# Patient Record
Sex: FEMALE | Race: WHITE | Employment: OTHER | URBAN - METROPOLITAN AREA
[De-identification: names, ages, dates, MRNs, and addresses within clinical notes are randomized per-mention and may not be internally consistent; named-entity substitution may affect disease eponyms.]

---

## 2017-01-03 ENCOUNTER — APPOINTMENT (OUTPATIENT)
Dept: ULTRASOUND IMAGING | Facility: HOSPITAL | Age: 77
End: 2017-01-03

## 2017-01-03 ENCOUNTER — HOSPITAL ENCOUNTER (EMERGENCY)
Facility: HOSPITAL | Age: 77
Discharge: NURSING, REHAB OR LONG-TERM CARE | End: 2017-01-04
Attending: EMERGENCY MEDICINE

## 2017-01-03 ENCOUNTER — APPOINTMENT (OUTPATIENT)
Dept: CT IMAGING | Facility: HOSPITAL | Age: 77
End: 2017-01-03
Attending: EMERGENCY MEDICINE

## 2017-01-03 ENCOUNTER — TELEPHONE (OUTPATIENT)
Dept: SURGERY | Facility: CLINIC | Age: 77
End: 2017-01-03

## 2017-01-03 DIAGNOSIS — I82.431 ACUTE DEEP VEIN THROMBOSIS (DVT) OF POPLITEAL VEIN OF RIGHT LOWER EXTREMITY (HCC): Primary | ICD-10-CM

## 2017-01-03 LAB
ALBUMIN SERPL-MCNC: 3.2 G/DL (ref 3.5–4.8)
ALP LIVER SERPL-CCNC: 226 U/L (ref 55–142)
ALT SERPL-CCNC: 81 U/L (ref 14–54)
APTT PPP: 30 SECONDS (ref 25–34)
AST SERPL-CCNC: 64 U/L (ref 15–41)
BASOPHILS # BLD AUTO: 0.04 X10(3) UL (ref 0–0.1)
BASOPHILS NFR BLD AUTO: 0.3 %
BILIRUB SERPL-MCNC: 0.5 MG/DL (ref 0.1–2)
BUN BLD-MCNC: 22 MG/DL (ref 8–20)
CALCIUM BLD-MCNC: 9.3 MG/DL (ref 8.3–10.3)
CHLORIDE: 99 MMOL/L (ref 101–111)
CO2: 28 MMOL/L (ref 22–32)
CREAT BLD-MCNC: 1.22 MG/DL (ref 0.55–1.02)
EOSINOPHIL # BLD AUTO: 0.04 X10(3) UL (ref 0–0.3)
EOSINOPHIL NFR BLD AUTO: 0.3 %
ERYTHROCYTE [DISTWIDTH] IN BLOOD BY AUTOMATED COUNT: 13.8 % (ref 11.5–16)
GLUCOSE BLD-MCNC: 115 MG/DL (ref 70–99)
HCT VFR BLD AUTO: 36.6 % (ref 34–50)
HGB BLD-MCNC: 12.3 G/DL (ref 12–16)
IMMATURE GRANULOCYTE COUNT: 0.07 X10(3) UL (ref 0–1)
IMMATURE GRANULOCYTE RATIO %: 0.6 %
INR BLD: 0.99 (ref 0.89–1.12)
LYMPHOCYTES # BLD AUTO: 1.34 X10(3) UL (ref 0.9–4)
LYMPHOCYTES NFR BLD AUTO: 11.1 %
M PROTEIN MFR SERPL ELPH: 7 G/DL (ref 6.1–8.3)
MCH RBC QN AUTO: 32.7 PG (ref 27–33.2)
MCHC RBC AUTO-ENTMCNC: 33.6 G/DL (ref 31–37)
MCV RBC AUTO: 97.3 FL (ref 81–100)
MONOCYTES # BLD AUTO: 0.74 X10(3) UL (ref 0.1–0.6)
MONOCYTES NFR BLD AUTO: 6.2 %
NEUTROPHIL ABS PRELIM: 9.8 X10 (3) UL (ref 1.3–6.7)
NEUTROPHILS # BLD AUTO: 9.8 X10(3) UL (ref 1.3–6.7)
NEUTROPHILS NFR BLD AUTO: 81.5 %
PLATELET # BLD AUTO: 127 10(3)UL (ref 150–450)
POTASSIUM SERPL-SCNC: 4.3 MMOL/L (ref 3.6–5.1)
PSA SERPL DL<=0.01 NG/ML-MCNC: 13.4 SECONDS (ref 12.3–14.8)
RBC # BLD AUTO: 3.76 X10(6)UL (ref 3.8–5.1)
RED CELL DISTRIBUTION WIDTH-SD: 49.3 FL (ref 35.1–46.3)
SODIUM SERPL-SCNC: 134 MMOL/L (ref 136–144)
WBC # BLD AUTO: 12 X10(3) UL (ref 4–13)

## 2017-01-03 PROCEDURE — 85025 COMPLETE CBC W/AUTO DIFF WBC: CPT

## 2017-01-03 PROCEDURE — 99285 EMERGENCY DEPT VISIT HI MDM: CPT

## 2017-01-03 PROCEDURE — 96372 THER/PROPH/DIAG INJ SC/IM: CPT

## 2017-01-03 PROCEDURE — 85610 PROTHROMBIN TIME: CPT

## 2017-01-03 PROCEDURE — 80053 COMPREHEN METABOLIC PANEL: CPT

## 2017-01-03 PROCEDURE — 85730 THROMBOPLASTIN TIME PARTIAL: CPT

## 2017-01-03 PROCEDURE — 93971 EXTREMITY STUDY: CPT

## 2017-01-03 NOTE — TELEPHONE ENCOUNTER
Returned call to Dr. Lorenza Price, who is not on unit at this time. Left message with unit secretary to have Dr. Lorenza Price contact Dr. Harvinder Olivera directly, provided Dr. Dhaval Oates cell # for contact.

## 2017-01-04 ENCOUNTER — APPOINTMENT (OUTPATIENT)
Dept: CT IMAGING | Facility: HOSPITAL | Age: 77
End: 2017-01-04
Attending: EMERGENCY MEDICINE

## 2017-01-04 VITALS
HEIGHT: 66 IN | BODY MASS INDEX: 37.12 KG/M2 | DIASTOLIC BLOOD PRESSURE: 62 MMHG | HEART RATE: 69 BPM | TEMPERATURE: 98 F | WEIGHT: 231 LBS | OXYGEN SATURATION: 98 % | SYSTOLIC BLOOD PRESSURE: 101 MMHG | RESPIRATION RATE: 20 BRPM

## 2017-01-04 PROCEDURE — 71275 CT ANGIOGRAPHY CHEST: CPT

## 2017-01-04 PROCEDURE — 70450 CT HEAD/BRAIN W/O DYE: CPT

## 2017-01-04 RX ORDER — HEPARIN SODIUM 5000 [USP'U]/ML
5000 INJECTION, SOLUTION INTRAVENOUS; SUBCUTANEOUS ONCE
Status: COMPLETED | OUTPATIENT
Start: 2017-01-04 | End: 2017-01-04

## 2017-01-04 NOTE — ED NOTES
Assumed care of patient, sleeping at this time. No apparent distress noted, awaiting results of testing.

## 2017-01-04 NOTE — ED NOTES
Patient incontinent of urine, cleaned and dressed for discharge back to [facility]. Up to bathroom with walker and assistance.

## 2017-01-04 NOTE — ED PROVIDER NOTES
Patient Seen in: [facility] Emergency Department    History   Patient presents with:  Swelling Edema (cardiovascular, metabolic)    Stated Complaint: BLE swelling    HPI    Patient is a 44-year-old female comes into emergency room for evaluation of bilat 1990s, both severe OA   • Bilateral shoulder region arthritis 6/18/2015     Severe both shoulders   • Frozen shoulder syndrome 6/18/2015     bilat severe   • Varicose vein 9/18/2015   • Cerebral atrophy 11/18/2015   • Neuropathy 9/16/2016     Sev MOUTH TWO TIMES A DAY WITH MEALS   Atorvastatin Calcium 10 MG Oral Tab,  Take 15 mg by mouth nightly. Cholecalciferol (VITAMIN D) 2000 UNITS Oral Cap,  Take 2,000 Units by mouth every 7 days.    Potassium Chloride ER 20 MEQ Oral Tab CR,  Take 20 mEq by mo appearing and non-toxic, Alert and oriented X 3   HEENT: Normocephalic, atraumatic. Moist mucous membranes. Pupils equal round reactive to light accommodation, extraocular motion is intact, sclerae white, conjunctiva is pink.   Oropharynx is unremarkable, created for panel order CBC WITH DIFFERENTIAL WITH PLATELET.   Procedure                               Abnormality         Status                     ---------                               -----------         ------                     CBC W/ DIFFERENTIAL[ dose.  I spoke with Dr. Cheryl Franco from hematology and he was okay with this as well.  [facility] did not want patient anticoagulated further than prophylactic dosing above. MDM     30-year-old female with DVT right lower extremity.   Patient already has IVC fi

## 2017-01-04 NOTE — ED NOTES
Pt requesting food. Pt given turkey lunch box to eat & water to drink. ER Phys advised it was okay for Pt to eat.  Pt given warm blankets

## 2017-01-05 ENCOUNTER — HOSPITAL ENCOUNTER (INPATIENT)
Facility: HOSPITAL | Age: 77
LOS: 26 days | Discharge: NURSING, REHAB OR LONG-TERM CARE | DRG: 252 | End: 2017-01-31
Attending: INTERNAL MEDICINE | Admitting: INTERNAL MEDICINE
Payer: MEDICARE

## 2017-01-05 ENCOUNTER — HOSPITAL ENCOUNTER (EMERGENCY)
Facility: HOSPITAL | Age: 77
Discharge: STILL ADMITTED OR ADMINISTRATIVE | DRG: 252 | End: 2017-01-08
Attending: INTERNAL MEDICINE
Payer: MEDICARE

## 2017-01-05 DIAGNOSIS — I50.9 CONGESTIVE HEART FAILURE, UNSPECIFIED CONGESTIVE HEART FAILURE CHRONICITY, UNSPECIFIED CONGESTIVE HEART FAILURE TYPE: ICD-10-CM

## 2017-01-05 DIAGNOSIS — Z98.890 S/P CRANIOTOMY: ICD-10-CM

## 2017-01-05 DIAGNOSIS — I82.411 ACUTE DEEP VEIN THROMBOSIS (DVT) OF FEMORAL VEIN OF RIGHT LOWER EXTREMITY (HCC): Primary | ICD-10-CM

## 2017-01-05 DIAGNOSIS — I62.9 INTRACRANIAL HEMORRHAGE (HCC): ICD-10-CM

## 2017-01-05 DIAGNOSIS — I48.91 ATRIAL FIBRILLATION, UNSPECIFIED TYPE (HCC): ICD-10-CM

## 2017-01-05 DIAGNOSIS — I62.00 SUBDURAL HEMORRHAGE (HCC): ICD-10-CM

## 2017-01-05 PROBLEM — I82.401 RIGHT LEG DVT (HCC): Status: ACTIVE | Noted: 2017-01-05

## 2017-01-05 LAB — GLUCOSE BLD-MCNC: 112 MG/DL (ref 65–99)

## 2017-01-05 PROCEDURE — 99223 1ST HOSP IP/OBS HIGH 75: CPT | Performed by: HOSPITALIST

## 2017-01-05 RX ORDER — TRAZODONE HYDROCHLORIDE 50 MG/1
50 TABLET ORAL NIGHTLY
Status: DISCONTINUED | OUTPATIENT
Start: 2017-01-05 | End: 2017-01-06

## 2017-01-05 RX ORDER — LOSARTAN POTASSIUM 25 MG/1
25 TABLET ORAL DAILY
Status: DISCONTINUED | OUTPATIENT
Start: 2017-01-06 | End: 2017-01-05

## 2017-01-05 RX ORDER — HYDROCODONE BITARTRATE AND ACETAMINOPHEN 5; 325 MG/1; MG/1
1 TABLET ORAL 2 TIMES DAILY PRN
Status: ON HOLD | COMMUNITY
End: 2017-11-03

## 2017-01-05 RX ORDER — TORSEMIDE 20 MG/1
20 TABLET ORAL DAILY
Status: DISCONTINUED | OUTPATIENT
Start: 2017-01-05 | End: 2017-01-17

## 2017-01-05 RX ORDER — METOPROLOL TARTRATE 50 MG/1
50 TABLET, FILM COATED ORAL
Status: DISCONTINUED | OUTPATIENT
Start: 2017-01-05 | End: 2017-01-31

## 2017-01-05 RX ORDER — HEPARIN SODIUM 5000 [USP'U]/ML
5000 INJECTION, SOLUTION INTRAVENOUS; SUBCUTANEOUS EVERY 12 HOURS
COMMUNITY
End: 2017-01-10

## 2017-01-05 RX ORDER — ALFUZOSIN HYDROCHLORIDE 10 MG/1
10 TABLET, EXTENDED RELEASE ORAL
Status: DISCONTINUED | OUTPATIENT
Start: 2017-01-05 | End: 2017-01-31

## 2017-01-05 RX ORDER — ALPRAZOLAM 0.25 MG/1
0.25 TABLET ORAL 3 TIMES DAILY PRN
Status: DISCONTINUED | OUTPATIENT
Start: 2017-01-05 | End: 2017-01-31

## 2017-01-05 RX ORDER — SULFAMETHOXAZOLE AND TRIMETHOPRIM 800; 160 MG/1; MG/1
1 TABLET ORAL 2 TIMES DAILY
Status: DISCONTINUED | OUTPATIENT
Start: 2017-01-05 | End: 2017-01-07

## 2017-01-05 RX ORDER — ONDANSETRON 4 MG/1
4 TABLET, ORALLY DISINTEGRATING ORAL EVERY 8 HOURS PRN
Status: ON HOLD | COMMUNITY
End: 2017-11-03

## 2017-01-05 RX ORDER — MORPHINE SULFATE 2 MG/ML
1 INJECTION, SOLUTION INTRAMUSCULAR; INTRAVENOUS EVERY 2 HOUR PRN
Status: DISCONTINUED | OUTPATIENT
Start: 2017-01-05 | End: 2017-01-21

## 2017-01-05 RX ORDER — METOCLOPRAMIDE HYDROCHLORIDE 5 MG/ML
10 INJECTION INTRAMUSCULAR; INTRAVENOUS EVERY 8 HOURS PRN
Status: DISCONTINUED | OUTPATIENT
Start: 2017-01-05 | End: 2017-01-07

## 2017-01-05 RX ORDER — BETHANECHOL CHLORIDE 10 MG/1
20 TABLET ORAL 3 TIMES DAILY
Status: DISCONTINUED | OUTPATIENT
Start: 2017-01-05 | End: 2017-01-31

## 2017-01-05 RX ORDER — HYDROCODONE BITARTRATE AND ACETAMINOPHEN 5; 325 MG/1; MG/1
1 TABLET ORAL 2 TIMES DAILY PRN
Status: DISCONTINUED | OUTPATIENT
Start: 2017-01-05 | End: 2017-01-06

## 2017-01-05 RX ORDER — BETHANECHOL CHLORIDE 10 MG/1
20 TABLET ORAL 3 TIMES DAILY
COMMUNITY
End: 2017-01-10

## 2017-01-05 RX ORDER — DOCUSATE SODIUM 100 MG/1
100 CAPSULE, LIQUID FILLED ORAL 2 TIMES DAILY
Status: DISCONTINUED | OUTPATIENT
Start: 2017-01-05 | End: 2017-01-31

## 2017-01-05 RX ORDER — SODIUM POLYSTYRENE SULFONATE 15 G/60ML
15 SUSPENSION ORAL; RECTAL ONCE
Status: DISCONTINUED | OUTPATIENT
Start: 2017-01-05 | End: 2017-01-31

## 2017-01-05 RX ORDER — POLYETHYLENE GLYCOL 3350 17 G/17G
17 POWDER, FOR SOLUTION ORAL DAILY PRN
Status: DISCONTINUED | OUTPATIENT
Start: 2017-01-05 | End: 2017-01-26

## 2017-01-05 RX ORDER — MORPHINE SULFATE 2 MG/ML
2 INJECTION, SOLUTION INTRAMUSCULAR; INTRAVENOUS EVERY 2 HOUR PRN
Status: DISCONTINUED | OUTPATIENT
Start: 2017-01-05 | End: 2017-01-21

## 2017-01-05 RX ORDER — TRAZODONE HYDROCHLORIDE 50 MG/1
25 TABLET ORAL NIGHTLY
Status: DISCONTINUED | OUTPATIENT
Start: 2017-01-05 | End: 2017-01-10

## 2017-01-05 RX ORDER — ACETAMINOPHEN 325 MG/1
650 TABLET ORAL EVERY 6 HOURS PRN
Status: DISCONTINUED | OUTPATIENT
Start: 2017-01-05 | End: 2017-01-31

## 2017-01-05 RX ORDER — SULFAMETHOXAZOLE AND TRIMETHOPRIM 800; 160 MG/1; MG/1
1 TABLET ORAL 2 TIMES DAILY
COMMUNITY
End: 2017-01-10

## 2017-01-05 RX ORDER — HEPARIN SODIUM 5000 [USP'U]/ML
5000 INJECTION, SOLUTION INTRAVENOUS; SUBCUTANEOUS EVERY 12 HOURS SCHEDULED
Status: DISCONTINUED | OUTPATIENT
Start: 2017-01-05 | End: 2017-01-06

## 2017-01-05 RX ORDER — ONDANSETRON 2 MG/ML
4 INJECTION INTRAMUSCULAR; INTRAVENOUS EVERY 6 HOURS PRN
Status: DISCONTINUED | OUTPATIENT
Start: 2017-01-05 | End: 2017-01-09 | Stop reason: SDUPTHER

## 2017-01-05 RX ORDER — ATORVASTATIN CALCIUM 10 MG/1
15 TABLET, FILM COATED ORAL NIGHTLY
Status: DISCONTINUED | OUTPATIENT
Start: 2017-01-05 | End: 2017-01-31

## 2017-01-05 RX ORDER — TAMSULOSIN HYDROCHLORIDE 0.4 MG/1
0.4 CAPSULE ORAL NIGHTLY
COMMUNITY
End: 2017-01-10

## 2017-01-05 RX ORDER — DILTIAZEM HYDROCHLORIDE 120 MG/1
120 CAPSULE, EXTENDED RELEASE ORAL DAILY
Status: DISCONTINUED | OUTPATIENT
Start: 2017-01-05 | End: 2017-01-28

## 2017-01-05 RX ORDER — BISACODYL 10 MG
10 SUPPOSITORY, RECTAL RECTAL
Status: DISCONTINUED | OUTPATIENT
Start: 2017-01-05 | End: 2017-01-31

## 2017-01-05 RX ORDER — TRAZODONE HYDROCHLORIDE 50 MG/1
50 TABLET ORAL NIGHTLY
COMMUNITY
End: 2017-01-10

## 2017-01-05 RX ORDER — DEXTROSE MONOHYDRATE 25 G/50ML
50 INJECTION, SOLUTION INTRAVENOUS
Status: DISCONTINUED | OUTPATIENT
Start: 2017-01-05 | End: 2017-01-31

## 2017-01-05 RX ORDER — DIGOXIN 125 MCG
125 TABLET ORAL DAILY
Status: DISCONTINUED | OUTPATIENT
Start: 2017-01-06 | End: 2017-01-07

## 2017-01-05 RX ORDER — DOCUSATE SODIUM 100 MG/1
100 CAPSULE, LIQUID FILLED ORAL 2 TIMES DAILY
COMMUNITY
End: 2018-02-28 | Stop reason: ALTCHOICE

## 2017-01-05 RX ORDER — SODIUM PHOSPHATE, DIBASIC AND SODIUM PHOSPHATE, MONOBASIC 7; 19 G/133ML; G/133ML
1 ENEMA RECTAL ONCE AS NEEDED
Status: ACTIVE | OUTPATIENT
Start: 2017-01-05 | End: 2017-01-05

## 2017-01-06 ENCOUNTER — TELEPHONE (OUTPATIENT)
Dept: HEMATOLOGY/ONCOLOGY | Facility: HOSPITAL | Age: 77
End: 2017-01-06

## 2017-01-06 ENCOUNTER — APPOINTMENT (OUTPATIENT)
Dept: ULTRASOUND IMAGING | Facility: HOSPITAL | Age: 77
DRG: 252 | End: 2017-01-06
Attending: INTERNAL MEDICINE
Payer: MEDICARE

## 2017-01-06 LAB
APTT PPP: 32.2 SECONDS (ref 25–34)
BUN BLD-MCNC: 20 MG/DL (ref 8–20)
CALCIUM BLD-MCNC: 8.6 MG/DL (ref 8.3–10.3)
CHLORIDE: 98 MMOL/L (ref 101–111)
CK: 14 IU/L (ref 26–192)
CK: 15 IU/L (ref 26–192)
CO2: 27 MMOL/L (ref 22–32)
CREAT BLD-MCNC: 1.21 MG/DL (ref 0.55–1.02)
D-DIMER: 10.6 UG/ML FEU (ref 0–0.49)
GLUCOSE BLD-MCNC: 118 MG/DL (ref 70–99)
GLUCOSE BLD-MCNC: 122 MG/DL (ref 65–99)
GLUCOSE BLD-MCNC: 125 MG/DL (ref 65–99)
GLUCOSE BLD-MCNC: 141 MG/DL (ref 65–99)
GLUCOSE BLD-MCNC: 143 MG/DL (ref 65–99)
POTASSIUM SERPL-SCNC: 4.8 MMOL/L (ref 3.6–5.1)
SODIUM SERPL-SCNC: 135 MMOL/L (ref 136–144)
TROPONIN: <0.046 NG/ML (ref ?–0.05)
TROPONIN: <0.046 NG/ML (ref ?–0.05)

## 2017-01-06 PROCEDURE — 99222 1ST HOSP IP/OBS MODERATE 55: CPT | Performed by: INTERNAL MEDICINE

## 2017-01-06 PROCEDURE — 99232 SBSQ HOSP IP/OBS MODERATE 35: CPT | Performed by: INTERNAL MEDICINE

## 2017-01-06 PROCEDURE — 93970 EXTREMITY STUDY: CPT

## 2017-01-06 RX ORDER — HYDROMORPHONE HYDROCHLORIDE 1 MG/ML
0.2 INJECTION, SOLUTION INTRAMUSCULAR; INTRAVENOUS; SUBCUTANEOUS EVERY 2 HOUR PRN
Status: DISCONTINUED | OUTPATIENT
Start: 2017-01-06 | End: 2017-01-06

## 2017-01-06 RX ORDER — HEPARIN SODIUM 5000 [USP'U]/ML
5000 INJECTION INTRAVENOUS; SUBCUTANEOUS ONCE
Status: COMPLETED | OUTPATIENT
Start: 2017-01-06 | End: 2017-01-06

## 2017-01-06 RX ORDER — HEPARIN SODIUM AND DEXTROSE 10000; 5 [USP'U]/100ML; G/100ML
1000 INJECTION INTRAVENOUS ONCE
Status: COMPLETED | OUTPATIENT
Start: 2017-01-06 | End: 2017-01-06

## 2017-01-06 RX ORDER — HYDROCODONE BITARTRATE AND ACETAMINOPHEN 10; 325 MG/1; MG/1
1 TABLET ORAL EVERY 4 HOURS PRN
Status: DISCONTINUED | OUTPATIENT
Start: 2017-01-06 | End: 2017-01-31

## 2017-01-06 RX ORDER — HEPARIN SODIUM AND DEXTROSE 10000; 5 [USP'U]/100ML; G/100ML
INJECTION INTRAVENOUS CONTINUOUS
Status: DISCONTINUED | OUTPATIENT
Start: 2017-01-07 | End: 2017-01-09

## 2017-01-06 RX ORDER — ALPRAZOLAM 0.25 MG/1
0.25 TABLET ORAL ONCE
Status: COMPLETED | OUTPATIENT
Start: 2017-01-06 | End: 2017-01-07

## 2017-01-06 NOTE — CONSULTS
[facility]  Report of Consultation    Leonardo Gloria Patient Status:  Inpatient    1940 MRN [id number]   [facility] 7NE-A Attending Melanie Nova MD   Hosp Day # 1 PCP Prosper Hutson MD     Reason for Consultation:  Right leg DVT, middle/lower lobe pulmonary emboli.  Heparin sub-q was initially started on 12/20/2016.  Hematology was consulted and recommended against anticoagulation, in lieu of IVC filter in place and SDH.  By 12/22 she was deemed appropriate for discharge back to [facility] 14-17mm Hg. Impressions:  This study is compared with previous dated 10/03/2014:  IMPROVED EF WHEN COMPARED. Stress Test: 08/2015  1. Normal rest/stress gated SPECT myocardial perfusion scan.   2. Left ventricular enlargement with preserved overall LV s pressure    • Diabetes (HCC)    • Stroke [facility])    • Migraines    • Muscle weakness    • Rheumatoid arthritis [facility])    • Osteoarthritis          Past Surgical History    HYSTERECTOMY      APPENDECTOMY      HERNIA SURGERY  3/24/2011 Bud AGUAYO    Comment Repa Sulfamethoxazole-TMP DS (BACTRIM DS) 800-160 MG per tab 1 tablet, 1 tablet, Oral, BID  •  Alfuzosin HCl ER (UROXATRAL) 24 hr tab 10 mg, 10 mg, Oral, Daily with breakfast  •  torsemide (DEMADEX) tab 20 mg, 20 mg, Oral, Daily  •  TraZODone HCl (DESYREL) tab : 231 lb (104.781 kg)  12/22/16 : 224 lb 6.9 oz (101.8 kg)      General: Alert and oriented in no apparent distress. HEENT: No focal deficits. Neck: sl up, carotids 2+ no bruits. Cardiac: irreg irreg, no pathological murmur, rub or gallop.   Lungs: Clear is performed through the brain. Dose reduction techniques were used. PATIENT STATED HISTORY:  The patient has a subdural hematoma. She is unable to respond to questions. FINDINGS:            Stable CT appearance of the brain .  There are stable left subd 01/06/2017   CO2 27.0 01/06/2017    01/06/2017   CA 8.6 01/06/2017   TROP <0.046 01/06/2017   CK 14 01/06/2017   PGLU 125 01/06/2017           Impression:  1. Increased right leg pain with edema but no compartment sx at present time - with rec DVT. not better with initial partial tx - would be nice to go with full anticoagulation marianela risk of worsening ICH is definitely there - no winner    D/w pt and the Nurse  Thank you for consult! Devin Bowman M.D., F.A.C.C.   Advanced Heart Failure  [facility]

## 2017-01-06 NOTE — HISTORICAL OFFICE NOTE
[place]  [phone]  : 1940  ACCOUNT:  [id number]  PCP: Naman Velasquez M.D. CARDIOLOGIST: Roseline Sousa M.D. Hospital: [facility]  Admitted: 2016  Discharged:  2016    DISCHARGE SUMMARY    DISCHARGE DIAGNOSES:  1.   Acute bilate magnesium 500 mg p.o. daily; metformin 500 mg p.o. b.i.d.; metoprolol tartrate 50 mg p.o. b.i.d.; potassium chloride 20 mEq p.o. daily; multivitamin 1 p.o. daily; torsemide 20 mg p.o. b.i.d.      DISCHARGE INSTRUCTIONS: Ms. Zandra Viveros will follow up with  scar, ejection fraction is 45%. NYHA Class: 2  RISK FACTORS:  CAD - Hypertension Weight Family    REVIEW OF SYSTEMS:    CONS: no fever, chills, or recent weight changes. EYES: denies significant visual changes.  ENMT: denies difficulties with hearing, o Instructions                             09/28/16 *Amiodarone HCl       200MG     1 TABLET TWICE DAILY.                    09/28/16 *Coumadin             5MG       2.5mg on Sun and Wed, 5mg all other      03/21/16 *Losartan Potassium   25MG      1 TABLET D

## 2017-01-06 NOTE — PLAN OF CARE
Assumed care at 0730. A/Ox2-3. Appears to have mild expressive aphasia. Pt had severe anxiety this AM. Xanax given. Rare complaints of pain in RLE, tylenol given with relief. Pt up with walker and 2 assist to washroom. Pt declined getting into chair.

## 2017-01-06 NOTE — H&P
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 She has never used smokeless tobacco. She reports that she does not drink alcohol or use illicit drugs.     Family History:   Family History   Problem Relation Age of Onset   • Diabetes Father    • Heart Disease Father    • Diabetes Mother 400 mg by mouth daily. Disp:  Rfl:    alprazolam 0.25 MG Oral Tab Take 1 tablet (0.25 mg total) by mouth 2 (two) times daily as needed for Anxiety.  (Patient taking differently: Take 0.25 mg by mouth 3 (three) times daily as needed for Anxiety.  ) Disp: 1 noted  2. Patient is s/p IVC filter placement on 12/16  3. Not candidate for systemic anticoagulation  4. Continue prophylactic heparin  1. D/w Dr. Caban who is ok with this  5. Pain control  2. Atypical chest pain/SOB/nausea  1.  Patient reports pain relat

## 2017-01-06 NOTE — PLAN OF CARE
Assumed patient care at 2115. Direct admit from [facility]  VSS. AOx2-3. Forgetful. O2 3L. A fib per tele. Pacemaker on demand pacing  Morphine and Norco for pain  x1 dose reglan for nausea  DVT RLE, pink and swollen. SQ heparin  Medications per MAR.  Hold kayex

## 2017-01-06 NOTE — CONSULTS
[facility]  Report of Consultation    Radha Naylor Patient Status:  Inpatient    1940 MRN [id number]   [facility] 7NE-A Attending Jo Cortes MD   Hosp Day # 1 PCP Bettye Leonardo MD     Reason for Consultation:    DVT, history HIGH CHOLESTEROL    • Gallstones    • Breast cyst 6/29/2012   • Arrhythmia    • Other and unspecified hyperlipidemia    • Extrinsic asthma, unspecified    • Unspecified sleep apnea    • Visual impairment    • Cataract      RIGHT   • Lymph node enlargement INJECT EPIDURAL ANEST,LUMB,CONTINOUS  2012    ANGIOGRAM      US FNA LYMPH NODE, LEFT SH(CPT=76942,76827)  1/14/14    PACEMAKER/DEFIBRILLATOR      Comment Medtronic single chamber iCD    TOTAL KNEE REPLACEMENT      CARDIAC PACEMAKER PLACEMENT       Family H 101.6 kg (223 lb 15.8 oz), SpO2 100 %. General: Slightly agitated. HEENT: Moist mucous membranes. EOM-I. PERRL  Neck: No lymphadenopathy. No JVD. Respiratory: Clear to auscultation bilaterally. No wheezes. No rhonchi.   Cardiovascular: S1, S2.  Regula diagnosed 12/14/16 in rehab. She was started on Lovenox but suffered worsening of subdural hematoma. At that time anticoagulation was discontinued and IVC filter was placed on 12/16/16.   She has been maintained on low-dose prophylactic heparin 5000 U twi

## 2017-01-06 NOTE — PHYSICAL THERAPY NOTE
PHYSICAL THERAPY EVALUATION - INPATIENT     Room Number: 8955/0394-I  Evaluation Date: 1/6/2017  Type of Evaluation: Initial  Physician Order: PT Eval and Treat    Presenting Problem: admitted from [place] [address] due to acute R LE DVT  Reason for Therapy: Mobility syndrome 6/18/2015     bilat severe   • Varicose vein 9/18/2015   • Cerebral atrophy 11/18/2015   • Neuropathy 9/16/2016     Severe both legs   • A-fib [facility])    • Abnormal gait 11/25/2013   • Pulmonary HTN ([facility] [address] 10/10/2014   • NSVT (nonsustained ventricula ASSESSMENT  Rating: Unable to rate  Location: \"all over my body\"  Management Techniques: Activity promotion; Body mechanics;Breathing techniques;Relaxation;Repositioning    COGNITION  · Arousal/Alertness:  lethargic  · Orientation Level:  oriented to plac wheelchair, bedside commode, etc.): A Lot   -   Moving from lying on back to sitting on the side of the bed?: A Little   How much help from another person does the patient currently need. ..   -   Moving to and from a bed to a chair (including a wheelchair) bed;Needs met;Call light within reach;RN aware of session/findings; All patient questions and concerns addressed    ASSESSMENT     Patient is a 78year old female admitted 1/5/2017 for acute R LE DVT.    In this PT evaluation, the patient presents with the f established on 1/6/2017

## 2017-01-06 NOTE — PROGRESS NOTES
[facility]  Progress Note     Radha Oswaldr Patient Status:  Inpatient    1940 MRN [id number]   [facility] 7NE-A Attending Jo Cortes MD   Hosp Day # 1 PCP Bettye Leonardo MD     Chief Complaint: R leg pain    S: Patient awak • DiltiaZEM HCl ER Coated Beads  120 mg Oral Daily   • Heparin Sodium (Porcine)  5,000 Units Subcutaneous Q12H   • lacosamide  200 mg Oral BID   • Metoprolol Tartrate  50 mg Oral 2x Daily(Beta Blocker)   • Sulfamethoxazole-TMP DS  1 tablet Oral BID   • A date of discharge: tbd    Plan of care discussed with patient, RN. JIGAR Mcgrath  8:41 AM     Addendum:    I have seen and examined pt.  I agree with above  She is very sleepy this am    General: NAD  CVS: IR IR   RS: CTAB  Ext: right leg edema >>

## 2017-01-06 NOTE — PLAN OF CARE
NURSING ADMISSION NOTE      Patient admitted via cart  Oriented to room. Safety precautions initiated. Bed in low position. Call light in reach. Updated history and pta meds. Gave report to RN.

## 2017-01-07 ENCOUNTER — APPOINTMENT (OUTPATIENT)
Dept: CT IMAGING | Facility: HOSPITAL | Age: 77
DRG: 252 | End: 2017-01-07
Attending: NEUROLOGICAL SURGERY
Payer: MEDICARE

## 2017-01-07 LAB
APTT PPP: 63.2 SECONDS (ref 25–34)
BILIRUB UR QL STRIP.AUTO: NEGATIVE
BUN BLD-MCNC: 20 MG/DL (ref 8–20)
CALCIUM BLD-MCNC: 8.3 MG/DL (ref 8.3–10.3)
CHLORIDE: 99 MMOL/L (ref 101–111)
CO2: 28 MMOL/L (ref 22–32)
COLOR UR AUTO: YELLOW
CREAT BLD-MCNC: 1.23 MG/DL (ref 0.55–1.02)
ERYTHROCYTE [DISTWIDTH] IN BLOOD BY AUTOMATED COUNT: 13.8 % (ref 11.5–16)
GLUCOSE BLD-MCNC: 105 MG/DL (ref 70–99)
GLUCOSE BLD-MCNC: 110 MG/DL (ref 65–99)
GLUCOSE BLD-MCNC: 111 MG/DL (ref 65–99)
GLUCOSE BLD-MCNC: 126 MG/DL (ref 65–99)
GLUCOSE BLD-MCNC: 147 MG/DL (ref 65–99)
GLUCOSE UR STRIP.AUTO-MCNC: NEGATIVE MG/DL
HCT VFR BLD AUTO: 30 % (ref 34–50)
HGB BLD-MCNC: 10.2 G/DL (ref 12–16)
HYALINE CASTS #/AREA URNS AUTO: PRESENT /LPF
KETONES UR STRIP.AUTO-MCNC: NEGATIVE MG/DL
MCH RBC QN AUTO: 33 PG (ref 27–33.2)
MCHC RBC AUTO-ENTMCNC: 34 G/DL (ref 31–37)
MCV RBC AUTO: 97.1 FL (ref 81–100)
NITRITE UR QL STRIP.AUTO: NEGATIVE
PH UR STRIP.AUTO: 6 [PH] (ref 4.5–8)
PLATELET # BLD AUTO: 133 10(3)UL (ref 150–450)
POTASSIUM SERPL-SCNC: 4.1 MMOL/L (ref 3.6–5.1)
PROT UR STRIP.AUTO-MCNC: NEGATIVE MG/DL
RBC # BLD AUTO: 3.09 X10(6)UL (ref 3.8–5.1)
RED CELL DISTRIBUTION WIDTH-SD: 49.1 FL (ref 35.1–46.3)
SODIUM SERPL-SCNC: 136 MMOL/L (ref 136–144)
SP GR UR STRIP.AUTO: 1.02 (ref 1–1.03)
UROBILINOGEN UR STRIP.AUTO-MCNC: 2 MG/DL
WBC # BLD AUTO: 8.8 X10(3) UL (ref 4–13)
WBC #/AREA URNS AUTO: >50 /HPF

## 2017-01-07 PROCEDURE — 99232 SBSQ HOSP IP/OBS MODERATE 35: CPT | Performed by: INTERNAL MEDICINE

## 2017-01-07 PROCEDURE — 70450 CT HEAD/BRAIN W/O DYE: CPT

## 2017-01-07 RX ORDER — METOCLOPRAMIDE HYDROCHLORIDE 5 MG/ML
5 INJECTION INTRAMUSCULAR; INTRAVENOUS EVERY 8 HOURS PRN
Status: DISCONTINUED | OUTPATIENT
Start: 2017-01-07 | End: 2017-01-31

## 2017-01-07 NOTE — PROGRESS NOTES
[facility]    Progress Note    Alphonso Downs Patient Status:  Inpatient    1940 MRN [id number]   [facility] 7NE-A Attending Og Mcbride MD   Hosp Day # 2 PCP Casa Ledesma MD     Subjective:  Alphonso Downs is a(n) 78 year ol hyperlipidemia     Osteoarthrosis, unspecified whether generalized or localized, unspecified site     Congestive heart failure ([address]     Diabetes type 2, controlled ([address]     Atrial fibrillation ([address]     Obstructive sleep apnea (adult) (pediatric)     Ank ([facility] [address]     Acute deep vein thrombosis (DVT) of both lower extremities (HCC)     Acute nonintractable headache     Acute cystitis without hematuria     Leukocytosis     Pulmonary embolus (HCC)     S/P IVC filter     Right leg DVT (HCC)     Chronic a-fib ([facility] [facility]

## 2017-01-07 NOTE — PROGRESS NOTES
[facility] Progress Note  Subjective:  No right leg pain now but pain when up to BR per RN.       Objective:  109/57  Afebrile  AFib rare v pacing    I/O - 295   BUN/cr 20/1.23  Hgb 10.2  plt 133  PT 63    Neuro:sleeping but arousable  HEENT:no

## 2017-01-07 NOTE — PROGRESS NOTES
[facility]  Progress Note     Courtney Harrison Patient Status:  Inpatient    1940 MRN [id number]   [facility] 7NE-A Attending Mimi Onael MD   Hosp Day # 2 PCP Rosalia Marie MD     Chief Complaint: R leg pain    S: Patient star Sulfamethoxazole-TMP DS  1 tablet Oral BID   • Alfuzosin HCl ER  10 mg Oral Daily with breakfast   • torsemide  20 mg Oral Daily   • traZODone  25 mg Oral Nightly   • docusate sodium  100 mg Oral BID   • insulin aspart  1-5 Units Subcutaneous TID CC and HS not covering recent cx here, repeat UA and monitor PVRs q 6hrs today. Ha KIMBALL  8:27 AM     Addendum:    I have seen and examined pt this am   I agree with above.    She has some right leg pain , no CP/SOB /HA now     General: NAD  CVS: IR IR

## 2017-01-07 NOTE — CONSULTS
CONSULTATION - NEUROSURGEON    PATIENT NAME: Siddharth Bustamante, : 1940, 78year old female   MR# [id number] [facility]# [id number]    RE: DVTs in light of recent SDH evacuation    HPI:  78 F Hx traumatic Left SDH, s/p left craniotomy for SDH evacuation on  by mouth daily. Disp:  Rfl:  1/5/2017 at 0800   Magnesium 500 MG Oral Tab Take 400 mg by mouth daily. Disp:  Rfl:  1/5/2017 at 0800   alprazolam 0.25 MG Oral Tab Take 1 tablet (0.25 mg total) by mouth 2 (two) times daily as needed for Anxiety.  (Patient t found.    MEDICATIONS:  [unfilled]    ALLERGIES:    Lisinopril              Coughing  Mexitil [Mexiletine]    Rash     PAST MEDICAL HISTORY:  Past Medical History   Diagnosis Date   • Arthritis    • DIABETES      Type !!   • Diverticulitis    • HYPERTENS Surgical History    HYSTERECTOMY      APPENDECTOMY      HERNIA SURGERY  3/24/2011 [facility]    Comment Repair of Incarcerated Complex Ventral Hernia w/mesh, bilateral component separation, removal previous mesh, partial omentectomy, lysis of adhesions on 3 specified>, HD#1.     Patient Active Problem List:     Vitamin D deficiency     Breast cyst     Other and unspecified hyperlipidemia     Osteoarthrosis, unspecified whether generalized or localized, unspecified site     Congestive heart failure ([facility]     Janna Mccollum ([facility]     Partial symptomatic epilepsy with complex partial seizures, not intractable, without status epilepticus ([facility]     Acute deep vein thrombosis (DVT) of both lower extremities (HCC)     Acute nonintractable headache     Acute cystitis without hematu

## 2017-01-07 NOTE — PLAN OF CARE
Assumed care at 1900:  Pt alert and oriented. Baseline Expressive aphasia. Forgetful. Anxious. Pt given xanax as ordered. Vss. Afebrile. o2 sat >90% on 3L/NC. Home o2. afib on tele. Pt up with 1 assist with walker to [facility]. Voiding frequently.  Pt wit

## 2017-01-07 NOTE — PROGRESS NOTES
[facility] Pharmacy Note:  Renal Dose Adjustment    Petros Rocha has been prescribed metoclopramide (REGLAN) 10 mg intravenously every 8 hours prn. Estimated Creatinine Clearance: 37.6 mL/min (based on Cr of 1.23).     Her calculated creatinine clearance is <

## 2017-01-07 NOTE — PLAN OF CARE
Patient alert and oriented. Afib on tele. Room air. Patient is incontinent at times. Bradycardic this am - cards discontinued the digoxin and set up hold parameters for that cardizem. Pacer to adjusted too. Head ct completed in am.  Repeat tomorrow.

## 2017-01-08 ENCOUNTER — APPOINTMENT (OUTPATIENT)
Dept: CT IMAGING | Facility: HOSPITAL | Age: 77
DRG: 252 | End: 2017-01-08
Attending: INTERNAL MEDICINE
Payer: MEDICARE

## 2017-01-08 LAB
APTT PPP: 45.7 SECONDS (ref 25–34)
APTT PPP: 46.8 SECONDS (ref 25–34)
APTT PPP: 58.1 SECONDS (ref 25–34)
GLUCOSE BLD-MCNC: 129 MG/DL (ref 65–99)
GLUCOSE BLD-MCNC: 133 MG/DL (ref 65–99)
GLUCOSE BLD-MCNC: 137 MG/DL (ref 65–99)
GLUCOSE BLD-MCNC: 142 MG/DL (ref 65–99)
PLATELET # BLD AUTO: 161 10(3)UL (ref 150–450)

## 2017-01-08 PROCEDURE — 70450 CT HEAD/BRAIN W/O DYE: CPT

## 2017-01-08 PROCEDURE — 99232 SBSQ HOSP IP/OBS MODERATE 35: CPT | Performed by: INTERNAL MEDICINE

## 2017-01-08 NOTE — PROGRESS NOTES
[facility]    Progress Note    Selina Turner Patient Status:  Inpatient    1940 MRN [id number]   [facility] 7NE-A Attending Esther Dior MD   Hosp Day # 3 PCP Woodrow Holman MD     Subjective:  Selina Turner is a(n) 78 year ol Atypical lymphoproliferative disorder (HCC)     Asthma     Gallstones     Neutrophilia     Leg swelling     Pericardial effusion     Fatigue     Hypokalemia     NSVT (nonsustained ventricular tachycardia) (HCC)     Hearing loss     Pulmonary HTN ([address] progression since her last u/s with increasing discomfort prompted restarting the heparin gtt. Neurosurgery approved, but recommended daily head CT. CT head this am pending. Yesterday's was improved. Continue heparin gtt for now.   Neurosurgery has recom

## 2017-01-08 NOTE — PLAN OF CARE
Assumed care at 1900:  Pt alert and oriented. Forgetful at times. Expressive aphasia. Vss. Afebrile. o2 sat >90% on 2L/NC. afib on tele. Paced at times. Hr 40's with sleep  Anxious. Pt given xanax as ordered. Pt with incontinent at times.    Urine c

## 2017-01-08 NOTE — PLAN OF CARE
Patient alert and oriented. 1.5L o2. afib on tele. Rates controlled better today, very few marissa episodes. Right leg more painful today with worsening edema and swelling. Plan is for lytic therapy with Rola Milligan tomorrow.      CARDIOVASCULAR - ADULT    •

## 2017-01-08 NOTE — PROGRESS NOTES
Cardiology addendum:  On revisit her AFib is now in the 50-60s after holding Dig and Lopressor. For now will not increase pacing rate of ICD. Revisit tomorrow.   Arnol Cobb NP

## 2017-01-08 NOTE — PROGRESS NOTES
[facility]  Progress Note     Alphonso Chandler Patient Status:  Inpatient    1940 MRN [id number]   [facility] 7NE-A Attending Og Mcbride MD   Hosp Day # 3 PCP Casa Ledesma MD     Chief Complaint: leg pain     S: Patient slept docusate sodium  100 mg Oral BID   • insulin aspart  1-5 Units Subcutaneous TID CC and HS   • Sodium Polystyrene Sulfonate  15 g Oral Once       ASSESSMENT / PLAN:     1. Acute RLE pain secondary to DVT  1. s/p IVC filter placement on 12/16  2.  Venous U/S

## 2017-01-08 NOTE — OCCUPATIONAL THERAPY NOTE
Attempted to see pt for initial evaluation. Pt in chair when therapist entered the room. Pt stated she \"feels terrible today\" and refused therapy session, declined to transfer to bed and need for toileting. Will attempt OT evaluation 1/9/17.

## 2017-01-08 NOTE — PROGRESS NOTES
[facility] Progress Note  Subjective:  Up in chair - left leg pain and edema  is worse today.         Objective:  108/51    Afebrile  AFib rare v pacing    I/O incomplete    PTT 45.7    Neuro:very sleepy but appropriate  HEENT:noJVD  Cardiac:S1

## 2017-01-09 ENCOUNTER — APPOINTMENT (OUTPATIENT)
Dept: CT IMAGING | Facility: HOSPITAL | Age: 77
DRG: 252 | End: 2017-01-09
Attending: NEUROLOGICAL SURGERY
Payer: MEDICARE

## 2017-01-09 ENCOUNTER — APPOINTMENT (OUTPATIENT)
Dept: INTERVENTIONAL RADIOLOGY/VASCULAR | Facility: HOSPITAL | Age: 77
DRG: 252 | End: 2017-01-09
Attending: INTERNAL MEDICINE
Payer: MEDICARE

## 2017-01-09 LAB
ANTIBODY SCREEN: NEGATIVE
APTT PPP: 51.2 SECONDS (ref 25–34)
APTT PPP: 59.5 SECONDS (ref 25–34)
BILIRUB UR QL STRIP.AUTO: NEGATIVE
BUN BLD-MCNC: 19 MG/DL (ref 8–20)
CALCIUM BLD-MCNC: 8.6 MG/DL (ref 8.3–10.3)
CHLORIDE: 98 MMOL/L (ref 101–111)
CO2: 29 MMOL/L (ref 22–32)
CREAT BLD-MCNC: 1.11 MG/DL (ref 0.55–1.02)
ERYTHROCYTE [DISTWIDTH] IN BLOOD BY AUTOMATED COUNT: 13.8 % (ref 11.5–16)
ERYTHROCYTE [DISTWIDTH] IN BLOOD BY AUTOMATED COUNT: 13.9 % (ref 11.5–16)
ERYTHROCYTE [DISTWIDTH] IN BLOOD BY AUTOMATED COUNT: 14 % (ref 11.5–16)
FIBRINOGEN: 179 MG/DL (ref 200–446)
FIBRINOGEN: 383 MG/DL (ref 200–446)
GLUCOSE BLD-MCNC: 117 MG/DL (ref 65–99)
GLUCOSE BLD-MCNC: 123 MG/DL (ref 65–99)
GLUCOSE BLD-MCNC: 123 MG/DL (ref 65–99)
GLUCOSE BLD-MCNC: 125 MG/DL (ref 70–99)
GLUCOSE UR STRIP.AUTO-MCNC: NEGATIVE MG/DL
HCT VFR BLD AUTO: 30.9 % (ref 34–50)
HCT VFR BLD AUTO: 31.3 % (ref 34–50)
HCT VFR BLD AUTO: 32.9 % (ref 34–50)
HGB BLD-MCNC: 10.5 G/DL (ref 12–16)
HGB BLD-MCNC: 10.6 G/DL (ref 12–16)
HGB BLD-MCNC: 10.8 G/DL (ref 12–16)
INR BLD: 1.06 (ref 0.89–1.12)
INR BLD: 1.26 (ref 0.89–1.12)
ISTAT ACTIVATED CLOTTING TIME: 131 SECONDS (ref 74–137)
ISTAT ACTIVATED CLOTTING TIME: 152 SECONDS (ref 74–137)
ISTAT ACTIVATED CLOTTING TIME: 188 SECONDS (ref 74–137)
KETONES UR STRIP.AUTO-MCNC: NEGATIVE MG/DL
MCH RBC QN AUTO: 32.1 PG (ref 27–33.2)
MCH RBC QN AUTO: 33.1 PG (ref 27–33.2)
MCH RBC QN AUTO: 33.5 PG (ref 27–33.2)
MCHC RBC AUTO-ENTMCNC: 32.8 G/DL (ref 31–37)
MCHC RBC AUTO-ENTMCNC: 33.5 G/DL (ref 31–37)
MCHC RBC AUTO-ENTMCNC: 34.3 G/DL (ref 31–37)
MCV RBC AUTO: 97.8 FL (ref 81–100)
MCV RBC AUTO: 97.9 FL (ref 81–100)
MCV RBC AUTO: 98.7 FL (ref 81–100)
NITRITE UR QL STRIP.AUTO: NEGATIVE
PH UR STRIP.AUTO: 5 [PH] (ref 4.5–8)
PLATELET # BLD AUTO: 146 10(3)UL (ref 150–450)
PLATELET # BLD AUTO: 173 10(3)UL (ref 150–450)
PLATELET # BLD AUTO: 176 10(3)UL (ref 150–450)
POTASSIUM SERPL-SCNC: 4 MMOL/L (ref 3.6–5.1)
PROT UR STRIP.AUTO-MCNC: 100 MG/DL
PSA SERPL DL<=0.01 NG/ML-MCNC: 14.1 SECONDS (ref 12.3–14.8)
PSA SERPL DL<=0.01 NG/ML-MCNC: 16.2 SECONDS (ref 12.3–14.8)
RBC # BLD AUTO: 3.16 X10(6)UL (ref 3.8–5.1)
RBC # BLD AUTO: 3.17 X10(6)UL (ref 3.8–5.1)
RBC # BLD AUTO: 3.36 X10(6)UL (ref 3.8–5.1)
RBC #/AREA URNS AUTO: >10 /HPF
RED CELL DISTRIBUTION WIDTH-SD: 49.9 FL (ref 35.1–46.3)
RED CELL DISTRIBUTION WIDTH-SD: 50.2 FL (ref 35.1–46.3)
RED CELL DISTRIBUTION WIDTH-SD: 50.3 FL (ref 35.1–46.3)
RH BLOOD TYPE: POSITIVE
SODIUM SERPL-SCNC: 135 MMOL/L (ref 136–144)
SP GR UR STRIP.AUTO: 1.02 (ref 1–1.03)
TROPONIN: <0.046 NG/ML (ref ?–0.05)
UROBILINOGEN UR STRIP.AUTO-MCNC: 2 MG/DL
WBC # BLD AUTO: 12.8 X10(3) UL (ref 4–13)
WBC # BLD AUTO: 14.3 X10(3) UL (ref 4–13)
WBC # BLD AUTO: 9.2 X10(3) UL (ref 4–13)
WBC #/AREA URNS AUTO: >50 /HPF
WBC CLUMPS UR QL AUTO: PRESENT

## 2017-01-09 PROCEDURE — 99232 SBSQ HOSP IP/OBS MODERATE 35: CPT | Performed by: INTERNAL MEDICINE

## 2017-01-09 PROCEDURE — 067C3ZZ DILATION OF RIGHT COMMON ILIAC VEIN, PERCUTANEOUS APPROACH: ICD-10-PCS | Performed by: INTERNAL MEDICINE

## 2017-01-09 PROCEDURE — 3E03317 INTRODUCTION OF OTHER THROMBOLYTIC INTO PERIPHERAL VEIN, PERCUTANEOUS APPROACH: ICD-10-PCS | Performed by: INTERNAL MEDICINE

## 2017-01-09 PROCEDURE — 70450 CT HEAD/BRAIN W/O DYE: CPT

## 2017-01-09 PROCEDURE — 067F3ZZ DILATION OF RIGHT EXTERNAL ILIAC VEIN, PERCUTANEOUS APPROACH: ICD-10-PCS | Performed by: INTERNAL MEDICINE

## 2017-01-09 PROCEDURE — B51BYZZ FLUOROSCOPY OF RIGHT LOWER EXTREMITY VEINS USING OTHER CONTRAST: ICD-10-PCS | Performed by: INTERNAL MEDICINE

## 2017-01-09 RX ORDER — ONDANSETRON 2 MG/ML
INJECTION INTRAMUSCULAR; INTRAVENOUS
Status: COMPLETED
Start: 2017-01-09 | End: 2017-01-09

## 2017-01-09 RX ORDER — MIDAZOLAM HYDROCHLORIDE 1 MG/ML
INJECTION INTRAMUSCULAR; INTRAVENOUS
Status: COMPLETED
Start: 2017-01-09 | End: 2017-01-09

## 2017-01-09 RX ORDER — MORPHINE SULFATE 2 MG/ML
1 INJECTION, SOLUTION INTRAMUSCULAR; INTRAVENOUS ONCE
Status: COMPLETED | OUTPATIENT
Start: 2017-01-09 | End: 2017-01-09

## 2017-01-09 RX ORDER — MORPHINE SULFATE 4 MG/ML
4 INJECTION, SOLUTION INTRAMUSCULAR; INTRAVENOUS EVERY 2 HOUR PRN
Status: DISCONTINUED | OUTPATIENT
Start: 2017-01-09 | End: 2017-01-31

## 2017-01-09 RX ORDER — MORPHINE SULFATE 2 MG/ML
2 INJECTION, SOLUTION INTRAMUSCULAR; INTRAVENOUS EVERY 2 HOUR PRN
Status: DISCONTINUED | OUTPATIENT
Start: 2017-01-09 | End: 2017-01-31

## 2017-01-09 RX ORDER — LIDOCAINE HYDROCHLORIDE 10 MG/ML
INJECTION, SOLUTION INFILTRATION; PERINEURAL
Status: COMPLETED
Start: 2017-01-09 | End: 2017-01-09

## 2017-01-09 RX ORDER — SODIUM CHLORIDE 9 MG/ML
INJECTION, SOLUTION INTRAVENOUS CONTINUOUS
Status: DISCONTINUED | OUTPATIENT
Start: 2017-01-09 | End: 2017-01-11

## 2017-01-09 RX ORDER — DIPHENHYDRAMINE HYDROCHLORIDE 50 MG/ML
INJECTION INTRAMUSCULAR; INTRAVENOUS
Status: COMPLETED
Start: 2017-01-09 | End: 2017-01-09

## 2017-01-09 RX ORDER — ONDANSETRON 2 MG/ML
4 INJECTION INTRAMUSCULAR; INTRAVENOUS EVERY 6 HOURS PRN
Status: DISCONTINUED | OUTPATIENT
Start: 2017-01-09 | End: 2017-01-10

## 2017-01-09 RX ORDER — HEPARIN SODIUM 5000 [USP'U]/ML
INJECTION, SOLUTION INTRAVENOUS; SUBCUTANEOUS
Status: COMPLETED
Start: 2017-01-09 | End: 2017-01-09

## 2017-01-09 RX ORDER — SODIUM CHLORIDE 9 MG/ML
INJECTION, SOLUTION INTRAVENOUS CONTINUOUS
Status: DISCONTINUED | OUTPATIENT
Start: 2017-01-09 | End: 2017-01-10

## 2017-01-09 RX ORDER — MORPHINE SULFATE 2 MG/ML
1 INJECTION, SOLUTION INTRAMUSCULAR; INTRAVENOUS EVERY 2 HOUR PRN
Status: DISCONTINUED | OUTPATIENT
Start: 2017-01-09 | End: 2017-01-31

## 2017-01-09 NOTE — PROGRESS NOTES
[facility]  Progress Note    [facility] Patient Status:  Inpatient    1940 MRN [id number]   [facility] 7NE-A Attending Neftali Santos MD   Hosp Day # 4 PCP Kate Montelongo MD       SUBJECTIVE:    Still c/o R leg pain.  No SOB/ches dextrose **OR** glucose **OR** Glucose-Vitamin C    PHYSICAL EXAM:    General: Patient is alert and oriented x 3, appears anxious. Chest: Clear to auscultation. Heart: Regular rate and rhythm. Abdomen: Soft, non tender with good bowel sounds.   Extremit

## 2017-01-09 NOTE — PROGRESS NOTES
[facility]  Progress Note     Olesya Balloon Patient Status:  Inpatient    1940 MRN [id number]   [facility] 7NE-A Attending Yissel Dudley MD   Hosp Day # 4 PCP Karen Anderson MD     Chief Complaint: leg pain     S: Patient Rufino Byrne Blocker)   • Alfuzosin HCl ER  10 mg Oral Daily with breakfast   • torsemide  20 mg Oral Daily   • traZODone  25 mg Oral Nightly   • docusate sodium  100 mg Oral BID   • insulin aspart  1-5 Units Subcutaneous TID CC and HS   • Sodium Polystyrene Sulfonate ND  Ext: ++ edema in right leg    Plan:  - thrombolysis today   - heparin drip  - to CCU after procedure  - +/- CT head today- monitor pt closely     D/w patient and her son in depth by bedside.      Delgado Clark MD  [facility]

## 2017-01-09 NOTE — PROGRESS NOTES
[facility] Neurosurgery Progress Note    Jesse Mcgraw Patient Status:  Inpatient    1940 MRN [id number]   [facility] 6NE-A Attending Bridger Lara MD   [facility] Day # 4 PCP Charly Reinoso MD     Subjective:  Jesse Mcgraw is a( afternoon after procedure. CT scan this evening was done and looks stable. Will look at CT in the morning. Continue close monitoring in ICU overnight. Ceferino Byrne

## 2017-01-09 NOTE — PROGRESS NOTES
Prelim angio    Ultrasound guided right popliteal vein access with micropuncture kit -- extremely smooth -- 5F sheath placed and ultimately exchanged for 8F sheath    Venography revealed extensive thrombosis from PV to right CI/IVC junction    IVC accessed

## 2017-01-09 NOTE — PLAN OF CARE
Voiding every 2-4hrs. PVR~90mls. PRN pain meds given for 9/10 neck and R leg pain. Reglan given prior to vimpat to prevent nausea per pt. Effective. NPO at midnight.     CARDIOVASCULAR - ADULT    • Maintains optimal cardiac output and hemodynamic sta

## 2017-01-09 NOTE — PROGRESS NOTES
[facility] Pharmacy Note: Antimicrobial Weight Dose Adjustment for: Ancef (cefazolin)    Clemencia Beltre is a 78year old female who has been prescribed Ancef (cefazolin). CrCl is estimated creatinine clearance is 41.6 mL/min (based on Cr of 1.11).  and pt has a w

## 2017-01-09 NOTE — PROGRESS NOTES
Case reviewed; pt with severe right leg pain but no clear cyanosis noted; tense edema noted of the whole right leg. Risks, benefits, alternatives of catheter directed thrombolysis discussed in detail with patient, son and daughter.   Risks explained incl

## 2017-01-09 NOTE — PLAN OF CARE
AO, anxious and forgetful, RA, Afib on tele  Denies pain but c/o nausea and feels like she will vomit  Reglan given- patient refusing Vimpat medication d/t nausea and stating \"I vomit every time I take that medication.  RN  reminded patient that she did no

## 2017-01-09 NOTE — PROGRESS NOTES
Patient taken to cath lab via transport for procedure. Consent obtained from daughter Iva Meyer over the phone at request of the patient. Confirmed with another RN.

## 2017-01-09 NOTE — OCCUPATIONAL THERAPY NOTE
Attempted to see pt for OT eval.  Pt confused, anxious, tearful. Refusing therapy at this time. Will re-attempt to see pt as able.

## 2017-01-10 ENCOUNTER — ANESTHESIA EVENT (OUTPATIENT)
Dept: PERIOP | Facility: HOSPITAL | Age: 77
DRG: 252 | End: 2017-01-10
Payer: MEDICARE

## 2017-01-10 ENCOUNTER — APPOINTMENT (OUTPATIENT)
Dept: INTERVENTIONAL RADIOLOGY/VASCULAR | Facility: HOSPITAL | Age: 77
DRG: 252 | End: 2017-01-10
Attending: INTERNAL MEDICINE
Payer: MEDICARE

## 2017-01-10 ENCOUNTER — ANESTHESIA (OUTPATIENT)
Dept: PERIOP | Facility: HOSPITAL | Age: 77
DRG: 252 | End: 2017-01-10
Payer: MEDICARE

## 2017-01-10 ENCOUNTER — APPOINTMENT (OUTPATIENT)
Dept: CT IMAGING | Facility: HOSPITAL | Age: 77
DRG: 252 | End: 2017-01-10
Attending: NEUROLOGICAL SURGERY
Payer: MEDICARE

## 2017-01-10 LAB
ALLENS TEST: POSITIVE
APTT PPP: 152.5 SECONDS (ref 25–34)
APTT PPP: 33.1 SECONDS (ref 25–34)
APTT PPP: 39.4 SECONDS (ref 25–34)
APTT PPP: 45 SECONDS (ref 25–34)
APTT PPP: 49.6 SECONDS (ref 25–34)
ARTERIAL BLD GAS O2 SATURATION: 97 % (ref 92–100)
ARTERIAL BLOOD GAS BASE EXCESS: -2.8
ARTERIAL BLOOD GAS HCO3: 22.3 MEQ/L (ref 22–26)
ARTERIAL BLOOD GAS PCO2: 40 MM HG (ref 35–45)
ARTERIAL BLOOD GAS PH: 7.36 (ref 7.35–7.45)
ARTERIAL BLOOD GAS PO2: 171 MM HG (ref 80–105)
ATRIAL RATE: 82 BPM
BUN BLD-MCNC: 19 MG/DL (ref 8–20)
CALCIUM BLD-MCNC: 8 MG/DL (ref 8.3–10.3)
CALCULATED O2 SATURATION: 99 % (ref 92–100)
CARBOXYHEMOGLOBIN: 1.9 % SAT (ref 0–3)
CHLORIDE: 108 MMOL/L (ref 101–111)
CO2: 26 MMOL/L (ref 22–32)
CREAT BLD-MCNC: 1.17 MG/DL (ref 0.55–1.02)
ERYTHROCYTE [DISTWIDTH] IN BLOOD BY AUTOMATED COUNT: 14 % (ref 11.5–16)
ERYTHROCYTE [DISTWIDTH] IN BLOOD BY AUTOMATED COUNT: 14.1 % (ref 11.5–16)
ERYTHROCYTE [DISTWIDTH] IN BLOOD BY AUTOMATED COUNT: 14.2 % (ref 11.5–16)
FIBRINOGEN: 152 MG/DL (ref 200–446)
FIBRINOGEN: 161 MG/DL (ref 200–446)
FIBRINOGEN: 169 MG/DL (ref 200–446)
FIBRINOGEN: 180 MG/DL (ref 200–446)
FIO2: 60 %
GLUCOSE BLD-MCNC: 102 MG/DL (ref 65–99)
GLUCOSE BLD-MCNC: 109 MG/DL (ref 65–99)
GLUCOSE BLD-MCNC: 116 MG/DL (ref 65–99)
GLUCOSE BLD-MCNC: 84 MG/DL (ref 70–99)
HCT VFR BLD AUTO: 27.1 % (ref 34–50)
HCT VFR BLD AUTO: 27.9 % (ref 34–50)
HCT VFR BLD AUTO: 28.5 % (ref 34–50)
HCT VFR BLD AUTO: 29 % (ref 34–50)
HCT VFR BLD AUTO: 29.1 % (ref 34–50)
HGB BLD-MCNC: 9.1 G/DL (ref 12–16)
HGB BLD-MCNC: 9.1 G/DL (ref 12–16)
HGB BLD-MCNC: 9.4 G/DL (ref 12–16)
HGB BLD-MCNC: 9.5 G/DL (ref 12–16)
HGB BLD-MCNC: 9.8 G/DL (ref 12–16)
INR BLD: 1.14 (ref 0.89–1.12)
INR BLD: 1.22 (ref 0.89–1.12)
INR BLD: 1.29 (ref 0.89–1.12)
INR BLD: 1.29 (ref 0.89–1.12)
IONIZED CALCIUM: 1.16 MMOL/L (ref 1.12–1.32)
LACTIC ACID ARTERIAL: <1.3 MMOL/L (ref 0.5–2)
MCH RBC QN AUTO: 32 PG (ref 27–33.2)
MCH RBC QN AUTO: 32.6 PG (ref 27–33.2)
MCH RBC QN AUTO: 33 PG (ref 27–33.2)
MCH RBC QN AUTO: 33.1 PG (ref 27–33.2)
MCH RBC QN AUTO: 33.5 PG (ref 27–33.2)
MCHC RBC AUTO-ENTMCNC: 31.9 G/DL (ref 31–37)
MCHC RBC AUTO-ENTMCNC: 32.8 G/DL (ref 31–37)
MCHC RBC AUTO-ENTMCNC: 33.6 G/DL (ref 31–37)
MCHC RBC AUTO-ENTMCNC: 33.7 G/DL (ref 31–37)
MCHC RBC AUTO-ENTMCNC: 33.7 G/DL (ref 31–37)
MCV RBC AUTO: 100.4 FL (ref 81–100)
MCV RBC AUTO: 98 FL (ref 81–100)
MCV RBC AUTO: 98.2 FL (ref 81–100)
MCV RBC AUTO: 99.6 FL (ref 81–100)
MCV RBC AUTO: 99.7 FL (ref 81–100)
METHEMOGLOBIN: 0.6 % SAT (ref 0.4–1.5)
PATIENT TEMPERATURE: 98.6 F
PEEP: 5 CM H2O
PLATELET # BLD AUTO: 126 10(3)UL (ref 150–450)
PLATELET # BLD AUTO: 131 10(3)UL (ref 150–450)
PLATELET # BLD AUTO: 131 10(3)UL (ref 150–450)
PLATELET # BLD AUTO: 133 10(3)UL (ref 150–450)
PLATELET # BLD AUTO: 139 10(3)UL (ref 150–450)
POTASSIUM BLOOD GAS: 3.2 MMOL/L (ref 3.6–5.1)
POTASSIUM SERPL-SCNC: 3.5 MMOL/L (ref 3.6–5.1)
PSA SERPL DL<=0.01 NG/ML-MCNC: 15 SECONDS (ref 12.3–14.8)
PSA SERPL DL<=0.01 NG/ML-MCNC: 15.8 SECONDS (ref 12.3–14.8)
PSA SERPL DL<=0.01 NG/ML-MCNC: 16.5 SECONDS (ref 12.3–14.8)
PSA SERPL DL<=0.01 NG/ML-MCNC: 16.5 SECONDS (ref 12.3–14.8)
Q-T INTERVAL: 422 MS
QRS DURATION: 164 MS
QTC CALCULATION (BEZET): 490 MS
R AXIS: -4 DEGREES
RBC # BLD AUTO: 2.72 X10(6)UL (ref 3.8–5.1)
RBC # BLD AUTO: 2.84 X10(6)UL (ref 3.8–5.1)
RBC # BLD AUTO: 2.84 X10(6)UL (ref 3.8–5.1)
RBC # BLD AUTO: 2.91 X10(6)UL (ref 3.8–5.1)
RBC # BLD AUTO: 2.97 X10(6)UL (ref 3.8–5.1)
RED CELL DISTRIBUTION WIDTH-SD: 50.2 FL (ref 35.1–46.3)
RED CELL DISTRIBUTION WIDTH-SD: 51.3 FL (ref 35.1–46.3)
RED CELL DISTRIBUTION WIDTH-SD: 51.4 FL (ref 35.1–46.3)
RED CELL DISTRIBUTION WIDTH-SD: 51.5 FL (ref 35.1–46.3)
RED CELL DISTRIBUTION WIDTH-SD: 51.8 FL (ref 35.1–46.3)
SODIUM BLOOD GAS: 139 MMOL/L (ref 136–144)
SODIUM SERPL-SCNC: 142 MMOL/L (ref 136–144)
T AXIS: 190 DEGREES
TIDAL VOLUME: 500 ML
TOTAL HEMOGLOBIN: 9.8 G/DL (ref 11.7–16)
VENT RATE: 14 /MIN
VENTRICULAR RATE: 81 BPM
WBC # BLD AUTO: 10.2 X10(3) UL (ref 4–13)
WBC # BLD AUTO: 10.7 X10(3) UL (ref 4–13)
WBC # BLD AUTO: 10.8 X10(3) UL (ref 4–13)
WBC # BLD AUTO: 9 X10(3) UL (ref 4–13)
WBC # BLD AUTO: 9.7 X10(3) UL (ref 4–13)

## 2017-01-10 PROCEDURE — 99232 SBSQ HOSP IP/OBS MODERATE 35: CPT | Performed by: INTERNAL MEDICINE

## 2017-01-10 PROCEDURE — 70450 CT HEAD/BRAIN W/O DYE: CPT

## 2017-01-10 PROCEDURE — 6A751Z6 ULTRASOUND THERAPY OF PERIPHERAL VESSELS, MULTIPLE: ICD-10-PCS | Performed by: INTERNAL MEDICINE

## 2017-01-10 PROCEDURE — 067C3ZZ DILATION OF RIGHT COMMON ILIAC VEIN, PERCUTANEOUS APPROACH: ICD-10-PCS | Performed by: INTERNAL MEDICINE

## 2017-01-10 PROCEDURE — B51BYZZ FLUOROSCOPY OF RIGHT LOWER EXTREMITY VEINS USING OTHER CONTRAST: ICD-10-PCS | Performed by: INTERNAL MEDICINE

## 2017-01-10 PROCEDURE — 067F3ZZ DILATION OF RIGHT EXTERNAL ILIAC VEIN, PERCUTANEOUS APPROACH: ICD-10-PCS | Performed by: INTERNAL MEDICINE

## 2017-01-10 RX ORDER — NALOXONE HYDROCHLORIDE 0.4 MG/ML
80 INJECTION, SOLUTION INTRAMUSCULAR; INTRAVENOUS; SUBCUTANEOUS AS NEEDED
Status: ACTIVE | OUTPATIENT
Start: 2017-01-10 | End: 2017-01-11

## 2017-01-10 RX ORDER — HYDROMORPHONE HYDROCHLORIDE 1 MG/ML
0.4 INJECTION, SOLUTION INTRAMUSCULAR; INTRAVENOUS; SUBCUTANEOUS EVERY 5 MIN PRN
Status: ACTIVE | OUTPATIENT
Start: 2017-01-10 | End: 2017-01-10

## 2017-01-10 RX ORDER — DEXMEDETOMIDINE HYDROCHLORIDE 4 UG/ML
INJECTION, SOLUTION INTRAVENOUS CONTINUOUS
Status: DISCONTINUED | OUTPATIENT
Start: 2017-01-10 | End: 2017-01-11

## 2017-01-10 RX ORDER — LIDOCAINE HYDROCHLORIDE 10 MG/ML
INJECTION, SOLUTION INFILTRATION; PERINEURAL
Status: COMPLETED
Start: 2017-01-10 | End: 2017-01-10

## 2017-01-10 RX ORDER — ONDANSETRON 2 MG/ML
4 INJECTION INTRAMUSCULAR; INTRAVENOUS EVERY 6 HOURS PRN
Status: DISCONTINUED | OUTPATIENT
Start: 2017-01-10 | End: 2017-01-31

## 2017-01-10 RX ORDER — HYDRALAZINE HYDROCHLORIDE 20 MG/ML
10 INJECTION INTRAMUSCULAR; INTRAVENOUS EVERY 4 HOURS PRN
Status: DISCONTINUED | OUTPATIENT
Start: 2017-01-10 | End: 2017-01-31

## 2017-01-10 RX ORDER — DIGOXIN 125 MCG
125 TABLET ORAL DAILY
Status: DISCONTINUED | OUTPATIENT
Start: 2017-01-10 | End: 2017-01-20

## 2017-01-10 RX ORDER — HEPARIN SODIUM 5000 [USP'U]/ML
INJECTION, SOLUTION INTRAVENOUS; SUBCUTANEOUS
Status: COMPLETED
Start: 2017-01-10 | End: 2017-01-10

## 2017-01-10 RX ORDER — SODIUM CHLORIDE 9 MG/ML
INJECTION, SOLUTION INTRAVENOUS CONTINUOUS
Status: DISCONTINUED | OUTPATIENT
Start: 2017-01-10 | End: 2017-01-11

## 2017-01-10 RX ORDER — SODIUM CHLORIDE, SODIUM LACTATE, POTASSIUM CHLORIDE, CALCIUM CHLORIDE 600; 310; 30; 20 MG/100ML; MG/100ML; MG/100ML; MG/100ML
INJECTION, SOLUTION INTRAVENOUS CONTINUOUS
Status: DISCONTINUED | OUTPATIENT
Start: 2017-01-10 | End: 2017-01-10

## 2017-01-10 RX ORDER — MIDAZOLAM HYDROCHLORIDE 1 MG/ML
INJECTION INTRAMUSCULAR; INTRAVENOUS
Status: COMPLETED
Start: 2017-01-10 | End: 2017-01-10

## 2017-01-10 NOTE — PROGRESS NOTES
[facility]  Progress Note     Alphonso Downs Patient Status:  Inpatient    1940 MRN [id number]   [facility] 7NE-A Attending Og Mcbride MD   Hosp Day # 5 PCP Casa Ledesma MD     Chief Complaint: leg pain     S: Patient under mg Oral Daily   • Atorvastatin Calcium  15 mg Oral Nightly   • Bethanechol Chloride  20 mg Oral TID   • DiltiaZEM HCl ER Coated Beads  120 mg Oral Daily   • lacosamide  200 mg Oral BID   • Metoprolol Tartrate  50 mg Oral 2x Daily(Beta Blocker)   • Alfuzosi recent SDH; s/p catheter directed thrombolysis yesterday. AC per hematology   - CT head stable  - plan for angio today     Family aware of plans.      Giana Anderson MD  [facility]

## 2017-01-10 NOTE — PHYSICAL THERAPY NOTE
Pt being followed by physical therapy[facility] to B LE DVTs. Due to change in status, will place patient ON HOLD. Please re-order therapy, as appropriate.

## 2017-01-10 NOTE — OCCUPATIONAL THERAPY NOTE
Attempted to see pt for OT eval, pt with recent t/f to CNICU post intervention, OT will place pt ON HOLD at this time, please re-order when approp.

## 2017-01-10 NOTE — ANESTHESIA PREPROCEDURE EVALUATION
PRE-OP EVALUATION    Patient Name: Karyle Cheng    Pre-op Diagnosis: * No surgery found *    * No surgery found *    * Surgery not found *    Pre-op vitals reviewed.   Temp: 97.9 °F (36.6 °C)  Pulse: 56  Resp: 18  BP: 114/48 mmHg  SpO2: 97 %    Current me (ACTIVASE) 4 mg in sodium chloride 0.9 % 100 mL infusion 0.5 mg/hr Intravenous Continuous   0.9%  NaCl infusion  Intravenous Continuous   sodium chloride 0.9% with heparin 2 units/ml premix flush  Intravenous Continuous   morphINE sulfate (PF) 2 MG/ML inje Blocker)   [DISCONTINUED] Sulfamethoxazole-TMP DS (BACTRIM DS) 800-160 MG per tab 1 tablet 1 tablet Oral BID   Alfuzosin HCl ER (UROXATRAL) 24 hr tab 10 mg 10 mg Oral Daily with breakfast   torsemide (DEMADEX) tab 20 mg 20 mg Oral Daily   [DISCONTINUED] Tr sodium 100 MG Oral Cap Take 100 mg by mouth 2 (two) times daily. Disp:  Rfl:    Metoprolol Tartrate 50 MG Oral Tab Take 1 tablet (50 mg total) by mouth 2x Daily(Beta Blocker).  Disp: 180 tablet Rfl: 1   torsemide (DEMADEX) 20 MG Oral Tab Take 1 tablet (20 m Rfl:        Allergies: Lisinopril; Mexitil      Anesthesia Evaluation    Patient summary reviewed.     Anesthetic Complications           GI/Hepatic/Renal                                 Cardiovascular                (+) obesity  (+) hypertension Pulmonary    Pulmonary exam normal.  Breath sounds clear to auscultation bilaterally. Other findings            ASA: 4   Plan: general  NPO status verified and patient meets guidelines.           Plan/risks discussed with: patient

## 2017-01-10 NOTE — PROGRESS NOTES
[facility]  Progress Note    Betty Dennison Patient Status:  Inpatient    1940 MRN [id number]   [facility] 7NE-A Attending Dash Boles MD   Hosp Day # 5 PCP Kimberly Borrego MD       SUBJECTIVE:    S/p catheter directed thrombolysi morphINE sulfate (PF) **OR** morphINE sulfate (PF), glucose **OR** Glucose-Vitamin C **OR** dextrose **OR** glucose **OR** Glucose-Vitamin C    PHYSICAL EXAM:    General: Patient is alert and oriented x 3, appears anxious. Chest: Clear to auscultation.   H

## 2017-01-10 NOTE — PROCEDURES
[facility]    PATIENT'S NAME: Vishnu Bustamante   ATTENDING PHYSICIAN: Jodie Ventura M.D. OPERATING PHYSICIAN: Francisco Jacome M.D.    PATIENT ACCOUNT#:   [de-identified]    LOCATION:  [address] [place] [address]  MEDICAL RECORD #:   [id number]       DATE OF BIRTH:  0 from the popliteal vein all the way up into the iliac segments. Next, using a Glidewire and a Glide catheter, the segments were crossed. Initially, we stopped at the common femoral area and did a hand injection via 0.035 Quick-Cross catheter.   Injection was subsequently removed. Subsequent angiography via the popliteal site revealed a significant resolution of thrombus in the popliteal and common femoral segments. External iliac still appeared fairly hazy.   Decision was made to do a low-dose thrombolyti

## 2017-01-10 NOTE — PROGRESS NOTES
2000- received pt alert and orientated, other neuro's intact.  vss. ekos catheter intact and in place under right knee. No signs of bleeding or hematoma. tpa and heparin infusing as ordered. No distress noted.

## 2017-01-10 NOTE — PROGRESS NOTES
[facility] Neurosurgery Progress Note    Radha Naylor Patient Status:  Inpatient    1940 MRN [id number]   [facility] 6NE-A Attending Jo Cortes MD   [facility] Day # 5 PCP Bettye Leonardo MD     Subjective:  Radha Naylor is a(

## 2017-01-10 NOTE — PLAN OF CARE
CARDIOVASCULAR - ADULT    • Maintains optimal cardiac output and hemodynamic stability Progressing    • Absence of cardiac arrhythmias or at baseline Progressing        GASTROINTESTINAL - ADULT    • Minimal or absence of nausea and vomiting Progressing notified. ekg obtained. Currently patient resting in bed. appears comfortable and asleep. Vitals remain stable right behing knee insertion site remains c/d/i. Pulses per doppler. See assessment for complete details. Will continue to monitor.

## 2017-01-10 NOTE — CM/SW NOTE
KARUNA sent updates to [facility] via [facility]. The patient has been accepted.     Edu Almendarez LCSW

## 2017-01-10 NOTE — PROGRESS NOTES
Leg pain better; issues discussed with patient and daughter; will have relook angio today; possible venous stenting discussed.     Francisco Jacome MD

## 2017-01-11 ENCOUNTER — ANESTHESIA (OUTPATIENT)
Dept: PERIOP | Facility: HOSPITAL | Age: 77
DRG: 252 | End: 2017-01-11
Payer: MEDICARE

## 2017-01-11 ENCOUNTER — ANESTHESIA EVENT (OUTPATIENT)
Dept: PERIOP | Facility: HOSPITAL | Age: 77
DRG: 252 | End: 2017-01-11
Payer: MEDICARE

## 2017-01-11 ENCOUNTER — APPOINTMENT (OUTPATIENT)
Dept: CT IMAGING | Facility: HOSPITAL | Age: 77
DRG: 252 | End: 2017-01-11
Attending: NEUROLOGICAL SURGERY
Payer: MEDICARE

## 2017-01-11 ENCOUNTER — APPOINTMENT (OUTPATIENT)
Dept: CV DIAGNOSTICS | Facility: HOSPITAL | Age: 77
DRG: 252 | End: 2017-01-11
Attending: INTERNAL MEDICINE
Payer: MEDICARE

## 2017-01-11 ENCOUNTER — APPOINTMENT (OUTPATIENT)
Dept: GENERAL RADIOLOGY | Facility: HOSPITAL | Age: 77
DRG: 252 | End: 2017-01-11
Attending: INTERNAL MEDICINE
Payer: MEDICARE

## 2017-01-11 ENCOUNTER — APPOINTMENT (OUTPATIENT)
Dept: INTERVENTIONAL RADIOLOGY/VASCULAR | Facility: HOSPITAL | Age: 77
DRG: 252 | End: 2017-01-11
Attending: INTERNAL MEDICINE
Payer: MEDICARE

## 2017-01-11 LAB
ALLENS TEST: POSITIVE
APTT PPP: 199.8 SECONDS (ref 25–34)
APTT PPP: 38.6 SECONDS (ref 25–34)
APTT PPP: 42.2 SECONDS (ref 25–34)
APTT PPP: 99.9 SECONDS (ref 25–34)
ARTERIAL BLD GAS O2 SATURATION: 93 % (ref 92–100)
ARTERIAL BLD GAS O2 SATURATION: 95 % (ref 92–100)
ARTERIAL BLD GAS O2 SATURATION: 96 % (ref 92–100)
ARTERIAL BLOOD GAS BASE EXCESS: -4.6
ARTERIAL BLOOD GAS BASE EXCESS: -4.9
ARTERIAL BLOOD GAS BASE EXCESS: -6.3
ARTERIAL BLOOD GAS HCO3: 18.8 MEQ/L (ref 22–26)
ARTERIAL BLOOD GAS HCO3: 19.4 MEQ/L (ref 22–26)
ARTERIAL BLOOD GAS HCO3: 20.7 MEQ/L (ref 22–26)
ARTERIAL BLOOD GAS PCO2: 33 MM HG (ref 35–45)
ARTERIAL BLOOD GAS PCO2: 34 MM HG (ref 35–45)
ARTERIAL BLOOD GAS PCO2: 38 MM HG (ref 35–45)
ARTERIAL BLOOD GAS PH: 7.35 (ref 7.35–7.45)
ARTERIAL BLOOD GAS PH: 7.36 (ref 7.35–7.45)
ARTERIAL BLOOD GAS PH: 7.39 (ref 7.35–7.45)
ARTERIAL BLOOD GAS PO2: 72 MM HG (ref 80–105)
ARTERIAL BLOOD GAS PO2: 91 MM HG (ref 80–105)
ARTERIAL BLOOD GAS PO2: 97 MM HG (ref 80–105)
BUN BLD-MCNC: 13 MG/DL (ref 8–20)
CALCIUM BLD-MCNC: 7.8 MG/DL (ref 8.3–10.3)
CALCULATED O2 SATURATION: 94 % (ref 92–100)
CALCULATED O2 SATURATION: 97 % (ref 92–100)
CALCULATED O2 SATURATION: 97 % (ref 92–100)
CARBOXYHEMOGLOBIN: 1.7 % SAT (ref 0–3)
CARBOXYHEMOGLOBIN: 1.7 % SAT (ref 0–3)
CARBOXYHEMOGLOBIN: 1.8 % SAT (ref 0–3)
CHLORIDE: 112 MMOL/L (ref 101–111)
CO2: 22 MMOL/L (ref 22–32)
CREAT BLD-MCNC: 1.06 MG/DL (ref 0.55–1.02)
ERYTHROCYTE [DISTWIDTH] IN BLOOD BY AUTOMATED COUNT: 14 % (ref 11.5–16)
ERYTHROCYTE [DISTWIDTH] IN BLOOD BY AUTOMATED COUNT: 14.1 % (ref 11.5–16)
ERYTHROCYTE [DISTWIDTH] IN BLOOD BY AUTOMATED COUNT: 14.2 % (ref 11.5–16)
FIBRINOGEN: 162 MG/DL (ref 200–446)
FIBRINOGEN: 166 MG/DL (ref 200–446)
FIO2: 40 %
FIO2: 40 %
GLUCOSE BLD-MCNC: 100 MG/DL (ref 65–99)
GLUCOSE BLD-MCNC: 114 MG/DL (ref 65–99)
GLUCOSE BLD-MCNC: 93 MG/DL (ref 70–99)
GLUCOSE BLD-MCNC: 98 MG/DL (ref 65–99)
HCT VFR BLD AUTO: 27.8 % (ref 34–50)
HCT VFR BLD AUTO: 28.2 % (ref 34–50)
HCT VFR BLD AUTO: 32 % (ref 34–50)
HGB BLD-MCNC: 10.5 G/DL (ref 12–16)
HGB BLD-MCNC: 9.4 G/DL (ref 12–16)
HGB BLD-MCNC: 9.4 G/DL (ref 12–16)
INR BLD: 1.18 (ref 0.89–1.12)
INR BLD: 1.23 (ref 0.89–1.12)
ISTAT ACTIVATED CLOTTING TIME: 116 SECONDS (ref 74–137)
ISTAT ACTIVATED CLOTTING TIME: 229 SECONDS (ref 74–137)
ISTAT ACTIVATED CLOTTING TIME: 95 SECONDS (ref 74–137)
MCH RBC QN AUTO: 32.5 PG (ref 27–33.2)
MCH RBC QN AUTO: 32.9 PG (ref 27–33.2)
MCH RBC QN AUTO: 33.6 PG (ref 27–33.2)
MCHC RBC AUTO-ENTMCNC: 32.8 G/DL (ref 31–37)
MCHC RBC AUTO-ENTMCNC: 33.3 G/DL (ref 31–37)
MCHC RBC AUTO-ENTMCNC: 33.8 G/DL (ref 31–37)
MCV RBC AUTO: 98.6 FL (ref 81–100)
MCV RBC AUTO: 99.1 FL (ref 81–100)
MCV RBC AUTO: 99.3 FL (ref 81–100)
METHEMOGLOBIN: 0.5 % SAT (ref 0.4–1.5)
METHEMOGLOBIN: 0.5 % SAT (ref 0.4–1.5)
METHEMOGLOBIN: 0.6 % SAT (ref 0.4–1.5)
PATIENT TEMPERATURE: 96.2 F
PATIENT TEMPERATURE: 98 F
PATIENT TEMPERATURE: 98 F
PEEP: 5 CM H2O
PEEP: 5 CM H2O
PLATELET # BLD AUTO: 111 10(3)UL (ref 150–450)
PLATELET # BLD AUTO: 116 10(3)UL (ref 150–450)
PLATELET # BLD AUTO: 132 10(3)UL (ref 150–450)
POTASSIUM SERPL-SCNC: 3.2 MMOL/L (ref 3.6–5.1)
POTASSIUM SERPL-SCNC: 3.8 MMOL/L (ref 3.6–5.1)
PRESSURE SUPPORT: 5 CM H2O
PSA SERPL DL<=0.01 NG/ML-MCNC: 15.4 SECONDS (ref 12.3–14.8)
PSA SERPL DL<=0.01 NG/ML-MCNC: 15.9 SECONDS (ref 12.3–14.8)
RBC # BLD AUTO: 2.8 X10(6)UL (ref 3.8–5.1)
RBC # BLD AUTO: 2.86 X10(6)UL (ref 3.8–5.1)
RBC # BLD AUTO: 3.23 X10(6)UL (ref 3.8–5.1)
RED CELL DISTRIBUTION WIDTH-SD: 51.1 FL (ref 35.1–46.3)
RED CELL DISTRIBUTION WIDTH-SD: 51.3 FL (ref 35.1–46.3)
RED CELL DISTRIBUTION WIDTH-SD: 51.9 FL (ref 35.1–46.3)
SODIUM SERPL-SCNC: 145 MMOL/L (ref 136–144)
TIDAL VOLUME: 380 ML
TOTAL HEMOGLOBIN: 10.3 G/DL (ref 11.7–16)
TOTAL HEMOGLOBIN: 9.5 G/DL (ref 11.7–16)
TOTAL HEMOGLOBIN: 9.9 G/DL (ref 11.7–16)
VENT RATE: 14 /MIN
WBC # BLD AUTO: 12 X10(3) UL (ref 4–13)
WBC # BLD AUTO: 8.2 X10(3) UL (ref 4–13)
WBC # BLD AUTO: 9.1 X10(3) UL (ref 4–13)

## 2017-01-11 PROCEDURE — 71010 XR CHEST AP PORTABLE  (CPT=71010): CPT

## 2017-01-11 PROCEDURE — 99233 SBSQ HOSP IP/OBS HIGH 50: CPT | Performed by: HOSPITALIST

## 2017-01-11 PROCEDURE — 93306 TTE W/DOPPLER COMPLETE: CPT | Performed by: INTERNAL MEDICINE

## 2017-01-11 PROCEDURE — 99232 SBSQ HOSP IP/OBS MODERATE 35: CPT | Performed by: INTERNAL MEDICINE

## 2017-01-11 PROCEDURE — 93306 TTE W/DOPPLER COMPLETE: CPT

## 2017-01-11 PROCEDURE — B51BYZZ FLUOROSCOPY OF RIGHT LOWER EXTREMITY VEINS USING OTHER CONTRAST: ICD-10-PCS | Performed by: INTERNAL MEDICINE

## 2017-01-11 PROCEDURE — 70450 CT HEAD/BRAIN W/O DYE: CPT

## 2017-01-11 RX ORDER — SODIUM CHLORIDE 9 MG/ML
INJECTION, SOLUTION INTRAVENOUS CONTINUOUS
Status: ACTIVE | OUTPATIENT
Start: 2017-01-11 | End: 2017-01-11

## 2017-01-11 RX ORDER — HEPARIN SODIUM 5000 [USP'U]/ML
80 INJECTION INTRAVENOUS; SUBCUTANEOUS ONCE
Status: DISCONTINUED | OUTPATIENT
Start: 2017-01-11 | End: 2017-01-12

## 2017-01-11 RX ORDER — CEFAZOLIN SODIUM 1 G/3ML
INJECTION, POWDER, FOR SOLUTION INTRAMUSCULAR; INTRAVENOUS
Status: DISCONTINUED | OUTPATIENT
Start: 2017-01-11 | End: 2017-01-12 | Stop reason: ALTCHOICE

## 2017-01-11 RX ORDER — POTASSIUM CHLORIDE 14.9 MG/ML
20 INJECTION INTRAVENOUS ONCE
Status: COMPLETED | OUTPATIENT
Start: 2017-01-11 | End: 2017-01-11

## 2017-01-11 RX ORDER — HEPARIN SODIUM 10000 [USP'U]/100ML
INJECTION, SOLUTION INTRAVENOUS
Status: COMPLETED
Start: 2017-01-11 | End: 2017-01-11

## 2017-01-11 RX ORDER — HEPARIN SODIUM 5000 [USP'U]/ML
INJECTION, SOLUTION INTRAVENOUS; SUBCUTANEOUS
Status: COMPLETED
Start: 2017-01-11 | End: 2017-01-11

## 2017-01-11 RX ORDER — ALBUTEROL SULFATE 2.5 MG/3ML
2.5 SOLUTION RESPIRATORY (INHALATION)
Status: DISCONTINUED | OUTPATIENT
Start: 2017-01-11 | End: 2017-01-12

## 2017-01-11 RX ORDER — HEPARIN SODIUM AND DEXTROSE 10000; 5 [USP'U]/100ML; G/100ML
18 INJECTION INTRAVENOUS ONCE
Status: COMPLETED | OUTPATIENT
Start: 2017-01-11 | End: 2017-01-11

## 2017-01-11 RX ORDER — HEPARIN SODIUM AND DEXTROSE 10000; 5 [USP'U]/100ML; G/100ML
INJECTION INTRAVENOUS CONTINUOUS
Status: DISCONTINUED | OUTPATIENT
Start: 2017-01-11 | End: 2017-01-14

## 2017-01-11 RX ORDER — LIDOCAINE HYDROCHLORIDE 10 MG/ML
INJECTION, SOLUTION INFILTRATION; PERINEURAL
Status: COMPLETED
Start: 2017-01-11 | End: 2017-01-11

## 2017-01-11 NOTE — PROGRESS NOTES
[facility]  Progress Note     Sheela Velasquez Patient Status:  Inpatient    1940 MRN [id number]   [facility] 7NE-A Attending Og Bee MD   Hosp Day # 6 PCP John Bennett MD     Chief Complaint: leg pain     S: Patient intub Units/kg Intravenous Once   • potassium chloride 40mEq IVPB (peripheral line)  40 mEq Intravenous Once    Followed by   • potassium chloride  20 mEq Intravenous Once   • digoxin  125 mcg Oral Daily   • ceFAZolin  2 g Intravenous Q8H   • Amiodarone HCl  300 date of discharge: TBD, possible Dontrell hills instead per family request.    Hopefully extubate soon. I spoke with dtr yesterday.      Reyna Essex PA  12:51 PM     Addendum  Patient seen and examined  Intubated   Chest: CTA B/L  CVS: S1, S2, RRR  ABD: S

## 2017-01-11 NOTE — OPERATIVE REPORT
Full note dictated,    Pt with resolution of thrombus is CFV and SFV; occlusion noted in EIV    After angiojet, there was return of flow within CIV/EIV segments; significant thrombus noted in IVC/CIV segments.     Isanti catheter place with tPA 1 mg /hr

## 2017-01-11 NOTE — PROGRESS NOTES
[facility]  Progress Note    Brendan Mcfarlane Patient Status:  Inpatient    1940 MRN [id number]   [facility] 7NE-A Attending Martha Schumacher MD   Hosp Day # 6 PCP John Deras MD       SUBJECTIVE:    Extubated, drowsy.     OBJECTIVE: 1-5 Units Subcutaneous TID CC and HS   • Sodium Polystyrene Sulfonate  15 g Oral Once     CeFAZolin Sodium, Prochlorperazine Edisylate, ondansetron HCl, hydrALAzine HCl, morphINE sulfate **OR** morphINE sulfate **OR** morphINE sulfate, Metoclopramide HCl,

## 2017-01-11 NOTE — OPERATIVE REPORT
Full note dictated,    No sig thrombus noted in CFV/EIV/CIV/SFV  Non occlusive thrombus noted in IVC below filter    Straight line flow from pop vein to IVC    Sheath removed    Heparin restarted    Patrick ySed MD

## 2017-01-11 NOTE — ANESTHESIA POSTPROCEDURE EVALUATION
[address] Patient Status:  Inpatient   Age/Gender 78year old female MRN [id number]   [facility] 6NE-A Attending Daly Simon MD   [facility] Day # 6 PCP Cain Molina MD       Anesthesia Post-op Note    * No procedures lis

## 2017-01-11 NOTE — PROCEDURES
[facility]    PATIENT'S NAME: Nhi Holt   ATTENDING PHYSICIAN: Bel Waldron M.D. OPERATING PHYSICIAN: Christiano Raymond M.D.    PATIENT ACCOUNT#:   [de-identified]    LOCATION:  [address] [place] [address]  MEDICAL RECORD #:   [id number]       DATE OF BIRTH: flow without significant amount of thrombus within the iliac system. The same multipurpose catheter was pulled back into the popliteal segment, and angiography revealed, again, no significant thrombus within the popliteal and superficial vein segments.   G

## 2017-01-11 NOTE — PLAN OF CARE
Patient received vented and sedated. Neuro Q2 - pupils are equal and reactive, follows commands, GALAVIZ. Notified Dr. Lura Sandifer of new consult, see orders. AUTUMN. Fib on tele. Notified Dr. Aroldo Puente of /59 and HR 40s, see orders. Guerrero inserted per order.  R knee

## 2017-01-11 NOTE — ANESTHESIA PREPROCEDURE EVALUATION
PRE-OP EVALUATION    Patient Name: Karyle Cheng    Pre-op Diagnosis: * No surgery found *    * No surgery found *    * Surgery not found *    Pre-op vitals reviewed.   Temp: 97.2 °F (36.2 °C)  Pulse: 43  Resp: 15  BP: 111/49 mmHg  FiO2 (%): 40 %  SpO2: 98 10 mg Intravenous Q4H PRN   CeFAZolin Sodium (ANCEF/KEFZOL) IVPB premix 2 g 2 g Intravenous Q8H   0.9%  NaCl infusion  Intravenous Continuous   [COMPLETED] Lidocaine HCl (XYLOCAINE) 1 % injection      [COMPLETED] Heparin Sodium (Porcine) 5000 UNIT/ML injec PRN   [COMPLETED] Heparin Sodium (Porcine) 5000 UNIT/ML injection 5,000 Units 5,000 Units Intravenous Once   [COMPLETED] heparin (PORCINE) 03873dpqca/250mL infusion INITIAL DOSE 1,000 Units/hr Intravenous Once   [DISCONTINUED] heparin (PORCINE) drip 77115i PRN   [DISCONTINUED] ondansetron HCl (ZOFRAN) injection 4 mg 4 mg Intravenous Q6H PRN   [DISCONTINUED] Metoclopramide HCl (REGLAN) injection 10 mg 10 mg Intravenous Q8H PRN   morphINE sulfate (PF) 2 MG/ML injection 1 mg 1 mg Intravenous Q2H PRN   Or      m ER Coated Beads 120 MG Oral Capsule SR 24 Hr Take 1 capsule (120 mg total) by mouth daily.  Disp: 30 capsule Rfl: 6   METFORMIN  MG Oral Tab TAKE 1 TABLET BY MOUTH TWO TIMES A DAY WITH MEALS Disp: 180 tablet Rfl: 1   Atorvastatin Calcium 10 MG Oral T hemmorhage   in December requiring craniotomy.           Past Surgical History    HYSTERECTOMY      APPENDECTOMY      HERNIA SURGERY  3/24/2011 [facility]    Comment Repair of Incarcerated Complex Ventral Hernia w/mesh, bilateral component separation, remova product(s) discussed with: child/children and healthcare power of     Consented to blood products.

## 2017-01-11 NOTE — PLAN OF CARE
Assumed care of patient at [address] [place] [address]. Patient to cath lab at [place] [address], back from cath lab at [phone]. Patient intubated and sedated.  Attempted to wean sedation to complete weaning trial, patient with a lot of anxiety sedation increased and trial completed with patient

## 2017-01-11 NOTE — ANESTHESIA POSTPROCEDURE EVALUATION
[address] Patient Status:  Inpatient   Age/Gender 78year old female MRN [id number]   [facility] 6NE-A Attending Cee Mills, 1838 [facility] Day # 5 PCP Indiana Perea MD       Anesthesia Post-op Note    * No procedures list

## 2017-01-11 NOTE — PROCEDURES
[facility]    PATIENT'S NAME: Olesya Miguelangel   ATTENDING PHYSICIAN: Rosita Mccloud M.D. OPERATING PHYSICIAN: Alex Baeza M.D.    PATIENT ACCOUNT#:   [de-identified]    LOCATION:  [address] [place] [address]  MEDICAL RECORD #:   [id number]       DATE OF BIRTH: vena cava. Angiography through the AngioJet device in the external iliac vein revealed clearance and return of flow into the IVC. There was still haziness noted within the external and common iliac segment. Next, the 0.035 was exchanged to a 0.018 wire.

## 2017-01-11 NOTE — CONSULTS
[facility]  Report of Consultation    Ramandeep Anne Patient Status:  Inpatient    1940 MRN [id number]   [facility] 6NE-A Attending Raul Berry MD   [facility] Day # 6 PCP Christina Jacome MD     Reason for Consultation: Intensive car discovered to have small pulmonary emboli in her right middle and lower lobe. Despite that, the risk of anticoagulation was felt to be too great and the patient was followed conservatively.   She improved to the point where she was discharged to [facility] separation, removal previous mesh, partial omentectomy, lysis of adhesions on 3/24/2011:  PCP:  Dr. Jaqui Murillo, LEFT (QOW=[phone])  1/14/14    PAC 2,000 Units by mouth every 7 days. Disp:  Rfl:  1/4/2017 at 0800   Potassium Chloride ER 20 MEQ Oral Tab CR Take 20 mEq by mouth daily. Disp:  Rfl:  1/5/2017 at 0800   digoxin 0.125 MG Oral Tab Take 1 tablet by mouth daily.  Disp:  Rfl:  1/5/2017 at 0800 controlled. Chest wall: No tenderness or deformity. Heart: Irregularly irregular/atrial fibrillation, rate controlled, normal S1S2, no murmur. Abdomen: soft, obese, non-tender, non-distended, no masses, no guarding, no     rebound, positive BS.    Ext with extension to the IVC  · Status post IVC filter insertion (see above)  · Type 2 diabetes mellitus  · Essential hypertension  · RADHA for which the patient has been intolerant of CPAP treatment  · Hyperlipidemia  · UTI  · Severe obesity  · Degenerative ar

## 2017-01-11 NOTE — ADDENDUM NOTE
Addendum  created 01/10/17 2108 by Dusty Villanueva MD    Modules edited: Clinical Notes    Clinical Notes:  File: [id number]

## 2017-01-12 ENCOUNTER — APPOINTMENT (OUTPATIENT)
Dept: GENERAL RADIOLOGY | Facility: HOSPITAL | Age: 77
DRG: 252 | End: 2017-01-12
Attending: INTERNAL MEDICINE
Payer: MEDICARE

## 2017-01-12 LAB
ALBUMIN SERPL-MCNC: 2.1 G/DL (ref 3.5–4.8)
ALP LIVER SERPL-CCNC: 192 U/L (ref 55–142)
ALT SERPL-CCNC: 49 U/L (ref 14–54)
APTT PPP: 39.6 SECONDS (ref 25–34)
APTT PPP: 46.5 SECONDS (ref 25–34)
APTT PPP: 88.1 SECONDS (ref 25–34)
AST SERPL-CCNC: 83 U/L (ref 15–41)
BASOPHILS # BLD AUTO: 0.02 X10(3) UL (ref 0–0.1)
BASOPHILS NFR BLD AUTO: 0.2 %
BILIRUB SERPL-MCNC: 0.4 MG/DL (ref 0.1–2)
BUN BLD-MCNC: 12 MG/DL (ref 8–20)
CALCIUM BLD-MCNC: 7.9 MG/DL (ref 8.3–10.3)
CHLORIDE: 109 MMOL/L (ref 101–111)
CO2: 21 MMOL/L (ref 22–32)
CREAT BLD-MCNC: 0.85 MG/DL (ref 0.55–1.02)
EOSINOPHIL # BLD AUTO: 0.06 X10(3) UL (ref 0–0.3)
EOSINOPHIL NFR BLD AUTO: 0.7 %
ERYTHROCYTE [DISTWIDTH] IN BLOOD BY AUTOMATED COUNT: 14.6 % (ref 11.5–16)
GLUCOSE BLD-MCNC: 107 MG/DL (ref 65–99)
GLUCOSE BLD-MCNC: 116 MG/DL (ref 65–99)
GLUCOSE BLD-MCNC: 130 MG/DL (ref 65–99)
GLUCOSE BLD-MCNC: 168 MG/DL (ref 65–99)
GLUCOSE BLD-MCNC: 95 MG/DL (ref 70–99)
HAV IGM SER QL: 1.8 MG/DL (ref 1.7–3)
HCT VFR BLD AUTO: 28.1 % (ref 34–50)
HGB BLD-MCNC: 9 G/DL (ref 12–16)
IMMATURE GRANULOCYTE COUNT: 0.11 X10(3) UL (ref 0–1)
IMMATURE GRANULOCYTE RATIO %: 1.2 %
LYMPHOCYTES # BLD AUTO: 1.43 X10(3) UL (ref 0.9–4)
LYMPHOCYTES NFR BLD AUTO: 15.6 %
M PROTEIN MFR SERPL ELPH: 4.4 G/DL (ref 6.1–8.3)
MCH RBC QN AUTO: 32.1 PG (ref 27–33.2)
MCHC RBC AUTO-ENTMCNC: 32 G/DL (ref 31–37)
MCV RBC AUTO: 100.4 FL (ref 81–100)
MONOCYTES # BLD AUTO: 0.89 X10(3) UL (ref 0.1–0.6)
MONOCYTES NFR BLD AUTO: 9.7 %
NEUTROPHIL ABS PRELIM: 6.67 X10 (3) UL (ref 1.3–6.7)
NEUTROPHILS # BLD AUTO: 6.67 X10(3) UL (ref 1.3–6.7)
NEUTROPHILS NFR BLD AUTO: 72.6 %
PHOSPHATE SERPL-MCNC: 3.3 MG/DL (ref 2.5–4.9)
PLATELET # BLD AUTO: 149 10(3)UL (ref 150–450)
POTASSIUM SERPL-SCNC: 3.7 MMOL/L (ref 3.6–5.1)
RBC # BLD AUTO: 2.8 X10(6)UL (ref 3.8–5.1)
RED CELL DISTRIBUTION WIDTH-SD: 53.6 FL (ref 35.1–46.3)
SODIUM SERPL-SCNC: 142 MMOL/L (ref 136–144)
WBC # BLD AUTO: 9.2 X10(3) UL (ref 4–13)

## 2017-01-12 PROCEDURE — 71010 XR CHEST AP PORTABLE  (CPT=71010): CPT

## 2017-01-12 PROCEDURE — 05H533Z INSERTION OF INFUSION DEVICE INTO RIGHT SUBCLAVIAN VEIN, PERCUTANEOUS APPROACH: ICD-10-PCS | Performed by: HOSPITALIST

## 2017-01-12 PROCEDURE — 99232 SBSQ HOSP IP/OBS MODERATE 35: CPT | Performed by: HOSPITALIST

## 2017-01-12 RX ORDER — MAGNESIUM OXIDE 400 MG (241.3 MG MAGNESIUM) TABLET
400 TABLET ONCE
Status: COMPLETED | OUTPATIENT
Start: 2017-01-12 | End: 2017-01-12

## 2017-01-12 RX ORDER — WARFARIN SODIUM 5 MG/1
5 TABLET ORAL
Status: COMPLETED | OUTPATIENT
Start: 2017-01-12 | End: 2017-01-12

## 2017-01-12 RX ORDER — FUROSEMIDE 10 MG/ML
40 INJECTION INTRAMUSCULAR; INTRAVENOUS ONCE
Status: COMPLETED | OUTPATIENT
Start: 2017-01-12 | End: 2017-01-12

## 2017-01-12 RX ORDER — SODIUM CHLORIDE 0.9 % (FLUSH) 0.9 %
10 SYRINGE (ML) INJECTION EVERY 12 HOURS
Status: DISCONTINUED | OUTPATIENT
Start: 2017-01-12 | End: 2017-01-19

## 2017-01-12 RX ORDER — ALBUTEROL SULFATE 2.5 MG/3ML
2.5 SOLUTION RESPIRATORY (INHALATION) EVERY 4 HOURS PRN
Status: DISCONTINUED | OUTPATIENT
Start: 2017-01-12 | End: 2017-01-31

## 2017-01-12 RX ORDER — POTASSIUM CHLORIDE 20 MEQ/1
40 TABLET, EXTENDED RELEASE ORAL ONCE
Status: COMPLETED | OUTPATIENT
Start: 2017-01-12 | End: 2017-01-12

## 2017-01-12 RX ORDER — HEPARIN SODIUM 5000 [USP'U]/ML
30 INJECTION, SOLUTION INTRAVENOUS; SUBCUTANEOUS ONCE
Status: COMPLETED | OUTPATIENT
Start: 2017-01-12 | End: 2017-01-12

## 2017-01-12 NOTE — PLAN OF CARE
Neuro assessment every 4 hours  No deficit noted  Morphine for pain, well controlled  CP, VSS, weaning O2, on RA currently  Non pitting edema to all extremities  Midline place today  Guerrero removed per orders  PVRs, patient did not void  bladder scan 6 hour

## 2017-01-12 NOTE — CM/SW NOTE
Called Quentin Jackson at [facility] who said they are in network with pt's BC[employer] Medicare Adv PPO insurance; however, she has a $250 deductible and a $1500 out of pocket cost before the insurance takes affect.   Called pt's vika Wilcox to inform her of the cost - she

## 2017-01-12 NOTE — PROGRESS NOTES
[facility]  Cardiology Progress Note    Alphonso Downs Patient Status:  Inpatient    1940 MRN [id number]   [facility] 7NE-A Attending Comfort Wright MD   Hosp Day # 7 PCP Casa Ledesma MD     Subjective:  No complaints of chest pa edema  Skin: Warm and dry.      Medications:  • Normal Saline Flush  10 mL Intravenous Q12H   • albuterol sulfate  2.5 mg Nebulization 6 times per day   • pantoprazole (PROTONIX) IV push  40 mg Intravenous Q24H   • Heparin Sodium (Porcine)  80 Units/kg Intr

## 2017-01-12 NOTE — PROGRESS NOTES
[facility]  Progress Note    Nola Velasco Patient Status:  Inpatient    1940 MRN [id number]   [facility] 7NE-A Attending David Colindres MD   [facility] Day # 7 PCP Jorge Hankins MD     Subjective:  Nola Velasco is a(n) 78year old f Units/kg Intravenous Once   heparin (PORCINE) drip 64373ieaic/250mL infusion CONTINUOUS 200-3,000 Units/hr Intravenous Continuous   Prochlorperazine Edisylate (COMPAZINE) injection 10 mg 10 mg Intravenous Q6H PRN   digoxin (LANOXIN) tab 125 mcg 125 mcg Ora Q15 Min PRN   Or      Glucose-Vitamin C (DEX-4) 4-0.006 G chewable tab 4 tablet 4 tablet Oral Q15 Min PRN   Or      dextrose injection 50 mL 50 mL Intravenous Q15 Min PRN   Or      glucose (DEX4) oral liquid 30 g 30 g Oral Q15 Min PRN   Or      Glucose-Vit long term use of blood thinners     Plantar fasciitis     Acute subdural hematoma (HCC)     Midline shift of brain due to hematoma     Headache due to intracranial disease     Anticoagulated on Coumadin     Multiple contusions     Chronic systolic congesti

## 2017-01-12 NOTE — PLAN OF CARE
NURSING ADMISSION NOTE      Patient admitted via Wheelchair  Oriented to room. Safety precautions initiated. Bed in low position. Call light in reach.

## 2017-01-12 NOTE — PROGRESS NOTES
[facility]  Progress Note     Gwenlyn Siemens Patient Status:  Inpatient    1940 MRN [id number]   [facility] 7NE-A Attending Alexy Palacios MD   Hosp Day # 7 PCP Marcell Guy MD     Chief Complaint: leg pain     S: Patient repor --    --    --   83*   ALT   --    --    --   49   BILT   --    --    --   0.4   TP   --    --    --   4.4*       Recent Labs   Lab  01/10/17   2012  01/11/17   0001  01/11/17   0401   PTP  16.5*  15.4*  15.9*   INR  1.29*  1.18*  1.23*       Recent Labs stable  9. Chronic afib with slow VR, Rates controlled  10. Chronic systolic CHF, stable  11. Physical deconditioning  1. PT/OT, rehab after d/c.  12. Obesity, BMI 37  13. R Breast nodule, f/u mammogram as OP per hem recs prior  14.  Anemia/Thrombocytopenia

## 2017-01-12 NOTE — PLAN OF CARE
Pt A/O X4. Anxious at times. Xanax 0.25mg given. 2L O2 NC. Denies SOB or difficulty breathing. A fib on tele. Heparin running at 10ml/hr. RT leg dressing in place, CDI; site soft, mild tenderness. BLLE edema, nonpitting RT>LT. BL pedal pulses +1.  Guerrero in

## 2017-01-12 NOTE — PAYOR COMM NOTE
Attending Physician: Edgar Ferreira MD    Review Type: CONTINUED STAY  Reviewer: Cheng Yao     Date: January 12, 2017 - 1:17 PM  Payor: [facility] MEDICARE ADV PPO  Authorization Number: N/A  Admit date: 1/5/2017  9:21 PM        REVIEWER COMMENTS  Vit ASSESSMENT/PLAN:    # R leg DVT: S/p catheter directed thrombolysis. Feels better. No bleeding. No MS change. CT brain stable. Continue heparin. Would start  Lovenox when ready for d/c. Zeeshan Blanco M.D.     Valencia Alejandro Dr. 1/12/2017 0718 Given 2.5 mg Nebulization Charisma Ryder, MONYP    1/12/2017 0432 Given 2.5 mg Nebulization Yadi Dunn, FRAN    1/12/2017 0008 Given 2.5 mg Nebulization Yadi Dunn, P    1/11/2017 1959 Given 2.5 mg Nebulization Pascolla, Mal  MG per tab 1 tablet     Date Action Dose Route User    1/12/2017 0610 Given 1 tablet Oral Donn Franklin RN    1/11/2017 2151 Given 1 tablet Oral Donn Franklin RN    1/11/2017 1728 Given 1 tablet Oral Arloa Klinefelter, RN      insulin aspart (N Action Dose Route User    1/12/2017 0900 Given 20 mg Oral Marlyn Roland RN      Amiodarone HCl (PACERONE) tab 300 mg     Date Action Dose Route User    1/12/2017 0900 Given 300 mg Oral CASSIE Horton 01/10/17   6004   01/10/1

## 2017-01-12 NOTE — PROGRESS NOTES
[facility]  Progress Note    Isa Ortiz Patient Status:  Inpatient    1940 MRN [id number]   [facility] 7NE-A Attending Chrystal Oppenheim, MD   [facility] Day # 7 PCP Pamela Johnson MD     Subjective:  Isa Ortiz is a 78year old fema 3.3 01/12/2017        Recent Labs   Lab  01/10/17   2012  01/11/17   0001  01/11/17   0401  01/11/17   1027  01/11/17   1157  01/12/17   0513   INR  1.29*  1.18*  1.23*   --    --    --    PTT  152.5*  38.6*  42.2*  199.8*  99.9*  39.6*       Recent Labs correct itself  Anticoagulation per hematology service. Ok to d/c from pulm perspective - we will follow peripherally.   Colletta Carwin, MD  [address] [facility]

## 2017-01-12 NOTE — CM/SW NOTE
Dtr talked to pt's [facility] which stated pt is covered at 100% at SNF without any out of pocket costs. Pt will go to [facility] at d/c.

## 2017-01-13 ENCOUNTER — APPOINTMENT (OUTPATIENT)
Dept: GENERAL RADIOLOGY | Facility: HOSPITAL | Age: 77
DRG: 252 | End: 2017-01-13
Attending: INTERNAL MEDICINE
Payer: MEDICARE

## 2017-01-13 LAB
APTT PPP: 80.1 SECONDS (ref 25–34)
GLUCOSE BLD-MCNC: 114 MG/DL (ref 65–99)
GLUCOSE BLD-MCNC: 118 MG/DL (ref 65–99)
GLUCOSE BLD-MCNC: 128 MG/DL (ref 65–99)
GLUCOSE BLD-MCNC: 146 MG/DL (ref 65–99)
HAV IGM SER QL: 1.8 MG/DL (ref 1.7–3)
INR BLD: 1.12 (ref 0.89–1.12)
PLATELET # BLD AUTO: 167 10(3)UL (ref 150–450)
POTASSIUM SERPL-SCNC: 3.7 MMOL/L (ref 3.6–5.1)
PSA SERPL DL<=0.01 NG/ML-MCNC: 14.8 SECONDS (ref 12.3–14.8)

## 2017-01-13 PROCEDURE — 99232 SBSQ HOSP IP/OBS MODERATE 35: CPT | Performed by: HOSPITALIST

## 2017-01-13 PROCEDURE — 99232 SBSQ HOSP IP/OBS MODERATE 35: CPT | Performed by: INTERNAL MEDICINE

## 2017-01-13 PROCEDURE — 71010 XR CHEST AP PORTABLE  (CPT=71010): CPT

## 2017-01-13 RX ORDER — BETHANECHOL CHLORIDE 10 MG/1
10 TABLET ORAL 3 TIMES DAILY
Qty: 90 TABLET | Refills: 0 | Status: SHIPPED | OUTPATIENT
Start: 2017-01-13 | End: 2017-09-09

## 2017-01-13 RX ORDER — POTASSIUM CHLORIDE 20 MEQ/1
40 TABLET, EXTENDED RELEASE ORAL ONCE
Status: COMPLETED | OUTPATIENT
Start: 2017-01-13 | End: 2017-01-13

## 2017-01-13 RX ORDER — WARFARIN SODIUM 5 MG/1
5 TABLET ORAL
Status: COMPLETED | OUTPATIENT
Start: 2017-01-13 | End: 2017-01-13

## 2017-01-13 RX ORDER — TAMSULOSIN HYDROCHLORIDE 0.4 MG/1
0.4 CAPSULE ORAL NIGHTLY
Qty: 60 CAPSULE | Refills: 0 | Status: SHIPPED | OUTPATIENT
Start: 2017-01-13 | End: 2017-09-09

## 2017-01-13 RX ORDER — FUROSEMIDE 10 MG/ML
40 INJECTION INTRAMUSCULAR; INTRAVENOUS ONCE
Status: COMPLETED | OUTPATIENT
Start: 2017-01-13 | End: 2017-01-13

## 2017-01-13 RX ORDER — FUROSEMIDE 10 MG/ML
40 INJECTION INTRAMUSCULAR; INTRAVENOUS DAILY
Status: DISCONTINUED | OUTPATIENT
Start: 2017-01-14 | End: 2017-01-16

## 2017-01-13 NOTE — PLAN OF CARE
Received report at 0700. Patient alert and oriented x4. Neuros q4. Pain was able to go from BR to chair with standby assist using walker. Heparin drip at 12. Plan to [facility] upon discharge. Coumadin tonight and INR in am. Will continue to monitor.

## 2017-01-13 NOTE — PROGRESS NOTES
[facility]  Progress Note    Ksenia Gutierrez Patient Status:  Inpatient    1940 MRN [id number]   [facility] 7NE-A Attending Eric Eisenberg MD   Hosp Day # 8 PCP Grazyna Landon MD       SUBJECTIVE:    Feels much better.  R leg much les 200 mg Oral BID   • Metoprolol Tartrate  50 mg Oral 2x Daily(Beta Blocker)   • Alfuzosin HCl ER  10 mg Oral Daily with breakfast   • torsemide  20 mg Oral Daily   • docusate sodium  100 mg Oral BID   • insulin aspart  1-5 Units Subcutaneous TID CC and HS

## 2017-01-13 NOTE — PROGRESS NOTES
[facility] Cardiology Progress Note    Subjective:  Getting up to BR without problems, R leg feeling much better.      Objective:  /57 mmHg  Pulse 77  Temp(Src) 98.3 °F (36.8 °C) (Oral)  Resp 18  Wt 228 lb 6.3 oz (103.6 kg)  SpO2 95%    Telemetry: afib

## 2017-01-13 NOTE — PROGRESS NOTES
[facility]  Progress Note     Courtney Harrison Patient Status:  Inpatient    1940 MRN [id number]   [facility] 7NE-A Attending Kareem Morocho MD   Hosp Day # 8 PCP Rosalia Marie MD     Chief Complaint: leg pain     S: Patient some BILT   --    --   0.4   --    TP   --    --   4.4*   --        Recent Labs   Lab  01/10/17   2012  01/11/17   0001  01/11/17   0401   PTP  16.5*  15.4*  15.9*   INR  1.29*  1.18*  1.23*       No results for input(s): TROP, CK in the last 72 hours. hem recs prior  14. Anemia/Thrombocytopenia, stable  15. elevated AST, recheck in am  16. Mild metabolic acidosis, monitor  17. Volume overload  1.  Give additional IV lasix, monitor weights daily, i/os    Quality:  · DVT Prophylaxis: heparin drip/coumadin

## 2017-01-13 NOTE — PLAN OF CARE
Patient alert and oriented times four. Meds given per MAR. Straight cath at 2130.  950cc out. Patient tolerated well. Patient kept clean and dry. Vital signs stable. Resting comfortably in bed. Denies any pain or SOB.      CARDIOVASCULAR - ADULT    •

## 2017-01-13 NOTE — PROGRESS NOTES
[facility] Pulmonary/Critical Care Progress Note       Chart, notes,labs, imaging reviewed. No ongoing active pulmonary or critical care issues. Signing off. Please reconsult if new questions, issues,or concerns arise. Ekaterina Orozco

## 2017-01-14 LAB
ALBUMIN SERPL-MCNC: 2.3 G/DL (ref 3.5–4.8)
ALP LIVER SERPL-CCNC: 195 U/L (ref 55–142)
ALT SERPL-CCNC: 29 U/L (ref 14–54)
APTT PPP: 64.9 SECONDS (ref 25–34)
AST SERPL-CCNC: 46 U/L (ref 15–41)
BILIRUB SERPL-MCNC: 0.3 MG/DL (ref 0.1–2)
BUN BLD-MCNC: 11 MG/DL (ref 8–20)
CALCIUM BLD-MCNC: 8.3 MG/DL (ref 8.3–10.3)
CALCIUM BLD-MCNC: 8.7 MG/DL (ref 8.3–10.3)
CALCIUM BLD-MCNC: 8.7 MG/DL (ref 8.3–10.3)
CHLORIDE: 106 MMOL/L (ref 101–111)
CHLORIDE: 107 MMOL/L (ref 101–111)
CHLORIDE: 107 MMOL/L (ref 101–111)
CO2: 24 MMOL/L (ref 22–32)
CO2: 24 MMOL/L (ref 22–32)
CO2: 26 MMOL/L (ref 22–32)
CREAT BLD-MCNC: 0.83 MG/DL (ref 0.55–1.02)
CREAT BLD-MCNC: 0.88 MG/DL (ref 0.55–1.02)
CREAT BLD-MCNC: 0.88 MG/DL (ref 0.55–1.02)
ERYTHROCYTE [DISTWIDTH] IN BLOOD BY AUTOMATED COUNT: 14.5 % (ref 11.5–16)
ERYTHROCYTE [DISTWIDTH] IN BLOOD BY AUTOMATED COUNT: 14.5 % (ref 11.5–16)
GLUCOSE BLD-MCNC: 104 MG/DL (ref 65–99)
GLUCOSE BLD-MCNC: 104 MG/DL (ref 70–99)
GLUCOSE BLD-MCNC: 104 MG/DL (ref 70–99)
GLUCOSE BLD-MCNC: 106 MG/DL (ref 70–99)
GLUCOSE BLD-MCNC: 119 MG/DL (ref 65–99)
GLUCOSE BLD-MCNC: 120 MG/DL (ref 65–99)
GLUCOSE BLD-MCNC: 141 MG/DL (ref 65–99)
HCT VFR BLD AUTO: 29.1 % (ref 34–50)
HCT VFR BLD AUTO: 30.3 % (ref 34–50)
HGB BLD-MCNC: 10.1 G/DL (ref 12–16)
HGB BLD-MCNC: 9.4 G/DL (ref 12–16)
INR BLD: 1.49 (ref 0.89–1.12)
M PROTEIN MFR SERPL ELPH: 5.2 G/DL (ref 6.1–8.3)
MCH RBC QN AUTO: 32.2 PG (ref 27–33.2)
MCH RBC QN AUTO: 32.8 PG (ref 27–33.2)
MCHC RBC AUTO-ENTMCNC: 32.3 G/DL (ref 31–37)
MCHC RBC AUTO-ENTMCNC: 33.3 G/DL (ref 31–37)
MCV RBC AUTO: 98.4 FL (ref 81–100)
MCV RBC AUTO: 99.7 FL (ref 81–100)
PLATELET # BLD AUTO: 180 10(3)UL (ref 150–450)
PLATELET # BLD AUTO: 181 10(3)UL (ref 150–450)
POTASSIUM SERPL-SCNC: 3.7 MMOL/L (ref 3.6–5.1)
POTASSIUM SERPL-SCNC: 3.7 MMOL/L (ref 3.6–5.1)
POTASSIUM SERPL-SCNC: 3.9 MMOL/L (ref 3.6–5.1)
PSA SERPL DL<=0.01 NG/ML-MCNC: 18.5 SECONDS (ref 12.3–14.8)
RBC # BLD AUTO: 2.92 X10(6)UL (ref 3.8–5.1)
RBC # BLD AUTO: 3.08 X10(6)UL (ref 3.8–5.1)
RED CELL DISTRIBUTION WIDTH-SD: 52.2 FL (ref 35.1–46.3)
RED CELL DISTRIBUTION WIDTH-SD: 53.2 FL (ref 35.1–46.3)
SODIUM SERPL-SCNC: 140 MMOL/L (ref 136–144)
SODIUM SERPL-SCNC: 140 MMOL/L (ref 136–144)
SODIUM SERPL-SCNC: 141 MMOL/L (ref 136–144)
WBC # BLD AUTO: 7.9 X10(3) UL (ref 4–13)
WBC # BLD AUTO: 8 X10(3) UL (ref 4–13)

## 2017-01-14 PROCEDURE — 99232 SBSQ HOSP IP/OBS MODERATE 35: CPT | Performed by: INTERNAL MEDICINE

## 2017-01-14 PROCEDURE — 99232 SBSQ HOSP IP/OBS MODERATE 35: CPT | Performed by: HOSPITALIST

## 2017-01-14 RX ORDER — ENOXAPARIN SODIUM 100 MG/ML
1 INJECTION SUBCUTANEOUS EVERY 12 HOURS SCHEDULED
Status: DISCONTINUED | OUTPATIENT
Start: 2017-01-14 | End: 2017-01-14

## 2017-01-14 RX ORDER — FUROSEMIDE 10 MG/ML
20 INJECTION INTRAMUSCULAR; INTRAVENOUS ONCE
Status: DISCONTINUED | OUTPATIENT
Start: 2017-01-15 | End: 2017-01-14

## 2017-01-14 RX ORDER — WARFARIN SODIUM 5 MG/1
5 TABLET ORAL
Status: COMPLETED | OUTPATIENT
Start: 2017-01-14 | End: 2017-01-14

## 2017-01-14 RX ORDER — FUROSEMIDE 10 MG/ML
40 INJECTION INTRAMUSCULAR; INTRAVENOUS ONCE
Status: DISCONTINUED | OUTPATIENT
Start: 2017-01-14 | End: 2017-01-14

## 2017-01-14 RX ORDER — HEPARIN SODIUM AND DEXTROSE 10000; 5 [USP'U]/100ML; G/100ML
INJECTION INTRAVENOUS CONTINUOUS
Status: DISCONTINUED | OUTPATIENT
Start: 2017-01-14 | End: 2017-01-16

## 2017-01-14 RX ORDER — HEPARIN SODIUM AND DEXTROSE 10000; 5 [USP'U]/100ML; G/100ML
18 INJECTION INTRAVENOUS ONCE
Status: DISCONTINUED | OUTPATIENT
Start: 2017-01-14 | End: 2017-01-16

## 2017-01-14 NOTE — PROGRESS NOTES
INPATIENT PROGRESS NOTE    Assessment:   Andrea Gill in room 4370/6594-S, is a 78year old female, Hospital Day ( LOS: 9 days ) hospitalized for Right leg DVT ([facility] [address] The patient's other hospital problems include:    [address]

## 2017-01-14 NOTE — CM/SW NOTE
SW received call from RN indicating that patient is ready for discharge today. Patient to discharge to [facility]. SW spoke with admissions at [facility] ([phone]), confirmed that they can accept patient today.   LUIZ set up [employer] for [employer]

## 2017-01-14 NOTE — CM/SW NOTE
SW received update from RN that patient is no longer being discharge. SW cancelled ambulance transportation. SW informed [facility].

## 2017-01-14 NOTE — PROGRESS NOTES
[facility]  Progress Note     Dmitry  Patient Status:  Inpatient    1940 MRN [id number]   [facility] 7NE-A Attending Rosita Mccloud MD   [facility] Day # 9 PCP Khoa Man MD     Chief Complaint: leg pain     S: Patient feeli TROP, CK in the last 72 hours. Imaging: Imaging data reviewed in Epic.     Medications:   • furosemide  40 mg Intravenous Daily   • Normal Saline Flush  10 mL Intravenous Q12H   • pantoprazole (PROTONIX) IV push  40 mg Intravenous Q24H   • digoxin drip/coumadin  · CODE status: full  · Guerrero: removed    Janae Cuevas MD

## 2017-01-14 NOTE — PLAN OF CARE
Patient alert and oriented times four. Meds given per MAR. PRN tylenol for headache. Patient more comfortable in recliner chair.  in place. Vital signs stable. Up with 1 person assist and a walker to the bathroom.   No current problems with urinati

## 2017-01-14 NOTE — PROGRESS NOTES
Hematology/Oncology Progress Note    Patient Name: Dmitry Schmidt  Medical Record Number: [id number]    YOB: 1940     Reason for Consultation:  Dmitry Schmidt was seen today for the diagnosis of RLE DVT    Interval events:  Feeling well overa 2450)    Wt Readings from Last 6 Encounters:  01/11/17 : 103.6 kg (228 lb 6.3 oz)  01/03/17 : 104.781 kg (231 lb)  12/22/16 : 101.8 kg (224 lb 6.9 oz)  12/11/16 : 100.744 kg (222 lb 1.6 oz)  11/23/16 : 100.699 kg (222 lb)  11/17/16 : 100.699 kg (222 lb) INRs  -coumadin will be managed by cardiology as outpatient after pt leaves [facility].    -IVC filter in place, in near future, if she becomes more active, can consider removal. But her limited mobility and obesity increase her risk of DVT/PE so may be prudent to

## 2017-01-14 NOTE — PROGRESS NOTES
[facility] Cardiology Progress Note    Subjective:  Did not sleep very well due to frequent urination after lasix.      Objective:  /48 mmHg  Pulse 75  Temp(Src) 97.6 °F (36.4 °C) (Oral)  Resp 18  Wt 228 lb 6.3 oz (103.6 kg)  SpO2 97%    Telemetry: atria

## 2017-01-14 NOTE — PLAN OF CARE
Received report at 0700. Patient alert and oriented x4. Spoke with all MDs. No discharge today. INR to be 2-2.5, CT scan in AM. No lovenox. Continue IV heparin. Soft IV diuretics to help get rid of extra water in legs. Daughter updated on POC.       HEMATOL

## 2017-01-15 ENCOUNTER — PRIOR ORIGINAL RECORDS (OUTPATIENT)
Dept: OTHER | Age: 77
End: 2017-01-15

## 2017-01-15 ENCOUNTER — APPOINTMENT (OUTPATIENT)
Dept: CT IMAGING | Facility: HOSPITAL | Age: 77
DRG: 252 | End: 2017-01-15
Attending: NEUROLOGICAL SURGERY
Payer: MEDICARE

## 2017-01-15 LAB
APTT PPP: 59.2 SECONDS (ref 25–34)
BASOPHILS # BLD AUTO: 0.04 X10(3) UL (ref 0–0.1)
BASOPHILS NFR BLD AUTO: 0.6 %
BUN BLD-MCNC: 10 MG/DL (ref 8–20)
CALCIUM BLD-MCNC: 8.3 MG/DL (ref 8.3–10.3)
CHLORIDE: 108 MMOL/L (ref 101–111)
CO2: 23 MMOL/L (ref 22–32)
CREAT BLD-MCNC: 0.76 MG/DL (ref 0.55–1.02)
DEPRECATED HBV CORE AB SER IA-ACNC: 194.7 NG/ML (ref 10–291)
EOSINOPHIL # BLD AUTO: 0.17 X10(3) UL (ref 0–0.3)
EOSINOPHIL NFR BLD AUTO: 2.4 %
ERYTHROCYTE [DISTWIDTH] IN BLOOD BY AUTOMATED COUNT: 14.6 % (ref 11.5–16)
GLUCOSE BLD-MCNC: 102 MG/DL (ref 65–99)
GLUCOSE BLD-MCNC: 120 MG/DL (ref 65–99)
GLUCOSE BLD-MCNC: 129 MG/DL (ref 65–99)
GLUCOSE BLD-MCNC: 89 MG/DL (ref 65–99)
GLUCOSE BLD-MCNC: 92 MG/DL (ref 70–99)
HCT VFR BLD AUTO: 30.5 % (ref 34–50)
HGB BLD-MCNC: 9.4 G/DL (ref 12–16)
IMMATURE GRANULOCYTE COUNT: 0.08 X10(3) UL (ref 0–1)
IMMATURE GRANULOCYTE RATIO %: 1.1 %
INR BLD: 1.96 (ref 0.89–1.12)
LYMPHOCYTES # BLD AUTO: 1.5 X10(3) UL (ref 0.9–4)
LYMPHOCYTES NFR BLD AUTO: 21.3 %
MCH RBC QN AUTO: 31.9 PG (ref 27–33.2)
MCHC RBC AUTO-ENTMCNC: 30.8 G/DL (ref 31–37)
MCV RBC AUTO: 103.4 FL (ref 81–100)
MONOCYTES # BLD AUTO: 0.61 X10(3) UL (ref 0.1–0.6)
MONOCYTES NFR BLD AUTO: 8.7 %
NEUTROPHIL ABS PRELIM: 4.65 X10 (3) UL (ref 1.3–6.7)
NEUTROPHILS # BLD AUTO: 4.65 X10(3) UL (ref 1.3–6.7)
NEUTROPHILS NFR BLD AUTO: 65.9 %
PLATELET # BLD AUTO: 127 10(3)UL (ref 150–450)
POTASSIUM SERPL-SCNC: 3.6 MMOL/L (ref 3.6–5.1)
PSA SERPL DL<=0.01 NG/ML-MCNC: 23 SECONDS (ref 12.3–14.8)
RBC # BLD AUTO: 2.95 X10(6)UL (ref 3.8–5.1)
RED CELL DISTRIBUTION WIDTH-SD: 55.8 FL (ref 35.1–46.3)
RETIC ABS CALC AUTO: 92.6 X10(3) UL (ref 22.5–147.5)
RETIC IRF CALC: 0.18 RATIO (ref 0.09–0.3)
RETIC%: 3.1 % (ref 0.5–2.5)
RETICULOCYTE HEMOGLOBIN EQUIVALENT: 35.7 PG (ref 28.2–36.3)
SODIUM SERPL-SCNC: 139 MMOL/L (ref 136–144)
WBC # BLD AUTO: 7.1 X10(3) UL (ref 4–13)

## 2017-01-15 PROCEDURE — 99232 SBSQ HOSP IP/OBS MODERATE 35: CPT | Performed by: HOSPITALIST

## 2017-01-15 PROCEDURE — 70450 CT HEAD/BRAIN W/O DYE: CPT

## 2017-01-15 PROCEDURE — 99232 SBSQ HOSP IP/OBS MODERATE 35: CPT | Performed by: INTERNAL MEDICINE

## 2017-01-15 RX ORDER — WARFARIN SODIUM 2 MG/1
2 TABLET ORAL NIGHTLY
Status: DISCONTINUED | OUTPATIENT
Start: 2017-01-15 | End: 2017-01-17

## 2017-01-15 NOTE — PROGRESS NOTES
[facility] Cardiology Progress Note    Subjective:  Sleeping in chair.      Objective:  /54 mmHg  Pulse 69  Temp(Src) 97.7 °F (36.5 °C) (Oral)  Resp 19  Wt 228 lb 6.3 oz (103.6 kg)  SpO2 98%    Telemetry: atrial fibrillation     Labs: Hgb 9.4, Pl 127,000

## 2017-01-15 NOTE — PROGRESS NOTES
INPATIENT PROGRESS NOTE    Assessment:   Sandra Munroe in room 0268/3722-F, is a 78year old female, Hospital Day ( LOS: 10days ) hospitalized for Right leg DVT ([facility] [address] The patient's other hospital problems include:    [address]

## 2017-01-15 NOTE — PLAN OF CARE
Heparin gtt infusing per protocol  No complaints of pain  Up to chair for all meals  Discharge likely tomorrow  Vital signs stable, RA  No neuro deficits noted  neuros q4 per orders

## 2017-01-15 NOTE — PROGRESS NOTES
[facility]  Progress Note     Clara Coleman Patient Status:  Inpatient    1940 MRN [id number]   [facility] 7NE-A Attending Abril Schumacher MD   Hosp Day # 10 PCP Raul Honeycutt MD     Chief Complaint: leg pain     S: Patient feel Units/kg/hr Intravenous Once   • furosemide  40 mg Intravenous Daily   • Normal Saline Flush  10 mL Intravenous Q12H   • pantoprazole (PROTONIX) IV push  40 mg Intravenous Q24H   • digoxin  125 mcg Oral Daily   • Amiodarone HCl  300 mg Oral Daily   • Atorv

## 2017-01-15 NOTE — PROGRESS NOTES
Hematology/Oncology Progress Note    Patient Name: Dylan Palacios  Medical Record Number: [id number]    YOB: 1940     Reason for Consultation:  Dylan Palacios was seen today for the diagnosis of RLE DVT    Interval events:  Mild headache over Resp Rate: --  SpO2: 98 % (01/15 0400)    Wt Readings from Last 6 Encounters:  01/11/17 : 103.6 kg (228 lb 6.3 oz)  01/03/17 : 104.781 kg (231 lb)  12/22/16 : 101.8 kg (224 lb 6.9 oz)  12/11/16 : 100.744 kg (222 lb 1.6 oz)  11/23/16 : 100.699 kg (222 lb) stable  -given NS want to keep INR between 2-2.5, recommend decreasing coumadin dose tonight to 2mg based on rate of INR rise. Check daily INRs. -coumadin will be managed by cardiology as outpatient after pt leaves [facility].    -IVC filter in place, in near f

## 2017-01-16 LAB
APTT PPP: 101.3 SECONDS (ref 25–34)
GLUCOSE BLD-MCNC: 114 MG/DL (ref 65–99)
GLUCOSE BLD-MCNC: 133 MG/DL (ref 65–99)
GLUCOSE BLD-MCNC: 147 MG/DL (ref 65–99)
GLUCOSE BLD-MCNC: 93 MG/DL (ref 65–99)
INR BLD: 2.13 (ref 0.89–1.12)
PLATELET # BLD AUTO: 186 10(3)UL (ref 150–450)
PSA SERPL DL<=0.01 NG/ML-MCNC: 24.6 SECONDS (ref 12.3–14.8)

## 2017-01-16 PROCEDURE — 99232 SBSQ HOSP IP/OBS MODERATE 35: CPT | Performed by: INTERNAL MEDICINE

## 2017-01-16 PROCEDURE — 99232 SBSQ HOSP IP/OBS MODERATE 35: CPT | Performed by: HOSPITALIST

## 2017-01-16 RX ORDER — FUROSEMIDE 10 MG/ML
40 INJECTION INTRAMUSCULAR; INTRAVENOUS
Status: DISCONTINUED | OUTPATIENT
Start: 2017-01-16 | End: 2017-01-16

## 2017-01-16 RX ORDER — FUROSEMIDE 10 MG/ML
40 INJECTION INTRAMUSCULAR; INTRAVENOUS ONCE
Status: COMPLETED | OUTPATIENT
Start: 2017-01-16 | End: 2017-01-16

## 2017-01-16 RX ORDER — WARFARIN SODIUM 2.5 MG/1
2.5 TABLET ORAL NIGHTLY
Qty: 30 TABLET | Refills: 0 | Status: SHIPPED | OUTPATIENT
Start: 2017-01-16 | End: 2017-01-31

## 2017-01-16 RX ORDER — TORSEMIDE 20 MG/1
20 TABLET ORAL
Qty: 180 TABLET | Refills: 3 | Status: SHIPPED | OUTPATIENT
Start: 2017-01-16 | End: 2017-01-31

## 2017-01-16 NOTE — PLAN OF CARE
Assumed care at 2200. Pt alert and oriented. Heparin gtt infusing. Coumadin per mar. +BLE edema. Reports mild rt leg pain. Tylenol prn. Up with assist x1 and walker. D/C planning to [facility].     CARDIOVASCULAR - ADULT    • Maintains optimal cardi

## 2017-01-16 NOTE — DISCHARGE SUMMARY
[facility] HOSPITALIST  DISCHARGE SUMMARY     Ramandeep Anne Patient Status:  Inpatient    1940 MRN [id number]   [facility] 7NE-A Attending Raul Berry MD   [address] Day # 32 PCP Christina Jacome MD     Date of Admission: 2017  Date of Dis and swelling. Patient was recently admitted to [facility] on 12/16/16 due to acute on chronic SDH. Patient had been on lovenox at the time due to bilateral DVT. Anticoagulation was discontinued and reversed.  Patient was seen by neurosurgery and repeat evacuati day showed improvement. She was extubated without event. Serial CT of the brain remained stable during the stay. She continued on Vimpat and family is questioning whether she need to stay on this medication.   Patient had seizure during last hospitalizat thrombosis. 2.    History of subdural hematoma. 3.    History of pacemaker placement.     POSTOPERATIVE DIAGNOSIS:  Iliofemoral thrombosis status post thrombectomy.        1/11  PREOPERATIVE DIAGNOSIS:     1.    Iliofemoral thrombosis.   2.    Caval throm Last time this was given:  200 mg on 1/31/2017  8:22 AM   Commonly known as:  PACERONE        Take 3 tablets (300 mg total) by mouth daily.     Quantity:  90 tablet   Refills:  3       Atorvastatin Calcium 10 MG Tabs   Last time this was given:  15 mg on mEq by mouth daily. Refills:  0       TAB-A-MAE MAXIMUM Tabs        Take 1 tablet by mouth daily. Refills:  0       tamsulosin HCl 0.4 MG Caps   Commonly known as:  FLOMAX        Take 1 capsule (0.4 mg total) by mouth nightly.     Quantity:  60 capsu -----------------------------------------------------------------------------------------------  PATIENT DISCHARGE INSTRUCTIONS: See electronic chart    JIGAR Celis     Time spent:  > 30 minutes

## 2017-01-16 NOTE — PROGRESS NOTES
[facility]  Progress Note    Andrea Gill Patient Status:  Inpatient    1940 MRN [id number]   [facility] 7NE-A Attending Ck Whitlock MD   Hosp Day # 6 PCP Steve Lopez MD       SUBJECTIVE:    Sitting up, much better.     OBJE Daily(Beta Blocker)   • Alfuzosin HCl ER  10 mg Oral Daily with breakfast   • torsemide  20 mg Oral Daily   • docusate sodium  100 mg Oral BID   • insulin aspart  1-5 Units Subcutaneous TID CC and HS   • Sodium Polystyrene Sulfonate  15 g Oral Once     alb

## 2017-01-16 NOTE — PLAN OF CARE
Heparin gtt discontinued  No complaints of pain  Up to chair for all meals  Vital signs stable, RA  No neuro deficits noted  neuros q4 per orders

## 2017-01-16 NOTE — PROGRESS NOTES
[facility]  Progress Note     Reford Mariluz Patient Status:  Inpatient    1940 MRN [id number]   [facility] 7NE-A Attending Gene Germain MD   [facility] Day # 6 PCP Charly Reinoso MD     Chief Complaint: leg pain     S: Patient repo hours.         Imaging: Imaging data reviewed in Epic.     Medications:   • Warfarin Sodium  2 mg Oral Nightly   • furosemide  40 mg Intravenous Daily   • Normal Saline Flush  10 mL Intravenous Q12H   • pantoprazole (PROTONIX) IV push  40 mg Intravenous Q24 removed    Estimated date of Discharge: in next 24 hours when cleared by consultants.     Ha KIMBALL  8:39 AM     Addendum  Patient seen and examined  Chest: CTA B/L  CVS: S1, S2, RRR  ABD: Soft, NT, ND, BS+  EXT: No c/c, +edema     Imaging: Reviewe

## 2017-01-16 NOTE — PROGRESS NOTES
[facility] Cardiology Progress Note    Subjective:  No problems overnight.      Objective:  /52 mmHg  Pulse 59  Temp(Src) 98.2 °F (36.8 °C) (Oral)  Resp 18  Wt 236 lb 8.9 oz (107.3 kg)  SpO2 100%    Telemetry:  afib     Labs:  INR 2.1, , Pl 186,00

## 2017-01-17 LAB
APTT PPP: 52.7 SECONDS (ref 25–34)
BUN BLD-MCNC: 12 MG/DL (ref 8–20)
BUN BLD-MCNC: 15 MG/DL (ref 8–20)
CALCIUM BLD-MCNC: 8.2 MG/DL (ref 8.3–10.3)
CALCIUM BLD-MCNC: 8.8 MG/DL (ref 8.3–10.3)
CHLORIDE: 103 MMOL/L (ref 101–111)
CHLORIDE: 104 MMOL/L (ref 101–111)
CO2: 28 MMOL/L (ref 22–32)
CO2: 30 MMOL/L (ref 22–32)
CREAT BLD-MCNC: 0.8 MG/DL (ref 0.55–1.02)
CREAT BLD-MCNC: 0.84 MG/DL (ref 0.55–1.02)
ERYTHROCYTE [DISTWIDTH] IN BLOOD BY AUTOMATED COUNT: 14.5 % (ref 11.5–16)
ERYTHROCYTE [DISTWIDTH] IN BLOOD BY AUTOMATED COUNT: 14.6 % (ref 11.5–16)
GLUCOSE BLD-MCNC: 103 MG/DL (ref 70–99)
GLUCOSE BLD-MCNC: 106 MG/DL (ref 65–99)
GLUCOSE BLD-MCNC: 110 MG/DL (ref 65–99)
GLUCOSE BLD-MCNC: 115 MG/DL (ref 65–99)
GLUCOSE BLD-MCNC: 167 MG/DL (ref 65–99)
GLUCOSE BLD-MCNC: 87 MG/DL (ref 70–99)
GLUCOSE BLD-MCNC: 94 MG/DL (ref 65–99)
HCT VFR BLD AUTO: 28.2 % (ref 34–50)
HCT VFR BLD AUTO: 29.2 % (ref 34–50)
HGB BLD-MCNC: 9.3 G/DL (ref 12–16)
HGB BLD-MCNC: 9.6 G/DL (ref 12–16)
INR BLD: 1.87 (ref 0.89–1.12)
MCH RBC QN AUTO: 32.5 PG (ref 27–33.2)
MCH RBC QN AUTO: 32.5 PG (ref 27–33.2)
MCHC RBC AUTO-ENTMCNC: 32.9 G/DL (ref 31–37)
MCHC RBC AUTO-ENTMCNC: 33 G/DL (ref 31–37)
MCV RBC AUTO: 98.6 FL (ref 81–100)
MCV RBC AUTO: 99 FL (ref 81–100)
PLATELET # BLD AUTO: 175 10(3)UL (ref 150–450)
PLATELET # BLD AUTO: 210 10(3)UL (ref 150–450)
POTASSIUM SERPL-SCNC: 3.6 MMOL/L (ref 3.6–5.1)
POTASSIUM SERPL-SCNC: 3.9 MMOL/L (ref 3.6–5.1)
PSA SERPL DL<=0.01 NG/ML-MCNC: 22.2 SECONDS (ref 12.3–14.8)
RBC # BLD AUTO: 2.86 X10(6)UL (ref 3.8–5.1)
RBC # BLD AUTO: 2.95 X10(6)UL (ref 3.8–5.1)
RED CELL DISTRIBUTION WIDTH-SD: 52.4 FL (ref 35.1–46.3)
RED CELL DISTRIBUTION WIDTH-SD: 52.5 FL (ref 35.1–46.3)
SODIUM SERPL-SCNC: 141 MMOL/L (ref 136–144)
SODIUM SERPL-SCNC: 141 MMOL/L (ref 136–144)
WBC # BLD AUTO: 8.2 X10(3) UL (ref 4–13)
WBC # BLD AUTO: 9 X10(3) UL (ref 4–13)

## 2017-01-17 PROCEDURE — 99232 SBSQ HOSP IP/OBS MODERATE 35: CPT | Performed by: HOSPITALIST

## 2017-01-17 RX ORDER — HEPARIN SODIUM AND DEXTROSE 10000; 5 [USP'U]/100ML; G/100ML
18 INJECTION INTRAVENOUS ONCE
Status: DISCONTINUED | OUTPATIENT
Start: 2017-01-17 | End: 2017-01-31

## 2017-01-17 RX ORDER — PANTOPRAZOLE SODIUM 40 MG/1
40 TABLET, DELAYED RELEASE ORAL
Status: DISCONTINUED | OUTPATIENT
Start: 2017-01-18 | End: 2017-01-31

## 2017-01-17 RX ORDER — POTASSIUM CHLORIDE 20 MEQ/1
40 TABLET, EXTENDED RELEASE ORAL EVERY 4 HOURS
Status: COMPLETED | OUTPATIENT
Start: 2017-01-17 | End: 2017-01-17

## 2017-01-17 RX ORDER — WARFARIN SODIUM 2 MG/1
4 TABLET ORAL
Status: COMPLETED | OUTPATIENT
Start: 2017-01-17 | End: 2017-01-17

## 2017-01-17 RX ORDER — FUROSEMIDE 10 MG/ML
40 INJECTION INTRAMUSCULAR; INTRAVENOUS DAILY
Status: DISCONTINUED | OUTPATIENT
Start: 2017-01-17 | End: 2017-01-18

## 2017-01-17 RX ORDER — HEPARIN SODIUM AND DEXTROSE 10000; 5 [USP'U]/100ML; G/100ML
INJECTION INTRAVENOUS CONTINUOUS
Status: DISCONTINUED | OUTPATIENT
Start: 2017-01-17 | End: 2017-01-30

## 2017-01-17 RX ORDER — HEPARIN SODIUM 5000 [USP'U]/ML
30 INJECTION, SOLUTION INTRAVENOUS; SUBCUTANEOUS ONCE
Status: COMPLETED | OUTPATIENT
Start: 2017-01-17 | End: 2017-01-17

## 2017-01-17 RX ORDER — HEPARIN SODIUM 5000 [USP'U]/ML
80 INJECTION INTRAVENOUS; SUBCUTANEOUS ONCE
Status: DISCONTINUED | OUTPATIENT
Start: 2017-01-17 | End: 2017-01-17

## 2017-01-17 NOTE — PLAN OF CARE
Assumed care at [address]. Pt alert and oriented.    VSS on room air. +BLE edema.     Neuro's q4h, no deficits. Tylenol given for c/o a headache with improvement. Up with assist x1 and walker, tolerates well. D/C to [facility] today.     CARDIOVASCULAR - AD

## 2017-01-17 NOTE — CONSULTS
[facility] Note: Route Optimization for Pantoprazole (PROTONIX)    Patient is currently on Pantoprazole (PROTONIX) 40 mg IV every 24 hours.    The patient meets the criteria to convert to the oral equivalent as established by the IV to Oral conversion pro

## 2017-01-17 NOTE — PLAN OF CARE
Received report at 0700. Patient alert and oriented x4. INR 1.87. Restarted on heparin at 12/hr per cardiology. Hold discharge until INR therapeutic. Coumadin increased to 4mg. Will discharge to [facility] when she is therapeutic. IV lasix given x1.  Will

## 2017-01-17 NOTE — PROGRESS NOTES
[facility]   Cardiology Progress Note     Subjective:   Called to see pt for subtherapeutic inr, was scheduled for d/c to rehab today. Denies pain or dyspnea.      Objective:   /65 mmHg  Pulse 64  Temp(Src) 98.2 °F (36.8 °C) (Oral)  Resp 17  Wt 2 with previous dated 07/05/2016:  Pacemakers wires are now noted. Small pericardial effusion is now present.     Labs:    BUN 12, Cr 0.84, Na 141, K 3.6  INR 1.87  WBC 8.2, Hgb 9.6, Plt 175    Medications:  • Potassium Chloride ER  40 mEq Oral Q4H   • furose CRAIG Reyna   [id number] [phone]  1/17/2017  10:32 AM

## 2017-01-17 NOTE — PROGRESS NOTES
[facility]  Progress Note     Nola Velasco Patient Status:  Inpatient    1940 MRN [id number]   [facility] 7NE-A Attending Florentin Gustafson MD   Hosp Day # 12 PCP Jorge Hankins MD     Chief Complaint: leg pain     S: Patient repo (PROTONIX) IV push  40 mg Intravenous Q24H   • digoxin  125 mcg Oral Daily   • Amiodarone HCl  300 mg Oral Daily   • Atorvastatin Calcium  15 mg Oral Nightly   • Bethanechol Chloride  20 mg Oral TID   • DiltiaZEM HCl ER Coated Beads  120 mg Oral Daily   • Addendum  Patient seen and examined  Chest: CTA B/L  CVS: S1, S2, RRR  ABD: Soft, NT, ND, BS+  EXT: No c/c, +edema    DC planning    Zora Ang MD

## 2017-01-18 LAB
APTT PPP: 102.4 SECONDS (ref 25–34)
BUN BLD-MCNC: 16 MG/DL (ref 8–20)
CALCIUM BLD-MCNC: 8.2 MG/DL (ref 8.3–10.3)
CHLORIDE: 103 MMOL/L (ref 101–111)
CO2: 31 MMOL/L (ref 22–32)
CREAT BLD-MCNC: 0.75 MG/DL (ref 0.55–1.02)
GLUCOSE BLD-MCNC: 103 MG/DL (ref 65–99)
GLUCOSE BLD-MCNC: 112 MG/DL (ref 65–99)
GLUCOSE BLD-MCNC: 150 MG/DL (ref 65–99)
GLUCOSE BLD-MCNC: 150 MG/DL (ref 65–99)
GLUCOSE BLD-MCNC: 163 MG/DL (ref 65–99)
GLUCOSE BLD-MCNC: 94 MG/DL (ref 70–99)
HEMATOCRIT: 30.5 %
HEMOGLOBIN: 9.4 G/DL
INR BLD: 1.81 (ref 0.89–1.12)
PLATELET # BLD AUTO: 210 10(3)UL (ref 150–450)
PLATELETS: 127 K/UL
POTASSIUM SERPL-SCNC: 4.4 MMOL/L (ref 3.6–5.1)
PSA SERPL DL<=0.01 NG/ML-MCNC: 21.6 SECONDS (ref 12.3–14.8)
RED BLOOD COUNT: 2.95 X 10-6/U
SODIUM SERPL-SCNC: 141 MMOL/L (ref 136–144)
WHITE BLOOD COUNT: 7.1 X 10-3/U

## 2017-01-18 PROCEDURE — 99232 SBSQ HOSP IP/OBS MODERATE 35: CPT | Performed by: HOSPITALIST

## 2017-01-18 RX ORDER — FUROSEMIDE 10 MG/ML
40 INJECTION INTRAMUSCULAR; INTRAVENOUS
Status: DISCONTINUED | OUTPATIENT
Start: 2017-01-18 | End: 2017-01-26

## 2017-01-18 RX ORDER — SENNOSIDES 8.6 MG
8.6 TABLET ORAL 2 TIMES DAILY
Status: DISCONTINUED | OUTPATIENT
Start: 2017-01-18 | End: 2017-01-31

## 2017-01-18 RX ORDER — WARFARIN SODIUM 2 MG/1
4 TABLET ORAL
Status: COMPLETED | OUTPATIENT
Start: 2017-01-18 | End: 2017-01-18

## 2017-01-18 NOTE — PROGRESS NOTES
[facility]  Progress Note     Lencho Kelly Patient Status:  Inpatient    1940 MRN [id number]   [facility] 7NE-A Attending Stephanie Woods, [address] [facility] Day # 15 PCP Ayo Marshall MD     Chief Complaint: leg pain     S: Patient Flush  10 mL Intravenous Q12H   • digoxin  125 mcg Oral Daily   • Amiodarone HCl  300 mg Oral Daily   • Atorvastatin Calcium  15 mg Oral Nightly   • Bethanechol Chloride  20 mg Oral TID   • DiltiaZEM HCl ER Coated Beads  120 mg Oral Daily   • lacosamide  2 medicine hospitalist addendum  Patient seen and examined at bedside. Patient still having some swelling in the right lower extremity. No overnight events or new complaints. Physical exam:  General: Patient awake, alert, and oriented ×3.   No acute respir

## 2017-01-18 NOTE — PROGRESS NOTES
[facility]   Cardiology Progress Note     Subjective:   Tired this am, still with pain right leg, controlled with iv pain meds. Denies chest pain or dyspnea.      Objective:   /50 mmHg  Pulse 71  Temp(Src) 98.2 °F (36.8 °C) (Oral)  Resp 18  Wt 23 extracardiac: small to moderate pericardial effusion. Impressions:  This study is compared with previous dated 07/05/2016:  Pacemakers wires are now noted. Small pericardial effusion is now present.     Labs:    BUN 16, Cr 0.75, Na 141, K 4.4  INR 1.81, PT AM    Patient seen and examined    No complaints; continue heparin to keep INR > 2.0; redose coumadin    Nahum Cui MD [facility]

## 2017-01-18 NOTE — PLAN OF CARE
Assumed care at 1900. Pt a/ox4. Vss, afib per tele. RA. Heparin qtt infusing at 12ml/hr, increased rate to 14ml/hr per protocol. Coumadin po 4mg given per order. IV morphine prn given for c/o ladonna leg pain, w/ relief noted.   DC to [facility] when INR t

## 2017-01-19 LAB
APTT PPP: 95.4 SECONDS (ref 25–34)
BUN BLD-MCNC: 13 MG/DL (ref 8–20)
CALCIUM BLD-MCNC: 8.9 MG/DL (ref 8.3–10.3)
CHLORIDE: 103 MMOL/L (ref 101–111)
CO2: 30 MMOL/L (ref 22–32)
CREAT BLD-MCNC: 0.98 MG/DL (ref 0.55–1.02)
GLUCOSE BLD-MCNC: 110 MG/DL (ref 65–99)
GLUCOSE BLD-MCNC: 133 MG/DL (ref 65–99)
GLUCOSE BLD-MCNC: 139 MG/DL (ref 65–99)
GLUCOSE BLD-MCNC: 142 MG/DL (ref 65–99)
GLUCOSE BLD-MCNC: 88 MG/DL (ref 70–99)
INR BLD: 1.76 (ref 0.89–1.12)
PLATELET # BLD AUTO: 222 10(3)UL (ref 150–450)
POTASSIUM SERPL-SCNC: 4 MMOL/L (ref 3.6–5.1)
PSA SERPL DL<=0.01 NG/ML-MCNC: 21.1 SECONDS (ref 12.3–14.8)
SODIUM SERPL-SCNC: 143 MMOL/L (ref 136–144)

## 2017-01-19 PROCEDURE — 99232 SBSQ HOSP IP/OBS MODERATE 35: CPT | Performed by: HOSPITALIST

## 2017-01-19 PROCEDURE — B546ZZA ULTRASONOGRAPHY OF RIGHT SUBCLAVIAN VEIN, GUIDANCE: ICD-10-PCS | Performed by: HOSPITALIST

## 2017-01-19 PROCEDURE — 05H533Z INSERTION OF INFUSION DEVICE INTO RIGHT SUBCLAVIAN VEIN, PERCUTANEOUS APPROACH: ICD-10-PCS | Performed by: HOSPITALIST

## 2017-01-19 RX ORDER — FUROSEMIDE 10 MG/ML
40 INJECTION INTRAMUSCULAR; INTRAVENOUS ONCE
Status: COMPLETED | OUTPATIENT
Start: 2017-01-19 | End: 2017-01-19

## 2017-01-19 RX ORDER — WARFARIN SODIUM 5 MG/1
5 TABLET ORAL
Status: DISCONTINUED | OUTPATIENT
Start: 2017-01-19 | End: 2017-01-19

## 2017-01-19 RX ORDER — SODIUM CHLORIDE 0.9 % (FLUSH) 0.9 %
10 SYRINGE (ML) INJECTION EVERY 12 HOURS
Status: DISCONTINUED | OUTPATIENT
Start: 2017-01-19 | End: 2017-01-31

## 2017-01-19 RX ORDER — WARFARIN SODIUM 2 MG/1
4 TABLET ORAL
Status: COMPLETED | OUTPATIENT
Start: 2017-01-19 | End: 2017-01-19

## 2017-01-19 NOTE — PROGRESS NOTES
[facility]  Progress Note     Saurabh Slim Patient Status:  Inpatient    1940 MRN [id number]   [facility] 7NE-A Attending Nehemias Posadas, [address] [facility] Day # 15 PCP Barbara Owen MD     Chief Complaint: leg pain     S: Patient Normal Saline Flush  10 mL Intravenous Q12H   • digoxin  125 mcg Oral Daily   • Amiodarone HCl  300 mg Oral Daily   • Atorvastatin Calcium  15 mg Oral Nightly   • Bethanechol Chloride  20 mg Oral TID   • DiltiaZEM HCl ER Coated Beads  120 mg Oral Daily   • Increased diuretics and added fluid restrict due to FAUSTO. Needs to walk TID. Mireya KIMBALL  8:40 AM   Internal medicine hospitalist addendum  Patient seen and examined at bedside.  Patient still having some swelling in the right lower extremity.

## 2017-01-19 NOTE — PAYOR COMM NOTE
Attending Physician: Jenna Ken*    Review Type: CONTINUED STAY  Reviewer: Cheng Yao     Date: January 19, 2017 - 1:00 PM  Payor: [employer]  Authorization Number: [id number]/A  Admit date: 1/5/2017  9:21 PM        REVIEWER AIRAM Given 650 mg Oral Irene Moroe RN      Alfuzosin HCl ER (UROXATRAL) 24 hr tab 10 mg     Date Action Dose Route User    1/19/2017 0952 Given 10 mg Oral Irene Moore RN      Atorvastatin Calcium (LIPITOR) tab 15 mg     Date Action Dose Route Us Action Dose Route User    1/19/2017 0536 Given 40 mg Oral Ronny Sheppard RN      Warfarin Sodium (COUMADIN) tab 4 mg     Date Action Dose Route User    1/18/2017 2148 Given 4 mg Oral Ronny Sheppard RN      Amiodarone HCl (PACERONE) tab 300 mg     Date Action  32.2   32.8   31.9    --     MCHC   32.3   33.3   30.8*    --     RDW   14.5   14.5   14.6    --     NEPRELIM    --     --    4.65    --     WBC   7.9   8.0   7.1    --     PLT   181.0   180.0   127.0*   186.0      Recent Labs    Lab  01/14/17   0534   01

## 2017-01-19 NOTE — PLAN OF CARE
AOx4. C/O generalized pain last night. Norco and Xanax given. Pt slept well. Midline came out this morning. Peripheral IV inserted to Right hand. Remains on Heparin gtt 1400 units/hr. Next PTT tomorrow morning. Up with stand by assistance.   Plan of care

## 2017-01-20 ENCOUNTER — PRIOR ORIGINAL RECORDS (OUTPATIENT)
Dept: OTHER | Age: 77
End: 2017-01-20

## 2017-01-20 LAB
APTT PPP: 101.6 SECONDS (ref 25–34)
APTT PPP: 117 SECONDS (ref 25–34)
APTT PPP: 62.6 SECONDS (ref 25–34)
BASOPHILS # BLD AUTO: 0.03 X10(3) UL (ref 0–0.1)
BASOPHILS NFR BLD AUTO: 0.4 %
BUN BLD-MCNC: 15 MG/DL (ref 8–20)
CALCIUM BLD-MCNC: 8.6 MG/DL (ref 8.3–10.3)
CHLORIDE: 101 MMOL/L (ref 101–111)
CO2: 32 MMOL/L (ref 22–32)
CREAT BLD-MCNC: 0.89 MG/DL (ref 0.55–1.02)
EOSINOPHIL # BLD AUTO: 0.08 X10(3) UL (ref 0–0.3)
EOSINOPHIL NFR BLD AUTO: 1.1 %
ERYTHROCYTE [DISTWIDTH] IN BLOOD BY AUTOMATED COUNT: 14.5 % (ref 11.5–16)
GLUCOSE BLD-MCNC: 103 MG/DL (ref 65–99)
GLUCOSE BLD-MCNC: 103 MG/DL (ref 65–99)
GLUCOSE BLD-MCNC: 157 MG/DL (ref 65–99)
GLUCOSE BLD-MCNC: 94 MG/DL (ref 65–99)
GLUCOSE BLD-MCNC: 98 MG/DL (ref 70–99)
HCT VFR BLD AUTO: 27.5 % (ref 34–50)
HEMATOCRIT: 27.5 %
HEMOGLOBIN: 8.9 G/DL
HGB BLD-MCNC: 8.9 G/DL (ref 12–16)
IMMATURE GRANULOCYTE COUNT: 0.06 X10(3) UL (ref 0–1)
IMMATURE GRANULOCYTE RATIO %: 0.8 %
INR BLD: 1.91 (ref 0.89–1.12)
LYMPHOCYTES # BLD AUTO: 1.72 X10(3) UL (ref 0.9–4)
LYMPHOCYTES NFR BLD AUTO: 22.9 %
MCH RBC QN AUTO: 32.4 PG (ref 27–33.2)
MCHC RBC AUTO-ENTMCNC: 32.4 G/DL (ref 31–37)
MCV RBC AUTO: 100 FL (ref 81–100)
MONOCYTES # BLD AUTO: 0.68 X10(3) UL (ref 0.1–0.6)
MONOCYTES NFR BLD AUTO: 9.1 %
NEUTROPHIL ABS PRELIM: 4.94 X10 (3) UL (ref 1.3–6.7)
NEUTROPHILS # BLD AUTO: 4.94 X10(3) UL (ref 1.3–6.7)
NEUTROPHILS NFR BLD AUTO: 65.7 %
PLATELET # BLD AUTO: 188 10(3)UL (ref 150–450)
PLATELETS: 188 K/UL
POTASSIUM SERPL-SCNC: 3.8 MMOL/L (ref 3.6–5.1)
PSA SERPL DL<=0.01 NG/ML-MCNC: 22.6 SECONDS (ref 12.3–14.8)
RBC # BLD AUTO: 2.75 X10(6)UL (ref 3.8–5.1)
RED BLOOD COUNT: 2.75 X 10-6/U
RED CELL DISTRIBUTION WIDTH-SD: 52.3 FL (ref 35.1–46.3)
SODIUM SERPL-SCNC: 140 MMOL/L (ref 136–144)
WBC # BLD AUTO: 7.5 X10(3) UL (ref 4–13)
WHITE BLOOD COUNT: 7.5 X 10-3/U

## 2017-01-20 PROCEDURE — 99232 SBSQ HOSP IP/OBS MODERATE 35: CPT | Performed by: HOSPITALIST

## 2017-01-20 RX ORDER — WARFARIN SODIUM 2.5 MG/1
2.5 TABLET ORAL
Status: COMPLETED | OUTPATIENT
Start: 2017-01-20 | End: 2017-01-20

## 2017-01-20 NOTE — PLAN OF CARE
GASTROINTESTINAL - ADULT    • Minimal or absence of nausea and vomiting Adequate for Discharge          CARDIOVASCULAR - ADULT    • Maintains optimal cardiac output and hemodynamic stability Progressing        Impaired Functional Mobility    • Achieve high

## 2017-01-20 NOTE — PLAN OF CARE
AOx4. C/O generalized pain; Tylenol and Xanax given. Remains on Heparin protocol. Pt sleeps in the chair; Legs elevated with pillow. Pt is on room air; O2 sat mid 90's. Plan [facility] if INR >2-2.5. Plan of care discussed with pt.   Call light wit

## 2017-01-20 NOTE — PROGRESS NOTES
[facility]  Progress Note     Beintobyron Mcfarlane Patient Status:  Inpatient    1940 MRN [id number]   [facility] 7NE-A Attending Gwen Finn, [address] [facility] Day # 15 PCP John Deras MD     Chief Complaint: leg pain     S: Patient 125 mcg Oral Daily   • Amiodarone HCl  300 mg Oral Daily   • Atorvastatin Calcium  15 mg Oral Nightly   • Bethanechol Chloride  20 mg Oral TID   • DiltiaZEM HCl ER Coated Beads  120 mg Oral Daily   • lacosamide  200 mg Oral BID   • Metoprolol Tartrate  50 some swelling in the right lower extremity.  No overnight events or new complaints. Physical exam:  General: Patient awake, alert, and oriented ×3.  No acute respiratory distress.   Lungs: CTA B  CVS: Regular rhythm  Abdomen: Soft, nontender/nondistended,

## 2017-01-20 NOTE — PHYSICAL THERAPY NOTE
PHYSICAL THERAPY EVALUATION - INPATIENT     Room Number: 4448/1288-B  Evaluation Date: 1/20/2017  Type of Evaluation: Re-evaluation transferred to CNICU  following lytic therapy procedure, currently on CTU 7  Physician Order: PT Eval and Treat    Prese Bilateral shoulder region arthritis 6/18/2015     Severe both shoulders   • Frozen shoulder syndrome 6/18/2015     bilat severe   • Varicose vein 9/18/2015   • Cerebral atrophy 11/18/2015   • Neuropathy 9/16/2016     Severe both legs   • A-fib ([facility])    • A RESTRICTION  Weight Bearing Restriction: None                PAIN ASSESSMENT  Ratin  Location: bilat le with ambulation  Management Techniques: Repositioning;Relaxation    COGNITION  · Overall Cognitive Status:  WFL - within functional limits  · Follow Minimum assistance  Distance (ft): 200  Assistive Device: Rolling walker  Pattern: Shuffle (lateral sway)  Stoop/Curb Assistance: Not tested       Skilled Therapy Provided: pt recd sitting up in bs chair, educated in goals for session.   Pt reports she has (estimated LOS 12-14 days)    PLAN  PT Treatment Plan: Bed mobility; Body mechanics; Endurance; Energy conservation;Patient education; Family education;Gait training;Range of motion;Strengthening;Transfer training;Balance training  Rehab Potential : Fair  Freq

## 2017-01-20 NOTE — PLAN OF CARE
Assumed care @ 0700. Pt a/o x4, VSS. Tele SR. No acute respiratory distress noted. Denies any pain. Pt ambulated to the bathroom.  (+)BM. APTT 62.6, Heparin gtt adjusted to 1600 u/hr, INR 1.91. Will resume coumadin tonight.   No other complaints made

## 2017-01-20 NOTE — PROGRESS NOTES
[facility] Progress Note  Subjective:  Up in chair, no leg pain walking.   No dyspnea    Objective:  134/65  Afebrile  AFib 50-60s    I/O incomplete, weight down 4#      BUN/cr 15/0.89  INR 1.91  Hgb 8.9  plt 188    Neuro:awake/alert  HEENT:noJ

## 2017-01-21 LAB
APTT PPP: 85.9 SECONDS (ref 25–34)
BUN BLD-MCNC: 16 MG/DL (ref 8–20)
CALCIUM BLD-MCNC: 8.8 MG/DL (ref 8.3–10.3)
CHLORIDE: 103 MMOL/L (ref 101–111)
CO2: 31 MMOL/L (ref 22–32)
CREAT BLD-MCNC: 0.92 MG/DL (ref 0.55–1.02)
GLUCOSE BLD-MCNC: 141 MG/DL (ref 65–99)
GLUCOSE BLD-MCNC: 148 MG/DL (ref 65–99)
GLUCOSE BLD-MCNC: 164 MG/DL (ref 65–99)
GLUCOSE BLD-MCNC: 89 MG/DL (ref 70–99)
GLUCOSE BLD-MCNC: 93 MG/DL (ref 65–99)
INR BLD: 1.82 (ref 0.89–1.12)
POTASSIUM SERPL-SCNC: 3.2 MMOL/L (ref 3.6–5.1)
PSA SERPL DL<=0.01 NG/ML-MCNC: 21.7 SECONDS (ref 12.3–14.8)
SODIUM SERPL-SCNC: 141 MMOL/L (ref 136–144)

## 2017-01-21 PROCEDURE — 99232 SBSQ HOSP IP/OBS MODERATE 35: CPT | Performed by: HOSPITALIST

## 2017-01-21 RX ORDER — WARFARIN SODIUM 5 MG/1
5 TABLET ORAL
Status: COMPLETED | OUTPATIENT
Start: 2017-01-21 | End: 2017-01-21

## 2017-01-21 RX ORDER — POTASSIUM CHLORIDE 20 MEQ/1
40 TABLET, EXTENDED RELEASE ORAL EVERY 4 HOURS
Status: COMPLETED | OUTPATIENT
Start: 2017-01-21 | End: 2017-01-21

## 2017-01-21 NOTE — PROGRESS NOTES
[facility] Progress Note  Subjective:  No new issues overnight.   Still with much more LE edema than baseline    Objective:  104/42    Afebrile  AFib 50-60s   I/O incomplete      BUN/cr 16/0.92  INR 1.82    Neuro:awake/alert  HEENT:noJVD, moist

## 2017-01-21 NOTE — PLAN OF CARE
Assumed care. Patient resting up in recliner. BLE elevated . Patient c/o pain to tops of bilateral feet. Tylenol helped minimally. Morhine helped with pain control. Awaiting therapeutic INR. On heparin gtt.      CARDIOVASCULAR - ADULT    • Maintains optimal

## 2017-01-21 NOTE — PLAN OF CARE
Assumed care at 0730. A/Ox4. Afib per tele. RA. Neurologically intact. BLE swelling. Meds given per MAR. Heparin gtt infusing. Needs attended to. Call light in reach, will con't to monitor.       CARDIOVASCULAR - ADULT    • Maintains optimal cardiac o

## 2017-01-21 NOTE — OCCUPATIONAL THERAPY NOTE
OCCUPATIONAL THERAPY EVALUATION - INPATIENT     Room Number: 1820/1621-W  Evaluation Date: 1/21/2017  Type of Evaluation: Initial  Presenting Problem: Right Leg DVT    Physician Order: IP Consult to Occupational Therapy  Reason for Therapy: ADL/IADL Dysfun bilat severe   • Varicose vein 9/18/2015   • Cerebral atrophy 11/18/2015   • Neuropathy 9/16/2016     Severe both legs   • A-fib (HCC)    • Abnormal gait 11/25/2013   • [facility] 10/10/2014   • NSVT (nonsustained ventricular tachycardia) ([facility] 7/ PAIN ASSESSMENT  Rating: 3  Location: legs  Management Techniques: Activity promotion; Body mechanics;Breathing techniques;Relaxation;Repositioning    COGNITION  Overall Cognitive Status:  WFL - within functional limits    VISION  Current Visio routine at sink. Patient End of Session: Up in chair;Needs met;Call light within reach;RN aware of session/findings; All patient questions and concerns addressed;SCDs in place; Family present    ASSESSMENT     Patient is a 78year old female admitted 1/5/2

## 2017-01-22 LAB
APTT PPP: 100.1 SECONDS (ref 25–34)
GLUCOSE BLD-MCNC: 103 MG/DL (ref 65–99)
GLUCOSE BLD-MCNC: 131 MG/DL (ref 65–99)
GLUCOSE BLD-MCNC: 136 MG/DL (ref 65–99)
GLUCOSE BLD-MCNC: 99 MG/DL (ref 65–99)
INR BLD: 1.78 (ref 0.89–1.12)
POTASSIUM SERPL-SCNC: 3.8 MMOL/L (ref 3.6–5.1)
PSA SERPL DL<=0.01 NG/ML-MCNC: 21.3 SECONDS (ref 12.3–14.8)

## 2017-01-22 PROCEDURE — 99232 SBSQ HOSP IP/OBS MODERATE 35: CPT | Performed by: HOSPITALIST

## 2017-01-22 RX ORDER — POTASSIUM CHLORIDE 20 MEQ/1
40 TABLET, EXTENDED RELEASE ORAL ONCE
Status: COMPLETED | OUTPATIENT
Start: 2017-01-22 | End: 2017-01-22

## 2017-01-22 NOTE — PROGRESS NOTES
[facility]  Progress Note     Siddharth Bustamante Patient Status:  Inpatient    1940 MRN [id number]   [facility] 7NE-A Attending Stephany Singh, [address] [facility] Day # 12 PCP Indiana Perea MD     Chief Complaint: leg pain     S: Patient 300 mg Oral Daily   • Atorvastatin Calcium  15 mg Oral Nightly   • Bethanechol Chloride  20 mg Oral TID   • DiltiaZEM HCl ER Coated Beads  120 mg Oral Daily   • lacosamide  200 mg Oral BID   • Metoprolol Tartrate  50 mg Oral 2x Daily(Beta Blocker)   • Dougu

## 2017-01-22 NOTE — PLAN OF CARE
Pt A/Ox4, no neuro deficits, on RA, AFib controlled low 60's per tele, denies any pain  Heparin gtt at 1400 units/ hr, next draw tomorrow AM  Pt up with 1 assist and walker, ambulating in room  Plan: d/c to [facility] once INR is therapeutic   Will cont t

## 2017-01-22 NOTE — PROGRESS NOTES
[facility]  Progress Note    Isa Ortiz Patient Status:  Inpatient    1940 MRN [id number]   [facility] 7NE-A Attending [facility] Day # 16 PCP Pamela Johnson MD       Assessment:  · Extensive RLE DVT, s/p t (96.163 kg)              Physical Exam:    Gen: alert, oriented x 3, NAD  Heent/neck: no jvd  Cardiac: regular rate and rhythm, normal S1,S2  Lungs: CTA  Abd: soft, NT/ND +bs  Ext: 2-3+ LE edema; erythematous but not warm      Labs:    Lab Results  Compone

## 2017-01-22 NOTE — PLAN OF CARE
Assumed care. Heparin gtt infusing. Still with pain to feet. Morphine & tylenol given. Patient currently sleeping in recliner. Awaiting therapeutic INR. Will monitor.

## 2017-01-22 NOTE — PROGRESS NOTES
[facility]  Progress Note     Clara Coleman Patient Status:  Inpatient    1940 MRN [id number]   [facility] 7NE-A Attending Jim Spear, [address] [facility] Day # 16 PCP Raul Honeycutt MD     Chief Complaint: leg pain     S: Patient Amiodarone HCl  300 mg Oral Daily   • Atorvastatin Calcium  15 mg Oral Nightly   • Bethanechol Chloride  20 mg Oral TID   • DiltiaZEM HCl ER Coated Beads  120 mg Oral Daily   • lacosamide  200 mg Oral BID   • Metoprolol Tartrate  50 mg Oral 2x Daily(Beta B

## 2017-01-23 LAB
APTT PPP: 96.1 SECONDS (ref 25–34)
BUN BLD-MCNC: 16 MG/DL (ref 8–20)
BUN BLD-MCNC: 17 MG/DL (ref 8–20)
CALCIUM BLD-MCNC: 8.7 MG/DL (ref 8.3–10.3)
CALCIUM BLD-MCNC: 8.8 MG/DL (ref 8.3–10.3)
CHLORIDE: 101 MMOL/L (ref 101–111)
CHLORIDE: 104 MMOL/L (ref 101–111)
CO2: 29 MMOL/L (ref 22–32)
CO2: 32 MMOL/L (ref 22–32)
CREAT BLD-MCNC: 0.94 MG/DL (ref 0.55–1.02)
CREAT BLD-MCNC: 1.04 MG/DL (ref 0.55–1.02)
ERYTHROCYTE [DISTWIDTH] IN BLOOD BY AUTOMATED COUNT: 14.2 % (ref 11.5–16)
GLUCOSE BLD-MCNC: 119 MG/DL (ref 65–99)
GLUCOSE BLD-MCNC: 124 MG/DL (ref 70–99)
GLUCOSE BLD-MCNC: 148 MG/DL (ref 65–99)
GLUCOSE BLD-MCNC: 157 MG/DL (ref 65–99)
GLUCOSE BLD-MCNC: 90 MG/DL (ref 70–99)
GLUCOSE BLD-MCNC: 92 MG/DL (ref 65–99)
HCT VFR BLD AUTO: 28.6 % (ref 34–50)
HGB BLD-MCNC: 9.3 G/DL (ref 12–16)
INR BLD: 1.74 (ref 0.89–1.12)
MCH RBC QN AUTO: 32.4 PG (ref 27–33.2)
MCHC RBC AUTO-ENTMCNC: 32.5 G/DL (ref 31–37)
MCV RBC AUTO: 99.7 FL (ref 81–100)
PLATELET # BLD AUTO: 197 10(3)UL (ref 150–450)
POTASSIUM SERPL-SCNC: 3.7 MMOL/L (ref 3.6–5.1)
POTASSIUM SERPL-SCNC: 3.7 MMOL/L (ref 3.6–5.1)
POTASSIUM SERPL-SCNC: 4 MMOL/L (ref 3.6–5.1)
PSA SERPL DL<=0.01 NG/ML-MCNC: 21 SECONDS (ref 12.3–14.8)
RBC # BLD AUTO: 2.87 X10(6)UL (ref 3.8–5.1)
RED CELL DISTRIBUTION WIDTH-SD: 52.2 FL (ref 35.1–46.3)
SODIUM SERPL-SCNC: 139 MMOL/L (ref 136–144)
SODIUM SERPL-SCNC: 141 MMOL/L (ref 136–144)
WBC # BLD AUTO: 7.2 X10(3) UL (ref 4–13)

## 2017-01-23 PROCEDURE — 99232 SBSQ HOSP IP/OBS MODERATE 35: CPT | Performed by: HOSPITALIST

## 2017-01-23 RX ORDER — POTASSIUM CHLORIDE 20 MEQ/1
40 TABLET, EXTENDED RELEASE ORAL ONCE
Status: COMPLETED | OUTPATIENT
Start: 2017-01-23 | End: 2017-01-23

## 2017-01-23 RX ORDER — WARFARIN SODIUM 2 MG/1
4 TABLET ORAL
Status: COMPLETED | OUTPATIENT
Start: 2017-01-23 | End: 2017-01-23

## 2017-01-23 NOTE — OCCUPATIONAL THERAPY NOTE
OCCUPATIONAL THERAPY TREATMENT NOTE - INPATIENT     Room Number: 6159/9960-D  Session: 1  Number of Visits to Meet Established Goals: 5    Presenting Problem: Right Leg DVT    History related to current admission: 78 y.o female admitted 1/5/17 from [address] [place] [address] both legs   • A-fib (HCC)    • Abnormal gait 11/25/2013   • Pulmonary HTN ([facility] [address] 10/10/2014   • NSVT (nonsustained ventricular tachycardia) ([facility] 7/11/2014     Recurrence 7.2016 Hx VT- s/p ICD implant on 7/7/16.      • Bilateral edema of lower extremity 11/1 taking off regular upper body clothing?: A Little  -   Taking care of personal grooming such as brushing teeth?: None  -   Eating meals?: None    AM-PAC Score:  Score: 20  Approx Degree of Impairment: 38.32%  Standardized Score (AM-PAC Scale): 42.03  CMS M 5 minutes to complete grooming routine- ongoing

## 2017-01-23 NOTE — PLAN OF CARE
Received report at 0700. Patient alert and oriented x4. Only pain complaint is in legs sometimes due to BLE edema. Heparin dip at 14. INR 1.74. Dietary on consult to reeducate about foods high in vitamin K.  Up and ambulating to the bathroom with RW and sta

## 2017-01-23 NOTE — DIETARY NOTE
Nutrition Short Note      Dietitian consult received for education on foods high in Vit. K for Coumadin. Discussed foods high in Vit. K, serving sizes and emphasized importance of consistency rather than eliminated high Vit. K foods from diet.  Provided michaud

## 2017-01-23 NOTE — CDS QUERY
Heart Failure  CLINICAL DOCUMENTATION CLARIFICATION FORM  Dear Doctor:  Clinical information (provided below) includes a diagnosis of Heart Failure.  For accurate ICD-10-CM code assignment to reflect severity of illness and risk of mortality,  PLEASE “X” AL

## 2017-01-23 NOTE — PROGRESS NOTES
[facility]   Cardiology Progress Note     Subjective:   Just walked with PT, still some pain in her leg. Denies dyspnea.  Pamela Lebron for d/c.     Objective:   /49 mmHg  Pulse 70  Temp(Src) 98.2 °F (36.8 °C) (Oral)  Resp 17  Wt 226 lb 6.6 oz (102.7 kg) previous dated 07/05/2016:  Pacemakers wires are now noted. Small pericardial effusion is now present.     Labs:    K 3.7  INR 1.74, PTT 96.1    Medications:  • Normal Saline Flush  10 mL Intravenous Q12H   • Senna  8.6 mg Oral BID   • furosemide  40 mg Int

## 2017-01-23 NOTE — PHYSICAL THERAPY NOTE
PHYSICAL THERAPY TREATMENT NOTE - INPATIENT    Room Number: 1715/2925-S     Session: 1   Number of Visits to Meet Established Goals: 5    Presenting Problem: right ileofemoral femoral thrombosis, lytic therapy 1/11/17    Problem List  Principal Problem: Obstructive sleep apnea (adult) (pediatric) 6/29/2012     Cannot tolerate CPAP machine- uses oxygen periodically    • Asthma    • High blood pressure    • Diabetes (HCC)    • Stroke [facility])    • Migraines    • Muscle weakness    • Rheumatoid arthritis ([facility] currently need. ..   -   Moving to and from a bed to a chair (including a wheelchair)?: A Little   -   Need to walk in hospital room?: A Little   -   Climbing 3-5 steps with a railing?: Total    AM-PAC Score:  Raw Score: 16   PT Approx Degree of Impairment should achieve modified independent level in bed mobility, transfers, ambulation, and stoop negotiation.       DISCHARGE RECOMMENDATIONS  PT Discharge Recommendations: Sub-acute rehabilitation (estimated LOS 12-14 days)     PLAN  PT Treatment Plan: Bed mobi

## 2017-01-23 NOTE — PLAN OF CARE
Assumed care at 1900. No acute issues overnight. Slept in chair. Neurologically intact. Follows commands. Prn Norco for BLE pain. A fib on tele. HR 50s-60s. BLE 2-3+ pitting edema, redness. Hep gtt infusing.  PTT this am.   Coumadin at HS, awaitin

## 2017-01-23 NOTE — PROGRESS NOTES
[facility]  Progress Note     Reford Mariluz Patient Status:  Inpatient    1940 MRN [id number]   [facility] 7NE-A Attending Tim Wagner, [address] [facility] Day # 25 PCP Charly Reinoso MD     Chief Complaint: leg pain     S: Patient TID   • DiltiaZEM HCl ER Coated Beads  120 mg Oral Daily   • lacosamide  200 mg Oral BID   • Metoprolol Tartrate  50 mg Oral 2x Daily(Beta Blocker)   • Alfuzosin HCl ER  10 mg Oral Daily with breakfast   • docusate sodium  100 mg Oral BID   • insulin aspar alert, oriented ×3. No acute respiratory distress. Lungs: CTA B  CVS: Regular rhythm  Abdomen: Soft, nontender/nondistended, positive bowel sounds. Extremities: No C/C/E. Agree with above. Warfarin increased to 6mg yesterday.  INR continuing to trend do

## 2017-01-24 ENCOUNTER — APPOINTMENT (OUTPATIENT)
Dept: CT IMAGING | Facility: HOSPITAL | Age: 77
DRG: 252 | End: 2017-01-24
Attending: NURSE PRACTITIONER
Payer: MEDICARE

## 2017-01-24 LAB
APTT PPP: 70.7 SECONDS (ref 25–34)
BUN BLD-MCNC: 17 MG/DL (ref 8–20)
CALCIUM BLD-MCNC: 8.8 MG/DL (ref 8.3–10.3)
CHLORIDE: 103 MMOL/L (ref 101–111)
CO2: 31 MMOL/L (ref 22–32)
CREAT BLD-MCNC: 0.96 MG/DL (ref 0.55–1.02)
GLUCOSE BLD-MCNC: 129 MG/DL (ref 65–99)
GLUCOSE BLD-MCNC: 139 MG/DL (ref 65–99)
GLUCOSE BLD-MCNC: 143 MG/DL (ref 65–99)
GLUCOSE BLD-MCNC: 87 MG/DL (ref 70–99)
GLUCOSE BLD-MCNC: 97 MG/DL (ref 65–99)
INR BLD: 1.43 (ref 0.89–1.12)
POTASSIUM SERPL-SCNC: 4 MMOL/L (ref 3.6–5.1)
POTASSIUM SERPL-SCNC: 4 MMOL/L (ref 3.6–5.1)
PSA SERPL DL<=0.01 NG/ML-MCNC: 17.9 SECONDS (ref 12.3–14.8)
SODIUM SERPL-SCNC: 140 MMOL/L (ref 136–144)

## 2017-01-24 PROCEDURE — 70450 CT HEAD/BRAIN W/O DYE: CPT

## 2017-01-24 PROCEDURE — 99232 SBSQ HOSP IP/OBS MODERATE 35: CPT | Performed by: HOSPITALIST

## 2017-01-24 NOTE — PROGRESS NOTES
[facility] Neurosurgery Progress Note    Selina Rosemanan Patient Status:  Inpatient    1940 MRN [id number]   [facility] 6NE-A Attending Esther Dior MD   [address] Day # 23 PCP Woodrow Holman MD     Subjective:  Selina Turner is a

## 2017-01-24 NOTE — OCCUPATIONAL THERAPY NOTE
OCCUPATIONAL THERAPY TREATMENT NOTE - INPATIENT     Room Number: 1206/6383-F  Session: 2  Number of Visits to Meet Established Goals: 5    Presenting Problem: Right Leg DVT    History related to current admission: 78 y.o female admitted 1/5/17 from [address] [place] [address] both legs   • A-fib (HCC)    • Abnormal gait 11/25/2013   • Pulmonary HTN ([facility] [address] 10/10/2014   • NSVT (nonsustained ventricular tachycardia) ([facility] 7/11/2014     Recurrence 7.2016 Hx VT- s/p ICD implant on 7/7/16.      • Bilateral edema of lower extremity 11/1 taking off regular upper body clothing?: A Little  -   Taking care of personal grooming such as brushing teeth?: None  -   Eating meals?: None    AM-PAC Score:  Score: 20  Approx Degree of Impairment: 38.32%  Standardized Score (AM-PAC Scale): 42.03  CMS M all ADLs: with supervision- ongoing    Functional Transfer Goals  Patient will perform all functional transfers:  with supervision- ongoing    Additional Goals:  Pt will verbalize at least 3 energy conservation techniques- ongoing  Pt will stand at sink fo

## 2017-01-24 NOTE — PROGRESS NOTES
[facility]   Cardiology Progress Note     Subjective:   Disappointed inr still low. Legs a little sore. Denies dyspnea or chest pain. Daughter at bedside.      Objective:   /52 mmHg  Pulse 61  Temp(Src) 98.2 °F (36.8 °C) (Oral)  Resp 17  Wt 224 l moderate pericardial effusion. Impressions:  This study is compared with previous dated 07/05/2016:  Pacemakers wires are now noted. Small pericardial effusion is now present.     Labs:    BUN 17, Cr 0.96, Na 140, K 4.0  INR 1.43, PTT 70.7    Medications:

## 2017-01-24 NOTE — PROGRESS NOTES
[facility]  Progress Note     Ksenia Gutierrez Patient Status:  Inpatient    1940 MRN [id number]   [facility] 7NE-A Attending Arch List, [address] [facility] Day # 23 PCP Grazyna Landon MD     Chief Complaint: leg pain     S: Patient (PE/DVT)  18 Units/kg/hr Intravenous Once   • Pantoprazole Sodium  40 mg Oral QAM AC   • Amiodarone HCl  300 mg Oral Daily   • Atorvastatin Calcium  15 mg Oral Nightly   • Bethanechol Chloride  20 mg Oral TID   • DiltiaZEM HCl ER Coated Beads  120 mg Oral around unit today. Physical exam:  General: Patient awake, alert, oriented ×3.  No acute respiratory distress. Lungs: CTA B  CVS: Regular rhythm  Abdomen: Soft, nontender/nondistended, positive bowel sounds. Extremities: No C/C/E. Agree with above.  War

## 2017-01-24 NOTE — PLAN OF CARE
Assumed care for this patient at 0730:patient alert and oriented. Admitted with sob and chest discomfort, and right leg pain. Hx of SDH 12/16, discharge to [facility]. Right leg DVT s/p lytic therapy. Heparin drip infusing. Coumadin. INR 1.43 today.  BLE ed Progressing    • Oral mucous membranes remain intact Progressing

## 2017-01-25 LAB
APTT PPP: 83.6 SECONDS (ref 25–34)
BUN BLD-MCNC: 16 MG/DL (ref 8–20)
CALCIUM BLD-MCNC: 8.9 MG/DL (ref 8.3–10.3)
CHLORIDE: 101 MMOL/L (ref 101–111)
CO2: 29 MMOL/L (ref 22–32)
CREAT BLD-MCNC: 0.95 MG/DL (ref 0.55–1.02)
GLUCOSE BLD-MCNC: 100 MG/DL (ref 65–99)
GLUCOSE BLD-MCNC: 101 MG/DL (ref 65–99)
GLUCOSE BLD-MCNC: 112 MG/DL (ref 65–99)
GLUCOSE BLD-MCNC: 84 MG/DL (ref 70–99)
GLUCOSE BLD-MCNC: 97 MG/DL (ref 65–99)
INR BLD: 1.45 (ref 0.89–1.12)
POTASSIUM SERPL-SCNC: 3.6 MMOL/L (ref 3.6–5.1)
PSA SERPL DL<=0.01 NG/ML-MCNC: 18.1 SECONDS (ref 12.3–14.8)
SODIUM SERPL-SCNC: 140 MMOL/L (ref 136–144)

## 2017-01-25 PROCEDURE — 99232 SBSQ HOSP IP/OBS MODERATE 35: CPT | Performed by: HOSPITALIST

## 2017-01-25 NOTE — PLAN OF CARE
Assumed care at [address], Pt alert and oriented X 3, Pt c/o of pain 10/10 to BLE with relief from morphine and norco  2+ edema to BLE, RLE red /swollen. 1+ pedal pulses  Neuro Q shift, no deficits.  CT head stable  vss on RA, A. Fib controlled, monitored on tel site(s) healing without S/S of infection Progressing    • Oral mucous membranes remain intact Progressing

## 2017-01-25 NOTE — PROGRESS NOTES
[facility]  Progress Note     Leonardo Rise Patient Status:  Inpatient    1940 MRN [id number]   [facility] 7NE-A Attending Nate Godinez, [address] [facility] Day # 21 PCP Prosper Hutson MD     Chief Complaint: leg pain     S: Patient Heparin infusion (PE/DVT)  18 Units/kg/hr Intravenous Once   • Pantoprazole Sodium  40 mg Oral QAM AC   • Amiodarone HCl  300 mg Oral Daily   • Atorvastatin Calcium  15 mg Oral Nightly   • Bethanechol Chloride  20 mg Oral TID   • DiltiaZEM HCl ER Coated Be complaints.  Patient plan walk around unit today. Physical exam:  General: Patient awake, alert, oriented ×3.  No acute respiratory distress. Lungs: CTA B  CVS: Regular rhythm  Abdomen: Soft, nontender/nondistended, positive bowel sounds.   Extremities: N

## 2017-01-25 NOTE — PROGRESS NOTES
[facility]  Cardiology Progress Note    Michaela Olivo Patient Status:  Inpatient    1940 MRN [id number]   [facility]A [facility] PCP Denise Toussaint MD     Subjective:  Upset that INR is Oral Daily   • Atorvastatin Calcium  15 mg Oral Nightly   • Bethanechol Chloride  20 mg Oral TID   • DiltiaZEM HCl ER Coated Beads  120 mg Oral Daily   • lacosamide  200 mg Oral BID   • Metoprolol Tartrate  50 mg Oral 2x Daily(Beta Blocker)   • Alfuzosin H

## 2017-01-25 NOTE — PROGRESS NOTES
[facility]  Progress Note     Crystal Mercado Patient Status:  Inpatient    1940 MRN [id number]   [facility] 7NE-A Attending Amado Johnson, [address] [facility] Day # 21 PCP Yash Carrizales MD     Chief Complaint: leg pain     S: Patient Heparin infusion (PE/DVT)  18 Units/kg/hr Intravenous Once   • Pantoprazole Sodium  40 mg Oral QAM AC   • Amiodarone HCl  300 mg Oral Daily   • Atorvastatin Calcium  15 mg Oral Nightly   • Bethanechol Chloride  20 mg Oral TID   • DiltiaZEM HCl ER Coated Be

## 2017-01-25 NOTE — PHYSICAL THERAPY NOTE
PHYSICAL THERAPY TREATMENT NOTE - INPATIENT    Room Number: 2891/5000-J     Session: 2   Number of Visits to Meet Established Goals: 5    Presenting Problem: right ileofemoral femoral thrombosis, lytic therapy 1/11/17    Problem List  Principal Problem: Obstructive sleep apnea (adult) (pediatric) 6/29/2012     Cannot tolerate CPAP machine- uses oxygen periodically    • Asthma    • High blood pressure    • Diabetes (HCC)    • Stroke [facility])    • Migraines    • Muscle weakness    • Rheumatoid arthritis ([facility] patient currently need. ..   -   Moving to and from a bed to a chair (including a wheelchair)?: A Little   -   Need to walk in hospital room?: A Little   -   Climbing 3-5 steps with a railing?: Total    AM-PAC Score:  Raw Score: 16   PT Approx Degree of Imp negotiation. DISCHARGE RECOMMENDATIONS  PT Discharge Recommendations: Sub-acute rehabilitation (estimated LOS 12-14 days)     PLAN  PT Treatment Plan: Bed mobility; Body mechanics; Endurance; Energy conservation;Patient education; Family education;Gait tr

## 2017-01-26 LAB
APTT PPP: 80.8 SECONDS (ref 25–34)
BUN BLD-MCNC: 18 MG/DL (ref 8–20)
CALCIUM BLD-MCNC: 8.8 MG/DL (ref 8.3–10.3)
CHLORIDE: 101 MMOL/L (ref 101–111)
CO2: 32 MMOL/L (ref 22–32)
CREAT BLD-MCNC: 1.02 MG/DL (ref 0.55–1.02)
ERYTHROCYTE [DISTWIDTH] IN BLOOD BY AUTOMATED COUNT: 14 % (ref 11.5–16)
GLUCOSE BLD-MCNC: 138 MG/DL (ref 65–99)
GLUCOSE BLD-MCNC: 180 MG/DL (ref 65–99)
GLUCOSE BLD-MCNC: 84 MG/DL (ref 70–99)
GLUCOSE BLD-MCNC: 92 MG/DL (ref 65–99)
GLUCOSE BLD-MCNC: 99 MG/DL (ref 65–99)
HAV IGM SER QL: 2.2 MG/DL (ref 1.7–3)
HCT VFR BLD AUTO: 27.3 % (ref 34–50)
HGB BLD-MCNC: 9.2 G/DL (ref 12–16)
INR BLD: 1.51 (ref 0.89–1.12)
MCH RBC QN AUTO: 33.5 PG (ref 27–33.2)
MCHC RBC AUTO-ENTMCNC: 33.7 G/DL (ref 31–37)
MCV RBC AUTO: 99.3 FL (ref 81–100)
PLATELET # BLD AUTO: 186 10(3)UL (ref 150–450)
POTASSIUM SERPL-SCNC: 3.5 MMOL/L (ref 3.6–5.1)
PSA SERPL DL<=0.01 NG/ML-MCNC: 18.7 SECONDS (ref 12.3–14.8)
RBC # BLD AUTO: 2.75 X10(6)UL (ref 3.8–5.1)
RED CELL DISTRIBUTION WIDTH-SD: 50 FL (ref 35.1–46.3)
SODIUM SERPL-SCNC: 140 MMOL/L (ref 136–144)
WBC # BLD AUTO: 5.8 X10(3) UL (ref 4–13)

## 2017-01-26 PROCEDURE — 99232 SBSQ HOSP IP/OBS MODERATE 35: CPT | Performed by: HOSPITALIST

## 2017-01-26 RX ORDER — POLYETHYLENE GLYCOL 3350 17 G/17G
17 POWDER, FOR SOLUTION ORAL DAILY
Status: DISCONTINUED | OUTPATIENT
Start: 2017-01-26 | End: 2017-01-31

## 2017-01-26 RX ORDER — FUROSEMIDE 10 MG/ML
40 INJECTION INTRAMUSCULAR; INTRAVENOUS DAILY
Status: DISCONTINUED | OUTPATIENT
Start: 2017-01-26 | End: 2017-01-28

## 2017-01-26 RX ORDER — POTASSIUM CHLORIDE 20 MEQ/1
40 TABLET, EXTENDED RELEASE ORAL EVERY 4 HOURS
Status: COMPLETED | OUTPATIENT
Start: 2017-01-26 | End: 2017-01-26

## 2017-01-26 RX ORDER — WARFARIN SODIUM 7.5 MG/1
7.5 TABLET ORAL
Status: COMPLETED | OUTPATIENT
Start: 2017-01-26 | End: 2017-01-26

## 2017-01-26 NOTE — PROGRESS NOTES
INPATIENT PROGRESS NOTE    Assessment:   Isa Ortiz in room 9199/0580-N, is a 78year old female, Hospital Day ( LOS: 20 days ) hospitalized for Right leg DVT ([facility] [address] The patient's other hospital problems include:    Active Hospital Problems    *

## 2017-01-26 NOTE — PLAN OF CARE
C/o \"tingling/numb\" ladonna foot pain- wants to talk to her provider about medications for pain.   INR sub therapeutic at 1.45, Heparin gtt remains at 1400 units/hr-ptt in am.

## 2017-01-26 NOTE — PROGRESS NOTES
[facility]  Cardiology Progress Note    Nola Velasco Patient Status:  Inpatient    1940 MRN [id number]   [facility] 7NE-A Attending Demetrio [address] Day # 21 PCP Jorge Hankins MD     Subjective:  No chest pain or Daily   • Atorvastatin Calcium  15 mg Oral Nightly   • Bethanechol Chloride  20 mg Oral TID   • DiltiaZEM HCl ER Coated Beads  120 mg Oral Daily   • lacosamide  200 mg Oral BID   • Metoprolol Tartrate  50 mg Oral 2x Daily(Beta Blocker)   • Alfuzosin HCl ER

## 2017-01-26 NOTE — PHYSICAL THERAPY NOTE
PHYSICAL THERAPY TREATMENT NOTE - INPATIENT    Room Number: 8453/8232-H     Session: 3   Number of Visits to Meet Established Goals: 5    Presenting Problem: right ileofemoral femoral thrombosis, lytic therapy 1/11/17    Problem List  Principal Problem: Obstructive sleep apnea (adult) (pediatric) 6/29/2012     Cannot tolerate CPAP machine- uses oxygen periodically    • Asthma    • High blood pressure    • Diabetes (HCC)    • Stroke [facility])    • Migraines    • Muscle weakness    • Rheumatoid arthritis ([facility] the patient currently need. ..   -   Moving to and from a bed to a chair (including a wheelchair)?: A Little   -   Need to walk in hospital room?: A Little   -   Climbing 3-5 steps with a railing?: Total    AM-PAC Score:  Raw Score: 17   PT Approx Degree of 12-14 days)     PLAN  PT Treatment Plan: Bed mobility; Body mechanics; Endurance; Energy conservation;Patient education; Family education;Gait training;Range of motion;Strengthening;Transfer training;Balance training    CURRENT GOALS   Goal #1  Patient is able

## 2017-01-26 NOTE — PLAN OF CARE
Pt AOx4. RA.  . Pt c/o pain to bilateral feet. Norco po pain relief. Afib. Seizure Precautions. Right Midline CDI. Heparin infusing 14cc/hr. Next PTT in am.  Will continue to monitor.

## 2017-01-26 NOTE — PROGRESS NOTES
[facility]  Progress Note     Farideh Bull Patient Status:  Inpatient    1940 MRN [id number]   [facility] 7NE-A Attending Pinky Sr MD   Hosp Day # 21 PCP Alyssa Chiu MD     Chief Complaint: leg pain     S: Patient repor (PE/DVT)  18 Units/kg/hr Intravenous Once   • Pantoprazole Sodium  40 mg Oral QAM AC   • Amiodarone HCl  300 mg Oral Daily   • Atorvastatin Calcium  15 mg Oral Nightly   • Bethanechol Chloride  20 mg Oral TID   • DiltiaZEM HCl ER Coated Beads  120 mg Oral 98%  AOx3, NAD  S1+S2, RRR  CTA b/l  Soft, NT, ND, +BS  Trace LE edema    Cont diuretics, redose coumadin, d/c planning.     Kayley Boyle  1/26/2017

## 2017-01-27 LAB
APTT PPP: 51.9 SECONDS (ref 25–34)
APTT PPP: 79 SECONDS (ref 25–34)
GLUCOSE BLD-MCNC: 110 MG/DL (ref 65–99)
GLUCOSE BLD-MCNC: 134 MG/DL (ref 65–99)
GLUCOSE BLD-MCNC: 138 MG/DL (ref 65–99)
GLUCOSE BLD-MCNC: 90 MG/DL (ref 65–99)
INR BLD: 1.45 (ref 0.89–1.12)
PSA SERPL DL<=0.01 NG/ML-MCNC: 18.1 SECONDS (ref 12.3–14.8)

## 2017-01-27 PROCEDURE — 99232 SBSQ HOSP IP/OBS MODERATE 35: CPT | Performed by: HOSPITALIST

## 2017-01-27 RX ORDER — HEPARIN SODIUM 5000 [USP'U]/ML
30 INJECTION, SOLUTION INTRAVENOUS; SUBCUTANEOUS ONCE
Status: COMPLETED | OUTPATIENT
Start: 2017-01-27 | End: 2017-01-27

## 2017-01-27 RX ORDER — WARFARIN SODIUM 10 MG/1
10 TABLET ORAL
Status: COMPLETED | OUTPATIENT
Start: 2017-01-27 | End: 2017-01-27

## 2017-01-27 NOTE — PLAN OF CARE
Assumed care at 1900  Recd pt on room air, sleeping in the recliner. C/o legs pain, 10/10 Morphine given. Legs elevated with pillows. Heparin drip infusing at 14cc/hr. Coumadin given. Pt refused Vimpat.   Will check PT/INR and PTT in am.  Waiting for t

## 2017-01-27 NOTE — PAYOR COMM NOTE
Attending Physician: Nigel Looney MD    Review Type: CONTINUED STAY  Reviewer: Eran Bentley     Date: January 27, 2017 - 11:19 AM  Payor: [facility] MEDICARE ADV PPO  Authorization Number: N/A  Admit date: 1/5/2017  9:21 PM        REVIEWER COMMENTS  1/2 RN    1/26/2017 1741 Given 20 mg Oral Minnie Hinkle, RN      heparin (PORCINE) drip [id number]/250mL infusion CONTINUOUS     Date Action Dose Route User    1/27/2017 0735 Hi-Risk Rate/Dose Change 1550 Units/hr Intravenous Delia Dobson RN    1/27 1/26/2017 2043 Given 7.5 mg Oral Lorene Velasquez RN      Amiodarone HCl (PACERONE) tab 300 mg     Date Action Dose Route User    1/27/2017 0804 Given 300 mg Oral Jewel Tobias RN          Recent Torri People 01/23/17   5629   01/24/17   0530   01/25/17

## 2017-01-27 NOTE — PROGRESS NOTES
[facility]  Progress Note     Saurabh Slim Patient Status:  Inpatient    1940 MRN [id number]   [facility] 7NE-A Attending Gonsalo Hoang MD   Hosp Day # 25 PCP Barbara Owen MD     Chief Complaint: leg pain     S: Patient repor Once   • Pantoprazole Sodium  40 mg Oral QAM AC   • Amiodarone HCl  300 mg Oral Daily   • Atorvastatin Calcium  15 mg Oral Nightly   • Bethanechol Chloride  20 mg Oral TID   • DiltiaZEM HCl ER Coated Beads  120 mg Oral Daily   • lacosamide  200 mg Oral BID

## 2017-01-27 NOTE — PAYOR COMM NOTE
Attending Physician: Court Maguire MD    Review Type: CONTINUED STAY  Reviewer: Marisol Fowler     Date: January 27, 2017 - 10:55 AM  Payor: [employer] [address] MEDICARE ADV PPO  Authorization Number: N/A  Admit date: 1/5/2017  9:21 PM        REVIEWER COMMENTS  1/1 Miki Rios RN    1/26/2017 2043 Given 20 mg Oral Tanner Lester RN    1/26/2017 1741 Given 20 mg Oral Nicko Hinkle, RN      heparin (PORCINE) drip [id number]/250mL infusion CONTINUOUS     Date Action Dose Route User    1/27/2017 0735 Hi-Risk Rate/Dos Date Action Dose Route User    1/27/2017 0804 Given 300 mg Oral Alice Laura [facility]        Lab  01/16/17   0600   01/17/17   0531   01/17/17   1847   01/18/17   0300    WBC    --    8.2   9.0    --     HGB    --    9.6*   9.3*    --     MCV    --    99.0   85

## 2017-01-27 NOTE — PROGRESS NOTES
[facility]  Cardiology Progress Note    Olesya Balloon Patient Status:  Inpatient    1940 MRN [id number]   [facility] 7NE-A Attending Leah Davis [facility] Day # 25 PCP Karen Anderson MD     Subjective:      Objective:  B Oral 2x Daily(Beta Blocker)   • Alfuzosin HCl ER  10 mg Oral Daily with breakfast   • docusate sodium  100 mg Oral BID   • insulin aspart  1-5 Units Subcutaneous TID CC and HS   • Sodium Polystyrene Sulfonate  15 g Oral Once     • Continuous dose Heparin i

## 2017-01-27 NOTE — PLAN OF CARE
INR 1.45 today  Coumadin 10 scheduled tonight  Refusing vimpat  BM today  IV lasix  Plan for Angy Gave upon d/c

## 2017-01-27 NOTE — PLAN OF CARE
Received patient today alert and oriented x3 and denying any pain. She has been up to the chair all day. She has ambulated to the bathroom many times. Heparin drip infusing as ordered. Awaiting INR to be therapeutic.

## 2017-01-27 NOTE — OCCUPATIONAL THERAPY NOTE
OCCUPATIONAL THERAPY TREATMENT NOTE - INPATIENT     Room Number: 4031/8782-D  Session: 3  Number of Visits to Meet Established Goals: 5    Presenting Problem: Right Leg DVT    History related to current admission: 78 y.o female admitted 1/5/17 from [address] [place] [address] both legs   • A-fib (HCC)    • Abnormal gait 11/25/2013   • Pulmonary HTN ([facility] 10/10/2014   • NSVT (nonsustained ventricular tachycardia) ([email or web address] 7/11/2014     Recurrence 7.2016 Hx VT- s/p ICD implant on 7/7/16.      • Bilateral edema of lower extremity 11/1 includes using toilet, bedpan or urinal? : None  -   Putting on and taking off regular upper body clothing?: A Little  -   Taking care of personal grooming such as brushing teeth?: None  -   Eating meals?: None    AM-PAC Score:  Score: 21  Approx Degree of Recommendations: None    PLAN  OT Treatment Plan: Balance activities; Energy conservation/work simplification techniques;ADL training;IADL training;Functional transfer training;UE strengthening/ROM; Endurance training;Patient/Family education;Patient/Family

## 2017-01-27 NOTE — PROGRESS NOTES
[facility]  Cardiology Progress Note    Petros Rocha Patient Status:  Inpatient    1940 MRN [id number]   [facility] 7NE-A [facility] PCP Sin Peterson MD     Subjective:  Denies any chest Subcutaneous TID CC and HS   • Sodium Polystyrene Sulfonate  15 g Oral Once     • Continuous dose Heparin infusion 1,550 Units/hr (01/27/17 1898)         Assessment:    · Extensive RLE DVT, s/p thrombectomy/TPA 1/10/17 - on IV heparin/coumadin  · Hx SDH re

## 2017-01-27 NOTE — CM/SW NOTE
Still waiting for pt's INR to be therapeutic - pt will go to [facility].

## 2017-01-27 NOTE — PHYSICAL THERAPY NOTE
PHYSICAL THERAPY TREATMENT NOTE - INPATIENT    Room Number: 4434/9016-N     Session: 4   Number of Visits to Meet Established Goals: 5    Presenting Problem: right ileofemoral femoral thrombosis, lytic therapy 1/11/17    Problem List  Principal Problem: Obstructive sleep apnea (adult) (pediatric) 6/29/2012     Cannot tolerate CPAP machine- uses oxygen periodically    • Asthma    • High blood pressure    • Diabetes (HCC)    • Stroke [facility])    • Migraines    • Muscle weakness    • Rheumatoid arthritis ([facility] patient currently need. ..   -   Moving to and from a bed to a chair (including a wheelchair)?: A Little   -   Need to walk in hospital room?: A Little   -   Climbing 3-5 steps with a railing?: A Lot    AM-PAC Score:  Raw Score: 18   PT Approx Degree of Imp RECOMMENDATIONS  PT Discharge Recommendations: Sub-acute rehabilitation (estimated LOS 12-14 days)     PLAN  PT Treatment Plan: Bed mobility; Body mechanics; Endurance; Energy conservation;Patient education; Family education;Gait training;Range of motion;Stren

## 2017-01-28 LAB
APTT PPP: 96.8 SECONDS (ref 25–34)
BUN BLD-MCNC: 17 MG/DL (ref 8–20)
CALCIUM BLD-MCNC: 8.8 MG/DL (ref 8.3–10.3)
CHLORIDE: 103 MMOL/L (ref 101–111)
CO2: 29 MMOL/L (ref 22–32)
CREAT BLD-MCNC: 0.96 MG/DL (ref 0.55–1.02)
GLUCOSE BLD-MCNC: 128 MG/DL (ref 65–99)
GLUCOSE BLD-MCNC: 129 MG/DL (ref 65–99)
GLUCOSE BLD-MCNC: 130 MG/DL (ref 65–99)
GLUCOSE BLD-MCNC: 90 MG/DL (ref 70–99)
GLUCOSE BLD-MCNC: 95 MG/DL (ref 65–99)
INR BLD: 1.74 (ref 0.89–1.12)
PSA SERPL DL<=0.01 NG/ML-MCNC: 21 SECONDS (ref 12.3–14.8)
SODIUM SERPL-SCNC: 138 MMOL/L (ref 136–144)

## 2017-01-28 PROCEDURE — 99232 SBSQ HOSP IP/OBS MODERATE 35: CPT | Performed by: HOSPITALIST

## 2017-01-28 RX ORDER — WARFARIN SODIUM 7.5 MG/1
7.5 TABLET ORAL NIGHTLY
Status: DISCONTINUED | OUTPATIENT
Start: 2017-01-28 | End: 2017-01-29

## 2017-01-28 RX ORDER — DILTIAZEM HYDROCHLORIDE 240 MG/1
240 CAPSULE, COATED, EXTENDED RELEASE ORAL DAILY
Status: DISCONTINUED | OUTPATIENT
Start: 2017-01-29 | End: 2017-01-31

## 2017-01-28 RX ORDER — AMIODARONE HYDROCHLORIDE 200 MG/1
200 TABLET ORAL DAILY
Status: DISCONTINUED | OUTPATIENT
Start: 2017-01-29 | End: 2017-01-29

## 2017-01-28 NOTE — PROGRESS NOTES
[facility]  Progress Note     Radha Cyndy Patient Status:  Inpatient    1940 MRN [id number]   [facility] 7NE-A Attending German Napier MD   Hosp Day # 21 PCP Bettye Leonardo MD     Chief Complaint: leg pain     S: No acute even mg Oral TID   • DiltiaZEM HCl ER Coated Beads  120 mg Oral Daily   • lacosamide  200 mg Oral BID   • Metoprolol Tartrate  50 mg Oral 2x Daily(Beta Blocker)   • Alfuzosin HCl ER  10 mg Oral Daily with breakfast   • docusate sodium  100 mg Oral BID   • insul

## 2017-01-28 NOTE — PLAN OF CARE
Assumed care at 1900. Pt A&Ox4. VSS on RA. Heparin gtt infusing. Coumadin given as ordered. INR 1.45. Norco given for pain. Refused Vimpat. D/C planning to [facility].     CARDIOVASCULAR - ADULT    • Absence of cardiac arrhythmias or at baseline Beryl

## 2017-01-28 NOTE — PROGRESS NOTES
[facility]  Progress Note    Isa Ortiz Patient Status:  Inpatient    1940 MRN [id number]   [facility] 7NE-A Attending Ilya Ghosh MD   Hosp Day # 21 PCP Pamela Johnson MD     Subjective:  Patient seen and examined, chart 24 hr cap 240 mg 240 mg Oral Daily   Warfarin Sodium (COUMADIN) tab 7.5 mg 7.5 mg Oral Nightly   bumetanide (BUMEX) 12.5 mg in sodium chloride 0.9 % 100 mL infusion 0.5 mg/hr Intravenous Continuous   PEG 3350 (MIRALAX) powder packet 17 g 17 g Oral Daily (DULCOLAX) rectal suppository 10 mg 10 mg Rectal Daily PRN   glucose (DEX4) oral liquid 15 g 15 g Oral Q15 Min PRN   Or      Glucose-Vitamin C (DEX-4) 4-0.006 G chewable tab 4 tablet 4 tablet Oral Q15 Min PRN   Or      dextrose injection 50 mL 50 mL Alanadia Carls (implantable cardioverter-defibrillator) in place     Neuropathy ([facility] [address]     Hx of long term use of blood thinners     Plantar fasciitis     Acute subdural hematoma (HCC)     Midline shift of brain due to hematoma     Headache due to intracranial disease

## 2017-01-28 NOTE — PLAN OF CARE
Impaired Functional Mobility    • Achieve highest/safest level of mobility/gait Completed        Patient/Family Goals    • Patient/Family Long Term Goal Completed    • Patient/Family Short Term Goal Completed          CARDIOVASCULAR - ADULT    • Maintains

## 2017-01-29 LAB
APTT PPP: 133.9 SECONDS (ref 25–34)
APTT PPP: 85.5 SECONDS (ref 25–34)
BUN BLD-MCNC: 21 MG/DL (ref 8–20)
CALCIUM BLD-MCNC: 9.1 MG/DL (ref 8.3–10.3)
CHLORIDE: 101 MMOL/L (ref 101–111)
CO2: 30 MMOL/L (ref 22–32)
CREAT BLD-MCNC: 1.13 MG/DL (ref 0.55–1.02)
ERYTHROCYTE [DISTWIDTH] IN BLOOD BY AUTOMATED COUNT: 13.8 % (ref 11.5–16)
GLUCOSE BLD-MCNC: 100 MG/DL (ref 65–99)
GLUCOSE BLD-MCNC: 131 MG/DL (ref 65–99)
GLUCOSE BLD-MCNC: 152 MG/DL (ref 65–99)
GLUCOSE BLD-MCNC: 160 MG/DL (ref 65–99)
GLUCOSE BLD-MCNC: 90 MG/DL (ref 70–99)
HCT VFR BLD AUTO: 30 % (ref 34–50)
HGB BLD-MCNC: 9.5 G/DL (ref 12–16)
INR BLD: 1.99 (ref 0.89–1.12)
MCH RBC QN AUTO: 31.7 PG (ref 27–33.2)
MCHC RBC AUTO-ENTMCNC: 31.7 G/DL (ref 31–37)
MCV RBC AUTO: 100 FL (ref 81–100)
PLATELET # BLD AUTO: 174 10(3)UL (ref 150–450)
POTASSIUM SERPL-SCNC: 4 MMOL/L (ref 3.6–5.1)
PSA SERPL DL<=0.01 NG/ML-MCNC: 23.3 SECONDS (ref 12.3–14.8)
RBC # BLD AUTO: 3 X10(6)UL (ref 3.8–5.1)
RED CELL DISTRIBUTION WIDTH-SD: 50.4 FL (ref 35.1–46.3)
SODIUM SERPL-SCNC: 140 MMOL/L (ref 136–144)
WBC # BLD AUTO: 5.9 X10(3) UL (ref 4–13)

## 2017-01-29 PROCEDURE — 99232 SBSQ HOSP IP/OBS MODERATE 35: CPT | Performed by: HOSPITALIST

## 2017-01-29 RX ORDER — WARFARIN SODIUM 7.5 MG/1
7.5 TABLET ORAL NIGHTLY
Status: DISCONTINUED | OUTPATIENT
Start: 2017-01-29 | End: 2017-01-31

## 2017-01-29 NOTE — PROGRESS NOTES
[facility] Card[facility] Progress Note    Subjective:  Up urinating last night, legs are less swollen.      Objective:  /49 mmHg  Pulse 62  Temp(Src) 98.5 °F (36.9 °C) (Oral)  Resp 18  Wt 218 lb 9.4 oz (99.15 kg)  SpO2 97%    Telemetry: afib     Labs: INR 1

## 2017-01-29 NOTE — PLAN OF CARE
Pt.  AOx4. RA.  . Afib. Norco po pain relief. Right Midline DI and infusing Heparin @15.5cc/hr. Bumex @4cc/hr. Next PTT in am.  Will continue to monitor.

## 2017-01-29 NOTE — PROGRESS NOTES
[facility]  Progress Note     Kevin Ferrera Patient Status:  Inpatient    1940 MRN [id number]   [facility] 7NE-A Attending Tea Marquez MD   Hosp Day # 25 PCP Raquel Syed MD     Chief Complaint: leg pain     S: No acute even Pantoprazole Sodium  40 mg Oral QAM AC   • Atorvastatin Calcium  15 mg Oral Nightly   • Bethanechol Chloride  20 mg Oral TID   • Metoprolol Tartrate  50 mg Oral 2x Daily(Beta Blocker)   • Alfuzosin HCl ER  10 mg Oral Daily with breakfast   • docusate sodiu

## 2017-01-29 NOTE — PLAN OF CARE
Patient has been refusing vimpat since the 25th at the 9 pm dose, pt stated she no longer wanted to take it and would continued to refuse today, the medication was explained to patient and she stated she no longer wanted to take it and refused it, Jorden marquis

## 2017-01-30 LAB
APTT PPP: 120.1 SECONDS (ref 25–34)
BUN BLD-MCNC: 24 MG/DL (ref 8–20)
CALCIUM BLD-MCNC: 8.7 MG/DL (ref 8.3–10.3)
CHLORIDE: 98 MMOL/L (ref 101–111)
CO2: 33 MMOL/L (ref 22–32)
CREAT BLD-MCNC: 1.39 MG/DL (ref 0.55–1.02)
GLUCOSE BLD-MCNC: 102 MG/DL (ref 70–99)
GLUCOSE BLD-MCNC: 104 MG/DL (ref 65–99)
GLUCOSE BLD-MCNC: 107 MG/DL (ref 65–99)
GLUCOSE BLD-MCNC: 107 MG/DL (ref 65–99)
GLUCOSE BLD-MCNC: 130 MG/DL (ref 65–99)
INR BLD: 2.33 (ref 0.89–1.12)
POTASSIUM SERPL-SCNC: 3.9 MMOL/L (ref 3.6–5.1)
PSA SERPL DL<=0.01 NG/ML-MCNC: 26.4 SECONDS (ref 12.3–14.8)
SODIUM SERPL-SCNC: 139 MMOL/L (ref 136–144)

## 2017-01-30 PROCEDURE — 99232 SBSQ HOSP IP/OBS MODERATE 35: CPT | Performed by: HOSPITALIST

## 2017-01-30 RX ORDER — AMIODARONE HYDROCHLORIDE 200 MG/1
200 TABLET ORAL DAILY
Status: DISCONTINUED | OUTPATIENT
Start: 2017-01-30 | End: 2017-01-31

## 2017-01-30 NOTE — OCCUPATIONAL THERAPY NOTE
OCCUPATIONAL THERAPY TREATMENT NOTE - INPATIENT     Room Number: 6503/1338-R  Session: 4  Number of Visits to Meet Established Goals: 5    Presenting Problem: Right Leg DVT    History related to current admission: 78 y.o female admitted 1/5/17 from [address] [place] [address] both legs   • A-fib (HCC)    • Abnormal gait 11/25/2013   • Pulmonary HTN ([facility] [address] 10/10/2014   • NSVT (nonsustained ventricular tachycardia) ([facility] 7/11/2014     Recurrence 7.2016 Hx VT- s/p ICD implant on 7/7/16.      • Bilateral edema of lower extremity 11/1 U[address], which includes using toilet, bedpan or urinal? : None  -   Putting on and taking off regular upper body clothing?: None  -   Taking care of personal grooming such as brushing teeth?: None  -   Eating meals?: None    AM-PAC Score:  Sc home with home health services. Additional Information:  Spoke with  about potential change in discharge plan.          OT Discharge Recommendations: Home with home health PT/OT (pending continued progress)  OT Device Recommendations: None

## 2017-01-30 NOTE — PROGRESS NOTES
[facility] Card[facility] Progress Note    Subjective:  Up urinating last night, legs are less swollen. Objective:  BP 98/57 mmHg  Pulse 70  Temp(Src) 97.8 °F (36.6 °C) (Oral)  Resp 18  Wt 207 lb 1.6 oz (93.94 kg)  SpO2 96%    Telemetry: afib     Labs: INR 2.

## 2017-01-30 NOTE — PLAN OF CARE
Assumed care of patient at 0730, a+ox4, ambulating in room with min assist and tolerating well. Iv bumex and iv heparin infusing to midline without problems.  Midline draws blood well but has been coming back hemolyzed from lab, lab came to draw blood to ob

## 2017-01-30 NOTE — PROGRESS NOTES
[facility]  Progress Note     Kevin Ferrera Patient Status:  Inpatient    1940 MRN [id number]   [facility] 7NE-A Attending Tea Marquez MD   Hosp Day # 25 PCP Raquel Syed MD     Chief Complaint: leg pain     S:  no new compl BID   • Initial dose Heparin infusion (PE/DVT)  18 Units/kg/hr Intravenous Once   • Pantoprazole Sodium  40 mg Oral QAM AC   • Atorvastatin Calcium  15 mg Oral Nightly   • Bethanechol Chloride  20 mg Oral TID   • Metoprolol Tartrate  50 mg Oral 2x Daily(Be NAD  S1+S2, IR/IR  CTA b/l  Soft, NT, ND, +BS  + LE edema    D/c bumex gtt, d/c heparin, redose coumadin, d/c to rehab likely tomorrow.     [place] Dress  1/30/2017

## 2017-01-30 NOTE — PLAN OF CARE
INR 2.33 this morning. 1+ pitting edema to LLE and nonpitting to R.  Legs elevated on pillows. Norco given twice for pain in legs. Slept in recliner chair.     CARDIOVASCULAR - ADULT    • Maintains optimal cardiac output and hemodynamic stability Progr

## 2017-01-30 NOTE — PHYSICAL THERAPY NOTE
PHYSICAL THERAPY TREATMENT NOTE - INPATIENT    Room Number: 2389/8709-W     Session: 5     Number of Visits to Meet Established Goals: 5    Presenting Problem: right ileofemoral femoral thrombosis, lytic therapy 1/11/17    Problem List  Principal Problem: Obstructive sleep apnea (adult) (pediatric) 6/29/2012     Cannot tolerate CPAP machine- uses oxygen periodically    • Asthma    • High blood pressure    • Diabetes (HCC)    • Stroke [facility])    • Migraines    • Muscle weakness    • Rheumatoid arthritis ([facility] currently need. ..   -   Moving to and from a bed to a chair (including a wheelchair)?: A Little   -   Need to walk in hospital room?: A Little   -   Climbing 3-5 steps with a railing?: A Lot    AM-PAC Score:  Raw Score: 18   PT Approx Degree of Impairment motion;Strengthening;Transfer training;Balance training    CURRENT GOALS   Goal #1  Patient is able to demonstrate supine - sit EOB @ level: supervision      Goal #2  Patient is able to demonstrate transfers Sit to/from Stand at assistance level: Tricia Hays

## 2017-01-30 NOTE — PAYOR COMM NOTE
Attending Physician: Agustin Shaffer MD        Chief Complaint: leg pain     Physical Exam:  Leg swelling improving       Vitals:  Temp:  [97.8 °F (36.6 °C)-98.5 °F (36.9 °C)] 98.5 °F (36.9 °C)  Pulse:  [62-81] 62  Resp:  [14-18] 18  BP: (102-141)/(47-88) 1

## 2017-01-31 VITALS
TEMPERATURE: 98 F | DIASTOLIC BLOOD PRESSURE: 60 MMHG | BODY MASS INDEX: 33 KG/M2 | WEIGHT: 206.81 LBS | HEART RATE: 66 BPM | OXYGEN SATURATION: 97 % | RESPIRATION RATE: 18 BRPM | SYSTOLIC BLOOD PRESSURE: 103 MMHG

## 2017-01-31 LAB
BUN BLD-MCNC: 22 MG/DL (ref 8–20)
CALCIUM BLD-MCNC: 8.9 MG/DL (ref 8.3–10.3)
CHLORIDE: 99 MMOL/L (ref 101–111)
CO2: 32 MMOL/L (ref 22–32)
CREAT BLD-MCNC: 1.17 MG/DL (ref 0.55–1.02)
GLUCOSE BLD-MCNC: 100 MG/DL (ref 65–99)
GLUCOSE BLD-MCNC: 103 MG/DL (ref 65–99)
GLUCOSE BLD-MCNC: 92 MG/DL (ref 70–99)
HAV IGM SER QL: 2.3 MG/DL (ref 1.7–3)
INR BLD: 2.62 (ref 0.89–1.12)
POTASSIUM SERPL-SCNC: 3.4 MMOL/L (ref 3.6–5.1)
PSA SERPL DL<=0.01 NG/ML-MCNC: 29 SECONDS (ref 12.3–14.8)
SODIUM SERPL-SCNC: 138 MMOL/L (ref 136–144)

## 2017-01-31 PROCEDURE — 99239 HOSP IP/OBS DSCHRG MGMT >30: CPT | Performed by: HOSPITALIST

## 2017-01-31 RX ORDER — TORSEMIDE 20 MG/1
20 TABLET ORAL DAILY
Qty: 30 TABLET | Refills: 3 | Status: SHIPPED | OUTPATIENT
Start: 2017-01-31 | End: 2017-02-02 | Stop reason: DRUGHIGH

## 2017-01-31 RX ORDER — WARFARIN SODIUM 5 MG/1
5 TABLET ORAL NIGHTLY
Qty: 30 TABLET | Refills: 0 | Status: SHIPPED | COMMUNITY
Start: 2017-01-31 | End: 2017-01-31

## 2017-01-31 RX ORDER — DILTIAZEM HYDROCHLORIDE 240 MG/1
240 CAPSULE, COATED, EXTENDED RELEASE ORAL DAILY
Qty: 30 CAPSULE | Refills: 3 | Status: SHIPPED | OUTPATIENT
Start: 2017-01-31 | End: 2017-09-09

## 2017-01-31 RX ORDER — WARFARIN SODIUM 4 MG/1
5 TABLET ORAL NIGHTLY
Status: ON HOLD | COMMUNITY
Start: 2017-01-31 | End: 2017-04-24

## 2017-01-31 RX ORDER — WARFARIN SODIUM 5 MG/1
5 TABLET ORAL NIGHTLY
Status: DISCONTINUED | OUTPATIENT
Start: 2017-01-31 | End: 2017-01-31

## 2017-01-31 NOTE — PHYSICAL THERAPY NOTE
PHYSICAL THERAPY TREATMENT NOTE - INPATIENT    Room Number: 1838/3510-R     Session: 6   Number of Visits to Meet Established Goals: 10 (5 sessions added 1/31/17)    Presenting Problem: right ileofemoral femoral thrombosis, lytic therapy 1/11/17    Proble Insomnia 11/25/2013   • Obstructive sleep apnea (adult) (pediatric) 6/29/2012     Cannot tolerate CPAP machine- uses oxygen periodically    • Asthma    • High blood pressure    • Diabetes ([facility])    • Stroke [facility])    • Migraines    • Muscle weakness    • Rhe does the patient currently need. ..   -   Moving to and from a bed to a chair (including a wheelchair)?: A Little   -   Need to walk in hospital room?: A Little   -   Climbing 3-5 steps with a railing?: A Lot    AM-PAC Score:  Raw Score: 18   PT Approx Degr mechanics; Endurance; Energy conservation;Patient education; Family education;Gait training;Range of motion;Strengthening;Transfer training;Balance training    CURRENT GOALS   Goal #1  Patient is able to demonstrate supine - sit EOB @ level: supervision

## 2017-01-31 NOTE — PROGRESS NOTES
[facility]  Cardiology Progress Note    Ksenia Gutierrez Patient Status:  Inpatient    1940 MRN [id number]   [facility] 7NE-A Attending Abner Esquivel MD   [facility] Day # 32 PCP Grazyna Landon MD     Subjective:  Up in chair and feeling g • Atorvastatin Calcium  15 mg Oral Nightly   • Bethanechol Chloride  20 mg Oral TID   • Metoprolol Tartrate  50 mg Oral 2x Daily(Beta Blocker)   • Alfuzosin HCl ER  10 mg Oral Daily with breakfast   • docusate sodium  100 mg Oral BID   • Sodium Polystyre

## 2017-01-31 NOTE — PROGRESS NOTES
[facility]  Progress Note     Clemencia Beltre Patient Status:  Inpatient    1940 MRN [id number]   [facility] 7NE-A Attending Sundeep Back MD   Hosp Day # 32 PCP Jayme Wu MD     Chief Complaint: leg pain     S:  no new compl HCl ER Coated Beads  240 mg Oral Daily   • PEG 3350  17 g Oral Daily   • Normal Saline Flush  10 mL Intravenous Q12H   • Senna  8.6 mg Oral BID   • Pantoprazole Sodium  40 mg Oral QAM AC   • Atorvastatin Calcium  15 mg Oral Nightly   • Bethanechol Chloride therapeutic, likely d/c to rehab today if ok with cardiology.     Omero Platt  1/31/2017

## 2017-01-31 NOTE — CM/SW NOTE
Pt is ready for d/c today. Pt will go to [facility]. D/C summary and updates sent. Arranged Edw medicar to  pt at 3:00pm today. Pt aware of d/c time. RN will call dtr to notify her of d/c time.

## 2017-01-31 NOTE — OCCUPATIONAL THERAPY NOTE
OCCUPATIONAL THERAPY TREATMENT NOTE - INPATIENT     Room Number: 0161/6892-S  Session: 5, add 3 sessions  Number of Visits to Meet Established Goals: 5    Presenting Problem: Right Leg DVT    History related to current admission: 78 y.o female admitted 1/5 9/16/2016     Severe both legs   • A-fib (HCC)    • Abnormal gait 11/25/2013   • Pulmonary HTN ([facility] 10/10/2014   • NSVT (nonsustained ventricular tachycardia) ([facility] 7/11/2014     Recurrence 7.2016 Hx VT- s/p ICD implant on 7/7/16.      • Bilateral edema of using toilet, bedpan or urinal? : None  -   Putting on and taking off regular upper body clothing?: None  -   Taking care of personal grooming such as brushing teeth?: None  -   Eating meals?: None    AM-PAC Score:  Score: 22  Approx Degree of Impairment: Sub-acute rehabilitation (ELOS 12-14 days)  OT Device Recommendations: None    PLAN  OT Treatment Plan: Balance activities; Energy conservation/work simplification techniques;ADL training;IADL training;Functional transfer training;UE strengthening/ROM; Endur

## 2017-01-31 NOTE — PLAN OF CARE
Received patient today alert and oriented x3 and denying any pain. Her INR is theraputic today do cardiology and hospitalist have agreed patient can be discharged. All medications reconciled and verified with there respective specialties.  Coumadin, Digoxin

## 2017-01-31 NOTE — PROGRESS NOTES
[facility] Cardiology Progress Note    Subjective:  Legs much improved from admission. Up in bathroom cleaning up. No cardiac complaints.      Objective:  /60 mmHg  Pulse 66  Temp(Src) 98 °F (36.7 °C) (Oral)  Resp 18  Wt 206 lb 12.7 oz (93.8 kg)  SpO2

## 2017-02-01 ENCOUNTER — TELEPHONE (OUTPATIENT)
Dept: FAMILY MEDICINE CLINIC | Facility: CLINIC | Age: 77
End: 2017-02-01

## 2017-02-01 ENCOUNTER — SNF ADMIT/H&P (OUTPATIENT)
Dept: FAMILY MEDICINE CLINIC | Facility: CLINIC | Age: 77
End: 2017-02-01

## 2017-02-01 VITALS
BODY MASS INDEX: 34 KG/M2 | HEART RATE: 73 BPM | TEMPERATURE: 99 F | WEIGHT: 209 LBS | DIASTOLIC BLOOD PRESSURE: 74 MMHG | SYSTOLIC BLOOD PRESSURE: 126 MMHG | OXYGEN SATURATION: 95 % | RESPIRATION RATE: 18 BRPM

## 2017-02-01 DIAGNOSIS — M17.0 PRIMARY OSTEOARTHRITIS OF BOTH KNEES: ICD-10-CM

## 2017-02-01 DIAGNOSIS — N30.00 ACUTE CYSTITIS WITHOUT HEMATURIA: ICD-10-CM

## 2017-02-01 DIAGNOSIS — M25.471 ANKLE SWELLING, RIGHT: ICD-10-CM

## 2017-02-01 DIAGNOSIS — Z98.890 S/P CRANIOTOMY: ICD-10-CM

## 2017-02-01 DIAGNOSIS — I50.22 CHRONIC SYSTOLIC HEART FAILURE (HCC): ICD-10-CM

## 2017-02-01 DIAGNOSIS — I10 ESSENTIAL HYPERTENSION: ICD-10-CM

## 2017-02-01 DIAGNOSIS — Z91.81 AT RISK FOR FALLING: ICD-10-CM

## 2017-02-01 DIAGNOSIS — I48.20 CHRONIC A-FIB (HCC): ICD-10-CM

## 2017-02-01 DIAGNOSIS — S06.5X9A SDH (SUBDURAL HEMATOMA) (HCC): ICD-10-CM

## 2017-02-01 DIAGNOSIS — F41.9 ANXIETY: ICD-10-CM

## 2017-02-01 DIAGNOSIS — I82.401 ACUTE DEEP VEIN THROMBOSIS (DVT) OF RIGHT LOWER EXTREMITY, UNSPECIFIED VEIN (HCC): Primary | ICD-10-CM

## 2017-02-01 DIAGNOSIS — I82.403 ACUTE DEEP VEIN THROMBOSIS (DVT) OF BOTH LOWER EXTREMITIES, UNSPECIFIED VEIN (HCC): ICD-10-CM

## 2017-02-01 DIAGNOSIS — R26.9 ABNORMAL GAIT: ICD-10-CM

## 2017-02-01 DIAGNOSIS — G40.209 PARTIAL SYMPTOMATIC EPILEPSY WITH COMPLEX PARTIAL SEIZURES, NOT INTRACTABLE, WITHOUT STATUS EPILEPTICUS (HCC): ICD-10-CM

## 2017-02-01 DIAGNOSIS — Z79.01 ANTICOAGULATED ON COUMADIN: ICD-10-CM

## 2017-02-01 DIAGNOSIS — I48.91 ATRIAL FIBRILLATION, UNSPECIFIED TYPE (HCC): ICD-10-CM

## 2017-02-01 DIAGNOSIS — E11.59 CONTROLLED TYPE 2 DIABETES MELLITUS WITH OTHER CIRCULATORY COMPLICATION, UNSPECIFIED LONG TERM INSULIN USE STATUS: ICD-10-CM

## 2017-02-01 PROCEDURE — 99306 1ST NF CARE HIGH MDM 50: CPT | Performed by: FAMILY MEDICINE

## 2017-02-02 ENCOUNTER — MED REC SCAN ONLY (OUTPATIENT)
Dept: FAMILY MEDICINE CLINIC | Facility: CLINIC | Age: 77
End: 2017-02-02

## 2017-02-02 ENCOUNTER — SNF VISIT (OUTPATIENT)
Dept: INTERNAL MEDICINE CLINIC | Age: 77
End: 2017-02-02

## 2017-02-02 ENCOUNTER — TELEPHONE (OUTPATIENT)
Dept: SURGERY | Facility: CLINIC | Age: 77
End: 2017-02-02

## 2017-02-02 VITALS
HEART RATE: 76 BPM | DIASTOLIC BLOOD PRESSURE: 80 MMHG | OXYGEN SATURATION: 95 % | RESPIRATION RATE: 18 BRPM | TEMPERATURE: 98 F | SYSTOLIC BLOOD PRESSURE: 135 MMHG

## 2017-02-02 DIAGNOSIS — I50.22 CHRONIC SYSTOLIC CONGESTIVE HEART FAILURE (HCC): ICD-10-CM

## 2017-02-02 DIAGNOSIS — M25.473 ANKLE SWELLING, UNSPECIFIED LATERALITY: ICD-10-CM

## 2017-02-02 DIAGNOSIS — R26.81 UNSTEADY GAIT: ICD-10-CM

## 2017-02-02 DIAGNOSIS — G40.209 PARTIAL SYMPTOMATIC EPILEPSY WITH COMPLEX PARTIAL SEIZURES, NOT INTRACTABLE, WITHOUT STATUS EPILEPTICUS (HCC): Primary | ICD-10-CM

## 2017-02-02 DIAGNOSIS — S06.5X9A SDH (SUBDURAL HEMATOMA) (HCC): ICD-10-CM

## 2017-02-02 DIAGNOSIS — Z79.01 ANTICOAGULATED ON COUMADIN: ICD-10-CM

## 2017-02-02 PROCEDURE — 99310 SBSQ NF CARE HIGH MDM 45: CPT | Performed by: NURSE PRACTITIONER

## 2017-02-02 RX ORDER — TORSEMIDE 20 MG/1
20 TABLET ORAL 2 TIMES DAILY
COMMUNITY
Start: 2017-02-02 | End: 2017-04-19

## 2017-02-02 NOTE — PROGRESS NOTES
Dmitry Brayan  : 1940  Age 78year old  female patient is admitted to [facility] and [address] [place] [address] for [address] [place] [address] date:  17  Discharge date to [facility]:  17  ELOS:  12-14 days  Anticipated discharge date resolved  4. Prior Complex partial seizure intractable secondary to SDH  5. Ecoli UTI 2/2 urinary retention  6. Essential HTN, Controlled  7. DM II, stable  8. Chronic afib with slow VR, Rates controlled  9. Chronic systolic CHF, stable  10.  Physical decon 11/25/2013   • Obstructive sleep apnea (adult) (pediatric) 6/29/2012     Cannot tolerate CPAP machine- uses oxygen periodically    • Asthma    • High blood pressure    • Diabetes ([facility])    • Stroke [facility])    • Migraines    • Muscle weakness    • Rheumatoid a daily. Disp: 90 tablet Rfl: 0   Metoprolol Tartrate 50 MG Oral Tab Take 1 tablet (50 mg total) by mouth 2x Daily(Beta Blocker). Disp: 180 tablet Rfl: 1   Amiodarone HCl 100 MG Oral Tab Take 3 tablets (300 mg total) by mouth daily.  Disp: 90 tablet Rfl: 3 VITALS:  /80 mmHg  Pulse 76  Temp(Src) 98.2 °F (36.8 °C) (Oral)  Resp 18  SpO2 95%     REVIEW OF SYSTEMS:  GENERAL HEALTH:feels well otherwise  SKIN: denies any unusual skin lesions or rashes  WOUNDS: none   EYES:no visual complaints or deficit no bruits; nontender, no guarding, no rebound tenderness.   :no suprapubic distension and no CVA tenderness  LYMPHATIC:no lymphedema  MUSCULOSKELETAL: no acute synovitis upper or lower extremity  EXTREMITIES/VASCULAR:radial pulses 2+ and dorsalis pedal pu Twin  1. Resume coumadin @ 3mg: goal INR 2-2.5  2. Repeat INR on 2/5/17      Edema/weight gain/CHF: dilated CM with EF 50-55% /AICD/pacer/hld/pulm htn/afib  1. Increase torsemide to 20mg po bid  2.  Repeat bmp on Monday 2/5/17  3. cpm with daily weigh

## 2017-02-02 NOTE — PROGRESS NOTES
Sandra Munroe  : 1940  Age 78year old  female      CC--Patient presents with:  Nursing Home: Admitted to [facility] From [facility]  Deep Vein Thrombosis (cardiovascular): Complications      H. P.I Sandra Munroe is a 78year old female, Was OP  13. Anemia/Thrombocytopenia, stable/improved  14. Elevated AST  15. Mild metabolic acidosis  16. Volume overload/BLE edema  17.  Anxiety          Past Medical History   Diagnosis Date   • Arthritis    • DIABETES      Type !!   • Diverticulitis    • HYPE HYSTERECTOMY      APPENDECTOMY      HERNIA SURGERY  3/24/2011 [facility]    Comment Repair of Incarcerated Complex Ventral Hernia w/mesh, bilateral component separation, removal previous mesh, partial omentectomy, lysis of adhesions on 3/24/2011:  PCP:   EXAM:  GENERAL HEALTH: well developed, well nourished, in no apparent distress  LINES, TUBES, DRAINS:  none  SKIN: no rashes, no suspicious lesions, dry  WOUND: None  EYES: PERRLA, EOMI, sclera anicteric, conjunctiva normal; there is no nystagmus, no drain INR   1.99*   2.33*   2.62*              Assessment and plan  Acute RLE pain 2/2 progressing DVT on US  S/p lytic therapy    s/p IVC filter placement on 12/16.     coumadin - therapeutic again today---Daily PT/INRs  Acute on chronic diastolic CHF, improv

## 2017-02-06 ENCOUNTER — SNF VISIT (OUTPATIENT)
Dept: INTERNAL MEDICINE CLINIC | Age: 77
End: 2017-02-06

## 2017-02-06 ENCOUNTER — TELEPHONE (OUTPATIENT)
Dept: SURGERY | Facility: CLINIC | Age: 77
End: 2017-02-06

## 2017-02-06 VITALS
DIASTOLIC BLOOD PRESSURE: 67 MMHG | TEMPERATURE: 97 F | WEIGHT: 207 LBS | RESPIRATION RATE: 16 BRPM | BODY MASS INDEX: 33 KG/M2 | HEART RATE: 65 BPM | SYSTOLIC BLOOD PRESSURE: 124 MMHG | OXYGEN SATURATION: 97 %

## 2017-02-06 DIAGNOSIS — G47.09 OTHER INSOMNIA: Primary | ICD-10-CM

## 2017-02-06 DIAGNOSIS — Z79.01 ANTICOAGULATED ON COUMADIN: ICD-10-CM

## 2017-02-06 PROCEDURE — 99309 SBSQ NF CARE MODERATE MDM 30: CPT | Performed by: NURSE PRACTITIONER

## 2017-02-06 RX ORDER — MELATONIN 10 MG
10 CAPSULE ORAL NIGHTLY
Status: ON HOLD | COMMUNITY
Start: 2017-02-04 | End: 2017-11-03

## 2017-02-06 NOTE — PROGRESS NOTES
Nola Kevinon  : 1940  Age 78year old  female patient is admitted to [facility] and [address] [place] [address] for [address] [place] [address] date:  17  Discharge date to [facility]:  17  ELOS:  12-14 days  Anticipated discharge date    2. Chronic SDH, stable CT  3. Mild acute renal insufficiency, resolved  4. Prior Complex partial seizure intractable secondary to SDH  5. Ecoli UTI 2/2 urinary retention  6. Essential HTN, Controlled  7. DM II, stable  8.  Chronic afib with slow VR, Rate defibrillator) procedure 7/7/2016     medtronic    • Insomnia 11/25/2013   • Obstructive sleep apnea (adult) (pediatric) 6/29/2012     Cannot tolerate CPAP machine- uses oxygen periodically    • Asthma    • High blood pressure    • Diabetes (HCC)    • Amaury Bad Bethanechol Chloride 10 MG Oral Tab Take 1 tablet (10 mg total) by mouth 3 (three) times daily. Disp: 90 tablet Rfl: 0   ondansetron 4 MG Oral Tablet Dispersible Take 4 mg by mouth every 8 (eight) hours as needed for Nausea.  Disp:  Rfl:    HYDROcodone-ac Rfl:    Magnesium 500 MG Oral Tab Take 400 mg by mouth daily.    Disp:  Rfl:        VITALS:  /67 mmHg  Pulse 65  Temp(Src) 97.3 °F (36.3 °C) (Oral)  Resp 16  Wt 207 lb  SpO2 97%     REVIEW OF SYSTEMS:  GENERAL HEALTH:feels well otherwise  SKIN: jesus Upper chest wall  ABDOMEN:  normal active BS+, soft, nondistended; no organomegaly, no masses; no bruits; nontender, no guarding, no rebound tenderness.   :no suprapubic distension and no CVA tenderness  LYMPHATIC:no lymphedema  MUSCULOSKELETAL: no acute 2/1/17 at [address] [place] [address] Drive: 5.8  H/h: 10/31.1  Plate: 551    Glu: 954  Bun/cr: 19/1.1  Na: 140  K: 4.2    Vit D: 32.3    SEE PLAN BELOW    Insomnia  1. Melatonin 10mg po nightly prn for insomnia      Generalized weakness s/p hospital stay  1. ALYSHA/PT/OT/ST   2.  Rodrigo Sanchez

## 2017-02-09 ENCOUNTER — SNF DISCHARGE (OUTPATIENT)
Dept: INTERNAL MEDICINE CLINIC | Age: 77
End: 2017-02-09

## 2017-02-09 VITALS
DIASTOLIC BLOOD PRESSURE: 89 MMHG | HEART RATE: 62 BPM | TEMPERATURE: 98 F | SYSTOLIC BLOOD PRESSURE: 153 MMHG | RESPIRATION RATE: 18 BRPM | OXYGEN SATURATION: 97 %

## 2017-02-09 DIAGNOSIS — Z79.01 ANTICOAGULATED ON COUMADIN: ICD-10-CM

## 2017-02-09 DIAGNOSIS — R53.1 WEAKNESS: Primary | ICD-10-CM

## 2017-02-09 PROCEDURE — 99316 NF DSCHRG MGMT 30 MIN+: CPT | Performed by: NURSE PRACTITIONER

## 2017-02-09 RX ORDER — LOSARTAN POTASSIUM 25 MG/1
25 TABLET ORAL DAILY
COMMUNITY
Start: 2017-02-09 | End: 2017-04-19

## 2017-02-09 NOTE — PROGRESS NOTES
Michaela Olivo, 1/27/1940, 78year old, female is being discharged from Facility: [address] [place] [address]    Date of Admission:1/31/17    Date of Discharge:2/14/17                                  Admitting Diagnoses:  Acute RLE DVT extensive ly eyes  HENT: normocephalic; normal nose, no nasal drainage, mucous membranes pink, moist, pharynx no exudate, no visible cerumen.   NECK: supple; FROM; no JVD visible  BREAST: deferred  RESPIRATORY:clear to percussion and auscultation  CARDIOVASCULAR: S1, S2 what the plan is  1. Resumed coumadin @ 3mg: goal INR 2-2.5  2. Increase coumadin 4mg po  Every pm  3. Recheck in 5 days INR: friday      Edema/weight gain/CHF: dilated CM with EF 50-55% /AICD/pacer/hld/pulm htn/afib  1.  Increase torsemide to 20mg po bid

## 2017-02-17 ENCOUNTER — TELEPHONE (OUTPATIENT)
Dept: FAMILY MEDICINE CLINIC | Facility: CLINIC | Age: 77
End: 2017-02-17

## 2017-02-17 NOTE — TELEPHONE ENCOUNTER
Requesting Contour test strips and lancets  LOV: 9/16/16  RTC: 1-2 months  Last Labs: 1/17  Filled:  Not filled here    Future Appointments  Date Time Provider Sara Boo   2/23/2017 10:00 AM Hawa Dugan MD [facility] [address] [facility] [address]       Verified with

## 2017-02-21 ENCOUNTER — TELEPHONE (OUTPATIENT)
Dept: FAMILY MEDICINE CLINIC | Facility: CLINIC | Age: 77
End: 2017-02-21

## 2017-03-01 ENCOUNTER — MED REC SCAN ONLY (OUTPATIENT)
Dept: FAMILY MEDICINE CLINIC | Facility: CLINIC | Age: 77
End: 2017-03-01

## 2017-03-08 ENCOUNTER — TELEPHONE (OUTPATIENT)
Dept: FAMILY MEDICINE CLINIC | Facility: CLINIC | Age: 77
End: 2017-03-08

## 2017-03-17 ENCOUNTER — HOSPITAL ENCOUNTER (OUTPATIENT)
Dept: LAB | Facility: HOSPITAL | Age: 77
Discharge: HOME | End: 2017-03-17
Attending: INTERNAL MEDICINE
Payer: MEDICARE

## 2017-03-17 DIAGNOSIS — I48.91 ATRIAL FIBRILLATION (HCC): ICD-10-CM

## 2017-03-17 LAB — POC INR: 1.8 (ref 0.8–1.3)

## 2017-03-17 PROCEDURE — 85610 PROTHROMBIN TIME: CPT

## 2017-03-20 ENCOUNTER — TELEPHONE (OUTPATIENT)
Dept: FAMILY MEDICINE CLINIC | Facility: CLINIC | Age: 77
End: 2017-03-20

## 2017-03-21 ENCOUNTER — TELEPHONE (OUTPATIENT)
Dept: FAMILY MEDICINE CLINIC | Facility: CLINIC | Age: 77
End: 2017-03-21

## 2017-03-22 NOTE — TELEPHONE ENCOUNTER
PT orders dated 2/14/17  OT orders dated 2/15/17  Signed by MD and faxed back to [facility] at [phone]

## 2017-03-28 ENCOUNTER — MYAURORA ACCOUNT LINK (OUTPATIENT)
Dept: OTHER | Age: 77
End: 2017-03-28

## 2017-03-29 ENCOUNTER — HOSPITAL ENCOUNTER (OUTPATIENT)
Dept: LAB | Facility: HOSPITAL | Age: 77
Discharge: HOME | End: 2017-03-29
Attending: INTERNAL MEDICINE
Payer: MEDICARE

## 2017-03-29 DIAGNOSIS — I48.91 ATRIAL FIBRILLATION (HCC): ICD-10-CM

## 2017-03-29 LAB — POC INR: 1.8 (ref 0.8–1.3)

## 2017-03-29 PROCEDURE — 85610 PROTHROMBIN TIME: CPT

## 2017-03-30 PROBLEM — J44.9 ASTHMA WITH COPD (CHRONIC OBSTRUCTIVE PULMONARY DISEASE) (HCC): Chronic | Status: ACTIVE | Noted: 2017-03-30

## 2017-03-30 PROBLEM — J44.89 ASTHMA WITH COPD (CHRONIC OBSTRUCTIVE PULMONARY DISEASE): Chronic | Status: ACTIVE | Noted: 2017-03-30

## 2017-04-12 ENCOUNTER — HOSPITAL ENCOUNTER (OUTPATIENT)
Dept: LAB | Facility: HOSPITAL | Age: 77
Discharge: HOME | End: 2017-04-12
Attending: INTERNAL MEDICINE
Payer: MEDICARE

## 2017-04-12 DIAGNOSIS — I48.91 ATRIAL FIBRILLATION (HCC): ICD-10-CM

## 2017-04-12 PROCEDURE — 85610 PROTHROMBIN TIME: CPT

## 2017-04-18 ENCOUNTER — OFFICE VISIT (OUTPATIENT)
Dept: FAMILY MEDICINE CLINIC | Facility: CLINIC | Age: 77
End: 2017-04-18

## 2017-04-18 VITALS
BODY MASS INDEX: 34.45 KG/M2 | WEIGHT: 214.38 LBS | HEART RATE: 72 BPM | DIASTOLIC BLOOD PRESSURE: 80 MMHG | HEIGHT: 66 IN | SYSTOLIC BLOOD PRESSURE: 140 MMHG | RESPIRATION RATE: 16 BRPM

## 2017-04-18 DIAGNOSIS — N30.00 ACUTE CYSTITIS WITHOUT HEMATURIA: Primary | ICD-10-CM

## 2017-04-18 DIAGNOSIS — E11.59 CONTROLLED TYPE 2 DIABETES MELLITUS WITH OTHER CIRCULATORY COMPLICATION, UNSPECIFIED LONG TERM INSULIN USE STATUS: ICD-10-CM

## 2017-04-18 PROCEDURE — 83036 HEMOGLOBIN GLYCOSYLATED A1C: CPT | Performed by: NURSE PRACTITIONER

## 2017-04-18 PROCEDURE — 87086 URINE CULTURE/COLONY COUNT: CPT | Performed by: NURSE PRACTITIONER

## 2017-04-18 PROCEDURE — 99213 OFFICE O/P EST LOW 20 MIN: CPT | Performed by: NURSE PRACTITIONER

## 2017-04-18 PROCEDURE — 81003 URINALYSIS AUTO W/O SCOPE: CPT | Performed by: NURSE PRACTITIONER

## 2017-04-18 RX ORDER — NITROFURANTOIN 25; 75 MG/1; MG/1
100 CAPSULE ORAL 2 TIMES DAILY
Qty: 10 CAPSULE | Refills: 0 | Status: SHIPPED | OUTPATIENT
Start: 2017-04-18 | End: 2017-04-25

## 2017-04-18 NOTE — PROGRESS NOTES
[facility] Internal Medicine Office Note  Chief Complaint:   Patient presents with:  UTI: burning when urinating, really yellow in color, urinary urgency - x 4 days  Diabetes: sugars this morning was 121      HPI:   This is a 78year old female c by mouth every 8 (eight) hours as needed for Nausea. Disp:  Rfl:    HYDROcodone-acetaminophen 5-325 MG Oral Tab Take 1 tablet by mouth 2 (two) times daily as needed for Pain.  Disp:  Rfl:    docusate sodium 100 MG Oral Cap Take 100 mg by mouth 2 (two) times Respiratory: Negative for cough and shortness of breath. Cardiovascular: Negative for chest pain. Gastrointestinal: Negative for nausea, vomiting and abdominal pain. Genitourinary: Positive for dysuria, urgency and frequency.  Negative for hematuri Active Problem List:     Vitamin D deficiency     Breast cyst     Other and unspecified hyperlipidemia     Osteoarthrosis, unspecified whether generalized or localized, unspecified site     Congestive heart failure ([facility]     Diabetes type 2, controlled (HC symptomatic epilepsy with complex partial seizures, not intractable, without status epilepticus ([facility]     Acute deep vein thrombosis (DVT) of both lower extremities (HCC)     Acute nonintractable headache     Acute cystitis without hematuria     Leukocytos

## 2017-04-18 NOTE — PATIENT INSTRUCTIONS
Thank you for choosing [facility] at [place] [address]  To Do: Dylan Palacios  1. Start taking antibotic as directed  2. We are going to send urine culture and will call you with results in the next couple of days.     • Please signup for MY C we strive to make you healthier and to improve your quality of life.     Referrals, and Radiology Information:    If your insurance requires a referral to a specialist, please allow 5 business days to process your referral request.    If [employer]

## 2017-04-19 RX ORDER — METOPROLOL TARTRATE 50 MG/1
50 TABLET, FILM COATED ORAL
Qty: 180 TABLET | Refills: 3 | Status: SHIPPED | OUTPATIENT
Start: 2017-04-19 | End: 2017-09-09

## 2017-04-19 RX ORDER — TORSEMIDE 20 MG/1
20 TABLET ORAL 2 TIMES DAILY
Qty: 180 TABLET | Refills: 3 | Status: SHIPPED | OUTPATIENT
Start: 2017-04-19 | End: 2017-09-09

## 2017-04-19 RX ORDER — LOSARTAN POTASSIUM 25 MG/1
25 TABLET ORAL DAILY
Qty: 90 TABLET | Refills: 3 | Status: SHIPPED | OUTPATIENT
Start: 2017-04-19 | End: 2018-05-08

## 2017-04-19 NOTE — TELEPHONE ENCOUNTER
Requesting: Metoprolol ER 100mg, Losartan 25mg, Torsemide 25mg  LOV: 4/18/17  RTC:   Last Labs: A1C - 4/18/17  Filled: 12/1/16  - metoprolol given at the hospital as well as the rest medications.     Future Appointments  Date Time Provider Sara Boo

## 2017-04-22 ENCOUNTER — APPOINTMENT (OUTPATIENT)
Dept: GENERAL RADIOLOGY | Facility: HOSPITAL | Age: 77
End: 2017-04-22
Attending: EMERGENCY MEDICINE
Payer: MEDICARE

## 2017-04-22 ENCOUNTER — PRIOR ORIGINAL RECORDS (OUTPATIENT)
Dept: OTHER | Age: 77
End: 2017-04-22

## 2017-04-22 ENCOUNTER — HOSPITAL ENCOUNTER (OUTPATIENT)
Facility: HOSPITAL | Age: 77
Setting detail: OBSERVATION
Discharge: HOME | End: 2017-04-24
Attending: EMERGENCY MEDICINE | Admitting: HOSPITALIST
Payer: MEDICARE

## 2017-04-22 ENCOUNTER — APPOINTMENT (OUTPATIENT)
Dept: CV DIAGNOSTICS | Facility: HOSPITAL | Age: 77
End: 2017-04-22
Attending: Other
Payer: MEDICARE

## 2017-04-22 ENCOUNTER — APPOINTMENT (OUTPATIENT)
Dept: CT IMAGING | Facility: HOSPITAL | Age: 77
End: 2017-04-22
Attending: Other
Payer: MEDICARE

## 2017-04-22 DIAGNOSIS — R56.9 SEIZURE (HCC): ICD-10-CM

## 2017-04-22 DIAGNOSIS — R41.82 ALTERED MENTAL STATUS, UNSPECIFIED ALTERED MENTAL STATUS TYPE: Primary | ICD-10-CM

## 2017-04-22 PROCEDURE — 95819 EEG AWAKE AND ASLEEP: CPT | Performed by: OTHER

## 2017-04-22 PROCEDURE — 71010 XR CHEST AP PORTABLE  (CPT=71010): CPT

## 2017-04-22 PROCEDURE — 99215 OFFICE O/P EST HI 40 MIN: CPT | Performed by: OTHER

## 2017-04-22 PROCEDURE — 93306 TTE W/DOPPLER COMPLETE: CPT | Performed by: OTHER

## 2017-04-22 PROCEDURE — 70450 CT HEAD/BRAIN W/O DYE: CPT

## 2017-04-22 PROCEDURE — 93306 TTE W/DOPPLER COMPLETE: CPT

## 2017-04-22 PROCEDURE — 99220 INITIAL OBSERVATION CARE,LEVL III: CPT | Performed by: HOSPITALIST

## 2017-04-22 RX ORDER — WARFARIN SODIUM 5 MG/1
5 TABLET ORAL
Status: DISCONTINUED | OUTPATIENT
Start: 2017-04-22 | End: 2017-04-24

## 2017-04-22 RX ORDER — LOSARTAN POTASSIUM 50 MG/1
25 TABLET ORAL DAILY
Status: DISCONTINUED | OUTPATIENT
Start: 2017-04-22 | End: 2017-04-24

## 2017-04-22 RX ORDER — LORAZEPAM 2 MG/ML
0.5 INJECTION INTRAMUSCULAR ONCE
Status: COMPLETED | OUTPATIENT
Start: 2017-04-22 | End: 2017-04-22

## 2017-04-22 RX ORDER — ACETAMINOPHEN 500 MG
1000 TABLET ORAL EVERY 6 HOURS PRN
Status: DISCONTINUED | OUTPATIENT
Start: 2017-04-22 | End: 2017-04-24

## 2017-04-22 RX ORDER — POTASSIUM CHLORIDE 20 MEQ/1
20 TABLET, EXTENDED RELEASE ORAL DAILY
Status: DISCONTINUED | OUTPATIENT
Start: 2017-04-22 | End: 2017-04-24

## 2017-04-22 RX ORDER — DILTIAZEM HYDROCHLORIDE 240 MG/1
240 CAPSULE, COATED, EXTENDED RELEASE ORAL DAILY
Status: DISCONTINUED | OUTPATIENT
Start: 2017-04-22 | End: 2017-04-22

## 2017-04-22 RX ORDER — ATORVASTATIN CALCIUM 10 MG/1
10 TABLET, FILM COATED ORAL NIGHTLY
Status: DISCONTINUED | OUTPATIENT
Start: 2017-04-22 | End: 2017-04-24

## 2017-04-22 RX ORDER — DEXTROSE MONOHYDRATE 25 G/50ML
50 INJECTION, SOLUTION INTRAVENOUS
Status: DISCONTINUED | OUTPATIENT
Start: 2017-04-22 | End: 2017-04-24

## 2017-04-22 RX ORDER — DIGOXIN 125 MCG
125 TABLET ORAL
Status: ON HOLD | COMMUNITY
End: 2017-11-03

## 2017-04-22 RX ORDER — TORSEMIDE 20 MG/1
20 TABLET ORAL 2 TIMES DAILY
Status: DISCONTINUED | OUTPATIENT
Start: 2017-04-22 | End: 2017-04-24

## 2017-04-22 RX ORDER — DIGOXIN 125 MCG
125 TABLET ORAL DAILY
Status: DISCONTINUED | OUTPATIENT
Start: 2017-04-22 | End: 2017-04-22

## 2017-04-22 RX ORDER — ATORVASTATIN CALCIUM 10 MG/1
15 TABLET, FILM COATED ORAL NIGHTLY
Status: DISCONTINUED | OUTPATIENT
Start: 2017-04-22 | End: 2017-04-22

## 2017-04-22 RX ORDER — WARFARIN SODIUM 2 MG/1
4 TABLET ORAL NIGHTLY
Status: DISCONTINUED | OUTPATIENT
Start: 2017-04-22 | End: 2017-04-22

## 2017-04-22 RX ORDER — ALPRAZOLAM 0.25 MG/1
0.25 TABLET ORAL 3 TIMES DAILY PRN
Status: DISCONTINUED | OUTPATIENT
Start: 2017-04-22 | End: 2017-04-24

## 2017-04-22 RX ORDER — WARFARIN SODIUM 2.5 MG/1
2.5 TABLET ORAL
Status: DISCONTINUED | OUTPATIENT
Start: 2017-04-23 | End: 2017-04-24

## 2017-04-22 RX ORDER — DOCUSATE SODIUM 100 MG/1
100 CAPSULE, LIQUID FILLED ORAL 2 TIMES DAILY
Status: DISCONTINUED | OUTPATIENT
Start: 2017-04-22 | End: 2017-04-24

## 2017-04-22 RX ORDER — MEXILETINE HYDROCHLORIDE 150 MG/1
150 CAPSULE ORAL 3 TIMES DAILY
Status: ON HOLD | COMMUNITY
End: 2017-04-23

## 2017-04-22 RX ORDER — ONDANSETRON 2 MG/ML
4 INJECTION INTRAMUSCULAR; INTRAVENOUS EVERY 6 HOURS PRN
Status: DISCONTINUED | OUTPATIENT
Start: 2017-04-22 | End: 2017-04-24

## 2017-04-22 RX ORDER — DIGOXIN 125 MCG
125 TABLET ORAL
Status: DISCONTINUED | OUTPATIENT
Start: 2017-04-24 | End: 2017-04-24

## 2017-04-22 RX ORDER — MEXILETINE HYDROCHLORIDE 150 MG/1
150 CAPSULE ORAL 3 TIMES DAILY
Status: DISCONTINUED | OUTPATIENT
Start: 2017-04-22 | End: 2017-04-22

## 2017-04-22 RX ORDER — HYDROCODONE BITARTRATE AND ACETAMINOPHEN 5; 325 MG/1; MG/1
1 TABLET ORAL 2 TIMES DAILY PRN
Status: DISCONTINUED | OUTPATIENT
Start: 2017-04-22 | End: 2017-04-24

## 2017-04-22 RX ORDER — AMIODARONE HYDROCHLORIDE 100 MG/1
100 TABLET ORAL 2 TIMES DAILY WITH MEALS
Status: DISCONTINUED | OUTPATIENT
Start: 2017-04-22 | End: 2017-04-24

## 2017-04-22 RX ORDER — MULTIPLE VITAMINS W/ MINERALS TAB 9MG-400MCG
1 TAB ORAL DAILY
Status: DISCONTINUED | OUTPATIENT
Start: 2017-04-22 | End: 2017-04-24

## 2017-04-22 RX ORDER — MAGNESIUM OXIDE 400 MG (241.3 MG MAGNESIUM) TABLET
400 TABLET DAILY
Status: DISCONTINUED | OUTPATIENT
Start: 2017-04-22 | End: 2017-04-24

## 2017-04-22 RX ORDER — MECLIZINE HYDROCHLORIDE 25 MG/1
25 TABLET ORAL 3 TIMES DAILY PRN
Status: ON HOLD | COMMUNITY
End: 2017-11-03

## 2017-04-22 RX ORDER — ONDANSETRON 2 MG/ML
4 INJECTION INTRAMUSCULAR; INTRAVENOUS EVERY 4 HOURS PRN
Status: DISCONTINUED | OUTPATIENT
Start: 2017-04-22 | End: 2017-04-22

## 2017-04-22 NOTE — ED NOTES
Pt states she this morning prior to event pt felt dizzy and started shaking before slumping to the ground in her chair. Pt states son helped her to the ground.  Pt denies head injury

## 2017-04-22 NOTE — ED INITIAL ASSESSMENT (HPI)
Per son pt woke up around 600 today normal mental status and around 0630 pt had L side facial drooping with shaking and pt slide out of her chair assisted to ground by son. Pt c/o headache.  Pt responding appropriately with slurred speech and L side facial

## 2017-04-22 NOTE — ED NOTES
Pt side report given to [facility] AND [facility]. Pt bedding and gown changed. Pt clean and dry at this time.

## 2017-04-22 NOTE — ED PROVIDER NOTES
Patient Seen in: [facility] Emergency Department    History   Patient presents with:  Numbness Weakness (neurologic)    Stated Complaint:     HPI    Patient is a pleasant 24-year-old female, with multiple past medical history, presenting for evaluat 7.2016 Hx VT- s/p ICD implant on 7/7/16.      • Bilateral edema of lower extremity 11/18/2015   • S/P ICD (internal cardiac defibrillator) procedure 7/7/2016     medtronic    • Insomnia 11/25/2013   • Obstructive sleep apnea (adult) (pediatric) 6/29/2012 SNF.  Pending INR on 2/10/17    tamsulosin HCl (FLOMAX) 0.4 MG Oral Cap,  Take 1 capsule (0.4 mg total) by mouth nightly. Bethanechol Chloride 10 MG Oral Tab,  Take 1 tablet (10 mg total) by mouth 3 (three) times daily.    ondansetron 4 MG Oral Tablet Dis Disease Sister    • Diabetes Brother    • Heart Disease Brother    • Cancer Brother          Smoking Status: Never Smoker                      Smokeless Status: Never Used                        Alcohol Use: No                Review of Systems    Positive other components within normal limits   LIPID PANEL - Abnormal; Notable for the following:     Cholesterol, Total 235 (*)     Non HDL Chol 136 (*)     All other components within normal limits   PTT, ACTIVATED - Abnormal; Notable for the following:     PTT INDICATIONS:  Seizure  PATIENT STATED HISTORY: (As transcribed by Technologist)  Patient did not give any information. FINDINGS:  Left chest pacemaker.  Cardiomegaly with mild pulmonary vascular congestion and increased interstitial markings in both lung Bethany notified of the findings by telephone at 8:08 AM, April 22, 2017 with read back correctly performed. 4/22/2017  CONCLUSION:  Stable chronic findings. No sign of acute bleed. No CT features for acute territorial infarct.     Dictated by: Elgin Cast

## 2017-04-22 NOTE — CONSULTS
[facility]    Report of Consultation    Ednicko Coleman Patient Status:  Emergency    1940 MRN [id number]   Location [address] [facility] Attending Phylliss Bumpers, MD   Hosp Day # 0 PCP Raul Honeycutt MD     Date of Admission: 3/18/2015   • Arthritis of shoulder region, left, degenerative 3/18/2015     mod   • Obesity, morbid, BMI 40.0-49.9 ([facility] 6/18/2015   • S/P total knee replacement 6/18/2015     bilat 1990s, both severe OA   • Bilateral shoulder region arthritis 6/18/2015 she does not drink alcohol or use illicit drugs. Allergies:    Lisinopril              Coughing  Mexitil [Mexiletine]    Rash    Medications:  No current facility-administered medications for this encounter.     Review of Systems:  A 10-point system was hyperlipidemia     Osteoarthrosis, unspecified whether generalized or localized, unspecified site     Congestive heart failure ([address]     Diabetes type 2, controlled ([address]     Atrial fibrillation ([address]     Obstructive sleep apnea (adult) (pediatric)     Ank epilepticus ([address]     Acute deep vein thrombosis (DVT) of both lower extremities (HCC)     Acute nonintractable headache     Acute cystitis without hematuria     Leukocytosis     Pulmonary embolus (HCC)     S/P IVC filter     Right leg DVT ([address]     Chroni

## 2017-04-22 NOTE — H&P
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     Family History:   Family History   Problem Relation Age of Onset   • Diabetes Father    • Heart Disease Father    • Diabetes Mother    • Diabetes Sister    • Heart Disease Sister    • Diabete Amiodarone HCl 100 MG Oral Tab Take 3 tablets (300 mg total) by mouth daily. Disp: 90 tablet Rfl: 3   Lacosamide (VIMPAT) 200 MG Oral Tab Take 1 tablet (200 mg total) by mouth 2 (two) times daily.  (Patient taking differently: Take 200 mg by mouth 2 (two) (Temporal)  Resp 18  Wt 218 lb (98.884 kg)  SpO2 97%  General: No acute distress. Alert and oriented x 3. HEENT: Normocephalic atraumatic. Moist mucous membranes. EOM-I. PERRLA. Anicteric. Neck: No lymphadenopathy. No JVD. No carotid bruits.   Respiratory Prophylaxis: on chronic coumadin  · CODE status: full  · Guerrero: no    Plan of care discussed with patient     Lloyd Resendez MD  4/22/2017

## 2017-04-22 NOTE — PROGRESS NOTES
CODE STROKE NOTE:    Responded to Code Stroke in ED, B2; paged at 126.    78year old female presents via EMS for possible stroke like symptoms.     Per patient's son:  Patient woke up \"feeling fine\" at 06:30 and about a minute later he noticed what h

## 2017-04-22 NOTE — PLAN OF CARE
A/Ox4. Calm and cooperative. No facial droop noted. Speech clear. Pt appears to be back at her baseline. EEG and echo completed; awaiting results. Seizure precautions. Tele-Afib (chronic), on coumadin. Accuchecks ACHS.   Urine (+) UTI; started on Rocep

## 2017-04-23 PROCEDURE — 99225 SUBSEQUENT OBSERVATION CARE: CPT | Performed by: HOSPITALIST

## 2017-04-23 PROCEDURE — 99214 OFFICE O/P EST MOD 30 MIN: CPT | Performed by: OTHER

## 2017-04-23 NOTE — PROGRESS NOTES
[facility]  Progress Note     Hugo Hiltondarline Patient Status:  Observation    1940 MRN [id number]   [facility] 3NE-A Attending Dwayne Roman MD   Hosp Day # 1 PCP Hendrick Moritz, MD     Chief Complaint: AMS    S: Patient has no c Daily(Beta Blocker)   • multivitamin with minerals  1 tablet Oral Daily   • Potassium Chloride ER  20 mEq Oral Daily   • torsemide  20 mg Oral BID   • insulin aspart  1-5 Units Subcutaneous TID CC and HS   • cefTRIAXone  1 g Intravenous Q24H   • Amiodarone

## 2017-04-23 NOTE — PROCEDURES
[address]      PATIENT'S NAME: Ricky LEYVA   ATTENDING PHYSICIAN: Charlene Landin M.D.    PATIENT ACCOUNT #: [de-identified] LOCATION: [address] [place] [address]   MEDICAL RECORD #: [id number] DATE OF BIRTH: the left, and focal slowing on the left. Diffuse slowing background activity noticed more on the right than the left. Correlation clinically with cerebral dysfunction in those regions is highly recommended. Clinical correlation is indicated.     Dictated

## 2017-04-23 NOTE — PROCEDURES
Prelim EEG note:    Awake and drowsy record. Approximately 10Hz background with intermittent slow wave activity over the left temporoparietal region without any clear epileptiform activity seen. No ictal activity seen. Final report to follow.     Idalmis Smalls

## 2017-04-23 NOTE — PAYOR COMM NOTE
Attending Physician: Brissa Tee 99    ED      Numbness Weakness   Stated Complaint:      HPI    Patient is a pleasant 24-year-old female, with multiple past medical history, presenting for evaluation after a witnessed episode of unresponsiveness.   04/22/17 0731  22    Temp  04/22/17 0755  98 °F (36.7 °C)    Temp src  04/22/17 0755  Temporal    SpO2  04/22/17 0731  97 %    O2 Device  04/22/17 0731  None (Room air)                  RESULTS LAST 24HRS:  Labs Reviewed   COMP METABOLIC PANEL (14) - Abnor components within normal limits   POCT GLUCOSE - Abnormal; Notable for the following:     POC Glucose 135 (*)     All other components within normal limits   CBC W/ DIFFERENTIAL - Abnormal; Notable for the following:     HGB 11.6 (*)     Monocyte Absolute

## 2017-04-23 NOTE — PROGRESS NOTES
[facility]    Progress Note    Crystal Mercado Patient Status:  Observation    1940 MRN [id number]   [facility] 3NE-A Attending Kari Colunga MD   Hosp Day # 1 PCP Yash Carrizales MD     Subjective:  Crystal Mercado is a(n) 75 year solution 500 mcg 500 mcg Nebulization BID   Losartan Potassium (COZAAR) tab 25 mg 25 mg Oral Daily   magnesium oxide (MAG-OX) tab 400 mg 400 mg Oral Daily   metoprolol Tartrate (LOPRESSOR) tab 50 mg 50 mg Oral 2x Daily(Beta Blocker)   multivitamin with min in those regions is highly recommended.  Clinical correlation is indicated.     Assessment:  Patient Active Problem List:     Vitamin D deficiency     Breast cyst     Other and unspecified hyperlipidemia     Osteoarthrosis, unspecified whether generalized o ([facility]     S/P craniotomy     Intracranial hemorrhage (HCC)     Subdural hemorrhage (HCC)     Partial symptomatic epilepsy with complex partial seizures, not intractable, without status epilepticus ([facility]     Acute deep vein thrombosis (DVT) of both lower ex eating smaller meals and initiating thigh high compression stockings, as well as increasing hydration. If conservative measures fail, she may need medications for this.        Lawanda Garcia DO  Neurology and Neuromuscular medicine  [facility]

## 2017-04-23 NOTE — PROGRESS NOTES
Quick Note:    Your urine culture test came back negative. You can stop taking cipro (antibotic).   ______

## 2017-04-23 NOTE — PLAN OF CARE
A/Ox4. Calm, cooperative, and pleasant. Pt does state she feels more fatigued/tired. On RA with  97%. Tele-Afib, chronic, HR controlled. Seizure precautions in place. Accuchecks ACHS. IV rocephin for +UTI.  Pt does c/o some mild burning with voiding

## 2017-04-24 ENCOUNTER — TELEPHONE (OUTPATIENT)
Dept: FAMILY MEDICINE CLINIC | Facility: CLINIC | Age: 77
End: 2017-04-24

## 2017-04-24 VITALS
WEIGHT: 211.5 LBS | TEMPERATURE: 98 F | BODY MASS INDEX: 33.99 KG/M2 | DIASTOLIC BLOOD PRESSURE: 68 MMHG | HEART RATE: 54 BPM | OXYGEN SATURATION: 96 % | SYSTOLIC BLOOD PRESSURE: 111 MMHG | RESPIRATION RATE: 24 BRPM | HEIGHT: 66 IN

## 2017-04-24 PROCEDURE — 99217 OBSERVATION CARE DISCHARGE: CPT | Performed by: HOSPITALIST

## 2017-04-24 RX ORDER — IPRATROPIUM BROMIDE 42 UG/1
2 SPRAY, METERED NASAL 4 TIMES DAILY
Status: DISCONTINUED | OUTPATIENT
Start: 2017-04-24 | End: 2017-04-24

## 2017-04-24 RX ORDER — WARFARIN SODIUM 5 MG/1
5 TABLET ORAL NIGHTLY
Status: ON HOLD | COMMUNITY
Start: 2017-04-24 | End: 2017-11-09

## 2017-04-24 NOTE — CM/SW NOTE
04/24/17 1300   CM/SW Referral Data   Referral Source Social Work (self-referral)   Reason for Referral Discharge planning   Informant Patient   Patient Info   Patient's Mental Status Oriented; Alert   Patient's [address]   Number of Levels

## 2017-04-24 NOTE — PLAN OF CARE
Pt care received at 1915. Daughter called this RN, daughter very upset about discharge. Threatening starting a 'case' against doctor and this RN. Daughter wanting pt to have cardiology consult before pt discharged.  Explained to daughter that page out to ph

## 2017-04-24 NOTE — PLAN OF CARE
Cardiology consult ordered. Will stay one more night. Attempted to call daughter back, did not  x1. Will reattempt to notifiy.

## 2017-04-24 NOTE — HISTORICAL OFFICE NOTE
Avelina Bangura  [phone]  : 1940  ACCOUNT: [id number]  PCP: Charly Reinoso M.D. CARDIOLOGIST: Larry Bashir M.D. Hospital: [facility]  Admitted: 2017  Discharged: 2017    DISCHARGE SUMMARY    DISCHARGE DIAGNOSES:  1.  Extensive right with overall stable kidney function. She was doing well on day of discharge with no complaints and the plan was to discharge to [facility] for rehab.          DISCHARGE CARDIAC MEDICATIONS: Warfarin 4 mg, diltiazem 240 mg, amiodarone 300 mg daily, Lipitor

## 2017-04-24 NOTE — PROGRESS NOTES
[facility]  Progress Note     Saurabh Slim Patient Status:  Observation    1940 MRN [id number]   [facility] 3NE-A Attending Darya Fowler MD   Hosp Day # 2 PCP Barbara Owen MD     Chief Complaint: AMS    S: Patient has no c magnesium oxide  400 mg Oral Daily   • Metoprolol Tartrate  50 mg Oral 2x Daily(Beta Blocker)   • multivitamin with minerals  1 tablet Oral Daily   • Potassium Chloride ER  20 mEq Oral Daily   • torsemide  20 mg Oral BID   • insulin aspart  1-5 Units Subcu

## 2017-04-24 NOTE — CONSULTS
[facility]    Cardiology Consultation    Ramandeep Anne Patient Status:  Observation    1940 MRN [id number]   [facility] 3NE-A Attending Cora Puente MD   [facility] Day # 2 PCP Christina Jacome MD     2017  Reason for Consultation: • Obesity, morbid, BMI 40.0-49.9 ([facility] 6/18/2015   • S/P total knee replacement 6/18/2015     bilat 1990s, both severe OA   • Bilateral shoulder region arthritis 6/18/2015     Severe both shoulders   • Frozen shoulder syndrome 6/18/2015     bilat severe Coughing  Mexitil [Mexiletine]    Rash    Medications:    Current facility-administered medications:   •  acetaminophen (TYLENOL EXTRA STRENGTH) tab 1,000 mg, 1,000 mg, Oral, Q6H PRN  •  ALPRAZolam (XANAX) tab 0.25 mg, 0.25 mg, Oral, TID PRN  •  Calc mcg, 125 mcg, Oral, Once per day on Mon Wed Fri  •  diltiazem (CARDIZEM CD) 24 hr cap 360 mg, 360 mg, Oral, Daily  •  Atorvastatin Calcium (LIPITOR) tab 10 mg, 10 mg, Oral, Nightly  •  Warfarin Sodium (COUMADIN) tab 2.5 mg, 2.5 mg, Oral, Once per day on [place] fibrillation Nonspecific intraventricular block Cannot rule out Septal infarct , age undetermined Abnormal ECG When compared with ECG of 22-APR-2017 07:32, Nonspecific T wave abnormality now evident in Lateral leads    Ekg 12-lead    4/23/2017  Atrial fibr due to hematoma     Headache due to intracranial disease     Anticoagulated on Coumadin     Multiple contusions     Chronic systolic congestive heart failure (HCC)     Chronic atrial fibrillation (HCC)     Primary osteoarthritis of both knees     Mild pers to assess EF last EF 50-55%  - thigh high compression stockings, as well as increasing hydration    Will follow. Thank you for allowing me to participate in the care of your patient. Chava Estrada MD  [facility].   Interventional

## 2017-04-24 NOTE — PLAN OF CARE
CARDIOVASCULAR - ADULT    • Maintains optimal cardiac output and hemodynamic stability Adequate for Discharge    • Absence of cardiac arrhythmias or at baseline Adequate for Discharge        Impaired Functional Mobility    • Achieve highest/safest level of

## 2017-04-24 NOTE — TELEPHONE ENCOUNTER
Pts daughter Negro Villatoro - May release MIKAYLA (Betina), states that pt went to the hospital on Saturday and pt was informed that her UTI was worse and pt had low blood sugar.  Pt was admitted to [facility]. Per pts daughter,

## 2017-04-24 NOTE — PAYOR COMM NOTE
Attending Physician: Geraldine Carlson MD    Review Type: ADMISSION   Reviewer: Garth Thompson       Date: April 24, 2017 - 12:28 PM  Payor: [employer]on [employer]ender MEDICARE ADV PPO  Authorization Number: [id number]  Admit date: 4/22/2017  7:32 AM       REVIEWER COMMENTS

## 2017-04-24 NOTE — PHYSICAL THERAPY NOTE
PHYSICAL THERAPY QUICK EVALUATION - INPATIENT    Room Number: 3296/2117-T  Evaluation Date: 4/24/2017  Presenting Problem: AMS with possible seizure  Physician Order: PT Eval and Treat    Problem List  Principal Problem:    Altered mental status, unspecifi 7/7/2016     medtronic    • Insomnia 11/25/2013   • Obstructive sleep apnea (adult) (pediatric) 6/29/2012     Cannot tolerate CPAP machine- uses oxygen periodically    • Asthma    • High blood pressure    • Diabetes (HCC)    • Stroke [facility])    • Migraines Symmetrical  Coordination - Rapid Alternating Movement: Symmetrical  Sensation: intact       AM-PAC '6-Clicks' INPATIENT SHORT FORM - BASIC MOBILITY  How much difficulty does the patient currently have. ..  -   Turning over in bed (including adjusting bedcl evacuation 12/2016, currently lives with her son and DIL and amb with rw or cane as needed. Pt currently demonstrating wfl strength, balance, activity tolerance, scoring 26/28 on modified Moralez balance scale indicating no increased risk for falls.   Pt does

## 2017-04-25 NOTE — DISCHARGE SUMMARY
[facility]  DISCHARGE SUMMARY     Sheela Velasquez Patient Status:  Observation    1940 MRN [id number]   [facility] 3NE-A Attending No att. providers found   Hosp Day # 2 PCP John Bennett MD     Date of Admission: 2017  Date slumped over in a chair and was found by her son to be unresponsive.  She was also noticed to have some twitching in her right arm and drooping of the right side of her face.  Paramedics were called and she was brought to the emergency room for further mariajose Last time this was given:  360 mg on 4/24/2017  8:23 AM   Commonly known as:  CARDIZEM CD   What changed:  how much to take        Take 1 capsule (240 mg total) by mouth daily.     Quantity:  30 capsule   Refills:  3       Lacosamide 200 MG Tabs   Commonl Refills:  0       Calcium Carbonate-Vitamin D 600-400 MG-UNIT Tabs        Take 1 tablet by mouth daily.     Refills:  0       digoxin 0.125 MG Tabs   Last time this was given:  125 mcg on 4/24/2017  8:22 AM   Commonly known as:  LANOXIN        Take 125 mc mg total) by mouth 2 (two) times daily. For uti    Stop taking on:  4/25/2017   Quantity:  10 capsule   Refills:  0       ondansetron 4 MG Tbdp   Commonly known as:  ZOFRAN-ODT        Take 4 mg by mouth every 8 (eight) hours as needed for Nausea.     Refill nontender, nondistended. Positive bowel sounds. No rebound or guarding. Neurologic: No focal neurological deficits. Musculoskeletal: Moves all extremities. Extremities: No edema.   -----------------------------------------------------------------------

## 2017-04-25 NOTE — TELEPHONE ENCOUNTER
She was only on metformin once a day- they will adjust in the hospital but her sugars might be low because of combination of infection and everything else.   But the answer to the question is yes it can be held for awhile- they will decide when in hospital

## 2017-04-25 NOTE — TELEPHONE ENCOUNTER
Patient is at home and answered the phone. She is in agreement that her illness may have affected her glucose levels. She is going for labwork today and will let us know how she is doing.   She has to cancel her scheduled f/u this Thursday d/t conflicting

## 2017-04-26 ENCOUNTER — HOSPITAL ENCOUNTER (OUTPATIENT)
Dept: LAB | Facility: HOSPITAL | Age: 77
Discharge: HOME | End: 2017-04-26
Attending: INTERNAL MEDICINE
Payer: MEDICARE

## 2017-04-26 DIAGNOSIS — I48.91 ATRIAL FIBRILLATION (HCC): ICD-10-CM

## 2017-04-26 PROCEDURE — 85610 PROTHROMBIN TIME: CPT

## 2017-05-05 ENCOUNTER — HOSPITAL ENCOUNTER (OUTPATIENT)
Dept: LAB | Facility: HOSPITAL | Age: 77
Discharge: HOME | End: 2017-05-05
Attending: INTERNAL MEDICINE
Payer: MEDICARE

## 2017-05-05 DIAGNOSIS — I48.91 ATRIAL FIBRILLATION (HCC): ICD-10-CM

## 2017-05-05 PROCEDURE — 85610 PROTHROMBIN TIME: CPT

## 2017-05-24 ENCOUNTER — HOSPITAL ENCOUNTER (OUTPATIENT)
Dept: LAB | Facility: HOSPITAL | Age: 77
Discharge: HOME | End: 2017-05-24
Attending: INTERNAL MEDICINE
Payer: MEDICARE

## 2017-05-24 ENCOUNTER — PRIOR ORIGINAL RECORDS (OUTPATIENT)
Dept: OTHER | Age: 77
End: 2017-05-24

## 2017-05-24 PROCEDURE — 36415 COLL VENOUS BLD VENIPUNCTURE: CPT | Performed by: INTERNAL MEDICINE

## 2017-05-24 PROCEDURE — 84443 ASSAY THYROID STIM HORMONE: CPT | Performed by: INTERNAL MEDICINE

## 2017-05-24 PROCEDURE — 84460 ALANINE AMINO (ALT) (SGPT): CPT | Performed by: INTERNAL MEDICINE

## 2017-05-24 PROCEDURE — 84450 TRANSFERASE (AST) (SGOT): CPT | Performed by: INTERNAL MEDICINE

## 2017-05-24 PROCEDURE — 84439 ASSAY OF FREE THYROXINE: CPT | Performed by: INTERNAL MEDICINE

## 2017-05-25 ENCOUNTER — PRIOR ORIGINAL RECORDS (OUTPATIENT)
Dept: OTHER | Age: 77
End: 2017-05-25

## 2017-05-30 LAB
CHOLESTEROL, TOTAL: 235 MG/DL
HDL CHOLESTEROL: 99 MG/DL
LDL CHOLESTEROL: 120 MG/DL
TRIGLYCERIDES: 80 MG/DL

## 2017-05-31 LAB
ALT (SGPT): 26 U/L
AST (SGOT): 23 U/L
FREE T4: 1.6 MG/DL
THYROID STIMULATING HORMONE: 0.46 MLU/L

## 2017-06-02 ENCOUNTER — HOSPITAL ENCOUNTER (OUTPATIENT)
Dept: LAB | Facility: HOSPITAL | Age: 77
Discharge: HOME | End: 2017-06-02
Attending: INTERNAL MEDICINE
Payer: MEDICARE

## 2017-06-02 DIAGNOSIS — I48.91 ATRIAL FIBRILLATION (HCC): ICD-10-CM

## 2017-06-02 PROCEDURE — 85610 PROTHROMBIN TIME: CPT

## 2017-06-09 ENCOUNTER — OFFICE VISIT (OUTPATIENT)
Dept: HEMATOLOGY/ONCOLOGY | Facility: HOSPITAL | Age: 77
End: 2017-06-09
Attending: INTERNAL MEDICINE
Payer: MEDICARE

## 2017-06-09 VITALS
HEART RATE: 95 BPM | OXYGEN SATURATION: 96 % | TEMPERATURE: 98 F | SYSTOLIC BLOOD PRESSURE: 151 MMHG | HEIGHT: 65.98 IN | BODY MASS INDEX: 34.52 KG/M2 | RESPIRATION RATE: 18 BRPM | DIASTOLIC BLOOD PRESSURE: 78 MMHG | WEIGHT: 214.81 LBS

## 2017-06-09 DIAGNOSIS — I26.99 OTHER PULMONARY EMBOLISM WITHOUT ACUTE COR PULMONALE, UNSPECIFIED CHRONICITY (HCC): ICD-10-CM

## 2017-06-09 DIAGNOSIS — I82.5Y9 CHRONIC DEEP VEIN THROMBOSIS (DVT) OF PROXIMAL VEIN OF LOWER EXTREMITY, UNSPECIFIED LATERALITY (HCC): ICD-10-CM

## 2017-06-09 DIAGNOSIS — R55 POSTURAL DIZZINESS WITH PRESYNCOPE: ICD-10-CM

## 2017-06-09 DIAGNOSIS — R42 POSTURAL DIZZINESS WITH PRESYNCOPE: ICD-10-CM

## 2017-06-09 DIAGNOSIS — I82.A21 DVT OF AXILLARY VEIN, CHRONIC RIGHT (HCC): Primary | ICD-10-CM

## 2017-06-09 PROCEDURE — 99214 OFFICE O/P EST MOD 30 MIN: CPT | Performed by: INTERNAL MEDICINE

## 2017-06-09 NOTE — PROGRESS NOTES
[facility] Progress Note      Patient Name:  Lencho Kelly  YOB: 1940  Medical Record Number:  [id number]    Date of visit:  6/9/2017    CHIEF COMPLAINT: History of DVT, PE and lower extremity edema.     HPI:     78year old female tablet (5 mg total) by mouth nightly. Patient would like to resume 5mg M/Tue/thurs/fri/sat and 2.5mg Sun/Wed. HOLD today, INR tomorrow 4/25/17, then as as directed. Disp:  Rfl:    digoxin 0.125 MG Oral Tab Take 125 mcg by mouth 3 (three) times a week.    D (two) times daily as needed for Anxiety. (Patient taking differently: Take 0.25 mg by mouth 3 (three) times daily as needed for Anxiety.  ) Disp: 10 tablet Rfl: 0   Amiodarone HCl 100 MG Oral Tab Take 100 mg by mouth 2 (two) times daily.    Disp: 90 tablet auscultation and percussion. Abdomen: Soft, non tender, no hepato-splenomegaly. Extremities:  No edema. CNS: no focal deficit    Emotional well being: Patient's emotional well being was assessed.   No issues requiring acute psychosocial intervention were Pravin Laws M.D.     THE [facility]    [address], [address]    6/9/2017

## 2017-06-09 NOTE — PROGRESS NOTES
Patient was referred here by Dr Nathan Ya. Patient had a bad fall back in December and had a brain bleed, R DVT, seizures. Patient has been on coumadin for her AFIB and the clots.  Patient had a filter placed sometime between December and February of this year

## 2017-06-16 ENCOUNTER — HOSPITAL ENCOUNTER (OUTPATIENT)
Dept: LAB | Facility: HOSPITAL | Age: 77
Discharge: HOME | End: 2017-06-16
Attending: INTERNAL MEDICINE
Payer: MEDICARE

## 2017-06-16 DIAGNOSIS — I48.91 ATRIAL FIBRILLATION (HCC): ICD-10-CM

## 2017-06-16 PROCEDURE — 85610 PROTHROMBIN TIME: CPT

## 2017-06-20 ENCOUNTER — HOSPITAL ENCOUNTER (OUTPATIENT)
Dept: ULTRASOUND IMAGING | Age: 77
Discharge: HOME | End: 2017-06-20
Attending: INTERNAL MEDICINE
Payer: MEDICARE

## 2017-06-20 DIAGNOSIS — I82.A21 DVT OF AXILLARY VEIN, CHRONIC RIGHT (HCC): ICD-10-CM

## 2017-06-20 PROCEDURE — 93970 EXTREMITY STUDY: CPT | Performed by: INTERNAL MEDICINE

## 2017-06-22 ENCOUNTER — TELEPHONE (OUTPATIENT)
Dept: HEMATOLOGY/ONCOLOGY | Facility: HOSPITAL | Age: 77
End: 2017-06-22

## 2017-06-22 NOTE — TELEPHONE ENCOUNTER
Spoke to patient's daughter. Ultrasound shows significant improvement in right leg DVT. Discussed pros and cons of filter removal.  Discussed that it was likely not responsible for edema.   Discussed that while filter could be removed, should she fall i

## 2017-06-30 ENCOUNTER — HOSPITAL ENCOUNTER (OUTPATIENT)
Dept: LAB | Facility: HOSPITAL | Age: 77
Discharge: HOME | End: 2017-06-30
Attending: INTERNAL MEDICINE
Payer: MEDICARE

## 2017-06-30 DIAGNOSIS — I48.91 ATRIAL FIBRILLATION (HCC): ICD-10-CM

## 2017-06-30 LAB — POC INR: 2.1 (ref 0.8–1.3)

## 2017-06-30 PROCEDURE — 85610 PROTHROMBIN TIME: CPT

## 2017-07-25 ENCOUNTER — TELEPHONE (OUTPATIENT)
Dept: FAMILY MEDICINE CLINIC | Facility: CLINIC | Age: 77
End: 2017-07-25

## 2017-07-25 NOTE — TELEPHONE ENCOUNTER
Time started: 1431    Time ended: 1436    Total time spent on chart: 5 min     LMOM for [employer] to call back and discuss. Daly Luong with [employer] called back and states she needs OV note from April to discuss that patient uses O2 at night.  Notified Daly Luong that OV in

## 2017-07-25 NOTE — TELEPHONE ENCOUNTER
Horacio Steven called from [employer] , states they are trying to update pts supplies would like to know if  can put an addendem in stating pt is still using Nocturnal OT     Fax# [phone]

## 2017-07-26 NOTE — TELEPHONE ENCOUNTER
Time started: 11:01am    Time ended: 11:05am    Total time spent on chart: 4 min    Spoke with Carolin Holley from [facility].  She wanted to see if our office would be able to do O2 testing. Informed that we do not.  We do have a pulse ox for vitals but we do not

## 2017-07-26 NOTE — TELEPHONE ENCOUNTER
[facility] called back stating that she spoke with patient and patient's daughter in regards to her use of nocturnal O2. Patient told [facility] that she no longer uses her O2 and would like for it to be picked up.  She will be signing AMA for it to

## 2017-07-28 ENCOUNTER — HOSPITAL ENCOUNTER (OUTPATIENT)
Dept: LAB | Facility: HOSPITAL | Age: 77
Discharge: HOME | End: 2017-07-28
Attending: INTERNAL MEDICINE
Payer: MEDICARE

## 2017-07-28 DIAGNOSIS — I48.91 ATRIAL FIBRILLATION (HCC): ICD-10-CM

## 2017-07-28 LAB — POC INR: 2.5 (ref 0.8–1.3)

## 2017-07-28 PROCEDURE — 85610 PROTHROMBIN TIME: CPT

## 2017-08-25 ENCOUNTER — HOSPITAL ENCOUNTER (OUTPATIENT)
Dept: LAB | Facility: HOSPITAL | Age: 77
Discharge: HOME | End: 2017-08-25
Attending: INTERNAL MEDICINE
Payer: MEDICARE

## 2017-08-25 DIAGNOSIS — I48.91 ATRIAL FIBRILLATION (HCC): ICD-10-CM

## 2017-08-25 LAB — POC INR: 1.5 (ref 0.8–1.3)

## 2017-08-25 PROCEDURE — 85610 PROTHROMBIN TIME: CPT

## 2017-08-30 ENCOUNTER — HOSPITAL ENCOUNTER (INPATIENT)
Facility: HOSPITAL | Age: 77
LOS: 9 days | Discharge: HOME HEALTH | DRG: 445 | End: 2017-09-09
Admitting: HOSPITALIST
Payer: MEDICARE

## 2017-08-30 DIAGNOSIS — R79.89 ELEVATED LFTS: Primary | ICD-10-CM

## 2017-08-30 DIAGNOSIS — M25.473 ANKLE SWELLING, UNSPECIFIED LATERALITY: ICD-10-CM

## 2017-08-30 DIAGNOSIS — I48.91 ATRIAL FIBRILLATION, UNSPECIFIED TYPE (HCC): ICD-10-CM

## 2017-08-30 DIAGNOSIS — K80.50 BILIARY COLIC: ICD-10-CM

## 2017-08-30 LAB
BILIRUB UR QL STRIP.AUTO: NEGATIVE
CLARITY UR REFRACT.AUTO: CLEAR
COLOR UR AUTO: YELLOW
GLUCOSE UR STRIP.AUTO-MCNC: NEGATIVE MG/DL
KETONES UR STRIP.AUTO-MCNC: NEGATIVE MG/DL
LEUKOCYTE ESTERASE UR QL STRIP.AUTO: NEGATIVE
NITRITE UR QL STRIP.AUTO: NEGATIVE
PH UR STRIP.AUTO: 6 [PH] (ref 4.5–8)
PROT UR STRIP.AUTO-MCNC: NEGATIVE MG/DL
RBC UR QL AUTO: NEGATIVE
SP GR UR STRIP.AUTO: 1.01 (ref 1–1.03)
UROBILINOGEN UR STRIP.AUTO-MCNC: <2 MG/DL

## 2017-08-30 RX ORDER — SODIUM CHLORIDE 9 MG/ML
INJECTION, SOLUTION INTRAVENOUS CONTINUOUS
Status: DISCONTINUED | OUTPATIENT
Start: 2017-08-30 | End: 2017-09-01

## 2017-08-30 RX ORDER — ONDANSETRON 2 MG/ML
4 INJECTION INTRAMUSCULAR; INTRAVENOUS ONCE
Status: COMPLETED | OUTPATIENT
Start: 2017-08-30 | End: 2017-08-31

## 2017-08-30 RX ORDER — HYDROMORPHONE HYDROCHLORIDE 1 MG/ML
0.5 INJECTION, SOLUTION INTRAMUSCULAR; INTRAVENOUS; SUBCUTANEOUS ONCE
Status: COMPLETED | OUTPATIENT
Start: 2017-08-30 | End: 2017-08-31

## 2017-08-31 ENCOUNTER — APPOINTMENT (OUTPATIENT)
Dept: INTERVENTIONAL RADIOLOGY/VASCULAR | Facility: HOSPITAL | Age: 77
DRG: 445 | End: 2017-08-31
Attending: SURGERY
Payer: MEDICARE

## 2017-08-31 ENCOUNTER — PRIOR ORIGINAL RECORDS (OUTPATIENT)
Dept: OTHER | Age: 77
End: 2017-08-31

## 2017-08-31 ENCOUNTER — APPOINTMENT (OUTPATIENT)
Dept: ULTRASOUND IMAGING | Facility: HOSPITAL | Age: 77
DRG: 445 | End: 2017-08-31
Payer: MEDICARE

## 2017-08-31 PROBLEM — K80.50 BILIARY COLIC: Status: ACTIVE | Noted: 2017-08-31

## 2017-08-31 PROBLEM — K81.0 ACUTE CHOLECYSTITIS: Status: ACTIVE | Noted: 2017-08-31

## 2017-08-31 PROBLEM — R79.89 ELEVATED LFTS: Status: ACTIVE | Noted: 2017-08-31

## 2017-08-31 LAB
ALBUMIN SERPL-MCNC: 2.8 G/DL (ref 3.5–4.8)
ALBUMIN SERPL-MCNC: 3.1 G/DL (ref 3.5–4.8)
ALP LIVER SERPL-CCNC: 391 U/L (ref 55–142)
ALP LIVER SERPL-CCNC: 427 U/L (ref 55–142)
ALT SERPL-CCNC: 582 U/L (ref 14–54)
ALT SERPL-CCNC: 661 U/L (ref 14–54)
ANTIBODY SCREEN: NEGATIVE
APTT PPP: 34.9 SECONDS (ref 25–34)
APTT PPP: 37.9 SECONDS (ref 25–34)
APTT PPP: 66.7 SECONDS (ref 25–34)
AST SERPL-CCNC: 1039 U/L (ref 15–41)
AST SERPL-CCNC: 927 U/L (ref 15–41)
ATRIAL RATE: 97 BPM
BASOPHILS # BLD AUTO: 0.01 X10(3) UL (ref 0–0.1)
BASOPHILS # BLD AUTO: 0.04 X10(3) UL (ref 0–0.1)
BASOPHILS NFR BLD AUTO: 0.1 %
BASOPHILS NFR BLD AUTO: 0.2 %
BILIRUB SERPL-MCNC: 1.8 MG/DL (ref 0.1–2)
BILIRUB SERPL-MCNC: 1.9 MG/DL (ref 0.1–2)
BUN BLD-MCNC: 14 MG/DL (ref 8–20)
BUN BLD-MCNC: 15 MG/DL (ref 8–20)
CALCIUM BLD-MCNC: 8.3 MG/DL (ref 8.3–10.3)
CALCIUM BLD-MCNC: 8.6 MG/DL (ref 8.3–10.3)
CHLORIDE: 106 MMOL/L (ref 101–111)
CHLORIDE: 106 MMOL/L (ref 101–111)
CO2: 26 MMOL/L (ref 22–32)
CO2: 26 MMOL/L (ref 22–32)
CREAT BLD-MCNC: 0.81 MG/DL (ref 0.55–1.02)
CREAT BLD-MCNC: 0.91 MG/DL (ref 0.55–1.02)
EOSINOPHIL # BLD AUTO: 0 X10(3) UL (ref 0–0.3)
EOSINOPHIL # BLD AUTO: 0 X10(3) UL (ref 0–0.3)
EOSINOPHIL NFR BLD AUTO: 0 %
EOSINOPHIL NFR BLD AUTO: 0 %
ERYTHROCYTE [DISTWIDTH] IN BLOOD BY AUTOMATED COUNT: 14.7 % (ref 11.5–16)
ERYTHROCYTE [DISTWIDTH] IN BLOOD BY AUTOMATED COUNT: 14.9 % (ref 11.5–16)
EST. AVERAGE GLUCOSE BLD GHB EST-MCNC: 134 MG/DL (ref 68–126)
GLUCOSE BLD-MCNC: 101 MG/DL (ref 65–99)
GLUCOSE BLD-MCNC: 128 MG/DL (ref 65–99)
GLUCOSE BLD-MCNC: 129 MG/DL (ref 65–99)
GLUCOSE BLD-MCNC: 142 MG/DL (ref 65–99)
GLUCOSE BLD-MCNC: 153 MG/DL (ref 70–99)
GLUCOSE BLD-MCNC: 160 MG/DL (ref 70–99)
HBA1C MFR BLD HPLC: 6.3 % (ref ?–5.7)
HCT VFR BLD AUTO: 30.4 % (ref 34–50)
HCT VFR BLD AUTO: 34.3 % (ref 34–50)
HGB BLD-MCNC: 10.2 G/DL (ref 12–16)
HGB BLD-MCNC: 11 G/DL (ref 12–16)
IMMATURE GRANULOCYTE COUNT: 0.09 X10(3) UL (ref 0–1)
IMMATURE GRANULOCYTE COUNT: 0.1 X10(3) UL (ref 0–1)
IMMATURE GRANULOCYTE RATIO %: 0.4 %
IMMATURE GRANULOCYTE RATIO %: 0.5 %
INR BLD: 1.58 (ref 0.89–1.11)
INR BLD: 1.87 (ref 0.89–1.11)
LIPASE: 110 U/L (ref 73–393)
LYMPHOCYTES # BLD AUTO: 0.24 X10(3) UL (ref 0.9–4)
LYMPHOCYTES # BLD AUTO: 0.51 X10(3) UL (ref 0.9–4)
LYMPHOCYTES NFR BLD AUTO: 1.3 %
LYMPHOCYTES NFR BLD AUTO: 2.2 %
M PROTEIN MFR SERPL ELPH: 6 G/DL (ref 6.1–8.3)
M PROTEIN MFR SERPL ELPH: 6.5 G/DL (ref 6.1–8.3)
MCH RBC QN AUTO: 28.4 PG (ref 27–33.2)
MCH RBC QN AUTO: 29.9 PG (ref 27–33.2)
MCHC RBC AUTO-ENTMCNC: 32.1 G/DL (ref 31–37)
MCHC RBC AUTO-ENTMCNC: 33.6 G/DL (ref 31–37)
MCV RBC AUTO: 88.6 FL (ref 81–100)
MCV RBC AUTO: 89.1 FL (ref 81–100)
MONOCYTES # BLD AUTO: 0.69 X10(3) UL (ref 0.1–0.6)
MONOCYTES # BLD AUTO: 0.89 X10(3) UL (ref 0.1–0.6)
MONOCYTES NFR BLD AUTO: 3 %
MONOCYTES NFR BLD AUTO: 4.6 %
NEUTROPHIL ABS PRELIM: 17.97 X10 (3) UL (ref 1.3–6.7)
NEUTROPHIL ABS PRELIM: 21.86 X10 (3) UL (ref 1.3–6.7)
NEUTROPHILS # BLD AUTO: 17.97 X10(3) UL (ref 1.3–6.7)
NEUTROPHILS # BLD AUTO: 21.86 X10(3) UL (ref 1.3–6.7)
NEUTROPHILS NFR BLD AUTO: 93.5 %
NEUTROPHILS NFR BLD AUTO: 94.2 %
PLATELET # BLD AUTO: 170 10(3)UL (ref 150–450)
PLATELET # BLD AUTO: 183 10(3)UL (ref 150–450)
POTASSIUM SERPL-SCNC: 3.2 MMOL/L (ref 3.6–5.1)
POTASSIUM SERPL-SCNC: 3.6 MMOL/L (ref 3.6–5.1)
POTASSIUM SERPL-SCNC: 4 MMOL/L (ref 3.6–5.1)
PSA SERPL DL<=0.01 NG/ML-MCNC: 19 SECONDS (ref 12–14.3)
PSA SERPL DL<=0.01 NG/ML-MCNC: 21.8 SECONDS (ref 12–14.3)
Q-T INTERVAL: 414 MS
QRS DURATION: 142 MS
QTC CALCULATION (BEZET): 503 MS
R AXIS: 16 DEGREES
RBC # BLD AUTO: 3.41 X10(6)UL (ref 3.8–5.1)
RBC # BLD AUTO: 3.87 X10(6)UL (ref 3.8–5.1)
RED CELL DISTRIBUTION WIDTH-SD: 47.6 FL (ref 35.1–46.3)
RED CELL DISTRIBUTION WIDTH-SD: 48.2 FL (ref 35.1–46.3)
RH BLOOD TYPE: POSITIVE
SODIUM SERPL-SCNC: 140 MMOL/L (ref 136–144)
SODIUM SERPL-SCNC: 141 MMOL/L (ref 136–144)
T AXIS: 81 DEGREES
VENTRICULAR RATE: 89 BPM
WBC # BLD AUTO: 19.2 X10(3) UL (ref 4–13)
WBC # BLD AUTO: 23.2 X10(3) UL (ref 4–13)

## 2017-08-31 PROCEDURE — BF121ZZ FLUOROSCOPY OF GALLBLADDER USING LOW OSMOLAR CONTRAST: ICD-10-PCS | Performed by: RADIOLOGY

## 2017-08-31 PROCEDURE — 99223 1ST HOSP IP/OBS HIGH 75: CPT | Performed by: HOSPITALIST

## 2017-08-31 PROCEDURE — 0F9430Z DRAINAGE OF GALLBLADDER WITH DRAINAGE DEVICE, PERCUTANEOUS APPROACH: ICD-10-PCS | Performed by: RADIOLOGY

## 2017-08-31 PROCEDURE — 76700 US EXAM ABDOM COMPLETE: CPT

## 2017-08-31 RX ORDER — MORPHINE SULFATE 4 MG/ML
1 INJECTION, SOLUTION INTRAMUSCULAR; INTRAVENOUS EVERY 2 HOUR PRN
Status: DISCONTINUED | OUTPATIENT
Start: 2017-08-31 | End: 2017-09-09

## 2017-08-31 RX ORDER — METOPROLOL TARTRATE 5 MG/5ML
5 INJECTION INTRAVENOUS EVERY 6 HOURS
Status: DISCONTINUED | OUTPATIENT
Start: 2017-08-31 | End: 2017-09-02

## 2017-08-31 RX ORDER — AMIODARONE HYDROCHLORIDE 100 MG/1
100 TABLET ORAL 2 TIMES DAILY
Status: DISCONTINUED | OUTPATIENT
Start: 2017-08-31 | End: 2017-08-31

## 2017-08-31 RX ORDER — ONDANSETRON 2 MG/ML
4 INJECTION INTRAMUSCULAR; INTRAVENOUS EVERY 6 HOURS PRN
Status: DISCONTINUED | OUTPATIENT
Start: 2017-08-31 | End: 2017-09-09

## 2017-08-31 RX ORDER — DILTIAZEM HYDROCHLORIDE 180 MG/1
360 CAPSULE, EXTENDED RELEASE ORAL DAILY
Status: DISCONTINUED | OUTPATIENT
Start: 2017-08-31 | End: 2017-08-31

## 2017-08-31 RX ORDER — SODIUM CHLORIDE 9 MG/ML
INJECTION, SOLUTION INTRAVENOUS CONTINUOUS
Status: ACTIVE | OUTPATIENT
Start: 2017-08-31 | End: 2017-08-31

## 2017-08-31 RX ORDER — HEPARIN SODIUM 5000 [USP'U]/ML
80 INJECTION INTRAVENOUS; SUBCUTANEOUS ONCE
Status: COMPLETED | OUTPATIENT
Start: 2017-08-31 | End: 2017-08-31

## 2017-08-31 RX ORDER — MORPHINE SULFATE 4 MG/ML
2 INJECTION, SOLUTION INTRAMUSCULAR; INTRAVENOUS EVERY 2 HOUR PRN
Status: DISCONTINUED | OUTPATIENT
Start: 2017-08-31 | End: 2017-09-09

## 2017-08-31 RX ORDER — ONDANSETRON 2 MG/ML
4 INJECTION INTRAMUSCULAR; INTRAVENOUS EVERY 4 HOURS PRN
Status: DISCONTINUED | OUTPATIENT
Start: 2017-08-31 | End: 2017-09-08

## 2017-08-31 RX ORDER — POTASSIUM CHLORIDE 14.9 MG/ML
20 INJECTION INTRAVENOUS ONCE
Status: COMPLETED | OUTPATIENT
Start: 2017-08-31 | End: 2017-08-31

## 2017-08-31 RX ORDER — MORPHINE SULFATE 4 MG/ML
4 INJECTION, SOLUTION INTRAMUSCULAR; INTRAVENOUS EVERY 2 HOUR PRN
Status: DISCONTINUED | OUTPATIENT
Start: 2017-08-31 | End: 2017-09-09

## 2017-08-31 RX ORDER — MIDAZOLAM HYDROCHLORIDE 1 MG/ML
INJECTION INTRAMUSCULAR; INTRAVENOUS
Status: COMPLETED
Start: 2017-08-31 | End: 2017-08-31

## 2017-08-31 RX ORDER — HYDROMORPHONE HYDROCHLORIDE 1 MG/ML
0.5 INJECTION, SOLUTION INTRAMUSCULAR; INTRAVENOUS; SUBCUTANEOUS EVERY 30 MIN PRN
Status: DISPENSED | OUTPATIENT
Start: 2017-08-31 | End: 2017-08-31

## 2017-08-31 RX ORDER — LIDOCAINE HYDROCHLORIDE 10 MG/ML
INJECTION, SOLUTION INFILTRATION; PERINEURAL
Status: COMPLETED
Start: 2017-08-31 | End: 2017-08-31

## 2017-08-31 RX ORDER — METOPROLOL TARTRATE 50 MG/1
50 TABLET, FILM COATED ORAL
Status: DISCONTINUED | OUTPATIENT
Start: 2017-08-31 | End: 2017-08-31

## 2017-08-31 RX ORDER — HEPARIN SODIUM AND DEXTROSE 10000; 5 [USP'U]/100ML; G/100ML
18 INJECTION INTRAVENOUS ONCE
Status: COMPLETED | OUTPATIENT
Start: 2017-08-31 | End: 2017-08-31

## 2017-08-31 RX ORDER — DEXTROSE MONOHYDRATE 25 G/50ML
50 INJECTION, SOLUTION INTRAVENOUS
Status: DISCONTINUED | OUTPATIENT
Start: 2017-08-31 | End: 2017-09-05

## 2017-08-31 RX ORDER — HEPARIN SODIUM AND DEXTROSE 10000; 5 [USP'U]/100ML; G/100ML
INJECTION INTRAVENOUS CONTINUOUS
Status: DISCONTINUED | OUTPATIENT
Start: 2017-08-31 | End: 2017-09-09

## 2017-08-31 RX ORDER — FUROSEMIDE 10 MG/ML
40 INJECTION INTRAMUSCULAR; INTRAVENOUS ONCE
Status: COMPLETED | OUTPATIENT
Start: 2017-08-31 | End: 2017-08-31

## 2017-08-31 RX ORDER — SODIUM CHLORIDE 9 MG/ML
INJECTION, SOLUTION INTRAVENOUS CONTINUOUS
Status: DISCONTINUED | OUTPATIENT
Start: 2017-08-31 | End: 2017-09-01

## 2017-08-31 NOTE — PAYOR COMM NOTE
--------------  ADMISSION REVIEW       8/31             Headache   Abdomen/Flank Pain   Nausea/Vomiting/Diarrhea          complicated [id number] female presenting to the ER primarily because this afternoon she started having epigastric and right upper viktor to 6 mm. Suggestion of trace pericholecystic fluid. Findings concerning for acute cholecystitis in the appropriate clinical setting.  No biliary ductal   dilatation.     Hepatic and splenic calcifications.     Right renal cyst measuring 2.4 cm.

## 2017-08-31 NOTE — HOME CARE LIAISON
[facility] NOT ABLE TO ACCEPT THIS PTNT. . NOT CONTRACTED WITH PTNT INSURANCE.   RE REFERRAL SENT TO OUR REFERRAL DEPT  One Brenden Bucio ANSWER    Mely Stallings

## 2017-08-31 NOTE — CONSULTS
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 She has never used smokeless tobacco. She reports that she does not drink alcohol or use drugs.     Allergies:    Lisinopril              Coughing 18   Ht 5' 6\" (1.676 m)   Wt 221 lb 5.5 oz (100.4 kg)   SpO2 94%   BMI 35.73 kg/m²   Temp (24hrs), Av.5 °F (36.9 °C), Min:98.2 °F (36.8 °C), Max:98.7 °F (37.1 °C)       Intake/Output Summary (Last 24 hours) at 17 0945  Last data filed at  systolic congestive heart failure (HCC)    Partial symptomatic epilepsy with complex partial seizures, not intractable, without status epilepticus [facility]    Acute cholecystitis    Biliary colic  HLP    Recommendations:  -  agree with admission to telemetry,

## 2017-08-31 NOTE — CM/SW NOTE
08/31/17 1422   CM/SW Referral Data   Referral Source    Reason for Referral Discharge planning   Informant Children  (dtr Jeri and son Antwan Solo via phone)   Pertinent Medical Hx   Primary Care Physician Name Dr Petros Morataya   Date of Last Contact with

## 2017-08-31 NOTE — ED INITIAL ASSESSMENT (HPI)
Present with N/V/D that started at 1500 today, also c/o generalized weakness, right lower back and RLE pain and headache.

## 2017-08-31 NOTE — H&P
[facility]  History and Physical     Reford Mariluz Patient Status:  Inpatient    1940 MRN [id number]   [facility]NE-A [facility] PCP Mazin mancilla MD     Chief Complaint: Abdominal pa CHOLESTEROL    • HYPERTENSION    • Insomnia 11/25/2013   • KIDNEY STONE    • LAD (lymphadenopathy), cervical 1/18/2014    bx 2014- benign   • Lymph node enlargement 1/14/2014    Neck 2014   • Migraines    • Muscle weakness    • Near syncope 3/18/2015   • N Diabetes Father    • Heart Disease Father    • Diabetes Mother    • Diabetes Sister    • Heart Disease Sister    • Diabetes Brother    • Heart Disease Brother    • Cancer Brother        Allergies:   Lisinopril              Coughing  Mexitil [Mexiletine] Rfl:    Multiple Vitamins-Minerals (TAB-A-MAE MAXIMUM) Oral Tab Take 1 tablet by mouth daily. Disp:  Rfl:    Calcium Carbonate-Vitamin D 600-400 MG-UNIT Oral Tab Take 1 tablet by mouth daily.  Disp:  Rfl:    acetaminophen (TYLENOL EXTRA STRENGTH) 500 MG Or °F (36.8 °C) (Oral)   Resp 18   Ht 5' 6\" (1.676 m)   Wt 221 lb 5.5 oz (100.4 kg)   SpO2 96%   BMI 35.73 kg/m²   General: No acute distress. Alert and oriented x 3. HEENT: Normocephalic atraumatic. Moist mucous membranes. EOM-I. PERRLA. Anicteric.   Neck: NPO for now. Reversing Coumadin. 4. Type 2 diabetes-we will place on hyperglycemia protocol with erection factor insulin. Patient currently n.p.o.  5. History of lower extremity DVT-required thrombectomy and TPA and IVC filter.   Start heparin drip whil

## 2017-08-31 NOTE — PROGRESS NOTES
I spoke with patient's daughter and DEJAAUTUMN Nico Solis) via phone this afternoon ([phone]). We discussed her mother's clinical condition.   Saskia Jacobsan stated that her family and the patient have agreed that they do not wish any surgical intervention given the moth

## 2017-08-31 NOTE — PLAN OF CARE
Problem: CARDIOVASCULAR - ADULT  Goal: Maintains optimal cardiac output and hemodynamic stability  INTERVENTIONS:  - Monitor vital signs, rhythm, and trends  - Monitor for bleeding, hypotension and signs of decreased cardiac output  - Evaluate effectivenes Achieves appropriate nutritional intake (bariatric)  INTERVENTIONS:  - Monitor for over-consumption  - Identify factors contributing to increased intake, treat as appropriate  - Monitor I&O, WT and lab values  - Obtain nutritional consult as needed  - Eval

## 2017-08-31 NOTE — HISTORICAL OFFICE NOTE
Maegan Wilder  : 1940  ACCOUNT:  [id number]  [phone]  PCP: Dr. Sin Peterson     TODAY'S DATE: 2017  DICTATED BY:  Armand Li MD]    CHIEF COMPLAINT: [Followup of Heart failure, systolic, acute, Followup of ICD and not pacemaker dependent. fibrillation, cardiac catheterization, dyslipidemia, hypertension, ICD (Medtronic) MRI compatible 7/2016, SRAVANTHI October 2007 and ventricular tachycardia    FAMILY HISTORY: Significant for premature CAD. Negative for AAA.     SOCIAL HISTORY: SMOKING: Never use medications. 2. Follow up with Dr. Silvana Paez to decide long-term strategy of maintaining IVC filter. She will likely require lifelong anticoagulation. 3. If the IVC filter is to be removed, this was reviewed by Dr. King Fitzgerald.   He recommends follow up wi 12/14/2016. INR goal is 2.0-2.5 per neurosurgery. 3, History of PE/DVT with IVC filter placement, 12/16/2016. Filter to be remain in for now per hematology. 4. Permanent AFib with controlled rates. 5. Dilated cardiomyopathy with an EF of 50-55%.    6. and losartan. DISCHARGE INSTRUCTIONS: We have recommended daily INR for the next five days for close monitoring due to subdural hematoma at [facility]. She will also have a BMP the end of this week to reassess electrolytes and kidney function.  Daily

## 2017-08-31 NOTE — PLAN OF CARE
CARDIOVASCULAR - ADULT    • Maintains optimal cardiac output and hemodynamic stability Progressing    • Absence of cardiac arrhythmias or at baseline Progressing        Diabetes/Glucose Control    • Glucose maintained within prescribed range Progressing given, PTT recheck due at 1115. Medtronic ICD for VT. IV metoprolol for SBP > 150. Last BM 8/30, no witnessed vomiting since admission. Denies nausea at this time. Abd rounded, tender to (R) UQ. Up w/SBA. Denies urinary symptoms. UA negative.  K+3.2 - repla

## 2017-08-31 NOTE — ED PROVIDER NOTES
Patient Seen in: [facility] Emergency Department    History   Patient presents with:  Headache (neurologic)  Abdomen/Flank Pain (GI/)  Nausea/Vomiting/Diarrhea (gastrointestinal)    Stated Complaint: HA    HPI    Patient is a pleasant complicated shoulder syndrome 6/18/2015    bilat severe   • Gallstones    • High blood pressure    • HIGH CHOLESTEROL    • HYPERTENSION    • Insomnia 11/25/2013   • KIDNEY STONE    • LAD (lymphadenopathy), cervical 1/18/2014    bx 2014- benign   • Lymph node enlargeme Sun/Wed. HOLD today, INR tomorrow 4/25/17, then as as directed. digoxin 0.125 MG Oral Tab,  Take 125 mcg by mouth 3 (three) times a week. Losartan Potassium 25 MG Oral Tab,  Take 1 tablet (25 mg total) by mouth daily.    torsemide 20 MG Oral Tab,  Ta mouth 3 (three) times daily. ondansetron 4 MG Oral Tablet Dispersible,  Take 4 mg by mouth every 8 (eight) hours as needed for Nausea. HYDROcodone-acetaminophen 5-325 MG Oral Tab,  Take 1 tablet by mouth 2 (two) times daily as needed for Pain.    docusa Normocephalic and atraumatic. Sclera anicteric, conjunctiva pink and moist.   Mucus membranes pink and moist,   Neck: Supple with normal range of motion.    Chest: clear breath sounds without wheezes,     Heart: Regular rate and rhythm     Abdomen: Soft been validated against 0.3-0.7 heparin anti-Xa units/mL.      CBC W/ DIFFERENTIAL - Abnormal; Notable for the following:     WBC 19.2 (*)     HGB 11.0 (*)     RDW-SD 47.6 (*)     Neutrophil Absolute Prelim 17.97 (*)     Neutrophil Absolute 17.97 (*)     Lym -----------         ------                     ABORH (BLOOD BVGG)[[id number]]                               Final result               ANTIBODY GUTDSL[[id number]]                                  Final result                 Please view results for these lizbeth Prescribed:  Current Discharge Medication List        Present on Admission  Date Reviewed: 6/9/2017          ICD-10-CM Noted POA    * (Principal)Elevated LFTs R79.89 8/31/2017     Acute cholecystitis K81.0 8/31/2017 Unknown    Biliary colic S24.19 2/15/093

## 2017-08-31 NOTE — CONSULTS
[facility]  Report of Consultation    Dmitry Brayan Patient Status:  Inpatient    1940 MRN [id number]   [facility] 2NE-A [facility] PCP Khoa Man MD     Reason for Consultation:  RUQ abd s/p ICD implant on 7/7/16.      • Obesity, morbid, BMI 40.0-49.9 ([facility] 6/18/2015   • Obstructive sleep apnea (adult) (pediatric) 6/29/2012    Cannot tolerate CPAP machine- uses oxygen periodically    • Osteoarthritis    • Other and unspecified hyperlipidemi HCl (ZOFRAN) injection 4 mg, 4 mg, Intravenous, Q4H PRN  •  0.9%  NaCl infusion, , Intravenous, Continuous    Review of Systems:  Pertinent items are noted in HPI.     Physical Exam:  Blood pressure 123/65, pulse 90, temperature 98.5 °F (36.9 °C), temperatu lymphoproliferative disorder (HCC)     Asthma     Gallstones     Neutrophilia     Leg swelling     Pericardial effusion     Fatigue     Hypokalemia     NSVT (nonsustained ventricular tachycardia) (HCC)     Hearing loss     Pulmonary HTN (HCC)     BPPV (suleiman obstructive pulmonary disease) (HCC)     Altered mental status     Seizures ([facility]     Type 2 diabetes mellitus without complication, without long-term current use of insulin (HCC)     Seizure disorder (HCC)     History of subdural hematoma     Altered ment

## 2017-09-01 ENCOUNTER — TELEPHONE (OUTPATIENT)
Dept: CARDIOLOGY UNIT | Facility: HOSPITAL | Age: 77
End: 2017-09-01

## 2017-09-01 LAB
APTT PPP: 73.8 SECONDS (ref 25–34)
GLUCOSE BLD-MCNC: 115 MG/DL (ref 65–99)
GLUCOSE BLD-MCNC: 130 MG/DL (ref 65–99)
GLUCOSE BLD-MCNC: 94 MG/DL (ref 65–99)
GLUCOSE BLD-MCNC: 95 MG/DL (ref 65–99)
INR BLD: 1.66 (ref 0.89–1.11)
PSA SERPL DL<=0.01 NG/ML-MCNC: 19.8 SECONDS (ref 12–14.3)

## 2017-09-01 PROCEDURE — 99232 SBSQ HOSP IP/OBS MODERATE 35: CPT | Performed by: HOSPITALIST

## 2017-09-01 RX ORDER — HYDROCODONE BITARTRATE AND ACETAMINOPHEN 5; 325 MG/1; MG/1
1 TABLET ORAL EVERY 4 HOURS PRN
Status: DISCONTINUED | OUTPATIENT
Start: 2017-09-01 | End: 2017-09-09

## 2017-09-01 RX ORDER — WARFARIN SODIUM 2.5 MG/1
2.5 TABLET ORAL NIGHTLY
Status: DISCONTINUED | OUTPATIENT
Start: 2017-09-01 | End: 2017-09-01

## 2017-09-01 RX ORDER — METOPROLOL SUCCINATE 50 MG/1
50 TABLET, EXTENDED RELEASE ORAL
Status: DISCONTINUED | OUTPATIENT
Start: 2017-09-01 | End: 2017-09-02

## 2017-09-01 RX ORDER — ATORVASTATIN CALCIUM 10 MG/1
10 TABLET, FILM COATED ORAL NIGHTLY
Status: DISCONTINUED | OUTPATIENT
Start: 2017-09-01 | End: 2017-09-02

## 2017-09-01 RX ORDER — HYDROCODONE BITARTRATE AND ACETAMINOPHEN 5; 325 MG/1; MG/1
2 TABLET ORAL EVERY 4 HOURS PRN
Status: DISCONTINUED | OUTPATIENT
Start: 2017-09-01 | End: 2017-09-09

## 2017-09-01 RX ORDER — WARFARIN SODIUM 2.5 MG/1
2.5 TABLET ORAL NIGHTLY
Status: DISCONTINUED | OUTPATIENT
Start: 2017-09-01 | End: 2017-09-02

## 2017-09-01 RX ORDER — FUROSEMIDE 40 MG/1
40 TABLET ORAL
Status: DISCONTINUED | OUTPATIENT
Start: 2017-09-01 | End: 2017-09-09

## 2017-09-01 RX ORDER — AMIODARONE HYDROCHLORIDE 200 MG/1
200 TABLET ORAL DAILY
Status: DISCONTINUED | OUTPATIENT
Start: 2017-09-01 | End: 2017-09-09

## 2017-09-01 NOTE — PHYSICAL THERAPY NOTE
PHYSICAL THERAPY EVALUATION - INPATIENT     Room Number: 2606/2606-A  Evaluation Date: 9/1/2017  Type of Evaluation: Initial  Physician Order: PT Eval and Treat    Presenting Problem: abd pain, cholecystitis  Reason for Therapy: Mobility Dysfunction an (lymphadenopathy), cervical 1/18/2014    bx 2014- benign   • Lymph node enlargement 1/14/2014    Neck 2014   • Migraines    • Muscle weakness    • Near syncope 3/18/2015   • Neuropathy 9/16/2016    Severe both legs   • NSVT (nonsustained ventricular tachyc walker;Cane  Patient Regularly Uses: Reading glasses    Prior Level of [place]: pt reports ind with mobility, reports she uses rw in home and cane when out of home.   Pt reports she lives alone, but that her son is now staying with her and occasionally currently need. ..   -   Moving to and from a bed to a chair (including a wheelchair)?: A Little   -   Need to walk in hospital room?: A Little   -   Climbing 3-5 steps with a railing?: A Little       AM-PAC Score:  Raw Score: 20   PT Approx Degree of Impai Functional outcome measures completed include: The patient scores 12/20 on the Elderly Mobility Scale, indicating patient is borderline in terms of safe mobility.  Results of the AM-PAC \"6 clicks\" Inpatient Daily Mobility Short Form for the patient is 39

## 2017-09-01 NOTE — PLAN OF CARE
CARDIOVASCULAR - ADULT    • Maintains optimal cardiac output and hemodynamic stability Progressing    • Absence of cardiac arrhythmias or at baseline Progressing        Diabetes/Glucose Control    • Glucose maintained within prescribed range Progressing with no complaints of dizziness or lightheadedness. Patient uses a cane at home to ambulate. Patient has an IV in the RT forearm saline locked and Lt forearm infusing.  POC is heparin gtt 1800u, restart coumadin Friday; IV abx q 8hr, flush drain q 8 hr, mon

## 2017-09-01 NOTE — PROGRESS NOTES
[facility] Cardiology Progress Note    Patient seen and examined.  Chart reviewed.  Discussed with RN. No chest pain or shortness of breath. Tolerating clear liquid diet.     /74 (BP Location: Right arm)   Pulse 83   Temp 98.2 °F (36.8 °C) (Oral)   Resp

## 2017-09-01 NOTE — PROGRESS NOTES
[facility]  Progress Note    Michaela Olivo Patient Status:  Inpatient    1940 MRN [id number]   [facility] 2NE-A Attending Becky Clarke MD   Hosp Day # 1 PCP Denise Toussaint MD     Subjective:  Feels ok. No nausea or vomiting.   Pain Arthritis of shoulder region, left, degenerative     Cataract     Obesity, morbid, BMI 40.0-49.9 ([facility])     S/P total knee replacement     Bilateral shoulder region arthritis     Frozen shoulder syndrome     Essential hypertension     Varicose vein     Cere type     Seizure ([facility] [address]     Autonomic dysfunction     Chronic deep vein thrombosis (DVT) of proximal vein of lower extremity (HCC)     Postural dizziness with presyncope     Acute cholecystitis     Elevated LFTs     Biliary colic        Plan:  1.  Doing well

## 2017-09-01 NOTE — PLAN OF CARE
POD #1 biliary drain placed by Dr. Sedrick Adam in IR    Seizure Precautions Maintained  High Fall Risk Precautions Maintained  High Bleeding Risk Precautions Maintained    Comments: Pt is A&OX4, VSS on RA (3L/NC at noc only, which is home O2 dose) and maintaining a bleeding, hypotension and signs of decreased cardiac output  - Evaluate effectiveness of vasoactive medications to optimize hemodynamic stability  - Monitor arterial and/or venous puncture sites for bleeding and/or hematoma  - Assess quality of pulses, ski ADULT  Goal: Skin integrity remains intact  INTERVENTIONS  - Assess and document risk factors for pressure ulcer development  - Assess and document skin integrity  - Monitor for areas of redness and/or skin breakdown  - Initiate interventions, skin care al consults as needed  - Advance activity as appropriate  - Communicate ordered activity level and limitations with patient/family    Outcome: Progressing    Goal: Maintain proper alignment of affected body part  INTERVENTIONS:  - Support and protect limb and from fall injury  INTERVENTIONS:  - Assess pt frequently for physical needs  - Identify cognitive and physical deficits and behaviors that affect risk of falls.   - [place] fall precautions as indicated by assessment.  - Educate pt/family on patient safet psychosocial needs and initiate Spiritual Care or Behavioral Health consult as needed   Outcome: Progressing

## 2017-09-01 NOTE — PROGRESS NOTES
[facility]  Progress Note      Sandra Munroe Patient Status:  Inpatient    1940 MRN [id number]   [facility] 2NE-A Attending Vince Rockwell MD   Hosp Day # 1 PCP Courtney Ferguson MD       Subjective:   Resting in chair    Objective:   a

## 2017-09-02 LAB
ALBUMIN SERPL-MCNC: 2.4 G/DL (ref 3.5–4.8)
ALP LIVER SERPL-CCNC: 333 U/L (ref 55–142)
ALT SERPL-CCNC: 267 U/L (ref 14–54)
APTT PPP: 72.3 SECONDS (ref 25–34)
AST SERPL-CCNC: 86 U/L (ref 15–41)
ATRIAL RATE: 394 BPM
BILIRUB SERPL-MCNC: 3.4 MG/DL (ref 0.1–2)
BUN BLD-MCNC: 8 MG/DL (ref 8–20)
CALCIUM BLD-MCNC: 8.2 MG/DL (ref 8.3–10.3)
CHLORIDE: 104 MMOL/L (ref 101–111)
CO2: 26 MMOL/L (ref 22–32)
CREAT BLD-MCNC: 0.76 MG/DL (ref 0.55–1.02)
GLUCOSE BLD-MCNC: 108 MG/DL (ref 65–99)
GLUCOSE BLD-MCNC: 110 MG/DL (ref 65–99)
GLUCOSE BLD-MCNC: 113 MG/DL (ref 65–99)
GLUCOSE BLD-MCNC: 151 MG/DL (ref 65–99)
GLUCOSE BLD-MCNC: 156 MG/DL (ref 70–99)
INR BLD: 1 (ref 0.89–1.11)
M PROTEIN MFR SERPL ELPH: 6.1 G/DL (ref 6.1–8.3)
POTASSIUM SERPL-SCNC: 3.3 MMOL/L (ref 3.6–5.1)
POTASSIUM SERPL-SCNC: 3.8 MMOL/L (ref 3.6–5.1)
PSA SERPL DL<=0.01 NG/ML-MCNC: 13.2 SECONDS (ref 12–14.3)
Q-T INTERVAL: 384 MS
QRS DURATION: 138 MS
QTC CALCULATION (BEZET): 469 MS
R AXIS: 38 DEGREES
SODIUM SERPL-SCNC: 137 MMOL/L (ref 136–144)
T AXIS: 54 DEGREES
VENTRICULAR RATE: 90 BPM

## 2017-09-02 PROCEDURE — 99233 SBSQ HOSP IP/OBS HIGH 50: CPT | Performed by: HOSPITALIST

## 2017-09-02 RX ORDER — SENNA AND DOCUSATE SODIUM 50; 8.6 MG/1; MG/1
2 TABLET, FILM COATED ORAL 2 TIMES DAILY
Status: DISCONTINUED | OUTPATIENT
Start: 2017-09-02 | End: 2017-09-09

## 2017-09-02 RX ORDER — METOPROLOL SUCCINATE 100 MG/1
100 TABLET, EXTENDED RELEASE ORAL
Status: DISCONTINUED | OUTPATIENT
Start: 2017-09-03 | End: 2017-09-09

## 2017-09-02 RX ORDER — BISACODYL 10 MG
10 SUPPOSITORY, RECTAL RECTAL
Status: DISCONTINUED | OUTPATIENT
Start: 2017-09-02 | End: 2017-09-09

## 2017-09-02 RX ORDER — WARFARIN SODIUM 5 MG/1
5 TABLET ORAL NIGHTLY
Status: DISCONTINUED | OUTPATIENT
Start: 2017-09-02 | End: 2017-09-04

## 2017-09-02 RX ORDER — DILTIAZEM HYDROCHLORIDE 180 MG/1
360 CAPSULE, EXTENDED RELEASE ORAL DAILY
Status: DISCONTINUED | OUTPATIENT
Start: 2017-09-02 | End: 2017-09-09

## 2017-09-02 RX ORDER — SENNA AND DOCUSATE SODIUM 50; 8.6 MG/1; MG/1
2 TABLET, FILM COATED ORAL 2 TIMES DAILY
Status: DISCONTINUED | OUTPATIENT
Start: 2017-09-02 | End: 2017-09-02

## 2017-09-02 RX ORDER — POTASSIUM CHLORIDE 20 MEQ/1
40 TABLET, EXTENDED RELEASE ORAL EVERY 4 HOURS
Status: DISPENSED | OUTPATIENT
Start: 2017-09-02 | End: 2017-09-02

## 2017-09-02 RX ORDER — DIGOXIN 125 MCG
125 TABLET ORAL
Status: DISCONTINUED | OUTPATIENT
Start: 2017-09-02 | End: 2017-09-09

## 2017-09-02 RX ORDER — METOCLOPRAMIDE HYDROCHLORIDE 5 MG/ML
10 INJECTION INTRAMUSCULAR; INTRAVENOUS EVERY 6 HOURS PRN
Status: DISCONTINUED | OUTPATIENT
Start: 2017-09-02 | End: 2017-09-09

## 2017-09-02 NOTE — PLAN OF CARE
Problem: NEUROLOGICAL - ADULT  Goal: Absence of seizures  INTERVENTIONS  - Monitor for seizure activity  - Administer anti-seizure medications as ordered  - Monitor neurological status  Outcome: Progressing

## 2017-09-02 NOTE — PLAN OF CARE
CARDIOVASCULAR - ADULT    • Maintains optimal cardiac output and hemodynamic stability Progressing    • Absence of cardiac arrhythmias or at baseline Progressing        COPING    • Pt/Family able to verbalize concerns and demonstrate effective coping strat rates controlled, maintaining 97% spo2 on 3L o2 at night which is home home baseline. Patient reports severe pain when asked, meds given, see MAR.  Patient has been up too the bathroom and her chair with 1 assist and a walker with no complaints of dizziness

## 2017-09-02 NOTE — PROGRESS NOTES
· [facility] Progress Note     Subjective:  Up in chair - no abdominal pain, tolerating diet.   No dyspnea, edema at baseline    Objective:  131/79  Afebrile  AFib  90s   I/O equal    PTT 72 INR pending    Neuro:awake/alert  HEENT:no JVD  Cardi

## 2017-09-02 NOTE — PROGRESS NOTES
[facility]  Progress Note     Reford Mariluz Patient Status:  Inpatient    1940 MRN [id number]   [facility] 2NE-A Attending Herber Madrid MD   Hosp Day # 2 PCP Charly Reinoso MD     Chief Complaint: abd pain    S: Patient feels we Oral Daily   • digoxin  125 mcg Oral Once per day on Mon Wed Fri   • [START ON 9/3/2017] metoprolol succinate  100 mg Oral Daily Beta Blocker   • amiodarone HCl  200 mg Oral Daily   • furosemide  40 mg Oral BID (Diuretic)   • Warfarin Sodium  2.5 mg Oral N

## 2017-09-02 NOTE — PLAN OF CARE
POD #2 biliary drain placed by Dr. Raven Franco in IR    Seizure Precautions Maintained  High Fall Risk Precautions Maintained  High Bleeding Risk Precautions Maintained    Comments: Pt is A&OX4, VSS on RA w/ (3L/NC at noc only, which is home O2 dose) and maintai interventions    Outcome: Progressing    Goal: Patient/Family Short Term Goal  Patient's Short Term Goal: D/c home    Interventions:   - See additional Care Plan goals for specific interventions    Outcome: Progressing      Problem: CARDIOVASCULAR - ADULT Monitor urine specific gravity, serum osmolarity and serum sodium as indicated or ordered  - Monitor response to interventions for patient's volume status, including labs, urine output, blood pressure (other measures as available)  - Encourage oral intake trends   Outcome: Progressing      Problem: MUSCULOSKELETAL - ADULT  Goal: Return mobility to safest level of function  INTERVENTIONS:  - Assess patient stability and activity tolerance for standing, transferring and ambulating w/ or w/o assistive devices analgesics based on type and severity of pain and evaluate response  - Implement non-pharmacological measures as appropriate and evaluate response  - Consider cultural and social influences on pain and pain management  - Manage/alleviate anxiety  - Utilize COPING  Goal: Pt/Family able to verbalize concerns and demonstrate effective coping strategies  INTERVENTIONS:  - Assist patient/family to identify coping skills, available support systems and cultural and spiritual values  - Provide emotional support, inc

## 2017-09-02 NOTE — PLAN OF CARE
A/ox4. On RA. . Pt c/o CP to L chest, intermittent, dull, tightness. VSS. EKG a fib. Mike SRINIVASAN notified and saw pt, no further w/u at this time. Pt states that pain has now subsided.

## 2017-09-03 ENCOUNTER — APPOINTMENT (OUTPATIENT)
Dept: GENERAL RADIOLOGY | Facility: HOSPITAL | Age: 77
DRG: 445 | End: 2017-09-03
Attending: SURGERY
Payer: MEDICARE

## 2017-09-03 LAB
APTT PPP: 69.8 SECONDS (ref 25–34)
BASOPHILS # BLD AUTO: 0.01 X10(3) UL (ref 0–0.1)
BASOPHILS NFR BLD AUTO: 0.1 %
EOSINOPHIL # BLD AUTO: 0.17 X10(3) UL (ref 0–0.3)
EOSINOPHIL NFR BLD AUTO: 2.4 %
ERYTHROCYTE [DISTWIDTH] IN BLOOD BY AUTOMATED COUNT: 14.9 % (ref 11.5–16)
GLUCOSE BLD-MCNC: 104 MG/DL (ref 65–99)
GLUCOSE BLD-MCNC: 164 MG/DL (ref 65–99)
GLUCOSE BLD-MCNC: 84 MG/DL (ref 65–99)
GLUCOSE BLD-MCNC: 97 MG/DL (ref 65–99)
HCT VFR BLD AUTO: 32.8 % (ref 34–50)
HGB BLD-MCNC: 10.4 G/DL (ref 12–16)
IMMATURE GRANULOCYTE COUNT: 0.04 X10(3) UL (ref 0–1)
IMMATURE GRANULOCYTE RATIO %: 0.6 %
INR BLD: 1.02 (ref 0.89–1.11)
LYMPHOCYTES # BLD AUTO: 0.93 X10(3) UL (ref 0.9–4)
LYMPHOCYTES NFR BLD AUTO: 13.4 %
MCH RBC QN AUTO: 28.3 PG (ref 27–33.2)
MCHC RBC AUTO-ENTMCNC: 31.7 G/DL (ref 31–37)
MCV RBC AUTO: 89.1 FL (ref 81–100)
MONOCYTES # BLD AUTO: 0.8 X10(3) UL (ref 0.1–0.6)
MONOCYTES NFR BLD AUTO: 11.5 %
NEUTROPHIL ABS PRELIM: 5 X10 (3) UL (ref 1.3–6.7)
NEUTROPHILS # BLD AUTO: 5 X10(3) UL (ref 1.3–6.7)
NEUTROPHILS NFR BLD AUTO: 72 %
PLATELET # BLD AUTO: 144 10(3)UL (ref 150–450)
PSA SERPL DL<=0.01 NG/ML-MCNC: 13.4 SECONDS (ref 12–14.3)
RBC # BLD AUTO: 3.68 X10(6)UL (ref 3.8–5.1)
RED CELL DISTRIBUTION WIDTH-SD: 49.1 FL (ref 35.1–46.3)
WBC # BLD AUTO: 7 X10(3) UL (ref 4–13)

## 2017-09-03 PROCEDURE — 99232 SBSQ HOSP IP/OBS MODERATE 35: CPT | Performed by: HOSPITALIST

## 2017-09-03 PROCEDURE — 74020 XR ABDOMEN, OBSTRUCTIVE SERIES (CPT=74020): CPT | Performed by: SURGERY

## 2017-09-03 NOTE — PLAN OF CARE
CARDIOVASCULAR - ADULT    • Maintains optimal cardiac output and hemodynamic stability Progressing    • Absence of cardiac arrhythmias or at baseline Progressing        COPING    • Pt/Family able to verbalize concerns and demonstrate effective coping strat ordered. Heparin gtt. POC discussed with patient and daughter. Will continue to monitor.

## 2017-09-03 NOTE — PROGRESS NOTES
[facility]  Progress Note    Nola Velasco Patient Status:  Inpatient    1940 MRN [id number]   [facility] 2NE-A Attending Chad Dao MD   Hosp Day # 3 PCP Jorge Hankins MD     Subjective:  Pt with nausea and vomiting this AM, was originally seen by Dr. Vishnu Flores for cholecystitis, cholelithiasis. A cholecystostomy tube was placed. The patient was doing well until earlier today when she developed nausea and vomiting. The patient has not had a bowel movement since her admission.

## 2017-09-03 NOTE — PROGRESS NOTES
[facility]  Progress Note     Siddharth Bustamante Patient Status:  Inpatient    1940 MRN [id number]   [facility] 2NE-A Attending Saskia Jackson MD   Hosp Day # 3 PCP Indiana Perea MD     Chief Complaint: abd pain    S: Patient had naus lacosamide  200 mg Oral BID   • DilTIAZem HCl ER Coated Beads  360 mg Oral Daily   • digoxin  125 mcg Oral Once per day on Mon Wed Fri   • metoprolol succinate  100 mg Oral Daily Beta Blocker   • Warfarin Sodium  5 mg Oral Nightly   • cefTRIAXone  2 g Intr

## 2017-09-03 NOTE — PROGRESS NOTES
Spoke with Dr. Rell Curtis in regards to nausea, vomiting, and constipation. Orders received for NG tube placement, obstructive series x ray.  Patient refusing NG tube until she speaks with the

## 2017-09-03 NOTE — PLAN OF CARE
CARDIOVASCULAR - ADULT    • Maintains optimal cardiac output and hemodynamic stability Progressing    • Absence of cardiac arrhythmias or at baseline Progressing        COPING    • Pt/Family able to verbalize concerns and demonstrate effective coping strat continue to monitor.

## 2017-09-03 NOTE — PROGRESS NOTES
· [facility] Progress Note     Subjective:  Doesn't feel well. Some abdominal pain, nausea. Vomitted last night.   No dyspnea    Objective:  133/71   Afebrile  AFib  90s   I/O + 955  INR 1.02  Hgb 10.4  INR 1.02  plt 144    Neuro:awake/alert

## 2017-09-04 LAB
ALT SERPL-CCNC: 154 U/L (ref 14–54)
APTT PPP: 68.1 SECONDS (ref 25–34)
AST SERPL-CCNC: 47 U/L (ref 15–41)
BUN BLD-MCNC: 7 MG/DL (ref 8–20)
CALCIUM BLD-MCNC: 9 MG/DL (ref 8.3–10.3)
CHLORIDE: 104 MMOL/L (ref 101–111)
CO2: 29 MMOL/L (ref 22–32)
CREAT BLD-MCNC: 0.58 MG/DL (ref 0.55–1.02)
GLUCOSE BLD-MCNC: 100 MG/DL (ref 65–99)
GLUCOSE BLD-MCNC: 100 MG/DL (ref 65–99)
GLUCOSE BLD-MCNC: 129 MG/DL (ref 65–99)
GLUCOSE BLD-MCNC: 151 MG/DL (ref 65–99)
GLUCOSE BLD-MCNC: 89 MG/DL (ref 70–99)
INR BLD: 1.16 (ref 0.89–1.11)
POTASSIUM SERPL-SCNC: 3.5 MMOL/L (ref 3.6–5.1)
POTASSIUM SERPL-SCNC: 3.7 MMOL/L (ref 3.6–5.1)
PSA SERPL DL<=0.01 NG/ML-MCNC: 14.9 SECONDS (ref 12–14.3)
SODIUM SERPL-SCNC: 140 MMOL/L (ref 136–144)

## 2017-09-04 PROCEDURE — 99232 SBSQ HOSP IP/OBS MODERATE 35: CPT | Performed by: HOSPITALIST

## 2017-09-04 RX ORDER — WARFARIN SODIUM 7.5 MG/1
7.5 TABLET ORAL NIGHTLY
Status: DISCONTINUED | OUTPATIENT
Start: 2017-09-04 | End: 2017-09-06

## 2017-09-04 RX ORDER — SODIUM PHOSPHATE,MONO-DIBASIC 19G-7G/118
1 ENEMA (ML) RECTAL ONCE AS NEEDED
Status: ACTIVE | OUTPATIENT
Start: 2017-09-04 | End: 2017-09-04

## 2017-09-04 RX ORDER — ENOXAPARIN SODIUM 100 MG/ML
100 INJECTION SUBCUTANEOUS 2 TIMES DAILY
Qty: 28 ML | Refills: 0 | Status: SHIPPED | OUTPATIENT
Start: 2017-09-04 | End: 2017-09-07

## 2017-09-04 RX ORDER — POTASSIUM CHLORIDE 20 MEQ/1
40 TABLET, EXTENDED RELEASE ORAL EVERY 4 HOURS
Status: COMPLETED | OUTPATIENT
Start: 2017-09-04 | End: 2017-09-04

## 2017-09-04 NOTE — PROGRESS NOTES
[facility]  Progress Note    Solange Ill Patient Status:  Inpatient    1940 MRN [id number]   [facility] 2NE-A Attending Sada Shultz MD   [address] Day # 4 PCP Kate Montelongo MD       Assessment and Plan:  Patient Active Problem Jesus Fernando osteoarthritis of both knees     Mild persistent asthma without complication     Acute encephalopathy     Complex partial status epilepticus (HCC)     S/P craniotomy     Intracranial hemorrhage (HCC)     Subdural hemorrhage (HCC)     Partial symptomatic ep breath.     Objective:  /70 (BP Location: Left arm)   Pulse 80   Temp 98.4 °F (36.9 °C) (Oral)   Resp 18   Ht 5' 6\" (1.676 m)   Wt 223 lb 8.7 oz (101.4 kg)   SpO2 95%   BMI 36.08 kg/m²     Temp (24hrs), Av.2 °F (36.8 °C), Min:97.8 °F (36.6 °C), M mcg 125 mcg Oral Once per day on Mon Wed Fri   Metoprolol Succinate ER (Toprol XL) 24 hr tab 100 mg 100 mg Oral Daily Beta Blocker   bisacodyl (DULCOLAX) rectal suppository 10 mg 10 mg Rectal Daily PRN   Metoclopramide HCl (REGLAN) injection 10 mg 10 mg In

## 2017-09-04 NOTE — PROGRESS NOTES
[facility]  Progress Note    Lencho Kelly Patient Status:  Inpatient    1940 MRN [id number]   [facility] 2NE-A Attending Eitan Waller MD   [facility] Day # 4 PCP Ayo Marshall MD     Subjective:  Pt states nausea much improved, tole prophylaxis  7.  GI prophylaxis    Ricarda Ariasgm, [id number]  9/4/2017  9:20AM    Patient status post cholecystostomy tube  Nausea yesterday-KUB revealed constipation with no evidence of bowel obstruction  Patient started on laxative and milk of magnesia yesterda

## 2017-09-04 NOTE — PLAN OF CARE
Patient and oriented. Saturates well on room air, 3L on NC at night, Afib on the tele. Patient denies any pain or SOB. Patient ambulates with SBA.  Lovenox shots were suggested since patient had done them in the past but family and patient refuse to try t

## 2017-09-04 NOTE — PROGRESS NOTES
[facility]  Progress Note     Tangelajasmeet Beltre Patient Status:  Inpatient    1940 MRN [id number]   [facility] 2NE-A Attending Angeli Unger MD   Hosp Day # 4 PCP Jayme Wu MD     Chief Complaint: abd pain    S: Patient feels be lacosamide  200 mg Oral BID   • DilTIAZem HCl ER Coated Beads  360 mg Oral Daily   • digoxin  125 mcg Oral Once per day on Mon Wed Fri   • metoprolol succinate  100 mg Oral Daily Beta Blocker   • Warfarin Sodium  5 mg Oral Nightly   • cefTRIAXone  2 g Intr

## 2017-09-05 LAB
ALBUMIN SERPL-MCNC: 2.4 G/DL (ref 3.5–4.8)
ALP LIVER SERPL-CCNC: 292 U/L (ref 55–142)
ALT SERPL-CCNC: 142 U/L (ref 14–54)
APTT PPP: 95.3 SECONDS (ref 25–34)
AST SERPL-CCNC: 59 U/L (ref 15–41)
BILIRUB SERPL-MCNC: 1.6 MG/DL (ref 0.1–2)
BUN BLD-MCNC: 9 MG/DL (ref 8–20)
CALCIUM BLD-MCNC: 8.7 MG/DL (ref 8.3–10.3)
CHLORIDE: 102 MMOL/L (ref 101–111)
CO2: 29 MMOL/L (ref 22–32)
CREAT BLD-MCNC: 0.63 MG/DL (ref 0.55–1.02)
DIGOXIN: 0.29 NG/ML (ref 0.8–2)
GLUCOSE BLD-MCNC: 130 MG/DL (ref 65–99)
GLUCOSE BLD-MCNC: 96 MG/DL (ref 65–99)
GLUCOSE BLD-MCNC: 98 MG/DL (ref 70–99)
INR BLD: 1.37 (ref 0.89–1.11)
M PROTEIN MFR SERPL ELPH: 6.3 G/DL (ref 6.1–8.3)
POTASSIUM SERPL-SCNC: 3.4 MMOL/L (ref 3.6–5.1)
POTASSIUM SERPL-SCNC: 4.3 MMOL/L (ref 3.6–5.1)
PSA SERPL DL<=0.01 NG/ML-MCNC: 17 SECONDS (ref 12–14.3)
SODIUM SERPL-SCNC: 139 MMOL/L (ref 136–144)

## 2017-09-05 PROCEDURE — 99232 SBSQ HOSP IP/OBS MODERATE 35: CPT | Performed by: HOSPITALIST

## 2017-09-05 RX ORDER — POTASSIUM CHLORIDE 20 MEQ/1
40 TABLET, EXTENDED RELEASE ORAL EVERY 4 HOURS
Status: COMPLETED | OUTPATIENT
Start: 2017-09-05 | End: 2017-09-05

## 2017-09-05 NOTE — CM/SW NOTE
Spoke with patient to verify home pharmacy [facility] [address] and [address] [phone]. CM to call in Lovenox prescription to verify cost/availability, patient is very adamant, \"I will not take that, I'll stay in the hospital till my inr is up\".  Revi

## 2017-09-05 NOTE — PROGRESS NOTES
[facility]  Progress Note    Selina Turner Patient Status:  Inpatient    1940 MRN [id number]   [facility] 2NE-A Attending Tahir Rodriguez MD   [facility] Day # 5 PCP Woodrow Holman MD     Subjective:  Feels good. No nausea or vomiting. Microalbuminuria     Arthritis of knee, degenerative     Depression     Insomnia     Abnormal gait     Lymph node enlargement     Atypical lymphoproliferative disorder (HCC)     Asthma     Gallstones     Neutrophilia     Leg swelling     Pericardial effusi Atypical chest pain     Anxiety     SDH (subdural hematoma) (HCC)     Chronic systolic heart failure (HCC)     Asthma with COPD (chronic obstructive pulmonary disease) (HCC)     Altered mental status     Seizures (HCC)     Type 2 diabetes mellitus without

## 2017-09-05 NOTE — PROGRESS NOTES
[facility]  Cardiology Progress Note    Leonardo Gloria Patient Status:  Inpatient    1940 MRN [id number]   [facility] 2NE-A Attending Olivia Vu MD   [facility] Day # 5 PCP Prosper Hutson MD     Subjective:  C/o headache this morning 2016 by IR. Required thombectomy, TPA, iliac PTA Jan 2017. Dr. Bakari Mehta favors removal of IVC filter in the future. · Elevated LFTs with #1.    · HTN  · Dyslipidemia -  Lipitor held       Plan:   1.  Continue IV heparin/coumadin as patient favors conserva

## 2017-09-05 NOTE — PLAN OF CARE
Assumed care of patient around 1930, alert and oriented X4, no c/o CP/SOB, SpO2 97 % on RA  Rhythm/HR: afib 60-90s  Heparin gtt running at 1800units- bridging, coumadin 705mg tonight  Having loose stools now- pt refused anymore stool softeners for tonight

## 2017-09-05 NOTE — PROGRESS NOTES
[facility]  Progress Note     Hugo Meier Patient Status:  Inpatient    1940 MRN [id number]   [facility] 2NE-A Attending Michael Lugo MD   Hosp Day # 5 PCP Hendrick Moritz, MD     Chief Complaint: abd pain    S: Patient is stabl mg Oral Daily   • digoxin  125 mcg Oral Once per day on Mon Wed Fri   • metoprolol succinate  100 mg Oral Daily Beta Blocker   • cefTRIAXone  2 g Intravenous Q24H   • Senna-Docusate Sodium  2 tablet Oral BID   • amiodarone HCl  200 mg Oral Daily   • furose

## 2017-09-05 NOTE — PLAN OF CARE
POD #5 biliary drain placed by Dr. Gurpreet Zaidi in IR    Seizure Precautions Maintained  High Fall Risk Precautions Maintained  High Bleeding Risk Precautions Maintained    Comments: Pt is A&OX4, VSS on RA w/ (3L/NC at noc only, which is home O2 dose) and maintai home  Interventions:   - See additional Care Plan goals for specific interventions    Outcome: Progressing      Problem: CARDIOVASCULAR - ADULT  Goal: Maintains optimal cardiac output and hemodynamic stability  INTERVENTIONS:  - Monitor vital signs, rhythm Outcome: Progressing    Goal: Incision(s), wounds(s) or drain site(s) healing without S/S of infection  INTERVENTIONS:  - Assess and document risk factors for pressure ulcer development  - Assess and document skin integrity  - Assess and document dressin ability and encourage patient to participate in ADLs to maximize function  - Promote sitting position while performing ADLs such as feeding, grooming, and bathing  - Educate and encourage patient/family in tolerated functional activity level and precaution care, etc)  - Arrange for interpreters to assist at discharge as needed  - Consider post-discharge preferences of patient/family/discharge partner  - Complete POLST form as appropriate  - Assess patient's ability to be responsible for managing their own he

## 2017-09-06 ENCOUNTER — PRIOR ORIGINAL RECORDS (OUTPATIENT)
Dept: OTHER | Age: 77
End: 2017-09-06

## 2017-09-06 LAB
APTT PPP: 87.8 SECONDS (ref 25–34)
INR BLD: 1.31 (ref 0.89–1.11)
INR BLD: 1.43 (ref 0.89–1.11)
PSA SERPL DL<=0.01 NG/ML-MCNC: 16.4 SECONDS (ref 12–14.3)
PSA SERPL DL<=0.01 NG/ML-MCNC: 17.6 SECONDS (ref 12–14.3)

## 2017-09-06 PROCEDURE — 99232 SBSQ HOSP IP/OBS MODERATE 35: CPT | Performed by: HOSPITALIST

## 2017-09-06 RX ORDER — FUROSEMIDE 10 MG/ML
40 INJECTION INTRAMUSCULAR; INTRAVENOUS ONCE
Status: COMPLETED | OUTPATIENT
Start: 2017-09-06 | End: 2017-09-06

## 2017-09-06 RX ORDER — WARFARIN SODIUM 10 MG/1
10 TABLET ORAL NIGHTLY
Status: DISCONTINUED | OUTPATIENT
Start: 2017-09-06 | End: 2017-09-09

## 2017-09-06 NOTE — PROGRESS NOTES
[facility]  Cardiology Progress Note    Isa Ortiz Patient Status:  Inpatient    1940 MRN [id number]   [facility] 2NE-A Attending Basilia Yanes MD   [facility] Day # 6 PCP Pamela Johnson MD     Subjective:  No new complaints.  Denies future. · Elevated LFTs with #1.    · HTN  · Dyslipidemia -  Lipitor held     Plan:   1. Agree with additional Coumadin ordered per PCP. 2. Continue IV heparin  3. Continue BB, amiodarone, diuretics, dig and CCB.  IV lasix today    CRAIG Camilo

## 2017-09-06 NOTE — PHYSICAL THERAPY NOTE
PHYSICAL THERAPY TREATMENT NOTE - INPATIENT    Room Number: 2606/2606-A     Session: 1   Number of Visits to Meet Established Goals: 5    Presenting Problem: abd pain, cholecystitis  History related to current admission: pt admitted from home due to abd p Neck 2014   • Migraines    • Muscle weakness    • Near syncope 3/18/2015   • Neuropathy 9/16/2016    Severe both legs   • NSVT (nonsustained ventricular tachycardia) ([facility] [address] 7/11/2014    Recurrence 7.2016 Hx VT- s/p ICD implant on 7/7/16.      • Obesity, morb Static Sitting: Good  Dynamic Sitting: Good           Static Standing: Fair -  Dynamic Standing: Fair -    ACTIVITY TOLERANCE  O2 Saturation: 97%  Room air  No shortness of breath    AM-PAC '6-Clicks' INPA demonstrating improvement with all fxal mobility since previous PT session. Pt cont to present with dec strength, endurance, activity tolerance.  Patient will benefit from continued inpatient physical therapy to address above issues so that patient may ach

## 2017-09-06 NOTE — PLAN OF CARE
CARDIOVASCULAR - ADULT    • Maintains optimal cardiac output and hemodynamic stability Progressing    • Absence of cardiac arrhythmias or at baseline Progressing        HEMATOLOGIC - ADULT    • Free from bleeding injury Progressing        SKIN/TISSUE INTEG

## 2017-09-06 NOTE — PROGRESS NOTES
[facility]  Progress Note     Gemma Rosa Patient Status:  Inpatient    1940 MRN [id number]   [facility] 2NE-A Attending Dash Choudhary MD   [facility] Day # 6 PCP Teddy Miller MD     Chief Complaint: abd pain    S: Patient feels ok digoxin  125 mcg Oral Once per day on Mon Wed Fri   • metoprolol succinate  100 mg Oral Daily Beta Blocker   • cefTRIAXone  2 g Intravenous Q24H   • Senna-Docusate Sodium  2 tablet Oral BID   • amiodarone HCl  200 mg Oral Daily   • furosemide  40 mg Oral B

## 2017-09-07 LAB
APTT PPP: 197.9 SECONDS (ref 25–34)
APTT PPP: 87 SECONDS (ref 25–34)
INR BLD: 1.61 (ref 0.89–1.11)
PSA SERPL DL<=0.01 NG/ML-MCNC: 19.3 SECONDS (ref 12–14.3)

## 2017-09-07 PROCEDURE — 99232 SBSQ HOSP IP/OBS MODERATE 35: CPT | Performed by: HOSPITALIST

## 2017-09-07 RX ORDER — CEFDINIR 300 MG/1
300 CAPSULE ORAL 2 TIMES DAILY
Status: DISCONTINUED | OUTPATIENT
Start: 2017-09-07 | End: 2017-09-08

## 2017-09-07 RX ORDER — METOPROLOL SUCCINATE 100 MG/1
100 TABLET, EXTENDED RELEASE ORAL
Qty: 30 TABLET | Refills: 2 | Status: ON HOLD | OUTPATIENT
Start: 2017-09-07 | End: 2017-11-03

## 2017-09-07 RX ORDER — LEVOFLOXACIN 750 MG/1
750 TABLET ORAL DAILY
Status: DISCONTINUED | OUTPATIENT
Start: 2017-09-07 | End: 2017-09-07

## 2017-09-07 RX ORDER — FUROSEMIDE 10 MG/ML
40 INJECTION INTRAMUSCULAR; INTRAVENOUS ONCE
Status: COMPLETED | OUTPATIENT
Start: 2017-09-07 | End: 2017-09-07

## 2017-09-07 RX ORDER — FUROSEMIDE 40 MG/1
40 TABLET ORAL
Qty: 60 TABLET | Refills: 2 | Status: SHIPPED | OUTPATIENT
Start: 2017-09-07 | End: 2018-05-30

## 2017-09-07 RX ORDER — DILTIAZEM HYDROCHLORIDE 360 MG/1
360 CAPSULE, EXTENDED RELEASE ORAL DAILY
Qty: 30 CAPSULE | Refills: 5 | Status: ON HOLD | OUTPATIENT
Start: 2017-09-07 | End: 2017-11-03

## 2017-09-07 RX ORDER — CEFDINIR 300 MG/1
300 CAPSULE ORAL 2 TIMES DAILY
Qty: 20 CAPSULE | Refills: 0 | Status: SHIPPED | OUTPATIENT
Start: 2017-09-07 | End: 2017-09-08

## 2017-09-07 NOTE — PAYOR COMM NOTE
--------------  CONTINUED STAY REVIEW    Payor: [employer] MEDICARE ADV PPO  Subscriber #:  [id number]  Authorization Number: [id number]    Admit date: 8/31/17  Admit time: [address] [place]    Admitting Physician: Judy Nazario DO  Attending Physician:  Saloni Joyce CASSIE CROOKS      furosemide (LASIX) injection 40 mg     Date Action Dose Route User    9/7/2017 1229 Given 40 mg Intravenous Jeniffer Maxwell, CASSIE      furosemide (LASIX) tab 40 mg     Date Action Dose Route User    9/7/2017 0933 Given 40 mg Oral Raleigh Florian RN resume statin  9/2  · Resume home rate control meds - Dig and Cardizem  · Increase Toprol to home dose 100 mg  · Hold Losartan for now   · Heparin/Coumadin   · Hold Lipitor  · Follow  LFTs  · Will need decision on IVC filter removal at some point.     Will

## 2017-09-07 NOTE — PROGRESS NOTES
[facility]  Cardiology Progress Note    Siddharth Bustamante Patient Status:  Inpatient    1940 MRN [id number]   [facility] 2NE-A Attending Royal Guillermina MD   [facility] Day # 7 PCP Indiana Perea MD     Subjective:  No complaints of chest pa 6-8 weeks per surgery. · AFib, permanent. HRs 80s. On IV Heparin/coumadin.  Had Vitamin K prior to t tube. INR 1.6.  Will check again this afternoon  · Chronic HF with EF 60% - mild LE edema  · [employer] ICD  · H/o DVT/PE.  IVC filter placed Dec 2016 by CHRISTA

## 2017-09-07 NOTE — PHYSICAL THERAPY NOTE
PHYSICAL THERAPY TREATMENT NOTE - INPATIENT    Room Number: 2606/2606-A     Session: 2  Number of Visits to Meet Established Goals: 5    Presenting Problem: abd pain, cholecystitis  History related to current admission: pt admitted from home due to abd pa Neck 2014   • Migraines    • Muscle weakness    • Near syncope 3/18/2015   • Neuropathy 9/16/2016    Severe both legs   • NSVT (nonsustained ventricular tachycardia) ([facility] [address] 7/11/2014    Recurrence 7.2016 Hx VT- s/p ICD implant on 7/7/16.      • Obesity, morb Static Sitting: Good  Dynamic Sitting: Good           Static Standing: Fair -  Dynamic Standing: Fair -    ACTIVITY TOLERANCE  O2 Saturation: 97%  Room air  No shortness of breath    AM-PAC '6-Cl concerns addressed    ASSESSMENT     Patient currently does not meet criteria for skilled inpatient physical therapy services, however patient will remain on Inpatient Mobility Team and will continue with ambulation to maintain current level of mobility.

## 2017-09-07 NOTE — PLAN OF CARE
CARDIOVASCULAR - ADULT    • Maintains optimal cardiac output and hemodynamic stability Progressing    • Absence of cardiac arrhythmias or at baseline Progressing        COPING    • Pt/Family able to verbalize concerns and demonstrate effective coping strat Up with SBA and walker. Plan for routine PTT and PT/INR. Katey Asif and PO Levaquin on DC. RN will continue to monitor.

## 2017-09-07 NOTE — PROGRESS NOTES
[facility]  Progress Note     Dylan Palacios Patient Status:  Inpatient    1940 MRN [id number]   [facility] 2NE-A Attending Tono Redmond MD   [facility] Day # 7 PCP Elvis Valle MD     Chief Complaint: abd pain    S: Patient has no c digoxin  125 mcg Oral Once per day on Mon Wed Fri   • metoprolol succinate  100 mg Oral Daily Beta Blocker   • Senna-Docusate Sodium  2 tablet Oral BID   • amiodarone HCl  200 mg Oral Daily   • furosemide  40 mg Oral BID (Diuretic)       ASSESSMENT / PLAN:

## 2017-09-08 LAB
APTT PPP: 114.5 SECONDS (ref 25–34)
APTT PPP: 126.5 SECONDS (ref 25–34)
APTT PPP: 72.7 SECONDS (ref 25–34)
INR BLD: 1.83 (ref 0.89–1.11)
PSA SERPL DL<=0.01 NG/ML-MCNC: 21.4 SECONDS (ref 12–14.3)

## 2017-09-08 PROCEDURE — 99232 SBSQ HOSP IP/OBS MODERATE 35: CPT | Performed by: HOSPITALIST

## 2017-09-08 RX ORDER — LEVOFLOXACIN 750 MG/1
750 TABLET ORAL DAILY
Status: DISCONTINUED | OUTPATIENT
Start: 2017-09-08 | End: 2017-09-09

## 2017-09-08 RX ORDER — LOSARTAN POTASSIUM 25 MG/1
25 TABLET ORAL DAILY
Status: DISCONTINUED | OUTPATIENT
Start: 2017-09-08 | End: 2017-09-09

## 2017-09-08 RX ORDER — SULFAMETHOXAZOLE AND TRIMETHOPRIM 800; 160 MG/1; MG/1
1 TABLET ORAL EVERY 12 HOURS SCHEDULED
Status: DISCONTINUED | OUTPATIENT
Start: 2017-09-08 | End: 2017-09-08

## 2017-09-08 NOTE — PROGRESS NOTES
Pharmacy Note: Dose adjustment of levaquin    Betty Dennison is a 78year old female who has been prescribed levaquin 500 mg po q24. The dose has been adjusted  to levaquin 750 mg po q24 per hospital dose adjustment protocol.       Thank you,   Ryder Lewis

## 2017-09-08 NOTE — PLAN OF CARE
A/o x3, denies chest pain, sob, lightheadedness, dizziness. Sitting up in chair. Heparin drip maintained and is bridging for warfarin to be therapeutic (2.0). left sided drain currently draining. Flush and aspirate 10 ml Q 8 hours per orders.

## 2017-09-08 NOTE — PROGRESS NOTES
A&ox4, very tired tonight and having pain in R side, norco given just prior to change of shift at 2330, encouraged to make a lower pain goal and notify RN if pain meds aren't working well enough  RA, , prn O2 at home  afib controlled rate, leg edema, co

## 2017-09-08 NOTE — PROGRESS NOTES
· [facility] Progress Note     Subjective:  Up in chair, no pian or dyspnea.   Edema resolved    Objective:  147/84   Afebrile  AFib  80s   I/O -760  INR 1.83       Neuro:awake/alert  HEENT:no JVD  Cardiac:S1 S2 irregular with systolic murmur

## 2017-09-08 NOTE — CM/SW NOTE
Received call from [facility] ([phone]) inquiring about patient's discharge. Reviewed case with RN, patient's INR is still sub-therapeutic. Anticipate weekend discharge. Bill from Rafita Sutton, MSW, LSW  P: [phone]

## 2017-09-08 NOTE — PROGRESS NOTES
[facility]  Progress Note     Jenna Apgar Patient Status:  Inpatient    1940 MRN [id number]   [facility] 2NE-A Attending Lori Gomez MD   [address] Day # 8 PCP Filippo Mason MD     Chief Complaint: abd pain    S: Patient feels we weeks and cont. To observe; pt and family do not wish to have surgical intervention  3. CX: Enterobacter cloacae complex  4. Transition to bactrim  5. LFT's cont to improve  2. Constipation ileus  1. resolved  3. Chronic diastolic CHF  1. Compensated  2.  C

## 2017-09-09 VITALS
DIASTOLIC BLOOD PRESSURE: 58 MMHG | OXYGEN SATURATION: 98 % | RESPIRATION RATE: 17 BRPM | BODY MASS INDEX: 33.98 KG/M2 | WEIGHT: 211.44 LBS | HEIGHT: 66 IN | HEART RATE: 77 BPM | SYSTOLIC BLOOD PRESSURE: 121 MMHG | TEMPERATURE: 97 F

## 2017-09-09 LAB
APTT PPP: 116.7 SECONDS (ref 25–34)
INR BLD: 2.12 (ref 0.89–1.11)
PSA SERPL DL<=0.01 NG/ML-MCNC: 24.1 SECONDS (ref 12–14.3)

## 2017-09-09 PROCEDURE — 99239 HOSP IP/OBS DSCHRG MGMT >30: CPT | Performed by: HOSPITALIST

## 2017-09-09 RX ORDER — LEVOFLOXACIN 750 MG/1
750 TABLET ORAL DAILY
Qty: 9 TABLET | Refills: 0 | Status: SHIPPED | OUTPATIENT
Start: 2017-09-09 | End: 2017-09-18

## 2017-09-09 NOTE — PROGRESS NOTES
[facility]  Progress Note     Clara Ovisarah Patient Status:  Inpatient    1940 MRN [id number]   [facility] 2NE-A Attending Kadi Roberto MD   [facility] Day # 9 PCP Raul Honeycutt MD     Chief Complaint: abd pain    S: Patient seen and BID (Diuretic)       ASSESSMENT / PLAN:     1. Acute cholecystitis  1.  s/p cholecystostomy tube 8/31  2. Plan is to keep tube in place for 6-8 weeks and cont. To observe; pt and family do not wish to have surgical intervention  3.  CX: Enterobacter cloacae

## 2017-09-09 NOTE — DISCHARGE SUMMARY
[facility] HOSPITALIST  DISCHARGE SUMMARY     Andrea Gill Patient Status:  Inpatient    1940 MRN [id number]   [facility] 2NE-A Attending No att. providers found   [address] Day # 9 PCP Rosa Martin MD     Date of Admission: 2017  Date o hemoptysis, hematemesis patient was diagnosed with DVT and PE in December 2016 and is been on Coumadin which is managed by Dr Tommy Greenwood. Brief Synopsis: Patient admitted due to acute cholecystitis. General surgery consulted.  Given multiple comorbid conditi Beads 360 MG Cp24  Commonly known as:  CARDIZEM CD  What changed:  · medication strength  · how much to take      Take 1 capsule (360 mg total) by mouth daily.    Quantity:  30 capsule  Refills:  5     Lacosamide 200 MG Tabs  Commonly known as:  VIMPAT  Sandra MG Tabs  Commonly known as:  ANTIVERT      Take 25 mg by mouth 3 (three) times daily as needed for Dizziness. Refills:  0     Melatonin 10 MG Caps      Take 10 mg by mouth nightly.    Refills:  0     MetFORMIN HCl 500 MG Tabs  Commonly known as:  Sonali Juarez [id number]  [phone]    In 1 week      Debbie Quinteros MD  [facility]  [address] [facility] [phone]    Today  Keep scheduled followup with Dr. Sammye Claude, MD  [place] [phone]

## 2017-09-09 NOTE — PLAN OF CARE
Problem: CARDIOVASCULAR - ADULT  Goal: Maintains optimal cardiac output and hemodynamic stability  INTERVENTIONS:  - Monitor vital signs, rhythm, and trends  - Monitor for bleeding, hypotension and signs of decreased cardiac output  - Evaluate effectivenes if interventions unsuccessful or patient reports new pain   Outcome: Progressing

## 2017-09-09 NOTE — PLAN OF CARE
Pt care assumed this morning, pt seen and evaluated by all physicians, disposition for discharge. Pt daughter reported her son is at a \"game\" and has her keys and she can not take her mom home, she will have to stay until \" midnight\".   pag

## 2017-09-09 NOTE — CM/SW NOTE
ricco notified pt will dc today.  ricco notified faviola [facility]    [address] [facility]

## 2017-09-11 ENCOUNTER — TELEPHONE (OUTPATIENT)
Dept: FAMILY MEDICINE CLINIC | Facility: CLINIC | Age: 77
End: 2017-09-11

## 2017-09-11 ENCOUNTER — PATIENT OUTREACH (OUTPATIENT)
Dept: CASE MANAGEMENT | Age: 77
End: 2017-09-11

## 2017-09-11 DIAGNOSIS — Z02.9 ENCOUNTERS FOR ADMINISTRATIVE PURPOSE: ICD-10-CM

## 2017-09-11 NOTE — PAYOR COMM NOTE
--------------  CONTINUED STAY REVIEW    Payor: [facility] MEDICARE ADV PPO  Subscriber #:  [id number]  Authorization Number: [id number]    Admit date: 8/31/17  Admit time: [address]    Admitting Physician: Royal Blayne DO  Attending Physician:  Kady marquis

## 2017-09-11 NOTE — TELEPHONE ENCOUNTER
Spoke to dtr per HIPPA for TCM today. Dtr cancelled HFU appt with PCP prior to TCM call today.   Dtr states she does not feel the need to bring pt in at this time as Koby Robin RN is seeing her several times weekly and pt will be following up with surgeon on 9/15/

## 2017-09-11 NOTE — PAYOR COMM NOTE
--------------  DISCHARGE REVIEW    Payor: [employer] MEDICARE ADV PPO  Subscriber #:  [id number]  Authorization Number: [id number]    Admit date: 8/31/17  Admit time:  0245  Discharge Date: 9/9/2017  4:37 PM     Admitting Physician: Edna Nunez

## 2017-09-11 NOTE — PROGRESS NOTES
Initial Post Discharge Follow Up   Discharge Date: 9/9/17  Contact Date: 9/11/2017    Consent Verification:  Assessment Completed With: Caregiver: Jeri sandy Permission received per patient?  written  HIPAA Verified?   Yes    Discharge Dx:  Acute cholecy Ipratropium Bromide 0.02 % Inhalation Solution 42 mcg by Nasal route 2 (two) times daily. Disp:  Rfl:    Meclizine HCl 25 MG Oral Tab Take 25 mg by mouth 3 (three) times daily as needed for Dizziness.  Disp:  Rfl:    Losartan Potassium 25 MG Oral Tab Ta Take 1 tablet by mouth daily. Disp:  Rfl:    Magnesium 500 MG Oral Tab Take 400 mg by mouth daily.    Disp:  Rfl:      • When you were leaving the hospital were any medication changes discussed with you? yes  • If you were prescribed a new medication:   o W David  [facility]  [address]  [facility] [id number] [phone]          PCP TCM/HFU appointment: scheduled at D/C within 7-14 days  no     NCM Reviewed/scheduled/rescheduled PCP TCM/HFU appointment with pt:  No      Hav

## 2017-09-12 NOTE — TELEPHONE ENCOUNTER
Well we haven't seen pt since 11.2016.   So note to her is that she has not been here into office for almost 1 year and therefore I have no idea what is going on with her      Pt just dced from hospital    And she's due for a MAP physical

## 2017-09-12 NOTE — TELEPHONE ENCOUNTER
Time started: 2667    Time ended: 0858    Total time spent on chart: 3min    I spoke with Gera Fields who argued with me about when patient was seen by you.    I did explain that although she was seen in March - it was with NP and not MD. I further instructed az

## 2017-09-12 NOTE — TELEPHONE ENCOUNTER
FYI family declining TCM appt.      Time started: 0251    Time ended: 3207    Total time spent on chart: 1 min

## 2017-09-15 ENCOUNTER — OFFICE VISIT (OUTPATIENT)
Dept: SURGERY | Facility: CLINIC | Age: 77
End: 2017-09-15

## 2017-09-15 VITALS
DIASTOLIC BLOOD PRESSURE: 74 MMHG | SYSTOLIC BLOOD PRESSURE: 126 MMHG | HEART RATE: 103 BPM | WEIGHT: 210 LBS | BODY MASS INDEX: 33.75 KG/M2 | HEIGHT: 66 IN

## 2017-09-15 DIAGNOSIS — I48.91 ATRIAL FIBRILLATION, UNSPECIFIED TYPE (HCC): ICD-10-CM

## 2017-09-15 DIAGNOSIS — I47.2 NSVT (NONSUSTAINED VENTRICULAR TACHYCARDIA) (HCC): ICD-10-CM

## 2017-09-15 DIAGNOSIS — D47.9 ATYPICAL LYMPHOPROLIFERATIVE DISORDER (HCC): ICD-10-CM

## 2017-09-15 DIAGNOSIS — K81.0 ACUTE CHOLECYSTITIS: Primary | ICD-10-CM

## 2017-09-15 DIAGNOSIS — I50.32 CHRONIC DIASTOLIC CONGESTIVE HEART FAILURE (HCC): ICD-10-CM

## 2017-09-15 DIAGNOSIS — E11.59 CONTROLLED TYPE 2 DIABETES MELLITUS WITH OTHER CIRCULATORY COMPLICATION, UNSPECIFIED LONG TERM INSULIN USE STATUS: ICD-10-CM

## 2017-09-15 DIAGNOSIS — Z79.01 ANTICOAGULATED ON COUMADIN: ICD-10-CM

## 2017-09-15 DIAGNOSIS — J44.9 ASTHMA WITH COPD (CHRONIC OBSTRUCTIVE PULMONARY DISEASE) (HCC): Chronic | ICD-10-CM

## 2017-09-15 DIAGNOSIS — E66.01 OBESITY, MORBID, BMI 40.0-49.9 (HCC): ICD-10-CM

## 2017-09-15 PROCEDURE — 99213 OFFICE O/P EST LOW 20 MIN: CPT | Performed by: SURGERY

## 2017-09-15 NOTE — PROGRESS NOTES
Follow Up Visit Note       Active Problems      1. Acute cholecystitis    2. Asthma with COPD (chronic obstructive pulmonary disease) ([facility]    3. Atrial fibrillation, unspecified type ([facility]    4. Chronic diastolic congestive heart failure ([facility].)    5.  Contr • Diverticulitis    • Extrinsic asthma, unspecified    • Frozen shoulder syndrome 6/18/2015    bilat severe   • Gallstones    • High blood pressure    • HIGH CHOLESTEROL    • HYPERTENSION    • Insomnia 11/25/2013   • KIDNEY STONE    • LAD (lymphadenopath Problem Relation Age of Onset   • Diabetes Father    • Heart Disease Father    • Diabetes Mother    • Diabetes Sister    • Heart Disease Sister    • Diabetes Brother    • Heart Disease Brother    • Cancer Brother      Social History    Marital status:  Wi Disp: 100 each Rfl: 1   Melatonin 10 MG Oral Cap Take 10 mg by mouth nightly. Disp:  Rfl:    ondansetron 4 MG Oral Tablet Dispersible Take 4 mg by mouth every 8 (eight) hours as needed for Nausea.  Disp:  Rfl:    HYDROcodone-acetaminophen 5-325 MG Oral Tab and leg swelling. Gastrointestinal: Negative for abdominal distention, abdominal pain, anal bleeding, blood in stool, constipation, diarrhea, nausea and vomiting. Genitourinary: Negative for difficulty urinating, dysuria, frequency and urgency.    [facility] [place] [facility] do NOT wish any surgical intervention for the gallbladder. Plan   · Lengthy discussion with the patient and her son and daughter-in-law. · Discussed etiology and treatment options for acute cholecystitis.   · Plan is to keep the

## 2017-09-19 ENCOUNTER — TELEPHONE (OUTPATIENT)
Dept: FAMILY MEDICINE CLINIC | Facility: CLINIC | Age: 77
End: 2017-09-19

## 2017-09-20 ENCOUNTER — TELEPHONE (OUTPATIENT)
Dept: FAMILY MEDICINE CLINIC | Facility: CLINIC | Age: 77
End: 2017-09-20

## 2017-09-26 ENCOUNTER — TELEPHONE (OUTPATIENT)
Dept: FAMILY MEDICINE CLINIC | Facility: CLINIC | Age: 77
End: 2017-09-26

## 2017-09-26 NOTE — TELEPHONE ENCOUNTER
Received [facility] orders. Placed in MD inbox for review.      Time started: 1100    Time ended: 1101    Total time spent on chart: 1 min

## 2017-09-29 ENCOUNTER — HOSPITAL ENCOUNTER (OUTPATIENT)
Dept: LAB | Facility: HOSPITAL | Age: 77
Discharge: HOME | End: 2017-09-29
Attending: INTERNAL MEDICINE
Payer: MEDICARE

## 2017-09-29 DIAGNOSIS — I48.91 ATRIAL FIBRILLATION (HCC): ICD-10-CM

## 2017-09-29 PROCEDURE — 85610 PROTHROMBIN TIME: CPT

## 2017-10-01 ENCOUNTER — MED REC SCAN ONLY (OUTPATIENT)
Dept: FAMILY MEDICINE CLINIC | Facility: CLINIC | Age: 77
End: 2017-10-01

## 2017-10-05 ENCOUNTER — HOSPITAL ENCOUNTER (OUTPATIENT)
Dept: LAB | Facility: HOSPITAL | Age: 77
Discharge: HOME | End: 2017-10-05
Attending: INTERNAL MEDICINE
Payer: MEDICARE

## 2017-10-05 DIAGNOSIS — I48.91 ATRIAL FIBRILLATION (HCC): ICD-10-CM

## 2017-10-05 PROCEDURE — 85610 PROTHROMBIN TIME: CPT

## 2017-10-16 ENCOUNTER — OFFICE VISIT (OUTPATIENT)
Dept: SURGERY | Facility: CLINIC | Age: 77
End: 2017-10-16

## 2017-10-16 VITALS
BODY MASS INDEX: 33.43 KG/M2 | WEIGHT: 208 LBS | HEART RATE: 134 BPM | SYSTOLIC BLOOD PRESSURE: 124 MMHG | HEIGHT: 66 IN | DIASTOLIC BLOOD PRESSURE: 79 MMHG

## 2017-10-16 DIAGNOSIS — D47.9 ATYPICAL LYMPHOPROLIFERATIVE DISORDER (HCC): ICD-10-CM

## 2017-10-16 DIAGNOSIS — I50.32 CHRONIC DIASTOLIC CONGESTIVE HEART FAILURE (HCC): ICD-10-CM

## 2017-10-16 DIAGNOSIS — K81.0 CHOLECYSTITIS, ACUTE: Primary | ICD-10-CM

## 2017-10-16 DIAGNOSIS — E11.59 CONTROLLED TYPE 2 DIABETES MELLITUS WITH OTHER CIRCULATORY COMPLICATION, UNSPECIFIED LONG TERM INSULIN USE STATUS: ICD-10-CM

## 2017-10-16 DIAGNOSIS — I50.22 CHRONIC SYSTOLIC HEART FAILURE (HCC): ICD-10-CM

## 2017-10-16 DIAGNOSIS — K81.0 ACUTE CHOLECYSTITIS: ICD-10-CM

## 2017-10-16 PROCEDURE — 99213 OFFICE O/P EST LOW 20 MIN: CPT | Performed by: SURGERY

## 2017-10-16 NOTE — PROGRESS NOTES
Follow Up Visit Note       Active Problems      1. Cholecystitis, acute    2. Acute cholecystitis    3. Chronic diastolic congestive heart failure ([facility]    4.  Controlled type 2 diabetes mellitus with other circulatory complication, unspecified long term in legs   • NSVT (nonsustained ventricular tachycardia) ([facility]) 7/11/2014    Recurrence 7.2016 Hx VT- s/p ICD implant on 7/7/16.      • Obesity, morbid, BMI 40.0-49.9 ([facility] 6/18/2015   • Obstructive sleep apnea (adult) (pediatric) 6/29/2012    Cannot tolerate CP Smokeless tobacco: Never Used                        Alcohol use: No              Drug use:  No                 Current Outpatient Prescriptions:  Metoprolol Succinate  MG Oral Tablet 24 Hr Take 1 tablet (100 mg t Take 1 tablet (200 mg total) by mouth 2 (two) times daily. (Patient taking differently: Take 200 mg by mouth 2 (two) times daily.  2/2/17: patient is refusing medications ) Disp: 60 tablet Rfl: 1   METFORMIN  MG Oral Tab TAKE 1 TABLET BY MOUTH TWO TI disturbance. Physical Findings   /79 (BP Location: Right arm, Patient Position: Sitting, Cuff Size: large)   Pulse 134   Ht 66\"   Wt 208 lb   BMI 33.57 kg/m²   Physical Exam   Constitutional: She appears well-developed and well-nourished.  No

## 2017-10-20 ENCOUNTER — HOSPITAL ENCOUNTER (OUTPATIENT)
Dept: LAB | Facility: HOSPITAL | Age: 77
Discharge: HOME | End: 2017-10-20
Attending: INTERNAL MEDICINE
Payer: MEDICARE

## 2017-10-20 DIAGNOSIS — I48.91 ATRIAL FIBRILLATION (HCC): ICD-10-CM

## 2017-10-20 PROCEDURE — 85610 PROTHROMBIN TIME: CPT

## 2017-10-23 PROBLEM — L84 CALLUS OF FOOT: Status: ACTIVE | Noted: 2017-10-23

## 2017-10-25 ENCOUNTER — HOSPITAL ENCOUNTER (OUTPATIENT)
Dept: INTERVENTIONAL RADIOLOGY/VASCULAR | Facility: HOSPITAL | Age: 77
Discharge: HOME | End: 2017-10-25
Attending: SURGERY | Admitting: SURGERY
Payer: MEDICARE

## 2017-10-25 ENCOUNTER — TELEPHONE (OUTPATIENT)
Dept: FAMILY MEDICINE CLINIC | Facility: CLINIC | Age: 77
End: 2017-10-25

## 2017-10-25 VITALS
HEART RATE: 120 BPM | RESPIRATION RATE: 26 BRPM | WEIGHT: 208 LBS | SYSTOLIC BLOOD PRESSURE: 103 MMHG | OXYGEN SATURATION: 96 % | HEIGHT: 66 IN | BODY MASS INDEX: 33.43 KG/M2 | DIASTOLIC BLOOD PRESSURE: 58 MMHG | TEMPERATURE: 98 F

## 2017-10-25 DIAGNOSIS — K81.0 CHOLECYSTITIS, ACUTE: ICD-10-CM

## 2017-10-25 PROCEDURE — 47531 INJECTION FOR CHOLANGIOGRAM: CPT

## 2017-10-25 PROCEDURE — 82962 GLUCOSE BLOOD TEST: CPT

## 2017-10-25 PROCEDURE — BF131ZZ FLUOROSCOPY OF GALLBLADDER AND BILE DUCTS USING LOW OSMOLAR CONTRAST: ICD-10-PCS | Performed by: RADIOLOGY

## 2017-10-25 PROCEDURE — 99152 MOD SED SAME PHYS/QHP 5/>YRS: CPT

## 2017-10-25 RX ORDER — LEVOFLOXACIN 5 MG/ML
INJECTION, SOLUTION INTRAVENOUS
Status: COMPLETED
Start: 2017-10-25 | End: 2017-10-25

## 2017-10-25 RX ORDER — MIDAZOLAM HYDROCHLORIDE 1 MG/ML
INJECTION INTRAMUSCULAR; INTRAVENOUS
Status: COMPLETED
Start: 2017-10-25 | End: 2017-10-25

## 2017-10-25 RX ORDER — SODIUM CHLORIDE 9 MG/ML
INJECTION, SOLUTION INTRAVENOUS CONTINUOUS
Status: DISCONTINUED | OUTPATIENT
Start: 2017-10-25 | End: 2017-10-25

## 2017-10-25 RX ORDER — LIDOCAINE HYDROCHLORIDE 10 MG/ML
INJECTION, SOLUTION INFILTRATION; PERINEURAL
Status: COMPLETED
Start: 2017-10-25 | End: 2017-10-25

## 2017-10-25 RX ADMIN — SODIUM CHLORIDE: 9 INJECTION, SOLUTION INTRAVENOUS at 09:15:00

## 2017-10-25 NOTE — PROCEDURES
[facility]  Procedure Note    Raj Colon Patient Status:  Outpatient in a Bed    1940 MRN [id number]   [facility] Isaac Lina[facility]   Hosp Day # 0 PCP Leslie Ortega MD     Procedure: Sheath Chol

## 2017-10-25 NOTE — H&P
[facility]   History & Physical    [facility] Patient Status:  Outpatient in [facility] Bed    1940 MRN [id number]   Location [address] [facility] Attending Omero Cabral MD   Hosp Day # 0 PCP Hendrick Moritz, MD     Admitting Diagnos medtronic    • S/P total knee replacement 6/18/2015    bilat 1990s, both severe OA   • Seizure disorder ([facility]    • Stroke [facility])    • Unspecified sleep apnea    • UTI (lower urinary tract infection) 3/18/2015   • Varicose vein 9/18/2015   • Visual impairmen INR  1.0     No results for input(s): GLU, BUN, CREATSERUM, GFRAA, GFRNAA, CA, NA, K, CL, CO2 in the last 72 hours.     Assessment/Plan:   Impression: 79 yo F h/o acute arely, s/p perc arely    I have discussed with the patient and/or legal representative

## 2017-10-25 NOTE — PROGRESS NOTES
Per Dr. Luiz Seth, pt does not need to hold Coumadin prior to her next arely tube check on Nov. 8th. However, per Anil Correia in IR, Dr Luiz Seth may not be working that day.  She will check with the doctor this is to be working that day and if anything changes, she harsha

## 2017-11-02 ENCOUNTER — HOSPITAL ENCOUNTER (INPATIENT)
Facility: HOSPITAL | Age: 77
LOS: 6 days | Discharge: HOME | DRG: 308 | End: 2017-11-09
Admitting: STUDENT IN AN ORGANIZED HEALTH CARE EDUCATION/TRAINING PROGRAM
Payer: MEDICARE

## 2017-11-02 DIAGNOSIS — R55 SYNCOPE, CARDIOGENIC: Primary | ICD-10-CM

## 2017-11-02 DIAGNOSIS — I48.91 ATRIAL FIBRILLATION WITH RAPID VENTRICULAR RESPONSE (HCC): ICD-10-CM

## 2017-11-03 ENCOUNTER — APPOINTMENT (OUTPATIENT)
Dept: CT IMAGING | Facility: HOSPITAL | Age: 77
DRG: 308 | End: 2017-11-03
Payer: MEDICARE

## 2017-11-03 ENCOUNTER — TELEPHONE (OUTPATIENT)
Dept: FAMILY MEDICINE CLINIC | Facility: CLINIC | Age: 77
End: 2017-11-03

## 2017-11-03 ENCOUNTER — APPOINTMENT (OUTPATIENT)
Dept: GENERAL RADIOLOGY | Facility: HOSPITAL | Age: 77
DRG: 308 | End: 2017-11-03
Payer: MEDICARE

## 2017-11-03 ENCOUNTER — APPOINTMENT (OUTPATIENT)
Dept: CV DIAGNOSTICS | Facility: HOSPITAL | Age: 77
DRG: 308 | End: 2017-11-03
Attending: STUDENT IN AN ORGANIZED HEALTH CARE EDUCATION/TRAINING PROGRAM
Payer: MEDICARE

## 2017-11-03 ENCOUNTER — TELEPHONE (OUTPATIENT)
Dept: SURGERY | Facility: CLINIC | Age: 77
End: 2017-11-03

## 2017-11-03 PROBLEM — R55 SYNCOPE, CARDIOGENIC: Status: ACTIVE | Noted: 2017-11-03

## 2017-11-03 PROBLEM — R56.9 SEIZURE (HCC): Status: RESOLVED | Noted: 2017-04-22 | Resolved: 2017-11-03

## 2017-11-03 PROBLEM — I48.91 ATRIAL FIBRILLATION WITH RAPID VENTRICULAR RESPONSE (HCC): Status: ACTIVE | Noted: 2017-11-03

## 2017-11-03 PROCEDURE — 93306 TTE W/DOPPLER COMPLETE: CPT | Performed by: STUDENT IN AN ORGANIZED HEALTH CARE EDUCATION/TRAINING PROGRAM

## 2017-11-03 PROCEDURE — 71275 CT ANGIOGRAPHY CHEST: CPT

## 2017-11-03 PROCEDURE — 99222 1ST HOSP IP/OBS MODERATE 55: CPT | Performed by: STUDENT IN AN ORGANIZED HEALTH CARE EDUCATION/TRAINING PROGRAM

## 2017-11-03 PROCEDURE — 70450 CT HEAD/BRAIN W/O DYE: CPT

## 2017-11-03 PROCEDURE — 71010 XR CHEST AP PORTABLE  (CPT=71010): CPT

## 2017-11-03 RX ORDER — HEPARIN SODIUM 5000 [USP'U]/ML
30 INJECTION INTRAVENOUS; SUBCUTANEOUS ONCE
Status: COMPLETED | OUTPATIENT
Start: 2017-11-03 | End: 2017-11-03

## 2017-11-03 RX ORDER — MAGNESIUM OXIDE 400 MG (241.3 MG MAGNESIUM) TABLET
400 TABLET DAILY
Status: DISCONTINUED | OUTPATIENT
Start: 2017-11-03 | End: 2017-11-09

## 2017-11-03 RX ORDER — AMIODARONE HYDROCHLORIDE 100 MG/1
100 TABLET ORAL 2 TIMES DAILY WITH MEALS
Status: DISCONTINUED | OUTPATIENT
Start: 2017-11-03 | End: 2017-11-09

## 2017-11-03 RX ORDER — FUROSEMIDE 10 MG/ML
40 INJECTION INTRAMUSCULAR; INTRAVENOUS
Status: DISCONTINUED | OUTPATIENT
Start: 2017-11-03 | End: 2017-11-06

## 2017-11-03 RX ORDER — DOCUSATE SODIUM 100 MG/1
100 CAPSULE, LIQUID FILLED ORAL 2 TIMES DAILY
Status: DISCONTINUED | OUTPATIENT
Start: 2017-11-03 | End: 2017-11-06

## 2017-11-03 RX ORDER — DEXTROSE MONOHYDRATE 25 G/50ML
50 INJECTION, SOLUTION INTRAVENOUS
Status: DISCONTINUED | OUTPATIENT
Start: 2017-11-03 | End: 2017-11-09

## 2017-11-03 RX ORDER — BISACODYL 10 MG
10 SUPPOSITORY, RECTAL RECTAL
Status: DISCONTINUED | OUTPATIENT
Start: 2017-11-03 | End: 2017-11-09

## 2017-11-03 RX ORDER — DIPHENHYDRAMINE HYDROCHLORIDE 50 MG/ML
50 INJECTION INTRAMUSCULAR; INTRAVENOUS ONCE
Status: COMPLETED | OUTPATIENT
Start: 2017-11-03 | End: 2017-11-03

## 2017-11-03 RX ORDER — METOPROLOL SUCCINATE 50 MG/1
50 TABLET, EXTENDED RELEASE ORAL 2 TIMES DAILY
COMMUNITY
End: 2018-02-06

## 2017-11-03 RX ORDER — WARFARIN SODIUM 5 MG/1
5 TABLET ORAL NIGHTLY
Status: DISCONTINUED | OUTPATIENT
Start: 2017-11-03 | End: 2017-11-06

## 2017-11-03 RX ORDER — NITROGLYCERIN 0.4 MG/1
0.4 TABLET SUBLINGUAL EVERY 5 MIN PRN
Status: DISCONTINUED | OUTPATIENT
Start: 2017-11-03 | End: 2017-11-09

## 2017-11-03 RX ORDER — ONDANSETRON 2 MG/ML
4 INJECTION INTRAMUSCULAR; INTRAVENOUS EVERY 6 HOURS PRN
Status: DISCONTINUED | OUTPATIENT
Start: 2017-11-03 | End: 2017-11-09

## 2017-11-03 RX ORDER — ACETAMINOPHEN 325 MG/1
650 TABLET ORAL EVERY 6 HOURS PRN
Status: DISCONTINUED | OUTPATIENT
Start: 2017-11-03 | End: 2017-11-09

## 2017-11-03 RX ORDER — DILTIAZEM HCL-SODIUM CHLORIDE IV SOLN 125 MG/125ML-0.9% 125-0.9/125 MG/ML-%
10 SOLUTION INTRAVENOUS CONTINUOUS
Status: DISCONTINUED | OUTPATIENT
Start: 2017-11-03 | End: 2017-11-04

## 2017-11-03 RX ORDER — METHYLPREDNISOLONE SODIUM SUCCINATE 125 MG/2ML
125 INJECTION, POWDER, LYOPHILIZED, FOR SOLUTION INTRAMUSCULAR; INTRAVENOUS ONCE
Status: COMPLETED | OUTPATIENT
Start: 2017-11-03 | End: 2017-11-03

## 2017-11-03 RX ORDER — HEPARIN SODIUM AND DEXTROSE 10000; 5 [USP'U]/100ML; G/100ML
INJECTION INTRAVENOUS CONTINUOUS
Status: DISCONTINUED | OUTPATIENT
Start: 2017-11-03 | End: 2017-11-09

## 2017-11-03 RX ORDER — FAMOTIDINE 10 MG/ML
20 INJECTION, SOLUTION INTRAVENOUS ONCE
Status: DISCONTINUED | OUTPATIENT
Start: 2017-11-03 | End: 2017-11-03

## 2017-11-03 RX ORDER — ALBUTEROL SULFATE 2.5 MG/3ML
2.5 SOLUTION RESPIRATORY (INHALATION) ONCE
Status: COMPLETED | OUTPATIENT
Start: 2017-11-03 | End: 2017-11-03

## 2017-11-03 RX ORDER — ATORVASTATIN CALCIUM 10 MG/1
10 TABLET, FILM COATED ORAL NIGHTLY
Status: DISCONTINUED | OUTPATIENT
Start: 2017-11-03 | End: 2017-11-09

## 2017-11-03 RX ORDER — SODIUM CHLORIDE 9 MG/ML
1000 INJECTION, SOLUTION INTRAVENOUS ONCE
Status: DISCONTINUED | OUTPATIENT
Start: 2017-11-03 | End: 2017-11-03

## 2017-11-03 RX ORDER — DIGOXIN 125 MCG
125 TABLET ORAL DAILY
Status: DISCONTINUED | OUTPATIENT
Start: 2017-11-03 | End: 2017-11-03

## 2017-11-03 RX ORDER — HEPARIN SODIUM AND DEXTROSE 10000; 5 [USP'U]/100ML; G/100ML
1000 INJECTION INTRAVENOUS ONCE
Status: COMPLETED | OUTPATIENT
Start: 2017-11-03 | End: 2017-11-03

## 2017-11-03 RX ORDER — HYDROMORPHONE HYDROCHLORIDE 1 MG/ML
0.5 INJECTION, SOLUTION INTRAMUSCULAR; INTRAVENOUS; SUBCUTANEOUS EVERY 30 MIN PRN
Status: DISCONTINUED | OUTPATIENT
Start: 2017-11-03 | End: 2017-11-09

## 2017-11-03 RX ORDER — ASPIRIN 325 MG
325 TABLET ORAL DAILY
Status: DISCONTINUED | OUTPATIENT
Start: 2017-11-03 | End: 2017-11-09

## 2017-11-03 RX ORDER — NITROGLYCERIN 0.4 MG/1
TABLET SUBLINGUAL
Status: DISPENSED
Start: 2017-11-03 | End: 2017-11-03

## 2017-11-03 RX ORDER — HEPARIN SODIUM AND DEXTROSE 10000; 5 [USP'U]/100ML; G/100ML
INJECTION INTRAVENOUS CONTINUOUS
Status: CANCELLED | OUTPATIENT
Start: 2017-11-03

## 2017-11-03 RX ORDER — AMIODARONE HYDROCHLORIDE 200 MG/1
200 TABLET ORAL DAILY
Status: DISCONTINUED | OUTPATIENT
Start: 2017-11-03 | End: 2017-11-03

## 2017-11-03 RX ORDER — METOPROLOL SUCCINATE 50 MG/1
50 TABLET, EXTENDED RELEASE ORAL
Status: DISCONTINUED | OUTPATIENT
Start: 2017-11-03 | End: 2017-11-09

## 2017-11-03 RX ORDER — DILTIAZEM HCL-SODIUM CHLORIDE IV SOLN 125 MG/125ML-0.9% 125-0.9/125 MG/ML-%
SOLUTION INTRAVENOUS CONTINUOUS
Status: DISCONTINUED | OUTPATIENT
Start: 2017-11-03 | End: 2017-11-03

## 2017-11-03 RX ORDER — DIGOXIN 125 MCG
125 TABLET ORAL DAILY
COMMUNITY
End: 2018-02-06

## 2017-11-03 RX ORDER — HYDROCODONE BITARTRATE AND ACETAMINOPHEN 5; 325 MG/1; MG/1
1 TABLET ORAL 2 TIMES DAILY PRN
Status: DISCONTINUED | OUTPATIENT
Start: 2017-11-03 | End: 2017-11-03

## 2017-11-03 RX ORDER — ALPRAZOLAM 0.25 MG/1
0.25 TABLET ORAL 3 TIMES DAILY PRN
Status: DISCONTINUED | OUTPATIENT
Start: 2017-11-03 | End: 2017-11-09

## 2017-11-03 RX ORDER — POLYETHYLENE GLYCOL 3350 17 G/17G
17 POWDER, FOR SOLUTION ORAL DAILY PRN
Status: DISCONTINUED | OUTPATIENT
Start: 2017-11-03 | End: 2017-11-09

## 2017-11-03 RX ORDER — LOSARTAN POTASSIUM 25 MG/1
25 TABLET ORAL DAILY
Status: DISCONTINUED | OUTPATIENT
Start: 2017-11-03 | End: 2017-11-09

## 2017-11-03 RX ORDER — DIGOXIN 125 MCG
125 TABLET ORAL DAILY
Status: DISCONTINUED | OUTPATIENT
Start: 2017-11-04 | End: 2017-11-09

## 2017-11-03 RX ORDER — METOPROLOL SUCCINATE 50 MG/1
50 TABLET, EXTENDED RELEASE ORAL 2 TIMES DAILY
Status: DISCONTINUED | OUTPATIENT
Start: 2017-11-03 | End: 2017-11-03

## 2017-11-03 RX ORDER — AMIODARONE HYDROCHLORIDE 100 MG/1
100 TABLET ORAL 2 TIMES DAILY
Status: DISCONTINUED | OUTPATIENT
Start: 2017-11-03 | End: 2017-11-03

## 2017-11-03 RX ORDER — DIGOXIN 0.25 MG/ML
125 INJECTION INTRAMUSCULAR; INTRAVENOUS ONCE
Status: COMPLETED | OUTPATIENT
Start: 2017-11-03 | End: 2017-11-03

## 2017-11-03 RX ORDER — DILTIAZEM HYDROCHLORIDE 5 MG/ML
5 INJECTION INTRAVENOUS
Status: DISCONTINUED | OUTPATIENT
Start: 2017-11-03 | End: 2017-11-03

## 2017-11-03 RX ORDER — DILTIAZEM HYDROCHLORIDE 180 MG/1
360 CAPSULE, EXTENDED RELEASE ORAL DAILY
Status: DISCONTINUED | OUTPATIENT
Start: 2017-11-03 | End: 2017-11-09

## 2017-11-03 RX ORDER — FUROSEMIDE 10 MG/ML
40 INJECTION INTRAMUSCULAR; INTRAVENOUS ONCE
Status: COMPLETED | OUTPATIENT
Start: 2017-11-03 | End: 2017-11-03

## 2017-11-03 RX ORDER — DILTIAZEM HYDROCHLORIDE 180 MG/1
360 CAPSULE, COATED, EXTENDED RELEASE ORAL DAILY
Status: ON HOLD | COMMUNITY
End: 2017-11-09

## 2017-11-03 RX ORDER — DILTIAZEM HYDROCHLORIDE 5 MG/ML
10 INJECTION INTRAVENOUS EVERY 2 HOUR PRN
Status: DISCONTINUED | OUTPATIENT
Start: 2017-11-03 | End: 2017-11-09

## 2017-11-03 RX ORDER — HYDROMORPHONE HYDROCHLORIDE 1 MG/ML
INJECTION, SOLUTION INTRAMUSCULAR; INTRAVENOUS; SUBCUTANEOUS
Status: COMPLETED
Start: 2017-11-03 | End: 2017-11-03

## 2017-11-03 NOTE — PROGRESS NOTES
Admitted after midnight; this is my brief social f/u. Patient feeling less dyspneic now but very sleepy. Lungs clear; 1+ peripheral LE edema. nontender abdomen; percutaneous cholecystostomy tube in place  Continue dilt drip, heparin drip and IV lasix.

## 2017-11-03 NOTE — ED INITIAL ASSESSMENT (HPI)
Patient is a 77 yo  female with c/o CP, exertional dyspnea, and acivity intolerance since this morning. Patient states that had some coffee and went for breakfast and on her way to the car she began to feel sob.  Patient states that shortly after a

## 2017-11-03 NOTE — ED NOTES
Patient assisted to bedside commode, tolerated fairly with increased SOB and increased heart rate. Patient laying back on cart in position of comfort. No acute distress noted.

## 2017-11-03 NOTE — CM/SW NOTE
Per review of chart and call to [facility] agency--patient has a history with faviola [facility] (phone [phone] / fax [phone])--BELLA discharged from service sept, 2017--CM/SW to follow

## 2017-11-03 NOTE — ED NOTES
Patient states that she is feeling better and bi-pap mask is uncomfortable. Bi-pap removed as trial. Nasal canula placed at 3L. Will continue to monitor.

## 2017-11-03 NOTE — ED NOTES
Patient returns from CT in respiratory distress. Audible crackles noted. MD notified. Respiratory contacted for bi-pap set up.

## 2017-11-03 NOTE — PLAN OF CARE
CARDIOVASCULAR - ADULT    • Maintains optimal cardiac output and hemodynamic stability Not Progressing    • Absence of cardiac arrhythmias or at baseline Not Progressing          HEMATOLOGIC - ADULT    • Maintains hematologic stability Progressing    • Salvatore possible pacemaker lead dislodgement. Paged CRAIG Li and case reviewed by her. Patient had asymptomatic short runs of V-Tach at 08:45. Notified CRAIG Li. No new orders. Will continue to monitor.

## 2017-11-03 NOTE — PLAN OF CARE
CARDIOVASCULAR - ADULT    • Maintains optimal cardiac output and hemodynamic stability Progressing    • Absence of cardiac arrhythmias or at baseline Progressing        HEMATOLOGIC - ADULT    • Maintains hematologic stability Progressing    • Free from ble month and a half ago and left w biliary drain on R side, dr Chetna Prasad aware of consult called from ed, seen & examined by dr Lela Singer, labs in am, poc discussed w pt, verbalized understanding, closely monitored and observed - mxe.     [employer],

## 2017-11-03 NOTE — H&P
[facility]  History and Physical     Gemma Rosa Patient Status:  Emergency    1940 MRN [id number]   Location [address] [facility] Attending Waleska Toro MD   Hosp Day # 0 PCP Teddy Miller MD     Chief Complaint: Aparna Venegas STONE    • LAD (lymphadenopathy), cervical 1/18/2014    bx 2014- benign   • Lymph node enlargement 1/14/2014    Neck 2014   • Migraines    • Muscle weakness    • Near syncope 3/18/2015   • Neuropathy 9/16/2016    Severe both legs   • NSVT (nonsustained stephanie Sister    • Heart Disease Sister    • Diabetes Brother    • Heart Disease Brother    • Cancer Brother        Allergies:   Lisinopril              Coughing  Mexitil [Mexiletine]    Rash    Medications:    No current facility-administered medications on file daily. Disp:  Rfl:    alprazolam 0.25 MG Oral Tab Take 1 tablet (0.25 mg total) by mouth 2 (two) times daily as needed for Anxiety.  (Patient taking differently: Take 0.25 mg by mouth 3 (three) times daily as needed for Anxiety.  ) Disp: 10 tablet Rfl: 0 organomegaly. Neurologic: No focal neurological deficits. CNII-XII grossly intact. Musculoskeletal: Moves all extremities. Extremities: No edema or cyanosis. Integument: No rashes or lesions. Psychiatric: Appropriate mood and affect.       Diagnostic

## 2017-11-03 NOTE — ED PROVIDER NOTES
Patient Seen in: [facility] Emergency Department    History   Patient presents with:  Arrythmia/Palpitations (cardiovascular)    Stated Complaint: afib - pt was treated at [facility] in [facility].   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 Perez Claire  No date: HERNIA SURGERY  No date: HYSTERECTOMY  2012: Jo Ann Doyle 41  No date: PACEMAKER/DEFIBRILLATOR      Comment: Medtronic single chamber ICD  No date: TOTAL KNEE REPLACEMENT  1 No significant deformity or joint abnormality    Calves are symmetric and nontender  Good peripheral color, cap refill . Skin: Unremarkable without lesions or rash.      Neurologic: Awake alert and oriented x 3 with clear speech    5 out of 5 streng evaluation of the aorta due to paucity of intra-aortic contrast. No definite dissection. Cardiomegaly. Small pericardial effusion. Findings of moderate pulmonary edema. No air space consolidation. Trace bilateral pleural effusions.     Left chest wal

## 2017-11-03 NOTE — PAYOR COMM NOTE
--------------  ADMISSION REVIEW     Payor: [employer] MEDICARE ADV PPO  Subscriber #:  [id number]  Authorization Number: N/A    Admit date: 11/3/17  Admit time: 8836       Admitting Physician: Nikkie Baron MD  Attending Physician:  Wei Willson MD  [facility] not have any CT of the head or chest.  She denies any history of DVT or PE, is on Coumadin because of the A. fib. She has a pacemaker because of A. fib, but she is normal in A. fib. She denies any bleeding otherwise.   She does not have any focal complain both severe OA   • Seizure disorder (HCC)    • Stroke [facility])    • Unspecified sleep apnea    • UTI (lower urinary tract infection) 3/18/2015   • Varicose vein 9/18/2015   • Visual impairment        Past Surgical History:  No date: ANGIOGRAM  No date: APPEND Mucus membranes pink and moist,   Neck: Supple with normal range of motion.  no thyromegaly  Chest: clear breath sounds without wheezes,     Heart: Tachycardic rate, irregular rhythm  Abdomen: Soft, nondistended,  No tenderness   Back: No costovertebral a abnormality. Mild generalized parenchymal volume loss. Changes of left-sided craniotomy. CTA CHEST WITH IV CONTRAST (PULMONARY ANGIOGRAM)  IMPRESSION:  Good technical quality study. No evidence for pulmonary embolism.   Limited evaluation of the aorta Attending Raleigh Retana MD   Hosp Day # 0 PCP Casa Ledesma MD     Chief Complaint: Syncope     History of Present Illness: Alphonso Downs is a 78year old female with  H/o afib, VT s/p ICD, Dilated CM, h/o VTE, DM type 2 who presents to the hospital wit % Inhalation Solution 42 mcg by Nasal route 2 (two) times daily. Disp:  Rfl:    Meclizine HCl 25 MG Oral Tab Take 25 mg by mouth 3 (three) times daily as needed for Dizziness.  Disp:  Rfl:    Losartan Potassium 25 MG Oral Tab Take 1 tablet (25 mg total) b HPI.    Physical Exam:    /87   Pulse 104   Temp 98.6 °F (37 °C) (Temporal)   Resp 22   Ht 5' 6\" (1.676 m)   Wt 201 lb (91.2 kg)   SpO2 96%   BMI 32.44 kg/m²    General: No acute distress. Alert and oriented x 3. HEENT: Normocephalic atraumatic.  Fredda Raheem thrombectomy/ tPA[GS.1] 1/2017[GS.2]  2. IVC filter in place[GS.1] 12/2016[GS.2]  3. AG on coumadin, subthx   5. DM type 2  6. H/o VT with ICD in place  7. Hypertension  8. H/o ICH s/p craniotomy   9. Acute cholecystits  1.  Cholecystotomy tube 8/31/2017- O CASSIE Hay    11/3/2017 5416 New Bag 10 mg/hr Intravenous Rehana Goodman RN      DilTIAZem HCl (CARDIZEM) injection 5 mg     Date Action Dose Route User    11/3/2017 0029 Given 5 mg Intravenous Katey Black RN      diltiazem (CARDIZEM CD) 24 hr cap 360 mg Upper Arm) Rehana Goodman RN    11/3/2017 0046 Given 3 Units Subcutaneous (Right Upper Arm) Bharti Corrales RN      Losartan Potassium (COZAAR) tab 25 mg     Date Action Dose Route User    11/3/2017 1409 Given 25 mg Oral Bharti Corrales RN      magnesium oxide (MAG-O

## 2017-11-03 NOTE — TELEPHONE ENCOUNTER
Pts daughter sts her mother is an inpatient at [facility].  Sts she is concerned with the possibility of them running unnecessary testing.   Daughter wants to speak with Dr Rodriguez Rogelio she has had many conversations with him and he is aware of the concern

## 2017-11-03 NOTE — CONSULTS
[facility]    Report of Consultation    Isa Ortiz Patient Status:  Inpatient    1940 MRN [id number]   [facility] 8NE-A Attending Sherryle Arrow, MD   Hosp Day # 0 PCP Pamela Johnson MD     Date of Admission:  2017  Jose 2014   • Migraines    • Muscle weakness    • Near syncope 3/18/2015   • Neuropathy 9/16/2016    Severe both legs   • NSVT (nonsustained ventricular tachycardia) ([facility] 7/11/2014    Recurrence 7.2016 Hx VT- s/p ICD implant on 7/7/16.      • Obesity, morbid, B Rash    Medications:    Current Facility-Administered Medications:   •  nitroGLYCERIN (NITROSTAT) 0.4 MG SL tab, , ,   •  HYDROmorphone HCl PF (DILAUDID) 1 MG/ML injection 0.5 mg, 0.5 mg, Intravenous, Q30 Min PRN  •  aspirin tab 325 mg, 325 mg, Oral, Daily HPI.    Physical Exam:  Blood pressure 135/83, pulse 106, temperature 98.3 °F (36.8 °C), temperature source Oral, resp. rate (!) 35, height 167.6 cm (5' 6\"), weight 208 lb 15.9 oz (94.8 kg), SpO2 95 %.   Temp (24hrs), Av °F (36.7 °C), Min:97.5 °F (36.4 Insomnia     Abnormal gait     Lymph node enlargement     Atypical lymphoproliferative disorder (HCC)     Asthma     Gallstones     Neutrophilia     Leg swelling     Pericardial effusion     Fatigue     Hypokalemia     NSVT (nonsustained ventricular tach hematoma) (HCC)     Chronic systolic heart failure (HCC)     Asthma with COPD (chronic obstructive pulmonary disease) (HCC)     Altered mental status     Seizures (HCC)     Type 2 diabetes mellitus without complication, without long-term current use of ins

## 2017-11-03 NOTE — TELEPHONE ENCOUNTER
Daughter Delano Lang called and wanted us to know that pt is in hospital. She would like to discuss the drain tube. Spoke with RN pt is in for syncopal episode and did not know if general surgery would be consulted - message sent to surgeon about daughters call.

## 2017-11-03 NOTE — PROGRESS NOTES
Medtronic single ICD interrogated. Normal thresholds and RV lead impedance. She has stored NSVT events and one event pace terminated - hard to tell if AFib or VT given only one lead.     CXR reviewed - I think the RV lead position is similar to prior danny

## 2017-11-03 NOTE — TELEPHONE ENCOUNTER
Patient's daughter called to ask that when her mother f/u from her hospital stay we do not talk to her about the need for mammogram.  Calcifications were incidentally found in her Chest X-ray and hospital is recommending mammogram.  Per Jeri her mother ge

## 2017-11-03 NOTE — HISTORICAL OFFICE NOTE
Alirio Torres  [phone]  : 1940  ACCOUNT:  [id number]  PCP: Sandra Trejo M.D. CARDIOLOGIST: Vladimir Jaimes M.D.    Hospital: [facility]   Admitted: 2017  Discharged: (anticipated) 2017    DISCHARGE SUMMARY    DISCHARGE SUMMARY    DI the [facility]mad[facility], and will need consideration for having her IVC filter removed by radiology as an outpatient.       KRISHNA Parish/jeremie   D: 2017 - T: 2017 - Job ID# [id number]          CUONG STARKS  : 1940  ACCOUNT: or environmental allergies.       PAST HISTORY: arthritis, sleep apnea, asthma, diabetes, bilateral knee replacement 1990, hysterectomy 1986, appendectomy 20 years ago and hernia x6    PAST CV HISTORY: 10/2014, atrial fibrillation, cardiac catheterization, pure  4. Hypertension, benign  5. ICD reprogramming  6. ICD, not pacemaker dependent  7. Unspecified atrial fibrillation  8. Unspecified atrial fibrillation  9. Ventricular tachycardia    PLAN:  [  1. Continue current medications.    2. Follow up with Dr. Arcenio Watkins

## 2017-11-03 NOTE — TELEPHONE ENCOUNTER
Acknowledged but none of that really matters  What matters is her heart on the ct chest there's calcifications from atherosclerosis and pericardial effusion, her mammogram is irrelevant, what we need to focus on is her heart  That is my medical opinion  Wi

## 2017-11-04 PROCEDURE — 99232 SBSQ HOSP IP/OBS MODERATE 35: CPT | Performed by: INTERNAL MEDICINE

## 2017-11-04 RX ORDER — POTASSIUM CHLORIDE 20 MEQ/1
40 TABLET, EXTENDED RELEASE ORAL EVERY 4 HOURS
Status: COMPLETED | OUTPATIENT
Start: 2017-11-04 | End: 2017-11-04

## 2017-11-04 NOTE — PROGRESS NOTES
[facility] Card[facility] Progress Note    Subjective:  Feeling much better.      Objective:  /74 (BP Location: Left arm)   Pulse 74   Temp 98 °F (36.7 °C) (Oral)   Resp 16   Ht 167.6 cm (5' 6\")   Wt 206 lb 12.7 oz (93.8 kg)   SpO2 98%   BMI 33.38 kg/m²

## 2017-11-04 NOTE — PLAN OF CARE
CARDIOVASCULAR - ADULT    • Maintains optimal cardiac output and hemodynamic stability Progressing    • Absence of cardiac arrhythmias or at baseline Progressing        Diabetes/Glucose Control    • Glucose maintained within prescribed range Progressing and in low position. Personal belonging within reach. Ambulated in hallway with SBA using IV pole as support. Updated on plan of care and verbalized understanding.

## 2017-11-04 NOTE — PROGRESS NOTES
[facility]  Progress Note    Petros Rocha Patient Status:  Inpatient    1940 MRN [id number]   [facility] 8NE-A Attending Valeriy Lemus MD   Hosp Day # 1 PCP Sin Peterson MD     CC:   Dizziness and syncope    SUBJECTIVE:  Still Awake,alert,nonfocal  Psych: Mood and affect appears normal      Labs:     Lab Results  Component Value Date   WBC 10.9 11/04/2017   HGB 10.1 11/04/2017   HCT 31.8 11/04/2017   .0 11/04/2017   CREATSERUM 0.91 11/04/2017   BUN 22 11/04/2017    granulomas within the left lobe of the liver, unchanged from prior imaging. BONES:  No bony lesion or fracture. OTHER:  Negative.       =====  CONCLUSION:    1. No evidence of pulmonary embolism.   2. Suggestion of pulmonary edema, minimal bilateral ple (Diuretic)   digoxin (LANOXIN) tab 125 mcg 125 mcg Oral Daily   diltiazem (CARDIZEM CD) 24 hr cap 360 mg 360 mg Oral Daily   DilTIAZem HCl (CARDIZEM) injection 10 mg 10 mg Intravenous Q2H PRN   Metoprolol Succinate ER (Toprol XL) 24 hr tab 50 mg 50 mg Oral - Initial Certification    Patient will require inpatient services that will reasonably be expected to span two midnight's based on the clinical documentation in H+P.    Based on patients current state of illness, I anticipate that, after discharge, patient

## 2017-11-05 PROCEDURE — 99232 SBSQ HOSP IP/OBS MODERATE 35: CPT | Performed by: INTERNAL MEDICINE

## 2017-11-05 NOTE — PLAN OF CARE
NEUROLOGICAL - ADULT    • Achieves stable or improved neurological status Completed          CARDIOVASCULAR - ADULT    • Maintains optimal cardiac output and hemodynamic stability Progressing    • Absence of cardiac arrhythmias or at baseline Progressing Site CDI. Pt up sba, aox4, steady gait and calls appropriately.  Pt plan for heparin bridging until INR therapeutic, IV lasix BID, and follow labs

## 2017-11-05 NOTE — PROGRESS NOTES
[facility]  Progress Note    Mark Friday Patient Status:  Inpatient    1940 MRN [id number]   [facility] 8NE-A Attending Lorri Narvaez MD   Hosp Day # 2 PCP Herminia José MD     CC:   Dizziness and syncope    SUBJECTIVE:  No mor 11/05/2017   CREATSERUM 0.97 11/05/2017   BUN 21 11/05/2017    11/05/2017   K 4.4 11/05/2017    11/05/2017   CO2 30.0 11/05/2017   GLU 92 11/05/2017   CA 8.8 11/05/2017   PTT 57.5 11/05/2017   INR 1.70 11/05/2017   PTP 20.2 11/05/2017   PGLU 12 of pulmonary edema, minimal bilateral pleural effusions. 3. Moderate cardiomegaly and moderate pericardial effusion. 4. Please see further, less significant details above. 5. The preliminary report was reviewed. Dictated by:  Narayan Speak, DO o tab 100 mg 100 mg Oral BID with meals   ALPRAZolam (XANAX) tab 0.25 mg 0.25 mg Oral TID PRN   Losartan Potassium (COZAAR) tab 25 mg 25 mg Oral Daily       ASSESSMENT / PLAN:     1.  Syncope and dizziness due to cardiac arrhythmia A. fib with RVR -cardiology

## 2017-11-05 NOTE — PROGRESS NOTES
[facility]  Progress Note    Farideh Bull Patient Status:  Inpatient    1940 MRN [id number]   [facility] 8NE-A Attending Chapito Pride MD   [address] Day # 2 PCP Alyssa Chiu MD       Assessment and Plan:  Patient Active Problem Yvonne Garzon congestive heart failure (HCC)     Chronic atrial fibrillation (HCC)     Primary osteoarthritis of both knees     Mild persistent asthma without complication     Acute encephalopathy     Complex partial status epilepticus (HCC)     S/P craniotomy     Intra Continue supportive care. Thanks. Subjective:  No chest pain or shortness of breath.     Objective:  /83 (BP Location: Right arm)   Pulse 87   Temp (!) 97.3 °F (36.3 °C) (Oral)   Resp 19   Ht 5' 6\" (1.676 m)   Wt 204 lb 12.9 oz (92.9 kg) Glucose-Vitamin C (DEX-4) 4-0.006 g chewable tab 4 tablet 4 tablet Oral Q15 Min PRN   Or      dextrose 50% injection 50 mL 50 mL Intravenous Q15 Min PRN   Or      glucose (DEX4) oral liquid 30 g 30 g Oral Q15 Min PRN   Or      Glucose-Vitamin C (DEX-4) 4

## 2017-11-05 NOTE — PLAN OF CARE
Problem: NEUROLOGICAL - ADULT  Goal: Achieves stable or improved neurological status  INTERVENTIONS  - Assess for and report changes in neurological status  - Initiate measures to prevent increased intracranial pressure  - Maintain blood pressure and fluid Progressing      Problem: SAFETY ADULT - FALL  Goal: Free from fall injury  INTERVENTIONS:  - Assess pt frequently for physical needs  - Identify cognitive and physical deficits and behaviors that affect risk of falls.   - [place] fall precautions as kirstin

## 2017-11-05 NOTE — PHYSICAL THERAPY NOTE
PHYSICAL THERAPY QUICK EVALUATION - INPATIENT    Room Number: 9834/5599-J  Evaluation Date: 11/5/2017  Presenting Problem: acute on chronic CHF  Physician Order: PT Eval and Treat  History of Present Admit: pt admitted from home due to syncope.   Per card Neuropathy 9/16/2016    Severe both legs   • NS[facility]onsustained ventricular tachy[facility]) [facility] 7/11/2014    Recurrence 7.2016 Hx VT- s/p ICD implant on 7/7/16.      • Obesity, morbid, BMI 40.0-49.9 ([facility] 6/18/2015   • Obstructive sleep apnea (adult) (pediat her son is now staying with her and occasionally her DIL stays with her. Pt reports ind with adls.           SUBJECTIVE  \"I have been walking with the nurses\"    OBJECTIVE  Precautions: Cardiac  Fall Risk: Standard fall risk    WEIGHT BEARING RESTRICTION however patient will remain on Inpatient Mobility Team and will continue with amb and therex to maintain current level of mobility.    The rehab aide will perform treatment activities prescribed by this physical therapist. The rehab aide will communicate wi

## 2017-11-06 PROCEDURE — 99232 SBSQ HOSP IP/OBS MODERATE 35: CPT | Performed by: INTERNAL MEDICINE

## 2017-11-06 RX ORDER — FUROSEMIDE 40 MG/1
40 TABLET ORAL DAILY
Status: DISCONTINUED | OUTPATIENT
Start: 2017-11-07 | End: 2017-11-09

## 2017-11-06 RX ORDER — WARFARIN SODIUM 10 MG/1
10 TABLET ORAL
Status: COMPLETED | OUTPATIENT
Start: 2017-11-06 | End: 2017-11-06

## 2017-11-06 RX ORDER — WARFARIN SODIUM 7.5 MG/1
7.5 TABLET ORAL
Status: DISCONTINUED | OUTPATIENT
Start: 2017-11-06 | End: 2017-11-06

## 2017-11-06 RX ORDER — DOCUSATE SODIUM 100 MG/1
100 CAPSULE, LIQUID FILLED ORAL 2 TIMES DAILY PRN
Status: DISCONTINUED | OUTPATIENT
Start: 2017-11-06 | End: 2017-11-09

## 2017-11-06 NOTE — CM/SW NOTE
Met with patient to discuss discharge planning needs. Patient resides with sons in ran style residence in [facility]. Patient does not drive, uses a cane and walker, has nocturnal oxygen for 'years' unable to recall .   States independent with adl

## 2017-11-06 NOTE — OCCUPATIONAL THERAPY NOTE
OCCUPATIONAL THERAPY QUICK EVALUATION - INPATIENT    Room Number: 1563/8775-S  Evaluation Date: 11/6/2017     Type of Evaluation: Quick Eval  Presenting Problem: Syncope, Carfiogenic    Physician Order: IP Consult to Occupational Therapy  Reason for Flora Letters bx 2014- benign   • Lymph node enlargement 1/14/2014    Neck 2014   • Migraines    • Muscle weakness    • Near syncope 3/18/2015   • Neuropathy 9/16/2016    Severe both legs   • NS[facility]nonsustained ventricular tachycardia) [facility] 7/11/2014    Recurrence 7.20 Right  Drives: Yes  Patient Regularly Uses: Reading glasses    Prior Level of Function:  t lives at [address] [address] in [place] in a  home. pt reports ind with mobility, reports she uses rw in home and cane when out of home.   Pt reports she lives yesi independent    Skilled Therapy Provided: Pt presents in B S Recliner.  Pt education on Role of OT, POC, D/C plan, Energy Conservation Techniques, Home Safety/Fall Prevention.  Home Safety/Fall Prevention and Energy Conservation Techniques discussion continu admission.     Patient was able to achieve the following goals:  Patient able to toilet transfer: at previous functional level  Patient able to dress lower extremities: at previous functional level  Patient/Caregiver able to demonstrate safety with ADLS: at

## 2017-11-06 NOTE — PROGRESS NOTES
[facility]  Cardiology Progress Note    Sheela Velasquez Patient Status:  Inpatient    1940 MRN [id number]   [facility] 8NE-A Attending Mikayla Gupta MD   Hosp Day # 3 PCP John Bennett MD     Subjective:  Wants to go home.  No compl with meals   • Losartan Potassium  25 mg Oral Daily     • Heparin infusion 1,150 Units/hr (11/05/17 0847)       Assessment:  · Acute on chronic systolic/diastolic HF. EF 50%. Normal coronaries from a cath in 2014 when she had an EF of 35-40%.    · Chronic a

## 2017-11-06 NOTE — PROGRESS NOTES
[facility]  Progress Note    Gwenlyn Siemens Patient Status:  Inpatient    1940 MRN [id number]   [facility] 8NE-A Attending Ron Gimenez MD   Hosp Day # 3 PCP Marcell Guy MD     CC:   Dizziness and syncope    SUBJECTIVE:  No mor 11/06/2017   HGB 10.7 11/06/2017   HCT 33.9 11/06/2017   .0 11/06/2017   CREATSERUM 0.93 11/06/2017   BUN 20 11/06/2017    11/06/2017   K 3.9 11/06/2017    11/06/2017   CO2 30.0 11/06/2017   GLU 94 11/06/2017   CA 8.5 11/06/2017   PTT 59 Negative.       =====  CONCLUSION:    1. No evidence of pulmonary embolism. 2. Suggestion of pulmonary edema, minimal bilateral pleural effusions. 3. Moderate cardiomegaly and moderate pericardial effusion.   4. Please see further, less significant detail Succinate ER (Toprol XL) 24 hr tab 50 mg 50 mg Oral 2x Daily(Beta Blocker)   Amiodarone HCl (PACERONE) tab 100 mg 100 mg Oral BID with meals   ALPRAZolam (XANAX) tab 0.25 mg 0.25 mg Oral TID PRN   Losartan Potassium (COZAAR) tab 25 mg 25 mg Oral Daily

## 2017-11-06 NOTE — PLAN OF CARE
CARDIOVASCULAR - ADULT    • Maintains optimal cardiac output and hemodynamic stability Progressing    • Absence of cardiac arrhythmias or at baseline Progressing        Diabetes/Glucose Control    • Glucose maintained within prescribed range Progressing bridging until INR therapeutic, IV lasix BID, and follow labs

## 2017-11-06 NOTE — PLAN OF CARE
CARDIOVASCULAR - ADULT    • Maintains optimal cardiac output and hemodynamic stability Progressing    • Absence of cardiac arrhythmias or at baseline Progressing        Diabetes/Glucose Control    • Glucose maintained within prescribed range Progressing for drain 11/9/17  Continent B&B, lasix 40 BID    Plan: monitor labs    POC updated with pt, pt verbalized understanding. Will continue to monitor.

## 2017-11-07 PROCEDURE — 99232 SBSQ HOSP IP/OBS MODERATE 35: CPT | Performed by: HOSPITALIST

## 2017-11-07 RX ORDER — WARFARIN SODIUM 10 MG/1
10 TABLET ORAL
Status: COMPLETED | OUTPATIENT
Start: 2017-11-07 | End: 2017-11-07

## 2017-11-07 NOTE — PAYOR COMM NOTE
--------------  CONTINUED STAY REVIEW    Payor: BCBS MEDICARE ADV PPO  Subscriber #:  [id number]  Authorization Number: [id number]    Admit date: 11/3/17  Admit time: 2123    Admitting Physician: Tom Valera MD  Attending Physician:  Sherryle Arrow, Renford Lather previous intracranial hemorrhage with history of left craniotomy 12/4/2016  9. Recent acute cholecystitis on percutaneous cholecystostomy tube-will have IR evaluate today  10. Pericardial effusion on CT -cardiology following.   2D echo done yesterday 11/3/2 CASSIE Sheridan      atorvastatin (LIPITOR) tab 10 mg     Date Action Dose Route User    11/6/2017 2104 Given 10 mg Oral Yashira Rivers RN      digoxin (LANOXIN) tab 125 mcg     Date Action Dose Route User    11/7/2017 0834 Given 125 mcg Oral Lucina

## 2017-11-07 NOTE — PROGRESS NOTES
Patient presented sitting upright in bed side chair; VSS and agreeable to participate. Transferred sit/stand w/ supervision assist.  Ambulated 300' w/IV Pole and SBA. Upon completion, patient made comfortable sitting in BSC. Carrie De Leon RN aware of session.

## 2017-11-07 NOTE — PLAN OF CARE
Alert. Oriented. afib per tele. Hr 60's. Sleeps on recliner. o2 sat on room air 97%, denies any sob. Up w/ sb and walker. Heparin gtt infusing. Coumadin given. poc updated w/ patient. Voiced understanding. Cont. to monitor pt.

## 2017-11-07 NOTE — PROGRESS NOTES
[facility]  Progress Note    Clemencia Beltre Patient Status:  Inpatient    1940 MRN [id number]   [facility] 8NE-A Attending Andra Rodriguez MD   [facility] Day # 4 PCP Elio Baldwin MD     CC:   Dizziness and syncope    SUBJECTIVE:  Denies anticoagulation  6. Diabetes mellitus type 2–hypoglycemia protocol  7. Essential hypertension–continue home medications, follow blood pressure closely  8. Obesity with a BMI of 33.38  9.  History of previous intracranial hemorrhage with history of left cran

## 2017-11-08 PROCEDURE — 99232 SBSQ HOSP IP/OBS MODERATE 35: CPT | Performed by: HOSPITALIST

## 2017-11-08 RX ORDER — WARFARIN SODIUM 10 MG/1
10 TABLET ORAL
Status: COMPLETED | OUTPATIENT
Start: 2017-11-08 | End: 2017-11-08

## 2017-11-08 RX ORDER — HEPARIN SODIUM 10000 [USP'U]/100ML
INJECTION, SOLUTION INTRAVENOUS
Status: DISPENSED
Start: 2017-11-08 | End: 2017-11-09

## 2017-11-08 NOTE — PROGRESS NOTES
[facility]  Progress Note    Dylan Palacios Patient Status:  Inpatient    1940 MRN [id number]   [facility] 8NE-A Attending Roxanne Basurto MD   [facility] Day # 5 PCP Zuleika Benitez MD     CC:   Dizziness and syncope    SUBJECTIVE:  Denies intracranial hemorrhage with history of left craniotomy 12/4/2016  10. Recent acute cholecystitis on percutaneous cholecystostomy tube-IR to remove drain tomorrow? ?    Quality:  · DVT Prophylaxis: Coumadin and IV heparin drip as INR subtherapeutic  · CODE

## 2017-11-09 ENCOUNTER — APPOINTMENT (OUTPATIENT)
Dept: INTERVENTIONAL RADIOLOGY/VASCULAR | Facility: HOSPITAL | Age: 77
DRG: 308 | End: 2017-11-09
Attending: HOSPITALIST
Payer: MEDICARE

## 2017-11-09 ENCOUNTER — PRIOR ORIGINAL RECORDS (OUTPATIENT)
Dept: OTHER | Age: 77
End: 2017-11-09

## 2017-11-09 ENCOUNTER — HOSPITAL ENCOUNTER (OUTPATIENT)
Dept: INTERVENTIONAL RADIOLOGY/VASCULAR | Facility: HOSPITAL | Age: 77
DRG: 308 | End: 2017-11-09
Attending: SURGERY
Payer: MEDICARE

## 2017-11-09 VITALS
TEMPERATURE: 98 F | DIASTOLIC BLOOD PRESSURE: 88 MMHG | WEIGHT: 203.94 LBS | OXYGEN SATURATION: 84 % | SYSTOLIC BLOOD PRESSURE: 108 MMHG | HEIGHT: 66 IN | HEART RATE: 65 BPM | BODY MASS INDEX: 32.78 KG/M2 | RESPIRATION RATE: 16 BRPM

## 2017-11-09 PROCEDURE — BF121ZZ FLUOROSCOPY OF GALLBLADDER USING LOW OSMOLAR CONTRAST: ICD-10-PCS | Performed by: RADIOLOGY

## 2017-11-09 PROCEDURE — 99239 HOSP IP/OBS DSCHRG MGMT >30: CPT | Performed by: HOSPITALIST

## 2017-11-09 RX ORDER — LIDOCAINE HYDROCHLORIDE 10 MG/ML
INJECTION, SOLUTION INFILTRATION; PERINEURAL
Status: COMPLETED
Start: 2017-11-09 | End: 2017-11-09

## 2017-11-09 RX ORDER — DILTIAZEM HYDROCHLORIDE 360 MG/1
360 CAPSULE, EXTENDED RELEASE ORAL DAILY
Qty: 30 CAPSULE | Refills: 5 | Status: SHIPPED | COMMUNITY
Start: 2017-11-09 | End: 2018-02-06

## 2017-11-09 RX ORDER — MIDAZOLAM HYDROCHLORIDE 1 MG/ML
INJECTION INTRAMUSCULAR; INTRAVENOUS
Status: COMPLETED
Start: 2017-11-09 | End: 2017-11-09

## 2017-11-09 RX ORDER — WARFARIN SODIUM 5 MG/1
5 TABLET ORAL SEE ADMIN INSTRUCTIONS
Qty: 30 TABLET | Refills: 5 | Status: SHIPPED | COMMUNITY
Start: 2017-11-09 | End: 2018-05-30

## 2017-11-09 RX ORDER — LEVOFLOXACIN 5 MG/ML
INJECTION, SOLUTION INTRAVENOUS
Status: COMPLETED
Start: 2017-11-09 | End: 2017-11-09

## 2017-11-09 NOTE — H&P
[address] Patient Status:  Inpatient    1940 MRN [id number]   [facility] 8NE-A Attending Mark Sanchez MD   [facility] Day # 6 PCP Karen Anderson MD     Admitting Diagnosis:   Cholecystitis    His knee replacement 6/18/2015    bilat 1990s, both severe OA   • Seizure disorder ([facility]    • Stroke [facility])    • Unspecified sleep apnea    • UTI (lower urinary tract infection) 3/18/2015   • Varicose vein 9/18/2015   • Visual impairment        Past Surgical Hi S2  Abdomen: Soft, NT/ND, BS+x4  Extremities: Warm, dry, no LE edema bilat  Pulses: 2+ bilat DP    Results:   Labs:  Recent Labs   Lab  11/09/17   0455   RBC  4.02   HGB  11.2*   HCT  35.9   MCV  89.3   MCH  27.9   MCHC  31.2   RDW  14.6   WBC  7.9   PLT

## 2017-11-09 NOTE — PROGRESS NOTES
[facility]  Cardiology Progress Note    Farideh Bull Patient Status:  Inpatient    1940 MRN [id number]   [facility] 8NE-A Attending Tamara Varghese MD   Hosp Day # 6 PCP Alyssa Chiu MD     Subjective:  No complaints of chest pa heparin/po Coumadin. · ICD, hx VT, on amio  · Hx DVT/PE, on life long AC  · Recent acute cholecystitis, biliary drain intact  Plan:   1. Discontinue IV heparin  2. Continue coumadin. Follow up with [facility] coumadin clinic  3.  Discharge to home after biliary

## 2017-11-09 NOTE — PROCEDURES
[facility]  Procedure Note    Andrea Gill Patient Status:  Inpatient    1940 MRN [id number]   [facility] 8NE-A Attending Arthur Leiva MD   [facility] Day # 6 PCP Steve Lopez MD     Procedure: Sheath cholangiogram, tube removal

## 2017-11-09 NOTE — PROGRESS NOTES
Patient feeling great. Appears nearly euvolemic. Rate controlled BP stable. Ok to d/c today after IR pulls biliary drain, if ok with cards.     Lisa Collins MD  [facility]  Internal Medicine Hospitalist  Pager [phone]

## 2017-11-09 NOTE — PROGRESS NOTES
NURSING DISCHARGE NOTE    Discharged Home via Wheelchair. Accompanied by Family member  Belongings Taken by patient/family. Pt PIV removed with catheter intact and tele discontinued. Pt received discharge instructions and follow up information.  Med

## 2017-11-10 ENCOUNTER — PATIENT OUTREACH (OUTPATIENT)
Dept: CASE MANAGEMENT | Age: 77
End: 2017-11-10

## 2017-11-10 ENCOUNTER — TELEPHONE (OUTPATIENT)
Dept: FAMILY MEDICINE CLINIC | Facility: CLINIC | Age: 77
End: 2017-11-10

## 2017-11-10 DIAGNOSIS — Z02.9 ENCOUNTERS FOR ADMINISTRATIVE PURPOSE: ICD-10-CM

## 2017-11-10 PROBLEM — R41.82 ALTERED MENTAL STATUS: Status: RESOLVED | Noted: 2017-04-22 | Resolved: 2017-11-10

## 2017-11-10 PROBLEM — J44.9 ASTHMA WITH COPD (CHRONIC OBSTRUCTIVE PULMONARY DISEASE) (HCC): Chronic | Status: RESOLVED | Noted: 2017-03-30 | Resolved: 2017-11-10

## 2017-11-10 PROBLEM — K81.0 ACUTE CHOLECYSTITIS: Status: RESOLVED | Noted: 2017-08-31 | Resolved: 2017-11-10

## 2017-11-10 PROBLEM — R41.82 ALTERED MENTAL STATUS, UNSPECIFIED ALTERED MENTAL STATUS TYPE: Status: RESOLVED | Noted: 2017-04-22 | Resolved: 2017-11-10

## 2017-11-10 PROBLEM — J44.89 ASTHMA WITH COPD (CHRONIC OBSTRUCTIVE PULMONARY DISEASE): Chronic | Status: RESOLVED | Noted: 2017-03-30 | Resolved: 2017-11-10

## 2017-11-10 NOTE — DISCHARGE SUMMARY
[facility] HOSPITALIST  DISCHARGE SUMMARY     Dmitry Brayan Patient Status:  Inpatient    1940 MRN [id number]   [facility] 8NE-A Attending No att. providers found   [facility] Day # 6 PCP Khoa Man MD     Date of Admission: 2017  Date o while having her cup of coffee - and had to spit it out, she then continued to feel poorly throughout the day.   She was out to dinner with family when she began to feel lightheaded, was suddenly dyspneic and develop left sided chest pain with radiation to Quantity:  90 tablet  Refills:  3     atorvastatin 10 MG Tabs  Commonly known as:  LIPITOR      Take 10 mg by mouth nightly. Refills:  0     LUIS ALFREDO MICROLET LANCETS Misc      1 lancet by Finger stick route daily. Use as directed.    Quantity:  100 each  Re week        Vital signs:  Temp:  [98 °F (36.7 °C)-98.1 °F (36.7 °C)] 98.1 °F (36.7 °C)  Pulse:  [62-72] 65  Resp:  [16-18] 16  BP: (105-142)/(58-88) 108/88    Physical Exam:    General appearance: alert and cooperative  Head: Normocephalic, without obvious

## 2017-11-10 NOTE — TELEPHONE ENCOUNTER
Attempted to reach pt for TCM. Dtr answered and stated pt is \"doing fine. \"  She refused to answer any other TCM questions or review d/c instructions/medications.   She cancelled HFU appt prior to TCM call and states she does not see the need for a f/u ap

## 2017-11-10 NOTE — PROGRESS NOTES
Attempted to reach pt for TCM today. Dtr answered and states pt is doing fine since d/c. Attempted to review d/c instructions and meds with dtr. Dtr states she does not have time to review these. NCM offered to call back at a later time or date.   Dtr r

## 2017-11-13 ENCOUNTER — HOSPITAL ENCOUNTER (OUTPATIENT)
Dept: LAB | Facility: HOSPITAL | Age: 77
Discharge: HOME | End: 2017-11-13
Attending: INTERNAL MEDICINE
Payer: MEDICARE

## 2017-11-13 DIAGNOSIS — I48.91 ATRIAL FIBRILLATION (HCC): ICD-10-CM

## 2017-11-13 PROCEDURE — 85610 PROTHROMBIN TIME: CPT

## 2017-11-16 ENCOUNTER — HOSPITAL ENCOUNTER (OUTPATIENT)
Dept: LAB | Facility: HOSPITAL | Age: 77
Discharge: HOME | End: 2017-11-16
Attending: INTERNAL MEDICINE
Payer: MEDICARE

## 2017-11-16 DIAGNOSIS — I48.91 ATRIAL FIBRILLATION (HCC): ICD-10-CM

## 2017-11-16 PROCEDURE — 85610 PROTHROMBIN TIME: CPT

## 2017-11-16 NOTE — PAYOR COMM NOTE
--------------  DISCHARGE REVIEW    Payor: BCBS MEDICARE ADV PPO  Subscriber #:  [id number]  Authorization Number: [id number]    Admit date: 11/3/17  Admit time:  9246  Discharge Date: 11/9/2017  4:44 PM     Admitting Physician: Mervin Wilder MD  Attend anticoagulation  6. Diabetes mellitus type 2  7. Essential hypertension  8. Obesity with a BMI of 33.38  9. History of previous intracranial hemorrhage with history of left craniotomy 12/4/2016  10.  Recent acute cholecystitis on percutaneous cholecystostom daily.   Quantity:  30 capsule  Refills:  5     Warfarin Sodium 5 MG Tabs  Commonly known as:  COUMADIN  What changed:  additional instructions      Take 1 tablet (5 mg total) by mouth nightly.  Or as directed by S coumadin clinic   Quantity:  30 tablet Refills:  0     TAB-A-MAE MAXIMUM Tabs      Take 1 tablet by mouth daily.    Refills:  0            Follow-up appointment:   Effie Hickey MD  [address] [facility] [address] [facility]  [phone]    In 1 week  F/u with dr. Olivia Leggett regarding smal Gian Joyce APN   Cardiology        [facility]  Cardiology Progress Note           Gemma Rosa Patient Status:  Inpatient    1940 MRN [id number]   [facility] 8NE-A Attending Carlitos Obando MD   Hosp Day # 6 PCP Annie fibrillation, RVR on admission - HR controlled. INR 2.08 today on IV heparin/po Coumadin. · ICD, hx VT, on amio  · Hx DVT/PE, on life long AC  · Recent acute cholecystitis, biliary drain intact  Plan:   1. Discontinue IV heparin  2. Continue coumadin.  Fo chronic combined systolic and diastolic heart failure- now on oral lasix per cardiology  13. Chronic atrial fibrillation with rapid ventricular rate on admission– off Cardizem drip, Cardiac medications being adjusted by cardiology.   Still on IV heparin dri

## 2017-11-20 ENCOUNTER — OFFICE VISIT (OUTPATIENT)
Dept: SURGERY | Facility: CLINIC | Age: 77
End: 2017-11-20

## 2017-11-20 VITALS
SYSTOLIC BLOOD PRESSURE: 152 MMHG | DIASTOLIC BLOOD PRESSURE: 71 MMHG | TEMPERATURE: 98 F | WEIGHT: 201 LBS | HEIGHT: 66 IN | HEART RATE: 71 BPM | BODY MASS INDEX: 32.3 KG/M2

## 2017-11-20 DIAGNOSIS — K80.20 GALLSTONES: Primary | ICD-10-CM

## 2017-11-20 DIAGNOSIS — E11.8 TYPE 2 DIABETES MELLITUS WITH COMPLICATION, UNSPECIFIED LONG TERM INSULIN USE STATUS: ICD-10-CM

## 2017-11-20 DIAGNOSIS — I50.32 CHRONIC DIASTOLIC CONGESTIVE HEART FAILURE (HCC): ICD-10-CM

## 2017-11-20 DIAGNOSIS — I48.20 CHRONIC ATRIAL FIBRILLATION (HCC): ICD-10-CM

## 2017-11-20 PROCEDURE — 99213 OFFICE O/P EST LOW 20 MIN: CPT | Performed by: SURGERY

## 2017-11-20 NOTE — PROGRESS NOTES
Follow Up Visit Note       Active Problems      1. Gallstones    2. Chronic atrial fibrillation (HCC)    3. Chronic diastolic congestive heart failure ([facility]    4.  Type 2 diabetes mellitus with complication, unspecified long term insulin use status ([facility] (pediatric) 6/29/2012    Cannot tolerate CPAP machine- uses oxygen periodically    • Osteoarthritis    • Other and unspecified hyperlipidemia    • Pericardial effusion 6/5/2014    trivial   • Pulmonary HTN 10/10/2014   • [facility]    • S/P 360 MG Oral Capsule SR 24 Hr Take 1 capsule (360 mg total) by mouth daily. Disp: 30 capsule Rfl: 5   Warfarin Sodium 5 MG Oral Tab Take 1 tablet (5 mg total) by mouth nightly.  Or as directed by S coumadin clinic Disp: 30 tablet Rfl: 5   digoxin 0.125 MG for chills, diaphoresis, fatigue, fever and unexpected weight change. HENT: Negative for hearing loss, nosebleeds, sore throat and trouble swallowing. Respiratory: Negative for apnea, cough, shortness of breath and wheezing.     Cardiovascular: Negativ file.    Kamron Allen MD

## 2017-11-21 NOTE — TELEPHONE ENCOUNTER
Requesting Metformin and Atorvastatin  LOV: 4/18/17  RTC: not noted  Last Relevant Labs: lipid 11/3/17, cmp 9/2017 alt = 142 and glyco 4/17  Filled: *** #*** with *** refills    Future Appointments  Date Time Provider Sara Boo   12/14/2017 9:30 AM [facility] CARD PHLEBOTOMY [id number]   1/29/2018 9:30 AM Darnell Cao DPM SP JOSE R [facility]

## 2017-11-22 RX ORDER — ATORVASTATIN CALCIUM 10 MG/1
TABLET, FILM COATED ORAL
Qty: 30 TABLET | Refills: 0 | OUTPATIENT
Start: 2017-11-22

## 2017-11-22 NOTE — TELEPHONE ENCOUNTER
Last seen 1 year ago to discuss meds. ... She is correct that meds were refilled for a year, that was a year ago.  OV to discuss meds required 1x per year so she would be due. (although MD wanted to see her back in 3 months so she is actually 9 months overdu

## 2017-11-22 NOTE — TELEPHONE ENCOUNTER
Diabetic Medication Protocol [id number]/21 8:34 PM   Appointment in past 6 or next 3 months       Cholesterol Medication Protocol Erjchv95/21 8:34 PM   ALT < 80     Requesting Metformin and Atorvastatin   LOV: 4/18/17 (acute)   Last non acute 11/17/16  RTC:

## 2017-11-22 NOTE — TELEPHONE ENCOUNTER
Spoke with daughter and she stated that her mother doesn't need to make a follow up. She stated that when she was last seen Dr. Ezequiel Rollins renewed all of her medications for a year. She would like to speak with a nurse.  Patient was notified that Dr. Ezequiel Rollins is Ndiia Stewart

## 2017-11-27 RX ORDER — ATORVASTATIN CALCIUM 10 MG/1
TABLET, FILM COATED ORAL
Qty: 30 TABLET | Refills: 0 | Status: SHIPPED | OUTPATIENT
Start: 2017-11-27 | End: 2017-11-30

## 2017-11-27 NOTE — TELEPHONE ENCOUNTER
Patient made appt for 11/30 - she states she will be out of medication then. Can we send short refill?

## 2017-11-30 ENCOUNTER — MYAURORA ACCOUNT LINK (OUTPATIENT)
Dept: OTHER | Age: 77
End: 2017-11-30

## 2017-11-30 ENCOUNTER — OFFICE VISIT (OUTPATIENT)
Dept: FAMILY MEDICINE CLINIC | Facility: CLINIC | Age: 77
End: 2017-11-30

## 2017-11-30 VITALS
WEIGHT: 203 LBS | SYSTOLIC BLOOD PRESSURE: 124 MMHG | RESPIRATION RATE: 16 BRPM | HEIGHT: 66 IN | BODY MASS INDEX: 32.62 KG/M2 | TEMPERATURE: 98 F | DIASTOLIC BLOOD PRESSURE: 84 MMHG | HEART RATE: 76 BPM

## 2017-11-30 DIAGNOSIS — R56.1 SEIZURES, POST-TRAUMATIC (HCC): ICD-10-CM

## 2017-11-30 DIAGNOSIS — K80.50 BILIARY COLIC: ICD-10-CM

## 2017-11-30 DIAGNOSIS — R79.89 ELEVATED LFTS: ICD-10-CM

## 2017-11-30 DIAGNOSIS — Z23 NEED FOR VACCINATION: ICD-10-CM

## 2017-11-30 DIAGNOSIS — E55.9 VITAMIN D DEFICIENCY: ICD-10-CM

## 2017-11-30 DIAGNOSIS — R80.9 MICROALBUMINURIA: ICD-10-CM

## 2017-11-30 DIAGNOSIS — E11.9 TYPE 2 DIABETES MELLITUS WITHOUT COMPLICATION, WITHOUT LONG-TERM CURRENT USE OF INSULIN (HCC): Primary | ICD-10-CM

## 2017-11-30 PROCEDURE — 99214 OFFICE O/P EST MOD 30 MIN: CPT | Performed by: FAMILY MEDICINE

## 2017-11-30 PROCEDURE — 90670 PCV13 VACCINE IM: CPT | Performed by: FAMILY MEDICINE

## 2017-11-30 PROCEDURE — G0009 ADMIN PNEUMOCOCCAL VACCINE: HCPCS | Performed by: FAMILY MEDICINE

## 2017-11-30 RX ORDER — POTASSIUM CHLORIDE 20 MEQ/1
20 TABLET, EXTENDED RELEASE ORAL DAILY
Qty: 90 TABLET | Refills: 1 | Status: SHIPPED | OUTPATIENT
Start: 2017-11-30 | End: 2018-05-08

## 2017-11-30 RX ORDER — ATORVASTATIN CALCIUM 10 MG/1
TABLET, FILM COATED ORAL
Qty: 90 TABLET | Refills: 1 | Status: SHIPPED | OUTPATIENT
Start: 2017-11-30 | End: 2018-02-06

## 2017-11-30 NOTE — PROGRESS NOTES
[facility] Visit Note  11/30/2017      Subjective:      Patient ID: Mark Friday is a 78year old female. Chief Complaint:  Patient presents with:  Checkup: here for routine ckup.   Medication Request: refills requested Potassium, Metformin & (14); Future    2. Microalbuminuria  - MICROALB/CREAT RATIO, RANDOM URINE; Future    3. Vitamin D deficiency  - MICROALB/CREAT RATIO, RANDOM URINE; Future  - VITAMIN D, 25-HYDROXY; Future    4. Elevated LFTs  - COMP METABOLIC PANEL (14); Future    5.  Sierra Guillaume

## 2017-12-11 ENCOUNTER — PRIOR ORIGINAL RECORDS (OUTPATIENT)
Dept: OTHER | Age: 77
End: 2017-12-11

## 2017-12-14 ENCOUNTER — HOSPITAL ENCOUNTER (OUTPATIENT)
Dept: LAB | Facility: HOSPITAL | Age: 77
Discharge: HOME | End: 2017-12-14
Attending: INTERNAL MEDICINE
Payer: MEDICARE

## 2017-12-14 DIAGNOSIS — I48.91 ATRIAL FIBRILLATION (HCC): ICD-10-CM

## 2017-12-14 PROCEDURE — 85610 PROTHROMBIN TIME: CPT

## 2017-12-21 RX ORDER — ATORVASTATIN CALCIUM 10 MG/1
TABLET, FILM COATED ORAL
Qty: 30 TABLET | Refills: 0 | OUTPATIENT
Start: 2017-12-21

## 2017-12-26 RX ORDER — ATORVASTATIN CALCIUM 10 MG/1
TABLET, FILM COATED ORAL
Qty: 30 TABLET | Refills: 0 | OUTPATIENT
Start: 2017-12-26

## 2017-12-27 DIAGNOSIS — E11.9 TYPE 2 DIABETES MELLITUS WITHOUT COMPLICATION, WITHOUT LONG-TERM CURRENT USE OF INSULIN (HCC): ICD-10-CM

## 2017-12-27 RX ORDER — ATORVASTATIN CALCIUM 10 MG/1
TABLET, FILM COATED ORAL
Qty: 90 TABLET | Refills: 1 | Status: CANCELLED | OUTPATIENT
Start: 2017-12-27

## 2017-12-28 RX ORDER — ATORVASTATIN CALCIUM 10 MG/1
TABLET, FILM COATED ORAL
Qty: 30 TABLET | Refills: 0 | OUTPATIENT
Start: 2017-12-28

## 2017-12-28 NOTE — TELEPHONE ENCOUNTER
Cholesterol Medication Protocol Twaxvy18/28 10:38 AM   ALT < 80     Requesting Atorvastatin and Metformin   Last Relevant Labs: Metformin pp, Atorvastatin   Filled:  Both 11/30/17 #90 with 1 refills - Denied     Future Appointments  Date Time Provider Depar

## 2018-01-03 LAB
BUN: 20 MG/DL
CALCIUM: 8.9 MG/DL
CHLORIDE: 105 MEQ/L
CREATININE, SERUM: 0.85 MG/DL
GLUCOSE: 88 MG/DL
HEMATOCRIT: 35.9 %
HEMOGLOBIN: 11.2 G/DL
PLATELETS: 236 K/UL
POTASSIUM, SERUM: 4.3 MEQ/L
RED BLOOD COUNT: 4.02 X 10-6/U
SODIUM: 139 MEQ/L
WHITE BLOOD COUNT: 7.9 X 10-3/U

## 2018-01-11 ENCOUNTER — HOSPITAL ENCOUNTER (OUTPATIENT)
Dept: LAB | Facility: HOSPITAL | Age: 78
Discharge: HOME | End: 2018-01-11
Attending: INTERNAL MEDICINE
Payer: MEDICARE

## 2018-01-11 DIAGNOSIS — I48.91 ATRIAL FIBRILLATION (HCC): ICD-10-CM

## 2018-01-11 LAB — POC INR: 1.8 (ref 0.8–1.3)

## 2018-01-11 PROCEDURE — 85610 PROTHROMBIN TIME: CPT

## 2018-01-25 ENCOUNTER — HOSPITAL ENCOUNTER (OUTPATIENT)
Dept: LAB | Facility: HOSPITAL | Age: 78
Discharge: HOME | End: 2018-01-25
Attending: INTERNAL MEDICINE
Payer: MEDICARE

## 2018-01-25 DIAGNOSIS — I48.91 ATRIAL FIBRILLATION (HCC): ICD-10-CM

## 2018-01-25 LAB — POC INR: 2.4 (ref 0.8–1.3)

## 2018-01-25 PROCEDURE — 85610 PROTHROMBIN TIME: CPT

## 2018-01-29 ENCOUNTER — PRIOR ORIGINAL RECORDS (OUTPATIENT)
Dept: OTHER | Age: 78
End: 2018-01-29

## 2018-01-30 ENCOUNTER — PRIOR ORIGINAL RECORDS (OUTPATIENT)
Dept: OTHER | Age: 78
End: 2018-01-30

## 2018-02-06 ENCOUNTER — PRIOR ORIGINAL RECORDS (OUTPATIENT)
Dept: OTHER | Age: 78
End: 2018-02-06

## 2018-02-06 ENCOUNTER — APPOINTMENT (OUTPATIENT)
Dept: GENERAL RADIOLOGY | Facility: HOSPITAL | Age: 78
End: 2018-02-06
Attending: EMERGENCY MEDICINE
Payer: MEDICARE

## 2018-02-06 ENCOUNTER — APPOINTMENT (OUTPATIENT)
Dept: CT IMAGING | Facility: HOSPITAL | Age: 78
End: 2018-02-06
Attending: EMERGENCY MEDICINE
Payer: MEDICARE

## 2018-02-06 ENCOUNTER — HOSPITAL ENCOUNTER (OUTPATIENT)
Facility: HOSPITAL | Age: 78
Setting detail: OBSERVATION
Discharge: HOME | End: 2018-02-08
Attending: EMERGENCY MEDICINE | Admitting: HOSPITALIST
Payer: MEDICARE

## 2018-02-06 DIAGNOSIS — R51.9 ACUTE NONINTRACTABLE HEADACHE, UNSPECIFIED HEADACHE TYPE: ICD-10-CM

## 2018-02-06 DIAGNOSIS — R07.9 ACUTE CHEST PAIN: Primary | ICD-10-CM

## 2018-02-06 DIAGNOSIS — R42 DIZZINESS: ICD-10-CM

## 2018-02-06 PROBLEM — R00.2 HEART PALPITATIONS: Status: ACTIVE | Noted: 2018-02-06

## 2018-02-06 PROBLEM — I48.91 ATRIAL FIBRILLATION (HCC): Status: ACTIVE | Noted: 2017-11-03

## 2018-02-06 PROBLEM — R73.9 HYPERGLYCEMIA: Status: ACTIVE | Noted: 2018-02-06

## 2018-02-06 PROBLEM — R79.89 AZOTEMIA: Status: ACTIVE | Noted: 2018-02-06

## 2018-02-06 LAB
ALBUMIN SERPL-MCNC: 3.4 G/DL (ref 3.5–4.8)
ALP LIVER SERPL-CCNC: 139 U/L (ref 55–142)
ALT SERPL-CCNC: 25 U/L (ref 14–54)
APTT PPP: 33.3 SECONDS (ref 25–34)
AST SERPL-CCNC: 15 U/L (ref 15–41)
BASOPHILS # BLD AUTO: 0.01 X10(3) UL (ref 0–0.1)
BASOPHILS NFR BLD AUTO: 0.1 %
BILIRUB SERPL-MCNC: 0.4 MG/DL (ref 0.1–2)
BUN BLD-MCNC: 24 MG/DL (ref 8–20)
CALCIUM BLD-MCNC: 9 MG/DL (ref 8.3–10.3)
CHLORIDE: 105 MMOL/L (ref 101–111)
CO2: 30 MMOL/L (ref 22–32)
CREAT BLD-MCNC: 1.02 MG/DL (ref 0.55–1.02)
DIGOXIN: 1.74 NG/ML (ref 0.8–2)
EOSINOPHIL # BLD AUTO: 0.11 X10(3) UL (ref 0–0.3)
EOSINOPHIL NFR BLD AUTO: 1.4 %
ERYTHROCYTE [DISTWIDTH] IN BLOOD BY AUTOMATED COUNT: 15.9 % (ref 11.5–16)
EST. AVERAGE GLUCOSE BLD GHB EST-MCNC: 126 MG/DL (ref 68–126)
GLUCOSE BLD-MCNC: 102 MG/DL (ref 65–99)
GLUCOSE BLD-MCNC: 152 MG/DL (ref 70–99)
GLUCOSE BLD-MCNC: 154 MG/DL (ref 65–99)
HAV IGM SER QL: 2.3 MG/DL (ref 1.7–3)
HBA1C MFR BLD HPLC: 6 % (ref ?–5.7)
HCT VFR BLD AUTO: 37 % (ref 34–50)
HGB BLD-MCNC: 12 G/DL (ref 12–16)
IMMATURE GRANULOCYTE COUNT: 0.03 X10(3) UL (ref 0–1)
IMMATURE GRANULOCYTE RATIO %: 0.4 %
INR BLD: 2.03 (ref 0.89–1.11)
LYMPHOCYTES # BLD AUTO: 1.57 X10(3) UL (ref 0.9–4)
LYMPHOCYTES NFR BLD AUTO: 19.8 %
M PROTEIN MFR SERPL ELPH: 7 G/DL (ref 6.1–8.3)
MCH RBC QN AUTO: 28.9 PG (ref 27–33.2)
MCHC RBC AUTO-ENTMCNC: 32.4 G/DL (ref 31–37)
MCV RBC AUTO: 89.2 FL (ref 81–100)
MONOCYTES # BLD AUTO: 0.64 X10(3) UL (ref 0.1–0.6)
MONOCYTES NFR BLD AUTO: 8.1 %
NEUTROPHIL ABS PRELIM: 5.57 X10 (3) UL (ref 1.3–6.7)
NEUTROPHILS # BLD AUTO: 5.57 X10(3) UL (ref 1.3–6.7)
NEUTROPHILS NFR BLD AUTO: 70.2 %
PLATELET # BLD AUTO: 183 10(3)UL (ref 150–450)
POTASSIUM SERPL-SCNC: 4 MMOL/L (ref 3.6–5.1)
PSA SERPL DL<=0.01 NG/ML-MCNC: 23.3 SECONDS (ref 12–14.3)
RBC # BLD AUTO: 4.15 X10(6)UL (ref 3.8–5.1)
RED CELL DISTRIBUTION WIDTH-SD: 51.6 FL (ref 35.1–46.3)
SED RATE-ML: 21 MM/HR (ref 0–25)
SODIUM SERPL-SCNC: 142 MMOL/L (ref 136–144)
TSI SER-ACNC: 0.45 MIU/ML (ref 0.35–5.5)
WBC # BLD AUTO: 7.9 X10(3) UL (ref 4–13)

## 2018-02-06 PROCEDURE — 71045 X-RAY EXAM CHEST 1 VIEW: CPT | Performed by: EMERGENCY MEDICINE

## 2018-02-06 PROCEDURE — 70450 CT HEAD/BRAIN W/O DYE: CPT | Performed by: EMERGENCY MEDICINE

## 2018-02-06 PROCEDURE — 99220 INITIAL OBSERVATION CARE,LEVL III: CPT | Performed by: STUDENT IN AN ORGANIZED HEALTH CARE EDUCATION/TRAINING PROGRAM

## 2018-02-06 RX ORDER — DEXTROSE MONOHYDRATE 25 G/50ML
50 INJECTION, SOLUTION INTRAVENOUS
Status: DISCONTINUED | OUTPATIENT
Start: 2018-02-06 | End: 2018-02-08

## 2018-02-06 RX ORDER — MORPHINE SULFATE 2 MG/ML
2 INJECTION, SOLUTION INTRAMUSCULAR; INTRAVENOUS ONCE
Status: COMPLETED | OUTPATIENT
Start: 2018-02-06 | End: 2018-02-06

## 2018-02-06 RX ORDER — AMIODARONE HYDROCHLORIDE 200 MG/1
200 TABLET ORAL 2 TIMES DAILY
Status: ON HOLD | COMMUNITY
End: 2019-03-04

## 2018-02-06 RX ORDER — MAGNESIUM OXIDE 400 MG (241.3 MG MAGNESIUM) TABLET
400 TABLET DAILY
COMMUNITY
End: 2018-05-30

## 2018-02-06 RX ORDER — DIGOXIN 250 MCG
250 TABLET ORAL DAILY
COMMUNITY
End: 2018-02-08

## 2018-02-06 RX ORDER — ACETAMINOPHEN 325 MG/1
650 TABLET ORAL EVERY 6 HOURS PRN
Status: DISCONTINUED | OUTPATIENT
Start: 2018-02-06 | End: 2018-02-08

## 2018-02-06 RX ORDER — SODIUM CHLORIDE 9 MG/ML
INJECTION, SOLUTION INTRAVENOUS CONTINUOUS
Status: DISCONTINUED | OUTPATIENT
Start: 2018-02-06 | End: 2018-02-06

## 2018-02-06 RX ORDER — WARFARIN SODIUM 2.5 MG/1
2.5 TABLET ORAL SEE ADMIN INSTRUCTIONS
COMMUNITY
End: 2018-05-30

## 2018-02-06 RX ORDER — ONDANSETRON 2 MG/ML
4 INJECTION INTRAMUSCULAR; INTRAVENOUS EVERY 6 HOURS PRN
Status: DISCONTINUED | OUTPATIENT
Start: 2018-02-06 | End: 2018-02-08

## 2018-02-06 RX ORDER — AMIODARONE HYDROCHLORIDE 200 MG/1
200 TABLET ORAL 2 TIMES DAILY
Status: DISCONTINUED | OUTPATIENT
Start: 2018-02-06 | End: 2018-02-08

## 2018-02-06 RX ORDER — DIGOXIN 250 MCG
250 TABLET ORAL DAILY
Status: DISCONTINUED | OUTPATIENT
Start: 2018-02-07 | End: 2018-02-07

## 2018-02-06 RX ORDER — WARFARIN SODIUM 5 MG/1
5 TABLET ORAL
Status: DISCONTINUED | OUTPATIENT
Start: 2018-02-06 | End: 2018-02-08

## 2018-02-06 RX ORDER — METOPROLOL TARTRATE 50 MG/1
50 TABLET, FILM COATED ORAL 2 TIMES DAILY
COMMUNITY
End: 2018-05-30 | Stop reason: ALTCHOICE

## 2018-02-06 RX ORDER — WARFARIN SODIUM 2.5 MG/1
2.5 TABLET ORAL
Status: DISCONTINUED | OUTPATIENT
Start: 2018-02-07 | End: 2018-02-08

## 2018-02-06 RX ORDER — DILTIAZEM HYDROCHLORIDE 300 MG/1
300 CAPSULE, COATED, EXTENDED RELEASE ORAL DAILY
Status: DISCONTINUED | OUTPATIENT
Start: 2018-02-07 | End: 2018-02-07

## 2018-02-06 RX ORDER — ATORVASTATIN CALCIUM 10 MG/1
10 TABLET, FILM COATED ORAL NIGHTLY
COMMUNITY
End: 2018-05-30

## 2018-02-06 RX ORDER — METOPROLOL TARTRATE 50 MG/1
50 TABLET, FILM COATED ORAL 2 TIMES DAILY
Status: DISCONTINUED | OUTPATIENT
Start: 2018-02-06 | End: 2018-02-08

## 2018-02-06 RX ORDER — DILTIAZEM HYDROCHLORIDE 300 MG/1
300 CAPSULE, COATED, EXTENDED RELEASE ORAL DAILY
COMMUNITY
End: 2018-02-08

## 2018-02-06 RX ORDER — SODIUM CHLORIDE 9 MG/ML
INJECTION, SOLUTION INTRAVENOUS CONTINUOUS
Status: ACTIVE | OUTPATIENT
Start: 2018-02-06 | End: 2018-02-06

## 2018-02-06 RX ORDER — ATORVASTATIN CALCIUM 10 MG/1
10 TABLET, FILM COATED ORAL NIGHTLY
Status: DISCONTINUED | OUTPATIENT
Start: 2018-02-06 | End: 2018-02-08

## 2018-02-06 RX ORDER — LOSARTAN POTASSIUM 25 MG/1
25 TABLET ORAL DAILY
Status: DISCONTINUED | OUTPATIENT
Start: 2018-02-07 | End: 2018-02-08

## 2018-02-06 NOTE — ED INITIAL ASSESSMENT (HPI)
Pt c/o headache since this past Saturday and hypertension that started yesterday (191/91) and this morning it was 598 systolic w/ a pulse of 57GYT.

## 2018-02-06 NOTE — HISTORICAL OFFICE NOTE
CUONG STARKS  : 1940  ACCOUNT:  [id number]  [phone]  PCP: Dr. Carter Erp     TODAY'S DATE: 2018  DICTATED BY:  Ballard Snow Frommelt ]      CHIEF COMPLAINT: [Followup of Heart failure, systolic, acute.]    HPI:    [On 2018, kandis Jerez denies difficulties with hearing, otherwise negative. CV: peripheral edema, see CV exam, dizziness and lightheadedness. RESP: dyspnea on exertion. GI: denies melena, hematochezia. : no hematuria. INTEG: no new rashes, lesions. MS: difficulty in walking. This was reviewed and recommended by Dr. Gerber December  2. Hypertension, currently well controlled  3. Heart failure, recent improvement in EF to 55%      ASSESSMENT:  1. Heart failure, systolic, acute  2. Coumadin Management,MHS   3. Hypercholesteremia, pure  4. daughter to call me back/dev Alida Lu  : 1940  ACCOUNT:  [id number]  [phone]  PCP: Dr. Pamela Johnson     TODAY'S DATE: 2017  DICTATED BY:  [Dr. Ochoa Corn: [Hospital Discharge.]    HPI:    [On 2017, Rut sleep apnea, asthma, diabetes, bilateral knee replacement 1990, hysterectomy 1986, appendectomy 20 years ago and hernia x6    PAST CV HISTORY: 10/2014, atrial fibrillation, cardiac catheterization, dyslipidemia, hypertension, ICD ([employer])  MRI compatibl 1 tablet by mouth twice daily            12/11/17 Digoxin               125MCG    1 tablet by mouth daily                  12/11/17 DilTIAZem HCl ER [id number]     1 tablet by mouth daily                  12/11/17 Furosemide            40MG      1 tablet b

## 2018-02-06 NOTE — H&P
[facility]  History and Physical     Dylan Palacios Patient Status:  Emergency    1940 MRN [id number]   Location 656 [facility] Attending Anita Iqbal MD   Hosp Day # 0 PCP JENNA Mendez     Chief Complaint: Dori Kan sleep apnea) 06/29/2012    Cannot tolerate CPAP machine- uses oxygen periodically    • Other and unspecified hyperlipidemia    • Pericardial effusion 6/5/2014    trivial   • Pulmonary HTN 10/10/2014   • Rheumatoid arthritis ([address]    • S/P ICD (internal car Saturday  Disp: 30 tablet Rfl: 5   furosemide 40 MG Oral Tab Take 1 tablet (40 mg total) by mouth BID (Diuretic). Disp: 60 tablet Rfl: 2   Losartan Potassium 25 MG Oral Tab Take 1 tablet (25 mg total) by mouth daily.  Disp: 90 tablet Rfl: 3   docusate sodiu Creatinine Clearance: 43.9 mL/min (based on SCr of 1.02 mg/dL). Recent Labs   Lab  02/06/18   1323   PTP  23.3*   INR  2.03*       No results for input(s): TROP, CK in the last 72 hours. Imaging: Imaging data reviewed in Epic.       ASSESSMENT / PLAN:

## 2018-02-06 NOTE — CONSULTS
[facility]  Cardiology Consultation    Courtney Blander Patient Status:  Observation    1940 MRN [id number]   [facility] 2NE-A Attending Lissa Denny MD;Cory*   Hosp Day # 0 PCP JENNA Yanez     Reason for Consultation:  Caryle Adas Migraines    • Muscle weakness    • Neuropathy 9/16/2016    Severe both legs   • NSVT (nonsustained ventricular tachycardia) ([facility] 7/11/2014    Recurrence 7.2016 Hx VT- s/p ICD implant on 7/7/16.      • Obesity, morbid, BMI 40.0-49.9 ([facility] 6/18/2015   • RADHA TABLET DAILY.                           12/11/17 Amiodarone HCl        100MG     1 tablet by mouth twice daily            12/11/17 Digoxin               125MCG    1 tablet by mouth daily                  12/11/17 DilTIAZem HCl ER [id number]     1 tablet by normal. No masses felt. No rebound. Not distended  Extremities: Without clubbing, cyanosis or edema. No cords. Homans neg bilat  Neurologic: Alert and oriented; a bit anxious. Skin: Warm and dry.  No rash    Laboratory Data:    Lab Results  Component Valu

## 2018-02-06 NOTE — ED PROVIDER NOTES
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     Physical Exam   ED Triage Vitals [02/06/18 1321]  BP: (!) 162/71  Pulse: 58  Resp: 22  Temp: 98 °F (36.7 °C)  Temp src: Temporal  SpO2: 98 %  O2 Device: None (Room air)    Current:/72   Pulse 52   Temp The following orders were created for panel order CBC WITH DIFFERENTIAL WITH PLATELET.   Procedure                               Abnormality         Status                     ---------                               -----------         ------ PM           Disposition and Plan     Clinical Impression:  Acute chest pain  (primary encounter diagnosis)  Dizziness  Acute nonintractable headache, unspecified headache type    Disposition:  Admit  2/6/2018  3:27 pm    Follow-up:  No follow-up provider

## 2018-02-07 ENCOUNTER — APPOINTMENT (OUTPATIENT)
Dept: ULTRASOUND IMAGING | Age: 78
End: 2018-02-07
Attending: INTERNAL MEDICINE
Payer: MEDICARE

## 2018-02-07 ENCOUNTER — APPOINTMENT (OUTPATIENT)
Dept: ULTRASOUND IMAGING | Facility: HOSPITAL | Age: 78
End: 2018-02-07
Attending: INTERNAL MEDICINE
Payer: MEDICARE

## 2018-02-07 ENCOUNTER — PRIOR ORIGINAL RECORDS (OUTPATIENT)
Dept: OTHER | Age: 78
End: 2018-02-07

## 2018-02-07 LAB
BASOPHILS # BLD AUTO: 0.03 X10(3) UL (ref 0–0.1)
BASOPHILS NFR BLD AUTO: 0.5 %
BILIRUB UR QL STRIP.AUTO: NEGATIVE
BUN BLD-MCNC: 22 MG/DL (ref 8–20)
CALCIUM BLD-MCNC: 8.5 MG/DL (ref 8.3–10.3)
CHLORIDE: 108 MMOL/L (ref 101–111)
CLARITY UR REFRACT.AUTO: CLEAR
CO2: 28 MMOL/L (ref 22–32)
COLOR UR AUTO: YELLOW
CREAT BLD-MCNC: 0.75 MG/DL (ref 0.55–1.02)
EOSINOPHIL # BLD AUTO: 0.13 X10(3) UL (ref 0–0.3)
EOSINOPHIL NFR BLD AUTO: 2.2 %
ERYTHROCYTE [DISTWIDTH] IN BLOOD BY AUTOMATED COUNT: 15.7 % (ref 11.5–16)
GLUCOSE BLD-MCNC: 100 MG/DL (ref 65–99)
GLUCOSE BLD-MCNC: 113 MG/DL (ref 65–99)
GLUCOSE BLD-MCNC: 89 MG/DL (ref 70–99)
GLUCOSE BLD-MCNC: 99 MG/DL (ref 65–99)
GLUCOSE UR STRIP.AUTO-MCNC: NEGATIVE MG/DL
HAV IGM SER QL: 2.4 MG/DL (ref 1.7–3)
HCT VFR BLD AUTO: 34.2 % (ref 34–50)
HGB BLD-MCNC: 11.3 G/DL (ref 12–16)
IMMATURE GRANULOCYTE COUNT: 0.03 X10(3) UL (ref 0–1)
IMMATURE GRANULOCYTE RATIO %: 0.5 %
INR BLD: 2.08 (ref 0.89–1.11)
KETONES UR STRIP.AUTO-MCNC: NEGATIVE MG/DL
LYMPHOCYTES # BLD AUTO: 1.35 X10(3) UL (ref 0.9–4)
LYMPHOCYTES NFR BLD AUTO: 22.6 %
MCH RBC QN AUTO: 29.2 PG (ref 27–33.2)
MCHC RBC AUTO-ENTMCNC: 33 G/DL (ref 31–37)
MCV RBC AUTO: 88.4 FL (ref 81–100)
MONOCYTES # BLD AUTO: 0.52 X10(3) UL (ref 0.1–0.6)
MONOCYTES NFR BLD AUTO: 8.7 %
NEUTROPHIL ABS PRELIM: 3.92 X10 (3) UL (ref 1.3–6.7)
NEUTROPHILS # BLD AUTO: 3.92 X10(3) UL (ref 1.3–6.7)
NEUTROPHILS NFR BLD AUTO: 65.5 %
NITRITE UR QL STRIP.AUTO: NEGATIVE
PH UR STRIP.AUTO: 6 [PH] (ref 4.5–8)
PLATELET # BLD AUTO: 161 10(3)UL (ref 150–450)
POTASSIUM SERPL-SCNC: 3.7 MMOL/L (ref 3.6–5.1)
PROT UR STRIP.AUTO-MCNC: NEGATIVE MG/DL
PSA SERPL DL<=0.01 NG/ML-MCNC: 23.7 SECONDS (ref 12–14.3)
RBC # BLD AUTO: 3.87 X10(6)UL (ref 3.8–5.1)
RBC UR QL AUTO: NEGATIVE
RED CELL DISTRIBUTION WIDTH-SD: 50.5 FL (ref 35.1–46.3)
SODIUM SERPL-SCNC: 142 MMOL/L (ref 136–144)
SP GR UR STRIP.AUTO: 1.02 (ref 1–1.03)
TROPONIN: <0.046 NG/ML (ref ?–0.05)
UROBILINOGEN UR STRIP.AUTO-MCNC: <2 MG/DL
WBC # BLD AUTO: 6 X10(3) UL (ref 4–13)

## 2018-02-07 PROCEDURE — 99225 SUBSEQUENT OBSERVATION CARE: CPT | Performed by: INTERNAL MEDICINE

## 2018-02-07 PROCEDURE — 99215 OFFICE O/P EST HI 40 MIN: CPT | Performed by: OTHER

## 2018-02-07 PROCEDURE — 93970 EXTREMITY STUDY: CPT | Performed by: INTERNAL MEDICINE

## 2018-02-07 PROCEDURE — 95816 EEG AWAKE AND DROWSY: CPT | Performed by: OTHER

## 2018-02-07 RX ORDER — POTASSIUM CHLORIDE 20 MEQ/1
40 TABLET, EXTENDED RELEASE ORAL ONCE
Status: COMPLETED | OUTPATIENT
Start: 2018-02-07 | End: 2018-02-07

## 2018-02-07 RX ORDER — FUROSEMIDE 40 MG/1
40 TABLET ORAL DAILY
Status: DISCONTINUED | OUTPATIENT
Start: 2018-02-07 | End: 2018-02-08

## 2018-02-07 RX ORDER — DILTIAZEM HYDROCHLORIDE 120 MG/1
120 CAPSULE, EXTENDED RELEASE ORAL DAILY
Status: DISCONTINUED | OUTPATIENT
Start: 2018-02-08 | End: 2018-02-08

## 2018-02-07 NOTE — PROGRESS NOTES
[facility]  Progress Note     Ksenia Gutierrez Patient Status:  Observation    1940 MRN [id number]   [facility] 2NE-A Attending Tre Cabrera MD   Hosp Day # 0 PCP JENNA Ferreira     Chief Complaint: headache    S: Patient still mcg Oral Daily   • DilTIAZem HCl ER Coated Beads  300 mg Oral Daily   • Losartan Potassium  25 mg Oral Daily   • Metoprolol Tartrate  50 mg Oral BID   • Warfarin Sodium  2.5 mg Oral Once per day on Sun Wed   • Warfarin Sodium  5 mg Oral Once per day on Mon

## 2018-02-07 NOTE — CM/SW NOTE
02/07/18 1000   CM/SW Screening   Referral Source Social Work (self-referral)   Information Source Chart review;[facility] staff;Nursing rounds   Patient's Mental Status Alert;Oriented       Patient's post d/c needs discussed in care rounds with FAYE BECKMAN, RN.  Marco Antonio Barnett

## 2018-02-07 NOTE — PROCEDURES
Date of Procedure: 2/7/2018    Procedure: EEG (ELECTROENCEPHALOGRAM)     DX: DIZZINESS  HX: PT IS A 77 YO FEMALE WHO PRESENTS TO [facility] [address] DIZZINESS AND CHEST PAIN. PT HAS ALSO BEEN HAVING HEADACHES LATELY.  THE PT DOES HAVE A HX OF MAJOR HEAD

## 2018-02-07 NOTE — PHYSICAL THERAPY NOTE
PHYSICAL THERAPY EVALUATION - INPATIENT     Room Number: 8415/8338-H  Evaluation Date: 2/7/2018  Type of Evaluation: Initial  Physician Order: PT Eval and Treat    Presenting Problem: dizziness, HA, intermittent chest pain and blurry vision  Reason f 2014- benign   • Lymph node enlargement 1/14/2014    Neck 2014   • Migraines    • Muscle weakness    • Neuropathy 9/16/2016    Severe both legs   • NSVT (nonsustained ventricular tachycardia) ([facility] 7/11/2014    Recurrence 7.2016 Hx VT- s/p ICD implant on 7 mod I with ADLs. SUBJECTIVE  \"Sure I can walk. \"    Patient self-stated goal is to go home at d/c    OBJECTIVE  Precautions: None  Fall Risk: High fall risk    WEIGHT BEARING RESTRICTION  Weight Bearing Restriction: None                PAIN ASSESSMENT (CGA)  Distance (ft): 250  Assistive Device: Rolling walker  Pattern: R Steppage;L Steppage (wide VICTOR HUGO, forward flexed posture)  Stoop/Curb Assistance: Not tested       Skilled Therapy Provided: Pt sitting in chair upon PT arrival. SBA for sit>stand to RW. with assist from family as needed and OPPT for balance retraining and full vestibular assessment. DISCHARGE RECOMMENDATIONS  PT Discharge Recommendations: Outpatient PT; Home    PLAN  PT Treatment Plan: Patient education;Gait training;Strengthening;Balance

## 2018-02-07 NOTE — PROGRESS NOTES
02/06/18 1829 02/06/18 1831   Vital Signs   /54 126/71   BP Location Left arm Left arm   BP Method Automatic Automatic   Patient Position Sitting Standing     Orthostatics negative

## 2018-02-07 NOTE — PROGRESS NOTES
[facility] Cardiology Progress Note    Subjective:  Had a dizzy episode during my visit today, her bp and rhythm were normal at the time. She is also bothered by a constant bilateral temporal HA. She also has L shoulder and arm pain.      Objective:  / dilated LA. Of note EF has been as low as 25-30% a few years back. Admitted with dizziness, HA, and fatigue. On ICD interrogation had VT on 2/3 and 2/4 that terminated with ATP. Had another episode on 2/5.   Patient states she can feel it everytime she

## 2018-02-07 NOTE — PLAN OF CARE
Diabetes/Glucose Control    • Glucose maintained within prescribed range Progressing        Patient/Family Goals    • Patient/Family Long Term Goal Progressing    • Patient/Family Short Term Goal Progressing          Pt received at 0700.  Alert and oriented

## 2018-02-07 NOTE — PLAN OF CARE
Pt is AOx4, denies chest pain or dyspnea. Room air. A-flutter on tele in the 45s and 46s. Tylenol prn for headache. Up with SBA and a walker. QID accucheck. Will monitor.

## 2018-02-07 NOTE — CONSULTS
[id number] Karime Laurent Neurology Consultation    Date of consult: 2/7/2018    Reason for consult: dizziness, headache    HPI: Pawan Pruitt is a 70year old female with past medical history of afib, VT with ICD, ICH s/p craniotomy, abnormal EEG, DM who pre Min PRN   Or      Glucose-Vitamin C (DEX-4) 4-0.006 g chewable tab 8 tablet 8 tablet Oral Q15 Min PRN   Insulin Aspart Pen (NOVOLOG) 100 UNIT/ML flexpen 1-5 Units 1-5 Units Subcutaneous TID CC and HS     Allergies:    Lisinopril              Coughing  Mexi [facility])    • Shortness of breath    • Stroke [facility])    • Valvular disease    • Varicose vein 9/18/2015     Past Surgical History:  No date: ANGIOGRAM  No date: APPENDECTOMY  3/24/2011 [facility]: HERNIA SURGERY      Comment: Repair of Incarcerated Complex Vent LT  Gait: deferred  Romberg:defered  Neck: supple    Data and Notes Reviewed on 2/7/2018  Labs Reviewed: Recent Labs   Lab  02/07/18   0546   RBC  3.87   HGB  11.3*   HCT  34.2   MCV  88.4   MCH  29.2   MCHC  33.0   RDW  15.7   NEPRELIM  3.92   WBC  6.0

## 2018-02-07 NOTE — PLAN OF CARE
Diabetes/Glucose Control    • Glucose maintained within prescribed range Progressing        Patient/Family Goals    • Patient/Family Long Term Goal Progressing    • Patient/Family Short Term Goal Progressing          Pt received at approx 1700.  Alert and o

## 2018-02-07 NOTE — ICD/PM
Medtronic single lead ICD. 3 recent stored episodes since last checked. 2  pace terminated episodes:  2/5 at 08:59,   And 2/4 at 07:53.      1 NSVT no treatment, 2/3 at 17:39. EMG of episodes in ECG section of chart.

## 2018-02-07 NOTE — PAYOR COMM NOTE
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 2]    Present on Admission  Date Reviewed: 11/20/2017          ICD-10-CM Noted POA    Acute chest pain R07.9 2/6/2018 Unknown    Azotemia R79.89 2/6/2018 Yes    Hyperglycemia R73.9 2/6/2018 Yes[KF. 2]        Daphne Aparicio MD on 2/6/2018  4:14 PM Oral Tab Take 1 tablet (25 mg total) by mouth daily. Disp: 90 tablet Rfl: 3   docusate sodium 100 MG Oral Cap Take 100 mg by mouth 2 (two) times daily. Disp:  Rfl:    Multiple Vitamins-Minerals (TAB-A-MAE MAXIMUM) Oral Tab Take 1 tablet by mouth daily.  Marita Epperson last 72 hours. Imaging: Imaging data reviewed in Epic. ASSESSMENT / PLAN:     1. Headache, h/o ICB  1. CT head reviewed- no acute bleed[GS.1]  2. Agree with ESR  2. Slurred speech  1. CT head with no acute findings, pt with ICD unable to get MRI  2.  Ne 2/6/2018 1546 New Bag 1000 mL Intravenous Justyna Valiente RN      Warfarin Sodium (COUMADIN) tab 5 mg     Date Action Dose Route User    2/6/2018 1958 Given 5 mg Oral Kimberly Christensen, RN          REVIEWER COMMENTS:     PLEASE REVIEW AND FAX ALL INPT DAYS A

## 2018-02-08 ENCOUNTER — PRIOR ORIGINAL RECORDS (OUTPATIENT)
Dept: OTHER | Age: 78
End: 2018-02-08

## 2018-02-08 VITALS
TEMPERATURE: 98 F | BODY MASS INDEX: 31.89 KG/M2 | SYSTOLIC BLOOD PRESSURE: 122 MMHG | DIASTOLIC BLOOD PRESSURE: 52 MMHG | OXYGEN SATURATION: 97 % | WEIGHT: 198.44 LBS | HEIGHT: 66 IN | HEART RATE: 51 BPM | RESPIRATION RATE: 16 BRPM

## 2018-02-08 LAB
GLUCOSE BLD-MCNC: 114 MG/DL (ref 65–99)
GLUCOSE BLD-MCNC: 84 MG/DL (ref 65–99)
GLUCOSE BLD-MCNC: 99 MG/DL (ref 65–99)
INR BLD: 1.9 (ref 0.89–1.11)
POTASSIUM SERPL-SCNC: 4.1 MMOL/L (ref 3.6–5.1)
PSA SERPL DL<=0.01 NG/ML-MCNC: 22.1 SECONDS (ref 12–14.3)

## 2018-02-08 PROCEDURE — 99217 OBSERVATION CARE DISCHARGE: CPT | Performed by: INTERNAL MEDICINE

## 2018-02-08 PROCEDURE — 99214 OFFICE O/P EST MOD 30 MIN: CPT | Performed by: OTHER

## 2018-02-08 RX ORDER — DILTIAZEM HYDROCHLORIDE 120 MG/1
120 CAPSULE, COATED, EXTENDED RELEASE ORAL DAILY
Qty: 30 CAPSULE | Refills: 5 | Status: SHIPPED | OUTPATIENT
Start: 2018-02-09 | End: 2018-05-30

## 2018-02-08 NOTE — PLAN OF CARE
Assumed care of patient around 0730, alert and oriented X4, no c/o CP/SOB, SpO2 98 % on RA  Rhythm/HR: a fib 50s  Seizure precautions in place    Dr Michelle Whyte- ok for dc if neuro and cards is ok  1430-s/w Dr Diego Cortez for pt to go home, needs fu outpatient with

## 2018-02-08 NOTE — PROGRESS NOTES
[facility]  Progress Note     Selina Turner Patient Status:  Observation    1940 MRN [id number]   [facility] 2NE-A Attending Marisela Yang MD   Hosp Day # 0 PCP JENNA Chavarria     Chief Complaint: headache    S: Patient doing • amiodarone HCl  200 mg Oral BID   • atorvastatin  10 mg Oral Nightly   • Losartan Potassium  25 mg Oral Daily   • Metoprolol Tartrate  50 mg Oral BID   • Warfarin Sodium  2.5 mg Oral Once per day on Sun Wed   • Warfarin Sodium  5 mg Oral Once per day o

## 2018-02-08 NOTE — PROGRESS NOTES
[facility]  Cardiology Progress Note    Dmitry  Patient Status:  Observation    1940 MRN [id number]   [facility] 2NE-A Attending Jaclyn Alcantara MD   Hosp Day # 0 PCP JENNA Duckworth     Subjective:  Denies dizziness, wants t DVT/PE  · Bradycardia - improved after dig stopped and cardizem decreased. HRs now in the 50s. Plan:   1. Continue ARB, BB , amiodarone, diuretic, decreased CCB   2. Discharge planning to home if ok with Dr. Marielena Hawk.      CRAIG Freed  2/8/2018  9:

## 2018-02-08 NOTE — CM/SW NOTE
Call placed to patient's [facility]'s [phone] regarding cardiazem cost--with insurance, $17.24 per month

## 2018-02-08 NOTE — PROGRESS NOTES
INPATIENT NEUROLOGY PROGRESS NOTE    Date: 2/8/2018    Kevin Ferrera is a 70year old female at Hospital Day ( LOS: 0 days )    Assessment:  Chronic intermittent dizziness likely autonomic dysfunction related, other possible etiology include vestibular hy (DEX-4) 4-0.006 g chewable tab 8 tablet 8 tablet Oral Q15 Min PRN   Insulin Aspart Pen (NOVOLOG) 100 UNIT/ML flexpen 1-5 Units 1-5 Units Subcutaneous TID CC and HS        Objective:   Physical Exam:  /52 (BP Location: Left arm)   Pulse 51   Temp 98. 1

## 2018-02-08 NOTE — DISCHARGE SUMMARY
[facility] HOSPITALIST  DISCHARGE SUMMARY     Petros Rocha Patient Status:  Observation    1940 MRN [id number]   [facility] 2NE-A Attending Evelio Junior MD   Hosp Day # 0 PCP JENNA Baeza     Date of Admission: 2018  Date of Disc changes in the cerebral white matter. If there is clinical concern for acute ischemia/infarction, an MRI of the brain would be recommended for further evaluation.     Dictated by: Jacky De La Paz MD on 2/06/2018 at 15:20     Approved by: Jacky De La Paz MD Calcium Carbonate-Vitamin D 600-400 MG-UNIT Tabs      Take 1 tablet by mouth daily. Refills:  0     docusate sodium 100 MG Caps  Commonly known as:  COLACE      Take 100 mg by mouth 2 (two) times daily.    Refills:  0     furosemide 40 MG Tabs  Commonly danay Nuñez, DO  [address] [place] [address] 15  [phone]    In 2 weeks        Vital signs:  Temp:  [97.4 °F (36.3 °C)-98.1 °F (36.7 °C)] 98.1 °F (36.7 °C)  Pulse:  [51-61] 51  Resp:  [14-18] 16  BP: (120143)/(92-70) 122/

## 2018-02-22 ENCOUNTER — HOSPITAL ENCOUNTER (OUTPATIENT)
Dept: LAB | Facility: HOSPITAL | Age: 78
Discharge: HOME | End: 2018-02-22
Attending: INTERNAL MEDICINE
Payer: MEDICARE

## 2018-02-22 DIAGNOSIS — I48.91 ATRIAL FIBRILLATION (HCC): ICD-10-CM

## 2018-02-22 LAB — POC INR: 1.3 (ref 0.8–1.3)

## 2018-02-22 PROCEDURE — 85610 PROTHROMBIN TIME: CPT

## 2018-02-26 ENCOUNTER — PRIOR ORIGINAL RECORDS (OUTPATIENT)
Dept: OTHER | Age: 78
End: 2018-02-26

## 2018-02-26 ENCOUNTER — HOSPITAL ENCOUNTER (OUTPATIENT)
Dept: LAB | Facility: HOSPITAL | Age: 78
Discharge: HOME | End: 2018-02-26
Attending: INTERNAL MEDICINE
Payer: MEDICARE

## 2018-02-26 DIAGNOSIS — E11.9 TYPE 2 DIABETES MELLITUS WITHOUT COMPLICATION, WITHOUT LONG-TERM CURRENT USE OF INSULIN (HCC): ICD-10-CM

## 2018-02-26 DIAGNOSIS — R80.9 MICROALBUMINURIA: ICD-10-CM

## 2018-02-26 DIAGNOSIS — E55.9 VITAMIN D DEFICIENCY: ICD-10-CM

## 2018-02-26 DIAGNOSIS — K80.50 BILIARY COLIC: ICD-10-CM

## 2018-02-26 DIAGNOSIS — R79.89 ELEVATED LFTS: ICD-10-CM

## 2018-02-26 LAB
25-HYDROXYVITAMIN D (TOTAL): 30.5 NG/ML (ref 30–100)
ALBUMIN SERPL-MCNC: 3.5 G/DL (ref 3.5–4.8)
ALP LIVER SERPL-CCNC: 239 U/L (ref 55–142)
ALT SERPL-CCNC: 38 U/L (ref 14–54)
AST SERPL-CCNC: 16 U/L (ref 15–41)
BILIRUB SERPL-MCNC: 0.7 MG/DL (ref 0.1–2)
BUN BLD-MCNC: 21 MG/DL (ref 8–20)
CALCIUM BLD-MCNC: 8.7 MG/DL (ref 8.3–10.3)
CHLORIDE: 107 MMOL/L (ref 101–111)
CO2: 27 MMOL/L (ref 22–32)
CREAT BLD-MCNC: 0.93 MG/DL (ref 0.55–1.02)
CREAT UR-SCNC: 74.6 MG/DL
EST. AVERAGE GLUCOSE BLD GHB EST-MCNC: 128 MG/DL (ref 68–126)
GLUCOSE BLD-MCNC: 100 MG/DL (ref 70–99)
HAV IGM SER QL: 2.2 MG/DL (ref 1.7–3)
HBA1C MFR BLD HPLC: 6.1 % (ref ?–5.7)
M PROTEIN MFR SERPL ELPH: 6.7 G/DL (ref 6.1–8.3)
MICROALBUMIN UR-MCNC: 0.81 MG/DL
MICROALBUMIN/CREAT 24H UR-RTO: 10.9 UG/MG (ref ?–30)
POTASSIUM SERPL-SCNC: 4.1 MMOL/L (ref 3.6–5.1)
SODIUM SERPL-SCNC: 141 MMOL/L (ref 136–144)

## 2018-02-26 PROCEDURE — 80053 COMPREHEN METABOLIC PANEL: CPT | Performed by: INTERNAL MEDICINE

## 2018-02-26 PROCEDURE — 83036 HEMOGLOBIN GLYCOSYLATED A1C: CPT

## 2018-02-26 PROCEDURE — 82570 ASSAY OF URINE CREATININE: CPT

## 2018-02-26 PROCEDURE — 82306 VITAMIN D 25 HYDROXY: CPT

## 2018-02-26 PROCEDURE — 83735 ASSAY OF MAGNESIUM: CPT | Performed by: INTERNAL MEDICINE

## 2018-02-26 PROCEDURE — 82043 UR ALBUMIN QUANTITATIVE: CPT

## 2018-02-26 PROCEDURE — 36415 COLL VENOUS BLD VENIPUNCTURE: CPT

## 2018-02-28 ENCOUNTER — OFFICE VISIT (OUTPATIENT)
Dept: FAMILY MEDICINE CLINIC | Facility: CLINIC | Age: 78
End: 2018-02-28

## 2018-02-28 VITALS
TEMPERATURE: 98 F | WEIGHT: 198 LBS | BODY MASS INDEX: 31.82 KG/M2 | HEIGHT: 66 IN | RESPIRATION RATE: 16 BRPM | DIASTOLIC BLOOD PRESSURE: 60 MMHG | SYSTOLIC BLOOD PRESSURE: 126 MMHG | HEART RATE: 58 BPM

## 2018-02-28 DIAGNOSIS — K81.9 CHOLECYSTITIS: ICD-10-CM

## 2018-02-28 DIAGNOSIS — I26.99 OTHER PULMONARY EMBOLISM WITHOUT ACUTE COR PULMONALE, UNSPECIFIED CHRONICITY (HCC): ICD-10-CM

## 2018-02-28 DIAGNOSIS — R74.8 ELEVATED ALKALINE PHOSPHATASE LEVEL: Primary | ICD-10-CM

## 2018-02-28 DIAGNOSIS — I50.32 CHRONIC DIASTOLIC CONGESTIVE HEART FAILURE (HCC): ICD-10-CM

## 2018-02-28 DIAGNOSIS — E11.8 TYPE 2 DIABETES MELLITUS WITH COMPLICATION, UNSPECIFIED LONG TERM INSULIN USE STATUS: ICD-10-CM

## 2018-02-28 DIAGNOSIS — R56.1 SEIZURES, POST-TRAUMATIC (HCC): ICD-10-CM

## 2018-02-28 PROBLEM — R51.9 ACUTE NONINTRACTABLE HEADACHE, UNSPECIFIED HEADACHE TYPE: Status: RESOLVED | Noted: 2018-02-06 | Resolved: 2018-02-28

## 2018-02-28 PROBLEM — R00.2 HEART PALPITATIONS: Status: RESOLVED | Noted: 2018-02-06 | Resolved: 2018-02-28

## 2018-02-28 PROBLEM — J44.89 ASTHMA WITH COPD (CHRONIC OBSTRUCTIVE PULMONARY DISEASE): Chronic | Status: ACTIVE | Noted: 2018-02-28

## 2018-02-28 PROBLEM — J44.9 ASTHMA WITH COPD (CHRONIC OBSTRUCTIVE PULMONARY DISEASE) (HCC): Chronic | Status: ACTIVE | Noted: 2018-02-28

## 2018-02-28 PROBLEM — R07.9 ACUTE CHEST PAIN: Status: RESOLVED | Noted: 2018-02-06 | Resolved: 2018-02-28

## 2018-02-28 PROCEDURE — 99214 OFFICE O/P EST MOD 30 MIN: CPT | Performed by: FAMILY MEDICINE

## 2018-02-28 NOTE — PROGRESS NOTES
[facility] Group Visit Note  2/28/2018      Subjective:      Patient ID: Alphonso Downs is a 70year old female. Chief Complaint:  Patient presents with:  Diabetic Flu: here for 3 month f/u.  States her BP has beening running high at home, 180's/90's TempSrc: Temporal   Weight: 198 lb   Height: 66\"       Physical Examination   General:  Alert, in no acute distress  HEENT: NCAT, EOMI, mucus membranes moist   Neck:  No cervical lymphadenopathy  CV: Regular rate and rhythm.  No murmurs, gallops, or rubs

## 2018-03-01 ENCOUNTER — PRIOR ORIGINAL RECORDS (OUTPATIENT)
Dept: OTHER | Age: 78
End: 2018-03-01

## 2018-03-01 ENCOUNTER — HOSPITAL ENCOUNTER (OUTPATIENT)
Dept: LAB | Facility: HOSPITAL | Age: 78
Discharge: HOME | End: 2018-03-01
Attending: INTERNAL MEDICINE
Payer: MEDICARE

## 2018-03-01 DIAGNOSIS — I48.91 ATRIAL FIBRILLATION (HCC): ICD-10-CM

## 2018-03-01 LAB
ALBUMIN: 3.5 G/DL
ALKALINE PHOSPHATATE(ALK PHOS): 239 IU/L
BILIRUBIN TOTAL: 0.7 MG/DL
BUN: 21 MG/DL
CALCIUM: 8.7 MG/DL
CHLORIDE: 107 MEQ/L
CREATININE, SERUM: 0.93 MG/DL
GLUCOSE: 100 MG/DL
MAGNESIUM: 2.2 MG/DL
POC INR: 1.6 (ref 0.8–1.3)
POTASSIUM, SERUM: 4.1 MEQ/L
PROTEIN, TOTAL: 6.7 G/DL
SGOT (AST): 16 IU/L
SGPT (ALT): 38 IU/L
SODIUM: 141 MEQ/L

## 2018-03-01 PROCEDURE — 85610 PROTHROMBIN TIME: CPT

## 2018-03-05 LAB
ALT (SGPT): 38 U/L
AST (SGOT): 16 U/L
ESR (SED RATE): 21 MM/HR
GLUCOSE: 100 MG/DL
HEMATOCRIT: 34.2 %
HEMOGLOBIN A1C: 6 %
HEMOGLOBIN A1C: 6.1 %
HEMOGLOBIN: 11.3 G/DL
PLATELETS: 161 K/UL
POTASSIUM, SERUM: 4.1 MEQ/L
RED BLOOD COUNT: 3.87 X 10-6/U
VITAMIN D 25-OH: 30.5 NG/ML
WHITE BLOOD COUNT: 6 X 10-3/U

## 2018-03-08 ENCOUNTER — HOSPITAL ENCOUNTER (OUTPATIENT)
Dept: LAB | Facility: HOSPITAL | Age: 78
Discharge: HOME | End: 2018-03-08
Attending: INTERNAL MEDICINE
Payer: MEDICARE

## 2018-03-08 DIAGNOSIS — I48.91 ATRIAL FIBRILLATION (HCC): ICD-10-CM

## 2018-03-08 LAB — POC INR: 1.6 (ref 0.8–1.3)

## 2018-03-08 PROCEDURE — 85610 PROTHROMBIN TIME: CPT

## 2018-03-14 ENCOUNTER — HOSPITAL ENCOUNTER (OUTPATIENT)
Dept: LAB | Facility: HOSPITAL | Age: 78
Discharge: HOME | End: 2018-03-14
Attending: INTERNAL MEDICINE
Payer: MEDICARE

## 2018-03-14 DIAGNOSIS — I48.91 ATRIAL FIBRILLATION (HCC): ICD-10-CM

## 2018-03-14 LAB — POC INR: 2.5 (ref 0.8–1.3)

## 2018-03-14 PROCEDURE — 85610 PROTHROMBIN TIME: CPT

## 2018-03-20 ENCOUNTER — PRIOR ORIGINAL RECORDS (OUTPATIENT)
Dept: OTHER | Age: 78
End: 2018-03-20

## 2018-03-29 ENCOUNTER — MYAURORA ACCOUNT LINK (OUTPATIENT)
Dept: OTHER | Age: 78
End: 2018-03-29

## 2018-03-29 ENCOUNTER — HOSPITAL ENCOUNTER (OUTPATIENT)
Dept: LAB | Facility: HOSPITAL | Age: 78
Discharge: HOME | End: 2018-03-29
Attending: INTERNAL MEDICINE
Payer: MEDICARE

## 2018-03-29 DIAGNOSIS — I48.91 ATRIAL FIBRILLATION (HCC): ICD-10-CM

## 2018-03-29 LAB — POC INR: 1.9 (ref 0.8–1.3)

## 2018-03-29 PROCEDURE — 85610 PROTHROMBIN TIME: CPT

## 2018-04-12 ENCOUNTER — HOSPITAL ENCOUNTER (OUTPATIENT)
Dept: LAB | Facility: HOSPITAL | Age: 78
Discharge: HOME | End: 2018-04-12
Attending: INTERNAL MEDICINE
Payer: MEDICARE

## 2018-04-12 DIAGNOSIS — I48.91 ATRIAL FIBRILLATION (HCC): ICD-10-CM

## 2018-04-12 PROCEDURE — 85610 PROTHROMBIN TIME: CPT

## 2018-04-13 ENCOUNTER — HOSPITAL ENCOUNTER (OUTPATIENT)
Age: 78
Discharge: HOME | End: 2018-04-13
Payer: MEDICARE

## 2018-04-13 VITALS — DIASTOLIC BLOOD PRESSURE: 80 MMHG | SYSTOLIC BLOOD PRESSURE: 153 MMHG

## 2018-04-13 DIAGNOSIS — S61.411A SKIN TEAR OF RIGHT HAND WITHOUT COMPLICATION, INITIAL ENCOUNTER: Primary | ICD-10-CM

## 2018-04-13 PROCEDURE — 99213 OFFICE O/P EST LOW 20 MIN: CPT

## 2018-04-13 NOTE — ED INITIAL ASSESSMENT (HPI)
Patient reports she hit her hand against the counter last night and it broke it open. Patient reports she already was bruised on the hand. Patient reports she is on blood thinners and the bleeding has not stopped.

## 2018-04-13 NOTE — ED PROVIDER NOTES
Patient Seen in: [facility]    History   Patient presents with:  Laceration Abrasion (integumentary)    Stated Complaint: laceration to hand on  blood thinners x 1 day    HPI      Patient is a 30-year-old female with moderate me trivial   • Pulmonary HTN ([address] 10/10/2014   • Rheumatoid arthritis ([address]    • S/P ICD (internal cardiac defibrillator) procedure 7/7/2016    medtronic    • S/P total knee replacement 6/18/2015    bilat [facility], both severe OA   • Seizure disorder ([address]    • distress, RR, no retraction  Extremities: Full ROM, no deformity. normal cap refill and intact sensation  Skin: 3.5 cm crescent shaped skin tear/avulsion to the right dorsal hand.   Neuro: Cranial nerves intact, Normal Gait     ED Course   Labs Reviewed - N

## 2018-05-08 RX ORDER — POTASSIUM CHLORIDE 20 MEQ/1
TABLET, EXTENDED RELEASE ORAL
Qty: 90 TABLET | Refills: 0 | Status: SHIPPED | OUTPATIENT
Start: 2018-05-08 | End: 2018-05-30

## 2018-05-08 RX ORDER — TORSEMIDE 20 MG/1
TABLET ORAL
Qty: 180 TABLET | Refills: 0 | Status: SHIPPED | OUTPATIENT
Start: 2018-05-08 | End: 2018-05-30

## 2018-05-08 RX ORDER — METOPROLOL TARTRATE 50 MG/1
TABLET, FILM COATED ORAL
Qty: 180 TABLET | Refills: 0 | Status: SHIPPED | OUTPATIENT
Start: 2018-05-08 | End: 2018-05-30

## 2018-05-08 RX ORDER — WARFARIN SODIUM 5 MG/1
TABLET ORAL
Qty: 30 TABLET | Refills: 0 | Status: SHIPPED | OUTPATIENT
Start: 2018-05-08 | End: 2018-05-30

## 2018-05-08 RX ORDER — LOSARTAN POTASSIUM 25 MG/1
TABLET ORAL
Qty: 90 TABLET | Refills: 0 | Status: SHIPPED | OUTPATIENT
Start: 2018-05-08 | End: 2018-05-30

## 2018-05-08 NOTE — TELEPHONE ENCOUNTER
Requesting   POTASSIUM CL 20MEQ ER TABLETS  NON Protocol Med  TORSEMIDE 20MG TABLETS  Hypertension Medications Protocol Passed  LOSARTAN 25MG TABLETS  Hypertension Medications Protocol Passed  METOPROLOL TARTRATE 50MG TABLETS  Hypertension Medications Prot

## 2018-05-24 ENCOUNTER — HOSPITAL ENCOUNTER (OUTPATIENT)
Dept: LAB | Facility: HOSPITAL | Age: 78
Discharge: HOME | End: 2018-05-24
Attending: INTERNAL MEDICINE
Payer: MEDICARE

## 2018-05-24 DIAGNOSIS — I48.91 ATRIAL FIBRILLATION (HCC): ICD-10-CM

## 2018-05-24 PROCEDURE — 85610 PROTHROMBIN TIME: CPT

## 2018-05-30 PROBLEM — D69.6 THROMBOCYTOPENIA (HCC): Chronic | Status: ACTIVE | Noted: 2018-05-30

## 2018-05-30 RX ORDER — FLUOXETINE HYDROCHLORIDE 20 MG/1
CAPSULE ORAL
Qty: 90 CAPSULE | Refills: 0 | OUTPATIENT
Start: 2018-05-30

## 2018-05-30 NOTE — TELEPHONE ENCOUNTER
FLUOXETINE 20MG CAPSULES  Will file in chart as: FLUOXETINE HCL 20 MG Oral Cap  TAKE 1 CAPSULE(20 MG) BY MOUTH DAILY       Disp: 90 capsule Refills: 0    Class: Normal Start: 5/30/2018   Originally ordered:  Today by Flaco Sánchez MD  Last refill: 5/3

## 2018-05-30 NOTE — PROGRESS NOTES
[facility] Group Visit Note  5/30/2018      Subjective:      Patient ID: Olesya Almonte is a 70year old female. Chief Complaint:  Patient presents with: Follow - Up: here for 3 month ckup.  States her daughter wants her to discuss possibly starting TAKE 1 TABLET(20 MG) BY MOUTH TWICE DAILY  Dispense: 180 tablet; Refill: 3  - Losartan Potassium 25 MG Oral Tab; TAKE 1 TABLET(25 MG) BY MOUTH DAILY  Dispense: 90 tablet; Refill: 3  - Metoprolol Tartrate 50 MG Oral Tab;  Take 1 tablet (50 mg total) by mouth

## 2018-05-31 ENCOUNTER — TELEPHONE (OUTPATIENT)
Dept: FAMILY MEDICINE CLINIC | Facility: CLINIC | Age: 78
End: 2018-05-31

## 2018-05-31 NOTE — TELEPHONE ENCOUNTER
Fax received stating Klor-Con 20 meq ER tablets not covered by patient's insurance. I called to verify what is, the pharmacist said patient picked up RX sent on 5/8/18 - there was some kind of computer glitch where it did not change to generic.   There is

## 2018-06-07 ENCOUNTER — HOSPITAL ENCOUNTER (OUTPATIENT)
Dept: LAB | Facility: HOSPITAL | Age: 78
Discharge: HOME | End: 2018-06-07
Attending: INTERNAL MEDICINE
Payer: MEDICARE

## 2018-06-07 DIAGNOSIS — I48.91 ATRIAL FIBRILLATION (HCC): ICD-10-CM

## 2018-06-07 PROCEDURE — 85610 PROTHROMBIN TIME: CPT

## 2018-06-08 ENCOUNTER — TELEPHONE (OUTPATIENT)
Dept: FAMILY MEDICINE CLINIC | Facility: CLINIC | Age: 78
End: 2018-06-08

## 2018-06-08 RX ORDER — SERTRALINE HYDROCHLORIDE 25 MG/1
25 TABLET, FILM COATED ORAL DAILY
Qty: 30 TABLET | Refills: 0 | Status: SHIPPED | OUTPATIENT
Start: 2018-06-08 | End: 2018-06-29

## 2018-06-08 RX ORDER — SERTRALINE HYDROCHLORIDE 25 MG/1
TABLET, FILM COATED ORAL
Qty: 90 TABLET | Refills: 0 | OUTPATIENT
Start: 2018-06-08

## 2018-06-08 NOTE — TELEPHONE ENCOUNTER
Patient called and states that she believes the Fluoxetine she was started on 5/30/18 is effecting her PT/INR. Patient had a reading today of 4.9 and previous check on 5/24/18 was 1.7. (Coumadin is managed by coumadin clinic).  Used Up to date to check inte (Systemic), Piroxicam (Topical), Prasugrel, Propyphenazone, Sarpogrelate, Sertraline, Sulfinpyrazone, Sulindac, Tenoxicam, Tiaprofenic Acid, Ticagrelor, Ticlopidine, Tirofiban, Tolfenamic Acid, Tolmetin, Triflusal, Venlafaxine, Vilazodone, Vorapaxar, Vorti

## 2018-06-08 NOTE — TELEPHONE ENCOUNTER
Patient informed of MD recommendations, patient verbalized understanding with intent to comply. Offered opportunity to ask questions, all questions were answered.   Future Appointments  Date Time Provider Sara Boo   6/12/2018 8:40 AM [facility]

## 2018-06-08 NOTE — TELEPHONE ENCOUNTER
SERTRALINE 25MG TABLETS  Will file in chart as: SERTRALINE HCL 25 MG Oral Tab  TAKE 1 TABLET(25 MG) BY MOUTH DAILY       Disp: 90 tablet Refills: 0    Class: Normal Start: 6/8/2018   Originally ordered:  Today by Dorrie Gitelman, MD  Last refill: 6/8/201

## 2018-06-08 NOTE — TELEPHONE ENCOUNTER
Pt needs a call back from  nurse she states that the medication dr prescribed is giving her some sort of reaction please call pt at [phone]

## 2018-06-08 NOTE — TELEPHONE ENCOUNTER
Yes, fluoxetine can increase the INR more than the rest in that category, so please stop. Let's switch to a med less likely to increase it. (sertraline) same potential side effect, but less likely to affect the INR and take 1# PO QD. See me in 1 mo.

## 2018-06-12 ENCOUNTER — HOSPITAL ENCOUNTER (OUTPATIENT)
Dept: LAB | Facility: HOSPITAL | Age: 78
Discharge: HOME | End: 2018-06-12
Attending: INTERNAL MEDICINE
Payer: MEDICARE

## 2018-06-12 DIAGNOSIS — I48.91 ATRIAL FIBRILLATION (HCC): ICD-10-CM

## 2018-06-12 PROCEDURE — 36415 COLL VENOUS BLD VENIPUNCTURE: CPT

## 2018-06-12 PROCEDURE — 85610 PROTHROMBIN TIME: CPT

## 2018-06-19 ENCOUNTER — HOSPITAL ENCOUNTER (OUTPATIENT)
Dept: LAB | Facility: HOSPITAL | Age: 78
Discharge: HOME | End: 2018-06-19
Attending: INTERNAL MEDICINE
Payer: MEDICARE

## 2018-06-19 DIAGNOSIS — I48.91 ATRIAL FIBRILLATION (HCC): ICD-10-CM

## 2018-06-19 LAB
ALBUMIN: 3.4 G/DL
ALKALINE PHOSPHATATE(ALK PHOS): 139 IU/L
BILIRUBIN TOTAL: 0.4 MG/DL
BUN: 24 MG/DL
CALCIUM: 9 MG/DL
CHLORIDE: 105 MEQ/L
CREATININE, SERUM: 1.02 MG/DL
GLUCOSE: 152 MG/DL
HEMATOCRIT: 37 %
HEMOGLOBIN: 12 G/DL
PLATELETS: 183 K/UL
POTASSIUM, SERUM: 4 MEQ/L
PROTEIN, TOTAL: 7 G/DL
RED BLOOD COUNT: 4.15 X 10-6/U
SGOT (AST): 15 IU/L
SGPT (ALT): 25 IU/L
SODIUM: 142 MEQ/L
THYROID STIMULATING HORMONE: 0.45 MLU/L
WHITE BLOOD COUNT: 7.9 X 10-3/U

## 2018-06-19 PROCEDURE — 85610 PROTHROMBIN TIME: CPT

## 2018-06-27 ENCOUNTER — HOSPITAL ENCOUNTER (OUTPATIENT)
Dept: LAB | Facility: HOSPITAL | Age: 78
Discharge: HOME | End: 2018-06-27
Attending: INTERNAL MEDICINE
Payer: MEDICARE

## 2018-06-27 DIAGNOSIS — I48.91 ATRIAL FIBRILLATION (HCC): ICD-10-CM

## 2018-06-27 PROCEDURE — 85610 PROTHROMBIN TIME: CPT

## 2018-06-29 ENCOUNTER — APPOINTMENT (OUTPATIENT)
Dept: LAB | Age: 78
End: 2018-06-29
Attending: FAMILY MEDICINE
Payer: MEDICARE

## 2018-06-29 DIAGNOSIS — K81.9 CHOLECYSTITIS: ICD-10-CM

## 2018-06-29 DIAGNOSIS — E11.8 TYPE 2 DIABETES MELLITUS WITH COMPLICATION (HCC): ICD-10-CM

## 2018-06-29 DIAGNOSIS — R74.8 ELEVATED ALKALINE PHOSPHATASE LEVEL: ICD-10-CM

## 2018-06-29 LAB
ALBUMIN SERPL-MCNC: 3.3 G/DL (ref 3.5–4.8)
ALP LIVER SERPL-CCNC: 117 U/L (ref 55–142)
ALT SERPL-CCNC: 42 U/L (ref 14–54)
AST SERPL-CCNC: 31 U/L (ref 15–41)
BILIRUB SERPL-MCNC: 0.5 MG/DL (ref 0.1–2)
BUN BLD-MCNC: 18 MG/DL (ref 8–20)
CALCIUM BLD-MCNC: 8.8 MG/DL (ref 8.3–10.3)
CHLORIDE: 106 MMOL/L (ref 101–111)
CHOLEST SMN-MCNC: 167 MG/DL (ref ?–200)
CO2: 27 MMOL/L (ref 22–32)
CREAT BLD-MCNC: 1 MG/DL (ref 0.55–1.02)
CREAT UR-SCNC: 52 MG/DL
EST. AVERAGE GLUCOSE BLD GHB EST-MCNC: 123 MG/DL (ref 68–126)
GLUCOSE BLD-MCNC: 88 MG/DL (ref 70–99)
HBA1C MFR BLD HPLC: 5.9 % (ref ?–5.7)
HDLC SERPL-MCNC: 96 MG/DL (ref 45–?)
HDLC SERPL: 1.74 {RATIO} (ref ?–4.44)
LDLC SERPL CALC-MCNC: 62 MG/DL (ref ?–130)
M PROTEIN MFR SERPL ELPH: 6.8 G/DL (ref 6.1–8.3)
MICROALBUMIN UR-MCNC: 0.97 MG/DL
MICROALBUMIN/CREAT 24H UR-RTO: 18.7 UG/MG (ref ?–30)
NONHDLC SERPL-MCNC: 71 MG/DL (ref ?–130)
POTASSIUM SERPL-SCNC: 3.7 MMOL/L (ref 3.6–5.1)
SODIUM SERPL-SCNC: 142 MMOL/L (ref 136–144)
TRIGL SERPL-MCNC: 47 MG/DL (ref ?–150)
VLDLC SERPL CALC-MCNC: 9 MG/DL (ref 5–40)

## 2018-06-29 PROCEDURE — 82570 ASSAY OF URINE CREATININE: CPT

## 2018-06-29 PROCEDURE — 80061 LIPID PANEL: CPT

## 2018-06-29 PROCEDURE — 80053 COMPREHEN METABOLIC PANEL: CPT

## 2018-06-29 PROCEDURE — 82043 UR ALBUMIN QUANTITATIVE: CPT

## 2018-06-29 PROCEDURE — 84075 ASSAY ALKALINE PHOSPHATASE: CPT

## 2018-06-29 PROCEDURE — 83036 HEMOGLOBIN GLYCOSYLATED A1C: CPT

## 2018-06-29 PROCEDURE — 84080 ASSAY ALKALINE PHOSPHATASES: CPT

## 2018-06-29 PROCEDURE — 36415 COLL VENOUS BLD VENIPUNCTURE: CPT

## 2018-06-29 NOTE — PROGRESS NOTES
705 [facility] Group Visit Note  6/29/2018      Subjective:      Patient ID: Sandra Munroe is a 70year old female. Chief Complaint:  Patient presents with:  Depression: Here for 1 month f/u.       HPI:  Sandra Munroe is a 70year old female who is zina

## 2018-07-01 ENCOUNTER — MED REC SCAN ONLY (OUTPATIENT)
Dept: FAMILY MEDICINE CLINIC | Facility: CLINIC | Age: 78
End: 2018-07-01

## 2018-07-01 LAB
ALK-PHOSPHATASE BONE CALC: 33 U/L
ALK-PHOSPHATASE LIVER CALC: 86 U/L
ALK-PHOSPHATASE OTHER CALC: 0 U/L
ALKALINE PHOSPHATASE: 119 U/L

## 2018-07-03 ENCOUNTER — HOSPITAL ENCOUNTER (OUTPATIENT)
Dept: LAB | Facility: HOSPITAL | Age: 78
Discharge: HOME | End: 2018-07-03
Attending: INTERNAL MEDICINE
Payer: MEDICARE

## 2018-07-03 DIAGNOSIS — I48.91 ATRIAL FIBRILLATION (HCC): ICD-10-CM

## 2018-07-03 LAB — POC INR: 3.8 (ref 0.8–1.3)

## 2018-07-03 PROCEDURE — 85610 PROTHROMBIN TIME: CPT

## 2018-07-12 ENCOUNTER — HOSPITAL ENCOUNTER (OUTPATIENT)
Dept: LAB | Facility: HOSPITAL | Age: 78
Discharge: HOME | End: 2018-07-12
Attending: INTERNAL MEDICINE
Payer: MEDICARE

## 2018-07-12 ENCOUNTER — PRIOR ORIGINAL RECORDS (OUTPATIENT)
Dept: OTHER | Age: 78
End: 2018-07-12

## 2018-07-12 DIAGNOSIS — I48.91 ATRIAL FIBRILLATION (HCC): ICD-10-CM

## 2018-07-12 LAB
ALT SERPL-CCNC: 62 U/L (ref 14–54)
AST SERPL-CCNC: 39 U/L (ref 15–41)
FREE T4: 1.5 NG/DL (ref 0.9–1.8)
TSI SER-ACNC: 0.53 MIU/ML (ref 0.35–5.5)

## 2018-07-12 PROCEDURE — 84443 ASSAY THYROID STIM HORMONE: CPT | Performed by: INTERNAL MEDICINE

## 2018-07-12 PROCEDURE — 84450 TRANSFERASE (AST) (SGOT): CPT | Performed by: INTERNAL MEDICINE

## 2018-07-12 PROCEDURE — 36415 COLL VENOUS BLD VENIPUNCTURE: CPT | Performed by: INTERNAL MEDICINE

## 2018-07-12 PROCEDURE — 84460 ALANINE AMINO (ALT) (SGPT): CPT | Performed by: INTERNAL MEDICINE

## 2018-07-12 PROCEDURE — 85610 PROTHROMBIN TIME: CPT

## 2018-07-12 PROCEDURE — 84439 ASSAY OF FREE THYROXINE: CPT | Performed by: INTERNAL MEDICINE

## 2018-07-16 ENCOUNTER — APPOINTMENT (OUTPATIENT)
Dept: GENERAL RADIOLOGY | Facility: HOSPITAL | Age: 78
DRG: 312 | End: 2018-07-16
Attending: EMERGENCY MEDICINE
Payer: MEDICARE

## 2018-07-16 ENCOUNTER — APPOINTMENT (OUTPATIENT)
Dept: CT IMAGING | Facility: HOSPITAL | Age: 78
DRG: 312 | End: 2018-07-16
Attending: EMERGENCY MEDICINE
Payer: MEDICARE

## 2018-07-16 ENCOUNTER — HOSPITAL ENCOUNTER (INPATIENT)
Facility: HOSPITAL | Age: 78
LOS: 2 days | Discharge: HOME | DRG: 312 | End: 2018-07-18
Attending: EMERGENCY MEDICINE | Admitting: HOSPITALIST
Payer: MEDICARE

## 2018-07-16 ENCOUNTER — APPOINTMENT (OUTPATIENT)
Dept: CV DIAGNOSTICS | Facility: HOSPITAL | Age: 78
DRG: 312 | End: 2018-07-16
Attending: INTERNAL MEDICINE
Payer: MEDICARE

## 2018-07-16 DIAGNOSIS — R55 SYNCOPE AND COLLAPSE: Primary | ICD-10-CM

## 2018-07-16 DIAGNOSIS — S01.01XA SCALP LACERATION, INITIAL ENCOUNTER: ICD-10-CM

## 2018-07-16 DIAGNOSIS — S12.001A CLOSED NONDISPLACED FRACTURE OF FIRST CERVICAL VERTEBRA, UNSPECIFIED FRACTURE MORPHOLOGY, INITIAL ENCOUNTER (HCC): ICD-10-CM

## 2018-07-16 LAB
ALBUMIN SERPL-MCNC: 3.2 G/DL (ref 3.5–4.8)
ALP LIVER SERPL-CCNC: 100 U/L (ref 55–142)
ALT SERPL-CCNC: 44 U/L (ref 14–54)
ANTIBODY SCREEN: NEGATIVE
AST SERPL-CCNC: 28 U/L (ref 15–41)
ATRIAL RATE: 98 BPM
BASOPHILS # BLD AUTO: 0.01 X10(3) UL (ref 0–0.1)
BASOPHILS # BLD AUTO: 0.01 X10(3) UL (ref 0–0.1)
BASOPHILS NFR BLD AUTO: 0.1 %
BASOPHILS NFR BLD AUTO: 0.1 %
BILIRUB SERPL-MCNC: 0.5 MG/DL (ref 0.1–2)
BUN BLD-MCNC: 23 MG/DL (ref 8–20)
CALCIUM BLD-MCNC: 8.4 MG/DL (ref 8.3–10.3)
CHLORIDE: 105 MMOL/L (ref 101–111)
CO2: 27 MMOL/L (ref 22–32)
CREAT BLD-MCNC: 1.16 MG/DL (ref 0.55–1.02)
EOSINOPHIL # BLD AUTO: 0.01 X10(3) UL (ref 0–0.3)
EOSINOPHIL # BLD AUTO: 0.13 X10(3) UL (ref 0–0.3)
EOSINOPHIL NFR BLD AUTO: 0.1 %
EOSINOPHIL NFR BLD AUTO: 1.1 %
ERYTHROCYTE [DISTWIDTH] IN BLOOD BY AUTOMATED COUNT: 14.2 % (ref 11.5–16)
ERYTHROCYTE [DISTWIDTH] IN BLOOD BY AUTOMATED COUNT: 14.3 % (ref 11.5–16)
GLUCOSE BLD-MCNC: 111 MG/DL (ref 65–99)
GLUCOSE BLD-MCNC: 117 MG/DL (ref 70–99)
GLUCOSE BLD-MCNC: 120 MG/DL (ref 65–99)
GLUCOSE BLD-MCNC: 135 MG/DL (ref 65–99)
GLUCOSE BLD-MCNC: 141 MG/DL (ref 65–99)
HCT VFR BLD AUTO: 29.3 % (ref 34–50)
HCT VFR BLD AUTO: 34 % (ref 34–50)
HGB BLD-MCNC: 11.2 G/DL (ref 12–16)
HGB BLD-MCNC: 8.8 G/DL (ref 12–16)
HGB BLD-MCNC: 9.3 G/DL (ref 12–16)
IMMATURE GRANULOCYTE COUNT: 0.06 X10(3) UL (ref 0–1)
IMMATURE GRANULOCYTE COUNT: 0.1 X10(3) UL (ref 0–1)
IMMATURE GRANULOCYTE RATIO %: 0.6 %
IMMATURE GRANULOCYTE RATIO %: 0.9 %
INR BLD: 1.27 (ref 0.9–1.1)
INR BLD: 2.69 (ref 0.9–1.1)
INR BLD: 2.89 (ref 0.9–1.1)
LYMPHOCYTES # BLD AUTO: 0.69 X10(3) UL (ref 0.9–4)
LYMPHOCYTES # BLD AUTO: 1.19 X10(3) UL (ref 0.9–4)
LYMPHOCYTES NFR BLD AUTO: 10.5 %
LYMPHOCYTES NFR BLD AUTO: 6.4 %
M PROTEIN MFR SERPL ELPH: 6.1 G/DL (ref 6.1–8.3)
MCH RBC QN AUTO: 30.5 PG (ref 27–33.2)
MCH RBC QN AUTO: 30.8 PG (ref 27–33.2)
MCHC RBC AUTO-ENTMCNC: 31.7 G/DL (ref 31–37)
MCHC RBC AUTO-ENTMCNC: 32.9 G/DL (ref 31–37)
MCV RBC AUTO: 93.4 FL (ref 81–100)
MCV RBC AUTO: 96.1 FL (ref 81–100)
MONOCYTES # BLD AUTO: 0.51 X10(3) UL (ref 0.1–1)
MONOCYTES # BLD AUTO: 0.69 X10(3) UL (ref 0.1–1)
MONOCYTES NFR BLD AUTO: 4.7 %
MONOCYTES NFR BLD AUTO: 6.1 %
NEUTROPHIL ABS PRELIM: 9.22 X10 (3) UL (ref 1.3–6.7)
NEUTROPHIL ABS PRELIM: 9.56 X10 (3) UL (ref 1.3–6.7)
NEUTROPHILS # BLD AUTO: 9.22 X10(3) UL (ref 1.3–6.7)
NEUTROPHILS # BLD AUTO: 9.56 X10(3) UL (ref 1.3–6.7)
NEUTROPHILS NFR BLD AUTO: 81.3 %
NEUTROPHILS NFR BLD AUTO: 88.1 %
PLATELET # BLD AUTO: 140 10(3)UL (ref 150–450)
PLATELET # BLD AUTO: 169 10(3)UL (ref 150–450)
POTASSIUM SERPL-SCNC: 3.4 MMOL/L (ref 3.6–5.1)
PSA SERPL DL<=0.01 NG/ML-MCNC: 16.4 SECONDS (ref 12.4–14.7)
PSA SERPL DL<=0.01 NG/ML-MCNC: 29.5 SECONDS (ref 12.4–14.7)
PSA SERPL DL<=0.01 NG/ML-MCNC: 31.2 SECONDS (ref 12.4–14.7)
Q-T INTERVAL: 450 MS
QRS DURATION: 160 MS
QTC CALCULATION (BEZET): 535 MS
R AXIS: 4 DEGREES
RBC # BLD AUTO: 3.05 X10(6)UL (ref 3.8–5.1)
RBC # BLD AUTO: 3.64 X10(6)UL (ref 3.8–5.1)
RED CELL DISTRIBUTION WIDTH-SD: 48.5 FL (ref 35.1–46.3)
RED CELL DISTRIBUTION WIDTH-SD: 49.7 FL (ref 35.1–46.3)
RH BLOOD TYPE: POSITIVE
SODIUM SERPL-SCNC: 140 MMOL/L (ref 136–144)
T AXIS: 124 DEGREES
TROPONIN: <0.046 NG/ML (ref ?–0.05)
VENTRICULAR RATE: 85 BPM
WBC # BLD AUTO: 10.8 X10(3) UL (ref 4–13)
WBC # BLD AUTO: 11.3 X10(3) UL (ref 4–13)

## 2018-07-16 PROCEDURE — 0HQ0XZZ REPAIR SCALP SKIN, EXTERNAL APPROACH: ICD-10-PCS | Performed by: EMERGENCY MEDICINE

## 2018-07-16 PROCEDURE — 93306 TTE W/DOPPLER COMPLETE: CPT | Performed by: INTERNAL MEDICINE

## 2018-07-16 PROCEDURE — 30233H1 TRANSFUSION OF NONAUTOLOGOUS WHOLE BLOOD INTO PERIPHERAL VEIN, PERCUTANEOUS APPROACH: ICD-10-PCS | Performed by: HOSPITALIST

## 2018-07-16 PROCEDURE — 70450 CT HEAD/BRAIN W/O DYE: CPT | Performed by: EMERGENCY MEDICINE

## 2018-07-16 PROCEDURE — 30233K1 TRANSFUSION OF NONAUTOLOGOUS FROZEN PLASMA INTO PERIPHERAL VEIN, PERCUTANEOUS APPROACH: ICD-10-PCS | Performed by: HOSPITALIST

## 2018-07-16 PROCEDURE — 73130 X-RAY EXAM OF HAND: CPT | Performed by: EMERGENCY MEDICINE

## 2018-07-16 PROCEDURE — 99223 1ST HOSP IP/OBS HIGH 75: CPT | Performed by: HOSPITALIST

## 2018-07-16 PROCEDURE — 72100 X-RAY EXAM L-S SPINE 2/3 VWS: CPT | Performed by: EMERGENCY MEDICINE

## 2018-07-16 PROCEDURE — 71045 X-RAY EXAM CHEST 1 VIEW: CPT | Performed by: EMERGENCY MEDICINE

## 2018-07-16 PROCEDURE — 72125 CT NECK SPINE W/O DYE: CPT | Performed by: EMERGENCY MEDICINE

## 2018-07-16 PROCEDURE — 72170 X-RAY EXAM OF PELVIS: CPT | Performed by: EMERGENCY MEDICINE

## 2018-07-16 RX ORDER — SODIUM CHLORIDE 9 MG/ML
INJECTION, SOLUTION INTRAVENOUS ONCE
Status: COMPLETED | OUTPATIENT
Start: 2018-07-16 | End: 2018-07-16

## 2018-07-16 RX ORDER — ATORVASTATIN CALCIUM 10 MG/1
10 TABLET, FILM COATED ORAL NIGHTLY
Status: DISCONTINUED | OUTPATIENT
Start: 2018-07-16 | End: 2018-07-19

## 2018-07-16 RX ORDER — METOPROLOL TARTRATE 50 MG/1
50 TABLET, FILM COATED ORAL 2 TIMES DAILY
Status: DISCONTINUED | OUTPATIENT
Start: 2018-07-16 | End: 2018-07-19

## 2018-07-16 RX ORDER — BISACODYL 10 MG
10 SUPPOSITORY, RECTAL RECTAL
Status: DISCONTINUED | OUTPATIENT
Start: 2018-07-16 | End: 2018-07-19

## 2018-07-16 RX ORDER — POTASSIUM CHLORIDE 20 MEQ/1
20 TABLET, EXTENDED RELEASE ORAL DAILY
Status: DISCONTINUED | OUTPATIENT
Start: 2018-07-17 | End: 2018-07-19

## 2018-07-16 RX ORDER — TORSEMIDE 20 MG/1
20 TABLET ORAL
Status: DISCONTINUED | OUTPATIENT
Start: 2018-07-17 | End: 2018-07-19

## 2018-07-16 RX ORDER — MORPHINE SULFATE 4 MG/ML
INJECTION, SOLUTION INTRAMUSCULAR; INTRAVENOUS
Status: COMPLETED
Start: 2018-07-16 | End: 2018-07-16

## 2018-07-16 RX ORDER — HYDROCODONE BITARTRATE AND ACETAMINOPHEN 5; 325 MG/1; MG/1
1 TABLET ORAL EVERY 4 HOURS PRN
Status: DISCONTINUED | OUTPATIENT
Start: 2018-07-16 | End: 2018-07-19

## 2018-07-16 RX ORDER — POLYETHYLENE GLYCOL 3350 17 G/17G
17 POWDER, FOR SOLUTION ORAL DAILY PRN
Status: DISCONTINUED | OUTPATIENT
Start: 2018-07-16 | End: 2018-07-19

## 2018-07-16 RX ORDER — ONDANSETRON 2 MG/ML
4 INJECTION INTRAMUSCULAR; INTRAVENOUS EVERY 6 HOURS PRN
Status: DISCONTINUED | OUTPATIENT
Start: 2018-07-16 | End: 2018-07-19

## 2018-07-16 RX ORDER — DOCUSATE SODIUM 100 MG/1
100 CAPSULE, LIQUID FILLED ORAL 2 TIMES DAILY
Status: DISCONTINUED | OUTPATIENT
Start: 2018-07-16 | End: 2018-07-19

## 2018-07-16 RX ORDER — MORPHINE SULFATE 4 MG/ML
1 INJECTION, SOLUTION INTRAMUSCULAR; INTRAVENOUS EVERY 2 HOUR PRN
Status: DISCONTINUED | OUTPATIENT
Start: 2018-07-16 | End: 2018-07-19

## 2018-07-16 RX ORDER — LOSARTAN POTASSIUM 50 MG/1
50 TABLET ORAL DAILY
Status: DISCONTINUED | OUTPATIENT
Start: 2018-07-17 | End: 2018-07-19

## 2018-07-16 RX ORDER — SODIUM PHOSPHATE, DIBASIC AND SODIUM PHOSPHATE, MONOBASIC 7; 19 G/133ML; G/133ML
1 ENEMA RECTAL ONCE AS NEEDED
Status: DISCONTINUED | OUTPATIENT
Start: 2018-07-16 | End: 2018-07-19

## 2018-07-16 RX ORDER — METOLAZONE 2.5 MG/1
2.5 TABLET ORAL WEEKLY
COMMUNITY
End: 2018-07-25

## 2018-07-16 RX ORDER — SODIUM CHLORIDE 9 MG/ML
INJECTION, SOLUTION INTRAVENOUS CONTINUOUS
Status: DISCONTINUED | OUTPATIENT
Start: 2018-07-16 | End: 2018-07-17

## 2018-07-16 RX ORDER — HYDROCODONE BITARTRATE AND ACETAMINOPHEN 5; 325 MG/1; MG/1
2 TABLET ORAL EVERY 4 HOURS PRN
Status: DISCONTINUED | OUTPATIENT
Start: 2018-07-16 | End: 2018-07-19

## 2018-07-16 RX ORDER — MORPHINE SULFATE 4 MG/ML
4 INJECTION, SOLUTION INTRAMUSCULAR; INTRAVENOUS EVERY 2 HOUR PRN
Status: DISCONTINUED | OUTPATIENT
Start: 2018-07-16 | End: 2018-07-19

## 2018-07-16 RX ORDER — METOCLOPRAMIDE HYDROCHLORIDE 5 MG/ML
10 INJECTION INTRAMUSCULAR; INTRAVENOUS EVERY 8 HOURS PRN
Status: DISCONTINUED | OUTPATIENT
Start: 2018-07-16 | End: 2018-07-19

## 2018-07-16 RX ORDER — MORPHINE SULFATE 4 MG/ML
INJECTION, SOLUTION INTRAMUSCULAR; INTRAVENOUS
Status: DISPENSED
Start: 2018-07-16 | End: 2018-07-16

## 2018-07-16 RX ORDER — ACETAMINOPHEN 325 MG/1
650 TABLET ORAL EVERY 4 HOURS PRN
Status: DISCONTINUED | OUTPATIENT
Start: 2018-07-16 | End: 2018-07-19

## 2018-07-16 RX ORDER — SODIUM CHLORIDE 9 MG/ML
125 INJECTION, SOLUTION INTRAVENOUS CONTINUOUS
Status: DISCONTINUED | OUTPATIENT
Start: 2018-07-16 | End: 2018-07-16

## 2018-07-16 RX ORDER — DILTIAZEM HYDROCHLORIDE 120 MG/1
120 CAPSULE, EXTENDED RELEASE ORAL DAILY
Status: DISCONTINUED | OUTPATIENT
Start: 2018-07-16 | End: 2018-07-19

## 2018-07-16 RX ORDER — AMIODARONE HYDROCHLORIDE 200 MG/1
200 TABLET ORAL 2 TIMES DAILY WITH MEALS
Status: DISCONTINUED | OUTPATIENT
Start: 2018-07-16 | End: 2018-07-19

## 2018-07-16 RX ORDER — MORPHINE SULFATE 4 MG/ML
2 INJECTION, SOLUTION INTRAMUSCULAR; INTRAVENOUS ONCE
Status: COMPLETED | OUTPATIENT
Start: 2018-07-16 | End: 2018-07-16

## 2018-07-16 RX ORDER — ACETAMINOPHEN 325 MG/1
650 TABLET ORAL EVERY 6 HOURS PRN
Status: DISCONTINUED | OUTPATIENT
Start: 2018-07-16 | End: 2018-07-19

## 2018-07-16 RX ORDER — MORPHINE SULFATE 4 MG/ML
2 INJECTION, SOLUTION INTRAMUSCULAR; INTRAVENOUS EVERY 2 HOUR PRN
Status: DISCONTINUED | OUTPATIENT
Start: 2018-07-16 | End: 2018-07-19

## 2018-07-16 NOTE — PROGRESS NOTES
07/16/18 1309   Clinical Encounter Type   Visited With Patient   Sacramental Encounters   Sacrament of Sick-Anointing Anointed  (Father Greg Quezada provided prayer, Scripture, support and Sacrament of the Sick.   to remain available at pager [phone].  )

## 2018-07-16 NOTE — HISTORICAL OFFICE NOTE
Sury Youngblood  : 1940  ACCOUNT:  [id number]  [phone]  PCP: Dr. Prosper Hutson     TODAY'S DATE: 2018  DICTATED BY:  [Dr. Kennedy Hosteller: [.]    HPI:    [On 2018, Linh Schulte, a 70year old female, presented with dyspnea Hypertension Weight Family    REVIEW OF SYSTEMS:    CONS: no fever, chills, or recent weight changes. EYES: denies significant visual changes. ENMT: denies difficulties with hearing, otherwise negative.  CV: peripheral edema, see CV exam. RESP: dyspnea and murmurs. PEDAL: pedal pulses intact. EXT: 1+ edema on right to mid-calf.      DECISION MAKIN-year-old female with a history of permanent atrial fibrillation, heart failure with preserved ejection fraction, and ventricular tachycardia status post Med TABLET TWICE DAILY.                     03/01/18 *Losartan Potassium   50MG      1 TABLET DAILY                           03/01/18 *DilTIAZem HCl ER     120MG     1 CAPSULE DAILY                          05/25/17 *Coumadin             5MG       1 by mouth a

## 2018-07-16 NOTE — PAYOR COMM NOTE
--------------  ADMISSION REVIEW     Payor: BCBS MEDICARE ADV PPO  Subscriber #:  [id number]  Authorization Number: N/A    Admit date: 7/16/18  Admit time: 8765       Admitting Physician: Holly Garcia MD  Attending Physician:  Blanca Crawford MD  [facility] Gallstones    • HIGH CHOLESTEROL    • Insomnia 11/25/2013   • KIDNEY STONE    • LAD (lymphadenopathy), cervical 1/18/2014    bx 2014- benign   • Lymph node enlargement 1/14/2014    Neck 2014   • Migraines    • Neuropathy 9/16/2016    Severe both legs   • N 0108]  SpO2: 93 % [07/16/18 0146]  O2 Device: n/a    Current:/64   Pulse 79   Temp 98.9 °F (37.2 °C) (Oral)   Resp 18   Ht 167.6 cm (5' 6\")   Wt 90.7 kg   SpO2 95%   BMI 32.28 kg/m²          Physical Exam    GENERAL: Patient is awake, alert,  in no - Abnormal; Notable for the following:     RBC 3.64 (*)     HGB 11.2 (*)     RDW-SD 48.5 (*)     Neutrophil Absolute Prelim 9.22 (*)     Neutrophil Absolute 9.22 (*)      EKG    Rate, intervals and axes as noted on EKG Report.   Rate: 85  Rhythm: Atrial Fib PAT^LINDSAY^MADHURI  YOB: 1940    Past Medical History (entered by Technologist):  cervical fx  Reason For Exam (entered by Technologist):  Pt fell. Lower back pain. Other Notes (entered by Technologist):      Additional Information (per Vision Ra primarily close, due to complex nature of the closure and possibility of revision a consult was placed for plastics and this was also discussed with Dr. Gabriel Carter the hospitalist physician.   Patient will be admitted for further evaluation treatment, patiisabelle Allergies:   Lisinopril              Coughing  Mexitil [Mexiletine]    RASH    Review of Systems:   A comprehensive 14 point review of systems was completed. Pertinent positives and negatives noted in the HPI.     Physical Exam:    /75   Puls Cardiology on consult. Will have defibrillator interrogated. 2. Atrial fibrillation-we will continue on Cardizem, amiodarone and metoprolol. Hold Coumadin for now due to bleeding.   3. Type 2 diabetes-we will place on hyperglycemia protocol with correcti place.  3.       Scalp laceration:  Staples and sutures utilized in the ER with consideration for further revision as necessary. 4.       History of congestive heart failure:  Diastolic in nature.   5.       Coagulopathy:  The patient was receiving warfari

## 2018-07-16 NOTE — CONSULTS
Patient: Raj Colon  Medical Record Number: [id number]    HISTORY OF CHIEF COMPLAINT:    Raj Colon is a 70year old female, who complains of 1 day h/o neck pain. Pt walked outside last night with her cane when she fell and hit her head.  She does ad • Other and unspecified hyperlipidemia    • Pericardial effusion 6/5/2014    trivial   • Pulmonary HTN ([facility] 10/10/2014   • Rheumatoid arthritis ([facility]    • S/P ICD (internal cardiac defibrillator) procedure 7/7/2016    medtronic    • S/P total knee repla STATED HISTORY: (As transcribed by Technologist)  Pt fell. New cervical fx. Lower back pain. FINDINGS:  No fracture, dislocation or osseous abnormality. Left lower quadrant sutures. CONCLUSION:  No abnormality demonstrated in the pelvis.     Dictated right hand. 2.  Moderate osteoarthritis of the right digits.     Dictated by: Gerry Kim MD on 7/16/2018 at 8:02     Approved by: Gerry Kim MD            Ct Brain Or Head ([id number])    Result Date: 7/16/2018  PROCEDURE:  CT BRAIN OR HEAD ([id number])  COMP COMPARISON:  [facility] , CT BRAIN OR HEAD ([id number]), 2/06/2018, 15:10. INDICATIONS:  fall  TECHNIQUE:  Noncontrast CT scanning of the cervical spine is performed from the skull base through C7. Multiplanar reconstructions are generated.   Dose reduction techni right lobe. CONCLUSION:  1.   There is an acute fracture of the right superior articular facet of C1 posteriorly with minimal widening of the right atlanto occipital articulations and minimal posterior displacement of the right lateral mass of C1 with fibrosis. Advanced osteoarthritic changes of both shoulders. Degenerative changes of thoracic spine. Calcified right paratracheal lymph nodes. No significant change. CONCLUSION:  No evidence of active cardiopulmonary disease.      Dictated by: Back clubbing or cyanosis.  Calves supple, NT   ABDOMINAL EXAM: Abdomen is soft, NT.  MUSCULOSKELETAL: Fluid, pain-free ROM to bilateral: ankles, knees  & hips                                                                                  MEDICAL DECISION Debra Quiñones

## 2018-07-16 NOTE — PROGRESS NOTES
Patient admitted post fall with no acute intracranial process on CT brain, but positive cervical fracture on CT cspine. C Collar in place. Patient seen and neuro exam completed.   Patient reports having trouble sleeping so around 1030pm last night she charlotte Approved by: Hafsa Ayala MD            Critical Care Neurology, Dr Chavo Pelayo,  was updated on patient arrival.  Parameters entered for SBP <150 and Neurochecks q2h. In ED both Spine surgery and Neurosurgery were consulted.   Per call list for spine--

## 2018-07-16 NOTE — CONSULTS
[facility]    PATIENT'S NAME: Maria Luisa Guerrero   ATTENDING PHYSICIAN: Rodolfo Bumpers, M.D. Camilla Stager PHYSICIAN: Landy Montejo M.D.    PATIENT ACCOUNT#:   [de-identified]    LOCATION:  [address] [place] [address]  MEDICAL RECORD #:   [id number]       DATE OF BIRTH: review and physical examination led to the following assessments:    1. Syncope: The etiology is unclear. The patient should have an ICD interrogation. An echocardiogram will be assessed. Initial biomarkers are negative.   Multiple etiologies are unde

## 2018-07-16 NOTE — CONSULTS
Cardiology Consult       NAME: Selina Turner - ROOM: 0490/4978-R - MRN: [id number] - Age: 70year old - :  1940    Date of Admission: 2018  1:05 AM  Admission Diagnosis: Syncope and collapse [R55]  Scalp laceration, initial encounter [S01. Maya Cagle Rheumatoid arthritis ([facility] [address]    • S/P ICD (internal cardiac defibrillator) procedure 7/7/2016    medtronic    • S/P total knee replacement 6/18/2015    bilat 1990s, both severe OA   • Seizure disorder ([facility] [address]    • Shortness of breath    • Stroke [facility])    • Lakia 98.9 °F (37.2 °C) (Oral)   Resp 18   Ht 167.6 cm (5' 6\")   Wt 200 lb (90.7 kg)   SpO2 97%   BMI 32.28 kg/m²     dictated    @EDWBRIEFLAB(CBC,TROP,CK,CKMB,BMP,MG,BNP,NA,K,BUN,CREATSERUM,PTT,PT,INR,WBC,HGB,PLT)    ASSESSMENT/PLAN: dictated    ·  Syncope- ch

## 2018-07-16 NOTE — ICD/PM
ICD interrogated. Had 4 couplets in the matter of a few minutes in the 11pm hour last night however no NSVT or tachyarrhythmias to explain event leading up to injuries. No therapies delivered.  Interrogation report reviewed with Ayan Leonardo from [employer], now

## 2018-07-16 NOTE — ED PROVIDER NOTES
Patient Seen in: [facility] Emergency Department    History   Patient presents with:  Trauma (cardiovascular, musculoskeletal)    Stated Complaint:     HPI    70-year-old female with history of atrial fibrillation on Coumadin, history of V. tach wit ([facility] 7/11/2014    Recurrence 7.2016 Hx VT- s/p ICD implant on 7/7/16.      • Obesity, morbid, BMI 40.0-49.9 ([facility] 6/18/2015   • RADHA (obstructive sleep apnea) 06/29/2012    Cannot tolerate CPAP machine- uses oxygen periodically    • Other and unspecified hy Ht 167.6 cm (5' 6\")   Wt 90.7 kg   SpO2 95%   BMI 32.28 kg/m²         Physical Exam    GENERAL: Patient is awake, alert,  in no acute distress. HEENT: Pupils equal round reactive to light, extraocular muscles are intact, there is no scleral icterus.   Enrique Alejandro (*)     Neutrophil Absolute 9.22 (*)     All other components within normal limits   TROPONIN I - Normal   CBC WITH DIFFERENTIAL WITH PLATELET    Narrative: The following orders were created for panel order CBC WITH DIFFERENTIAL WITH PLATELET.   Procedu Radiologist):    Comparison February 6, 2018    CT head without contrast  CT cervical spine without contrast    IMPRESSION:    Head:  No acute intracranial injury. No acute intracranial hemorrhage, mass effect, or midline shift.   Moderate left frontopariet fracture or malalignment. Bilateral hips are intact. Results were faxed on  Monday, July 16, 2018 at 06:13 ET. To discuss the findings, please call [phone] extension [phone]. Darylene Engman, M.D.       Laceration was anesthetized with lidocaine loc List

## 2018-07-16 NOTE — PLAN OF CARE
Patient admitted from ED to CNICU around 0530. S/p traumatic fall. Neuro checks every hour, patient is AOx4, neurologically intact. Anterior head laceration stapled and sutured in the ED, is currently bleeding, APN aware.  C1 fracture, c-collar applied per

## 2018-07-16 NOTE — PROGRESS NOTES
[facility]PITAL[facility]  Progress Note     Betty Dennison Patient Status:  Inpatient    1940 MRN [id number]   [facility] 6NE-A Attending Rachael Hays MD   Hosp Day # 0 PCP Karen Pena MD     Chief Complaint: scalp bleed.      S: Pa pending  3. trp neg  2. Chronic afib 70s  1. Tele, cards following  3. Left frontal parietal hematoma  4. Scalp lac bleeding 2/2 coagulopathy on coumadin  1. Recheck INR/hgb now, received vit K at 730, may need further reversal   2.  Unable to turn head at

## 2018-07-16 NOTE — ED INITIAL ASSESSMENT (HPI)
Pt, who's taking blood thinners, presents tonight with a head lac and multiple abrasions to both hands and left hip pain to palpation, her injuries sustained after she felt faint during a walk outside and fell onto the blacktop.  Now pt believes she lost co

## 2018-07-16 NOTE — CM/SW NOTE
MSW left a message for the patient's son Susana Santiago who is listed on the patient's demographic information. The patient is currently getting a bedside echo. MSW will continue to follow to complete dc planning assessment.       Stephanie Huffman, MATTIW

## 2018-07-16 NOTE — H&P
[facility] HOSPITALIST  History and Physical     Dylan Palacios Patient Status:  Emergency    1940 MRN [id number]   Location [address] Attending Cherrilul Villagomez, [facility] Day # 0 PCP Mariam Burnette MD     Chief Complaint: • Lymph node enlargement 1/14/2014    Neck 2014   • Migraines    • Neuropathy 9/16/2016    Severe both legs   • NSVT (nonsustained ventricular tachycardia) ([facility] [address] 7/11/2014    Recurrence 7.2016 Hx VT- s/p ICD implant on 7/7/16.      • Obesity, morbid, BMI current facility-administered medications on file prior to encounter. Current Outpatient Prescriptions on File Prior to Encounter:  Sertraline HCl 25 MG Oral Tab Take 1 tablet (25 mg total) by mouth daily.  Disp: 90 tablet Rfl: 0   DilTIAZem HCl ER Beads (1.676 m)   Wt 200 lb (90.7 kg)   SpO2 96%   BMI 32.28 kg/m²   General: No acute distress. Alert and oriented x 3. HEENT: Normocephalic atraumatic. Moist mucous membranes. EOM-I. PERRLA. Anicteric. Neck: No lymphadenopathy. No JVD. No carotid bruits.   Re Hypertension-we will continue on losartan, metoprolol. 5.  Depression-we will continue on Zoloft. 6.  Cervical fracture-Aspen collar placed. Spine surgery on consult. 7.  Scalp laceration-staples and sutures in the ER.   Extensive laceration will consul

## 2018-07-16 NOTE — PLAN OF CARE
Assumed care of patient at [address] [place] [address]. Patient drowsy but oriented x4. Patient follows commands, GALAVIZ, and pupils equal and reactive. VSS and patient complaining of pain which is relieved with medication. IV in place and stable with IVF per Md order.  Yolanda in samantha

## 2018-07-17 LAB
BASOPHILS # BLD AUTO: 0.02 X10(3) UL (ref 0–0.1)
BASOPHILS NFR BLD AUTO: 0.3 %
BLOOD TYPE BARCODE: 6200
BUN BLD-MCNC: 11 MG/DL (ref 8–20)
CALCIUM BLD-MCNC: 8.4 MG/DL (ref 8.3–10.3)
CHLORIDE: 106 MMOL/L (ref 101–111)
CO2: 27 MMOL/L (ref 22–32)
CREAT BLD-MCNC: 0.7 MG/DL (ref 0.55–1.02)
EOSINOPHIL # BLD AUTO: 0.09 X10(3) UL (ref 0–0.3)
EOSINOPHIL NFR BLD AUTO: 1.2 %
ERYTHROCYTE [DISTWIDTH] IN BLOOD BY AUTOMATED COUNT: 14.3 % (ref 11.5–16)
GLUCOSE BLD-MCNC: 101 MG/DL (ref 65–99)
GLUCOSE BLD-MCNC: 119 MG/DL (ref 65–99)
GLUCOSE BLD-MCNC: 130 MG/DL (ref 65–99)
GLUCOSE BLD-MCNC: 131 MG/DL (ref 65–99)
GLUCOSE BLD-MCNC: 88 MG/DL (ref 70–99)
HCT VFR BLD AUTO: 27.4 % (ref 34–50)
HGB BLD-MCNC: 8.8 G/DL (ref 12–16)
IMMATURE GRANULOCYTE COUNT: 0.02 X10(3) UL (ref 0–1)
IMMATURE GRANULOCYTE RATIO %: 0.3 %
LYMPHOCYTES # BLD AUTO: 1.18 X10(3) UL (ref 0.9–4)
LYMPHOCYTES NFR BLD AUTO: 15.1 %
MCH RBC QN AUTO: 31.5 PG (ref 27–33.2)
MCHC RBC AUTO-ENTMCNC: 32.1 G/DL (ref 31–37)
MCV RBC AUTO: 98.2 FL (ref 81–100)
MONOCYTES # BLD AUTO: 0.8 X10(3) UL (ref 0.1–1)
MONOCYTES NFR BLD AUTO: 10.2 %
NEUTROPHIL ABS PRELIM: 5.7 X10 (3) UL (ref 1.3–6.7)
NEUTROPHILS # BLD AUTO: 5.7 X10(3) UL (ref 1.3–6.7)
NEUTROPHILS NFR BLD AUTO: 72.9 %
PLATELET # BLD AUTO: 144 10(3)UL (ref 150–450)
POTASSIUM SERPL-SCNC: 3.7 MMOL/L (ref 3.6–5.1)
RBC # BLD AUTO: 2.79 X10(6)UL (ref 3.8–5.1)
RED CELL DISTRIBUTION WIDTH-SD: 51.2 FL (ref 35.1–46.3)
SODIUM SERPL-SCNC: 139 MMOL/L (ref 136–144)
WBC # BLD AUTO: 7.8 X10(3) UL (ref 4–13)

## 2018-07-17 PROCEDURE — 99233 SBSQ HOSP IP/OBS HIGH 50: CPT | Performed by: HOSPITALIST

## 2018-07-17 RX ORDER — POTASSIUM CHLORIDE 20 MEQ/1
40 TABLET, EXTENDED RELEASE ORAL ONCE
Status: DISCONTINUED | OUTPATIENT
Start: 2018-07-17 | End: 2018-07-19

## 2018-07-17 NOTE — PROGRESS NOTES
2 person skin check completed with SUSAN Higuera. Skin looks fine, bruise on left hand, left upper eyelid, and stapled laceration on head.

## 2018-07-17 NOTE — PROGRESS NOTES
Pts son visiting. States they are scheduled to take pt on trip driving to [address] in 2 weeks. Will have MD address whether pt can still go on trip. PT recommends home PT with 24 hour supervision.  Pt's son lives at home with her and someone is with her 2

## 2018-07-17 NOTE — PHYSICAL THERAPY NOTE
PHYSICAL THERAPY EVALUATION - INPATIENT     Room Number: 380/380-A  Evaluation Date: 7/17/2018  Type of Evaluation: Initial  Physician Order: PT Eval and Treat    Presenting Problem: syncope, fall, C1 fx  Reason for Therapy: Mobility Dysfunction and node enlargement 1/14/2014    Neck 2014   • Migraines    • Neuropathy 9/16/2016    Severe both legs   • NSVT (nonsustained ventricular tachycardia) ([facility] [address] 7/11/2014    Recurrence 7.2016 Hx VT- s/p ICD implant on 7/7/16.      • Obesity, morbid, BMI 40.0-49.9 collar)  Fall Risk: High fall risk    WEIGHT BEARING RESTRICTION  Weight Bearing Restriction: None                PAIN ASSESSMENT  Rating: 10  Location: neck, head  Management Techniques:  Activity promotion;Repositioning    COGNITION  · Overall Cognitive S and shaky but no LOB occurred. Tends to veer L with RW, occasional cues required to realign path in order to avoid running into obstacles in hallway. CGA for stand>sit into chair.      Exercise/Education Provided:  Functional activity tolerated  Gait traini level: supervision     Goal #3 Patient is able to ambulate 250 feet with assist device: walker - rolling at assistance level: supervision     Goal #4    Goal #5    Goal #6    Goal Comments: Goals established on 7/17/2018

## 2018-07-17 NOTE — PROGRESS NOTES
Pt transferred from ICU. Pt up in recliner. Pt has aspen collar on. Daughter called and updated on POC. She is POA.

## 2018-07-17 NOTE — PROGRESS NOTES
07/17/18 1053   Clinical Encounter Type   Visited With Patient   Continue Visiting No   Referral To (Patient's Power of  for Health Care is in patient's electronic chart/ spoke with patient/POA is her daughter and if she is not available

## 2018-07-17 NOTE — OCCUPATIONAL THERAPY NOTE
OCCUPATIONAL THERAPY EVALUATION - INPATIENT     Room Number: 380/380-A  Evaluation Date: 7/17/2018  Type of Evaluation: Initial  Presenting Problem: fall with cervical fracture    Physician Order: IP Consult to Occupational Therapy  Reason for Therapy: ADL 2014- benign   • Lymph node enlargement 1/14/2014    Neck 2014   • Migraines    • Neuropathy 9/16/2016    Severe both legs   • NSVT (nonsustained ventricular tachycardia) ([facility] [address] 7/11/2014    Recurrence 7.2016 Hx VT- s/p ICD implant on 7/7/16.      • Obesity, ADLs    SUBJECTIVE   Pt stated, \"I am just in some pain. \"    Patient self-stated goal is to get home     OBJECTIVE  Precautions: Cervical brace (Aspen collar)  Fall Risk: High fall risk    WEIGHT BEARING RESTRICTION  Weight Bearing Restriction: None ASSESSMENT  Supine to Sit : Minimum assistance  Sit to Stand: Minimum assistance    Skilled Therapy Provided: Pt was received up in chair for session, therapist educated pt on spinal precautions, therapist completed MMT and ROM on pt within spinal precauti None    PLAN  OT Treatment Plan: Balance activities; Energy conservation/work simplification techniques;ADL training;IADL training;Continued evaluation; Compensatory technique education;Equipment eval/education;Patient/Family training;Patient/Family educatio

## 2018-07-17 NOTE — PROGRESS NOTES
[facility]  Cardiology Progress Note    Krista Vazquez Patient Status:  Inpatient    1940 MRN [id number]   [facility] 3SW-A Attending Olimpia Valdes MD   Hosp Day # 1 PCP Bernarda Grace MD     Subjective:  Sitting upi in a ch single chamber ICD  · Diastolic heart failure, pulmonary HTN (PASP 81 mmHg)  · Left frontal parietal hematoma, scalp laceration  · Cervical C1 fracture  · DM  · HTN  · Hypercholesterolemia    Plan:   1. Continue to hold Coumadin for now  2.  Continue amioda

## 2018-07-17 NOTE — PAYOR COMM NOTE
--------------  CONTINUED STAY REVIEW    Payor: [employer] MEDICARE ADV PPO  Subscriber #:  [id number]  Authorization Number: [id number]    Admit date: 7/16/18  Admit time: [address]    Admitting Physician: Cuate Blum MD  Attending Physician:  Leilani Anthony MG per tab 1 tablet     Date Action Dose Route User    7/17/2018 0918 Given 1 tablet Oral Dara Marie, RN    7/17/2018 0425 Given 1 tablet Oral Isela Ortiz RN    7/17/2018 0006 Given 1 tablet Oral Isela Ortiz RN    7/16/2018 2041 Given 1 table

## 2018-07-17 NOTE — PROGRESS NOTES
Spoke with Marline Ramirez with cardiology at [phone]. Dr. Chika Wong will be rounding on pt later today. OK to give amiodarone for now. Hold all other BP and HR meds until they round.

## 2018-07-17 NOTE — PLAN OF CARE
Assumed care of this patient at 2300. Neuro checks every 4 hours. Patient is drowsy, yet AOx4, neurologically intact, no deficits are noted. Anterior head laceration telfa dressing CDI. Face swollen due to fall, however, no visual deficits.  Aspen collar wo

## 2018-07-17 NOTE — PROGRESS NOTES
[facility]ITAL[facility]  Progress Note     Michaela Olivo Patient Status:  Inpatient    1940 MRN [id number]   [facility] 6NE-A Attending Brandie aGrcia MD   Hosp Day # 1 PCP Malissa Damon MD     Chief Complaint: scalp bleed.      S: Pa 16.4*   INR  2.89*  2.69*  1.27*       Recent Labs   Lab  07/16/18   0120   TROP  <0.046            Imaging: Imaging data reviewed in Epic.     Medications:   • Potassium Chloride ER  40 mEq Oral Once   • docusate sodium  100 mg Oral BID   • amiodarone HCl

## 2018-07-18 ENCOUNTER — HOSPITAL ENCOUNTER (OUTPATIENT)
Dept: CARDIOLOGY CLINIC | Facility: HOSPITAL | Age: 78
End: 2018-07-18

## 2018-07-18 VITALS
DIASTOLIC BLOOD PRESSURE: 66 MMHG | HEART RATE: 69 BPM | SYSTOLIC BLOOD PRESSURE: 137 MMHG | RESPIRATION RATE: 18 BRPM | HEIGHT: 66 IN | OXYGEN SATURATION: 98 % | BODY MASS INDEX: 30.08 KG/M2 | WEIGHT: 187.19 LBS | TEMPERATURE: 98 F

## 2018-07-18 LAB
ALT (SGPT): 62 U/L
AST (SGOT): 39 U/L
FREE T4: 1.5 MG/DL
GLUCOSE BLD-MCNC: 103 MG/DL (ref 65–99)
GLUCOSE BLD-MCNC: 120 MG/DL (ref 65–99)
GLUCOSE BLD-MCNC: 156 MG/DL (ref 65–99)
HGB BLD-MCNC: 8.4 G/DL (ref 12–16)
INR BLD: 1.13 (ref 0.9–1.1)
POTASSIUM SERPL-SCNC: 3.6 MMOL/L (ref 3.6–5.1)
POTASSIUM SERPL-SCNC: 4.4 MMOL/L (ref 3.6–5.1)
PSA SERPL DL<=0.01 NG/ML-MCNC: 15 SECONDS (ref 12.4–14.7)
THYROID STIMULATING HORMONE: 0.53 MLU/L

## 2018-07-18 PROCEDURE — 99239 HOSP IP/OBS DSCHRG MGMT >30: CPT | Performed by: HOSPITALIST

## 2018-07-18 RX ORDER — LOSARTAN POTASSIUM 50 MG/1
50 TABLET ORAL DAILY
Qty: 30 TABLET | Refills: 5 | Status: ON HOLD | OUTPATIENT
Start: 2018-07-19 | End: 2018-07-28

## 2018-07-18 RX ORDER — POTASSIUM CHLORIDE 20 MEQ/1
40 TABLET, EXTENDED RELEASE ORAL EVERY 4 HOURS
Status: COMPLETED | OUTPATIENT
Start: 2018-07-18 | End: 2018-07-18

## 2018-07-18 RX ORDER — HYDROCODONE BITARTRATE AND ACETAMINOPHEN 5; 325 MG/1; MG/1
1 TABLET ORAL EVERY 6 HOURS PRN
Qty: 10 TABLET | Refills: 0 | Status: SHIPPED | OUTPATIENT
Start: 2018-07-18 | End: 2018-07-25

## 2018-07-18 NOTE — PROGRESS NOTES
[facility]  Progress Note    Radha Naylor Patient Status:  Inpatient    1940 MRN [id number]   [facility]SW-A [facility] PCP Scotty Olivas MD       Assessment:    · Chronic atrial f

## 2018-07-18 NOTE — PAYOR COMM NOTE
--------------  CONTINUED STAY REVIEW    Payor: [facility] MEDICARE ADV PPO  Subscriber #:  [id number]  Authorization Number: [id number]    Admit date: 7/16/18  Admit time: [address]    Admitting Physician: Comfort Wright MD  Attending Physician:  Pavithra Murillo 7/17/2018 1540 Given 1 tablet Oral Jason Sosa RN      HYDROcodone-acetaminophen [facility]) 5-325 MG per tab 2 tablet     Date Action Dose Route User    7/18/2018 1122 Given 2 tablet Oral Marissa Marcial RN    7/18/2018 2846 Given 2 tablet Oral Antonia

## 2018-07-18 NOTE — CM/SW NOTE
Orders for home health VN and p.t.   PT is recommending home physical therapy. Met with pt. Discussed recommendations. She is refusing any outside help at home. She states her family will be helping her and she does not need anything at this time.

## 2018-07-18 NOTE — PROGRESS NOTES
As per Dr. Elio Doll patient to wear Aspen Collar on at all times for 6 weeks and is okay for patient to go to [place] with family. RN spoke with Bharti, daughter in law regarding discharge, RN unable to reach son.

## 2018-07-18 NOTE — HOME CARE LIAISON
[facility] REC'D VERBAL ORDER TO CHECK PTNT INSURANCE.  [facility] IS NOT CONTRACTED WITH THE PTNT INSURANCE    THANKS  Martinez Souza

## 2018-07-18 NOTE — BH PROGRESS NOTE
[facility] Resource Referral Counselor Note    Sandra Munroe Patient Status:  Inpatient    1940 MRN [id number]   [facility] 3SW-A [facility] Day [facility] 2 PCP MD SHANNON Raymundo(subjecti

## 2018-07-18 NOTE — PHYSICAL THERAPY NOTE
PHYSICAL THERAPY TREATMENT NOTE - INPATIENT    Room Number: 380/380-A     Session: 1   Number of Visits to Meet Established Goals: 5    Presenting Problem: syncope, fall, C1 fx  History related to current admission: Pt fell posteriorly while taking a walk legs   • NSVT (nonsustained ventricular tachycardia) ([facility]) 7/11/2014    Recurrence 7.2016 Hx VT- s/p ICD implant on 7/7/16.      • Obesity, morbid, BMI 40.0-49.9 ([facility] [address] 6/18/2015   • RADHA (obstructive sleep apnea) 06/29/2012    Cannot tolerate CPAP machine- u +    ACTIVITY TOLERANCE  Room air  No shortness of breath  Heart Rate: 58  Blood Pressure: 109/52 after standing ex; post 5 min of rest 90/43; after rest 105/97 ; after mobility 694/41    AM-PAC '6-Clicks' INPATIENT SHORT FORM - BASIC MOBILITY  How much di within reach;RN aware of session/findings; All patient questions and concerns addressed    ASSESSMENT     Patient is a 70year old female admitted on 7/16/2018 for fall due to presumed syncopal episode with resultant C1 fx.  Pertinent comorbidities and pers Pyelonephritis

## 2018-07-18 NOTE — CM/SW NOTE
RN spoke with pts son. He states family will be with her at all times so he is fine with pt declining home health services. Dr. Gene Hay also was notified by RN.

## 2018-07-18 NOTE — DISCHARGE SUMMARY
[facility] [place] [facility]  DISCHARGE SUMMARY     Gemma Rosa Patient Status:  Inpatient    1940 MRN [id number]   [facility] 3SW-A Attending Olesya Finn Day # 2 PCP Ernie Barrett MD     Date of Admission:  discharge serration but denies any sinus/nasal congestion, cough, palpitations. No hematochezia, melena, hematuria, hemoptysis no vision or auditory changes. Brief Synopsis: Patient is 76yo female who had syncope while walking in her neighborhood.   She total) by mouth daily. Quantity:  30 tablet  Refills:  5        CONTINUE taking these medications      Instructions Prescription details   amiodarone HCl 200 MG Tabs  Commonly known as:  PACERONE      Take 200 mg by mouth 2 (two) times daily.    Refills: prescriptions at the location directed by your doctor or nurse    Bring a paper prescription for each of these medications  · HYDROcodone-acetaminophen 5-325 MG Tabs  · Losartan Potassium 50 MG Tabs         [place]    Follow-up appointment:   [facility]

## 2018-07-18 NOTE — OCCUPATIONAL THERAPY NOTE
OCCUPATIONAL THERAPY TREATMENT NOTE - INPATIENT     Room Number: 380/380-A  Session: 1   Number of Visits to Meet Established Goals: 5    Presenting Problem: fall with cervical fracture    History related to current admission: Pt fell posteriorly while alexandr both legs   • NSVT (nonsustained ventricular tachycardia) ([facility]) 7/11/2014    Recurrence 7.2016 Hx VT- s/p ICD implant on 7/7/16.      • Obesity, morbid, BMI 40.0-49.9 ([facility] [address] 6/18/2015   • RADHA (obstructive sleep apnea) 06/29/2012    Cannot tolerate CPAP yenifer patient currently need…  -   Putting on and taking off regular lower body clothing?: A Lot  -   Bathing (including washing, rinsing, drying)?: A Little  -   Toileting, which includes using toilet, bedpan or urinal? : A Little  -   Putting on and taking off family importance of 24-hr supervision for safety. Patient End of Session: Up in chair;Needs met;Call light within reach;RN aware of session/findings; All patient questions and concerns addressed;SCDs in place    ASSESSMENT   Patient seen for OT services spinalprecautions with independently --> in progress  Pt will complete all ADLs and IADLs while following spinal precautions with 1 verbal cue --> in progress  Pt will stand at sink for 5 minutes to complete grooming routine --> in progress  Pt will verbal

## 2018-07-18 NOTE — PROGRESS NOTES
[facility]PITAL[facility]  Progress Note     Pawan Pruitt Patient Status:  Inpatient    1940 MRN [id number]   [facility] 6NE-A Attending Janina Phan, [address] [facility] Day # 2 PCP Hannah Coleman MD     Chief Complaint: scalp bleed.      Arcenio Woodson Clearance: 64 mL/min (based on SCr of 0.7 mg/dL).     Recent Labs   Lab  07/16/18   0902  07/16/18   1618  07/18/18   0511   PTP  29.5*  16.4*  15.0*   INR  2.69*  1.27*  1.13*       Recent Labs   Lab  07/16/18   0120   TROP  <0.046            Imaging: Imag improved. Physical exam:  General: Patient awake alert. No acute respiratory distress. Lungs: CTA B  CVS: Regular rhythm  Abdomen: Soft, nontender/nondistended, positive bowel sounds. Extremities: No C/C/E. Assessment/plan:  Agree with above.   Okay fo

## 2018-07-18 NOTE — PROGRESS NOTES
Rn spoke with the patient regarding therapy recommendation HH however she refused.  RN made the daughter in law aware as well, MD made aware

## 2018-07-18 NOTE — PLAN OF CARE
Problem: Impaired Functional Mobility  Goal: Achieve highest/safest level of mobility/gait  Interventions:  - Assess patient's functional ability and stability  - Promote increasing activity/tolerance for mobility and gait  - Educate and engage patient/fam Do not make important decisions/Showering allowed/Do not drive or operate machinery/No Heavy lifting/straining

## 2018-07-19 ENCOUNTER — PATIENT OUTREACH (OUTPATIENT)
Dept: CASE MANAGEMENT | Age: 78
End: 2018-07-19

## 2018-07-19 DIAGNOSIS — S01.01XA SCALP LACERATION, INITIAL ENCOUNTER: ICD-10-CM

## 2018-07-19 DIAGNOSIS — S12.001A CLOSED NONDISPLACED FRACTURE OF FIRST CERVICAL VERTEBRA, UNSPECIFIED FRACTURE MORPHOLOGY, INITIAL ENCOUNTER (HCC): ICD-10-CM

## 2018-07-19 DIAGNOSIS — R55 SYNCOPE AND COLLAPSE: ICD-10-CM

## 2018-07-19 NOTE — CM/SW NOTE
07/19/18 0800   Discharge disposition   Expected discharge disposition Home or Self   Patient Refuses Rehab Services Yes   Discharge transportation Private car

## 2018-07-19 NOTE — PLAN OF CARE
Discharge instructions given to patient and son. All questions answered. Saline locked dc'd. No redness or swelling noted. Tele monitor removed. Patient discharged home per wheelchair with all belongings with patient.

## 2018-07-20 NOTE — PROGRESS NOTES
Initial Post Discharge Follow Up   Discharge Date: 7/18/18  Contact Date: 7/19/2018    Consent Verification:  Assessment Completed With: Patient  HIPAA Verified? Yes    Discharge Dx:     1. Syncope, unclear etiology  2.  Chronic afib 60s - COUMADIN ON H perform normal daily activities of living as you have prior to your hospital stay? no, I am able to get up and walk with my walker  o If No, What are some barriers or concerns?   Patient needs assist even with her walker, someone is walking with her, needs Tab Take 1 tablet by mouth daily.  Disp:  Rfl:      • When you were leaving the hospital were any medication changes discussed with you? yes  • If you were prescribed a new medication:   o Was the new medication’s purpose explained? yes  o Was the new medic 26, 2018  8:30 AM CDT [facility] Visit with HEART FAILURE CRAIG Toney 63 for [facility])    Please come in through the [employer], stop at [facility] registration desk in the [address] [place] [address] Patient verbalized understanding of these. NCM instructed patient to call PCP with any questions or needs, he states he will.     BOOK BY DATE: 8/1/18    [x]  Discharge Summary, Discharge medications reviewed/discussed/and reconciled with patient, and order

## 2018-07-24 ENCOUNTER — PRIOR ORIGINAL RECORDS (OUTPATIENT)
Dept: OTHER | Age: 78
End: 2018-07-24

## 2018-07-24 ENCOUNTER — HOSPITAL ENCOUNTER (EMERGENCY)
Facility: HOSPITAL | Age: 78
Discharge: HOME | End: 2018-07-24
Attending: EMERGENCY MEDICINE
Payer: MEDICARE

## 2018-07-24 VITALS
TEMPERATURE: 98 F | BODY MASS INDEX: 31.16 KG/M2 | HEIGHT: 65 IN | SYSTOLIC BLOOD PRESSURE: 128 MMHG | RESPIRATION RATE: 18 BRPM | OXYGEN SATURATION: 92 % | WEIGHT: 187 LBS | DIASTOLIC BLOOD PRESSURE: 67 MMHG | HEART RATE: 73 BPM

## 2018-07-24 DIAGNOSIS — T14.8XXA BLEEDING FROM WOUND: Primary | ICD-10-CM

## 2018-07-24 DIAGNOSIS — S00.03XD HEMATOMA OF SCALP, SUBSEQUENT ENCOUNTER: ICD-10-CM

## 2018-07-24 LAB
INR BLD: 1.05 (ref 0.9–1.1)
PSA SERPL DL<=0.01 NG/ML-MCNC: 14.1 SECONDS (ref 12.4–14.7)

## 2018-07-24 PROCEDURE — 36415 COLL VENOUS BLD VENIPUNCTURE: CPT

## 2018-07-24 PROCEDURE — 99283 EMERGENCY DEPT VISIT LOW MDM: CPT

## 2018-07-24 PROCEDURE — 85610 PROTHROMBIN TIME: CPT | Performed by: EMERGENCY MEDICINE

## 2018-07-24 NOTE — ED PROVIDER NOTES
Patient Seen in: [facility] Emergency Department    History   Patient presents with:  Bleeding (hematologic)    Stated Complaint: bleeding from head where suture/staples placed 1 week ago    HPI    Patient is a pleasant 61-year-old female presenting (lymphadenopathy), cervical 1/18/2014    bx 2014- benign   • Lymph node enlargement 1/14/2014    Neck 2014   • Migraines    • Neuropathy 9/16/2016    Severe both legs   • NS[facility] (nonsustained ventricular tachycardia) ([facility] 7/11/2014    Recurrence 7.2016 Hx V [07/24/18 1533]  BP: 117/72  Pulse: 78  Resp: 18  Temp: 98 °F (36.7 °C)  Temp src: Temporal  SpO2: 96 %  O2 Device: None (Room air)    Current:/72   Pulse 78   Temp 98 °F (36.7 °C) (Temporal)   Resp 18   Ht 165.1 cm (5' 5\")   Wt 84.8 kg   SpO2 96% symptoms, or as discussed. Patient verbalizes understanding and is comfortable with the plan as recommended. Patient ambulated freely and was subsequently discharged without incident.     Disposition and Plan     Clinical Impression:  Bleeding from woun

## 2018-07-24 NOTE — ED INITIAL ASSESSMENT (HPI)
Pt to ED with bleeding from staple to head. Pt fell over a week ago and had staples placed to head. Small amount of bleeding noted at this time. Pt's sts she was told not to take her coumadin until Thursday. electronic

## 2018-07-25 ENCOUNTER — APPOINTMENT (OUTPATIENT)
Dept: CT IMAGING | Facility: HOSPITAL | Age: 78
End: 2018-07-25
Attending: EMERGENCY MEDICINE
Payer: MEDICARE

## 2018-07-25 ENCOUNTER — HOSPITAL ENCOUNTER (OUTPATIENT)
Facility: HOSPITAL | Age: 78
Setting detail: OBSERVATION
LOS: 1 days | Discharge: HOME | End: 2018-07-28
Attending: EMERGENCY MEDICINE | Admitting: HOSPITALIST
Payer: MEDICARE

## 2018-07-25 ENCOUNTER — APPOINTMENT (OUTPATIENT)
Dept: GENERAL RADIOLOGY | Facility: HOSPITAL | Age: 78
End: 2018-07-25
Attending: EMERGENCY MEDICINE
Payer: MEDICARE

## 2018-07-25 DIAGNOSIS — R55 SYNCOPE, UNSPECIFIED SYNCOPE TYPE: Primary | ICD-10-CM

## 2018-07-25 DIAGNOSIS — Z76.0 MEDICATION REFILL: ICD-10-CM

## 2018-07-25 DIAGNOSIS — N17.9 ACUTE RENAL INJURY (HCC): ICD-10-CM

## 2018-07-25 PROBLEM — E87.1 HYPONATREMIA: Status: ACTIVE | Noted: 2018-07-25

## 2018-07-25 PROBLEM — D64.9 ANEMIA: Status: ACTIVE | Noted: 2018-07-25

## 2018-07-25 LAB
ALBUMIN SERPL-MCNC: 3 G/DL (ref 3.5–4.8)
ALBUMIN/GLOB SERPL: 0.8 {RATIO} (ref 1–2)
ALP LIVER SERPL-CCNC: 142 U/L (ref 55–142)
ALT SERPL-CCNC: 98 U/L (ref 14–54)
ANION GAP SERPL CALC-SCNC: 11 MMOL/L (ref 0–18)
APTT PPP: 29.8 SECONDS (ref 26.1–34.6)
AST SERPL-CCNC: 82 U/L (ref 15–41)
BASOPHILS # BLD AUTO: 0.01 X10(3) UL (ref 0–0.1)
BASOPHILS NFR BLD AUTO: 0.1 %
BILIRUB SERPL-MCNC: 0.6 MG/DL (ref 0.1–2)
BILIRUB UR QL STRIP.AUTO: NEGATIVE
BUN BLD-MCNC: 30 MG/DL (ref 8–20)
BUN/CREAT SERPL: 15.7 (ref 10–20)
CALCIUM BLD-MCNC: 8.7 MG/DL (ref 8.3–10.3)
CHLORIDE SERPL-SCNC: 95 MMOL/L (ref 101–111)
CO2 SERPL-SCNC: 29 MMOL/L (ref 22–32)
COLOR UR AUTO: YELLOW
CREAT BLD-MCNC: 1.91 MG/DL (ref 0.55–1.02)
EOSINOPHIL # BLD AUTO: 0.04 X10(3) UL (ref 0–0.3)
EOSINOPHIL NFR BLD AUTO: 0.4 %
ERYTHROCYTE [DISTWIDTH] IN BLOOD BY AUTOMATED COUNT: 13.8 % (ref 11.5–16)
GLOBULIN PLAS-MCNC: 3.6 G/DL (ref 2.5–3.7)
GLUCOSE BLD-MCNC: 131 MG/DL (ref 70–99)
GLUCOSE BLD-MCNC: 140 MG/DL (ref 65–99)
GLUCOSE BLD-MCNC: 152 MG/DL (ref 65–99)
GLUCOSE BLD-MCNC: 95 MG/DL (ref 65–99)
GLUCOSE UR STRIP.AUTO-MCNC: NEGATIVE MG/DL
HCT VFR BLD AUTO: 29.3 % (ref 34–50)
HGB BLD-MCNC: 9.5 G/DL (ref 12–16)
HYALINE CASTS #/AREA URNS AUTO: PRESENT /LPF
IMMATURE GRANULOCYTE COUNT: 0.07 X10(3) UL (ref 0–1)
IMMATURE GRANULOCYTE RATIO %: 0.7 %
INR BLD: 1 (ref 0.9–1.1)
KETONES UR STRIP.AUTO-MCNC: NEGATIVE MG/DL
LYMPHOCYTES # BLD AUTO: 1.35 X10(3) UL (ref 0.9–4)
LYMPHOCYTES NFR BLD AUTO: 12.9 %
M PROTEIN MFR SERPL ELPH: 6.6 G/DL (ref 6.1–8.3)
MCH RBC QN AUTO: 30.4 PG (ref 27–33.2)
MCHC RBC AUTO-ENTMCNC: 32.4 G/DL (ref 31–37)
MCV RBC AUTO: 93.9 FL (ref 81–100)
MONOCYTES # BLD AUTO: 1 X10(3) UL (ref 0.1–1)
MONOCYTES NFR BLD AUTO: 9.5 %
NEUTROPHIL ABS PRELIM: 8.02 X10 (3) UL (ref 1.3–6.7)
NEUTROPHILS # BLD AUTO: 8.02 X10(3) UL (ref 1.3–6.7)
NEUTROPHILS NFR BLD AUTO: 76.4 %
NITRITE UR QL STRIP.AUTO: NEGATIVE
OSMOLALITY SERPL CALC.SUM OF ELEC: 288 MOSM/KG (ref 275–295)
PH UR STRIP.AUTO: 5 [PH] (ref 4.5–8)
PLATELET # BLD AUTO: 215 10(3)UL (ref 150–450)
POTASSIUM SERPL-SCNC: 3.2 MMOL/L (ref 3.6–5.1)
PRO-BETA NATRIURETIC PEPTIDE: 431 PG/ML (ref ?–450)
PROT UR STRIP.AUTO-MCNC: NEGATIVE MG/DL
PSA SERPL DL<=0.01 NG/ML-MCNC: 13.6 SECONDS (ref 12.4–14.7)
RBC # BLD AUTO: 3.12 X10(6)UL (ref 3.8–5.1)
RED CELL DISTRIBUTION WIDTH-SD: 47.2 FL (ref 35.1–46.3)
SODIUM SERPL-SCNC: 135 MMOL/L (ref 136–144)
SP GR UR STRIP.AUTO: 1.01 (ref 1–1.03)
T4 FREE SERPL-MCNC: 1.9 NG/DL (ref 0.9–1.8)
TROPONIN I SERPL-MCNC: <0.046 NG/ML (ref ?–0.05)
TSI SER-ACNC: 0.18 MIU/ML (ref 0.35–5.5)
UROBILINOGEN UR STRIP.AUTO-MCNC: <2 MG/DL
WBC # BLD AUTO: 10.5 X10(3) UL (ref 4–13)
WBC CLUMPS UR QL AUTO: PRESENT

## 2018-07-25 PROCEDURE — 99223 1ST HOSP IP/OBS HIGH 75: CPT | Performed by: HOSPITALIST

## 2018-07-25 PROCEDURE — 71045 X-RAY EXAM CHEST 1 VIEW: CPT | Performed by: EMERGENCY MEDICINE

## 2018-07-25 PROCEDURE — 70450 CT HEAD/BRAIN W/O DYE: CPT | Performed by: EMERGENCY MEDICINE

## 2018-07-25 RX ORDER — METOPROLOL TARTRATE 50 MG/1
50 TABLET, FILM COATED ORAL
Status: DISCONTINUED | OUTPATIENT
Start: 2018-07-25 | End: 2018-07-28

## 2018-07-25 RX ORDER — ACETAMINOPHEN 325 MG/1
650 TABLET ORAL EVERY 4 HOURS PRN
Status: DISCONTINUED | OUTPATIENT
Start: 2018-07-25 | End: 2018-07-28

## 2018-07-25 RX ORDER — POTASSIUM CHLORIDE 20 MEQ/1
20 TABLET, EXTENDED RELEASE ORAL DAILY
COMMUNITY
End: 2019-10-11

## 2018-07-25 RX ORDER — AMIODARONE HYDROCHLORIDE 200 MG/1
200 TABLET ORAL 2 TIMES DAILY WITH MEALS
Status: DISCONTINUED | OUTPATIENT
Start: 2018-07-25 | End: 2018-07-28

## 2018-07-25 RX ORDER — HYDROCODONE BITARTRATE AND ACETAMINOPHEN 5; 325 MG/1; MG/1
2 TABLET ORAL EVERY 4 HOURS PRN
Status: DISCONTINUED | OUTPATIENT
Start: 2018-07-25 | End: 2018-07-28

## 2018-07-25 RX ORDER — SODIUM CHLORIDE 9 MG/ML
INJECTION, SOLUTION INTRAVENOUS CONTINUOUS
Status: DISCONTINUED | OUTPATIENT
Start: 2018-07-25 | End: 2018-07-26

## 2018-07-25 RX ORDER — SODIUM CHLORIDE 9 MG/ML
INJECTION, SOLUTION INTRAVENOUS CONTINUOUS
Status: ACTIVE | OUTPATIENT
Start: 2018-07-25 | End: 2018-07-25

## 2018-07-25 RX ORDER — DILTIAZEM HYDROCHLORIDE 120 MG/1
120 CAPSULE, EXTENDED RELEASE ORAL DAILY
Status: DISCONTINUED | OUTPATIENT
Start: 2018-07-26 | End: 2018-07-28

## 2018-07-25 RX ORDER — HYDROCODONE BITARTRATE AND ACETAMINOPHEN 5; 325 MG/1; MG/1
1 TABLET ORAL EVERY 4 HOURS PRN
Status: DISCONTINUED | OUTPATIENT
Start: 2018-07-25 | End: 2018-07-28

## 2018-07-25 RX ORDER — SERTRALINE HYDROCHLORIDE 25 MG/1
25 TABLET, FILM COATED ORAL DAILY
Status: DISCONTINUED | OUTPATIENT
Start: 2018-07-26 | End: 2018-07-28

## 2018-07-25 RX ORDER — WARFARIN SODIUM 2.5 MG/1
2.5 TABLET ORAL SEE ADMIN INSTRUCTIONS
Status: ON HOLD | COMMUNITY
End: 2018-07-27

## 2018-07-25 RX ORDER — SODIUM CHLORIDE 9 MG/ML
INJECTION, SOLUTION INTRAVENOUS ONCE
Status: COMPLETED | OUTPATIENT
Start: 2018-07-25 | End: 2018-07-25

## 2018-07-25 RX ORDER — LOSARTAN POTASSIUM 25 MG/1
25 TABLET ORAL DAILY
Status: DISCONTINUED | OUTPATIENT
Start: 2018-07-26 | End: 2018-07-28

## 2018-07-25 RX ORDER — ATORVASTATIN CALCIUM 10 MG/1
10 TABLET, FILM COATED ORAL NIGHTLY
Status: DISCONTINUED | OUTPATIENT
Start: 2018-07-25 | End: 2018-07-28

## 2018-07-25 RX ORDER — ONDANSETRON 2 MG/ML
4 INJECTION INTRAMUSCULAR; INTRAVENOUS EVERY 4 HOURS PRN
Status: DISCONTINUED | OUTPATIENT
Start: 2018-07-25 | End: 2018-07-28

## 2018-07-25 RX ORDER — DEXTROSE MONOHYDRATE 25 G/50ML
50 INJECTION, SOLUTION INTRAVENOUS
Status: DISCONTINUED | OUTPATIENT
Start: 2018-07-25 | End: 2018-07-28

## 2018-07-25 RX ORDER — WARFARIN SODIUM 5 MG/1
5 TABLET ORAL NIGHTLY
Status: ON HOLD | COMMUNITY
End: 2018-07-27

## 2018-07-25 RX ORDER — POTASSIUM CHLORIDE 20 MEQ/1
40 TABLET, EXTENDED RELEASE ORAL EVERY 4 HOURS
Status: COMPLETED | OUTPATIENT
Start: 2018-07-25 | End: 2018-07-26

## 2018-07-25 NOTE — ED PROVIDER NOTES
Patient Seen in: [facility] Emergency Department    History   Patient presents with:  Altered Mental Status (neurologic)  Syncope (cardiovascular, neurologic)    Stated Complaint:     HPI    Patient is a pleasant 24-year-old female, presenting for e disease ([facility]    • Deep vein thrombosis ([facility]    • Dementia    • Diabetes ([facility]    • Diabetes mellitus type 2, noninsulin dependent ([facility]     Type !!   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 Constitutional and vital signs reviewed.       All other systems reviewed and negative except a 0.179 (*)     All other components within normal limits   URINALYSIS WITH CULTURE REFLEX - Abnormal; Notable for the following:     Clarity Urine Hazy (*)     Blood Urine Small (*)     Leukocyte Esterase Urine Moderate (*)     WBC Urine 21-50 (*)     Hyali HEAD (71582), 7/16/2018, 1:36. INDICATIONS:  Syncope; Recent head injury  TECHNIQUE:  Noncontrast CT scanning is performed through the brain. Dose reduction techniques were used.  Dose information is transmitted to the [facility] [employer] [facility] Rad[facility]) stable single lead cardiac defibrillator. Pulmonary vasculature is stable and within normal limits. Lungs appear clear. No acute process or significant interval change noted. CONCLUSION:  No acute process or significant interval change noted.   [facility]    Disposition:  Admit  7/25/2018  4:29 pm    Follow-up:  No follow-up provider specified.       Medications Prescribed:  Current Discharge Medication List        Present on Admission  Date Reviewed: 6/29/2018          ICD-10-CM Noted POA    Acute kid

## 2018-07-25 NOTE — ED NOTES
Patient is alert, has delayed responses, though does not cooperate with assessments when asked to follow commands. When asked to move her feet, patient stated \"I can't\" but is observed moving both feet. MD notified.

## 2018-07-25 NOTE — PAYOR COMM NOTE
--------------  DISCHARGE REVIEW    Payor: BCBS MEDICARE ADV PPO  Subscriber #:  [id number]  Authorization Number: [id number]    Admit date: 7/16/18  Admit time:  0559  Discharge Date: 7/18/2018 10:27 PM     Admitting Physician: MD Kevon Dempsey she said she did hit her head and briefly passed out. Patient calling out for help for about 10 minutes. Patient denied any chest pain or shortness of breath.   No fevers, chills, nausea, vomiting, diarrhea no numbness or tingling extremities or any focal these medications      Instructions Prescription details   Losartan Potassium 50 MG Tabs  Commonly known as:  COZAAR  Start taking on:  7/19/2018  What changed:  · medication strength  · how much to take  · how to take this  · when to take this  · addition TABLET(20 MG) BY MOUTH TWICE DAILY   Quantity:  180 tablet  Refills:  3        STOP taking these medications    Warfarin Sodium 2.5 MG Tabs  Commonly known as:  COUMADIN        Warfarin Sodium 5 MG Tabs  Commonly known as:  COUMADIN        Bring a paper pr

## 2018-07-25 NOTE — PLAN OF CARE
Received report from ER  Patient here with syncopal episode  Hx of syncopal episode 1 week ago with hematoma to left forehead and C2 fracture and aspen collar.    A&0x4  C/o pain-doctor notified for pain medication   Respiratory notified for CPAP   Patient

## 2018-07-25 NOTE — H&P
[facility]  History and Physical     [facility] Ill Patient Status:  Inpatient    1940 MRN [id number]   [facility] 7NE-A Attending Glenn Ferrer MD   Hosp Day # 0 PCP Sunil Holly MD     Chief Complaint: syncope    Hi Neck 2014   • Migraines    • Neuropathy 9/16/2016    Severe both legs   • NSVT (nonsustained ventricular tachycardia) ([facility] 7/11/2014    Recurrence 7.2016 Hx VT- s/p ICD implant on 7/7/16.      • Obesity, morbid, BMI 40.0-49.9 ([facility] 6/18/2015   • RADHA (ob medications on file prior to encounter. Current Outpatient Prescriptions on File Prior to Encounter:  Losartan Potassium 50 MG Oral Tab Take 1 tablet (50 mg total) by mouth daily.  Disp: 30 tablet Rfl: 5   Sertraline HCl 25 MG Oral Tab Take 1 tablet (25 m AICD in chest  Chest and Back: No tenderness or deformity. Abdomen: Soft, nontender, nondistended. Positive bowel sounds. No rebound, guarding or organomegaly. Neurologic: No focal neurological deficits. CNII-XII grossly intact.   Musculoskeletal: Moves services that will reasonably be expected to span two midnight's based on the clinical documentation in H+P. Based on patients current state of illness, I anticipate that, after discharge, patient will require TBD.

## 2018-07-25 NOTE — ED INITIAL ASSESSMENT (HPI)
Patient was reporting HEIDE at store, was passenger in car on way to hospital and becamae unresponsive. Pulled out of car by staff and awakened with sternal rub.

## 2018-07-26 ENCOUNTER — HOSPITAL ENCOUNTER (OUTPATIENT)
Dept: CARDIOLOGY CLINIC | Facility: HOSPITAL | Age: 78
Discharge: HOME | End: 2018-07-26
Attending: NURSE PRACTITIONER
Payer: MEDICARE

## 2018-07-26 LAB
ALBUMIN SERPL-MCNC: 2.7 G/DL (ref 3.5–4.8)
ALBUMIN/GLOB SERPL: 0.8 {RATIO} (ref 1–2)
ALP LIVER SERPL-CCNC: 121 U/L (ref 55–142)
ALT SERPL-CCNC: 94 U/L (ref 14–54)
ANION GAP SERPL CALC-SCNC: 8 MMOL/L (ref 0–18)
AST SERPL-CCNC: 66 U/L (ref 15–41)
BASOPHILS # BLD AUTO: 0.03 X10(3) UL (ref 0–0.1)
BASOPHILS NFR BLD AUTO: 0.4 %
BILIRUB SERPL-MCNC: 0.4 MG/DL (ref 0.1–2)
BUN BLD-MCNC: 26 MG/DL (ref 8–20)
BUN/CREAT SERPL: 24.3 (ref 10–20)
CALCIUM BLD-MCNC: 8.8 MG/DL (ref 8.3–10.3)
CHLORIDE SERPL-SCNC: 104 MMOL/L (ref 101–111)
CO2 SERPL-SCNC: 29 MMOL/L (ref 22–32)
CREAT BLD-MCNC: 1.07 MG/DL (ref 0.55–1.02)
EOSINOPHIL # BLD AUTO: 0.08 X10(3) UL (ref 0–0.3)
EOSINOPHIL NFR BLD AUTO: 1 %
ERYTHROCYTE [DISTWIDTH] IN BLOOD BY AUTOMATED COUNT: 14.2 % (ref 11.5–16)
GLOBULIN PLAS-MCNC: 3.3 G/DL (ref 2.5–3.7)
GLUCOSE BLD-MCNC: 105 MG/DL (ref 65–99)
GLUCOSE BLD-MCNC: 83 MG/DL (ref 65–99)
GLUCOSE BLD-MCNC: 83 MG/DL (ref 70–99)
GLUCOSE BLD-MCNC: 89 MG/DL (ref 65–99)
GLUCOSE BLD-MCNC: 97 MG/DL (ref 65–99)
HAV IGM SER QL: 2.3 MG/DL (ref 1.8–2.5)
HCT VFR BLD AUTO: 28 % (ref 34–50)
HGB BLD-MCNC: 9.1 G/DL (ref 12–16)
IMMATURE GRANULOCYTE COUNT: 0.04 X10(3) UL (ref 0–1)
IMMATURE GRANULOCYTE RATIO %: 0.5 %
INR BLD: 1.03 (ref 0.9–1.1)
LYMPHOCYTES # BLD AUTO: 1.41 X10(3) UL (ref 0.9–4)
LYMPHOCYTES NFR BLD AUTO: 16.9 %
M PROTEIN MFR SERPL ELPH: 6 G/DL (ref 6.1–8.3)
MCH RBC QN AUTO: 30.7 PG (ref 27–33.2)
MCHC RBC AUTO-ENTMCNC: 32.5 G/DL (ref 31–37)
MCV RBC AUTO: 94.6 FL (ref 81–100)
MONOCYTES # BLD AUTO: 0.94 X10(3) UL (ref 0.1–1)
MONOCYTES NFR BLD AUTO: 11.3 %
NEUTROPHIL ABS PRELIM: 5.85 X10 (3) UL (ref 1.3–6.7)
NEUTROPHILS # BLD AUTO: 5.85 X10(3) UL (ref 1.3–6.7)
NEUTROPHILS NFR BLD AUTO: 69.9 %
OSMOLALITY SERPL CALC.SUM OF ELEC: 296 MOSM/KG (ref 275–295)
PLATELET # BLD AUTO: 184 10(3)UL (ref 150–450)
POTASSIUM SERPL-SCNC: 4.5 MMOL/L (ref 3.6–5.1)
POTASSIUM SERPL-SCNC: 4.5 MMOL/L (ref 3.6–5.1)
PSA SERPL DL<=0.01 NG/ML-MCNC: 13.9 SECONDS (ref 12.4–14.7)
RBC # BLD AUTO: 2.96 X10(6)UL (ref 3.8–5.1)
RED CELL DISTRIBUTION WIDTH-SD: 48.8 FL (ref 35.1–46.3)
SODIUM SERPL-SCNC: 141 MMOL/L (ref 136–144)
WBC # BLD AUTO: 8.4 X10(3) UL (ref 4–13)

## 2018-07-26 PROCEDURE — 99226 SUBSEQUENT OBSERVATION CARE: CPT | Performed by: HOSPITALIST

## 2018-07-26 NOTE — PROGRESS NOTES
07/26/18 0453 07/26/18 0455   Vital Signs   Pulse 68 77   Heart Rate Source Monitor Monitor   Resp 18 18   Respiratory Quality Normal Normal   /56 128/67   BP Location Left arm Left arm   BP Method Automatic Automatic   Patient Position Sitting St

## 2018-07-26 NOTE — DIETARY MALNUTRITION NOTE
[facility]    NUTRITION INITIAL ASSESSMENT    Pt meets moderate malnutrition criteria.     CRITERIA FOR MALNUTRITION DIAGNOSIS:  Criteria for non-severe malnutrition diagnosis: acute illness/injury related to wt loss 5% in 1 month and energy intake les (201 lb)  11/09/17 : 92.5 kg (203 lb 14.8 oz)  10/18/17 : 94.3 kg (208 lb)  10/16/17 : 94.3 kg (208 lb)      NUTRITION:  Diet: Cardiac, Carbohydrate Controlled  Oral Supplements: Magic Cup daily    FOOD/NUTRITION RELATED HISTORY:  Appetite: Fair  Intake: <

## 2018-07-26 NOTE — PHYSICAL THERAPY NOTE
PHYSICAL THERAPY EVALUATION - INPATIENT     Room Number: 1137/9005-J  Evaluation Date: 7/26/2018  Type of Evaluation: Initial  Physician Order: PT Eval and Treat    Presenting Problem: syncope  Reason for Therapy: Mobility Dysfunction and Discharge P CHOLESTEROL    • Insomnia 11/25/2013   • KIDNEY STONE    • LAD (lymphadenopathy), cervical 1/18/2014    bx 2014- benign   • Lymph node enlargement 1/14/2014    Neck 2014   • Migraines    • Neuropathy 9/16/2016    Severe both legs   • NSVT (nonsustained stephanie walking outside in the neighborhood.      SUBJECTIVE  Pt verbalizing her frustration with medical situation    Patient self-stated goal is return home    OBJECTIVE  Precautions: Cardiac;Spine;Cervical brace  Fall Risk: High fall risk    WEIGHT BEARING RESTR help from another person does the patient currently need. ..   -   Moving to and from a bed to a chair (including a wheelchair)?: A Little   -   Need to walk in hospital room?: A Little   -   Climbing 3-5 steps with a railing?: A Little       AM-PAC Score: dysfunction, decreased safety awareness and insight into limitations. Functional outcome measures completed include AMPAC. Based on this evaluation, patient's clinical presentation is stable and overall the evaluation complexity is considered moderate.

## 2018-07-26 NOTE — PLAN OF CARE
Kevin notified of need for new Cervical collar or new pads. Also a shower collar. Dr. Syhla Mendes here. OK to change collar for shower. Mepilex foam placed on reddened area on upper chest where collar was rubbing. Resting comfortably in recliner.   Will cont

## 2018-07-26 NOTE — PROGRESS NOTES
[facility]  Progress Note     Gemma Rosa Patient Status:  Observation    1940 MRN [id number]   [facility] 7NE-A Attending Jame Nunez MD   Hosp Day # 1 PCP Ernie Barrett MD     Chief Complaint: Syncope    S: KZSHAZ atorvastatin  10 mg Oral Nightly   • diltiazem  120 mg Oral Daily   • Losartan Potassium  25 mg Oral Daily   • Metoprolol Tartrate  50 mg Oral 2x Daily(Beta Blocker)   • Sertraline HCl  25 mg Oral Daily   • cefTRIAXone  1 g Intravenous Q24H   • Insulin Asp

## 2018-07-26 NOTE — PLAN OF CARE
Assumed care at [address]. C/o HA/neck pain, PRN tylenol/norco with relief. Aspen collar in place. Tele AFIB, HR controlled. IV ABT for UTI. IVF as ordered. No s/s of distress. Will cont to monitor.       METABOLIC/FLUID AND ELECTROLYTES - ADULT    • Gluc

## 2018-07-26 NOTE — CM/SW NOTE
COND 44:Patient failed Inpatient criteria. Second level of review completed and supports Observation. UR committee in agreement.  Discussed with Dr Katy Morales approves.   '

## 2018-07-26 NOTE — CONSULTS
[facility]  Report of Consultation    Kevin Ferrera Patient Status:  Observation    1940 MRN [id number]   [facility] 7NE-A Attending Agustin Shaffer MD   Hosp Day # 1 PCP Anita Ann MD     R Chronic a-fib (HCC)     Anxiety     SDH (subdural hematoma) (HCC)     Acute on chronic systolic congestive heart failure (HCC)     Type 2 diabetes mellitus without complication, without long-term current use of insulin (HCC)     History of subdural hematom holding diuretics and gentle fluid hydration.     History:  Past Medical History:   Diagnosis Date   • [facility]fib ([facility]    • Abnormal gait 11/25/2013   • Anxiety    • Arrhythmia    • Arthritis of shoulder region, left, degenerative 3/18/2015    mod   • Asthma Complex Ventral Hernia                w/mesh, bilateral component separation, removal               previous mesh, partial omentectomy, lysis of                adhesions on 3/24/2011:  PCP:  Dr. Rocío Fields  No date: HERNIA SURGERY  No date: HYSTERECTOMY 4-0.006 g chewable tab 4 tablet, 4 tablet, Oral, Q15 Min PRN **OR** dextrose 50 % injection 50 mL, 50 mL, Intravenous, Q15 Min PRN **OR** glucose (DEX4) oral liquid 30 g, 30 g, Oral, Q15 Min PRN **OR** Glucose-Vitamin C (DEX-4) 4-0.006 g chewable tab 8 tab ----------  Creatinine (mg/dL)   Date Value   07/26/2018 1.07 (H)   07/25/2018 1.91 (H)   07/17/2018 0.70   ----------    Lab Results  Component Value Date   PT 20.6 (H) 07/11/2014   PT 24.1 (H) 07/09/2014   PT 13.6 01/17/2014   INR 1.03 07/26/2018   INR

## 2018-07-26 NOTE — CM/SW NOTE
Pt is a 71 yo female who is a readmission for syncope. Spoke with pt who said that she became dizzy and fell while she was walking outside with her cane. Pt suffered from a head laceration and a spinal fracture.   Pt was d/c'd from the ER and then came ba To be determined   Referral To   Financial [employer] Medicare

## 2018-07-26 NOTE — OCCUPATIONAL THERAPY NOTE
OCCUPATIONAL THERAPY EVALUATION - INPATIENT     Room Number: 3339/5387-M  Evaluation Date: 7/26/2018  Type of Evaluation: Initial  Presenting Problem: fall, syncope, recent C1 fracture 7/16 from falling    Physician Order: IP Consult to Magalie Ashley disease ([facility]    • Deep vein thrombosis ([facility]    • Dementia    • Diabetes ([facility]    • Diabetes mellitus type 2, noninsulin dependent ([facility]     Type !!   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 Motor    WFL      ADDITIONAL TESTS                                    NEUROLOGICAL FINDINGS                   ACTIVITY TOLERANCE   O2 Saturation: 97%    ACTIVITIES OF DAILY LIVING ASSESSMENT  AM-PAC ‘6-Clicks’ Inpatient Daily Activity Short Form  How much fracture and hematoma forehead, has been wearing Aspen collar at all times. Sutures in ER on 7/16. During the last admission, home therapy was recommended, but pt refused home therapy.   History of A-fib, subdural hemorrhage and craniotomy in December, 201 strengthening/ROM; Patient/Family education;Patient/Family training;Equipment eval/education; Compensatory technique education  Rehab Potential : Fair  Frequency (Obs): 5x/week  Number of Visits to Meet Established Goals: 5    ADL Goals   Patient will perfor

## 2018-07-26 NOTE — HISTORICAL OFFICE NOTE
Eden Caro  : 1940  ACCOUNT:  [id number]  [phone]  PCP: Dr. Hendrick Moritz     TODAY'S DATE: 2018  DICTATED BY:  [Dr. Shon Bridges: [.]    HPI:    [On 2018, Jessica Waller, a 70year old female, presented with dyspnea Hypertension Weight Family    REVIEW OF SYSTEMS:    CONS: no fever, chills, or recent weight changes. EYES: denies significant visual changes. ENMT: denies difficulties with hearing, otherwise negative.  CV: peripheral edema, see CV exam. RESP: dyspnea and murmurs. PEDAL: pedal pulses intact. EXT: 1+ edema on right to mid-calf.      DECISION MAKIN-year-old female with a history of permanent atrial fibrillation, heart failure with preserved ejection fraction, and ventricular tachycardia status post Med TABLET TWICE DAILY.                     03/01/18 *Losartan Potassium   50MG      1 TABLET DAILY                           03/01/18 *DilTIAZem HCl ER     120MG     1 CAPSULE DAILY                          05/25/17 *Coumadin             5MG       1 by mouth a

## 2018-07-26 NOTE — CONSULTS
Medical Record Number: [id number]  Referring Physician:  No ref. provider found  PCP: Bebo Glasgow MD      HISTORY OF CHIEF COMPLAINT:    Farideh Bull is a 70year old female, who complains of neck pain   57-year-old female syncopal episode.   Histo unspecified hyperlipidemia    • Pericardial effusion 6/5/2014    trivial   • Pulmonary HTN ([facility] 10/10/2014   • Rheumatoid arthritis ([facility]    • S/P ICD (internal cardiac defibrillator) procedure 7/7/2016    medtronic    • S/P total knee replacement 6/18/20 vision. HEENT: No swallowing or hearing difficulties. RESPIRATORY: No shortness of breath. CARDIOVASCULAR: No chest pain or palpitations. GI: Denies nausea, vomiting, constipation, diarrhea; no rectal bleeding; no heartburn, no melana.   : No dysuri at all times. Follow up with Dr. Brandon Ward in 2 weeks. The patient indicates understanding of these issues and agrees to the plan. Thank you for the opportunity to help care for your patient.       Deena Covington MD  Orthopaedic Spine Surgery  [place] [id number]

## 2018-07-26 NOTE — BH PROGRESS NOTE
[facility] Resource Referral Counselor Note    Raj Colon Patient Status:  Observation    1940 MRN [id number]   [facility] 7NE-A Attending Alessio Cabezas MD   Hosp Day # 1 PCP Kevon Penaloza MD       S(subjective) Pt

## 2018-07-26 NOTE — PAYOR COMM NOTE
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   Constitut normal limits   TSH W REFLEX TO FREE T4 - Abnormal; Notable for the following:     TSH 0.179 (*)     All other components within normal limits   URINALYSIS WITH CULTURE REFLEX - Abnormal; Notable for the following:     Clarity Urine Hazy (*)     Blood Urin as noted above. Patient was observed while the laboratory and radiology studies were obtained. Results of the patient's laboratory and radiology studies were reviewed and discussed with the patient and family.   Patient is more alert than she was upon ar weeks ago, led to a fall, head laceration s/p staples, and superficial hematoma. Also had a C1 fracture and placed in aspen collar. Did feel dizzy prior to that episode. Has been held off coumadin since that fall.  Since then has had poor PO intake, then he Data:      Labs:  Recent Labs   Lab  07/24/18   1628  07/25/18   1402   WBC   --   10.5   HGB   --   9.5*   MCV   --   93.9   PLT   --   215.0   INR  1.05  1.00       Recent Labs   Lab  07/25/18   1402   GLU  131*   BUN  30*   CREATSERUM  1.91*   GFRAA  29 5-325 MG per tab 1 tablet     Date Action Dose Route User    7/26/2018 0513 Given 1 tablet Oral Matilde Yu RN    7/25/2018 2141 Given 1 tablet Oral Matilde Yu RN      Insulin Aspart Pen (NOVOLOG) 100 UNIT/ML flexpen 2-10 Units     Date Action Mariam Hassan

## 2018-07-27 LAB
ATRIAL RATE: 73 BPM
GLUCOSE BLD-MCNC: 121 MG/DL (ref 65–99)
GLUCOSE BLD-MCNC: 128 MG/DL (ref 65–99)
GLUCOSE BLD-MCNC: 84 MG/DL (ref 65–99)
GLUCOSE BLD-MCNC: 95 MG/DL (ref 65–99)
Q-T INTERVAL: 478 MS
QRS DURATION: 160 MS
QTC CALCULATION (BEZET): 501 MS
R AXIS: 2 DEGREES
T AXIS: 46 DEGREES
VENTRICULAR RATE: 66 BPM

## 2018-07-27 PROCEDURE — 99225 SUBSEQUENT OBSERVATION CARE: CPT | Performed by: HOSPITALIST

## 2018-07-27 RX ORDER — TORSEMIDE 10 MG/1
20 TABLET ORAL AS NEEDED
Qty: 20 TABLET | Refills: 1 | Status: SHIPPED | OUTPATIENT
Start: 2018-07-27 | End: 2018-07-27

## 2018-07-27 RX ORDER — TORSEMIDE 20 MG/1
20 TABLET ORAL DAILY
Qty: 30 TABLET | Refills: 6 | Status: ON HOLD | OUTPATIENT
Start: 2018-07-27 | End: 2019-02-25

## 2018-07-27 NOTE — CM/SW NOTE
PT/OT recommending Home with HH and 24 hr supervision. Spoke with pt, who is alert and oriented x4 and is decisional,  about having HH. Pt refused HH saying, \"I have enough help at home. \"  Pt's son, dtr in law and grandson are currently living with pt.

## 2018-07-27 NOTE — PLAN OF CARE
Assumed care @6534. Pt AOx4. VSS on RA. Pain treated with Norco per pt request.   Okay to shower per all MD's. Hair washed with baby shampoo and shower c-collar applied. Pt tolerated well. Okay to stay 1 more night per Dr. Connie Oneill.  Pt son and daughter

## 2018-07-27 NOTE — PROGRESS NOTES
[facility]  Progress Note     Saurabh Slim Patient Status:  Observation    1940 MRN [id number]   [facility] 7NE-A Attending Jem Wolf MD   Hosp Day # 1 PCP Anisha Aldana MD     Chief Complaint: Syncope    S: CAFBYV Nightly   • diltiazem  120 mg Oral Daily   • Losartan Potassium  25 mg Oral Daily   • Metoprolol Tartrate  50 mg Oral 2x Daily(Beta Blocker)   • Sertraline HCl  25 mg Oral Daily   • cefTRIAXone  1 g Intravenous Q24H   • Insulin Aspart Pen  2-10 Units Subcu

## 2018-07-27 NOTE — PLAN OF CARE
Assumed care at 1900. Pt A&Ox4, VSS on RA. Afib on tele with occasional paced beats. Staples to frontal scalp laceration. Having a small amount of brownish red drainage, dry dressing placed over site. Wound care to see. Cervical collar on at all times.  Tyl

## 2018-07-27 NOTE — OCCUPATIONAL THERAPY NOTE
OCCUPATIONAL THERAPY TREATMENT NOTE - INPATIENT     Room Number: 3622/0276-H  Session: 1   Number of Visits to Meet Established Goals: 5    Presenting Problem: fall, syncope, recent C1 fracture 7/16 from falling    History related to current admission: Pt dependent ([facility]     Type !!   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    bilat severe   • Gallstones    • High blood pressure    • HIGH CHOLESTEROL    • Insomnia 11/25/2013   • KIDNEY STONE    • LAD (lymphadenop ASSESSMENT  Rating: Unable to rate  Location: soreness under the brace  Management Techniques: Relaxation     ACTIVITY TOLERANCE  O2 Saturation: 97%    ACTIVITIES OF DAILY LIVING ASSESSMENT  AM-PAC ‘6-Clicks’ Inpatient Daily Activity Short Form  How much h PLAN  OT Treatment Plan: Balance activities; Energy conservation/work simplification techniques;ADL training;Functional transfer training;UE strengthening/ROM; Patient/Family education;Patient/Family training;Equipment eval/education; Compensatory techniq

## 2018-07-27 NOTE — CONSULTS
[facility]  Report of Inpatient Wound Care Consultation     Nola Velasco Patient Status:  Observation    1940 MRN [id number]   [facility] [place] 7NE-A Attending Joesph Stafford MD   Hosp Day # 1 PCP Michele Lawrence MD     REASON F 06/29/2012    Cannot tolerate CPAP machine- uses oxygen periodically    • Other and unspecified hyperlipidemia    • Pericardial effusion 6/5/2014    trivial   • Pulmonary HTN ([facility] 10/10/2014   • Rheumatoid arthritis ([facility]    • S/P ICD (internal cardiac de CREATSERUM   --   1.91*   --   1.07*   --    --    --    --    BUN   --   30*   --   26*   --    --    --    --    GLU   --   131*   --   83   --    --    --    --    CA   --   8.7   --   8.8   --    --    --    --    ALB   --   3.0*   --   2.7*   -- identified. ASSESSMENT/ RECOMMENDATIONS:  Patient presents with wound to left frontal scalp from recent fall. Incision curves into a horsehoe shape, unable to visualize wound bed due to presence of dry black scabs covering.  Area measuring 2.9cm (length)

## 2018-07-27 NOTE — PHYSICAL THERAPY NOTE
PHYSICAL THERAPY TREATMENT NOTE - INPATIENT    Room Number: 5965/3374-Q     Session: 2  Number of Visits to Meet Established Goals: 3    Presenting Problem: syncope    Problem List  Principal Problem:    Syncope, unspecified syncope type  Active Problems: (internal cardiac defibrillator) procedure 7/7/2016    medtronic    • S/P total knee replacement 6/18/2015    bilat 1990s, both severe OA   • Seizure disorder (HCC)    • Shortness of breath    • Stroke [facility])    • Valvular disease    • Varicose vein 9/18/20 arms (e.g., wheelchair, bedside commode, etc.): None   -   Moving from lying on back to sitting on the side of the bed?: None   How much help from another person does the patient currently need. ..   -   Moving to and from a bed to a chair (including a whee training  Rehab Potential : Fair  Frequency (Obs): 5x/week    CURRENT GOALS     Goal #1 Patient is able to demonstrate supine - sit EOB @ level: supervision Pt sleeps in recliner chair, demonstrates supervision      Goal #2 Patient is able to demonstrate t

## 2018-07-27 NOTE — PROGRESS NOTES
07/27/18 0541 07/27/18 0542 07/27/18 0543   Vital Signs   Pulse 77 83 90   Heart Rate Source Monitor Monitor Monitor   Resp 18 --  --    /67 144/70 139/72   BP Location Right arm Right arm Right arm   BP Method Automatic Automatic Automatic   Ana The patient is a 58y Female complaining of back pain/injury.

## 2018-07-28 VITALS
SYSTOLIC BLOOD PRESSURE: 125 MMHG | WEIGHT: 187.31 LBS | BODY MASS INDEX: 31.98 KG/M2 | OXYGEN SATURATION: 96 % | TEMPERATURE: 98 F | RESPIRATION RATE: 20 BRPM | HEIGHT: 64 IN | DIASTOLIC BLOOD PRESSURE: 76 MMHG | HEART RATE: 71 BPM

## 2018-07-28 LAB
GLUCOSE BLD-MCNC: 110 MG/DL (ref 65–99)
GLUCOSE BLD-MCNC: 91 MG/DL (ref 65–99)

## 2018-07-28 PROCEDURE — 99217 OBSERVATION CARE DISCHARGE: CPT | Performed by: HOSPITALIST

## 2018-07-28 RX ORDER — LOSARTAN POTASSIUM 25 MG/1
25 TABLET ORAL DAILY
Qty: 30 TABLET | Refills: 0 | Status: ON HOLD | OUTPATIENT
Start: 2018-07-29 | End: 2019-04-17

## 2018-07-28 RX ORDER — HYDROCODONE BITARTRATE AND ACETAMINOPHEN 5; 325 MG/1; MG/1
1 TABLET ORAL EVERY 4 HOURS PRN
Qty: 20 TABLET | Refills: 0 | Status: ON HOLD | OUTPATIENT
Start: 2018-07-28 | End: 2018-08-06

## 2018-07-28 NOTE — PLAN OF CARE
Assumed care @6931. Pt AOx4. VSS on RA. Headache treated with Tylenol and Norco with relief. Staples/sutures approximated, CORNELIA. See further assessment on flowsheet. Pt states that she has a walker at home.  She is not open to [facility] but is open to OP PT

## 2018-07-28 NOTE — PLAN OF CARE
Assumed care at [address]. Patient A&Ox4. Tele afib,rate controlled. Aspen collar in place. C/o headache, PRN norco with relief. Plan for d/c today. Updated on POC. Will cont to monitor.     CARDIOVASCULAR - ADULT    • Maintains optimal cardiac output and

## 2018-07-28 NOTE — PROGRESS NOTES
· [facility] Progress Note     Subjective:  Reviewed with pt/daugther - ready for discharge today    Objective:  125/76 67 not orthostatic  AFib 70s  I/O  -1160  No new labs    Neuro: awake/alert  HEENT: cervical collar, dry scalp laceration w

## 2018-07-28 NOTE — PROGRESS NOTES
07/28/18 0852 07/28/18 0854 07/28/18 0856   Vital Signs   /71 141/67 125/76   BP Location Left arm Left arm Left arm   BP Method Automatic Automatic Automatic   Patient Position Lying Sitting Standing

## 2018-07-28 NOTE — PROGRESS NOTES
[facility]  Progress Note     Gemma Rosa Patient Status:  Observation    1940 MRN [id number]   [facility] 7NE-A Attending Jame Nunez MD   Hosp Day # 1 PCP Ernie Barrett MD     Chief Complaint: Syncope    S: SGZPLT Nightly   • diltiazem  120 mg Oral Daily   • Losartan Potassium  25 mg Oral Daily   • Metoprolol Tartrate  50 mg Oral 2x Daily(Beta Blocker)   • Sertraline HCl  25 mg Oral Daily   • cefTRIAXone  1 g Intravenous Q24H   • Insulin Aspart Pen  2-10 Units Subcu

## 2018-07-30 ENCOUNTER — PATIENT OUTREACH (OUTPATIENT)
Dept: CASE MANAGEMENT | Age: 78
End: 2018-07-30

## 2018-07-30 ENCOUNTER — PATIENT MESSAGE (OUTPATIENT)
Dept: CASE MANAGEMENT | Age: 78
End: 2018-07-30

## 2018-07-30 NOTE — CM/SW NOTE
07/30/18 0900   Discharge disposition   Expected discharge disposition Home or Self   Name of [address] [place] [address] services after discharge Patient refused services   Discharge transportati

## 2018-07-31 ENCOUNTER — APPOINTMENT (OUTPATIENT)
Dept: GENERAL RADIOLOGY | Facility: HOSPITAL | Age: 78
DRG: 445 | End: 2018-07-31
Attending: EMERGENCY MEDICINE
Payer: MEDICARE

## 2018-07-31 ENCOUNTER — HOSPITAL ENCOUNTER (INPATIENT)
Facility: HOSPITAL | Age: 78
LOS: 9 days | Discharge: HOME | DRG: 445 | End: 2018-08-09
Attending: EMERGENCY MEDICINE | Admitting: HOSPITALIST
Payer: MEDICARE

## 2018-07-31 ENCOUNTER — APPOINTMENT (OUTPATIENT)
Dept: ULTRASOUND IMAGING | Facility: HOSPITAL | Age: 78
DRG: 445 | End: 2018-07-31
Attending: EMERGENCY MEDICINE
Payer: MEDICARE

## 2018-07-31 DIAGNOSIS — I48.91 ATRIAL FIBRILLATION AND FLUTTER (HCC): ICD-10-CM

## 2018-07-31 DIAGNOSIS — D72.829 LEUKOCYTOSIS, UNSPECIFIED TYPE: ICD-10-CM

## 2018-07-31 DIAGNOSIS — I48.92 ATRIAL FIBRILLATION AND FLUTTER (HCC): ICD-10-CM

## 2018-07-31 DIAGNOSIS — K81.0 ACUTE CHOLECYSTITIS: Primary | ICD-10-CM

## 2018-07-31 LAB
ALBUMIN SERPL-MCNC: 3.2 G/DL (ref 3.5–4.8)
ALBUMIN/GLOB SERPL: 1 {RATIO} (ref 1–2)
ALP LIVER SERPL-CCNC: 190 U/L (ref 55–142)
ALT SERPL-CCNC: 71 U/L (ref 14–54)
ANION GAP SERPL CALC-SCNC: 11 MMOL/L (ref 0–18)
APTT PPP: 26.3 SECONDS (ref 26.1–34.6)
AST SERPL-CCNC: 37 U/L (ref 15–41)
BASOPHILS # BLD AUTO: 0.02 X10(3) UL (ref 0–0.1)
BASOPHILS NFR BLD AUTO: 0.1 %
BILIRUB SERPL-MCNC: 0.5 MG/DL (ref 0.1–2)
BILIRUB UR QL STRIP.AUTO: NEGATIVE
BUN BLD-MCNC: 13 MG/DL (ref 8–20)
BUN/CREAT SERPL: 16.3 (ref 10–20)
CALCIUM BLD-MCNC: 8.9 MG/DL (ref 8.3–10.3)
CHLORIDE SERPL-SCNC: 104 MMOL/L (ref 101–111)
CLARITY UR REFRACT.AUTO: CLEAR
CO2 SERPL-SCNC: 24 MMOL/L (ref 22–32)
COLOR UR AUTO: YELLOW
CREAT BLD-MCNC: 0.8 MG/DL (ref 0.55–1.02)
EOSINOPHIL # BLD AUTO: 0.02 X10(3) UL (ref 0–0.3)
EOSINOPHIL NFR BLD AUTO: 0.1 %
ERYTHROCYTE [DISTWIDTH] IN BLOOD BY AUTOMATED COUNT: 13.7 % (ref 11.5–16)
GLOBULIN PLAS-MCNC: 3.2 G/DL (ref 2.5–3.7)
GLUCOSE BLD-MCNC: 105 MG/DL (ref 65–99)
GLUCOSE BLD-MCNC: 134 MG/DL (ref 70–99)
GLUCOSE UR STRIP.AUTO-MCNC: NEGATIVE MG/DL
HCT VFR BLD AUTO: 28.9 % (ref 34–50)
HGB BLD-MCNC: 9.4 G/DL (ref 12–16)
IMMATURE GRANULOCYTE COUNT: 0.11 X10(3) UL (ref 0–1)
IMMATURE GRANULOCYTE RATIO %: 0.8 %
INR BLD: 0.99 (ref 0.9–1.1)
LEUKOCYTE ESTERASE UR QL STRIP.AUTO: NEGATIVE
LIPASE: 101 U/L (ref 73–393)
LYMPHOCYTES # BLD AUTO: 0.66 X10(3) UL (ref 0.9–4)
LYMPHOCYTES NFR BLD AUTO: 4.7 %
M PROTEIN MFR SERPL ELPH: 6.4 G/DL (ref 6.1–8.3)
MCH RBC QN AUTO: 30.3 PG (ref 27–33.2)
MCHC RBC AUTO-ENTMCNC: 32.5 G/DL (ref 31–37)
MCV RBC AUTO: 93.2 FL (ref 81–100)
MONOCYTES # BLD AUTO: 0.59 X10(3) UL (ref 0.1–1)
MONOCYTES NFR BLD AUTO: 4.2 %
NEUTROPHIL ABS PRELIM: 12.59 X10 (3) UL (ref 1.3–6.7)
NEUTROPHILS # BLD AUTO: 12.59 X10(3) UL (ref 1.3–6.7)
NEUTROPHILS NFR BLD AUTO: 90.1 %
NITRITE UR QL STRIP.AUTO: NEGATIVE
OSMOLALITY SERPL CALC.SUM OF ELEC: 290 MOSM/KG (ref 275–295)
PH UR STRIP.AUTO: 7 [PH] (ref 4.5–8)
PLATELET # BLD AUTO: 233 10(3)UL (ref 150–450)
POTASSIUM SERPL-SCNC: 3.5 MMOL/L (ref 3.6–5.1)
PROT UR STRIP.AUTO-MCNC: NEGATIVE MG/DL
PSA SERPL DL<=0.01 NG/ML-MCNC: 13.5 SECONDS (ref 12.4–14.7)
RBC # BLD AUTO: 3.1 X10(6)UL (ref 3.8–5.1)
RBC UR QL AUTO: NEGATIVE
RED CELL DISTRIBUTION WIDTH-SD: 46.5 FL (ref 35.1–46.3)
SODIUM SERPL-SCNC: 139 MMOL/L (ref 136–144)
SP GR UR STRIP.AUTO: 1.01 (ref 1–1.03)
UROBILINOGEN UR STRIP.AUTO-MCNC: <2 MG/DL
WBC # BLD AUTO: 14 X10(3) UL (ref 4–13)

## 2018-07-31 PROCEDURE — 99223 1ST HOSP IP/OBS HIGH 75: CPT | Performed by: HOSPITALIST

## 2018-07-31 PROCEDURE — 71045 X-RAY EXAM CHEST 1 VIEW: CPT | Performed by: EMERGENCY MEDICINE

## 2018-07-31 PROCEDURE — 76700 US EXAM ABDOM COMPLETE: CPT | Performed by: EMERGENCY MEDICINE

## 2018-07-31 RX ORDER — SODIUM CHLORIDE 9 MG/ML
INJECTION, SOLUTION INTRAVENOUS CONTINUOUS
Status: DISCONTINUED | OUTPATIENT
Start: 2018-07-31 | End: 2018-08-03

## 2018-07-31 RX ORDER — METOCLOPRAMIDE HYDROCHLORIDE 5 MG/ML
10 INJECTION INTRAMUSCULAR; INTRAVENOUS EVERY 8 HOURS PRN
Status: DISCONTINUED | OUTPATIENT
Start: 2018-07-31 | End: 2018-08-09

## 2018-07-31 RX ORDER — DEXTROSE MONOHYDRATE 25 G/50ML
50 INJECTION, SOLUTION INTRAVENOUS
Status: DISCONTINUED | OUTPATIENT
Start: 2018-07-31 | End: 2018-08-09

## 2018-07-31 RX ORDER — LOSARTAN POTASSIUM 50 MG/1
25 TABLET ORAL DAILY
Status: DISCONTINUED | OUTPATIENT
Start: 2018-07-31 | End: 2018-08-09

## 2018-07-31 RX ORDER — SERTRALINE HYDROCHLORIDE 25 MG/1
25 TABLET, FILM COATED ORAL DAILY
Status: DISCONTINUED | OUTPATIENT
Start: 2018-07-31 | End: 2018-08-09

## 2018-07-31 RX ORDER — DILTIAZEM HYDROCHLORIDE 120 MG/1
120 CAPSULE, EXTENDED RELEASE ORAL DAILY
Status: DISCONTINUED | OUTPATIENT
Start: 2018-08-01 | End: 2018-08-09

## 2018-07-31 RX ORDER — ONDANSETRON 2 MG/ML
4 INJECTION INTRAMUSCULAR; INTRAVENOUS EVERY 6 HOURS PRN
Status: DISCONTINUED | OUTPATIENT
Start: 2018-07-31 | End: 2018-08-09

## 2018-07-31 RX ORDER — TRAZODONE HYDROCHLORIDE 100 MG/1
50 TABLET ORAL NIGHTLY PRN
Status: DISCONTINUED | OUTPATIENT
Start: 2018-07-31 | End: 2018-08-09

## 2018-07-31 RX ORDER — HYDROCODONE BITARTRATE AND ACETAMINOPHEN 5; 325 MG/1; MG/1
1 TABLET ORAL EVERY 6 HOURS PRN
Status: DISCONTINUED | OUTPATIENT
Start: 2018-07-31 | End: 2018-08-02

## 2018-07-31 RX ORDER — MORPHINE SULFATE 4 MG/ML
4 INJECTION, SOLUTION INTRAMUSCULAR; INTRAVENOUS ONCE
Status: COMPLETED | OUTPATIENT
Start: 2018-07-31 | End: 2018-07-31

## 2018-07-31 RX ORDER — ACETAMINOPHEN 325 MG/1
650 TABLET ORAL EVERY 6 HOURS PRN
Status: DISCONTINUED | OUTPATIENT
Start: 2018-07-31 | End: 2018-08-09

## 2018-07-31 RX ORDER — ONDANSETRON 2 MG/ML
4 INJECTION INTRAMUSCULAR; INTRAVENOUS ONCE
Status: COMPLETED | OUTPATIENT
Start: 2018-07-31 | End: 2018-07-31

## 2018-07-31 RX ORDER — AMIODARONE HYDROCHLORIDE 200 MG/1
200 TABLET ORAL 2 TIMES DAILY
Status: DISCONTINUED | OUTPATIENT
Start: 2018-07-31 | End: 2018-08-09

## 2018-07-31 NOTE — ED PROVIDER NOTES
Patient Seen in: [facility] Emergency Department    History   Patient presents with:  Nausea/Vomiting/Diarrhea (gastrointestinal)    Stated Complaint: N/V/D since this am.    HPI    16-year-old female presents emergency department complaining of gilda Obesity, morbid, BMI 40.0-49.9 ([facility] [address] 6/18/2015   • RADHA (obstructive sleep apnea) 06/29/2012    Cannot tolerate CPAP machine- uses oxygen periodically    • Other and unspecified hyperlipidemia    • Pericardial effusion 6/5/2014    trivial   • Pulmonary HTN Exam    Vital signs reviewed  General appearance: Patient is alert and in no acute distress  HEENT: Pupils equal react to light extraocular muscles intact no scleral icterus, mucous membranes are moist, there is no erythema or exudate in the posterior phar CULTURE REFLEX   PTT, ACTIVATED   PROTHROMBIN TIME (PT)   RAINBOW DRAW BLUE   RAINBOW DRAW LAVENDER   RAINBOW DRAW LIGHT GREEN   RAINBOW DRAW GOLD     EKG    Rate, intervals and axes as noted on EKG Report.   Rate: 74  Rhythm: Atrial Fibrillation  Reading: (cpt=72125)    Result Date: 7/16/2018  CONCLUSION:  1.   There is an acute fracture of the right superior articular facet of C1 posteriorly with minimal widening of the right atlanto occipital articulations and minimal posterior displacement of the right la (cpt=71045)    Result Date: 7/16/2018  CONCLUSION:  No evidence of active cardiopulmonary disease. Dictated by: Simone Toth MD on 7/16/2018 at 8:25     Approved by: Simone Toth MD             Patient's exam is consistent withAcute cholecystitis.   P

## 2018-07-31 NOTE — PROGRESS NOTES
ARIEL spoke with Stef Medina patient's daughter. Stef Medina states that her brother Beau Briscoe is taking Maddock Adie back to the ED right now. Jeri states that Maddock Adie is vomiting now, has a headache and chills. 8/1/18 NCM checked this mornining and patient was readmitted yesterday.

## 2018-08-01 ENCOUNTER — APPOINTMENT (OUTPATIENT)
Dept: INTERVENTIONAL RADIOLOGY/VASCULAR | Facility: HOSPITAL | Age: 78
DRG: 445 | End: 2018-08-01
Attending: SURGERY
Payer: MEDICARE

## 2018-08-01 LAB
ANION GAP SERPL CALC-SCNC: 6 MMOL/L (ref 0–18)
ATRIAL RATE: 300 BPM
BASOPHILS # BLD AUTO: 0.03 X10(3) UL (ref 0–0.1)
BASOPHILS NFR BLD AUTO: 0.3 %
BUN BLD-MCNC: 11 MG/DL (ref 8–20)
BUN/CREAT SERPL: 14.9 (ref 10–20)
CALCIUM BLD-MCNC: 8.6 MG/DL (ref 8.3–10.3)
CHLORIDE SERPL-SCNC: 106 MMOL/L (ref 101–111)
CO2 SERPL-SCNC: 29 MMOL/L (ref 22–32)
CREAT BLD-MCNC: 0.74 MG/DL (ref 0.55–1.02)
EOSINOPHIL # BLD AUTO: 0.08 X10(3) UL (ref 0–0.3)
EOSINOPHIL NFR BLD AUTO: 0.7 %
ERYTHROCYTE [DISTWIDTH] IN BLOOD BY AUTOMATED COUNT: 13.8 % (ref 11.5–16)
GLUCOSE BLD-MCNC: 100 MG/DL (ref 65–99)
GLUCOSE BLD-MCNC: 143 MG/DL (ref 65–99)
GLUCOSE BLD-MCNC: 163 MG/DL (ref 65–99)
GLUCOSE BLD-MCNC: 166 MG/DL (ref 65–99)
GLUCOSE BLD-MCNC: 233 MG/DL (ref 65–99)
GLUCOSE BLD-MCNC: 70 MG/DL (ref 65–99)
GLUCOSE BLD-MCNC: 87 MG/DL (ref 70–99)
GLUCOSE BLD-MCNC: 94 MG/DL (ref 65–99)
HAV IGM SER QL: 1.9 MG/DL (ref 1.8–2.5)
HCT VFR BLD AUTO: 27.5 % (ref 34–50)
HGB BLD-MCNC: 9 G/DL (ref 12–16)
IMMATURE GRANULOCYTE COUNT: 0.08 X10(3) UL (ref 0–1)
IMMATURE GRANULOCYTE RATIO %: 0.7 %
LYMPHOCYTES # BLD AUTO: 1.2 X10(3) UL (ref 0.9–4)
LYMPHOCYTES NFR BLD AUTO: 10.9 %
MCH RBC QN AUTO: 30.8 PG (ref 27–33.2)
MCHC RBC AUTO-ENTMCNC: 32.7 G/DL (ref 31–37)
MCV RBC AUTO: 94.2 FL (ref 81–100)
MONOCYTES # BLD AUTO: 1 X10(3) UL (ref 0.1–1)
MONOCYTES NFR BLD AUTO: 9.1 %
NEUTROPHIL ABS PRELIM: 8.59 X10 (3) UL (ref 1.3–6.7)
NEUTROPHILS # BLD AUTO: 8.59 X10(3) UL (ref 1.3–6.7)
NEUTROPHILS NFR BLD AUTO: 78.3 %
OSMOLALITY SERPL CALC.SUM OF ELEC: 291 MOSM/KG (ref 275–295)
PLATELET # BLD AUTO: 239 10(3)UL (ref 150–450)
POTASSIUM SERPL-SCNC: 3.5 MMOL/L (ref 3.6–5.1)
POTASSIUM SERPL-SCNC: 4.5 MMOL/L (ref 3.6–5.1)
Q-T INTERVAL: 462 MS
QRS DURATION: 160 MS
QTC CALCULATION (BEZET): 512 MS
R AXIS: 9 DEGREES
RBC # BLD AUTO: 2.92 X10(6)UL (ref 3.8–5.1)
RED CELL DISTRIBUTION WIDTH-SD: 47.8 FL (ref 35.1–46.3)
SODIUM SERPL-SCNC: 141 MMOL/L (ref 136–144)
T AXIS: 25 DEGREES
VENTRICULAR RATE: 74 BPM
WBC # BLD AUTO: 11 X10(3) UL (ref 4–13)

## 2018-08-01 PROCEDURE — 0F9430Z DRAINAGE OF GALLBLADDER WITH DRAINAGE DEVICE, PERCUTANEOUS APPROACH: ICD-10-PCS | Performed by: RADIOLOGY

## 2018-08-01 PROCEDURE — 99223 1ST HOSP IP/OBS HIGH 75: CPT | Performed by: SURGERY

## 2018-08-01 PROCEDURE — 99232 SBSQ HOSP IP/OBS MODERATE 35: CPT | Performed by: HOSPITALIST

## 2018-08-01 RX ORDER — POTASSIUM CHLORIDE 20 MEQ/1
40 TABLET, EXTENDED RELEASE ORAL EVERY 4 HOURS
Status: COMPLETED | OUTPATIENT
Start: 2018-08-01 | End: 2018-08-01

## 2018-08-01 RX ORDER — LIDOCAINE HYDROCHLORIDE 10 MG/ML
INJECTION, SOLUTION EPIDURAL; INFILTRATION; INTRACAUDAL; PERINEURAL
Status: COMPLETED
Start: 2018-08-01 | End: 2018-08-01

## 2018-08-01 RX ORDER — MIDAZOLAM HYDROCHLORIDE 1 MG/ML
INJECTION INTRAMUSCULAR; INTRAVENOUS
Status: COMPLETED
Start: 2018-08-01 | End: 2018-08-01

## 2018-08-01 NOTE — PLAN OF CARE
PAIN - ADULT    • Verbalizes/displays adequate comfort level or patient's stated pain goal Progressing        Patient/Family Goals    • Patient/Family Long Term Goal Progressing    • Patient/Family Short Term Goal Progressing        A/O x 4.  RA. 2 L @ nig

## 2018-08-01 NOTE — PROCEDURES
[facility]  Procedure Note    Leonardo Rise Patient Status:  Inpatient    1940 MRN [id number]   [facility] 3NE-A Attending Dinorah Bolden,  [address] Day # 1 PCP Tre Ramos MD     Procedure: percutaneous cholecysto

## 2018-08-01 NOTE — PLAN OF CARE
NURSING ADMISSION NOTE      Patient admitted via Cart  Oriented to room. Safety precautions initiated. Bed in low position. Call light in reach. Orientated to unit. Admission navigator completed. A&O x 4. On 3L HS. NPO. Neck brace in place.  Sta

## 2018-08-01 NOTE — RESTORATIVE THERAPY
RADHA assessment. Patient is currently in C-collar sleeping up in chair with nasal cannula. States she does not wear a CPAP at home and wants to wear the oxygen. Will continue to monitor.

## 2018-08-01 NOTE — PHYSICAL THERAPY NOTE
PT eval orders received via mobility screen. Chart reviewed. Pt to have procedure for cholecystostomy tube today under general anesthesia per RN. Will hold PT order at this time due to upcoming procedure.  Pt will require new PT orders following procedure i

## 2018-08-01 NOTE — CM/SW NOTE
08/01/18 1500   CM/SW Referral Data   Referral Source ; Other  (pt advocate)   Reason for Referral Readmission   Informant Children  (POA & dtr, Jeri)   Readmission Assessment   Factors that patient feels contributed to this readmission Othe been removed over the course of these admissions & that she has asked for her gall bladder to be evaluated prior to this admission. CM reviewed the case, discussed with hospitalist and RN. The staples are being removed today per order by Dr Harpreet Gallardo.    P

## 2018-08-01 NOTE — CM/SW NOTE
Pt is a 71 yo who is a readmission for abdominal pain and SOB. Pt was just d/c'd on 7/28 home. Pt refused [facility] upon d/c from her last admission from a fall. Pt has had [facility] in the past.  She has also been to [facility] and [facility] previously.   Pt daisy

## 2018-08-01 NOTE — CONSULTS
[facility]  Report of Consultation    Betty Dennison Patient Status:  Observation    1940 MRN [id number]   [facility] 3NE-A Attending Bustosbertin Hein [address] Day # 0 PCP Karen Pena MD     Reason for Consultation: implant on 7/7/16.      • Obesity, morbid, BMI 40.0-49.9 ([facility] [address] 6/18/2015   • RADHA (obstructive sleep apnea) 06/29/2012    Cannot tolerate CPAP machine- uses oxygen periodically    • Other and unspecified hyperlipidemia    • Pericardial effusion 6/5/2014    t HYDROcodone-acetaminophen (NORCO) 5-325 MG per tab 1 tablet, 1 tablet, Oral, Q6H PRN  •  diltiazem (CARDIZEM CD) 24 hr cap 120 mg, 120 mg, Oral, Daily  •  amiodarone HCl (PACERONE) tab 200 mg, 200 mg, Oral, BID  •  Piperacillin Sod-Tazobactam So (ZOSYN) 3. examination is grossly normal.  No focal neurologic deficit.     Laboratory Data:    Lab Results  Component Value Date   WBC 11.0 08/01/2018   HGB 9.0 08/01/2018   HCT 27.5 08/01/2018   .0 08/01/2018   CREATSERUM 0.80 07/31/2018   BUN 13 07/31/2018 persistent asthma without complication     S/P craniotomy     Subdural hemorrhage (HCC)     Acute deep vein thrombosis (DVT) of both lower extremities (HCC)     Acute cystitis without hematuria     Leukocytosis     Pulmonary embolus (HCC)     S/P IVC filte in the office and discuss treatment options for the gallbladder. The patient expresses understanding and agrees with the plan. I spent 45 minutes face to face with the patient.  More than 50% of that time was spent counseling the patient and/or on coord

## 2018-08-01 NOTE — PROGRESS NOTES
Spoke with patient's daughter, Felipe Penaloza ([phone]), and discussed the patient's overall condition and plan of care. Felipe Penaloza states that she and her mother do not wish any surgical intervention for the gallbladder given her multiple medical conditions.   Padmini Hatch

## 2018-08-01 NOTE — PAYOR COMM NOTE
--------------  ADMISSION REVIEW     Payor: BCBS MEDICARE ADV PPO  Subscriber #:  [id number]  Authorization Number: N/A    Admit date: 7/31/18  Admit time: 2235       Admitting Physician: Chrystal Oppenheim, MD  Attending Physician:  Nadeem Alonzo LAD (lymphadenopathy), cervical 1/18/2014    bx 2014- benign   • Lymph node enlargement 1/14/2014    Neck 2014   • Migraines    • Neuropathy 9/16/2016    Severe both legs   • NS[facility]onsustained ventricular tachycardia) [facility] 7/11/2014    Recurrence 7.2016 (Temporal)   Resp (!) 28   Ht 167.6 cm (5' 6\")   Wt 84.8 kg   SpO2 100%   BMI 30.18 kg/m²          Physical Exam    Vital signs reviewed  General appearance: Patient is alert and in no acute distress  HEENT: Pupils equal react to light extraocular muscles dose of Zosyn. Will need to be cleared by cardiology before any type procedures done. MDM     Patient was evaluated in the emergency department and at this point patient will need admission for further management of medical condition.   Patient was stab Clear to auscultation bilaterally. No wheezes. No rhonchi. Cardiovascular: irreg irreg. Controlled rates. No murmurs, rubs or gallops. Equal pulses. Chest and Back: No tenderness or deformity. Abdomen: Soft, tender RUQ, nondistended.   Positive bowel so conditions. · The plan would be to have a cholecystostomy tube placed today to allow for the inflammation to fully subside. I would then meet the patient and family again in the office and discuss treatment options for the gallbladder.   The patient exp

## 2018-08-02 LAB
GLUCOSE BLD-MCNC: 106 MG/DL (ref 65–99)
GLUCOSE BLD-MCNC: 110 MG/DL (ref 65–99)
GLUCOSE BLD-MCNC: 133 MG/DL (ref 65–99)
GLUCOSE BLD-MCNC: 168 MG/DL (ref 65–99)

## 2018-08-02 PROCEDURE — 99232 SBSQ HOSP IP/OBS MODERATE 35: CPT | Performed by: HOSPITALIST

## 2018-08-02 RX ORDER — WARFARIN SODIUM 5 MG/1
5 TABLET ORAL NIGHTLY
Status: DISCONTINUED | OUTPATIENT
Start: 2018-08-02 | End: 2018-08-05

## 2018-08-02 RX ORDER — BUTALBITAL, ACETAMINOPHEN AND CAFFEINE 50; 325; 40 MG/1; MG/1; MG/1
1 TABLET ORAL EVERY 4 HOURS PRN
Status: DISCONTINUED | OUTPATIENT
Start: 2018-08-02 | End: 2018-08-09

## 2018-08-02 RX ORDER — HYDROCODONE BITARTRATE AND ACETAMINOPHEN 5; 325 MG/1; MG/1
1-2 TABLET ORAL EVERY 6 HOURS PRN
Status: DISCONTINUED | OUTPATIENT
Start: 2018-08-02 | End: 2018-08-09

## 2018-08-02 RX ORDER — WARFARIN SODIUM 5 MG/1
5 TABLET ORAL NIGHTLY
Status: ON HOLD | COMMUNITY
End: 2019-03-04

## 2018-08-02 RX ORDER — WARFARIN SODIUM 2.5 MG/1
2.5 TABLET ORAL
Status: ON HOLD | COMMUNITY
End: 2019-03-04

## 2018-08-02 NOTE — PROGRESS NOTES
[facility]  Progress Note     Michaela Olivo Patient Status:  Inpatient    1940 MRN [id number]   [facility] 3NE-A Attending Amilcar Langston [address] Day # 2 PCP Malissa Damon MD     Chief Complaint: Abdominal pain results for input(s): TROP, CK in the last 168 hours. Imaging: Imaging data reviewed in Epic.     Medications:   • Warfarin Sodium  5 mg Oral Nightly   • Sertraline HCl  25 mg Oral Daily   • Metoprolol Tartrate  50 mg Oral 2x Daily(Beta Blocker)   • auscultation bilaterally. No wheezes. No rhonchi. Cardiovascular: S1, S2. Regular rate and rhythm. No murmurs, rubs or gallops. Abdomen: Soft, nontender, nondistended. Positive bowel sounds. No rebound or guarding.   Neurologic: No focal neurological de

## 2018-08-02 NOTE — PROGRESS NOTES
Patient inquired about restarting her coumadin. Informed patient that we will check with the MD in the AM about the medication. Endorsed to night shift RN.

## 2018-08-02 NOTE — PHYSICAL THERAPY NOTE
PHYSICAL THERAPY EVALUATION - INPATIENT     Room Number: 9841/0778-I  Evaluation Date: 8/2/2018  Type of Evaluation: Initial  Physician Order: PT Eval and Treat    Presenting Problem: acute cholecystitis  Reason for Therapy: Mobility Dysfunction and 1/18/2014    bx 2014- benign   • Lymph node enlargement 1/14/2014    Neck 2014   • Migraines    • Neuropathy 9/16/2016    Severe both legs   • NSVT (nonsustained ventricular tachycardia) ([facility] [address] 7/11/2014    Recurrence 7.2016 Hx VT- s/p ICD implant on 7/7/16 daughter who lives in [facility], this house has two steps to enter. SUBJECTIVE  \"I am doing ok, just feeling weak\"    Patient self-stated goal is to go back home.      OBJECTIVE  Precautions: Cervical brace  Fall Risk: High fall risk    WEIGHT BEARING R Degree of Impairment Score: 46.58%   Standardized Score (AM-PAC Scale): 43.63   CMS Modifier (G-Code): CK    FUNCTIONAL ABILITY STATUS  Gait Assessment   Gait Assistance: Supervision  Distance (ft): 200  Assistive Device: Rolling walker  Pattern: Shuffle functional mobility, decreased balance, and decreased overall functional mobility2.   Functional outcome measures completed include The AM-PAC '6-Clicks' Inpatient Basic Mobility Short Form was completed and this patient is demonstrating a 46.58% degree of

## 2018-08-02 NOTE — PROGRESS NOTES
[facility]  Progress Note    Courtney Harrison Patient Status:  Inpatient    1940 MRN [id number]   [facility] 3NE-A Attending Gene Hay, [address] Day # 2 PCP Katelynn Keita MD     Subjective:  Feels ok.   Mild pain a ([facility] [address]     Obstructive sleep apnea (adult) (pediatric)     Microalbuminuria     Arthritis of knee, degenerative     Depression     Insomnia     Abnormal gait     Lymph node enlargement     Atypical lymphoproliferative disorder ([facility] [address]     Gallstones     Peric collapse     Closed nondisplaced fracture of first cervical vertebra, unspecified fracture morphology, initial encounter ([facility])     Scalp laceration, initial encounter     Hyponatremia     Anemia     Acute renal failure (ARF) ([facility])     Acute kidney injury (

## 2018-08-02 NOTE — PAYOR COMM NOTE
--------------  CONTINUED STAY REVIEW    Payor: BCBS MEDICARE ADV PPO  Subscriber #:  [id number]  Authorization Number: [id number]    Admit date: 7/31/18  Admit time: 2235    Admitting Physician: Gabriel Salas MD  Attending Physician:  Mary Barrios

## 2018-08-02 NOTE — PLAN OF CARE
A/O x 4. Sutures to head intact- staples removed yesterday and no bleeding noted by staff  Pt sitting up in chair  Kristel tube draining to gravity in bag- 100 ml serosanginous thick drainage out  Tele Afib.  AICD  Coumadin on hold as of now - per surgery t

## 2018-08-03 ENCOUNTER — APPOINTMENT (OUTPATIENT)
Dept: GENERAL RADIOLOGY | Facility: HOSPITAL | Age: 78
DRG: 445 | End: 2018-08-03
Attending: PHYSICIAN ASSISTANT
Payer: MEDICARE

## 2018-08-03 LAB
ALBUMIN SERPL-MCNC: 2.5 G/DL (ref 3.5–4.8)
ALBUMIN/GLOB SERPL: 0.7 {RATIO} (ref 1–2)
ALP LIVER SERPL-CCNC: 496 U/L (ref 55–142)
ALT SERPL-CCNC: 367 U/L (ref 14–54)
ANION GAP SERPL CALC-SCNC: 10 MMOL/L (ref 0–18)
AST SERPL-CCNC: 250 U/L (ref 15–41)
BASOPHILS # BLD AUTO: 0.03 X10(3) UL (ref 0–0.1)
BASOPHILS NFR BLD AUTO: 0.1 %
BILIRUB SERPL-MCNC: 3.9 MG/DL (ref 0.1–2)
BILIRUB UR QL STRIP.AUTO: NEGATIVE
BUN BLD-MCNC: 8 MG/DL (ref 8–20)
BUN/CREAT SERPL: 10.7 (ref 10–20)
CALCIUM BLD-MCNC: 8.6 MG/DL (ref 8.3–10.3)
CHLORIDE SERPL-SCNC: 102 MMOL/L (ref 101–111)
CLARITY UR REFRACT.AUTO: CLEAR
CO2 SERPL-SCNC: 25 MMOL/L (ref 22–32)
CREAT BLD-MCNC: 0.75 MG/DL (ref 0.55–1.02)
EOSINOPHIL # BLD AUTO: 0 X10(3) UL (ref 0–0.3)
EOSINOPHIL NFR BLD AUTO: 0 %
ERYTHROCYTE [DISTWIDTH] IN BLOOD BY AUTOMATED COUNT: 13.9 % (ref 11.5–16)
GLOBULIN PLAS-MCNC: 3.4 G/DL (ref 2.5–3.7)
GLUCOSE BLD-MCNC: 111 MG/DL (ref 65–99)
GLUCOSE BLD-MCNC: 112 MG/DL (ref 65–99)
GLUCOSE BLD-MCNC: 130 MG/DL (ref 70–99)
GLUCOSE BLD-MCNC: 133 MG/DL (ref 65–99)
GLUCOSE BLD-MCNC: 163 MG/DL (ref 65–99)
GLUCOSE UR STRIP.AUTO-MCNC: NEGATIVE MG/DL
HCT VFR BLD AUTO: 27.6 % (ref 34–50)
HGB BLD-MCNC: 8.8 G/DL (ref 12–16)
IMMATURE GRANULOCYTE COUNT: 0.12 X10(3) UL (ref 0–1)
IMMATURE GRANULOCYTE RATIO %: 0.5 %
INR BLD: 1.1 (ref 0.9–1.1)
KETONES UR STRIP.AUTO-MCNC: NEGATIVE MG/DL
LEUKOCYTE ESTERASE UR QL STRIP.AUTO: NEGATIVE
LYMPHOCYTES # BLD AUTO: 0.5 X10(3) UL (ref 0.9–4)
LYMPHOCYTES NFR BLD AUTO: 2.2 %
M PROTEIN MFR SERPL ELPH: 5.9 G/DL (ref 6.1–8.3)
MCH RBC QN AUTO: 29.9 PG (ref 27–33.2)
MCHC RBC AUTO-ENTMCNC: 31.9 G/DL (ref 31–37)
MCV RBC AUTO: 93.9 FL (ref 81–100)
MONOCYTES # BLD AUTO: 0.67 X10(3) UL (ref 0.1–1)
MONOCYTES NFR BLD AUTO: 3 %
NEUTROPHIL ABS PRELIM: 21.22 X10 (3) UL (ref 1.3–6.7)
NEUTROPHILS # BLD AUTO: 21.22 X10(3) UL (ref 1.3–6.7)
NEUTROPHILS NFR BLD AUTO: 94.2 %
NITRITE UR QL STRIP.AUTO: NEGATIVE
OSMOLALITY SERPL CALC.SUM OF ELEC: 284 MOSM/KG (ref 275–295)
PH UR STRIP.AUTO: 5 [PH] (ref 4.5–8)
PLATELET # BLD AUTO: 224 10(3)UL (ref 150–450)
POTASSIUM SERPL-SCNC: 4 MMOL/L (ref 3.6–5.1)
PROT UR STRIP.AUTO-MCNC: 30 MG/DL
PSA SERPL DL<=0.01 NG/ML-MCNC: 14.6 SECONDS (ref 12.4–14.7)
RBC # BLD AUTO: 2.94 X10(6)UL (ref 3.8–5.1)
RBC UR QL AUTO: NEGATIVE
RED CELL DISTRIBUTION WIDTH-SD: 47.3 FL (ref 35.1–46.3)
SODIUM SERPL-SCNC: 137 MMOL/L (ref 136–144)
SP GR UR STRIP.AUTO: 1.01 (ref 1–1.03)
UROBILINOGEN UR STRIP.AUTO-MCNC: 4 MG/DL
WBC # BLD AUTO: 22.5 X10(3) UL (ref 4–13)

## 2018-08-03 PROCEDURE — 71045 X-RAY EXAM CHEST 1 VIEW: CPT | Performed by: PHYSICIAN ASSISTANT

## 2018-08-03 PROCEDURE — 99232 SBSQ HOSP IP/OBS MODERATE 35: CPT | Performed by: HOSPITALIST

## 2018-08-03 RX ORDER — FUROSEMIDE 10 MG/ML
20 INJECTION INTRAMUSCULAR; INTRAVENOUS ONCE
Status: COMPLETED | OUTPATIENT
Start: 2018-08-03 | End: 2018-08-03

## 2018-08-03 RX ORDER — FAMOTIDINE 10 MG/ML
20 INJECTION, SOLUTION INTRAVENOUS 2 TIMES DAILY
Status: DISCONTINUED | OUTPATIENT
Start: 2018-08-03 | End: 2018-08-09

## 2018-08-03 RX ORDER — BISACODYL 10 MG
10 SUPPOSITORY, RECTAL RECTAL
Status: DISCONTINUED | OUTPATIENT
Start: 2018-08-03 | End: 2018-08-09

## 2018-08-03 NOTE — HOME CARE LIAISON
TNL SENT REFERRAL TO SEE PTNT TO DISCUSS HH. TNL SPOKE WITH PTNT.  AFTER DISCUSSING OUR SERVICES WITH PTN, PTNT DCLINED    THANKS  Dali Coleman

## 2018-08-03 NOTE — PROGRESS NOTES
[facility]  Progress Note    Dmtiry Brayan Patient Status:  Inpatient    1940 MRN [id number]   [facility] 3NE-A Attending [facility] Day # 3 PCP Lola Rueda MD     Subjective:  Feels better this morn knee replacement     Frozen shoulder syndrome     Essential hypertension     Varicose vein     Cerebral atrophy     Ventricular tachycardia (HCC)     At risk for falling     S/P ICD (internal cardiac defibrillator) procedure     ICD (im[facility]able cardiovert [facility]        Plan:  1. Doing well s/p cholecystostomy tube placement for acute cholecystitis. 2. Will keep cholecystostomy tube in place for 6-8 weeks. 3. Restarted coumadin yesterday. 4. Pt and family do not wish any surgical intervention.   Will discus

## 2018-08-03 NOTE — PLAN OF CARE
Pt is A/O x 4. Sitting up in chair  Cervical collar in place for C1 fx  Laceration to head clean, dry, intact - no bleeding noted  Norco 2 tabs PRN for pain- surgical site and headache.  Pt also c/o headache- fioricet PRN with some relief  Nausea tonight- z

## 2018-08-03 NOTE — PROGRESS NOTES
[facility]  Progress Note     Hugo Hiltondarline Patient Status:  Inpatient    1940 MRN [id number]   [facility] 3NE-A Attending Alexandr Whiting [address] Day # 3 PCP Ceferino Dawson MD     Chief Complaint: Abdominal pain No results for input(s): TROP, CK in the last 168 hours. Imaging: Imaging data reviewed in Epic.     Medications:   • famoTIDine  20 mg Intravenous BID   • Warfarin Sodium  5 mg Oral Nightly   • Sertraline HCl  25 mg Oral Daily   • Metoprolol a low-grade temp this morning. Physical exam:  General: Patient awake alert. No acute respiratory distress. Lungs: CTA B  CVS: Regular rhythm  Abdomen: Soft, nontender/nondistended, positive bowel sounds.   Extremities: No C/C/E  A/P:  Agree with above

## 2018-08-03 NOTE — PROGRESS NOTES
Pt seen and examined. Reports feeling better this afternoon. Minimal pain at the cholecystostomy site. Tolerating diet. No nausea or vomiting. Drain with thick bilious material.  Abdomen is soft, non-distended.   Mild tenderness around the cholecysto

## 2018-08-03 NOTE — DISCHARGE SUMMARY
[facility] HOSPITALIST  DISCHARGE SUMMARY     Leonardo Gloria Patient Status:  Observation    1940 MRN [id number]   [facility] 7NE-A Attending No att. providers found   Hosp Day # 1 PCP Tre Ramos MD     Date of Admission:  IV fluids. She was seen by cardiology/IEP, although spine, and wound care. Patient was advised to continue aspirin collar at all times and to switch to a shower collar during showers. She was seen by wound care for her scalp lacerations.   Dressing  200 mg by mouth 2 (two) times daily. Refills:  0     Calcium Carbonate-Vitamin D 600-400 MG-UNIT Tabs      Take 1 tablet by mouth daily. Refills:  0     DilTIAZem HCl ER Beads 120 MG Cp24  Commonly known as:  TIAZAC      Take 120 mg by mouth daily.    R Tabs         [place]    Follow-up appointment:   Spencer Clay MD  [address] [phone]      see a Nurse Practioner in Dr. Kathryn Dempsey office 1-2 week.   Repeat labs on that day 66.7

## 2018-08-03 NOTE — PROGRESS NOTES
One episode of vomit- small amount after PRN zofran given  Pt states she feels relief  Unsure if related to dinner or to Fioricet  Headache improved from tylenol PRN  Will cont to monitor

## 2018-08-04 LAB
ALBUMIN SERPL-MCNC: 2.3 G/DL (ref 3.5–4.8)
ALBUMIN/GLOB SERPL: 0.7 {RATIO} (ref 1–2)
ALP LIVER SERPL-CCNC: 440 U/L (ref 55–142)
ALT SERPL-CCNC: 317 U/L (ref 14–54)
ANION GAP SERPL CALC-SCNC: 7 MMOL/L (ref 0–18)
AST SERPL-CCNC: 189 U/L (ref 15–41)
BASOPHILS # BLD AUTO: 0.02 X10(3) UL (ref 0–0.1)
BASOPHILS NFR BLD AUTO: 0.2 %
BILIRUB SERPL-MCNC: 2.8 MG/DL (ref 0.1–2)
BUN BLD-MCNC: 10 MG/DL (ref 8–20)
BUN/CREAT SERPL: 13 (ref 10–20)
CALCIUM BLD-MCNC: 8.4 MG/DL (ref 8.3–10.3)
CHLORIDE SERPL-SCNC: 102 MMOL/L (ref 101–111)
CO2 SERPL-SCNC: 27 MMOL/L (ref 22–32)
CREAT BLD-MCNC: 0.77 MG/DL (ref 0.55–1.02)
EOSINOPHIL # BLD AUTO: 0.12 X10(3) UL (ref 0–0.3)
EOSINOPHIL NFR BLD AUTO: 1 %
ERYTHROCYTE [DISTWIDTH] IN BLOOD BY AUTOMATED COUNT: 14.2 % (ref 11.5–16)
GLOBULIN PLAS-MCNC: 3.2 G/DL (ref 2.5–3.7)
GLUCOSE BLD-MCNC: 109 MG/DL (ref 65–99)
GLUCOSE BLD-MCNC: 111 MG/DL (ref 65–99)
GLUCOSE BLD-MCNC: 117 MG/DL (ref 65–99)
GLUCOSE BLD-MCNC: 86 MG/DL (ref 70–99)
GLUCOSE BLD-MCNC: 90 MG/DL (ref 65–99)
HCT VFR BLD AUTO: 24.9 % (ref 34–50)
HGB BLD-MCNC: 7.9 G/DL (ref 12–16)
IMMATURE GRANULOCYTE COUNT: 0.11 X10(3) UL (ref 0–1)
IMMATURE GRANULOCYTE RATIO %: 0.9 %
INR BLD: 1.12 (ref 0.9–1.1)
LYMPHOCYTES # BLD AUTO: 1.04 X10(3) UL (ref 0.9–4)
LYMPHOCYTES NFR BLD AUTO: 8.6 %
M PROTEIN MFR SERPL ELPH: 5.5 G/DL (ref 6.1–8.3)
MCH RBC QN AUTO: 29.9 PG (ref 27–33.2)
MCHC RBC AUTO-ENTMCNC: 31.7 G/DL (ref 31–37)
MCV RBC AUTO: 94.3 FL (ref 81–100)
MONOCYTES # BLD AUTO: 1.06 X10(3) UL (ref 0.1–1)
MONOCYTES NFR BLD AUTO: 8.8 %
NEUTROPHIL ABS PRELIM: 9.72 X10 (3) UL (ref 1.3–6.7)
NEUTROPHILS # BLD AUTO: 9.72 X10(3) UL (ref 1.3–6.7)
NEUTROPHILS NFR BLD AUTO: 80.5 %
OSMOLALITY SERPL CALC.SUM OF ELEC: 280 MOSM/KG (ref 275–295)
PLATELET # BLD AUTO: 194 10(3)UL (ref 150–450)
POTASSIUM SERPL-SCNC: 3.9 MMOL/L (ref 3.6–5.1)
PSA SERPL DL<=0.01 NG/ML-MCNC: 14.9 SECONDS (ref 12.4–14.7)
RBC # BLD AUTO: 2.64 X10(6)UL (ref 3.8–5.1)
RED CELL DISTRIBUTION WIDTH-SD: 49 FL (ref 35.1–46.3)
SODIUM SERPL-SCNC: 136 MMOL/L (ref 136–144)
WBC # BLD AUTO: 12.1 X10(3) UL (ref 4–13)

## 2018-08-04 PROCEDURE — 99232 SBSQ HOSP IP/OBS MODERATE 35: CPT | Performed by: SURGERY

## 2018-08-04 PROCEDURE — 99232 SBSQ HOSP IP/OBS MODERATE 35: CPT | Performed by: HOSPITALIST

## 2018-08-04 RX ORDER — TORSEMIDE 20 MG/1
20 TABLET ORAL DAILY
Status: DISCONTINUED | OUTPATIENT
Start: 2018-08-04 | End: 2018-08-07

## 2018-08-04 NOTE — PROGRESS NOTES
[facility]  Progress Note    Andrea Gill Patient Status:  Inpatient    1940 MRN [id number]   [facility] 3NE-A Attending [facility] Day # 4 PCP Kenney Wing MD     Subjective:  Feels good.   No nausea ([facility] [address]     Type 2 diabetes mellitus with complication (HCC)     Persistent atrial fibrillation (HCC)     Obstructive sleep apnea (adult) (pediatric)     Microalbuminuria     Arthritis of knee, degenerative     Depression     Insomnia     Abnormal gait     L COPD (chronic obstructive pulmonary disease) (HCC)     Thrombocytopenia (HCC)     Syncope and collapse     Closed nondisplaced fracture of first cervical vertebra, unspecified fracture morphology, initial encounter ([facility])     Scalp laceration, initial encou

## 2018-08-04 NOTE — PROGRESS NOTES
[facility]  Progress Note     Solange Ill Patient Status:  Inpatient    1940 MRN [id number]   [facility] 3NE-A Attending [facility] Day # 4 PCP Sunil Holly MD     Chief Complaint: Abdominal pain (based on SCr of 0.75 mg/dL). Recent Labs   Lab  07/31/18   1829  08/03/18   0644   PTP  13.5  14.6   INR  0.99  1.10       No results for input(s): TROP, CK in the last 168 hours. Imaging: Imaging data reviewed in Epic.     Medications:   • famo

## 2018-08-04 NOTE — PHYSICAL THERAPY NOTE
PHYSICAL THERAPY TREATMENT NOTE - INPATIENT    Room Number: 5572/4905-N     Session: 1   Number of Visits to Meet Established Goals: 5    Presenting Problem: acute cholecystitis    Problem List  Principal Problem:    Acute cholecystitis  Active Problems: unspecified hyperlipidemia    • Pericardial effusion 6/5/2014    trivial   • Pulmonary HTN ([facility] [facility] 10/10/2014   • Rheumatoid arthritis ([facility] [address] [facility]    • S/P ICD (internal cardiac defibrillator) procedure 7/7/2016    medtronic    • S/P total knee replacement 6/18/20 on the side of the bed?: None   How much help from another person does the patient currently need. ..   -   Moving to and from a bed to a chair (including a wheelchair)?: A Little   -   Need to walk in hospital room?: A Little   -   Climbing 3-5 steps with with ambulation to maintain current level of mobility. The rehab aide will perform treatment activities prescribed by this physical therapist. The rehab aide will communicate with overseeing PT regarding any change in functional mobility. RN aware.

## 2018-08-04 NOTE — PROGRESS NOTES
Family and patient requesting torsedmide to be resumed for swelling in ankles and arms.   This rn paged Dr. Lsia Abbott to notify

## 2018-08-04 NOTE — PLAN OF CARE
Patient aox4, room air, o2 sats >92%. A. Fib on tele, ICD, IVC filter. Kristel tube in place and draining, see output in epic  Aspen collar on and adjusted by charge rn from ortho/spine, pt states it is comfortable.    Diabetes, coumadin, a. Fib, and heart f

## 2018-08-04 NOTE — PLAN OF CARE
Assumed patient care at 1900  Patient A&O x 4 Forgetful at times  No complaints of pain or discomfort at this time  VSS  Tele-AFIB  Hx CHF-daily weight  Coumadin restarted  Aspen collar in place  QID accucheck  Low fiber diet-advance as tolerated.  Patient

## 2018-08-05 LAB
ALBUMIN SERPL-MCNC: 2.3 G/DL (ref 3.5–4.8)
ALBUMIN/GLOB SERPL: 0.7 {RATIO} (ref 1–2)
ALP LIVER SERPL-CCNC: 418 U/L (ref 55–142)
ALT SERPL-CCNC: 256 U/L (ref 14–54)
ANION GAP SERPL CALC-SCNC: 6 MMOL/L (ref 0–18)
AST SERPL-CCNC: 104 U/L (ref 15–41)
BILIRUB SERPL-MCNC: 1.2 MG/DL (ref 0.1–2)
BUN BLD-MCNC: 13 MG/DL (ref 8–20)
BUN/CREAT SERPL: 14.6 (ref 10–20)
CALCIUM BLD-MCNC: 8.2 MG/DL (ref 8.3–10.3)
CHLORIDE SERPL-SCNC: 103 MMOL/L (ref 101–111)
CO2 SERPL-SCNC: 29 MMOL/L (ref 22–32)
CREAT BLD-MCNC: 0.89 MG/DL (ref 0.55–1.02)
ERYTHROCYTE [DISTWIDTH] IN BLOOD BY AUTOMATED COUNT: 14.4 % (ref 11.5–16)
GLOBULIN PLAS-MCNC: 3.4 G/DL (ref 2.5–3.7)
GLUCOSE BLD-MCNC: 148 MG/DL (ref 65–99)
GLUCOSE BLD-MCNC: 154 MG/DL (ref 65–99)
GLUCOSE BLD-MCNC: 181 MG/DL (ref 70–99)
GLUCOSE BLD-MCNC: 81 MG/DL (ref 65–99)
GLUCOSE BLD-MCNC: 92 MG/DL (ref 65–99)
HCT VFR BLD AUTO: 25.9 % (ref 34–50)
HGB BLD-MCNC: 8.4 G/DL (ref 12–16)
INR BLD: 1.06 (ref 0.9–1.1)
M PROTEIN MFR SERPL ELPH: 5.7 G/DL (ref 6.1–8.3)
MCH RBC QN AUTO: 30.2 PG (ref 27–33.2)
MCHC RBC AUTO-ENTMCNC: 32.4 G/DL (ref 31–37)
MCV RBC AUTO: 93.2 FL (ref 81–100)
OSMOLALITY SERPL CALC.SUM OF ELEC: 291 MOSM/KG (ref 275–295)
PLATELET # BLD AUTO: 214 10(3)UL (ref 150–450)
POTASSIUM SERPL-SCNC: 3.5 MMOL/L (ref 3.6–5.1)
PSA SERPL DL<=0.01 NG/ML-MCNC: 14.2 SECONDS (ref 12.4–14.7)
RBC # BLD AUTO: 2.78 X10(6)UL (ref 3.8–5.1)
RED CELL DISTRIBUTION WIDTH-SD: 49 FL (ref 35.1–46.3)
SODIUM SERPL-SCNC: 138 MMOL/L (ref 136–144)
WBC # BLD AUTO: 8.8 X10(3) UL (ref 4–13)

## 2018-08-05 PROCEDURE — 99232 SBSQ HOSP IP/OBS MODERATE 35: CPT | Performed by: HOSPITALIST

## 2018-08-05 PROCEDURE — 99232 SBSQ HOSP IP/OBS MODERATE 35: CPT | Performed by: SURGERY

## 2018-08-05 RX ORDER — WARFARIN SODIUM 7.5 MG/1
7.5 TABLET ORAL NIGHTLY
Status: DISCONTINUED | OUTPATIENT
Start: 2018-08-05 | End: 2018-08-07

## 2018-08-05 RX ORDER — POTASSIUM CHLORIDE 20 MEQ/1
40 TABLET, EXTENDED RELEASE ORAL EVERY 4 HOURS
Status: COMPLETED | OUTPATIENT
Start: 2018-08-05 | End: 2018-08-05

## 2018-08-05 NOTE — PLAN OF CARE
GASTROINTESTINAL - ADULT    • Minimal or absence of nausea and vomiting Progressing    • Maintains or returns to baseline bowel function Progressing    • Achieves appropriate nutritional intake (bariatric) Progressing          Patient alert and oriented x4

## 2018-08-05 NOTE — PROGRESS NOTES
[facility]  Progress Note    Radha Naylor Patient Status:  Inpatient    1940 MRN [id number]   [facility]NE-A [facility] Scotty Olivas MD     Subjective:  Feels good. No pain. degenerative     S/P total knee replacement     Frozen shoulder syndrome     Essential hypertension     Varicose vein     Cerebral atrophy     Ventricular tachycardia (HCC)     At risk for falling     S/P ICD (internal cardiac defibrillator) procedure fibrillation and flutter ([facility]        Plan:  1. Doing well s/p cholecystostomy tube placement for acute cholecystitis. 2. Will keep cholecystostomy in place for 6-8 weeks. 3. Pt and family do not wish any surgical intervention.   4. All questions answered

## 2018-08-05 NOTE — PROGRESS NOTES
[facility]  Progress Note     Radha Cyndy Patient Status:  Inpatient    1940 MRN [id number]   [facility] 3NE-A Attending Nunu [facility] Day # 5 PCP Scotty Oilvas MD     Chief Complaint: Abdominal pain 2.8*   TP  6.4   --    --   5.9*  5.5*       Estimated Creatinine Clearance: 58.2 mL/min (based on SCr of 0.77 mg/dL).     Recent Labs   Lab  07/31/18   1829  08/03/18   0644  08/04/18   0637   PTP  13.5  14.6  14.9*   INR  0.99  1.10  1.12*       No result be discharge to: Home    Plan of care discussed with pt at bedside.     Magaly Wilkerson DO

## 2018-08-06 ENCOUNTER — APPOINTMENT (OUTPATIENT)
Dept: INTERVENTIONAL RADIOLOGY/VASCULAR | Facility: HOSPITAL | Age: 78
DRG: 445 | End: 2018-08-06
Attending: SURGERY
Payer: MEDICARE

## 2018-08-06 LAB
GLUCOSE BLD-MCNC: 104 MG/DL (ref 65–99)
GLUCOSE BLD-MCNC: 122 MG/DL (ref 65–99)
GLUCOSE BLD-MCNC: 148 MG/DL (ref 65–99)
GLUCOSE BLD-MCNC: 186 MG/DL (ref 65–99)
INR BLD: 1.12 (ref 0.9–1.1)
POTASSIUM SERPL-SCNC: 3.4 MMOL/L (ref 3.6–5.1)
POTASSIUM SERPL-SCNC: 4.2 MMOL/L (ref 3.6–5.1)
PSA SERPL DL<=0.01 NG/ML-MCNC: 14.9 SECONDS (ref 12.4–14.7)

## 2018-08-06 PROCEDURE — 99232 SBSQ HOSP IP/OBS MODERATE 35: CPT | Performed by: HOSPITALIST

## 2018-08-06 PROCEDURE — BF121ZZ FLUOROSCOPY OF GALLBLADDER USING LOW OSMOLAR CONTRAST: ICD-10-PCS | Performed by: RADIOLOGY

## 2018-08-06 RX ORDER — POTASSIUM CHLORIDE 20 MEQ/1
40 TABLET, EXTENDED RELEASE ORAL EVERY 4 HOURS
Status: COMPLETED | OUTPATIENT
Start: 2018-08-06 | End: 2018-08-06

## 2018-08-06 NOTE — PROGRESS NOTES
[facility]  Progress Note     Courtney Harrison Patient Status:  Inpatient    1940 MRN [id number]   [facility] 3NE-A [facility] PCP Katelynn Keita MD     Chief Complaint: Abdominal pain ALT  367*  317*  256*   --    BILT  3.9*  2.8*  1.2   --    TP  5.9*  5.5*  5.7*   --        Estimated Creatinine Clearance: 50.3 mL/min (based on SCr of 0.89 mg/dL).     Recent Labs   Lab  08/04/18   0637  08/05/18   0856  08/06/18   0645   PTP  14.9*  1 Damir. D/c planning. Pt reports they decline [facility] bc her dtr worked in [facility] and does her care/PT at home. Kayleen KIMBALL  1:00 PM     IM Hospitalist Addendum    Pt seen and examined at bedside. No overnight events or new complaints.     Eliud

## 2018-08-06 NOTE — PROGRESS NOTES
This rn got in report from IR early that orders would be placed in Epic for aerly tube to be flushed. No orders in epic at this time, IR department closed at this time, no note from IR in epic at this time.    This rn paged Dr. Valeria Gonsalez, on call for Dr. Archie Headley

## 2018-08-06 NOTE — PROGRESS NOTES
[facility]  Progress Note    Alphonso Downs Patient Status:  Inpatient    1940 MRN [id number]   [facility] 3NE-A [facility] PCP Sherrill Martines MD     Subjective:  Feels good. No pain. complication (HCC)     Persistent atrial fibrillation (HCC)     Obstructive sleep apnea (adult) (pediatric)     Microalbuminuria     Arthritis of knee, degenerative     Depression     Insomnia     Abnormal gait     Lymph node enlargement     Atypical lymph disease) ([facility] [address]     Thrombocytopenia ([facility]     Syncope and collapse     Closed nondisplaced fracture of first cervical vertebra, unspecified fracture morphology, initial encounter (HCC)     Scalp laceration, initial encounter     Hyponatremia     Anemia

## 2018-08-07 LAB
ALBUMIN SERPL-MCNC: 2.3 G/DL (ref 3.5–4.8)
ALP LIVER SERPL-CCNC: 416 U/L (ref 55–142)
ALT SERPL-CCNC: 179 U/L (ref 14–54)
AST SERPL-CCNC: 63 U/L (ref 15–41)
BILIRUB DIRECT SERPL-MCNC: 0.4 MG/DL (ref 0.1–0.5)
BILIRUB SERPL-MCNC: 0.6 MG/DL (ref 0.1–2)
GLUCOSE BLD-MCNC: 102 MG/DL (ref 65–99)
GLUCOSE BLD-MCNC: 151 MG/DL (ref 65–99)
GLUCOSE BLD-MCNC: 157 MG/DL (ref 65–99)
GLUCOSE BLD-MCNC: 175 MG/DL (ref 65–99)
INR BLD: 1.32 (ref 0.9–1.1)
M PROTEIN MFR SERPL ELPH: 5.7 G/DL (ref 6.1–8.3)
PSA SERPL DL<=0.01 NG/ML-MCNC: 16.9 SECONDS (ref 12.4–14.7)

## 2018-08-07 PROCEDURE — 99232 SBSQ HOSP IP/OBS MODERATE 35: CPT | Performed by: HOSPITALIST

## 2018-08-07 PROCEDURE — 99232 SBSQ HOSP IP/OBS MODERATE 35: CPT | Performed by: SURGERY

## 2018-08-07 RX ORDER — WARFARIN SODIUM 5 MG/1
10 TABLET ORAL NIGHTLY
Status: DISCONTINUED | OUTPATIENT
Start: 2018-08-07 | End: 2018-08-08

## 2018-08-07 RX ORDER — TORSEMIDE 20 MG/1
20 TABLET ORAL
Status: DISCONTINUED | OUTPATIENT
Start: 2018-08-07 | End: 2018-08-09

## 2018-08-07 RX ORDER — ENOXAPARIN SODIUM 100 MG/ML
1 INJECTION SUBCUTANEOUS EVERY 12 HOURS SCHEDULED
Status: DISCONTINUED | OUTPATIENT
Start: 2018-08-07 | End: 2018-08-09

## 2018-08-07 NOTE — PROGRESS NOTES
[facility]  Progress Note    Clemencia Beltre Patient Status:  Inpatient    1940 MRN [id number]   [facility] 3NE-A [facility] PCP Ivor Schilder, MD     Subjective:  Feels ok. No pain.   Jonathon Steele replacement     Frozen shoulder syndrome     Essential hypertension     Varicose vein     Cerebral atrophy     Ventricular tachycardia (HCC)     At risk for falling     S/P ICD (internal cardiac defibrillator) procedure     ICD (implantable cardioverter-de ([facility]        Plan:  1. Doing well s/p cholecystostomy tube placement. 2. Cholecystostomy tube confirmed to be functional/patent. 3. Tolerating diet. 4. D/c when medically cleared. 5. Keep cholecystostomy tube in place 6-8 weeks.   6. All questions answe

## 2018-08-07 NOTE — PROGRESS NOTES
[facility]  Progress Note     Benitobyron Mcfarlane Patient Status:  Inpatient    1940 MRN [id number]   [facility] 3NE-A Attending [facility] Day # 7 PCP Corie Calloway MD     Chief Complaint: Abdominal pain 103   --    --    --    CO2  25.0  27.0  29.0   --    --    --    ALKPHO  496*  440*  418*   --    --   416*   AST  250*  189*  104*   --    --   63*   ALT  367*  317*  256*   --    --   179*   BILT  3.9*  2.8*  1.2   --    --   0.6   TP  5.9*  5.5*  5.7* peripheral edema  1. Back on oral torsemide over weekend    Quality:  · DVT Prophylaxis: SCD's/coumadin INR 1.32  · CODE status: Full    Estimated date of discharge: in next 24hrs if cleared by surgery. Plan of care discussed with patient, RN Daimr.

## 2018-08-07 NOTE — PLAN OF CARE
Assumed patient care at 1900  Patient A&O x 4 -forgetful at times  Some complaints of pain and discomfort to head and neck-PRN tylenol given with relief noted  [place] collar in place  VSS  Tele-Afib. Chronic; Controlled.  On coumadin  Daily weight  Strict I&

## 2018-08-07 NOTE — PAYOR COMM NOTE
--------------  CONTINUED STAY REVIEW    Payor: BCBS MEDICARE ADV PPO  Subscriber #:  [id number]  Authorization Number: [id number]    Admit date: 7/31/18  Admit time: 2235    Admitting Physician: Divya Meyers MD  Attending Physician:  Yobany Duarte 1.06      Estimated date of discharge: TBD  Discharge is dependent on: clinical improvement  At this point Ms. Kathi Klein is expected to be discharge to: Home      8/6/18 -    Vital Signs:  Blood pressure 128/54, pulse 68, temperature 97.9 °F (36.6 °C), [place]

## 2018-08-08 ENCOUNTER — HOSPITAL ENCOUNTER (OUTPATIENT)
Dept: CARDIOLOGY CLINIC | Facility: HOSPITAL | Age: 78
End: 2018-08-08

## 2018-08-08 LAB
GLUCOSE BLD-MCNC: 135 MG/DL (ref 65–99)
GLUCOSE BLD-MCNC: 138 MG/DL (ref 65–99)
GLUCOSE BLD-MCNC: 155 MG/DL (ref 65–99)
GLUCOSE BLD-MCNC: 86 MG/DL (ref 65–99)
INR BLD: 1.75 (ref 0.9–1.1)
PSA SERPL DL<=0.01 NG/ML-MCNC: 20.1 SECONDS (ref 12–14.1)

## 2018-08-08 PROCEDURE — 99233 SBSQ HOSP IP/OBS HIGH 50: CPT | Performed by: HOSPITALIST

## 2018-08-08 RX ORDER — WARFARIN SODIUM 2 MG/1
2 TABLET ORAL NIGHTLY
Status: DISCONTINUED | OUTPATIENT
Start: 2018-08-08 | End: 2018-08-09

## 2018-08-08 RX ORDER — ENOXAPARIN SODIUM 100 MG/ML
1 INJECTION SUBCUTANEOUS EVERY 12 HOURS SCHEDULED
Qty: 5.52 ML | Refills: 0 | Status: SHIPPED | OUTPATIENT
Start: 2018-08-08 | End: 2018-08-09

## 2018-08-08 NOTE — PLAN OF CARE
Assumed patient care at 1900  Patient A&O x 4  Complaints of pain and discomfort to head. PRN tylenol given with relief noted  Patient having trouble sleeping overnight.  PRN trazodone given with relief noted  VSS  Tele-Afib  Daily weight  Patient has AICD

## 2018-08-08 NOTE — PROGRESS NOTES
[facility]  Progress Note    Olesya Balloon Patient Status:  Inpatient    1940 MRN [id number]   [facility] 3NE-A Attending [facility] Day # 8 PCP Shelby Chris MD     Subjective:  Pt sitting up in chair degenerative     S/P total knee replacement     Frozen shoulder syndrome     Essential hypertension     Varicose vein     Cerebral atrophy     Ventricular tachycardia (HCC)     At risk for falling     S/P ICD (internal cardiac defibrillator) procedure fibrillation and flutter ([facility] [address]        Plan:  1. Doing well s/p cholecystostomy tube placement. 2. Cholecystostomy tube confirmed to be functional/patent. 3. Tolerating diet. 4. Plan for DC today   5. Keep cholecystostomy tube in place 6-8 weeks.   6. Pt

## 2018-08-08 NOTE — PROGRESS NOTES
[facility]  Progress Note     Farideh Bull Patient Status:  Inpatient    1940 MRN [id number]   [facility] 3NE-A Attending Matthew Reardon MD   Hosp Day # 8 PCP Bebo Glasgow MD     Chief Complaint: Abdominal pain    S: P --    --    CO2  25.0  27.0  29.0   --    --    --    ALKPHO  496*  440*  418*   --    --   416*   AST  250*  189*  104*   --    --   63*   ALT  367*  317*  256*   --    --   179*   BILT  3.9*  2.8*  1.2   --    --   0.6   TP  5.9*  5.5*  5.7*   --    - 1.75  · CODE status: Full    Estimated date of discharge: today, home, declined [facility] [place], INR tomorrow    Plan of care discussed with patient, RN Damir. D/c planning. Pt reports they decline [facility] bc her dtr worked in [facility] AT [facility] prior.  I discussed with her german

## 2018-08-08 NOTE — PAYOR COMM NOTE
--------------  CONTINUED STAY REVIEW    Payor: BCBS MEDICARE ADV PPO  Subscriber #:  [id number]  Authorization Number: [id number]    Admit date: 7/31/18  Admit time: 2235    Admitting Physician: Judy Valdez MD  Attending Physician:  Judy Valdez,

## 2018-08-08 NOTE — DISCHARGE SUMMARY
[facility] [place] [facility]PITAL[facility]  DISCHARGE SUMMARY     Andrea Gill Patient Status:  Inpatient    1940 MRN [id number]   [facility] 3NE-A Attending Divya Meyers MD   [facility] Day [facility] PCP Kenney Wing MD     Date of Admission: 2018  Date for acute cholecystitis. After further review surgery and family decided no surgery and place drain. She had drain in past in 2017. She had reactive increase in LFTs/WBCs which has now improved. We resumed coumadin after procedure.   We monitored INR gracie tablet  Refills:  3     MetFORMIN HCl 500 MG Tabs  Commonly known as:  GLUCOPHAGE      Take 1 tablet (500 mg total) by mouth 2 (two) times daily with meals.    Quantity:  180 tablet  Refills:  3     Metoprolol Tartrate 50 MG Tabs  Commonly known as:  [place] edema    ---------------------------------------------------------  PATIENT DISCHARGE INSTRUCTIONS: See electronic chart    JIGAR Harris 8/9/2018    Time spent:  > 30 minutes    Addendum  Patient seen and examined  Chest: CTA B/L  CVS: S1, S2, RRR

## 2018-08-08 NOTE — CM/SW NOTE
Call placed to pt daughter / Jarod Sullivan at [phone]. Due to conditions resulting in readmissions we want to make sure she has a sefe discharge plan. PEr her daughter, they continue to have no needs at discharge.  They are happy with the elevating INR and

## 2018-08-09 VITALS
HEIGHT: 66 IN | RESPIRATION RATE: 18 BRPM | BODY MASS INDEX: 32.03 KG/M2 | TEMPERATURE: 98 F | SYSTOLIC BLOOD PRESSURE: 125 MMHG | HEART RATE: 69 BPM | WEIGHT: 199.31 LBS | DIASTOLIC BLOOD PRESSURE: 74 MMHG | OXYGEN SATURATION: 96 %

## 2018-08-09 LAB
GLUCOSE BLD-MCNC: 102 MG/DL (ref 65–99)
GLUCOSE BLD-MCNC: 161 MG/DL (ref 65–99)
GLUCOSE BLD-MCNC: 173 MG/DL (ref 65–99)
INR BLD: 2.05 (ref 0.9–1.1)
PSA SERPL DL<=0.01 NG/ML-MCNC: 22.8 SECONDS (ref 12.4–14.7)

## 2018-08-09 PROCEDURE — 99239 HOSP IP/OBS DSCHRG MGMT >30: CPT | Performed by: HOSPITALIST

## 2018-08-09 RX ORDER — FAMOTIDINE 20 MG/1
20 TABLET ORAL 2 TIMES DAILY
Status: DISCONTINUED | OUTPATIENT
Start: 2018-08-09 | End: 2018-08-09

## 2018-08-09 NOTE — PLAN OF CARE
Assumed patient care at 1900  Patient A&O x 4  Complaints of pain and discomfort to head-PRN tylenol given with relief noted  VSS  Tele-AFIB (chronic; controlled) On coumadin  AICD  CHF-Daily weight Strict I&O  RADHA- no CPAP  QID accucheck  [place] collar in

## 2018-08-09 NOTE — PLAN OF CARE
GASTROINTESTINAL - ADULT    • Minimal or absence of nausea and vomiting Progressing        MUSCULOSKELETAL - ADULT    • Return mobility to safest level of function Progressing        Patient/Family Goals    • Patient/Family Long Term Goal Progressing    •

## 2018-08-09 NOTE — PROGRESS NOTES
[facility] Pharmacy Note: Route Optimization for Famotidine (PEPCID)    Patient is currently on Famotidine (PEPCID) 20 mg IV every 12 hours.    The patient meets the criteria to convert to the oral equivalent as established by the IV to Oral conversion protocol ap

## 2018-08-09 NOTE — PLAN OF CARE
AOx4  VSS, on RA  RADHA  PT- pt refused HHC  Up with marialusia and walker  Aspin collar  Strict I/O  DW  Cholecystostomy tube  IV Zosyn  Redraw INR in am  Possible D/C tomorrow  Pt and family refusing lovenox injections      CARDIOVASCULAR - ADULT    • Marily Ruiz

## 2018-08-10 ENCOUNTER — PATIENT OUTREACH (OUTPATIENT)
Dept: CASE MANAGEMENT | Age: 78
End: 2018-08-10

## 2018-08-10 DIAGNOSIS — S12.001A CLOSED NONDISPLACED FRACTURE OF FIRST CERVICAL VERTEBRA, UNSPECIFIED FRACTURE MORPHOLOGY, INITIAL ENCOUNTER (HCC): ICD-10-CM

## 2018-08-10 DIAGNOSIS — K81.0 ACUTE CHOLECYSTITIS: ICD-10-CM

## 2018-08-10 NOTE — PROGRESS NOTES
Initial Post Discharge Follow Up   Discharge Date: 8/9/18  Contact Date: 8/10/2018    Consent Verification:  Assessment Completed With: Other: Gibson Sahni patient's daughter Permission received per patient?  written  HIPAA Verified?   Yes    Discharge Dx:    A o Very well  • Do you have any questions about your discharge instructions? No  • Before leaving the hospital was your diagnoses explained to you? Yes  • Do you have any questions about your diagnoses?  No  • Are you able to perform normal daily activitie medication changes discussed with you? yes  • May I go over your medications with you to make sure we are not missing anything? yes  • Are there any reasons that keep you from taking your medication as prescribed?  No  Are you having any concerns with eddie mother has a follow up with Erich Blood on 8/15/[facility]. Interventions by NCM: NCM reviewed medications, discharge instructions, S&S of infection/blood clots, when to call the doctor and when to call 911.  Patient verbalized understanding

## 2018-08-10 NOTE — PROGRESS NOTES
NURSING DISCHARGE NOTE    Discharged Home via Wheelchair. Accompanied by Family member and Support staff  Belongings Taken by patient/family. IV site discontinued.  Discharge instructions, follow up, and medications discussed with patient and her so

## 2018-08-13 NOTE — PAYOR COMM NOTE
--------------  DISCHARGE REVIEW    Payor: Elizabeth KEVIN PPKOJO  Subscriber #:  [id number]  Authorization Number: [id number]    Admit date: 7/31/18  Admit time:  2235  Discharge Date: 8/9/2018  7:41 PM     Admitting Physician: MD Shivani Casillas

## 2018-08-14 ENCOUNTER — HOSPITAL ENCOUNTER (OUTPATIENT)
Dept: LAB | Facility: HOSPITAL | Age: 78
Discharge: HOME | End: 2018-08-14
Attending: INTERNAL MEDICINE
Payer: MEDICARE

## 2018-08-14 LAB
INR BLD: 1.98 (ref 0.9–1.1)
PSA SERPL DL<=0.01 NG/ML-MCNC: 22.2 SECONDS (ref 12–14.1)

## 2018-08-14 PROCEDURE — 85610 PROTHROMBIN TIME: CPT | Performed by: INTERNAL MEDICINE

## 2018-08-14 PROCEDURE — 36415 COLL VENOUS BLD VENIPUNCTURE: CPT | Performed by: INTERNAL MEDICINE

## 2018-08-21 NOTE — CM/SW NOTE
Call to Radha Devi [phone], will accept patient. Notified possibility of patient discharging tonight pending inr level. CM to send med rec and any pertient discharge orders first thing tomorrow morning to schedule a rn visit later the same day.     Fiona Ojeda Hospitalist

## 2018-08-23 ENCOUNTER — HOSPITAL ENCOUNTER (OUTPATIENT)
Dept: LAB | Facility: HOSPITAL | Age: 78
Discharge: HOME | End: 2018-08-23
Attending: INTERNAL MEDICINE
Payer: MEDICARE

## 2018-08-23 ENCOUNTER — TELEPHONE (OUTPATIENT)
Dept: SURGERY | Facility: CLINIC | Age: 78
End: 2018-08-23

## 2018-08-23 DIAGNOSIS — I48.91 ATRIAL FIBRILLATION AND FLUTTER (HCC): ICD-10-CM

## 2018-08-23 DIAGNOSIS — I48.92 ATRIAL FIBRILLATION AND FLUTTER (HCC): ICD-10-CM

## 2018-08-23 LAB
INR BLD: 2.26 (ref 0.9–1.1)
PSA SERPL DL<=0.01 NG/ML-MCNC: 25.7 SECONDS (ref 12.4–14.7)

## 2018-08-23 PROCEDURE — 85610 PROTHROMBIN TIME: CPT | Performed by: PHYSICIAN ASSISTANT

## 2018-08-23 PROCEDURE — 36415 COLL VENOUS BLD VENIPUNCTURE: CPT | Performed by: PHYSICIAN ASSISTANT

## 2018-08-23 NOTE — TELEPHONE ENCOUNTER
Chico Razo (daughter) states Dr. Diego Hall recommended Dr. Crowley Elmira bc he's a friend. [facility] and family felt like she was treated poorly there. Patient is not a surgery candidate bc of her health and no MRI can be done bc of pacemaker.  The

## 2018-08-23 NOTE — TELEPHONE ENCOUNTER
Will rout to provider, not sure if we can give any recommendation as we have not sen patient in the office and are not familiar with her case.

## 2018-08-26 DIAGNOSIS — F32.5 MAJOR DEPRESSIVE DISORDER WITH SINGLE EPISODE, IN REMISSION (HCC): ICD-10-CM

## 2018-08-27 NOTE — TELEPHONE ENCOUNTER
Daughter declined appt for tomorrow @ 9am, asking if pt can be seen on 09/06, please call pt's son Shell Paniagua to schedule appt

## 2018-08-28 RX ORDER — SERTRALINE HYDROCHLORIDE 25 MG/1
TABLET, FILM COATED ORAL
Qty: 90 TABLET | Refills: 0 | OUTPATIENT
Start: 2018-08-28

## 2018-08-28 NOTE — TELEPHONE ENCOUNTER
Requesting Sertraline   LOV: 6/29/18  RTC: months   Last Relevant Labs: n/a   Filled: 6/29/18 #90 with 0 refills    Future Appointments  Date Time Provider Sara Boo   9/7/2018 1:15 PM Everardo Santiago MD [facility]   9/28/2018 11:00 AM M

## 2018-08-31 ENCOUNTER — HOSPITAL ENCOUNTER (EMERGENCY)
Facility: HOSPITAL | Age: 78
Discharge: HOME | End: 2018-09-01
Attending: EMERGENCY MEDICINE
Payer: MEDICARE

## 2018-08-31 VITALS
HEIGHT: 66 IN | RESPIRATION RATE: 18 BRPM | HEART RATE: 68 BPM | DIASTOLIC BLOOD PRESSURE: 80 MMHG | SYSTOLIC BLOOD PRESSURE: 140 MMHG | BODY MASS INDEX: 31.82 KG/M2 | OXYGEN SATURATION: 100 % | WEIGHT: 198 LBS | TEMPERATURE: 98 F

## 2018-08-31 DIAGNOSIS — T85.518A CHOLECYSTOSTOMY TUBE DYSFUNCTION, INITIAL ENCOUNTER: Primary | ICD-10-CM

## 2018-08-31 PROCEDURE — 99283 EMERGENCY DEPT VISIT LOW MDM: CPT

## 2018-08-31 NOTE — TELEPHONE ENCOUNTER
Appt 9/6 @8 will not work pt's son. Son will be the one bringing pt in and has to drop kids off at school in the morning.  Please offer another date

## 2018-09-01 ENCOUNTER — MED REC SCAN ONLY (OUTPATIENT)
Dept: FAMILY MEDICINE CLINIC | Facility: CLINIC | Age: 78
End: 2018-09-01

## 2018-09-01 NOTE — ED INITIAL ASSESSMENT (HPI)
Pt with drain in gallbladder that was placed 1 month ago. Pt reports tube fell out tonight. Pt noticed tube was out this evening when she was doing the dressing change. Pt reports that the tube \"had been bothering me all day\".

## 2018-09-01 NOTE — ED NOTES
Pt's drain and tubing placed in ziplock bag for patient to take home per the son's request.  Band aid applied to old drainage site. No active draining noted. Assisted pt with getting dressed. Moved her to wheelchair.   Pt assisted to bathroom en route to

## 2018-09-01 NOTE — ED PROVIDER NOTES
Patient Seen in: [facility] Emergency Department    History   Patient presents with:  Cath Tube Problem (gastrointestinal, urinary, integumentary)    Stated Complaint: [facility]    HPI    Patient is a 42-year-old woman who comes in implant on 7/7/16.      • Obesity, morbid, BMI 40.0-49.9 ([facility] [address] [address] 6/18/2015   • RADHA (obstructive sleep apnea) 06/29/2012    Cannot tolerate CPAP machine- uses oxygen periodically    • Other and unspecified hyperlipidemia    • Pericardial effusion 6/5/2014    t Physical Exam   Constitutional: She is oriented to person, place, and time. She appears well-developed and well-nourished. Non-toxic appearance. No distress. HENT:   Head: Normocephalic and atraumatic.    Eyes: Conjunctivae are normal. No scleral

## 2018-09-04 NOTE — TELEPHONE ENCOUNTER
Son states time does not work with schedule. If possible can pt come in 9/6 after 9 a.m when son drops kid off at school?

## 2018-09-07 ENCOUNTER — OFFICE VISIT (OUTPATIENT)
Dept: SURGERY | Facility: CLINIC | Age: 78
End: 2018-09-07
Payer: MEDICARE

## 2018-09-07 VITALS — SYSTOLIC BLOOD PRESSURE: 100 MMHG | HEART RATE: 80 BPM | DIASTOLIC BLOOD PRESSURE: 80 MMHG

## 2018-09-07 VITALS
SYSTOLIC BLOOD PRESSURE: 119 MMHG | HEART RATE: 85 BPM | WEIGHT: 194 LBS | DIASTOLIC BLOOD PRESSURE: 74 MMHG | BODY MASS INDEX: 31 KG/M2

## 2018-09-07 DIAGNOSIS — K80.20 GALLSTONES: Primary | ICD-10-CM

## 2018-09-07 DIAGNOSIS — J44.9 ASTHMA WITH COPD (CHRONIC OBSTRUCTIVE PULMONARY DISEASE) (HCC): Chronic | ICD-10-CM

## 2018-09-07 DIAGNOSIS — I48.19 PERSISTENT ATRIAL FIBRILLATION (HCC): ICD-10-CM

## 2018-09-07 DIAGNOSIS — S12.001A CLOSED NONDISPLACED FRACTURE OF FIRST CERVICAL VERTEBRA, UNSPECIFIED FRACTURE MORPHOLOGY, INITIAL ENCOUNTER (HCC): Primary | ICD-10-CM

## 2018-09-07 DIAGNOSIS — D69.6 THROMBOCYTOPENIA (HCC): Chronic | ICD-10-CM

## 2018-09-07 DIAGNOSIS — E11.8 TYPE 2 DIABETES MELLITUS WITH COMPLICATION, WITHOUT LONG-TERM CURRENT USE OF INSULIN (HCC): ICD-10-CM

## 2018-09-07 DIAGNOSIS — I50.32 CHRONIC DIASTOLIC CONGESTIVE HEART FAILURE (HCC): ICD-10-CM

## 2018-09-07 PROCEDURE — 99214 OFFICE O/P EST MOD 30 MIN: CPT | Performed by: NEUROLOGICAL SURGERY

## 2018-09-07 PROCEDURE — 99213 OFFICE O/P EST LOW 20 MIN: CPT | Performed by: SURGERY

## 2018-09-07 NOTE — PROGRESS NOTES
Follow Up Visit Note       Active Problems      1. Gallstones    2. Asthma with COPD (chronic obstructive pulmonary disease) ([facility] [address]    3. Thrombocytopenia ([facility] [address]    4.  Chronic diastolic congestive heart failure (HCC)    5. Persistent atrial fibrillation ([facility] [address] (nonsustained ventricular tachycardia) ([facility] 7/11/2014    Recurrence 7.2016 Hx VT- s/p ICD implant on 7/7/16.      • Obesity, morbid, BMI 40.0-49.9 ([facility] 6/18/2015   • RADHA (obstructive sleep apnea) 06/29/2012    Cannot tolerate CPAP machine- uses oxygen per Smokeless tobacco: Never Used                        Alcohol use: No              Drug use:  No                 Current Outpatient Prescriptions:  acetaminophen 500 MG Oral Tab Take 500 mg by mouth every 6 (six) hours as neede Respiratory: Negative for apnea, cough, shortness of breath and wheezing. Cardiovascular: Negative for chest pain, palpitations and leg swelling. Gastrointestinal: Positive for constipation and diarrhea.  Negative for abdominal distention, abdominal were placed in this encounter. Imaging & Referrals   None    Follow Up  No Follow-up on file.     Evelyne Howard MD

## 2018-09-07 NOTE — PROGRESS NOTES
Pt is here because she had a fall 2 months ago, she has had a brace on since then and wants to know if she can take this brace off. She went in for a follow up 2 weeks ago and was told she still had a fracture.

## 2018-09-07 NOTE — PATIENT INSTRUCTIONS
Refill policies:    • Allow 2-3 business days for refills; controlled substances may take longer.   • Contact your pharmacy at least 5 days prior to running out of medication and have them send an electronic request or submit request through the “request re entire amount billed. Precertification and Prior Authorizations: If your physician has recommended that you have a procedure or additional testing performed.   [facility]) will contact your insurance carrier to obtain pre-certi

## 2018-09-07 NOTE — H&P
Neurosurgery Clinic Visit  2018    Dmitry Brayan PCP:  Lola Rueda MD    1940 MRN [id number]       CHIEF COMPLAINT:  Patient presents with:  Neck Pain: NP       HISTORY OF PRESENT ILLNESS:  Dmitry Brayan is a(n) 70year old female is meals. Disp: 180 tablet Rfl: 3   magnesium oxide 400 MG Oral Tab Take 1 tablet (400 mg total) by mouth daily. Disp: 90 tablet Rfl: 3   amiodarone HCl 200 MG Oral Tab Take 200 mg by mouth 2 (two) times daily.  Disp:  Rfl:    Multiple Vitamins-Minerals (TAB-A defibrillator) procedure 7/7/2016    medtronic    • S/P total knee replacement 6/18/2015    bilat 1990s, both severe OA   • Seizure disorder ([place] [address]    • Shortness of breath    • Stroke [facility])    • Valvular disease    • Varicose vein 9/18/2015     Past Surgic complaints  Reviewed the films with her and her daughter-in-law  All questions were answered  Patient and daughter-in-law were appreciative        Time spent on counseling/coordination of care:  15 Minutes    Total time spent with patient:  30 Minutes

## 2018-09-13 ENCOUNTER — PATIENT OUTREACH (OUTPATIENT)
Dept: CASE MANAGEMENT | Age: 78
End: 2018-09-13

## 2018-09-13 NOTE — PROGRESS NOTES
Contacted dtr Jeri to intro [facility] services for pt. Stated at this time they are going to hold off with CCM services, pt has too many other things going on at this time.  Also stated she is in constant contact with mothers care team and possibly in the future

## 2018-09-28 ENCOUNTER — OFFICE VISIT (OUTPATIENT)
Dept: FAMILY MEDICINE CLINIC | Facility: CLINIC | Age: 78
End: 2018-09-28
Payer: MEDICARE

## 2018-09-28 ENCOUNTER — TELEPHONE (OUTPATIENT)
Dept: FAMILY MEDICINE CLINIC | Facility: CLINIC | Age: 78
End: 2018-09-28

## 2018-09-28 VITALS
DIASTOLIC BLOOD PRESSURE: 80 MMHG | TEMPERATURE: 97 F | RESPIRATION RATE: 16 BRPM | WEIGHT: 196 LBS | HEIGHT: 66 IN | SYSTOLIC BLOOD PRESSURE: 124 MMHG | HEART RATE: 72 BPM | BODY MASS INDEX: 31.5 KG/M2

## 2018-09-28 DIAGNOSIS — M19.012 OSTEOARTHRITIS OF LEFT SHOULDER, UNSPECIFIED OSTEOARTHRITIS TYPE: ICD-10-CM

## 2018-09-28 DIAGNOSIS — Z28.21 INFLUENZA VACCINATION DECLINED BY PATIENT: ICD-10-CM

## 2018-09-28 DIAGNOSIS — I26.99 OTHER PULMONARY EMBOLISM WITHOUT ACUTE COR PULMONALE, UNSPECIFIED CHRONICITY (HCC): ICD-10-CM

## 2018-09-28 DIAGNOSIS — L84 CALLUS OF FOOT: ICD-10-CM

## 2018-09-28 DIAGNOSIS — E11.8 TYPE 2 DIABETES MELLITUS WITH COMPLICATION, WITHOUT LONG-TERM CURRENT USE OF INSULIN (HCC): ICD-10-CM

## 2018-09-28 DIAGNOSIS — D62 ACUTE BLOOD LOSS ANEMIA: ICD-10-CM

## 2018-09-28 DIAGNOSIS — G47.09 OTHER INSOMNIA: ICD-10-CM

## 2018-09-28 DIAGNOSIS — S12.000D CLOSED DISPLACED FRACTURE OF FIRST CERVICAL VERTEBRA WITH ROUTINE HEALING, UNSPECIFIED FRACTURE MORPHOLOGY, SUBSEQUENT ENCOUNTER: ICD-10-CM

## 2018-09-28 DIAGNOSIS — G47.33 OSA (OBSTRUCTIVE SLEEP APNEA): ICD-10-CM

## 2018-09-28 DIAGNOSIS — S12.001A CLOSED NONDISPLACED FRACTURE OF FIRST CERVICAL VERTEBRA, UNSPECIFIED FRACTURE MORPHOLOGY, INITIAL ENCOUNTER (HCC): ICD-10-CM

## 2018-09-28 DIAGNOSIS — Z86.79 H/O VENTRICULAR TACHYCARDIA: ICD-10-CM

## 2018-09-28 DIAGNOSIS — J45.30 MILD PERSISTENT ASTHMA WITHOUT COMPLICATION: ICD-10-CM

## 2018-09-28 DIAGNOSIS — G62.9 NEUROPATHY: ICD-10-CM

## 2018-09-28 DIAGNOSIS — F32.5 MAJOR DEPRESSIVE DISORDER WITH SINGLE EPISODE, IN REMISSION (HCC): ICD-10-CM

## 2018-09-28 DIAGNOSIS — H91.90 HEARING LOSS, UNSPECIFIED HEARING LOSS TYPE, UNSPECIFIED LATERALITY: ICD-10-CM

## 2018-09-28 DIAGNOSIS — I27.20 PULMONARY HTN (HCC): ICD-10-CM

## 2018-09-28 DIAGNOSIS — G40.909 SEIZURE DISORDER (HCC): ICD-10-CM

## 2018-09-28 DIAGNOSIS — Z00.00 ENCOUNTER FOR ANNUAL HEALTH EXAMINATION: Primary | ICD-10-CM

## 2018-09-28 DIAGNOSIS — I10 ESSENTIAL HYPERTENSION: ICD-10-CM

## 2018-09-28 DIAGNOSIS — F41.9 ANXIETY: ICD-10-CM

## 2018-09-28 DIAGNOSIS — Z95.810 ICD (IMPLANTABLE CARDIOVERTER-DEFIBRILLATOR) IN PLACE: ICD-10-CM

## 2018-09-28 DIAGNOSIS — D47.9 ATYPICAL LYMPHOPROLIFERATIVE DISORDER (HCC): ICD-10-CM

## 2018-09-28 DIAGNOSIS — M17.0 PRIMARY OSTEOARTHRITIS OF BOTH KNEES: ICD-10-CM

## 2018-09-28 DIAGNOSIS — I48.19 PERSISTENT ATRIAL FIBRILLATION (HCC): ICD-10-CM

## 2018-09-28 DIAGNOSIS — I50.22 CHRONIC SYSTOLIC CONGESTIVE HEART FAILURE (HCC): ICD-10-CM

## 2018-09-28 DIAGNOSIS — I82.401 ACUTE DEEP VEIN THROMBOSIS (DVT) OF RIGHT LOWER EXTREMITY, UNSPECIFIED VEIN (HCC): ICD-10-CM

## 2018-09-28 DIAGNOSIS — R79.89 ELEVATED LFTS: ICD-10-CM

## 2018-09-28 DIAGNOSIS — Z79.01 ANTICOAGULATED ON COUMADIN: ICD-10-CM

## 2018-09-28 DIAGNOSIS — K80.20 GALLSTONES: ICD-10-CM

## 2018-09-28 DIAGNOSIS — E87.6 HYPOKALEMIA: ICD-10-CM

## 2018-09-28 DIAGNOSIS — I83.93 VARICOSE VEINS OF BOTH LOWER EXTREMITIES, UNSPECIFIED WHETHER COMPLICATED: ICD-10-CM

## 2018-09-28 DIAGNOSIS — H81.10 BENIGN PAROXYSMAL POSITIONAL VERTIGO, UNSPECIFIED LATERALITY: ICD-10-CM

## 2018-09-28 DIAGNOSIS — Z78.0 POSTMENOPAUSAL: ICD-10-CM

## 2018-09-28 DIAGNOSIS — Z23 NEED FOR VACCINATION: ICD-10-CM

## 2018-09-28 DIAGNOSIS — E55.9 VITAMIN D DEFICIENCY: ICD-10-CM

## 2018-09-28 DIAGNOSIS — G90.9 AUTONOMIC DYSFUNCTION: ICD-10-CM

## 2018-09-28 DIAGNOSIS — M72.2 PLANTAR FASCIITIS: ICD-10-CM

## 2018-09-28 PROBLEM — N17.9 ACUTE KIDNEY INJURY (HCC): Status: RESOLVED | Noted: 2018-07-25 | Resolved: 2018-09-28

## 2018-09-28 PROBLEM — N17.9 ACUTE RENAL INJURY (HCC): Status: RESOLVED | Noted: 2018-07-25 | Resolved: 2018-09-28

## 2018-09-28 PROBLEM — D72.829 LEUKOCYTOSIS, UNSPECIFIED TYPE: Status: RESOLVED | Noted: 2018-07-31 | Resolved: 2018-09-28

## 2018-09-28 PROBLEM — R55 SYNCOPE: Status: RESOLVED | Noted: 2018-07-25 | Resolved: 2018-09-28

## 2018-09-28 PROBLEM — R55 SYNCOPE, UNSPECIFIED SYNCOPE TYPE: Status: RESOLVED | Noted: 2018-07-25 | Resolved: 2018-09-28

## 2018-09-28 PROBLEM — K81.0 ACUTE CHOLECYSTITIS: Status: RESOLVED | Noted: 2018-07-31 | Resolved: 2018-09-28

## 2018-09-28 PROBLEM — J44.9 ASTHMA WITH COPD (CHRONIC OBSTRUCTIVE PULMONARY DISEASE) (HCC): Chronic | Status: RESOLVED | Noted: 2018-02-28 | Resolved: 2018-09-28

## 2018-09-28 PROBLEM — J44.89 ASTHMA WITH COPD (CHRONIC OBSTRUCTIVE PULMONARY DISEASE): Chronic | Status: RESOLVED | Noted: 2018-02-28 | Resolved: 2018-09-28

## 2018-09-28 PROCEDURE — 96160 PT-FOCUSED HLTH RISK ASSMT: CPT | Performed by: FAMILY MEDICINE

## 2018-09-28 PROCEDURE — G0439 PPPS, SUBSEQ VISIT: HCPCS | Performed by: FAMILY MEDICINE

## 2018-09-28 RX ORDER — SERTRALINE HYDROCHLORIDE 25 MG/1
25 TABLET, FILM COATED ORAL DAILY
Qty: 90 TABLET | Refills: 3 | Status: SHIPPED | OUTPATIENT
Start: 2018-09-28 | End: 2019-10-11

## 2018-09-28 RX ORDER — SERTRALINE HYDROCHLORIDE 25 MG/1
25 TABLET, FILM COATED ORAL DAILY
Qty: 90 TABLET | Refills: 0 | Status: SHIPPED | OUTPATIENT
Start: 2018-09-28 | End: 2018-09-28

## 2018-09-28 NOTE — PROGRESS NOTES
HPI:   Betty Dennison is a 70year old female who presents for a MA (Medicare Advantage) [facility] (Once per calendar year).       Her last annual assessment has been over 1 year: Annual Physical due on 05/16/2017         Imms- up to date with yasmeen cummings cardioverter-defibrillator) in place  -f/u with cardiology    Chronic systolic congestive heart failure (HCC)  -cont diuretics, b-blocker, ARB and f/u with caridology    Other pulmonary embolism without acute cor pulmonale, unspecified chronicity ([facility] [address]  -r seeing?: 1-Yes  Do you have any difficulty walking or getting up?: 1-Yes  Do you have any tripping hazards?: 1-Yes  Are you on multiple medications?: 1-Yes  Does pain affect your day to day activities?: 1-Yes  Have you had any memory issues?: 1-Yes  Fall/R file in [address]    Advance care planning including the explanation and discussion of advance directives standard forms performed Face to Face with patient and Family/surrogate (if present), and forms available to patient in AVS       She has a Power of Parag fracture morphology, initial encounter ([facility] [address]     Acute blood loss anemia     Seizure disorder ([facility] [address]     Varicose veins of lower extremity    Wt Readings from Last 3 Encounters:  09/29/18 : 194 lb  09/28/18 : 196 lb  09/07/18 : 194 lb     Last Cholesterol L Tab Take 1 tablet (400 mg total) by mouth daily. amiodarone HCl 200 MG Oral Tab Take 200 mg by mouth 2 (two) times daily. Multiple Vitamins-Minerals (TAB-A-MAE MAXIMUM) Oral Tab Take 1 tablet by mouth daily.    Calcium Carbonate-Vitamin D 600-400 MG-UN never smoked. she has never used smokeless tobacco. She reports that she does not drink alcohol or use drugs.      REVIEW OF SYSTEMS:        EXAM:   /80   Pulse 72   Temp 97.2 °F (36.2 °C) (Temporal)   Resp 16   Ht 66\"   Wt 196 lb   BMI 31.64 kg/m² ([facility]  -cont coumadin and f/u with cardiology    Pulmonary HTN ([facility] [address]  -cont current meds, moderate    Essential hypertension  -well controlled, cont current meds    ICD (implantable cardioverter-defibrillator) in place  -f/u with cardiology    Chronic syst plan.  Reinforced healthy diet, lifestyle, and exercise. Return in about 3 months (around 12/28/2018) for diabetes follow-up (foot exam, fingerstick).      Ivor Schilder, MD, 9/28/2018     General Health     In the past six months, have you lost results found for this or any previous visit. No flowsheet data found. Pap and Pelvic      Pap: Every 3 yrs age 21-68 or Pap+HPV every 5 yrs age 33-67, age 72 and older at high risk There are no preventive care reminders to display for this patient.  Upd found.    Creatinine  Annually CREATININE (mg/dL)   Date Value   08/06/2014 0.57     Creatinine (mg/dL)   Date Value   08/05/2018 0.89    No flowsheet data found.     Drug Serum Conc  Annually DIGOXIN (ng/mL)   Date Value   07/09/2014 0.6 (L)     Digoxin (n

## 2018-09-28 NOTE — PATIENT INSTRUCTIONS
Riaz Pacheco's SCREENING SCHEDULE   Tests on this list are recommended by your physician but may not be covered, or covered at this frequency, by your insurer. Please check with your insurance carrier before scheduling to verify coverage.    PREVENTATIV visit.  Limited to patients who meet one of the following criteria:   • Men who are 73-68 years old and have smoked more than 100 cigarettes in their lifetime   • Anyone with a family history    Colorectal Cancer Screening  Covered up to Age 76     Colonosc preventive care reminders to display for this patient. Please get this Mammogram regularly   Immunizations      Influenza  Covered Annually No orders found for this or any previous visit.  Please get every year    Pneumococcal 13 (Prevnar)  Covered Once aft the [address] [place] [address] regarding Advance Directives.

## 2018-09-28 NOTE — TELEPHONE ENCOUNTER
Thank you so much for finding out this info. If not done yet, please call Ashlie Trimble and let her know this info. Thank you again.

## 2018-09-28 NOTE — TELEPHONE ENCOUNTER
Marvel Clay who is an audiologist at Dr. Júnior Waller recommended the following:     [facility] [address]   Hours:Open ?  Closes 4:30PM   Phone: [phone]    Medicaid pays for hearing aids if patient qualifies for that in

## 2018-09-29 ENCOUNTER — APPOINTMENT (OUTPATIENT)
Dept: GENERAL RADIOLOGY | Facility: HOSPITAL | Age: 78
End: 2018-09-29
Attending: EMERGENCY MEDICINE
Payer: MEDICARE

## 2018-09-29 ENCOUNTER — PRIOR ORIGINAL RECORDS (OUTPATIENT)
Dept: OTHER | Age: 78
End: 2018-09-29

## 2018-09-29 ENCOUNTER — HOSPITAL ENCOUNTER (EMERGENCY)
Facility: HOSPITAL | Age: 78
Discharge: HOME | End: 2018-09-29
Attending: EMERGENCY MEDICINE
Payer: MEDICARE

## 2018-09-29 VITALS
RESPIRATION RATE: 16 BRPM | DIASTOLIC BLOOD PRESSURE: 77 MMHG | SYSTOLIC BLOOD PRESSURE: 143 MMHG | WEIGHT: 194 LBS | HEART RATE: 84 BPM | OXYGEN SATURATION: 98 % | TEMPERATURE: 99 F | HEIGHT: 66 IN | BODY MASS INDEX: 31.18 KG/M2
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
## 2018-09-29 DIAGNOSIS — S81.811A LACERATION OF RIGHT LOWER EXTREMITY, INITIAL ENCOUNTER: Primary | ICD-10-CM

## 2018-09-29 LAB — INR: 2.76

## 2018-09-29 PROCEDURE — 85025 COMPLETE CBC W/AUTO DIFF WBC: CPT | Performed by: EMERGENCY MEDICINE

## 2018-09-29 PROCEDURE — 12005 RPR S/N/A/GEN/TRK12.6-20.0CM: CPT

## 2018-09-29 PROCEDURE — 96361 HYDRATE IV INFUSION ADD-ON: CPT

## 2018-09-29 PROCEDURE — 90471 IMMUNIZATION ADMIN: CPT

## 2018-09-29 PROCEDURE — 99284 EMERGENCY DEPT VISIT MOD MDM: CPT

## 2018-09-29 PROCEDURE — 73590 X-RAY EXAM OF LOWER LEG: CPT | Performed by: EMERGENCY MEDICINE

## 2018-09-29 PROCEDURE — 85610 PROTHROMBIN TIME: CPT | Performed by: EMERGENCY MEDICINE

## 2018-09-29 PROCEDURE — 96360 HYDRATION IV INFUSION INIT: CPT

## 2018-09-29 RX ORDER — HYDROCODONE BITARTRATE AND ACETAMINOPHEN 5; 325 MG/1; MG/1
1-2 TABLET ORAL EVERY 4 HOURS PRN
Qty: 10 TABLET | Refills: 0 | Status: SHIPPED | OUTPATIENT
Start: 2018-09-29 | End: 2018-10-06

## 2018-09-29 RX ORDER — HYDROCODONE BITARTRATE AND ACETAMINOPHEN 5; 325 MG/1; MG/1
2 TABLET ORAL ONCE
Status: COMPLETED | OUTPATIENT
Start: 2018-09-29 | End: 2018-09-29

## 2018-09-29 RX ORDER — CEFADROXIL 500 MG/1
500 CAPSULE ORAL 2 TIMES DAILY
Qty: 20 CAPSULE | Refills: 0 | Status: SHIPPED | OUTPATIENT
Start: 2018-09-29 | End: 2018-10-09

## 2018-09-29 RX ORDER — CEPHALEXIN 500 MG/1
500 CAPSULE ORAL ONCE
Status: COMPLETED | OUTPATIENT
Start: 2018-09-29 | End: 2018-09-29

## 2018-09-29 RX ORDER — SODIUM CHLORIDE 9 MG/ML
INJECTION, SOLUTION INTRAVENOUS ONCE
Status: COMPLETED | OUTPATIENT
Start: 2018-09-29 | End: 2018-09-29

## 2018-09-30 NOTE — ED PROVIDER NOTES
Patient Seen in: [facility] Emergency Department    History   Patient presents with:  Fall (musculoskeletal, neurologic)    Stated Complaint: c/o R leg wound, post fall    HPI    65-year-old female stated that she tripped and fell trying to take a s 1990s, both severe OA   • Seizure disorder ([facility] [address]    • Stroke [facility])    • Valvular disease    • Varicose vein 9/18/2015       Past Surgical History:  No date: ANGIOGRAM  No date: APPENDECTOMY  12/4/2016: CRANIOTOMY KRISTA HOLES;  Left      Comment:  Performed b laceration which is U-shaped/flap in appearance on the shin through skin and subcutaneous tissue exposing fascia. There is minimal diffuse tenderness of the surrounding area but no obvious deformity. Dorsal pedal pulses present.   Capillary refill to the by: Indiana Brown MD            At one point the patient stated that she felt mildly lightheaded.   Upon evaluation heart rate and blood pressure were normal.  The patient states that this is not an unusual feeling and that she has experienced it numerous t

## 2018-10-01 ENCOUNTER — HOSPITAL ENCOUNTER (OUTPATIENT)
Dept: LAB | Facility: HOSPITAL | Age: 78
Discharge: HOME | End: 2018-10-01
Attending: FAMILY MEDICINE
Payer: MEDICARE

## 2018-10-01 DIAGNOSIS — E11.8 TYPE 2 DIABETES MELLITUS WITH COMPLICATION, WITHOUT LONG-TERM CURRENT USE OF INSULIN (HCC): ICD-10-CM

## 2018-10-01 DIAGNOSIS — Z76.0 MEDICATION REFILL: ICD-10-CM

## 2018-10-01 PROCEDURE — 83036 HEMOGLOBIN GLYCOSYLATED A1C: CPT

## 2018-10-01 PROCEDURE — 36415 COLL VENOUS BLD VENIPUNCTURE: CPT

## 2018-10-02 ENCOUNTER — PRIOR ORIGINAL RECORDS (OUTPATIENT)
Dept: OTHER | Age: 78
End: 2018-10-02

## 2018-10-02 PROBLEM — S01.01XA SCALP LACERATION, INITIAL ENCOUNTER: Status: RESOLVED | Noted: 2018-07-16 | Resolved: 2018-10-02

## 2018-10-02 PROBLEM — I48.91 CONTROLLED ATRIAL FIBRILLATION (HCC): Status: RESOLVED | Noted: 2017-11-03 | Resolved: 2018-10-02

## 2018-10-02 PROBLEM — I48.91 ATRIAL FIBRILLATION AND FLUTTER (HCC): Status: RESOLVED | Noted: 2018-07-31 | Resolved: 2018-10-02

## 2018-10-02 PROBLEM — R79.89 AZOTEMIA: Status: RESOLVED | Noted: 2018-02-06 | Resolved: 2018-10-02

## 2018-10-02 PROBLEM — R55 SYNCOPE AND COLLAPSE: Status: RESOLVED | Noted: 2018-07-16 | Resolved: 2018-10-02

## 2018-10-02 PROBLEM — E87.1 HYPONATREMIA: Status: RESOLVED | Noted: 2018-07-25 | Resolved: 2018-10-02

## 2018-10-02 PROBLEM — R55 SYNCOPE, CARDIOGENIC: Status: RESOLVED | Noted: 2017-11-03 | Resolved: 2018-10-02

## 2018-10-02 PROBLEM — K80.50 BILIARY COLIC: Status: RESOLVED | Noted: 2017-08-31 | Resolved: 2018-10-02

## 2018-10-02 PROBLEM — R73.9 HYPERGLYCEMIA: Status: RESOLVED | Noted: 2018-02-06 | Resolved: 2018-10-02

## 2018-10-02 PROBLEM — N17.9 ACUTE RENAL FAILURE (ARF) (HCC): Status: RESOLVED | Noted: 2018-07-25 | Resolved: 2018-10-02

## 2018-10-02 PROBLEM — I83.90 VARICOSE VEINS OF LOWER EXTREMITY: Status: ACTIVE | Noted: 2018-10-02

## 2018-10-02 PROBLEM — D64.9 ANEMIA: Status: RESOLVED | Noted: 2018-07-25 | Resolved: 2018-10-02

## 2018-10-02 PROBLEM — S12.001A CLOSED NONDISPLACED FRACTURE OF FIRST CERVICAL VERTEBRA, UNSPECIFIED FRACTURE MORPHOLOGY, INITIAL ENCOUNTER (HCC): Status: RESOLVED | Noted: 2018-07-16 | Resolved: 2018-10-02

## 2018-10-02 PROBLEM — D69.6 THROMBOCYTOPENIA (HCC): Chronic | Status: RESOLVED | Noted: 2018-05-30 | Resolved: 2018-10-02

## 2018-10-02 PROBLEM — I48.92 ATRIAL FIBRILLATION AND FLUTTER (HCC): Status: RESOLVED | Noted: 2018-07-31 | Resolved: 2018-10-02

## 2018-10-02 RX ORDER — WARFARIN SODIUM 5 MG/1
TABLET ORAL
Qty: 90 TABLET | Refills: 0 | OUTPATIENT
Start: 2018-10-02

## 2018-10-03 ENCOUNTER — HOSPITAL ENCOUNTER (OUTPATIENT)
Dept: LAB | Facility: HOSPITAL | Age: 78
Discharge: HOME | End: 2018-10-03
Attending: INTERNAL MEDICINE
Payer: MEDICARE

## 2018-10-03 ENCOUNTER — PRIOR ORIGINAL RECORDS (OUTPATIENT)
Dept: OTHER | Age: 78
End: 2018-10-03

## 2018-10-03 LAB — INR: 2.58

## 2018-10-03 PROCEDURE — 85610 PROTHROMBIN TIME: CPT | Performed by: INTERNAL MEDICINE

## 2018-10-03 PROCEDURE — 36415 COLL VENOUS BLD VENIPUNCTURE: CPT | Performed by: INTERNAL MEDICINE

## 2018-10-03 RX ORDER — WARFARIN SODIUM 5 MG/1
TABLET ORAL
Qty: 90 TABLET | Refills: 0 | Status: SHIPPED | OUTPATIENT
Start: 2018-10-03 | End: 2018-10-11

## 2018-10-03 NOTE — TELEPHONE ENCOUNTER
Requesting Warfarin   LOV: 9/28/18  RTC: 3 mo   Last Relevant Labs: 10/3/18  Filled: 5/2018 #90 with 0 refills    Future Appointments   Date Time Provider Sara Boo   10/11/2018  9:30 AM Jolly Fan MD ENINAPER2 [facility] [place]   12/18/2018  9

## 2018-10-10 ENCOUNTER — TELEPHONE (OUTPATIENT)
Dept: SURGERY | Facility: CLINIC | Age: 78
End: 2018-10-10

## 2018-10-10 NOTE — TELEPHONE ENCOUNTER
Called Pt   Spoke to Kimberly doran per [facility]   Stated that Pt  Needed to get Xray of the cervical done before next office visit.    10/11/18 @ 9:30

## 2018-10-11 ENCOUNTER — OFFICE VISIT (OUTPATIENT)
Dept: SURGERY | Facility: CLINIC | Age: 78
End: 2018-10-11
Payer: MEDICARE

## 2018-10-11 ENCOUNTER — PRIOR ORIGINAL RECORDS (OUTPATIENT)
Dept: OTHER | Age: 78
End: 2018-10-11

## 2018-10-11 ENCOUNTER — HOSPITAL ENCOUNTER (OUTPATIENT)
Dept: GENERAL RADIOLOGY | Facility: HOSPITAL | Age: 78
Discharge: HOME | End: 2018-10-11
Attending: NEUROLOGICAL SURGERY
Payer: MEDICARE

## 2018-10-11 ENCOUNTER — HOSPITAL ENCOUNTER (OUTPATIENT)
Dept: LAB | Facility: HOSPITAL | Age: 78
Discharge: HOME | End: 2018-10-11
Attending: INTERNAL MEDICINE
Payer: MEDICARE

## 2018-10-11 VITALS — HEART RATE: 68 BPM | DIASTOLIC BLOOD PRESSURE: 80 MMHG | SYSTOLIC BLOOD PRESSURE: 124 MMHG

## 2018-10-11 DIAGNOSIS — S12.001D CLOSED NONDISPLACED FRACTURE OF FIRST CERVICAL VERTEBRA WITH ROUTINE HEALING, UNSPECIFIED FRACTURE MORPHOLOGY, SUBSEQUENT ENCOUNTER: Primary | ICD-10-CM

## 2018-10-11 DIAGNOSIS — S12.001A CLOSED NONDISPLACED FRACTURE OF FIRST CERVICAL VERTEBRA, UNSPECIFIED FRACTURE MORPHOLOGY, INITIAL ENCOUNTER (HCC): ICD-10-CM

## 2018-10-11 LAB — INR: 2.5

## 2018-10-11 PROCEDURE — 85610 PROTHROMBIN TIME: CPT | Performed by: INTERNAL MEDICINE

## 2018-10-11 PROCEDURE — 72040 X-RAY EXAM NECK SPINE 2-3 VW: CPT | Performed by: NEUROLOGICAL SURGERY

## 2018-10-11 PROCEDURE — 99213 OFFICE O/P EST LOW 20 MIN: CPT | Performed by: PHYSICIAN ASSISTANT

## 2018-10-11 PROCEDURE — 36415 COLL VENOUS BLD VENIPUNCTURE: CPT | Performed by: INTERNAL MEDICINE

## 2018-10-11 NOTE — PROGRESS NOTES
Neurosurgery Clinic Visit  10/11/2018    Jaziel Rose PCP:  Jason Barajas MD    1940 MRN [id number]     HISTORY OF PRESENT ILLNESS:  Jaziel Rose is a(n) 70year old female who is here 3 months s/p fall for follow-up of C1 fracture.   She

## 2018-10-11 NOTE — PROGRESS NOTES
Pt is here for follow up and review new Xray Cervical  Cervical pain currently 5/10 same as it has been

## 2018-10-12 NOTE — PROGRESS NOTES
Diabetic Foot Exam report received via mail from the [address] [place] [address] Specialists,   Chon Prado DPM who is  located at [address] [place] [address] in [place]. Phone # [phone],  Fax # [phone]. Date of exam was 09/20/18. DM flow sheet updated. Allergies:  Aspirin                 OTHER (SEE COMMENTS)    Comment:Contraindicated 2/2 taking coumadin  Lisinopril              Coughing  Mexitil [Mexiletine]    RASH   PHYSICAL EXAM:   Physical Exam           ASSESSMENT/PLAN:   No diagnosis found.

## 2018-10-16 ENCOUNTER — TELEPHONE (OUTPATIENT)
Dept: FAMILY MEDICINE CLINIC | Facility: CLINIC | Age: 78
End: 2018-10-16

## 2018-10-16 ENCOUNTER — HOSPITAL ENCOUNTER (OUTPATIENT)
Facility: HOSPITAL | Age: 78
Setting detail: OBSERVATION
Discharge: HOME | End: 2018-10-18
Attending: EMERGENCY MEDICINE | Admitting: INTERNAL MEDICINE
Payer: MEDICARE

## 2018-10-16 ENCOUNTER — OFFICE VISIT (OUTPATIENT)
Dept: FAMILY MEDICINE CLINIC | Facility: CLINIC | Age: 78
End: 2018-10-16
Payer: MEDICARE

## 2018-10-16 ENCOUNTER — APPOINTMENT (OUTPATIENT)
Dept: CT IMAGING | Facility: HOSPITAL | Age: 78
End: 2018-10-16
Attending: EMERGENCY MEDICINE
Payer: MEDICARE

## 2018-10-16 VITALS
DIASTOLIC BLOOD PRESSURE: 80 MMHG | HEART RATE: 71 BPM | BODY MASS INDEX: 31 KG/M2 | SYSTOLIC BLOOD PRESSURE: 122 MMHG | TEMPERATURE: 98 F | OXYGEN SATURATION: 98 % | RESPIRATION RATE: 16 BRPM | HEIGHT: 66 IN

## 2018-10-16 DIAGNOSIS — S81.801D OPEN WOUND OF RIGHT LOWER LEG, SUBSEQUENT ENCOUNTER: Primary | ICD-10-CM
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
## 2018-10-16 DIAGNOSIS — Z48.02 VISIT FOR SUTURE REMOVAL: ICD-10-CM

## 2018-10-16 DIAGNOSIS — S00.93XA CONTUSION OF HEAD, UNSPECIFIED PART OF HEAD, INITIAL ENCOUNTER: Primary | ICD-10-CM

## 2018-10-16 PROBLEM — S81.801A OPEN WOUND OF RIGHT LOWER LEG: Status: ACTIVE | Noted: 2018-10-16

## 2018-10-16 PROCEDURE — 97597 DBRDMT OPN WND 1ST 20 CM/<: CPT | Performed by: FAMILY MEDICINE

## 2018-10-16 PROCEDURE — 70450 CT HEAD/BRAIN W/O DYE: CPT | Performed by: EMERGENCY MEDICINE

## 2018-10-16 PROCEDURE — 99213 OFFICE O/P EST LOW 20 MIN: CPT | Performed by: FAMILY MEDICINE

## 2018-10-16 RX ORDER — HYDROCODONE BITARTRATE AND ACETAMINOPHEN 5; 325 MG/1; MG/1
1-2 TABLET ORAL EVERY 4 HOURS PRN
Qty: 10 TABLET | Refills: 0 | Status: SHIPPED | OUTPATIENT
Start: 2018-10-16 | End: 2018-10-17

## 2018-10-16 RX ORDER — ONDANSETRON 2 MG/ML
4 INJECTION INTRAMUSCULAR; INTRAVENOUS ONCE
Status: COMPLETED | OUTPATIENT
Start: 2018-10-16 | End: 2018-10-16

## 2018-10-16 RX ORDER — MORPHINE SULFATE 4 MG/ML
2 INJECTION, SOLUTION INTRAMUSCULAR; INTRAVENOUS EVERY 30 MIN PRN
Status: DISCONTINUED | OUTPATIENT
Start: 2018-10-16 | End: 2018-10-18

## 2018-10-16 NOTE — TELEPHONE ENCOUNTER
Patients dressing came off and pts son would like to know what they should do. Re-dress the wound using the same bandage? Please advise    Per wound care clinic- they are scheduling 2 weeks out. Appt was scheduled. Patients son aware.   Oct 22nd in [place]

## 2018-10-16 NOTE — TELEPHONE ENCOUNTER
Patient is scheduled with wound care clinic next Monday at the [place] location.  Patients son aware of appt time/ location  Future Appointments   Date Time Provider Sara Boo   10/22/2018  3:15 PM [facility] [id number] [place]   12/18/2018

## 2018-10-16 NOTE — TELEPHONE ENCOUNTER
I checked with patient's [facility] they do not have dressing. I called [facility] on [address] [facility] and they do have it. I informed the son and he will tell the family so they can purchase and redress.

## 2018-10-16 NOTE — PROGRESS NOTES
[facility] Visit Note  10/16/2018      Subjective:      Patient ID: Ksenia Gutierrez is a 70year old female. Chief Complaint:  Patient presents with:  Suture Removal: Right  lower leg sutures placed 09/29/18 at [facility].  She tripped and fell t limited to: infection, pain, bleeding. Description: 14 sutures were removed. The deepest laceration which was vertical 6cm is with good closure on the medial side of the wound, but the rest of the U-shaped wound has a layer of non-adherent skin.  This ski

## 2018-10-16 NOTE — TELEPHONE ENCOUNTER
I'm sorry I didn't mention it sooner. As they can't get in with wound care for 2 wks, please offer her an appt in 1 wk for wound care (20min). Call with any questions.  thanks

## 2018-10-16 NOTE — TELEPHONE ENCOUNTER
I would redress the wound like we discussed in the office.  Apply silvadene to wound and cover with the telfa or other non-stick dressing (they will likely need to buy more from OTC until they can be seen by wound care, if not available [facility] please let me kn

## 2018-10-16 NOTE — TELEPHONE ENCOUNTER
Pt would like to speak to a nurse or doctor in regards to not being able to get an appt to the wound clinic for a month and also to inform doctor that her wound bandage came off ,pt  would like a call back at [phone]

## 2018-10-17 ENCOUNTER — APPOINTMENT (OUTPATIENT)
Dept: GENERAL RADIOLOGY | Facility: HOSPITAL | Age: 78
End: 2018-10-17
Attending: HOSPITALIST
Payer: MEDICARE

## 2018-10-17 ENCOUNTER — PATIENT OUTREACH (OUTPATIENT)
Dept: FAMILY MEDICINE CLINIC | Facility: CLINIC | Age: 78
End: 2018-10-17

## 2018-10-17 PROBLEM — S00.93XA HEAD CONTUSION: Status: ACTIVE | Noted: 2018-10-17

## 2018-10-17 PROBLEM — S00.93XA CONTUSION OF HEAD, UNSPECIFIED PART OF HEAD, INITIAL ENCOUNTER: Status: ACTIVE | Noted: 2018-10-17

## 2018-10-17 PROCEDURE — 73560 X-RAY EXAM OF KNEE 1 OR 2: CPT | Performed by: HOSPITALIST

## 2018-10-17 PROCEDURE — 99220 INITIAL OBSERVATION CARE,LEVL III: CPT | Performed by: INTERNAL MEDICINE

## 2018-10-17 RX ORDER — SODIUM CHLORIDE 9 MG/ML
INJECTION, SOLUTION INTRAVENOUS CONTINUOUS
Status: DISCONTINUED | OUTPATIENT
Start: 2018-10-17 | End: 2018-10-18

## 2018-10-17 RX ORDER — TORSEMIDE 20 MG/1
20 TABLET ORAL
Status: DISCONTINUED | OUTPATIENT
Start: 2018-10-17 | End: 2018-10-18

## 2018-10-17 RX ORDER — DEXTROSE MONOHYDRATE 25 G/50ML
50 INJECTION, SOLUTION INTRAVENOUS
Status: DISCONTINUED | OUTPATIENT
Start: 2018-10-17 | End: 2018-10-18

## 2018-10-17 RX ORDER — ACETAMINOPHEN 500 MG
500 TABLET ORAL EVERY 6 HOURS PRN
Status: DISCONTINUED | OUTPATIENT
Start: 2018-10-17 | End: 2018-10-18

## 2018-10-17 RX ORDER — DILTIAZEM HYDROCHLORIDE 120 MG/1
120 CAPSULE, EXTENDED RELEASE ORAL DAILY
Status: DISCONTINUED | OUTPATIENT
Start: 2018-10-17 | End: 2018-10-18

## 2018-10-17 RX ORDER — SODIUM CHLORIDE 9 MG/ML
125 INJECTION, SOLUTION INTRAVENOUS CONTINUOUS
Status: DISCONTINUED | OUTPATIENT
Start: 2018-10-17 | End: 2018-10-18

## 2018-10-17 RX ORDER — AMIODARONE HYDROCHLORIDE 200 MG/1
200 TABLET ORAL 2 TIMES DAILY
Status: DISCONTINUED | OUTPATIENT
Start: 2018-10-17 | End: 2018-10-18

## 2018-10-17 RX ORDER — SERTRALINE HYDROCHLORIDE 25 MG/1
25 TABLET, FILM COATED ORAL DAILY
Status: DISCONTINUED | OUTPATIENT
Start: 2018-10-17 | End: 2018-10-18

## 2018-10-17 RX ORDER — LOSARTAN POTASSIUM 25 MG/1
25 TABLET ORAL DAILY
Status: DISCONTINUED | OUTPATIENT
Start: 2018-10-17 | End: 2018-10-18

## 2018-10-17 NOTE — ED NOTES
Pt unable to get out of bed. Pt sat at bedside and unable to sit up without assistance. Pt stated she was dizzy and that the [facility] room was spinning\".  MD aware, IV fluids given to pt

## 2018-10-17 NOTE — PLAN OF CARE
NURSING ADMISSION NOTE      Patient admitted via Cart  Oriented to room. Safety precautions initiated. Bed in low position. Call light in reach. Patient admitted to the floor. A&O x 4, drowsy. A fib on tele. Up with 2 assist to bedside commade.  Mov

## 2018-10-17 NOTE — ED PROVIDER NOTES
Received signout to my partner, Dr. Tina Bass requesting that I reassess in a couple hours as patient has been somnolent since getting morphine. Patient reassessed 2 hours after arriving in my pot approximately 2:30 AM.  Patient was arousable but somnolent.

## 2018-10-17 NOTE — H&P
[facility]PITAL[facility]  History and Physical     Raj Colon Patient Status:  Emergency    1940 MRN [id number]   Location [address] [facility] Attending No att. providers found   Hosp Day # 0 PCP Kevon Penaloza MD     Chief Com arthritis ([facility]    • S/P ICD (internal cardiac defibrillator) procedure 7/7/2016    medtronic    • S/P total knee replacement 6/18/2015    bilat 1990s, both severe OA   • Seizure disorder ([facility]    • Stroke [facility])    • Subdural hematoma ([facility] [address] 12/01/2016    s Oral Tab Take 1 tablet (25 mg total) by mouth daily. Disp: 90 tablet Rfl: 3   acetaminophen 500 MG Oral Tab Take 500 mg by mouth every 6 (six) hours as needed for Pain. Disp:  Rfl:    Warfarin Sodium 2.5 MG Oral Tab Take 2.5 mg by mouth twice a week.  Wed a rhonchi. Cardiovascular: S1, S2. Regular rate and rhythm. No murmurs, rubs or gallops. Equal pulses. Chest and Back: No tenderness or deformity. Abdomen: Soft, nontender, nondistended. Positive bowel sounds. No rebound, guarding or organomegaly.   Bernell [place]

## 2018-10-17 NOTE — PHYSICAL THERAPY NOTE
PHYSICAL THERAPY EVALUATION - INPATIENT     Room Number: 9741/7579-K  Evaluation Date: 10/17/2018  Type of Evaluation: Initial  Physician Order: PT Eval and Treat    Presenting Problem: fall, head contusion with (-) CT   Reason for Therapy: Mobility Recurrence 7.2016 Hx VT- s/p ICD implant on 7/7/16.      • Obesity, morbid, BMI 40.0-49.9 ([facility] 6/18/2015   • RADHA (obstructive sleep apnea) 06/29/2012    Cannot tolerate CPAP machine- uses oxygen periodically    • Osteoarthritis of shoulders, bilateral 06/1 in [facility]. Pt reports she amb with rw at supervision level at home, but does experience right knee buckling at times. Pt reports ind with adls, reports she helps out at home with doing dishes, cooking, and is able to stand to do so without support plof. Turning over in bed (including adjusting bedclothes, sheets and blankets)?: A Little   -   Sitting down on and standing up from a chair with arms (e.g., wheelchair, bedside commode, etc.): A Little   -   Moving from lying on back to sitting on the side of safe to return home at this time, feels she remains a high fall risk, rn aware.      Exercise/Education Provided:  Bed mobility  Energy conservation  Functional activity tolerated  Gait training  Posture  Transfer training    Patient End of Session: Up in c Established Goals: 5      CURRENT GOALS    Goal #1 Patient is able to demonstrate supine - sit EOB @ level: supervision     Goal #2 Patient is able to demonstrate transfers Sit to/from Stand at assistance level: supervision     Goal #3 Patient is able to a

## 2018-10-17 NOTE — ED PROVIDER NOTES
Patient Seen in: [facility] Emergency Department    History   Patient presents with:  Trauma (cardiovascular, musculoskeletal)  Head Neck Injury (neurologic, musculoskeletal)    Stated Complaint: fall / trip hit head    HPI    Patient is 65-year-old procedure 7/7/2016    medtronic    • S/P total knee replacement 6/18/2015    bilat 1990s, both severe OA   • Seizure disorder ([facility]    • Stroke [facility])    • Subdural hematoma ([facility] 12/01/2016    s/p L craniotomy   • Varicose vein 9/18/2015   • Vitamin D defi regular rate and rhythm, no murmurs. BACK: No CVA tenderness, no midline bony tenderness. ABDOMEN: + Bowel sounds, soft, nontender, nondistended. No rebound, no guarding, no hepatosplenomegaly.   EXTREMITIES: Full range of motion, no tenderness, good cap SCREEN.   Procedure                               Abnormality         Status                     ---------                               -----------         ------                     ABORH (BLOOD TYPE)[[id number]]                               Final result

## 2018-10-17 NOTE — OCCUPATIONAL THERAPY NOTE
OCCUPATIONAL THERAPY EVALUATION - INPATIENT     Room Number: 1412/5768-B  Evaluation Date: 10/17/2018  Type of Evaluation: Initial  Presenting Problem: fall    Physician Order: IP Consult to Occupational Therapy  Reason for Therapy: ADL/IADL Dysfunction an 7.2016 Hx VT- s/p ICD implant on 7/7/16.      • Obesity, morbid, BMI 40.0-49.9 ([facility] 6/18/2015   • RADHA (obstructive sleep apnea) 06/29/2012    Cannot tolerate CPAP machine- uses oxygen periodically    • Osteoarthritis of shoulders, bilateral 06/18/2015    S falls, 2 in the last 3 months. Pt ambulates with RW with supervision. Since C1 fracture, pt has had assist with shampooing hair and donning socks/shoes. Otherwise supervision level with ADL. Does dishes at home. States her DIL is always home with her.     Giovanny David toilet, bedpan or urinal? : Total  -   Putting on and taking off regular upper body clothing?: A Little  -   Taking care of personal grooming such as brushing teeth?: A Little  -   Eating meals?: A Little    AM-PAC Score:  Score: 13  Approx Degree of Impai participate in ADL, transfers, instrumental activities of daily living, rest and sleep, leisure and social participation.      The patient is functioning below her previous functional level and would benefit from skilled inpatient OT to address the above de

## 2018-10-17 NOTE — PROGRESS NOTES
Spoke with pts daughter, Anish Mercado. Pt is currently in the hospital and does not need to schedule an appt. I let her know that if anything changes, to just call the office to schedule an appt.

## 2018-10-17 NOTE — ED INITIAL ASSESSMENT (HPI)
Pt stated she looses her balance a lot. Pt states that she was walking out of a restaurant, lost her balance, fell and hit her head on concrete.  Pt is on coumadin

## 2018-10-17 NOTE — PLAN OF CARE
Impaired Activities of Daily Living    • Achieve highest/safest level of independence in self care Progressing        Impaired Functional Mobility    • Achieve highest/safest level of mobility/gait Progressing        Patient is drowsy, orientated x4.  Repor

## 2018-10-17 NOTE — PROGRESS NOTES
10/17/18 1609 10/17/18 1611 10/17/18 1613   Vitals   Pulse 64 61 61   Resp 18 18 19   /64 124/61 108/62   BP Location Left arm Left arm Left arm   BP Method Automatic Automatic Automatic   Patient Position Lying Sitting Standing

## 2018-10-17 NOTE — PROGRESS NOTES
Ok to d/c if cleared by PT for safe home discharge. Cautiously resume warfarin. Hopefully no further falls.

## 2018-10-18 VITALS
TEMPERATURE: 98 F | DIASTOLIC BLOOD PRESSURE: 62 MMHG | RESPIRATION RATE: 16 BRPM | SYSTOLIC BLOOD PRESSURE: 133 MMHG | WEIGHT: 194 LBS | OXYGEN SATURATION: 96 % | HEIGHT: 66 IN | BODY MASS INDEX: 31.18 KG/M2 | HEART RATE: 76 BPM

## 2018-10-18 PROCEDURE — 99217 OBSERVATION CARE DISCHARGE: CPT | Performed by: HOSPITALIST

## 2018-10-18 NOTE — CM/SW NOTE
LUIZ order for assessment. Met with patient. She lives in the [facility] and reports that her son and dtr-I-law have been staying with her. LUIZ explained role in d/c planning process. Discussed ALY[facility] recommendations.  She has been to [facility] in the

## 2018-10-18 NOTE — WOUND PROGRESS NOTE
[facility]  Report of Inpatient Wound Care Consultation    [facility] Ill Patient Status:  Observation    1940 MRN [id number]   [facility] 3NE-A Attending Libia Mora MD   Hosp Day # 0 PCP Sunil Holly MD     Reason for s/p L craniotomy   • Varicose vein 9/18/2015   • Vitamin D deficiency 2012     Past Surgical History:   Procedure Laterality Date   • ANGIOGRAM     • APPENDECTOMY     • CRANIOTOMY KRISTA HOLES Left 12/4/2016    Performed by Valdez Sanchez DO at [facility] MAIN compression. To follow up at the outpatient wound clinic. Thank you for allowing me to participate in the care of your patient. Time Spent 60 minutes, Thank you.     Stevie Ferguson RN, BSN [facility]   Wound Care pager [phone]  10/18/2018  10:41 AM

## 2018-10-18 NOTE — PLAN OF CARE
Impaired Activities of Daily Living    • Achieve highest/safest level of independence in self care Progressing        Impaired Functional Mobility    • Achieve highest/safest level of mobility/gait Progressing          Pt is A&O x4.    Pt complains of 10/10

## 2018-10-18 NOTE — OCCUPATIONAL THERAPY NOTE
OCCUPATIONAL THERAPY TREATMENT NOTE - INPATIENT     Room Number: 6945/0300-R  Session: 1/5  Number of Visits to Meet Established Goals: 5    Presenting Problem: fall    History related to current admission:   Pt admitted due to fall, pt was exiting restaur (obstructive sleep apnea) 06/29/2012    Cannot tolerate CPAP machine- uses oxygen periodically    • Osteoarthritis of shoulders, bilateral 06/18/2015    Severe, frozen shoulder syndrome   • Pericardial effusion 6/5/2014    trivial   • Pulmonary HTN ([facility] 1 currently need…  -   Putting on and taking off regular lower body clothing?: A Lot  -   Bathing (including washing, rinsing, drying)?: A Lot  -   Toileting, which includes using toilet, bedpan or urinal? : A Little  -   Putting on and taking off regular up Activity Short Form was completed and  this patient  is demonstrating a 38% degree impairment in activities of daily living. Research supports that patients with this level of impairment often benefit from home at HI.   Recommend sub acute rehab elos 7-10 d

## 2018-10-18 NOTE — PHYSICAL THERAPY NOTE
PHYSICAL THERAPY TREATMENT NOTE - INPATIENT    Room Number: 4272/0284-Q     Session: 1   Number of Visits to Meet Established Goals: 5    Presenting Problem: S/p Fall on 10/16/18,Head contusion,negative CT  History related to current admission:   Pt admit 40.0[facility]49.9 [facility] 6/18/2015   • RADHA (obstructive sleep apnea) 06/29/2012    Cannot tolerate CPAP machine- uses oxygen periodically    • Osteoarthritis of shoulders, bilateral 06/18/2015    Severe, frozen shoulder syndrome   • Pericardial effusion 6/5/2014 Static Sitting: Good  Dynamic Sitting: Fair           Static Standing: Fair -  Dynamic Standing: Poor +    ACTIVITY TOLERANCE                         O2 WALK                    AM-PAC '6-Clicks' INP staff;Needs met;Call light within reach;RN aware of session/findings; All patient questions and concerns addressed; Discussed recommendations with /    ASSESSMENT   Patient is a 70year old female admitted on 10/16/2018 for fall resu

## 2018-10-18 NOTE — PLAN OF CARE
AOx4  VSS, on RA  Tele- Afib- on coumadin  RADHA- refuses CPAP, wears O2 at home at night. No c/o pain  PT/OT recommending ALYSHA- pt refusing and wants to get outpt PT, son and daughter aware and agree.    QID accucheck  Seizure precautions  IVF- 0.9 @ 83ml/hr

## 2018-10-19 ENCOUNTER — PATIENT OUTREACH (OUTPATIENT)
Dept: CASE MANAGEMENT | Age: 78
End: 2018-10-19

## 2018-10-19 DIAGNOSIS — Z02.9 ENCOUNTERS FOR ADMINISTRATIVE PURPOSE: ICD-10-CM

## 2018-10-19 PROCEDURE — 1111F DSCHRG MED/CURRENT MED MERGE: CPT

## 2018-10-19 NOTE — CM/SW NOTE
10/19/18 0900   Discharge disposition   Expected discharge disposition Home or Self   Home services after discharge Patient refused services   Patient Refuses Rehab Services Yes   Discharge transportation Private car

## 2018-10-19 NOTE — PROGRESS NOTES
Initial Post Discharge Follow Up   Discharge Date: 10/18/18  Contact Date: 10/19/2018    Consent Verification:  Assessment Completed With: Other: Glenn Anderson patient's son Permission received per patient?  written  HIPAA Verified?   Yes    Discharge Dx:    S/P Monday, Tuesday, Thurs, Sat Disp:  Rfl:    Losartan Potassium 25 MG Oral Tab Take 1 tablet (25 mg total) by mouth daily. Disp: 30 tablet Rfl: 0   torsemide 20 MG Oral Tab Take 1 tablet (20 mg total) by mouth daily.  (Patient taking differently: Take 20 mg b Time Appointment Department [facility])    Oct 22, 2018  3:15 PM CDT Wound Care Visit with [facility] [address] in [place] ([address] [facility])    Oct 25, 2018  1:00 PM CDT Hospital Follow Up with MD Portia Rehman

## 2018-10-19 NOTE — DISCHARGE SUMMARY
[facility]  DISCHARGE SUMMARY     Petros Rocha Patient Status:  Observation    1940 MRN [id number]   [facility] 3NE-A Attending No att. providers found   Hosp Day # 0 PCP Belinda Jordan MD     Date of Admission: 10/16/20 Discharge:   · none    Consultants:  • none    Discharge Medication List:     Discharge Medications      CHANGE how you take these medications      Instructions Prescription details   torsemide 20 MG Tabs  Commonly known as:  DEMADEX  What changed:  when t change   Stop taking on:  1/14/2019  Quantity:  50 g  Refills:  0     TAB-A-MAE MAXIMUM Tabs      Take 1 tablet by mouth daily. Refills:  0     Warfarin Sodium 2.5 MG Tabs  Commonly known as:  COUMADIN      Take 2.5 mg by mouth twice a week.  Wed and Sun

## 2018-10-22 ENCOUNTER — OFFICE VISIT (OUTPATIENT)
Dept: WOUND CARE | Age: 78
End: 2018-10-22
Attending: NURSE PRACTITIONER
Payer: MEDICARE

## 2018-10-22 ENCOUNTER — TELEPHONE (OUTPATIENT)
Dept: FAMILY MEDICINE CLINIC | Facility: CLINIC | Age: 78
End: 2018-10-22

## 2018-10-22 DIAGNOSIS — R60.9 EDEMA: ICD-10-CM

## 2018-10-22 DIAGNOSIS — S81.801A UNSPECIFIED OPEN WOUND, RIGHT LOWER LEG, INITIAL ENCOUNTER: Primary | ICD-10-CM

## 2018-10-22 DIAGNOSIS — E11.65 TYPE II DIABETES MELLITUS WITH COMPLICATION, UNCONTROLLED (HCC): ICD-10-CM

## 2018-10-22 DIAGNOSIS — E11.8 TYPE II DIABETES MELLITUS WITH COMPLICATION, UNCONTROLLED (HCC): ICD-10-CM

## 2018-10-22 PROCEDURE — 97597 DBRDMT OPN WND 1ST 20 CM/<: CPT

## 2018-10-22 PROCEDURE — 99214 OFFICE O/P EST MOD 30 MIN: CPT

## 2018-10-22 PROCEDURE — 97598 DBRDMT OPN WND ADDL 20CM/<: CPT

## 2018-10-22 NOTE — PROGRESS NOTES
Progress Note Details  Patient Name: Darrius Carpenter Date: 10/22/2018   Patient Number: [id number] Physician / Monica Ramos: Raul Pro   Patient YOB: 1940 [facility]    Chief Complaint  This information was obtaine Debridement, cross-hatched dry non-viable skin flap, honey gel, gauze and tubigrip. Instructed to remove compression tubigrip if shortness of breath noted. Supplies ordered. F/U on Friday and thereafter, once weekly.     General Notes:  Pt wound sustained f Genitourinary (): Frequency (Due to diuretics)  Musculoskeletal: Assistive Devices [place]), Other (Uses cervical brace.), Joint Pain (OA knees ladonna)  Neurological: Loss of Protective Sensation (Pt stated has neuropathy), Seizures  Hematologic/Lymphatic: Constitutional  BP Elevated, on antihypertensive meds. . Pulse Irregular, chronic afib, controlled rate. RR within normal limits. Afebrile. Obesity. Alert, calm, well developed, in no apparent distress.  Height/Length: 66 in (167.64 cm), Weight: 195 lbs (88. The periwound skin exhibited: Edema, Erythema. The periwound skin did not exhibit: Induration, Crepitus, Fluctuance, Atrophie [place], Cyanosis, Ecchymosis, Hemosiderosis, Pallor, Rubor. The temperature of the periwound skin is Warm.  Periwound skin does no (Encounter Diagnosis) S81.801A - Unspecified open wound, right lower leg, initial encounter  (Encounter Diagnosis) R60.9 - Edema, unspecified  (Encounter Diagnosis) E11.8 - Type 2 diabetes mellitus with unspecified complications  (Encounter Diagnosis) I83. Elevate leg(s)as much as possible. Take you diuretics as directed. Follow-Up Appointments  Return Appointment in 1 week.  - Fridays as requested    Misc/Additional Orders  Increase dietary protein  S/S of Infection  Other: - Follow ADA diet    Addition

## 2018-10-22 NOTE — TELEPHONE ENCOUNTER
Patient called - she was seen in the ER previously and they have recommended PT and she is calling for a referral for PT

## 2018-10-25 ENCOUNTER — APPOINTMENT (OUTPATIENT)
Dept: PHYSICAL THERAPY | Age: 78
End: 2018-10-25
Attending: HOSPITALIST
Payer: MEDICARE

## 2018-10-26 ENCOUNTER — APPOINTMENT (OUTPATIENT)
Dept: WOUND CARE | Age: 78
End: 2018-10-26
Attending: NURSE PRACTITIONER
Payer: MEDICARE

## 2018-10-26 DIAGNOSIS — E11.8 TYPE II DIABETES MELLITUS WITH COMPLICATION, UNCONTROLLED (HCC): ICD-10-CM

## 2018-10-26 DIAGNOSIS — R60.9 EDEMA: ICD-10-CM

## 2018-10-26 DIAGNOSIS — E11.65 TYPE II DIABETES MELLITUS WITH COMPLICATION, UNCONTROLLED (HCC): ICD-10-CM

## 2018-10-26 DIAGNOSIS — S81.801A UNSPECIFIED OPEN WOUND, RIGHT LOWER LEG, INITIAL ENCOUNTER: Primary | ICD-10-CM

## 2018-10-26 PROCEDURE — 99214 OFFICE O/P EST MOD 30 MIN: CPT

## 2018-10-26 NOTE — PROGRESS NOTES
Progress Note Details  Patient Name: Feli Glass Date: 10/26/2018   Patient Number: [id number] Physician / Lobito Conquest: Army Rush   Patient YOB: 1940 Facility: [facility]    Chief Complaint  This information was obtaine night intermittent. Dtr able to do dressing changes at home. Tolerated compression well. General Notes:  Wound dimensions is stable; tissue is more necrotic at this visit. Will cont. with poc and add vaseline gauze over medihoney.    Lynda Cancer RN, [facility], apparent distress. Height/Length: 66 in (167.64 cm), Weight: 195 lbs (88.64 kgs), BMI: 31.5, Temperature: 97.9 °F (36.61 °C), Pulse: 71 bpm, Respiratory Rate: 16 breaths/min, Blood Pressure: 126/79 mmHg, Capillary Blood Glucose: 129 mg/dl.      Respiratory: colors, hair growth, and conditions:   Extremity Color: WNL   Hair Growth on Extremity: No   Temperature of Extremity: Cool   Capillary Refill: < 3 seconds   Erythema: No   Dependent Rubor: No   Hyperpigmentation: No   Lipodermatosclerosis: No  Right Extre 8.2cm width x 0.1cm depth; with an area of 65.6 sq cm and a volume of 6.56 cubic cm;  General Notes:  Dermoplast spray applied        Plan    Wound Orders:  Wound #1 Right, Lateral Lower Leg     Topicals:  Initial Anesthetic Order: Apply lidocaine to wound moisture and soften eshcar for debridement. F/u in 1 week. Counselled on adhereing to ADA diet, leg elevation, compression and diuretic adherence to promote wound healing.     Electronic Signature(s)   Signed By:  Date:    Nasrin SRINIVASAN, FNP-BC 1

## 2018-10-30 ENCOUNTER — APPOINTMENT (OUTPATIENT)
Dept: PHYSICAL THERAPY | Age: 78
End: 2018-10-30
Attending: HOSPITALIST
Payer: MEDICARE

## 2018-11-01 ENCOUNTER — APPOINTMENT (OUTPATIENT)
Dept: PHYSICAL THERAPY | Age: 78
End: 2018-11-01
Attending: HOSPITALIST
Payer: MEDICARE

## 2018-11-05 ENCOUNTER — APPOINTMENT (OUTPATIENT)
Dept: WOUND CARE | Age: 78
End: 2018-11-05
Attending: NURSE PRACTITIONER
Payer: MEDICARE

## 2018-11-05 DIAGNOSIS — R60.9 EDEMA: ICD-10-CM

## 2018-11-05 DIAGNOSIS — S81.801A UNSPECIFIED OPEN WOUND, RIGHT LOWER LEG, INITIAL ENCOUNTER: Primary | ICD-10-CM

## 2018-11-05 PROCEDURE — 11042 DBRDMT SUBQ TIS 1ST 20SQCM/<: CPT

## 2018-11-05 PROCEDURE — 11045 DBRDMT SUBQ TISS EACH ADDL: CPT

## 2018-11-05 NOTE — PROGRESS NOTES
Progress Note Details  Patient Name: Feli Glass Date: 11/5/2018   Patient Number: [id number] Physician / Lobito Conquest: Army Rush   Patient YOB: 1940 Facility: [facility]    Chief Complaint  This information was obta 10/26/18-Afebrile. Reports throbbing dull pain at night intermittent. Dtr able to do dressing changes at home. Tolerated compression well. 11/5/18-Afebrile. Takes pain meds for pain at night which resolves pain. Dressing changes done per treatment plan.  Troy Kennedy BP Elevated, on antihypertensive meds. . Pulse Irregular, chronic afib, controlled rate. RR within normal limits. Afebrile. Obesity. Alert, calm, well developed, in no apparent distress.  Height/Length: 66 in (167.64 cm), Weight: 195 lbs (88.64 kgs), BMI: 31 Calf Measurement 32 cm from HEEL with right measurement of 39.1 cm   Ankle Measurement 10 cm from HEEL with right measurement of 26.2 cm  Vascular Assessment:  Left Extremity colors, hair growth, and conditions:   Extremity Color: WNL   Hair Growth on Extr Wound #1 (Trauma Wound) is located on the right, lateral lower leg. A skin/subcutaneous tissue level excisional/surgical debridement with a total area debrided of 58.46 sq cm was performed by Rafia Boyce NP. Adipose was removed.  The following instr Perfusion assessed by MOSES/TBI  Perfusion assessed by doppler. - 10/22/18 Biphasic bilateral DP and Triphasic bilateral PT  Perfusion assessed by palpation of pulses.  - 11/5/18 +2 DP and PT  Last sharp debridement date: - 11/5/18  Last A1C date and value: -

## 2018-11-06 ENCOUNTER — APPOINTMENT (OUTPATIENT)
Dept: PHYSICAL THERAPY | Age: 78
End: 2018-11-06
Attending: HOSPITALIST
Payer: MEDICARE

## 2018-11-08 ENCOUNTER — APPOINTMENT (OUTPATIENT)
Dept: PHYSICAL THERAPY | Age: 78
End: 2018-11-08
Attending: HOSPITALIST
Payer: MEDICARE

## 2018-11-12 ENCOUNTER — APPOINTMENT (OUTPATIENT)
Dept: WOUND CARE | Age: 78
End: 2018-11-12
Attending: NURSE PRACTITIONER
Payer: MEDICARE

## 2018-11-12 DIAGNOSIS — E11.65 TYPE II DIABETES MELLITUS WITH COMPLICATION, UNCONTROLLED (HCC): ICD-10-CM

## 2018-11-12 DIAGNOSIS — R60.9 EDEMA: ICD-10-CM

## 2018-11-12 DIAGNOSIS — E11.8 TYPE II DIABETES MELLITUS WITH COMPLICATION, UNCONTROLLED (HCC): ICD-10-CM

## 2018-11-12 DIAGNOSIS — S81.801A UNSPECIFIED OPEN WOUND, RIGHT LOWER LEG, INITIAL ENCOUNTER: Primary | ICD-10-CM

## 2018-11-12 PROCEDURE — 11042 DBRDMT SUBQ TIS 1ST 20SQCM/<: CPT

## 2018-11-12 PROCEDURE — 11045 DBRDMT SUBQ TISS EACH ADDL: CPT

## 2018-11-12 NOTE — PROGRESS NOTES
Progress Note Details  Patient Name: Manuel Feliciano Date: 11/12/2018   Patient Number: [id number] Physician / Chayito Solomon: Luis Vasquez   Patient YOB: 1940 Facility: [facility]    Chief Complaint  This information was obtaine Debridement, cross-hatched dry non-viable skin flap, honey gel, gauze and tubigrip. Instructed to remove compression tubigrip if shortness of breath noted. Supplies ordered. F/U on Friday and thereafter, once weekly. 10/26/18-Afebrile.  Reports throbbing d Neurological: Tingling, Tremors  Hematologic/Lymphatic: Enlarged Lymph Nodes  Psychiatric: Anxiety, Depression, Mental Illness  Prior Wound History: Other (No h/o wounds), Bleeding, Malodor    Additional Information  Medication reconciliation completed at Compression Device In Use: No   Calf Measurement 32 cm from HEEL   Ankle Measurement 10 cm from HEEL  Right Extremity: Edema is present   Compression Device In Use: Yes   Device Used Correctly: Yes   Compression Device Used: Tubigrip or Tubifast   Calf Cara Wound #1 (Trauma Wound) is located on the right, lateral lower leg. A skin/subcutaneous tissue level excisional/surgical debridement with a total area debrided of 45 sq cm was performed by Clarence Ross NP.  Adipose was removed along with devitalized Wound improving. No s/s of infection. Perfusion assessed by MOSES/TBI  Perfusion assessed by doppler. - 10/22/18 Biphasic bilateral DP and Triphasic bilateral PT  Perfusion assessed by palpation of pulses.  - 11/12/18 +2 DP and PT  Last sharp debridement da

## 2018-11-13 ENCOUNTER — APPOINTMENT (OUTPATIENT)
Dept: PHYSICAL THERAPY | Age: 78
End: 2018-11-13
Attending: HOSPITALIST
Payer: MEDICARE

## 2018-11-15 ENCOUNTER — APPOINTMENT (OUTPATIENT)
Dept: PHYSICAL THERAPY | Age: 78
End: 2018-11-15
Attending: HOSPITALIST
Payer: MEDICARE

## 2018-11-19 ENCOUNTER — OFFICE VISIT (OUTPATIENT)
Dept: WOUND CARE | Age: 78
End: 2018-11-19
Attending: NURSE PRACTITIONER
Payer: MEDICARE

## 2018-11-19 DIAGNOSIS — S81.801D UNSPECIFIED OPEN WOUND, RIGHT LOWER LEG, SUBSEQUENT ENCOUNTER: Primary | ICD-10-CM

## 2018-11-19 DIAGNOSIS — E11.8 DIABETES MELLITUS WITH COMPLICATION (HCC): ICD-10-CM

## 2018-11-19 DIAGNOSIS — R60.9 EDEMA: ICD-10-CM

## 2018-11-19 PROCEDURE — 11042 DBRDMT SUBQ TIS 1ST 20SQCM/<: CPT

## 2018-11-19 PROCEDURE — 11045 DBRDMT SUBQ TISS EACH ADDL: CPT

## 2018-11-19 NOTE — PROGRESS NOTES
Progress Note Details  Patient Name: Mark Marquez Date: 11/19/2018   Patient Number: [id number] Physician / Ranjan Wyman: Rafia Boyce   Patient YOB: 1940 Facility: [facility]    Chief Complaint  This information was obtaine 10/26/18-Afebrile. Reports throbbing dull pain at night intermittent. Dtr able to do dressing changes at home. Tolerated compression well. 11/5/18-Afebrile. Takes pain meds for pain at night which resolves pain. Dressing changes done per treatment plan.  Miko Larsen Integumentary (Hair/Skin/Nails): Lumps, Hemosiderin Staining  Neurological: Tingling, Tremors  Hematologic/Lymphatic: Enlarged Lymph Nodes  Psychiatric: Anxiety, Depression, Mental Illness  Prior Wound History: Other (No h/o wounds), Bleeding, Malodor    A Edema Assessment:  Left Extremity: Edema is present   Compression Device In Use: No   Calf Measurement 32 cm from HEEL   Ankle Measurement 10 cm from HEEL  Right Extremity: Edema is present   Compression Device In Use: Yes   Device Used Correctly: Yes   Co Wound #1 (Trauma Wound) is located on the right, lateral lower leg. A skin/subcutaneous tissue level excisional/surgical debridement with a total area debrided of 42.75 sq cm was performed by Clarence Ross NP. to remove devitalized tissue: slough.  Floreen Babinski Wound improving. No s/s of infection. Perfusion assessed by MOSES/TBI - 10/22/18 TBI left 0.43 and right 0.57  Perfusion assessed by doppler. - 10/22/18 Biphasic bilateral DP and Triphasic bilateral PT  Perfusion assessed by palpation of pulses.  - 11/12/18

## 2018-11-20 ENCOUNTER — PRIOR ORIGINAL RECORDS (OUTPATIENT)
Dept: OTHER | Age: 78
End: 2018-11-20

## 2018-11-20 ENCOUNTER — APPOINTMENT (OUTPATIENT)
Dept: PHYSICAL THERAPY | Age: 78
End: 2018-11-20
Attending: HOSPITALIST
Payer: MEDICARE

## 2018-11-20 LAB — INR: 0

## 2018-11-26 ENCOUNTER — APPOINTMENT (OUTPATIENT)
Dept: WOUND CARE | Age: 78
End: 2018-11-26
Attending: NURSE PRACTITIONER
Payer: MEDICARE

## 2018-11-28 ENCOUNTER — MYAURORA ACCOUNT LINK (OUTPATIENT)
Dept: OTHER | Age: 78
End: 2018-11-28

## 2018-11-30 ENCOUNTER — APPOINTMENT (OUTPATIENT)
Dept: WOUND CARE | Age: 78
End: 2018-11-30
Attending: NURSE PRACTITIONER
Payer: MEDICARE

## 2018-11-30 DIAGNOSIS — E11.8 DIABETES MELLITUS WITH COMPLICATION (HCC): ICD-10-CM

## 2018-11-30 DIAGNOSIS — S81.801D UNSPECIFIED OPEN WOUND, RIGHT LOWER LEG, SUBSEQUENT ENCOUNTER: Primary | ICD-10-CM

## 2018-11-30 DIAGNOSIS — R60.9 EDEMA: ICD-10-CM

## 2018-11-30 PROCEDURE — 11045 DBRDMT SUBQ TISS EACH ADDL: CPT

## 2018-11-30 PROCEDURE — 29581 APPL MULTLAYER CMPRN SYS LEG: CPT

## 2018-11-30 PROCEDURE — 11042 DBRDMT SUBQ TIS 1ST 20SQCM/<: CPT

## 2018-11-30 NOTE — PROGRESS NOTES
Progress Note Details  Patient Name: Melva Valles Date: 11/30/2018   Patient Number: [id number] Physician / Ata Robin: Viktor Arenas   Patient YOB: 1940 [facility]: [facility]    Chief Complaint  This information was obtaine 10/26/18-Afebrile. Reports throbbing dull pain at night intermittent. Dtr able to do dressing changes at home. Tolerated compression well. 11/5/18-Afebrile. Takes pain meds for pain at night which resolves pain. Dressing changes done per treatment plan.  Chinyere Arango Gastrointestinal (GI): Change in Bowel Habits, Nausea / Vomiting / Diarrhea (N/V/D), Difficulty Swallowing, Stomach/abdominal pain  Integumentary (Hair/Skin/Nails): Lumps, Hemosiderin Staining  Neurological: Tingling, Tremors  Hematologic/Lymphatic: Pilo Grain The periwound skin exhibited: Edema, Moist, Maceration, Erythema, Hemosiderosis. The periwound skin did not exhibit: Induration, Crepitus, Fluctuance, Atrophie [place], Cyanosis, Ecchymosis, Pallor, Rubor. The temperature of the periwound skin is Warm.  Per (Encounter Diagnosis) E11.8 - Type 2 diabetes mellitus with unspecified complications  (Encounter Diagnosis) I83.90 - Asymptomatic varicose veins of unspecified lower extremity  (Encounter Diagnosis) W71.87 - Chronic systolic (congestive) heart failure  (E Hydrofera Transfer   Kerramax/Super absorbent  Kerlix - Conforming  Paper tape  Change dressing every: - Monday    Compression Therapy:  Tubigrip - 2 layer medium right LE if unable to tolerate compression  Coflex calamine unna boot  Avoid prolonged standi

## 2018-12-03 ENCOUNTER — OFFICE VISIT (OUTPATIENT)
Dept: WOUND CARE | Age: 78
End: 2018-12-03
Attending: NURSE PRACTITIONER
Payer: MEDICARE

## 2018-12-03 DIAGNOSIS — E11.8 DIABETES MELLITUS WITH COMPLICATION (HCC): ICD-10-CM

## 2018-12-03 DIAGNOSIS — R60.9 EDEMA: ICD-10-CM

## 2018-12-03 DIAGNOSIS — S81.801D UNSPECIFIED OPEN WOUND, RIGHT LOWER LEG, SUBSEQUENT ENCOUNTER: Primary | ICD-10-CM

## 2018-12-03 PROCEDURE — 11045 DBRDMT SUBQ TISS EACH ADDL: CPT

## 2018-12-03 PROCEDURE — 17250 CHEM CAUT OF GRANLTJ TISSUE: CPT

## 2018-12-03 PROCEDURE — 11042 DBRDMT SUBQ TIS 1ST 20SQCM/<: CPT

## 2018-12-03 PROCEDURE — 29581 APPL MULTLAYER CMPRN SYS LEG: CPT

## 2018-12-03 NOTE — PROGRESS NOTES
Progress Note Details  Patient Name: Sunshine Uribe Date: 12/3/2018   Patient Number: [id number] Physician / Nicolle Flatness: Elisa Hernandez   Patient YOB: 1940 [facility]    Chief Complaint  This information was obtained 10/26/18-Afebrile. Reports throbbing dull pain at night intermittent. Dtr able to do dressing changes at home. Tolerated compression well. 11/5/18-Afebrile. Takes pain meds for pain at night which resolves pain. Dressing changes done per treatment plan.  Samara Stevenson Gastrointestinal (GI): Change in Bowel Habits, Nausea / Vomiting / Diarrhea (N/V/D), Difficulty Swallowing, Stomach/abdominal pain  Integumentary (Hair/Skin/Nails): Lumps, Hemosiderin Staining  Neurological: Tingling, Tremors  Hematologic/Lymphatic: Kathy Primrose The periwound skin exhibited: Edema, Hemosiderosis. The periwound skin did not exhibit: Induration, Crepitus, Fluctuance, Moist, Maceration, Atrophie [place], Cyanosis, Ecchymosis, Erythema, Pallor, Rubor. The temperature of the periwound skin is Warm.  Per (Encounter Diagnosis) L71.95 - Chronic systolic (congestive) heart failure  (Encounter Diagnosis) G62.9 - Polyneuropathy, unspecified  (Encounter Diagnosis) Z79.01 - Long term (current) use of anticoagulants  (Encounter Diagnosis) R94.5 - Abnormal results Tubigrip - 2 layer medium right LE if unable to tolerate compression  Coflex calamine unna boot  Avoid prolonged standing in one place. Elevate leg(s)as much as possible. Take you diuretics as directed.     Follow-Up Appointments  Return Appointment in 1

## 2018-12-10 ENCOUNTER — OFFICE VISIT (OUTPATIENT)
Dept: WOUND CARE | Age: 78
End: 2018-12-10
Attending: NURSE PRACTITIONER
Payer: MEDICARE

## 2018-12-10 DIAGNOSIS — S81.801D UNSPECIFIED OPEN WOUND, RIGHT LOWER LEG, SUBSEQUENT ENCOUNTER: Primary | ICD-10-CM

## 2018-12-10 DIAGNOSIS — R60.9 EDEMA: ICD-10-CM

## 2018-12-10 PROCEDURE — 29581 APPL MULTLAYER CMPRN SYS LEG: CPT

## 2018-12-14 ENCOUNTER — PRIOR ORIGINAL RECORDS (OUTPATIENT)
Dept: OTHER | Age: 78
End: 2018-12-14

## 2018-12-14 ENCOUNTER — OFFICE VISIT (OUTPATIENT)
Dept: WOUND CARE | Facility: HOSPITAL | Age: 78
End: 2018-12-14
Attending: INTERNAL MEDICINE
Payer: MEDICARE

## 2018-12-14 DIAGNOSIS — R60.9 EDEMA: ICD-10-CM

## 2018-12-14 DIAGNOSIS — S81.801D UNSPECIFIED OPEN WOUND, RIGHT LOWER LEG, SUBSEQUENT ENCOUNTER: Primary | ICD-10-CM

## 2018-12-14 LAB — INR: 0

## 2018-12-14 PROCEDURE — 29581 APPL MULTLAYER CMPRN SYS LEG: CPT

## 2018-12-14 PROCEDURE — 15271 SKIN SUB GRAFT TRNK/ARM/LEG: CPT

## 2018-12-14 NOTE — PROGRESS NOTES
Chief Complaint  This information was obtained from the patient  Patient is here for a follow up rt lower leg wound    Allergies  aspirin, lisinopril, mexitil    HPI  This information was obtained from the patient  The following HPI elements were documen well.  11/12/18-Afebrile. Pain due to edema and takes Tylenol for relief. Dressing changes per treatment plan continued. Cost of Santyl was high so pt declined to purchase. 11/19/18-Afebrile. Pain with ambulation and pressure, relief with Tylenol.   Savannah Lua Hemosiderin Staining  Neurological: Tingling, Tremors  Hematologic/Lymphatic: Enlarged Lymph Nodes  Psychiatric: Anxiety, Depression, Mental Illness  Prior Wound History: Other (No h/o wounds), Bleeding, Malodor    General Notes:  negative except HPI    Ad measurement of 37.5 cm  Ankle Measurement 10 cm from HEEL with right measurement of 25 cm  Vascular Assessment:  Right Extremity Pulses:  Dorsalis Pedis: Palpable  Left Extremity colors, hair growth, and conditions:  Extremity Color: WNL  Hair Growth on Ex throughout and a pain level of 0 following the procedure. Wound #1 (Trauma Wound) is located on the right, lateral lower leg. A Multi-Layer Compression Wrap procedure was performed. A Multi-Layer was applied with high 30-40 mmhg.  The procedure was juan week

## 2018-12-21 ENCOUNTER — OFFICE VISIT (OUTPATIENT)
Dept: WOUND CARE | Facility: HOSPITAL | Age: 78
End: 2018-12-21
Attending: INTERNAL MEDICINE
Payer: MEDICARE

## 2018-12-21 ENCOUNTER — HOSPITAL ENCOUNTER (OUTPATIENT)
Dept: LAB | Facility: HOSPITAL | Age: 78
Discharge: HOME | End: 2018-12-21
Attending: INTERNAL MEDICINE
Payer: MEDICARE

## 2018-12-21 ENCOUNTER — PRIOR ORIGINAL RECORDS (OUTPATIENT)
Dept: OTHER | Age: 78
End: 2018-12-21

## 2018-12-21 DIAGNOSIS — E11.8 DIABETES MELLITUS WITH COMPLICATION (HCC): ICD-10-CM

## 2018-12-21 DIAGNOSIS — I48.91 ATRIAL FIBRILLATION (HCC): ICD-10-CM

## 2018-12-21 DIAGNOSIS — S81.801D UNSPECIFIED OPEN WOUND, RIGHT LOWER LEG, SUBSEQUENT ENCOUNTER: Primary | ICD-10-CM

## 2018-12-21 DIAGNOSIS — R60.9 EDEMA: ICD-10-CM

## 2018-12-21 LAB — INR: 3.5

## 2018-12-21 PROCEDURE — 36415 COLL VENOUS BLD VENIPUNCTURE: CPT | Performed by: INTERNAL MEDICINE

## 2018-12-21 PROCEDURE — 85610 PROTHROMBIN TIME: CPT | Performed by: INTERNAL MEDICINE

## 2018-12-21 PROCEDURE — 85610 PROTHROMBIN TIME: CPT

## 2018-12-21 PROCEDURE — 29581 APPL MULTLAYER CMPRN SYS LEG: CPT

## 2018-12-21 NOTE — PROGRESS NOTES
Chief Complaint  This information was obtained from the patient  Patient is here for a follow up rt lower leg wound    Allergies  aspirin, lisinopril, mexitil    HPI  This information was obtained from the patient  The following HPI elements were documen Pain due to edema and takes Tylenol for relief. Dressing changes per treatment plan continued. Cost of Santyl was high so pt declined to purchase. 11/19/18-Afebrile. Pain with ambulation and pressure, relief with Tylenol.  Honey gel used per treatment plan Hemosiderin Staining  Neurological: Tingling, Tremors  Hematologic/Lymphatic: Enlarged Lymph Nodes  Psychiatric: Anxiety, Depression, Mental Illness  Prior Wound History: Other (No h/o wounds), Bleeding, Malodor    General Notes:  negative except HPI    Ad colors, hair growth, and conditions:   Extremity Color: WNL   Hair Growth on Extremity: No   Temperature of Extremity: Cool   Capillary Refill: < 3 seconds   Erythema: No   Dependent Rubor: No   Hyperpigmentation: No   Lipodermatosclerosis: No  Right Extre gauze bolster   Kerramax/Super absorbent  Kerlix - Conforming  Paper tape  Change dressing every: - week    Compression Therapy:  Coflex calamine unna boot  Coflex 2 Layer  Avoid prolonged standing in one place. Elevate leg(s)as much as possible.    Take y

## 2018-12-27 ENCOUNTER — OFFICE VISIT (OUTPATIENT)
Dept: WOUND CARE | Facility: HOSPITAL | Age: 78
End: 2018-12-27
Attending: INTERNAL MEDICINE
Payer: MEDICARE

## 2018-12-27 DIAGNOSIS — R60.9 EDEMA: ICD-10-CM

## 2018-12-27 DIAGNOSIS — S81.801D UNSPECIFIED OPEN WOUND, RIGHT LOWER LEG, SUBSEQUENT ENCOUNTER: Primary | ICD-10-CM

## 2018-12-27 DIAGNOSIS — E11.8 DIABETES MELLITUS WITH COMPLICATION (HCC): ICD-10-CM

## 2018-12-27 PROCEDURE — 29581 APPL MULTLAYER CMPRN SYS LEG: CPT

## 2019-01-01 ENCOUNTER — EXTERNAL RECORD (OUTPATIENT)
Dept: HEALTH INFORMATION MANAGEMENT | Facility: OTHER | Age: 79
End: 2019-01-01

## 2019-01-04 ENCOUNTER — OFFICE VISIT (OUTPATIENT)
Dept: WOUND CARE | Facility: HOSPITAL | Age: 79
End: 2019-01-04
Attending: INTERNAL MEDICINE
Payer: MEDICARE

## 2019-01-04 DIAGNOSIS — S81.801D UNSPECIFIED OPEN WOUND, RIGHT LOWER LEG, SUBSEQUENT ENCOUNTER: Primary | ICD-10-CM

## 2019-01-04 DIAGNOSIS — E11.8 DIABETES MELLITUS WITH COMPLICATION (HCC): ICD-10-CM

## 2019-01-04 DIAGNOSIS — R60.9 EDEMA: ICD-10-CM

## 2019-01-04 PROCEDURE — 99213 OFFICE O/P EST LOW 20 MIN: CPT

## 2019-01-04 PROCEDURE — 29581 APPL MULTLAYER CMPRN SYS LEG: CPT

## 2019-01-04 NOTE — PROGRESS NOTES
Chief Complaint  This information was obtained from the patient  Patient is here for a follow up rt lower leg wound.  Pt states her leg continues to throb but not as bad    Allergies  aspirin, lisinopril, mexitil    HPI  This information was obtained from changes done per treatment plan. Tolerated compression well.  11/12/18-Afebrile. Pain due to edema and takes Tylenol for relief. Dressing changes per treatment plan continued. Cost of Santyl was high so pt declined to purchase. 11/19/18-Afebrile.   Pain Nausea / Vomiting / Diarrhea (N/V/D), Difficulty Swallowing, Stomach/abdominal pain  Integumentary (Hair/Skin/Nails): Lumps, Hemosiderin Staining  Neurological: Tingling, Tremors  Hematologic/Lymphatic: Enlarged Lymph Nodes  Psychiatric: Anxiety, Depressio Yes  Device Used Correctly: Yes  Compression Device Used: Multi-Layer Wrap  Calf Measurement 32 cm from HEEL with right measurement of 37 cm  Ankle Measurement 10 cm from HEEL with right measurement of 25.9 cm  Vascular Assessment:  Right Extremity Pulses: visits during preparation for physician exam if wound bed contains fibrin or eschar. 4% Topical Lidocaine    Wound Cleansing & Dressings  May shower with protection.   Cleanse with saline or wound cleanser  Collagen - endoform  Hydrofera Transfer   Joselyn Zelaya

## 2019-01-11 ENCOUNTER — PRIOR ORIGINAL RECORDS (OUTPATIENT)
Dept: OTHER | Age: 79
End: 2019-01-11

## 2019-01-11 ENCOUNTER — HOSPITAL ENCOUNTER (OUTPATIENT)
Dept: LAB | Facility: HOSPITAL | Age: 79
Discharge: HOME | End: 2019-01-11
Attending: INTERNAL MEDICINE
Payer: MEDICARE

## 2019-01-11 ENCOUNTER — OFFICE VISIT (OUTPATIENT)
Dept: WOUND CARE | Facility: HOSPITAL | Age: 79
End: 2019-01-11
Attending: INTERNAL MEDICINE
Payer: MEDICARE

## 2019-01-11 DIAGNOSIS — I48.91 ATRIAL FIBRILLATION (HCC): ICD-10-CM

## 2019-01-11 DIAGNOSIS — R60.9 EDEMA: ICD-10-CM

## 2019-01-11 DIAGNOSIS — S81.801D UNSPECIFIED OPEN WOUND, RIGHT LOWER LEG, SUBSEQUENT ENCOUNTER: Primary | ICD-10-CM

## 2019-01-11 DIAGNOSIS — E11.8 DIABETES MELLITUS WITH COMPLICATION (HCC): ICD-10-CM

## 2019-01-11 LAB
INR: 3.6
POC INR: 3.6 (ref 0.8–1.3)

## 2019-01-11 PROCEDURE — 29581 APPL MULTLAYER CMPRN SYS LEG: CPT

## 2019-01-11 PROCEDURE — 85610 PROTHROMBIN TIME: CPT

## 2019-01-11 NOTE — PROGRESS NOTES
Chief Complaint  This information was obtained from the patient  Patient is here for a follow up rt lower leg wound.  Pt states her pain is getting better    Allergies  aspirin, lisinopril, mexitil    HPI  This information was obtained from the patient  T treatment plan. Tolerated compression well.  11/12/18-Afebrile. Pain due to edema and takes Tylenol for relief. Dressing changes per treatment plan continued. Cost of Santyl was high so pt declined to purchase. 11/19/18-Afebrile.   Pain with ambulation Intermittent Claudication, Lower extremity (leg) resting pain  Gastrointestinal (GI): Change in Bowel Habits, Nausea / Vomiting / Diarrhea (N/V/D), Difficulty Swallowing, Stomach/abdominal pain  Integumentary (Hair/Skin/Nails): Lumps, Hemosiderin Staining Measurement 32 cm from HEEL  Ankle Measurement 10 cm from HEEL  Right Extremity: Edema is present  Compression Device In Use: Yes  Device Used Correctly: Yes  Compression Device Used: Multi-Layer Wrap  Calf Measurement 32 cm from HEEL with right measuremen Right, Lateral Lower Leg     Topicals:  Initial Anesthetic Order: Apply lidocaine to wound bed on all future wound center visits during preparation for physician exam if wound bed contains fibrin or eschar.   4% Topical Lidocaine    Wound Cleansing & Faythe Poag

## 2019-01-15 ENCOUNTER — PRIOR ORIGINAL RECORDS (OUTPATIENT)
Dept: OTHER | Age: 79
End: 2019-01-15

## 2019-01-15 ENCOUNTER — MYAURORA ACCOUNT LINK (OUTPATIENT)
Dept: OTHER | Age: 79
End: 2019-01-15

## 2019-01-15 ENCOUNTER — HOSPITAL ENCOUNTER (OUTPATIENT)
Dept: LAB | Facility: HOSPITAL | Age: 79
Discharge: HOME | End: 2019-01-15
Attending: INTERNAL MEDICINE
Payer: MEDICARE

## 2019-01-15 LAB
ALT SERPL-CCNC: 65 U/L (ref 14–54)
AST SERPL-CCNC: 65 U/L (ref 15–41)
T4 FREE SERPL-MCNC: 1.5 NG/DL (ref 0.9–1.8)
TSI SER-ACNC: 0.37 MIU/ML (ref 0.35–5.5)

## 2019-01-15 PROCEDURE — 36415 COLL VENOUS BLD VENIPUNCTURE: CPT | Performed by: INTERNAL MEDICINE

## 2019-01-15 PROCEDURE — 84439 ASSAY OF FREE THYROXINE: CPT | Performed by: INTERNAL MEDICINE

## 2019-01-15 PROCEDURE — 84460 ALANINE AMINO (ALT) (SGPT): CPT | Performed by: INTERNAL MEDICINE

## 2019-01-15 PROCEDURE — 84450 TRANSFERASE (AST) (SGOT): CPT | Performed by: INTERNAL MEDICINE

## 2019-01-15 PROCEDURE — 84443 ASSAY THYROID STIM HORMONE: CPT | Performed by: INTERNAL MEDICINE

## 2019-01-18 ENCOUNTER — OFFICE VISIT (OUTPATIENT)
Dept: WOUND CARE | Age: 79
End: 2019-01-18
Attending: INTERNAL MEDICINE
Payer: MEDICARE

## 2019-01-18 DIAGNOSIS — R60.9 EDEMA: ICD-10-CM

## 2019-01-18 DIAGNOSIS — E11.8 DIABETES MELLITUS WITH COMPLICATION (HCC): ICD-10-CM

## 2019-01-18 DIAGNOSIS — S81.801D UNSPECIFIED OPEN WOUND, RIGHT LOWER LEG, SUBSEQUENT ENCOUNTER: Primary | ICD-10-CM

## 2019-01-18 PROCEDURE — 29581 APPL MULTLAYER CMPRN SYS LEG: CPT

## 2019-01-18 NOTE — PROGRESS NOTES
Progress Note Details  Patient Name: Mary Sosa  Date: 1/18/2019   Patient Number: [id number] Physician / Extender: Rafia Boyce   Patient YOB: 1940 [facility]    Chief Complaint  This information was obtained 10/26/18-Afebrile. Reports throbbing dull pain at night intermittent. Dtr able to do dressing changes at home. Tolerated compression well. 11/5/18-Afebrile. Takes pain meds for pain at night which resolves pain. Dressing changes done per treatment plan.  Samella Marrow Constitutional Symptoms (General Health):  Fatigue, Fever, Loss of Appetite, Marked Weight Change, Night Sweats, Chills  Eyes: Vision Changes  Ear/Nose/Mouth/Throat: Dental Problems  Respiratory: Cough, Shortness of Breath, Wheezing  Cardiovascular (Central Wound #1 Right, Lateral Lower Leg is an acute Full Thickness Trauma Wound and has received a status of Not Healed. Subsequent wound encounter measurements are 0.3cm length x 0.7cm width with no measurable depth, with an area of 0.21 sq cm .  No tunneling ha Left Posterior Tibial Pulse: Triphasic        Wound dimensions decreased and no s/s of infection. Edema under control.   Assessment    Active Problems    ICD-10  (Encounter Diagnosis) S81.801D - Unspecified open wound, right lower leg, subsequent encounter Reviewed and evaluated labs. - 10/16/18 elevated alk phos 159, low gfr 53; CBC low hb 9.0 and elevated neutrophils 7.8;   10/1/18 [id number]  Discussed the Plan of Care at bedside with patient.  The patient verbally acknowledges understanding of all instruct

## 2019-01-25 ENCOUNTER — OFFICE VISIT (OUTPATIENT)
Dept: WOUND CARE | Age: 79
End: 2019-01-25
Attending: INTERNAL MEDICINE
Payer: MEDICARE

## 2019-01-25 DIAGNOSIS — S81.801D UNSPECIFIED OPEN WOUND, RIGHT LOWER LEG, SUBSEQUENT ENCOUNTER: Primary | ICD-10-CM

## 2019-01-25 DIAGNOSIS — E11.8 DIABETES MELLITUS WITH COMPLICATION (HCC): ICD-10-CM

## 2019-01-25 DIAGNOSIS — R60.9 EDEMA: ICD-10-CM

## 2019-01-25 PROCEDURE — 29581 APPL MULTLAYER CMPRN SYS LEG: CPT

## 2019-01-25 NOTE — PROGRESS NOTES
Progress Note Details  Patient Name: Campbell Robert  Date: 1/25/2019   Patient Number: [id number] Physician / Extender: Pancho Rios   Patient YOB: 1940 [facility]    Chief Complaint  This information was obtained 10/26/18-Afebrile. Reports throbbing dull pain at night intermittent. Dtr able to do dressing changes at home. Tolerated compression well. 11/5/18-Afebrile. Takes pain meds for pain at night which resolves pain. Dressing changes done per treatment plan.  Kenny Sports Neurological: Loss of Protective Sensation (Pt stated has neuropathy), Seizures  Hematologic/Lymphatic: Bleeding Tendency (Coumadin (Afib))    Patient denies complaints or symptoms related to:   Constitutional Symptoms (General Health):  Fatigue, Fever, Los Wound #1 Right, Lateral Lower Leg is an acute Full Thickness Trauma Wound and has received a status of Not Healed. Subsequent wound encounter measurements are 6cm length x 4.5cm width with no measurable depth, with an area of 27 sq cm .  Hypergranulation wa Dependent Rubor: No   Hyperpigmentation: No   Lipodermatosclerosis: No      Additional Information  Left Posterior Tibial Pulse: Triphasic        Assessment    Active Problems    ICD-10  (Encounter Diagnosis) S81.801D - Unspecified open wound, right lower Avoid prolonged standing in one place. Elevate leg(s)as much as possible. Take you diuretics as directed. Follow-Up Appointments  Return Appointment in 1 week. Please schedule for 15 minute time slot.     Misc/Additional Orders  Increase dietary pro

## 2019-01-29 ENCOUNTER — PRIOR ORIGINAL RECORDS (OUTPATIENT)
Dept: OTHER | Age: 79
End: 2019-01-29

## 2019-01-29 LAB — INR: 0

## 2019-01-31 ENCOUNTER — PRIOR ORIGINAL RECORDS (OUTPATIENT)
Dept: OTHER | Age: 79
End: 2019-01-31

## 2019-02-01 ENCOUNTER — APPOINTMENT (OUTPATIENT)
Dept: WOUND CARE | Facility: HOSPITAL | Age: 79
End: 2019-02-01
Attending: INTERNAL MEDICINE
Payer: MEDICARE

## 2019-02-01 ENCOUNTER — OFFICE VISIT (OUTPATIENT)
Dept: WOUND CARE | Age: 79
End: 2019-02-01
Attending: INTERNAL MEDICINE
Payer: MEDICARE

## 2019-02-01 ENCOUNTER — PRIOR ORIGINAL RECORDS (OUTPATIENT)
Dept: OTHER | Age: 79
End: 2019-02-01

## 2019-02-01 ENCOUNTER — HOSPITAL ENCOUNTER (OUTPATIENT)
Dept: LAB | Facility: HOSPITAL | Age: 79
Discharge: HOME | End: 2019-02-01
Attending: INTERNAL MEDICINE
Payer: MEDICARE

## 2019-02-01 DIAGNOSIS — S81.801D UNSPECIFIED OPEN WOUND, RIGHT LOWER LEG, SUBSEQUENT ENCOUNTER: Primary | ICD-10-CM

## 2019-02-01 DIAGNOSIS — R60.9 EDEMA: ICD-10-CM

## 2019-02-01 DIAGNOSIS — E11.8 DIABETES MELLITUS WITH COMPLICATION (HCC): ICD-10-CM

## 2019-02-01 DIAGNOSIS — I48.91 ATRIAL FIBRILLATION (HCC): ICD-10-CM

## 2019-02-01 LAB
ALT (SGPT): 65 U/L
AST (SGOT): 65 U/L
FREE T4: 1.5 MG/DL
INR: 3.2
POC INR: 3.4 (ref 0.8–1.3)
THYROID STIMULATING HORMONE: 0.37 MLU/L

## 2019-02-01 PROCEDURE — 85610 PROTHROMBIN TIME: CPT

## 2019-02-01 PROCEDURE — 99214 OFFICE O/P EST MOD 30 MIN: CPT

## 2019-02-01 NOTE — PROGRESS NOTES
Progress Note Details  Patient Name: Te Jorgensen  Date: 2/1/2019   Patient Number: [id number] Physician / Extender: Army Rush   Patient YOB: 1940 Facility: [facility]    Chief Complaint  This information was obtained 10/26/18-Afebrile. Reports throbbing dull pain at night intermittent. Dtr able to do dressing changes at home. Tolerated compression well. 11/5/18-Afebrile. Takes pain meds for pain at night which resolves pain. Dressing changes done per treatment plan.  Artemio Morales Cardiovascular (Central/Peripheral):  Other (HTN, Afib (coumadin), SHF, varicose veins, DVT (IVC filter)), Lower extremity (leg) swelling (Bilateral LE edema-chronic)  Genitourinary (): Frequency (Due to diuretics)  Musculoskeletal: Assistive Devices [facility] Intact judgement and insight. Approptiate affect. Integumentary (Hair, Skin)  Wound #1 Right, Lateral Lower Leg is an acute Full Thickness Trauma Wound and has received a status of Not Healed.  Subsequent wound encounter measurements are 9.4cm length x Hair Growth on Extremity: Yes   Temperature of Extremity: Warm   Capillary Refill: < 3 seconds   Erythema: Yes   Dependent Rubor: No   Hyperpigmentation: No   Lipodermatosclerosis: No      Additional Information  Left Posterior Tibial Pulse: Triphasic Follow-Up Appointments  Return Appointment in 1 week. Misc/Additional Orders  Increase dietary protein  S/S of Infection  Other: - Follow ADA diet    Additional Orders:    Care summary  Reviewed and evaluated labs.  - 1/15/19 ast and alt elevated  10/16/

## 2019-02-04 ENCOUNTER — OFFICE VISIT (OUTPATIENT)
Dept: WOUND CARE | Age: 79
End: 2019-02-04
Attending: NURSE PRACTITIONER
Payer: MEDICARE

## 2019-02-04 DIAGNOSIS — S81.801D UNSPECIFIED OPEN WOUND, RIGHT LOWER LEG, SUBSEQUENT ENCOUNTER: Primary | ICD-10-CM

## 2019-02-04 DIAGNOSIS — E11.8 DIABETES MELLITUS WITH COMPLICATION (HCC): ICD-10-CM

## 2019-02-04 PROCEDURE — 87205 SMEAR GRAM STAIN: CPT

## 2019-02-04 PROCEDURE — 87070 CULTURE OTHR SPECIMN AEROBIC: CPT

## 2019-02-04 PROCEDURE — 87186 SC STD MICRODIL/AGAR DIL: CPT

## 2019-02-04 PROCEDURE — 87147 CULTURE TYPE IMMUNOLOGIC: CPT

## 2019-02-04 PROCEDURE — 17250 CHEM CAUT OF GRANLTJ TISSUE: CPT

## 2019-02-04 PROCEDURE — 29581 APPL MULTLAYER CMPRN SYS LEG: CPT

## 2019-02-04 NOTE — PROGRESS NOTES
Progress Note Details  Patient Name: Yanni Young  Date: 2/4/2019   Patient Number: [id number] Physician / Extender: Jojo Carrillo   Patient YOB: 1940 Facility: [facility]    Chief Complaint  This information was obtained Debridement, cross-hatched dry non-viable skin flap, honey gel, gauze and tubigrip. Instructed to remove compression tubigrip if shortness of breath noted. Supplies ordered. F/U on Friday and thereafter, once weekly. 10/26/18-Afebrile.  Reports throbbing d 2/4/19-Afebrile and throbbing. Pt stated that when dressing change was done today, there was bleeding and daughter-in-law noticed blisters and that the reason she called the clinic. Hypergranulation tissue was mistaken for blisters. Edema has increased.  Issac Euceda Hematologic/Lymphatic: Enlarged Lymph Nodes  Psychiatric: Anxiety, Depression, Mental Illness  Prior Wound History: Other (No h/o wounds), Malodor    Additional Information  Medication reconciliation completed at today's visit. : Yes        Objective    Co Left Extremity: Edema is present   Compression Device In Use: No   Device Used Correctly: No   Compression Device Used: Compression Stockings   Calf Measurement 32 cm from HEEL   Ankle Measurement 10 cm from HEEL  Right Extremity: Edema is present   Compre Wound #1 (Trauma Wound) is located on the right, lateral lower leg. A non-selective chemical/enzymatic debridement was performed by Smiley England NP. Non-viable tissue was removed. The procedure was tolerated well with a pain level of 0 throughout and Wound improving. No s/s of infection. Perfusion assessed by MOSES/TBI - 10/22/18 TBI left 0.43 and right 0.57  Perfusion assessed by doppler. - 10/22/18 Biphasic bilateral DP and Triphasic bilateral PT  Perfusion assessed by palpation of pulses.  - 2/4/19 +

## 2019-02-08 ENCOUNTER — APPOINTMENT (OUTPATIENT)
Dept: WOUND CARE | Age: 79
End: 2019-02-08
Attending: NURSE PRACTITIONER
Payer: MEDICARE

## 2019-02-08 DIAGNOSIS — E11.8 DIABETES MELLITUS WITH COMPLICATION (HCC): ICD-10-CM

## 2019-02-08 DIAGNOSIS — S81.801D UNSPECIFIED OPEN WOUND, RIGHT LOWER LEG, SUBSEQUENT ENCOUNTER: Primary | ICD-10-CM

## 2019-02-08 PROCEDURE — 99214 OFFICE O/P EST MOD 30 MIN: CPT

## 2019-02-08 NOTE — PROGRESS NOTES
Progress Note Details  Patient Name: Hosea Yu  Date: 2/8/2019   Patient Number: [id number] Physician / Extender: Marisa Alfredo   Patient YOB: 1940 [facility]    Chief Complaint  This information was obtained Debridement, cross-hatched dry non-viable skin flap, honey gel, gauze and tubigrip. Instructed to remove compression tubigrip if shortness of breath noted. Supplies ordered. F/U on Friday and thereafter, once weekly. 10/26/18-Afebrile.  Reports throbbing d 2/4/19-Afebrile and throbbing. Pt stated that when dressing change was done today, there was bleeding and daughter-in-law noticed blisters and that the reason she called the clinic. Hypergranulation tissue was mistaken for blisters. Edema has increased.  Howard Montero Gastrointestinal (GI): Change in Bowel Habits, Nausea / Vomiting / Diarrhea (N/V/D), Difficulty Swallowing, Stomach/abdominal pain  Integumentary (Hair/Skin/Nails): Lumps, Hemosiderin Staining  Neurological: Tingling, Tremors  Hematologic/Lymphatic: Lincoln Bolton The periwound skin exhibited: Edema, Friable, Hemosiderosis. The periwound skin did not exhibit: Induration, Crepitus, Fluctuance, Rash, Dry/Scaly, Moist, Maceration, Atrophie [place], Cyanosis, Ecchymosis, Erythema, Pallor, Rubor.  The temperature of the p (Encounter Diagnosis) I83.90 - Asymptomatic varicose veins of unspecified lower extremity  (Encounter Diagnosis) B37.17 - Chronic systolic (congestive) heart failure  (Encounter Diagnosis) G62.9 - Polyneuropathy, unspecified  (Encounter Diagnosis) Z79.01 - Discussed the Plan of Care at bedside with patient. The patient verbally acknowledges understanding of all instructions and all questions were answered. Wound type: - Trauma  Edema  Wound improving. No s/s of infection.    Perfusion assessed by MOSES/TBI -

## 2019-02-15 ENCOUNTER — OFFICE VISIT (OUTPATIENT)
Dept: WOUND CARE | Facility: HOSPITAL | Age: 79
End: 2019-02-15
Attending: INTERNAL MEDICINE
Payer: MEDICARE

## 2019-02-15 ENCOUNTER — HOSPITAL ENCOUNTER (OUTPATIENT)
Dept: LAB | Facility: HOSPITAL | Age: 79
Discharge: HOME | End: 2019-02-15
Attending: INTERNAL MEDICINE
Payer: MEDICARE

## 2019-02-15 ENCOUNTER — ANTI-COAG (OUTPATIENT)
Dept: CARDIOLOGY | Age: 79
End: 2019-02-15

## 2019-02-15 ENCOUNTER — PRIOR ORIGINAL RECORDS (OUTPATIENT)
Dept: OTHER | Age: 79
End: 2019-02-15

## 2019-02-15 DIAGNOSIS — I48.91 ATRIAL FIBRILLATION (HCC): ICD-10-CM

## 2019-02-15 DIAGNOSIS — E11.8 DIABETES MELLITUS WITH COMPLICATION (HCC): ICD-10-CM

## 2019-02-15 DIAGNOSIS — R60.9 EDEMA: ICD-10-CM

## 2019-02-15 DIAGNOSIS — I48.91 ATRIAL FIBRILLATION, UNSPECIFIED TYPE (CMD): ICD-10-CM

## 2019-02-15 DIAGNOSIS — S81.801D UNSPECIFIED OPEN WOUND, RIGHT LOWER LEG, SUBSEQUENT ENCOUNTER: Primary | ICD-10-CM

## 2019-02-15 LAB
INR: 3.7
POC INR: 3.7 (ref 0.8–1.3)

## 2019-02-15 PROCEDURE — 99214 OFFICE O/P EST MOD 30 MIN: CPT

## 2019-02-15 PROCEDURE — 85610 PROTHROMBIN TIME: CPT

## 2019-02-15 NOTE — PROGRESS NOTES
Chief Complaint  This information was obtained from the patient  Patient is here for a follow up right lower leg wound. Patient states no new complaints.     Allergies  aspirin, lisinopril, mexitil    HPI  This information was obtained from the patient  T treatment plan. Tolerated compression well.  11/12/18-Afebrile. Pain due to edema and takes Tylenol for relief. Dressing changes per treatment plan continued. Cost of Santyl was high so pt declined to purchase. 11/19/18-Afebrile.   Pain with ambulation tolerate compression. Wound dimensions decreased. Hypergran treated. Wound culture positive for staph aureus, started on gentamicin topical ointment for 14 days and supplies ordered. Tubigrip 3 layers for compression.       General Notes:  Patient to co Weight: 195 lbs (88.64 kgs), BMI: 31.5, Temperature: 98.4 °F (36.89 °C), Pulse: 83 bpm, Respiratory Rate: 16 breaths/min, Blood Pressure: 133/84 mmHg, Capillary Blood Glucose: 101 mg/dl.      Integumentary (Hair, Skin)  Wound #1 Right, Lateral Lower Leg is seconds  Erythema: No  Dependent Rubor: No  Hyperpigmentation: No  Lipodermatosclerosis: No  Right Extremity colors, hair growth, and conditions:  Extremity Color: WNL  Hair Growth on Extremity: Yes  Temperature of Extremity: Warm  Capillary Refill: < 3 se Orders  Increase dietary protein  S/S of Infection  Other: - Follow ADA diet    Additional Orders:    Care summary  Reviewed and evaluated labs.  - 2/4/19 leg wound cuture -3+staph aureus-gentamicin TID for 14 days ordered today (2/8/19)  1/15/19 ast and al

## 2019-02-20 PROBLEM — I48.91 ATRIAL FIBRILLATION (CMD): Status: ACTIVE | Noted: 2019-02-20

## 2019-02-21 ENCOUNTER — HOSPITAL ENCOUNTER (INPATIENT)
Facility: HOSPITAL | Age: 79
LOS: 11 days | Discharge: HOME | DRG: 291 | End: 2019-03-04
Attending: EMERGENCY MEDICINE | Admitting: HOSPITALIST
Payer: MEDICARE

## 2019-02-21 ENCOUNTER — APPOINTMENT (OUTPATIENT)
Dept: CV DIAGNOSTICS | Facility: HOSPITAL | Age: 79
DRG: 291 | End: 2019-02-21
Attending: HOSPITALIST
Payer: MEDICARE

## 2019-02-21 ENCOUNTER — PRIOR ORIGINAL RECORDS (OUTPATIENT)
Dept: OTHER | Age: 79
End: 2019-02-21

## 2019-02-21 ENCOUNTER — APPOINTMENT (OUTPATIENT)
Dept: GENERAL RADIOLOGY | Facility: HOSPITAL | Age: 79
DRG: 291 | End: 2019-02-21
Attending: EMERGENCY MEDICINE
Payer: MEDICARE

## 2019-02-21 ENCOUNTER — APPOINTMENT (OUTPATIENT)
Dept: CT IMAGING | Facility: HOSPITAL | Age: 79
DRG: 291 | End: 2019-02-21
Attending: EMERGENCY MEDICINE
Payer: MEDICARE

## 2019-02-21 DIAGNOSIS — J44.1 COPD EXACERBATION (HCC): ICD-10-CM

## 2019-02-21 DIAGNOSIS — I50.9 CONGESTIVE HEART FAILURE, UNSPECIFIED HF CHRONICITY, UNSPECIFIED HEART FAILURE TYPE (HCC): Primary | ICD-10-CM

## 2019-02-21 DIAGNOSIS — I48.19 PERSISTENT ATRIAL FIBRILLATION (HCC): ICD-10-CM

## 2019-02-21 DIAGNOSIS — Z76.0 MEDICATION REFILL: ICD-10-CM

## 2019-02-21 LAB
ADENOVIRUS PCR:: NEGATIVE
ALBUMIN SERPL-MCNC: 3.1 G/DL (ref 3.4–5)
ALBUMIN/GLOB SERPL: 0.7 {RATIO} (ref 1–2)
ALLENS TEST: POSITIVE
ALLENS TEST: POSITIVE
ALP LIVER SERPL-CCNC: 178 U/L (ref 55–142)
ALT SERPL-CCNC: 58 U/L (ref 13–56)
ANION GAP SERPL CALC-SCNC: 7 MMOL/L (ref 0–18)
APTT PPP: 24.9 SECONDS (ref 26.1–34.6)
ARTERIAL BLD GAS O2 SATURATION: 82 % (ref 92–100)
ARTERIAL BLD GAS O2 SATURATION: 96 % (ref 92–100)
ARTERIAL BLOOD GAS BASE EXCESS: -0.6
ARTERIAL BLOOD GAS BASE EXCESS: 4.2
ARTERIAL BLOOD GAS HCO3: 25.1 MEQ/L (ref 22–26)
ARTERIAL BLOOD GAS HCO3: 28.2 MEQ/L (ref 22–26)
ARTERIAL BLOOD GAS PCO2: 40 MM HG (ref 35–45)
ARTERIAL BLOOD GAS PCO2: 46 MM HG (ref 35–45)
ARTERIAL BLOOD GAS PH: 7.35 (ref 7.35–7.45)
ARTERIAL BLOOD GAS PH: 7.46 (ref 7.35–7.45)
ARTERIAL BLOOD GAS PO2: 104 MM HG (ref 80–105)
ARTERIAL BLOOD GAS PO2: 55 MM HG (ref 80–105)
AST SERPL-CCNC: 62 U/L (ref 15–37)
ATRIAL RATE: 127 BPM
B PERT DNA SPEC QL NAA+PROBE: NEGATIVE
BASOPHILS # BLD AUTO: 0.04 X10(3) UL (ref 0–0.2)
BASOPHILS NFR BLD AUTO: 0.3 %
BILIRUB SERPL-MCNC: 0.7 MG/DL (ref 0.1–2)
BUN BLD-MCNC: 20 MG/DL (ref 7–18)
BUN/CREAT SERPL: 20 (ref 10–20)
C PNEUM DNA SPEC QL NAA+PROBE: NEGATIVE
CALCIUM BLD-MCNC: 8.6 MG/DL (ref 8.5–10.1)
CALCULATED O2 SATURATION: 86 % (ref 92–100)
CALCULATED O2 SATURATION: 98 % (ref 92–100)
CARBOXYHEMOGLOBIN: 1.7 % SAT (ref 0–3)
CARBOXYHEMOGLOBIN: 1.9 % SAT (ref 0–3)
CHLORIDE SERPL-SCNC: 105 MMOL/L (ref 98–107)
CO2 SERPL-SCNC: 25 MMOL/L (ref 21–32)
CORONAVIRUS 229E PCR:: NEGATIVE
CORONAVIRUS HKU1 PCR:: NEGATIVE
CORONAVIRUS NL63 PCR:: NEGATIVE
CORONAVIRUS OC43 PCR:: NEGATIVE
CREAT BLD-MCNC: 1 MG/DL (ref 0.55–1.02)
CRP SERPL-MCNC: 6.8 MG/DL (ref ?–0.3)
DEPRECATED RDW RBC AUTO: 52.8 FL (ref 35.1–46.3)
EOSINOPHIL # BLD AUTO: 0.07 X10(3) UL (ref 0–0.7)
EOSINOPHIL NFR BLD AUTO: 0.5 %
ERYTHROCYTE [DISTWIDTH] IN BLOOD BY AUTOMATED COUNT: 20.1 % (ref 11–15)
EST. AVERAGE GLUCOSE BLD GHB EST-MCNC: 128 MG/DL (ref 68–126)
EXPIRATORY PRESSURE: 6 CM H2O
FIO2: 40 %
FLUAV RNA SPEC QL NAA+PROBE: NEGATIVE
FLUBV RNA SPEC QL NAA+PROBE: NEGATIVE
GLOBULIN PLAS-MCNC: 4.5 G/DL (ref 2.8–4.4)
GLUCOSE BLD-MCNC: 102 MG/DL (ref 70–99)
GLUCOSE BLD-MCNC: 106 MG/DL (ref 70–99)
GLUCOSE BLD-MCNC: 108 MG/DL (ref 70–99)
GLUCOSE BLD-MCNC: 136 MG/DL (ref 70–99)
HBA1C MFR BLD HPLC: 6.1 % (ref ?–5.7)
HCT VFR BLD AUTO: 32 % (ref 35–48)
HGB BLD-MCNC: 9.2 G/DL (ref 12–16)
IMM GRANULOCYTES # BLD AUTO: 0.07 X10(3) UL (ref 0–1)
IMM GRANULOCYTES NFR BLD: 0.5 %
INR BLD: 1.6 (ref 0.9–1.1)
INSP PRESSURE: 14 CM H2O
IONIZED CALCIUM: 1.19 MMOL/L (ref 1.12–1.32)
L/M: 4 L/MIN
LACTATE SERPL-SCNC: 1.8 MMOL/L (ref 0.4–2)
LACTIC ACID ARTERIAL: 2.5 MMOL/L (ref 0.5–2)
LYMPHOCYTES # BLD AUTO: 2.64 X10(3) UL (ref 1–4)
LYMPHOCYTES NFR BLD AUTO: 17.4 %
M PROTEIN MFR SERPL ELPH: 7.6 G/DL (ref 6.4–8.2)
MCH RBC QN AUTO: 21.4 PG (ref 26–34)
MCHC RBC AUTO-ENTMCNC: 28.8 G/DL (ref 31–37)
MCV RBC AUTO: 74.4 FL (ref 80–100)
METAPNEUMOVIRUS PCR:: NEGATIVE
METHEMOGLOBIN: 0.3 % SAT (ref 0.4–1.5)
METHEMOGLOBIN: 0.4 % SAT (ref 0.4–1.5)
MONOCYTES # BLD AUTO: 1.26 X10(3) UL (ref 0.1–1)
MONOCYTES NFR BLD AUTO: 8.3 %
MYCOPLASMA PNEUMONIA PCR:: NEGATIVE
NEUTROPHILS # BLD AUTO: 11.13 X10 (3) UL (ref 1.5–7.7)
NEUTROPHILS # BLD AUTO: 11.13 X10(3) UL (ref 1.5–7.7)
NEUTROPHILS NFR BLD AUTO: 73 %
NT-PROBNP SERPL-MCNC: 2295 PG/ML (ref ?–450)
OSMOLALITY SERPL CALC.SUM OF ELEC: 289 MOSM/KG (ref 275–295)
PARAINFLUENZA 1 PCR:: NEGATIVE
PARAINFLUENZA 2 PCR:: NEGATIVE
PARAINFLUENZA 3 PCR:: NEGATIVE
PARAINFLUENZA 4 PCR:: NEGATIVE
PATIENT TEMPERATURE: 98.6 F
PATIENT TEMPERATURE: 99 F
PLATELET # BLD AUTO: 218 10(3)UL (ref 150–450)
POTASSIUM BLOOD GAS: 3.5 MMOL/L (ref 3.6–5.1)
POTASSIUM SERPL-SCNC: 4.3 MMOL/L (ref 3.5–5.1)
PROCALCITONIN SERPL-MCNC: <0.11 NG/ML
PSA SERPL DL<=0.01 NG/ML-MCNC: 18.8 SECONDS (ref 12–14.1)
Q-T INTERVAL: 410 MS
QRS DURATION: 158 MS
QTC CALCULATION (BEZET): 537 MS
R AXIS: -12 DEGREES
RBC # BLD AUTO: 4.3 X10(6)UL (ref 3.8–5.3)
RHINOVIRUS/ENTERO PCR:: NEGATIVE
RSV RNA SPEC QL NAA+PROBE: NEGATIVE
SED RATE-ML: 55 MM/HR (ref 0–25)
SODIUM BLOOD GAS: 139 MMOL/L (ref 136–144)
SODIUM SERPL-SCNC: 137 MMOL/L (ref 136–145)
T AXIS: 122 DEGREES
TOTAL HEMOGLOBIN: 7.7 G/DL (ref 11.7–16)
TOTAL HEMOGLOBIN: 8.9 G/DL (ref 11.7–16)
TROPONIN I SERPL-MCNC: <0.045 NG/ML (ref ?–0.04)
VENT RATE: 14 /MIN
VENTRICULAR RATE: 103 BPM
WBC # BLD AUTO: 15.2 X10(3) UL (ref 4–11)

## 2019-02-21 PROCEDURE — 71045 X-RAY EXAM CHEST 1 VIEW: CPT | Performed by: EMERGENCY MEDICINE

## 2019-02-21 PROCEDURE — 93306 TTE W/DOPPLER COMPLETE: CPT | Performed by: HOSPITALIST

## 2019-02-21 PROCEDURE — 5A09357 ASSISTANCE WITH RESPIRATORY VENTILATION, LESS THAN 24 CONSECUTIVE HOURS, CONTINUOUS POSITIVE AIRWAY PRESSURE: ICD-10-PCS | Performed by: HOSPITALIST

## 2019-02-21 PROCEDURE — 99291 CRITICAL CARE FIRST HOUR: CPT | Performed by: HOSPITALIST

## 2019-02-21 PROCEDURE — 71275 CT ANGIOGRAPHY CHEST: CPT | Performed by: EMERGENCY MEDICINE

## 2019-02-21 RX ORDER — DEXTROSE MONOHYDRATE 25 G/50ML
50 INJECTION, SOLUTION INTRAVENOUS
Status: DISCONTINUED | OUTPATIENT
Start: 2019-02-21 | End: 2019-03-04

## 2019-02-21 RX ORDER — AMIODARONE HYDROCHLORIDE 200 MG/1
200 TABLET ORAL DAILY
Status: DISCONTINUED | OUTPATIENT
Start: 2019-02-21 | End: 2019-02-22

## 2019-02-21 RX ORDER — IPRATROPIUM BROMIDE AND ALBUTEROL SULFATE 2.5; .5 MG/3ML; MG/3ML
3 SOLUTION RESPIRATORY (INHALATION) EVERY 4 HOURS PRN
Status: DISCONTINUED | OUTPATIENT
Start: 2019-02-21 | End: 2019-03-03

## 2019-02-21 RX ORDER — ACETAMINOPHEN 325 MG/1
650 TABLET ORAL EVERY 6 HOURS PRN
Status: DISCONTINUED | OUTPATIENT
Start: 2019-02-21 | End: 2019-03-04

## 2019-02-21 RX ORDER — WARFARIN SODIUM 5 MG/1
5 TABLET ORAL
Status: COMPLETED | OUTPATIENT
Start: 2019-02-21 | End: 2019-02-21

## 2019-02-21 RX ORDER — NITROGLYCERIN 20 MG/100ML
INJECTION INTRAVENOUS
Status: COMPLETED
Start: 2019-02-21 | End: 2019-02-21

## 2019-02-21 RX ORDER — FUROSEMIDE 10 MG/ML
40 INJECTION INTRAMUSCULAR; INTRAVENOUS
Status: DISCONTINUED | OUTPATIENT
Start: 2019-02-21 | End: 2019-02-25

## 2019-02-21 RX ORDER — NITROGLYCERIN 20 MG/100ML
INJECTION INTRAVENOUS CONTINUOUS
Status: DISCONTINUED | OUTPATIENT
Start: 2019-02-21 | End: 2019-02-23

## 2019-02-21 RX ORDER — FUROSEMIDE 10 MG/ML
40 INJECTION INTRAMUSCULAR; INTRAVENOUS ONCE
Status: COMPLETED | OUTPATIENT
Start: 2019-02-21 | End: 2019-02-21

## 2019-02-21 RX ORDER — IPRATROPIUM BROMIDE AND ALBUTEROL SULFATE 2.5; .5 MG/3ML; MG/3ML
3 SOLUTION RESPIRATORY (INHALATION)
Status: DISCONTINUED | OUTPATIENT
Start: 2019-02-21 | End: 2019-02-23

## 2019-02-21 NOTE — PROGRESS NOTES
This note also relates to the following rows which could not be included:  Resp - Cannot attach notes to unvalidated device data  Pulse - Cannot attach notes to unvalidated device data  SpO2 - Cannot attach notes to unvalidated device data       02/21/19 1

## 2019-02-21 NOTE — CONSULTS
[facility]  Report of Consultation    Sheri Apgar Patient Status:  Inpatient    1940 MRN [id number]   [facility] 4SW-A Attending Roverto Loyola MD   Hosp Day # 0 PCP Flaco Sánchez MD     Reason for Consultation: dyspnea ([facility] 3/7/2014    Low grade on LN bx 2014   • Cataract     RIGHT   • Cong[facility] disease ([facility]    • COPD (chronic obstructive pulmonary disease) ([facility]    • Deep vein thrombosis (HCC)     R lower leg- 12/14/16, h/o IVC filter   • Dementia    • Diabetes bilateral component separation, removal previous mesh, partial omentectomy, lysis of adhesions on 3/24/2011:  PCP:  Dr. Eileen Rodriguez   • HERNIA SURGERY     • HYSTERECTOMY     • [address]  2012   • OTHER  2016    surgery to reliev 3.375 g in dextrose 5 % 100 mL ADD-vantage 3.375 g Intravenous Q8H   ipratropium-albuterol (DUONEB) nebulizer solution 3 mL 3 mL Nebulization Q4H PRN   ipratropium-albuterol (DUONEB) nebulizer solution 3 mL 3 mL Nebulization 4 times per day       Review of disease ([facility]     Gallstones     Hypokalemia     Hearing loss     Pulmonary HTN (HCC)     BPPV (benign paroxysmal positional vertigo)     Arthritis of shoulder region, left, degenerative     Essential hypertension     ICD (implantable cardioverter-defibril are confirming her code status with her daughter (POA)    Thanks. Vance Samuesl MD  2/21/2019  11:38 AM     Echo has normal LVSF with possible chordal disruption and associated 3+ mitral regurgitation.    I talked with her daughter who is her POA who sta

## 2019-02-21 NOTE — ED PROVIDER NOTES
Patient Seen in: [facility] Emergency Department    History   Patient presents with:  Dyspnea HEIDE SOB (respiratory)    Stated Complaint: HEIDE    HPI    Patient is a 42-year-old female with multiple past medical problems presenting to the emergency de 12/01/2016    s/p L craniotomy   • Varicose vein 9/18/2015   • Vitamin D deficiency 2012       Past Surgical History:   Procedure Laterality Date   • ANGIOGRAM     • APPENDECTOMY     • CRANIOTOMY KRISTA HOLES Left 12/4/2016    Performed by Kike Veloz, or rigidity  Cardiovascular:    Regular rhythm without murmurs rubs or gallops, no peripheral edema or JVD. Radial and DP pulses 2+ bilaterally.   Lungs: Speaking only were to 2 at a time, unable to answer any questions secondary to her severe shortness of Hemoglobin 8.9 (*)     Potassium Blood Gas 3.5 (*)     Lactic Acid Arterial 2.5 (*)     All other components within normal limits   CBC W/ DIFFERENTIAL - Abnormal; Notable for the following components:    WBC 15.2 (*)     HGB 9.2 (*)     HCT 32.0 (*)     M Atrovent. ABG was obtained. After just a short period of BiPAP, patient reports some improvement. Blood was obtained and peripheral IV access was established. She was initially significantly hypertensive. IV nitroglycerin was initiated.   Hypertension results and initiate heparin if appropriate.             Admission disposition: 2/21/2019  4:23 AM                 Disposition and Plan     Clinical Impression:  Congestive heart failure, unspecified HF chronicity, unspecified heart failure type ([address]  (kate

## 2019-02-21 NOTE — HISTORICAL OFFICE NOTE
Rhiannon Kristin  : 1940  ACCOUNT:  [id number]  [id number]  PCP: Dr. Raquel Syed     TODAY'S DATE: 01/15/2019  DICTATED BY:  [Dr. Cody Velasco: [Followup of Coumadin Management,MHS, Followup of Hypercholesteremia, pure, Followup of U FACTORS:  CAD - Hypertension Weight Family    REVIEW OF SYSTEMS:    CONS: no fever, chills, or recent weight changes. EYES: denies significant visual changes. ENMT: denies difficulties with hearing, otherwise negative.  CV: peripheral edema, see CV exam. RE follow-up. She is having recurrent falls and did have VT requiring ATP and shocks in the past    ASSESSMENT:  1. Heart failure, systolic, acute  2. Coumadin Management,MHS  3. Hypercholesteremia, pure  4. Hypertension, benign  5. ICD reprogramming  6.  ICD Caltrate 600+D3 Soft  600-800M  1 TABLET DAILY                           03/01/18 One Daily For Women             1 TABLET DAILY                           03/01/18 Torsemide             20MG      1 TABLET TWICE DAILY                     03/01/18 Vitamin D- she had a normal blood pressure and rhythm. Neuro evaluated the patient also, who felt she may benefit from PT/OT and vestibular therapy.   The patient was experiencing bradycardia, and digoxin was stopped and her Cardizem dose was increased with improveme

## 2019-02-21 NOTE — CONSULTS
[facility]  Report of Consultation    Mark Friday Patient Status:  Inpatient    1940 MRN [id number]   [facility] 4SW-A Attending Harvinder Pat MD   Hosp Day # 0 PCP Keven Parkinson MD     Reason for Consultation:  Hypoxi continues to note left posterior pain. No further diarrhea. Initial presentation in the emergency room with systolic blood pressures upwards of 200 requiring nitro now weaned off.       History:  Past Medical History:   Diagnosis Date   • A-fib ([facility] [address] 2012     Family History   Problem Relation Age of Onset   • Diabetes Father    • Heart Disease Father    • Diabetes Mother    • Diabetes Sister    • Heart Disease Sister    • Diabetes Brother    • Heart Disease Brother    • Cancer Brother       reports az Rfl:  2/20/2019 at 0830   acetaminophen 500 MG Oral Tab Take 500 mg by mouth every 6 (six) hours as needed for Pain. Disp:  Rfl:  Taking   Warfarin Sodium 5 MG Oral Tab Take 5 mg by mouth nightly.  Monday, Tuesday, Thurs, Sat Disp:  Rfl:  Taking   magnesium Normocephalic, without obvious abnormality, atraumatic. Eyes/Nose: No obvious lesion   Throat: Lips, mucosa, and tongue normal.  No thrush noted.   On limited exam   Neck: Appears slightly decreased range of motion; trachea midline, no adenopathy, no thyr Chronic obstructive pulmonary disease (HCC)     Gallstones     Hypokalemia     Hearing loss     Pulmonary HTN (HCC)     BPPV (benign paroxysmal positional vertigo)     Arthritis of shoulder region, left, degenerative     Essential hypertension     ICD (imp suspected OHS      Echocardiogram has been ordered and serial troponins will be checked  Ongoing diuresis  In the interim viral panel is pending and plans to continue broad-spectrum antibiotic coverage pending her clinical course  Will follow for need to r

## 2019-02-21 NOTE — H&P
[facility]  History and Physical     [facility]:  [facility]    1940 MRN [id number]   Location [address] [facility] Attending WillGabriella MD   Hosp Day # 0 PCP Sherrill Martines MD     Chief Complai 7/7/16.      • Obesity, morbid, BMI 40.0-49.9 ([facility] [address] 6/18/2015   • RADHA (obstructive sleep apnea) 06/29/2012    Cannot tolerate CPAP machine- uses oxygen periodically    • Osteoarthritis of shoulders, bilateral 06/18/2015    Severe, frozen shoulder syndrome Coughing  Mexitil [Mexiletine]    RASH  Medications:    No current facility-administered medications on file prior to encounter.    Current Outpatient Medications on File Prior to Encounter:  Sertraline HCl 25 MG Oral Tab Take 1 tablet (25 mg tota carotid bruits. Respiratory:  decreased diminished with crackles inspiratory effort. to auscultation bilaterally. Cardiovascular: S1, S2. Irregular rhythm. Regular rate. No murmurs, rubs or gallops. Equal pulses.    Chest and Back: No tenderness or defo discussed with patient     Leroy Astudillo MD  2/21/2019

## 2019-02-21 NOTE — PAYOR COMM NOTE
--------------  ADMISSION REVIEW     Payor: BCBS MEDICARE ADV PPO  Subscriber #:  [id number]  Authorization Number: [id number]    Admit date: 2/21/19  Admit time: 1488       Admitting Physician: Daly Simon MD  Attending Physician:  Javon Vasquez MD Hx VT- s/p ICD implant on 7/7/16.      • Obesity, morbid, BMI 40.0-49.9 ([facility] [address] 6/18/2015   • RADHA (obstructive sleep apnea) 06/29/2012    Cannot tolerate CPAP machine- uses oxygen periodically    • Osteoarthritis of shoulders, bilateral 06/18/2015    Severe, Exam    General: Well-developed, well-nourished, adult female who is in severe respiratory distress, sitting straight upright, had a difficult time answering the questions. Neuro: No focal neurologic deficit. No facial droop or slurred speech.   CN II-XII for the following components:    PTT 24.9 (*)     All other components within normal limits   PRO BETA NATRIURETIC PEPTIDE - Abnormal; Notable for the following components:    Pro-Beta Natriuretic Peptide 2,295 (*)     All other components within normal li abnormal EKG, however it is similar to previous EKG obtained on July 31, 2018 when she also had a left bundle branch block, and atrial fibrillation however with a normal ventricular rate.                 On arrival of the patient an EKG was obtained which s IV nitroglycerin which was weaned off. She is still on BiPAP. She will need to be admitted to the ICU. I discussed the patient with Dr. Kely Mendieta who will admit the patient.   I also discussed the patient with Dr. Arnulfo Hair of pulmonary critical care as well wheezing on occasion. She denies abdominal pain or urinary complaints. She denies diarrhea or constipation. Review of Systems:   A comprehensive 14 point review of systems was completed. Pertinent positives and negatives noted in the HPI.     Physic Nebs. Hold steroids for now  2. COPD- as above   3. Pulmonary hypertension- as above   4. Chronic A. Fib- back on coumadin  1. Cont coumadin   5. H/o VT Status post ICD in 2016  6. RADHA with obesity hypoventilation syndrome- RADHA protocol   7.  History of DVT R Abnormal  158/74 96 % — Bi-PAP — [facility]   02/21/19 0328 — — — — — — Non-rebreather mask 15 L/min SM   02/21/19 0327 — 117 34 Abnormal  — 96 % — Non-rebreather mask 15 L/min [facility]   02/21/19 0323 — — 28 Abnormal  — — 194 lb 0.1 oz — — [facility]

## 2019-02-22 ENCOUNTER — APPOINTMENT (OUTPATIENT)
Dept: GENERAL RADIOLOGY | Facility: HOSPITAL | Age: 79
DRG: 291 | End: 2019-02-22
Attending: INTERNAL MEDICINE
Payer: MEDICARE

## 2019-02-22 ENCOUNTER — APPOINTMENT (OUTPATIENT)
Dept: WOUND CARE | Facility: HOSPITAL | Age: 79
End: 2019-02-22
Attending: INTERNAL MEDICINE
Payer: MEDICARE

## 2019-02-22 PROBLEM — I48.91 UNSPECIFIED ATRIAL FIBRILLATION (CMD): Status: ACTIVE | Noted: 2019-02-22

## 2019-02-22 LAB
ALBUMIN SERPL-MCNC: 2.5 G/DL (ref 3.4–5)
ALBUMIN/GLOB SERPL: 0.7 {RATIO} (ref 1–2)
ALP LIVER SERPL-CCNC: 137 U/L (ref 55–142)
ALT SERPL-CCNC: 44 U/L (ref 13–56)
ANION GAP SERPL CALC-SCNC: 5 MMOL/L (ref 0–18)
ANTIBODY SCREEN: NEGATIVE
AST SERPL-CCNC: 33 U/L (ref 15–37)
ATRIAL RATE: 326 BPM
BASOPHILS # BLD AUTO: 0.02 X10(3) UL (ref 0–0.2)
BASOPHILS NFR BLD AUTO: 0.4 %
BILIRUB SERPL-MCNC: 0.5 MG/DL (ref 0.1–2)
BUN BLD-MCNC: 17 MG/DL (ref 7–18)
BUN/CREAT SERPL: 19.3 (ref 10–20)
CALCIUM BLD-MCNC: 8.4 MG/DL (ref 8.5–10.1)
CHLORIDE SERPL-SCNC: 104 MMOL/L (ref 98–107)
CO2 SERPL-SCNC: 31 MMOL/L (ref 21–32)
CREAT BLD-MCNC: 0.88 MG/DL (ref 0.55–1.02)
DEPRECATED RDW RBC AUTO: 49.1 FL (ref 35.1–46.3)
EOSINOPHIL # BLD AUTO: 0.08 X10(3) UL (ref 0–0.7)
EOSINOPHIL NFR BLD AUTO: 1.4 %
ERYTHROCYTE [DISTWIDTH] IN BLOOD BY AUTOMATED COUNT: 19.2 % (ref 11–15)
GLOBULIN PLAS-MCNC: 3.7 G/DL (ref 2.8–4.4)
GLUCOSE BLD-MCNC: 136 MG/DL (ref 70–99)
GLUCOSE BLD-MCNC: 72 MG/DL (ref 70–99)
GLUCOSE BLD-MCNC: 80 MG/DL (ref 70–99)
GLUCOSE BLD-MCNC: 83 MG/DL (ref 70–99)
GLUCOSE BLD-MCNC: 93 MG/DL (ref 70–99)
GLUCOSE BLD-MCNC: 95 MG/DL (ref 70–99)
HAV IGM SER QL: 2.4 MG/DL (ref 1.6–2.6)
HCT VFR BLD AUTO: 24.5 % (ref 35–48)
HGB BLD-MCNC: 7.3 G/DL (ref 12–16)
HGB BLD-MCNC: 7.7 G/DL (ref 12–16)
IMM GRANULOCYTES # BLD AUTO: 0.01 X10(3) UL (ref 0–1)
IMM GRANULOCYTES NFR BLD: 0.2 %
INR BLD: 1.68 (ref 0.9–1.1)
LYMPHOCYTES # BLD AUTO: 0.98 X10(3) UL (ref 1–4)
LYMPHOCYTES NFR BLD AUTO: 17.3 %
M PROTEIN MFR SERPL ELPH: 6.2 G/DL (ref 6.4–8.2)
MCH RBC QN AUTO: 21.3 PG (ref 26–34)
MCHC RBC AUTO-ENTMCNC: 29.8 G/DL (ref 31–37)
MCV RBC AUTO: 71.6 FL (ref 80–100)
MONOCYTES # BLD AUTO: 0.59 X10(3) UL (ref 0.1–1)
MONOCYTES NFR BLD AUTO: 10.4 %
NEUTROPHILS # BLD AUTO: 4 X10 (3) UL (ref 1.5–7.7)
NEUTROPHILS # BLD AUTO: 4 X10(3) UL (ref 1.5–7.7)
NEUTROPHILS NFR BLD AUTO: 70.3 %
OSMOLALITY SERPL CALC.SUM OF ELEC: 291 MOSM/KG (ref 275–295)
PHOSPHATE SERPL-MCNC: 4 MG/DL (ref 2.5–4.9)
PLATELET # BLD AUTO: 208 10(3)UL (ref 150–450)
POTASSIUM SERPL-SCNC: 3.2 MMOL/L (ref 3.5–5.1)
POTASSIUM SERPL-SCNC: 4.1 MMOL/L (ref 3.5–5.1)
PSA SERPL DL<=0.01 NG/ML-MCNC: 20.4 SECONDS (ref 12.4–14.7)
Q-T INTERVAL: 452 MS
QRS DURATION: 152 MS
QTC CALCULATION (BEZET): 534 MS
R AXIS: 60 DEGREES
RBC # BLD AUTO: 3.42 X10(6)UL (ref 3.8–5.3)
RH BLOOD TYPE: POSITIVE
SODIUM SERPL-SCNC: 140 MMOL/L (ref 136–145)
T AXIS: 54 DEGREES
VENTRICULAR RATE: 84 BPM
WBC # BLD AUTO: 5.7 X10(3) UL (ref 4–11)

## 2019-02-22 PROCEDURE — 99232 SBSQ HOSP IP/OBS MODERATE 35: CPT | Performed by: STUDENT IN AN ORGANIZED HEALTH CARE EDUCATION/TRAINING PROGRAM

## 2019-02-22 PROCEDURE — 71045 X-RAY EXAM CHEST 1 VIEW: CPT | Performed by: INTERNAL MEDICINE

## 2019-02-22 RX ORDER — POTASSIUM CHLORIDE 20 MEQ/1
40 TABLET, EXTENDED RELEASE ORAL EVERY 4 HOURS
Status: COMPLETED | OUTPATIENT
Start: 2019-02-22 | End: 2019-02-22

## 2019-02-22 RX ORDER — SERTRALINE HYDROCHLORIDE 25 MG/1
25 TABLET, FILM COATED ORAL DAILY
Status: DISCONTINUED | OUTPATIENT
Start: 2019-02-22 | End: 2019-03-04

## 2019-02-22 NOTE — PROGRESS NOTES
[facility] Pulmonary/Critical Care Progress Note     SUBJECTIVE/24H Events: All events, procedures, notes reviewed. No acute events,feels much better today with less dyspnea and minimal dry cough. No f/c/s. No pain.   Do 0. 88   GFRAA  63  73   GFRNAA  54*  64   CA  8.6  8.4*   NA  137  140   K  4.3  3.2*   CL  105  104   CO2  25.0  31.0     Recent Labs   Lab  02/21/19   0337  02/22/19   0425   RBC  4.30  3.42*   HGB  9.2*  7.3*   HCT  32.0*  24.5*   MCV  74.4*  71.6*   [facility] Dictated by: Brissa Jett MD on 2/21/2019 at 8:24     Approved by: Brissa Jett MD              • Potassium Chloride ER  40 mEq Oral Q4H   • furosemide  40 mg Intravenous BID (Diuretic)   • Insulin Aspart Pen  1-10 Units Subcutaneous TID AC and HS   • Insulin and assess for comfort/adherence  Continue to monitor in ICU given the hemoglobin drop and reassess when repeat hgb done    Mirlande Johnston MD  [facility]  02/22/19  CCM time: 40 minutes

## 2019-02-22 NOTE — PLAN OF CARE
Pt AOX4. bipap at night. Tolerating well. Coumadin restarted this evening. A FIB on monitor. Rate 80s. bp stable. Denies pain. Sob. Appears comfortable. Wound to anterior right lower leg. Dressed with mepilex. CDI.  Wound care to see pt in am. Questions add

## 2019-02-22 NOTE — PROGRESS NOTES
[facility]  Progress Note     Jaziel Rose Patient Status:  Inpatient    1940 MRN [id number]   [facility] 4SW-A Attending Richi Reed MD   Hosp Day # 1 PCP Jason Barajas MD     Chief Complaint: SOB    S: Patient  Fee Aspart Pen  1-10 Units Subcutaneous TID AC and HS   • Insulin Aspart Pen  1-68 Units Subcutaneous TID CC   • insulin detemir  8 Units Subcutaneous Nightly   • ipratropium-albuterol  3 mL Nebulization 4 times per day   • amiodarone HCl  200 mg Oral Daily

## 2019-02-22 NOTE — PROGRESS NOTES
02/22/19 1349   Clinical Encounter Type   Visited With Patient   Sacramental Encounters   Sacrament of Sick-Anointing Anointed   The patient was seen by Edita Mejía. Received prayer, Scripture, support and Sacrament of the Sick.

## 2019-02-22 NOTE — PAYOR COMM NOTE
--------------  CONTINUED STAY REVIEW    Payor: BCBS MEDICARE ADV PPO  Subscriber #:  [id number]  Authorization Number: [id number]    Admit date: 19  Admit time:              Siddharth Bustamante Patient Status:  Inpatient    1940 MRN [id number] <0.045            Imaging: Imaging data reviewed in Epic.     Medications:   • Potassium Chloride ER  40 mEq Oral Q4H   • furosemide  40 mg Intravenous BID (Diuretic)   • Insulin Aspart Pen  1-10 Units Subcutaneous TID AC and HS   • Insulin Aspart Pen  1-6 98.3 °F (36.8 °C) 88 18 112/54 99 % — — — [facility]   02/21/19 2300 — 77 16 109/48 98 % — — — [facility]   02/21/19 2200 — 83 23 111/53 100 % — — — [facility]   02/21/19 2130 — — — — — — Bi-PAP — [facility]   02/21/19 2100 — 96 23 123/61 91 % — — — [facility]   02/21/19 2016 — — — — 96 % — [place]

## 2019-02-22 NOTE — PLAN OF CARE
RESPIRATORY - ADULT    • Achieves optimal ventilation and oxygenation Not Progressing          METABOLIC/FLUID AND ELECTROLYTES - ADULT    • Glucose maintained within prescribed range Not Progressing    • Electrolytes maintained within normal limits Not Pr

## 2019-02-22 NOTE — CONSULTS
Heart Failure Navigator Progress Note    Patient was evaluated by the Heart Failure Navigator for understanding, verbalization, demonstration, and recall of education related to heart failure, overall adherence to the behaviors necessa importance of 7-day follow-up appointment       _x__ yes  ___ no        Patient has adequate transportation to attend follow-up appointments    _x__ yes  ___ no    Gave her contact information and instructed her to call for further questions or concerns po

## 2019-02-22 NOTE — DIETARY NOTE
Nutrition Short Note    Dietitian consult received for heart failure education. Pt states she is following a low sodium and fluid restricted diet at home. Pt has no questions or concerns at this time. RD available PRN.     Chriss Logan MS, RD, LDN

## 2019-02-22 NOTE — CONSULTS
[facility]  Report of Inpatient Wound Care Consultation     Saurabh Smalls Patient Status:  Inpatient    1940 MRN [id number]   [facility] 4SW-A Attending Sue Rossi MD   Hosp Day # 1 PCP Anisha Aldana MD     REASON FOR Pulmonary HTN ([facility] [address] 10/10/2014   • Rheumatoid arthritis ([facility] [address]    • S/P ICD (internal cardiac defibrillator) procedure 7/7/2016    medtronic    • S/P total knee replacement 6/18/2015    bilat 1990s, both severe OA   • Seizure disorder ([facility] [address]     seizure when --    --   208.0   --    --    --    K  4.3   --    --    --   3.2*   --    --    --    CREATSERUM  1.00   --    --    --   0.88   --    --    --    BUN  20*   --    --    --   17   --    --    --    GLU  136*   --    --    --   80   --    --    --    CA me to participate in the care of your patient. Please call me at [phone] or page me at [phone] if you have any questions about this consultation and plan of care. If unable to reach me at these, please call the Inpatient Wound Care pager at #[phone].     Time Sp

## 2019-02-22 NOTE — CM/SW NOTE
Responding to order for [facility] eval.    Met with pt at ICU bedside. Pt advises lives alone in her [place] home but has good support and help from nearby family.  Son often stays with pt at night and daughter in law is a physical therapy assistant who helps

## 2019-02-23 LAB
ANION GAP SERPL CALC-SCNC: 7 MMOL/L (ref 0–18)
BASOPHILS # BLD AUTO: 0.03 X10(3) UL (ref 0–0.2)
BASOPHILS NFR BLD AUTO: 0.4 %
BUN BLD-MCNC: 24 MG/DL (ref 7–18)
BUN/CREAT SERPL: 27.9 (ref 10–20)
CALCIUM BLD-MCNC: 8.6 MG/DL (ref 8.5–10.1)
CHLORIDE SERPL-SCNC: 106 MMOL/L (ref 98–107)
CO2 SERPL-SCNC: 28 MMOL/L (ref 21–32)
CREAT BLD-MCNC: 0.86 MG/DL (ref 0.55–1.02)
DEPRECATED HBV CORE AB SER IA-ACNC: 20.2 NG/ML (ref 18–340)
DEPRECATED RDW RBC AUTO: 48.8 FL (ref 35.1–46.3)
EOSINOPHIL # BLD AUTO: 0.16 X10(3) UL (ref 0–0.7)
EOSINOPHIL NFR BLD AUTO: 2.1 %
ERYTHROCYTE [DISTWIDTH] IN BLOOD BY AUTOMATED COUNT: 19.6 % (ref 11–15)
FOLATE SERPL-MCNC: 8.4 NG/ML (ref 8.7–?)
GLUCOSE BLD-MCNC: 101 MG/DL (ref 70–99)
GLUCOSE BLD-MCNC: 115 MG/DL (ref 70–99)
GLUCOSE BLD-MCNC: 117 MG/DL (ref 70–99)
GLUCOSE BLD-MCNC: 74 MG/DL (ref 70–99)
GLUCOSE BLD-MCNC: 88 MG/DL (ref 70–99)
HCT VFR BLD AUTO: 26.2 % (ref 35–48)
HGB BLD-MCNC: 7.9 G/DL (ref 12–16)
HGB RETIC QN AUTO: 22.4 PG (ref 28.2–36.6)
IMM GRANULOCYTES # BLD AUTO: 0.02 X10(3) UL (ref 0–1)
IMM GRANULOCYTES NFR BLD: 0.3 %
IMM RETICS NFR: 0.26 RATIO (ref 0.1–0.3)
INR BLD: 1.44 (ref 0.9–1.1)
IRON SATURATION: 4 % (ref 15–50)
IRON SERPL-MCNC: 15 UG/DL (ref 50–170)
LYMPHOCYTES # BLD AUTO: 1.01 X10(3) UL (ref 1–4)
LYMPHOCYTES NFR BLD AUTO: 13.3 %
MCH RBC QN AUTO: 21.5 PG (ref 26–34)
MCHC RBC AUTO-ENTMCNC: 30.2 G/DL (ref 31–37)
MCV RBC AUTO: 71.4 FL (ref 80–100)
MONOCYTES # BLD AUTO: 0.76 X10(3) UL (ref 0.1–1)
MONOCYTES NFR BLD AUTO: 10 %
NEUTROPHILS # BLD AUTO: 5.64 X10 (3) UL (ref 1.5–7.7)
NEUTROPHILS # BLD AUTO: 5.64 X10(3) UL (ref 1.5–7.7)
NEUTROPHILS NFR BLD AUTO: 73.9 %
OSMOLALITY SERPL CALC.SUM OF ELEC: 295 MOSM/KG (ref 275–295)
PLATELET # BLD AUTO: 233 10(3)UL (ref 150–450)
POTASSIUM SERPL-SCNC: 4.2 MMOL/L (ref 3.5–5.1)
PSA SERPL DL<=0.01 NG/ML-MCNC: 18.1 SECONDS (ref 12.4–14.7)
RBC # BLD AUTO: 3.67 X10(6)UL (ref 3.8–5.3)
RETICS # AUTO: 68.3 X10(3) UL (ref 22.5–147.5)
RETICS/RBC NFR AUTO: 1.9 % (ref 0.5–2.5)
SODIUM SERPL-SCNC: 141 MMOL/L (ref 136–145)
TOTAL IRON BINDING CAPACITY: 387 UG/DL (ref 240–450)
TRANSFERRIN SERPL-MCNC: 260 MG/DL (ref 200–360)
WBC # BLD AUTO: 7.6 X10(3) UL (ref 4–11)

## 2019-02-23 PROCEDURE — 99233 SBSQ HOSP IP/OBS HIGH 50: CPT | Performed by: STUDENT IN AN ORGANIZED HEALTH CARE EDUCATION/TRAINING PROGRAM

## 2019-02-23 RX ORDER — AMIODARONE HYDROCHLORIDE 200 MG/1
200 TABLET ORAL DAILY
Status: DISCONTINUED | OUTPATIENT
Start: 2019-02-23 | End: 2019-03-04

## 2019-02-23 RX ORDER — LOSARTAN POTASSIUM 25 MG/1
25 TABLET ORAL DAILY
Status: DISCONTINUED | OUTPATIENT
Start: 2019-02-23 | End: 2019-03-04

## 2019-02-23 RX ORDER — FOLIC ACID 1 MG/1
1 TABLET ORAL DAILY
Status: DISCONTINUED | OUTPATIENT
Start: 2019-02-23 | End: 2019-03-04

## 2019-02-23 RX ORDER — IPRATROPIUM BROMIDE AND ALBUTEROL SULFATE 2.5; .5 MG/3ML; MG/3ML
3 SOLUTION RESPIRATORY (INHALATION)
Status: DISCONTINUED | OUTPATIENT
Start: 2019-02-23 | End: 2019-03-03

## 2019-02-23 RX ORDER — METOPROLOL TARTRATE 50 MG/1
50 TABLET, FILM COATED ORAL
Status: DISCONTINUED | OUTPATIENT
Start: 2019-02-23 | End: 2019-03-04

## 2019-02-23 NOTE — PROGRESS NOTES
[facility]  Progress Note     Sheela Velasquez Patient Status:  Inpatient    1940 MRN [id number]   [facility] 4SW-A Attending Jay Jang MD   Hosp Day # 2 PCP Yovana Funk MD     Chief Complaint: SOB    S: Patient is h 18.1*   INR  1.60*  1.68*  1.44*       Recent Labs   Lab  02/21/19   0337   TROP  <0.045            Imaging: Imaging data reviewed in Epic.     Medications:   • ipratropium-albuterol  3 mL Nebulization Q6H WA   • amiodarone HCl  200 mg Oral Daily   • Kirt

## 2019-02-23 NOTE — RESPIRATORY THERAPY NOTE
RADHA - Equipment Use Daily Summary:                  . Set Mode:                . Usage in hours:                . 90% Pressure (EPAP) level:                . 90% Insp. Pressure (IPAP): Dodie Band AHI:                .  Supplemental Oxygen:    LPM

## 2019-02-23 NOTE — PLAN OF CARE
Pt. Received to room 2616 from ICU. She is alert and oriented times four. Lungs clear, diminished on auscultation. She is in afib on monitor. Lower extremities with 2 plus edema.  Pt. States that she did not get a clean gown or get washed today, so I did gi

## 2019-02-23 NOTE — PLAN OF CARE
Discussed coumadin with Dr. Tigist Hou. Coumadin to be held tonight due to Hg 7.7.  To be re addressed in AM.

## 2019-02-23 NOTE — PROGRESS NOTES
[facility] Progress Note    Patient seen and examined.  Chart reviewed.  Discussed with RN. No chest pain or shortness of breath. Breathing much better with diuresis.     /78 (BP Location: Left arm)   Pulse 94   Temp 99.5 °F (37.5 °C) (Tempora glucose (DEX4) oral liquid 15 g 15 g Oral Q15 Min PRN   Or      Glucose-Vitamin C (DEX-4) 4-6 GM-MG chewable tab 4 tablet 4 tablet Oral Q15 Min PRN   Or      dextrose 50 % injection 50 mL 50 mL Intravenous Q15 Min PRN   Or      glucose (DEX4) oral liquid

## 2019-02-23 NOTE — PLAN OF CARE
Alert. Oriented. Pt declined to use bipap for sleep tonight. Stated face is so sore to wear that mask. o2 sat on room air while sleeping 94%. Pt sleeps on recliner. Denies pain. afib per tele. Hr 80's. Made comfortable for tonight. poc updated w/ pt. Cont.

## 2019-02-23 NOTE — CONSULTS
[facility]  Report of GI Consultation    Ksenia Quinnkandis Patient Status:  Inpatient    1940 MRN [id number]   [facility] 2NE-A Attending Apolonia Mccrary MD   Hosp Day # 2 PCP Isak Vasquez MD     Date of Admission:  2019 VT- s/p ICD implant on 7/7/16.      • Obesity, morbid, BMI 40.0-49.9 ([facility] [address] 6/18/2015   • RADHA (obstructive sleep apnea) 06/29/2012    Cannot tolerate CPAP machine- uses oxygen periodically    • Osteoarthritis of shoulders, bilateral 06/18/2015    Severe, fro Narrative    None on file            Current Medications:    Current Facility-Administered Medications:  ipratropium-albuterol (DUONEB) nebulizer solution 3 mL 3 mL Nebulization Q6H WA   amiodarone HCl (PACERONE) tab 200 mg 200 mg Oral Daily   Losartan Pot ulcers. EYES: No scleral icterus, mild conjunctival pallor. PULM: Wearing nasal cannula. Lungs clear to auscultation posteriorly  CV: Regular, no murmurs, 2+ radial pulses  ABD: Cholecystostomy tube. Soft.   Normoactive bowel sounds; soft/nondistended AICD  4. Chronic atrial fibrillation  5. Pulm HTN    Recommendations:  1. PPI daily  2.  OK to resume anticoagulation for now (stable Hgb, negative FOBT)  3. Consider EGD only if patient and daughter want to proceed and/or if anemia worsens on anticoagulati

## 2019-02-23 NOTE — PROGRESS NOTES
02/23/19 1142   Clinical Encounter Type   Visited With Patient and family together   Routine Visit (Pt. wanted a new advance directive)   Referral From Nurse   Referral To    Christianity Encounters   Christianity Needs Prayer   Patient Spiritual Elaine Sandoval

## 2019-02-23 NOTE — PROGRESS NOTES
[facility] Pulmonary/Critical Care Progress Note     SUBJECTIVE/24H Events: All events, procedures, notes reviewed. No acute events,feels much better today with less dyspnea and minimal dry cough. No f/c/s. No pain.   Do 02/21/19   0337  02/22/19   0425  02/22/19   1819  02/23/19   0556   GLU  136*  80   --   74   BUN  20*  17   --   24*   CREATSERUM  1.00  0.88   --   0.86   GFRAA  63  73   --   75   GFRNAA  54*  64   --   65   CA  8.6  8.4*   --   8.6   NA  137  140   -- severe pneumonia.     Dictated by: Zahra Jaimes MD on 2/21/2019 at 6:46     Approved by: Zahra Jaimes MD            Xr Chest Ap Portable  (cpt=71045)    Result Date: 2/22/2019  CONCLUSION:  1. Stable pleural parenchymal changes in the lower lobe with recurrent V. tach status post shock, pulmonary hypertension     #History of asthma-states ran out of inhalers 2 weeks ago      #acute on chronic anemia - decreased today by 2g but no s/s heme loss     # diabetes      #History of untreated RADHA suspecte

## 2019-02-23 NOTE — PROGRESS NOTES
· [facility]      Subjective:  Up in chair, dyspnea improved    Objective:  Afebrile 137/72  AFib 80-90s  BUN/cr 24/0.86 INR 1.4    Neuro: awake/alert  HEENT: no JVD moist mucosa  Cardiac: S1 S2 irregular 2/6 systolic murmur  Lungs:  Decreased

## 2019-02-24 LAB
ANION GAP SERPL CALC-SCNC: 7 MMOL/L (ref 0–18)
BASOPHILS # BLD AUTO: 0.03 X10(3) UL (ref 0–0.2)
BASOPHILS NFR BLD AUTO: 0.4 %
BUN BLD-MCNC: 25 MG/DL (ref 7–18)
BUN/CREAT SERPL: 28.7 (ref 10–20)
CALCIUM BLD-MCNC: 8.6 MG/DL (ref 8.5–10.1)
CHLORIDE SERPL-SCNC: 107 MMOL/L (ref 98–107)
CO2 SERPL-SCNC: 28 MMOL/L (ref 21–32)
CREAT BLD-MCNC: 0.87 MG/DL (ref 0.55–1.02)
DEPRECATED RDW RBC AUTO: 50.1 FL (ref 35.1–46.3)
EOSINOPHIL # BLD AUTO: 0.17 X10(3) UL (ref 0–0.7)
EOSINOPHIL NFR BLD AUTO: 2.5 %
ERYTHROCYTE [DISTWIDTH] IN BLOOD BY AUTOMATED COUNT: 19.7 % (ref 11–15)
GLUCOSE BLD-MCNC: 105 MG/DL (ref 70–99)
GLUCOSE BLD-MCNC: 72 MG/DL (ref 70–99)
GLUCOSE BLD-MCNC: 79 MG/DL (ref 70–99)
GLUCOSE BLD-MCNC: 93 MG/DL (ref 70–99)
GLUCOSE BLD-MCNC: 96 MG/DL (ref 70–99)
HCT VFR BLD AUTO: 26.4 % (ref 35–48)
HGB BLD-MCNC: 8.1 G/DL (ref 12–16)
IMM GRANULOCYTES # BLD AUTO: 0.02 X10(3) UL (ref 0–1)
IMM GRANULOCYTES NFR BLD: 0.3 %
INR BLD: 1.23 (ref 0.9–1.1)
LYMPHOCYTES # BLD AUTO: 1.26 X10(3) UL (ref 1–4)
LYMPHOCYTES NFR BLD AUTO: 18.8 %
MCH RBC QN AUTO: 22.2 PG (ref 26–34)
MCHC RBC AUTO-ENTMCNC: 30.7 G/DL (ref 31–37)
MCV RBC AUTO: 72.3 FL (ref 80–100)
MONOCYTES # BLD AUTO: 0.77 X10(3) UL (ref 0.1–1)
MONOCYTES NFR BLD AUTO: 11.5 %
NEUTROPHILS # BLD AUTO: 4.47 X10 (3) UL (ref 1.5–7.7)
NEUTROPHILS # BLD AUTO: 4.47 X10(3) UL (ref 1.5–7.7)
NEUTROPHILS NFR BLD AUTO: 66.5 %
OSMOLALITY SERPL CALC.SUM OF ELEC: 297 MOSM/KG (ref 275–295)
PLATELET # BLD AUTO: 228 10(3)UL (ref 150–450)
POTASSIUM SERPL-SCNC: 3.9 MMOL/L (ref 3.5–5.1)
PSA SERPL DL<=0.01 NG/ML-MCNC: 16 SECONDS (ref 12.4–14.7)
RBC # BLD AUTO: 3.65 X10(6)UL (ref 3.8–5.3)
SODIUM SERPL-SCNC: 142 MMOL/L (ref 136–145)
WBC # BLD AUTO: 6.7 X10(3) UL (ref 4–11)

## 2019-02-24 PROCEDURE — 99232 SBSQ HOSP IP/OBS MODERATE 35: CPT | Performed by: STUDENT IN AN ORGANIZED HEALTH CARE EDUCATION/TRAINING PROGRAM

## 2019-02-24 RX ORDER — MELATONIN
325 2 TIMES DAILY WITH MEALS
Status: DISCONTINUED | OUTPATIENT
Start: 2019-02-24 | End: 2019-03-04

## 2019-02-24 NOTE — PROGRESS NOTES
[facility] Pulmonary/Critical Care Progress Note     SUBJECTIVE/24H Events: All events, procedures, notes reviewed. No f/c/s. No pain.   Does not like BPAP but does endorse having a previous sleep study documenting sleep 02/22/19   0425  02/22/19   1819  02/23/19   0556  02/24/19   0557   GLU  80   --   74  72   BUN  17   --   24*  25*   CREATSERUM  0.88   --   0.86  0.87   GFRAA  73   --   75  74   GFRNAA  64   --   65  64   CA  8.4*   --   8.6  8.6   NA  140   --   141 Gentry Etienne DO            Medications:  • pantoprazole (PROTONIX) IV push  40 mg Intravenous Q24H   • ipratropium-albuterol  3 mL Nebulization Q6H WA   • amiodarone HCl  200 mg Oral Daily   • Losartan Potassium  25 mg Oral Daily   • metoprolol tartrate evaluating  Again reviewed use, risks and benefits of NIPPV in setting of RADHA and comorbid diseases.  She is declining use       Anni Guerrero MD, [address] [place] [address] [facility]

## 2019-02-24 NOTE — PROGRESS NOTES
[facility]  Progress Note     Sandra Shaneka Patient Status:  Inpatient    1940 MRN [id number]   [facility] 4SW-A Attending Mervin Wilder MD   Hosp Day # 3 PCP Merly Cunningham MD     Chief Complaint: SOB    S: Patient feel Estimated Creatinine Clearance: 50.7 mL/min (based on SCr of 0.87 mg/dL).     Recent Labs   Lab  02/22/19   0425  02/23/19   0556  02/24/19   0557   PTP  20.4*  18.1*  16.0*   INR  1.68*  1.44*  1.23*       Recent Labs   Lab  02/21/19   0337   TROP  < would ensure safe AG. I reiterated that this is all pending GI expert opinion.         Wilberto Stoner MD

## 2019-02-24 NOTE — RESPIRATORY THERAPY NOTE
RADHA - Equipment Use Daily Summary:                  . Set Mode:                . Usage in hours:                . 90% Pressure (EPAP) level:                . 90% Insp. Pressure (IPAP): Ladan Thakkar AHI:                .  Supplemental Oxygen:    LPM

## 2019-02-24 NOTE — PLAN OF CARE
Pt. Is alert and oriented times four. Lungs  Diminished on auscultation. Pt. Has no c/o pain. She is in afib on monitor. She is up in the room with one assist and walker.

## 2019-02-24 NOTE — PROGRESS NOTES
· [facility]      Subjective:  Dyspnea and edema are improving.       Objective:  Afebrile 122/65  AFib 80-90s  I/O - 1861  BUN/cr 25/0.87  Hgb pending  INR 1.23    Neuro: awake/alert  HEENT: no JVD moist mucosa  Cardiac: S1 S2 irregular 2/6 sy

## 2019-02-25 LAB
ANION GAP SERPL CALC-SCNC: 7 MMOL/L (ref 0–18)
APTT PPP: 29.4 SECONDS (ref 26.1–34.6)
APTT PPP: 42.7 SECONDS (ref 26.1–34.6)
BASOPHILS # BLD AUTO: 0.03 X10(3) UL (ref 0–0.2)
BASOPHILS NFR BLD AUTO: 0.4 %
BUN BLD-MCNC: 24 MG/DL (ref 7–18)
BUN/CREAT SERPL: 27 (ref 10–20)
CALCIUM BLD-MCNC: 8.6 MG/DL (ref 8.5–10.1)
CHLORIDE SERPL-SCNC: 103 MMOL/L (ref 98–107)
CO2 SERPL-SCNC: 30 MMOL/L (ref 21–32)
CREAT BLD-MCNC: 0.89 MG/DL (ref 0.55–1.02)
DEPRECATED RDW RBC AUTO: 49.5 FL (ref 35.1–46.3)
EOSINOPHIL # BLD AUTO: 0.14 X10(3) UL (ref 0–0.7)
EOSINOPHIL NFR BLD AUTO: 2 %
ERYTHROCYTE [DISTWIDTH] IN BLOOD BY AUTOMATED COUNT: 19.5 % (ref 11–15)
GLUCOSE BLD-MCNC: 106 MG/DL (ref 70–99)
GLUCOSE BLD-MCNC: 106 MG/DL (ref 70–99)
GLUCOSE BLD-MCNC: 110 MG/DL (ref 70–99)
GLUCOSE BLD-MCNC: 82 MG/DL (ref 70–99)
GLUCOSE BLD-MCNC: 91 MG/DL (ref 70–99)
HCT VFR BLD AUTO: 26.7 % (ref 35–48)
HGB BLD-MCNC: 8 G/DL (ref 12–16)
IMM GRANULOCYTES # BLD AUTO: 0.02 X10(3) UL (ref 0–1)
IMM GRANULOCYTES NFR BLD: 0.3 %
INR BLD: 1.06 (ref 0.9–1.1)
LYMPHOCYTES # BLD AUTO: 1.22 X10(3) UL (ref 1–4)
LYMPHOCYTES NFR BLD AUTO: 17.1 %
MCH RBC QN AUTO: 21.6 PG (ref 26–34)
MCHC RBC AUTO-ENTMCNC: 30 G/DL (ref 31–37)
MCV RBC AUTO: 72 FL (ref 80–100)
MONOCYTES # BLD AUTO: 0.75 X10(3) UL (ref 0.1–1)
MONOCYTES NFR BLD AUTO: 10.5 %
NEUTROPHILS # BLD AUTO: 4.96 X10 (3) UL (ref 1.5–7.7)
NEUTROPHILS # BLD AUTO: 4.96 X10(3) UL (ref 1.5–7.7)
NEUTROPHILS NFR BLD AUTO: 69.7 %
OSMOLALITY SERPL CALC.SUM OF ELEC: 293 MOSM/KG (ref 275–295)
PLATELET # BLD AUTO: 236 10(3)UL (ref 150–450)
POTASSIUM SERPL-SCNC: 3.7 MMOL/L (ref 3.5–5.1)
PSA SERPL DL<=0.01 NG/ML-MCNC: 14.3 SECONDS (ref 12.4–14.7)
RBC # BLD AUTO: 3.71 X10(6)UL (ref 3.8–5.3)
SODIUM SERPL-SCNC: 140 MMOL/L (ref 136–145)
WBC # BLD AUTO: 7.1 X10(3) UL (ref 4–11)

## 2019-02-25 PROCEDURE — 99232 SBSQ HOSP IP/OBS MODERATE 35: CPT | Performed by: HOSPITALIST

## 2019-02-25 RX ORDER — HEPARIN SODIUM AND DEXTROSE 10000; 5 [USP'U]/100ML; G/100ML
1000 INJECTION INTRAVENOUS ONCE
Status: COMPLETED | OUTPATIENT
Start: 2019-02-25 | End: 2019-02-25

## 2019-02-25 RX ORDER — HEPARIN SODIUM AND DEXTROSE 10000; 5 [USP'U]/100ML; G/100ML
INJECTION INTRAVENOUS CONTINUOUS
Status: DISCONTINUED | OUTPATIENT
Start: 2019-02-25 | End: 2019-03-04

## 2019-02-25 RX ORDER — POTASSIUM CHLORIDE 20 MEQ/1
40 TABLET, EXTENDED RELEASE ORAL ONCE
Status: COMPLETED | OUTPATIENT
Start: 2019-02-25 | End: 2019-02-25

## 2019-02-25 RX ORDER — TORSEMIDE 20 MG/1
20 TABLET ORAL DAILY
Status: DISCONTINUED | OUTPATIENT
Start: 2019-02-25 | End: 2019-03-02

## 2019-02-25 RX ORDER — WARFARIN SODIUM 5 MG/1
5 TABLET ORAL
Status: COMPLETED | OUTPATIENT
Start: 2019-02-25 | End: 2019-02-25

## 2019-02-25 RX ORDER — TORSEMIDE 20 MG/1
20 TABLET ORAL 2 TIMES DAILY
Status: ON HOLD | COMMUNITY
Start: 2019-02-25 | End: 2019-04-17

## 2019-02-25 NOTE — PROGRESS NOTES
[facility]  Progress Note    Sandra Munroe Patient Status:  Inpatient    1940 MRN [id number]   [facility] 2NE-A Attending Vince Rockwell MD   [facility] Day # 4 PCP Merly Cunningham MD     ASSESSMENT     #Hypoxic and borderline hyp HTN (HCC)     BPPV (benign paroxysmal positional vertigo)     Arthritis of shoulder region, left, degenerative     Essential hypertension     ICD (implantable cardioverter-defibrillator) in place     Neuropathy     Plantar fasciitis     Anticoagulated on C kg)  10/16/18 : 194 lb (88 kg)  09/29/18 : 194 lb (88 kg)  09/28/18 : 196 lb (88.9 kg)  09/07/18 : 194 lb (88 kg)  08/31/18 : 197 lb 15.6 oz (89.8 kg)      Physical Exam:  General: alert, oriented, no apparent distress  Heart: Regular rate and rhythm, norm Pantoprazole Sodium (PROTONIX) 40 mg in Sodium Chloride 0.9 % 10 mL IV push 40 mg Intravenous Q24H   ferrous sulfate EC tab 325 mg 325 mg Oral BID with meals   ipratropium-albuterol (DUONEB) nebulizer solution 3 mL 3 mL Nebulization Q6H WA   amiodarone H

## 2019-02-25 NOTE — PLAN OF CARE
PLAN OF CARE - SHIFT NOTE      Patient is ANOx4, on RA with goal O2 > 89% PRN O2, tele A Fib (patient also has AICD), cont B/B, up with SBA + walker. RLE (shin) wound care changing dressings. Monitoring Hgb, holding coumadin until GI Workup completed.  Ana

## 2019-02-25 NOTE — PAYOR COMM NOTE
--------------  CONTINUED STAY REVIEW    Payor: BCBS MEDICARE ADV PPO  Subscriber #:  [id number]  Authorization Number: [id number]    Admit date: 2/21/19  Admit time: 4996    Admitting Physician: Miguelangel Unger MD  Attending Physician:  Domi Overton MD --    --    ALT  58*  44   --    --    --    BILT  0.7  0.5   --    --    --    TP  7.6  6.2*   --    --    --          Estimated Creatinine Clearance: 50.7 mL/min (based on SCr of 0.87 mg/dL).            Recent Labs   Lab  02/22/19   0425  02/23/19   0174 pt and then decide wether or not an EGD is to be considered and that if active bleeding was noted on EGD GI could treat it and this would ensure safe AG.  I reiterated that this is all pending GI expert opinion.      Yaya Angelo MD             Electron

## 2019-02-25 NOTE — PROGRESS NOTES
[facility] Progress Note    Patient seen and examined.  Chart reviewed.      No chest pain or shortness of breath.   Did have a coughing spell this AM requiring BiPAP    BP 93/59 (BP Location: Right arm)   Pulse 83   Temp 97.8 °F (36.6 °C) (Oral)   Resp solution 15 mg 15 mg Nebulization Once   [COMPLETED] Ipratropium Bromide (ATROVENT) 0.02 % nebulizer solution 0.5 mg 0.5 mg Nebulization Once   [COMPLETED] Piperacillin Sod-Tazobactam So (ZOSYN) 3.375 g in dextrose 5 % 100 mL ADD-vantage 3.375 g Intravenou of 7.3 on this admit  3. Chronic anemia  4. Medtronic ICD with h/o VT - on amio  5.  H/o DVT - IVC filter in 2017, on Coumadin as OP, which GI cleared her to resume.   Can start taking home dose tonight    No objection with d/c home; needs OP discussion w

## 2019-02-25 NOTE — PLAN OF CARE
Problem: RESPIRATORY - ADULT  Goal: Achieves optimal ventilation and oxygenation  INTERVENTIONS:  - Assess for changes in respiratory status  - Assess for changes in mentation and behavior  - Position to facilitate oxygenation and minimize respiratory effo blood products/factors as ordered and appropriate  Outcome: Progressing      Problem: MUSCULOSKELETAL - ADULT  Goal: Return mobility to safest level of function  INTERVENTIONS:  - Assess patient stability and activity tolerance for standing, transferring a well. POC discussed with Pt., and her daughter Iva Meyer (when she called), and their questions answered. Call light in reach. Report given to night nurse at this time with bedside rounding.

## 2019-02-25 NOTE — DISCHARGE SUMMARY
[facility] HOSPITALIST  DISCHARGE SUMMARY     Radha Naylor Patient Status:  Inpatient    1940 MRN [id number]   [facility] 2NE-A Attending Vanessa Castellano MD   [facility] Day # 4 PCP Scotty Olivas MD     Date of Admission: 2019  Date of acetaminophen 500 MG Tabs  Commonly known as:  TYLENOL EXTRA STRENGTH      Take 500 mg by mouth every 6 (six) hours as needed for Pain.    Refills:  0     amiodarone HCl 200 MG Tabs  Commonly known as:  PACERONE      Take 200 mg by mouth 2 (two) times david specified.   Appointments for Next 30 Days 2/25/2019 - 3/27/2019    None          Vital signs:  Temp:  [97.3 °F (36.3 °C)-98.4 °F (36.9 °C)] 97.8 °F (36.6 °C)  Pulse:  [74-95] 83  Resp:  [16-20] 18  BP: ()/(55-76) 93/59    Physical Exam:    General: N

## 2019-02-25 NOTE — RESPIRATORY THERAPY NOTE
Patient did not use CPAP  for RADHA last night because she said she just uses oxygen, no cpap & none at home

## 2019-02-25 NOTE — PROGRESS NOTES
[facility] H[facility]PITAL[facility]  Progress Note     Isa Niece Patient Status:  Inpatient    1940 MRN [id number]   [facility] 2NE-A Attending Tai Hernández MD   [facility] Day # 4 PCP Armando Iraheta MD     Chief Complaint: sob    S: Patient feels 103   CO2  25.0  31.0   --   28.0  28.0  30.0   ALKPHO  178*  137   --    --    --    --    AST  62*  33   --    --    --    --    ALT  58*  44   --    --    --    --    BILT  0.7  0.5   --    --    --    --    TP  7.6  6.2*   --    --    --    --     < > about resuming Coumadin today per cards and GI       Corine Chappell MD

## 2019-02-25 NOTE — PAYOR COMM NOTE
--------------  CONTINUED STAY REVIEW    Payor: [employer] MEDICARE ADV PPO  Subscriber #:  [id number]  Authorization Number: [id number]    Admit date: 19  Admit time:  Leon Laurent Patient Status:  Inpatient    1940 MRN [id number]   L 8.6  8.6  8.6   ALB  3.1*  2.5*   --    --    --    --    NA  137  140   --   141  142  140   K  4.3  3.2*   < >  4.2  3.9  3.7   CL  105  104   --   106  107  103   CO2  25.0  31.0   --   28.0  28.0  30.0   ALKPHO  178*  137   --    --    --    --    AS expected to be discharge to: tbd     Plan of care discussed with pt, GI      D/w daughter- very upset that nobody told her that Coumadin was on hold on 2/22 and 2/23, d/w daughter about resuming Coumadin today per cards and Amanda Valladares MD       Given 3 mL Nebulization Zandra Shultz RCP    2/25/2019 1538 Given 3 mL Nebulization Zandra Shultz RCP    2/24/2019 1901 Given 3 mL Nebulization Perri Mena, FRAN      Losartan Potassium (COZAAR) tab 25 mg     Date Action Dose Route User    2/25/20

## 2019-02-25 NOTE — WOUND PROGRESS NOTE
[facility]  Report of Inpatient Wound Care Progress Note    Reford Mariluz Patient Status:  Inpatient    1940 MRN [id number]   [facility] 2NE-A Attending Herber Madrid MD   Hosp Day # 4 PCP Kathi Smalls MD       SUBJECTIVE: • Subdural hematoma (HCC) 12/01/2016    s/p L craniotomy   • Varicose vein 9/18/2015   • Vitamin D deficiency 2012     Family History   Problem Relation Age of Onset   • Diabetes Father    • Heart Disease Father    • Diabetes Mother    • Diabetes Sister ALEXI  1.00   --    --   0.88   --   0.86   --   0.87   --    --    --   0.89   --    --    BUN  20*   --    --   17   --   24*   --   25*   --    --    --   24*   --    --    GLU  136*   --    --   80   --   74   --   72   --    --    --   82   -- consultation and for allowing me to participate in the care of your patient. Please call me at [phone] or page me at [phone] if you have any questions about this consultation and plan of care.   If unable to reach me at these, please call the Inpatient Wound C

## 2019-02-26 LAB
ANION GAP SERPL CALC-SCNC: 8 MMOL/L (ref 0–18)
APTT PPP: 39.7 SECONDS (ref 26.1–34.6)
APTT PPP: 49.2 SECONDS (ref 26.1–34.6)
APTT PPP: 70.5 SECONDS (ref 26.1–34.6)
BUN BLD-MCNC: 24 MG/DL (ref 7–18)
BUN/CREAT SERPL: 27.9 (ref 10–20)
CALCIUM BLD-MCNC: 8.4 MG/DL (ref 8.5–10.1)
CHLORIDE SERPL-SCNC: 104 MMOL/L (ref 98–107)
CO2 SERPL-SCNC: 28 MMOL/L (ref 21–32)
CREAT BLD-MCNC: 0.86 MG/DL (ref 0.55–1.02)
GLUCOSE BLD-MCNC: 117 MG/DL (ref 70–99)
GLUCOSE BLD-MCNC: 117 MG/DL (ref 70–99)
GLUCOSE BLD-MCNC: 66 MG/DL (ref 70–99)
GLUCOSE BLD-MCNC: 74 MG/DL (ref 70–99)
INR BLD: 1.1 (ref 0.9–1.1)
OSMOLALITY SERPL CALC.SUM OF ELEC: 292 MOSM/KG (ref 275–295)
PLATELET # BLD AUTO: 243 10(3)UL (ref 150–450)
POTASSIUM SERPL-SCNC: 3.8 MMOL/L (ref 3.5–5.1)
POTASSIUM SERPL-SCNC: 3.8 MMOL/L (ref 3.5–5.1)
PSA SERPL DL<=0.01 NG/ML-MCNC: 14.7 SECONDS (ref 12.4–14.7)
SODIUM SERPL-SCNC: 140 MMOL/L (ref 136–145)

## 2019-02-26 PROCEDURE — 99232 SBSQ HOSP IP/OBS MODERATE 35: CPT | Performed by: HOSPITALIST

## 2019-02-26 RX ORDER — WARFARIN SODIUM 5 MG/1
5 TABLET ORAL
Status: COMPLETED | OUTPATIENT
Start: 2019-02-26 | End: 2019-02-26

## 2019-02-26 NOTE — PROGRESS NOTES
[facility]PITAL[facility]  Progress Note     Reford Mariluz Patient Status:  Inpatient    1940 MRN [id number]   [facility] 2NE-A Attending Herber Madrid MD   Hosp Day # 5 PCP Kathi Smalls MD     Chief Complaint: sob    S: Patient denies 28.0  30.0  28.0   ALKPHO  178*  137   --    --    --    --    AST  62*  33   --    --    --    --    ALT  58*  44   --    --    --    --    BILT  0.7  0.5   --    --    --    --    TP  7.6  6.2*   --    --    --    --     < > = values in this interval not

## 2019-02-26 NOTE — PROGRESS NOTES
[facility] Cardiology Progress Note    Patient seen and examined.  Chart reviewed.  Discussed with RN. No chest pain or shortness of breath. Still with coughing spell in AM, getting breathing tx now. /55 (BP Location: Right arm)   Pulse 80   Temp 97. (LOPRESSOR) tab 50 mg 50 mg Oral 2x Daily(Beta Blocker)   folic acid (FOLVITE) tab 1 mg 1 mg Oral Daily   [COMPLETED] Potassium Chloride ER (K-DUR M20) CR tab 40 mEq 40 mEq Oral Q4H   Sertraline HCl (ZOLOFT) tab 25 mg 25 mg Oral Daily   [COMPLETED] albuter DVT    Will follow,    Yokasta Feliciano MD

## 2019-02-26 NOTE — PAYOR COMM NOTE
--------------  CONTINUED STAY REVIEW----REQUESTING ADDITIONAL DAYS 2/26        Payor: Bernard BOWEN  Subscriber #:  [id number]  Authorization Number: [id number]    Admit date: 2/21/19  Admit time: 4255    Admitting Physician: Holly Garcia MD heparin (PORCINE) drip 67281hiiyx/250mL infusion CONTINUOUS 200-3,000 Units/hr Intravenous Continuous   [COMPLETED] Warfarin Sodium (COUMADIN) tab 5 mg 5 mg Oral Once at night   Pantoprazole Sodium (PROTONIX) 40 mg in Sodium Chloride 0.9 % 10 mL IV push 40 ipratropium-albuterol (DUONEB) nebulizer solution 3 mL 3 mL Nebulization Q4H PRN   [COMPLETED] iohexol (OMNIPAQUE) 350 MG/ML injection 100 mL 100 mL Intravenous ONCE PRN   [COMPLETED] Warfarin Sodium (COUMADIN) tab 5 mg 5 mg Oral Once at night      Imp/Rec Date Action Dose Route User    2/25/2019 1707 Given 4 Units Subcutaneous (Left Upper Arm) Judy Hammond RN    2/25/2019 1217 Given 4 Units Subcutaneous (Left Upper Arm) Judy Hammond RN      insulin detemir (LEVEMIR) 100 UNIT/ML flextouch 8

## 2019-02-26 NOTE — PLAN OF CARE
PLAN OF CARE - SHIFT NOTE      Patient is ANOx4, on RA (O2 PRN, refuses CPAP @ [address]), tele A Fib, cont B/B, up with SBA + walker. Patient is currently on heparin gtt being bridged to coumadin. See eMAR. Monitor PTT & INR. DC pending therapeutic INR.  Luzmaria Ferreira

## 2019-02-27 LAB
APTT PPP: 74.5 SECONDS (ref 26.1–34.6)
GLUCOSE BLD-MCNC: 108 MG/DL (ref 70–99)
GLUCOSE BLD-MCNC: 132 MG/DL (ref 70–99)
GLUCOSE BLD-MCNC: 208 MG/DL (ref 70–99)
GLUCOSE BLD-MCNC: 83 MG/DL (ref 70–99)
GLUCOSE BLD-MCNC: 99 MG/DL (ref 70–99)
INR BLD: 1.04 (ref 0.9–1.1)
PLATELET # BLD AUTO: 241 10(3)UL (ref 150–450)
PSA SERPL DL<=0.01 NG/ML-MCNC: 14 SECONDS (ref 12.5–14.7)

## 2019-02-27 PROCEDURE — 99232 SBSQ HOSP IP/OBS MODERATE 35: CPT | Performed by: HOSPITALIST

## 2019-02-27 RX ORDER — PANTOPRAZOLE SODIUM 40 MG/1
40 TABLET, DELAYED RELEASE ORAL
Status: DISCONTINUED | OUTPATIENT
Start: 2019-02-28 | End: 2019-03-04

## 2019-02-27 RX ORDER — WARFARIN SODIUM 7.5 MG/1
7.5 TABLET ORAL NIGHTLY
Status: DISCONTINUED | OUTPATIENT
Start: 2019-02-27 | End: 2019-02-28

## 2019-02-27 NOTE — PROGRESS NOTES
[facility]PITAL[facility]  Progress Note     Petros Rocha Patient Status:  Inpatient    1940 MRN [id number]   [facility] 2NE-A Attending Saurabh Pimentel MD   [facility] Day # 6 PCP Belinda Jordan MD     Chief Complaint: sob    S: Patient denies 104   CO2  25.0  31.0   < >  28.0  30.0  28.0   ALKPHO  178*  137   --    --    --    --    AST  62*  33   --    --    --    --    ALT  58*  44   --    --    --    --    BILT  0.7  0.5   --    --    --    --    TP  7.6  6.2*   --    --    --    --     < >

## 2019-02-27 NOTE — PROGRESS NOTES
[facility]  Cardiology Progress Note    Subjective:  No chest pain or shortness of breath. Volume looks good. Afib rates controlled. INR slowly rising. Appears to be tolerating heparin well.     Objective:  /61 (BP Location: Right arm)   Puls greater    VIKKI Gutiérrez  2/27/2019  10:06 AM      =======================================================  Patient seen and examined independently. Note reviewed and labs reviewed. Agree with above assessment and plan.   Reports breathing and edema

## 2019-02-27 NOTE — PROGRESS NOTES
[facility] Note: Route Optimization for Pantoprazole (PROTONIX)    Patient is currently on Pantoprazole (PROTONIX) 40 mg IV every 24 hours.    The patient meets the criteria to convert to the oral equivalent as established by the IV to Oral conversion pro

## 2019-02-27 NOTE — DIETARY NOTE
[facility]   CLINICAL NUTRITION    Krista Vazquez     Admitting diagnosis:  COPD exacerbation ([facility] [J44.1]  Congestive heart failure, unspecified HF chronicity, unspecified heart failure type ([facility] [I50.9]    Ht:  5'6\"  Wt: 90.4 kg (199 lb 4.7 oz).

## 2019-02-27 NOTE — PAYOR COMM NOTE
--------------  CONTINUED STAY REVIEW----REQUESTING ADDITIONAL DAY 2/27      Payor: Rocio KEVIN PPO  Subscriber #:  [id number]  Authorization Number: [id number]    Admit date: 2/21/19  Admit time: 0568    Admitting Physician: Artemio Menchaca MD  At INR  1.60*  1.68*   --   1.44*  1.23*   --   1.06  1.10  1.04                  Recent Labs   Lab  02/21/19   0337  02/22/19   0425    02/24/19   0557  02/25/19   0522  02/26/19   0534   GLU  136*  80   < >  72  82  66*   BUN  20*  17   < >  25*  24*  24* 4. Afib HR controlled,Coumadin and hep iv for now, patient and family refusing Lovenox shots at home, will d/w cards about Eliquis/Xarelto  5. Anemia hemocc neg, Hb stable     Plan of care:   FOBT is negative- Fe and Folate supplementation.    Diuresis, Cou 2/27/2019 0940 Given 3 mL Nebulization Ilene Lamb RCP    2/26/2019 2115 Given 3 mL Nebulization Vanessa Dodge      Losartan Potassium (COZAAR) tab 25 mg     Date Action Dose Route User    2/27/2019 0914 Given 25 mg Oral Liana Mar

## 2019-02-27 NOTE — CM/SW NOTE
02/27/19 1059   Discharge disposition   Expected discharge disposition Home or Self   Name of [facility]   Patient Refuses Rehab Services Yes     Patient declines home health.   / to remain available for sup

## 2019-02-28 VITALS — HEART RATE: 72 BPM | WEIGHT: 203 LBS | SYSTOLIC BLOOD PRESSURE: 128 MMHG | DIASTOLIC BLOOD PRESSURE: 64 MMHG

## 2019-02-28 VITALS
BODY MASS INDEX: 30.81 KG/M2 | WEIGHT: 191.7 LBS | HEART RATE: 64 BPM | HEIGHT: 66 IN | DIASTOLIC BLOOD PRESSURE: 68 MMHG | SYSTOLIC BLOOD PRESSURE: 125 MMHG

## 2019-02-28 VITALS — SYSTOLIC BLOOD PRESSURE: 144 MMHG | DIASTOLIC BLOOD PRESSURE: 70 MMHG | WEIGHT: 199 LBS | HEART RATE: 61 BPM

## 2019-02-28 VITALS
BODY MASS INDEX: 31.34 KG/M2 | SYSTOLIC BLOOD PRESSURE: 118 MMHG | WEIGHT: 195 LBS | HEART RATE: 64 BPM | DIASTOLIC BLOOD PRESSURE: 64 MMHG | HEIGHT: 66 IN

## 2019-02-28 VITALS — WEIGHT: 194 LBS | SYSTOLIC BLOOD PRESSURE: 130 MMHG | DIASTOLIC BLOOD PRESSURE: 60 MMHG | HEART RATE: 62 BPM

## 2019-02-28 VITALS
WEIGHT: 200 LBS | BODY MASS INDEX: 32.14 KG/M2 | DIASTOLIC BLOOD PRESSURE: 92 MMHG | SYSTOLIC BLOOD PRESSURE: 140 MMHG | HEIGHT: 66 IN | HEART RATE: 85 BPM

## 2019-02-28 LAB
APTT PPP: 59.4 SECONDS (ref 26.1–34.6)
GLUCOSE BLD-MCNC: 110 MG/DL (ref 70–99)
GLUCOSE BLD-MCNC: 130 MG/DL (ref 70–99)
GLUCOSE BLD-MCNC: 143 MG/DL (ref 70–99)
GLUCOSE BLD-MCNC: 81 MG/DL (ref 70–99)
INR BLD: 1.13 (ref 0.9–1.1)
PLATELET # BLD AUTO: 223 10(3)UL (ref 150–450)
PSA SERPL DL<=0.01 NG/ML-MCNC: 15 SECONDS (ref 12.5–14.7)

## 2019-02-28 PROCEDURE — 99232 SBSQ HOSP IP/OBS MODERATE 35: CPT | Performed by: HOSPITALIST

## 2019-02-28 RX ORDER — WARFARIN SODIUM 10 MG/1
10 TABLET ORAL
Status: COMPLETED | OUTPATIENT
Start: 2019-02-28 | End: 2019-02-28

## 2019-02-28 NOTE — PLAN OF CARE
Pt rec'd sleeping in chair , no distress or discomfort noted. Pt is easy to awaken. Pt sleeping in chair as she cannot tolerate CPAP. O2 sats on RA while awake adequate. Heparin therapeutic on 1300units, INR remains subtherapeutic @ 1.13.   Spoke with roz

## 2019-02-28 NOTE — PLAN OF CARE
DISCHARGE PLANNING    • Discharge to home or other facility with appropriate resources Not Progressing        HEMATOLOGIC - ADULT    • Maintains hematologic stability Not Progressing        Impaired Activities of Daily Living    • Achieve highest/safest le

## 2019-02-28 NOTE — PROGRESS NOTES
[facility]  Cardiology Progress Note    Subjective:  No chest pain or shortness of breath. Resting quietly.     Objective:  /65 (BP Location: Left arm)   Pulse 74   Temp 97.6 °F (36.4 °C) (Oral)   Resp 18   Wt 206 lb 12.7 oz (93.8 kg)   SpO2 96% tonight only and reassess INR in a.m.  - Continue IVUFH until INR 2.0 or greater  - Ongoing local care to chronic (R) outer distal leg wound - follows Dr. Abel Cortes as outpatient in wound clinic    Anabelle Hernandez, VIKKI  2/28/2019  11:25 AM

## 2019-02-28 NOTE — PAYOR COMM NOTE
--------------  CONTINUED STAY REVIEW    Payor: [facility] MEDICARE ADV PPO  Subscriber #:  [id number]  Authorization Number: [id number]    Admit date: 2/21/19  Admit time: 2280    Admitting Physician: Gabriel Salas MD  Attending Physician:  Kadi Roberto MD [facility]. Daughter does not want to do Lovenox at home reportedly, but does want her bridged/therapeutic prior to d/c.   · Anemia.   Stool OB negative.   · Medtronic ICD w/ h/o VT with shocks therefore on Amiodarone  · H/o DVT s/p thrombectomy and IVC filter

## 2019-03-01 ENCOUNTER — APPOINTMENT (OUTPATIENT)
Dept: CV DIAGNOSTICS | Facility: HOSPITAL | Age: 79
DRG: 291 | End: 2019-03-01
Attending: NURSE PRACTITIONER
Payer: MEDICARE

## 2019-03-01 VITALS — HEART RATE: 84 BPM | WEIGHT: 215 LBS | DIASTOLIC BLOOD PRESSURE: 50 MMHG | SYSTOLIC BLOOD PRESSURE: 124 MMHG

## 2019-03-01 LAB
APTT PPP: 64 SECONDS (ref 26.1–34.6)
GLUCOSE BLD-MCNC: 110 MG/DL (ref 70–99)
GLUCOSE BLD-MCNC: 85 MG/DL (ref 70–99)
GLUCOSE BLD-MCNC: 87 MG/DL (ref 70–99)
GLUCOSE BLD-MCNC: 91 MG/DL (ref 70–99)
INR BLD: 1.25 (ref 0.9–1.1)
PSA SERPL DL<=0.01 NG/ML-MCNC: 16.3 SECONDS (ref 12.5–14.7)

## 2019-03-01 PROCEDURE — 99231 SBSQ HOSP IP/OBS SF/LOW 25: CPT | Performed by: HOSPITALIST

## 2019-03-01 PROCEDURE — 93306 TTE W/DOPPLER COMPLETE: CPT | Performed by: NURSE PRACTITIONER

## 2019-03-01 RX ORDER — WARFARIN SODIUM 7.5 MG/1
7.5 TABLET ORAL
Status: COMPLETED | OUTPATIENT
Start: 2019-03-01 | End: 2019-03-01

## 2019-03-01 NOTE — PROGRESS NOTES
· [facility]      Subjective:  Dyspnea and edema resolved now waiting for INR to increase    Objective:  Afebrile 109/55   AFib 70s   I/O incomplete   INR 1.25    Neuro: awake/alert  HEENT: no JVD moist mucosa  Cardiac: S1 S2 irregular 2/6 sys continue for now. Will redose coumadin and hopefully home soon. Will continue to try and talk with the daughter regarding anticoagulation.   Sharad Moreira MD  [facility]  Cardiac Electrophysiolgy

## 2019-03-01 NOTE — PROGRESS NOTES
[facility]  Progress Note     Betty Dennison Patient Status:  Inpatient    1940 MRN [id number]   [facility] 2NE-A Attending Saima Dawson MD   [facility] Day # 8 PCP Karen Pena MD     Chief Complaint: sob    S: Patient and fa 02/28/19   0544  03/01/19   0551   PTP  14.0  15.0*  16.3*   INR  1.04  1.13*  1.25*       No results for input(s): TROP, CK in the last 168 hours. Imaging: Imaging data reviewed in Epic.     Medications:   • Warfarin Sodium  7.5 mg Oral Once at nig

## 2019-03-01 NOTE — PROGRESS NOTES
[facility]  Progress Note     Isa Ortiz Patient Status:  Inpatient    1940 MRN [id number]   [facility] 2NE-A Attending Tai Hernández MD   [facility] Day # 7 PCP Armando Iraheta MD     Chief Complaint: sob    S: Patient denies --    --    --    AST  33   --    --    --    --    ALT  44   --    --    --    --    BILT  0.5   --    --    --    --    TP  6.2*   --    --    --    --     < > = values in this interval not displayed.        Estimated Creatinine Clearance: 51.3 mL/min (ba

## 2019-03-01 NOTE — PAYOR COMM NOTE
--------------  CONTINUED STAY REVIEW    Payor: [employer] MEDICARE ADV PPO  Subscriber #:  [id number]  Authorization Number: [id number]    Admit date: 2/21/19  Admit time: 8942    Admitting Physician: Tammy Mejia MD  Attending Physician:  Vince Rockwell MD

## 2019-03-01 NOTE — PROGRESS NOTES
Called and informed the patient's daughter that the echo was being performed today. Also, informed her MV workup will be done as OP if they opt to proceed. Will call her later today with results.

## 2019-03-01 NOTE — PROGRESS NOTES
Received a call from the patient's daughter Jonathon Bradford stating that Dr. Jackie Will informed her on Saturday that the echo should be repeated once the fluid volume is decreased. Called CRAIG Naik and informed her of the daughter's concerns.  Romeo Martinez

## 2019-03-02 LAB
ANION GAP SERPL CALC-SCNC: 7 MMOL/L (ref 0–18)
APTT PPP: 58.8 SECONDS (ref 26.1–34.6)
APTT PPP: 91.8 SECONDS (ref 26.1–34.6)
BUN BLD-MCNC: 20 MG/DL (ref 7–18)
BUN/CREAT SERPL: 20.8 (ref 10–20)
CALCIUM BLD-MCNC: 8.5 MG/DL (ref 8.5–10.1)
CHLORIDE SERPL-SCNC: 107 MMOL/L (ref 98–107)
CO2 SERPL-SCNC: 27 MMOL/L (ref 21–32)
CREAT BLD-MCNC: 0.96 MG/DL (ref 0.55–1.02)
DEPRECATED RDW RBC AUTO: 53 FL (ref 35.1–46.3)
ERYTHROCYTE [DISTWIDTH] IN BLOOD BY AUTOMATED COUNT: 20.8 % (ref 11–15)
GLUCOSE BLD-MCNC: 106 MG/DL (ref 70–99)
GLUCOSE BLD-MCNC: 113 MG/DL (ref 70–99)
GLUCOSE BLD-MCNC: 129 MG/DL (ref 70–99)
GLUCOSE BLD-MCNC: 76 MG/DL (ref 70–99)
GLUCOSE BLD-MCNC: 83 MG/DL (ref 70–99)
HCT VFR BLD AUTO: 28.9 % (ref 35–48)
HGB BLD-MCNC: 8.5 G/DL (ref 12–16)
INR BLD: 1.44 (ref 0.9–1.1)
MCH RBC QN AUTO: 21.9 PG (ref 26–34)
MCHC RBC AUTO-ENTMCNC: 29.4 G/DL (ref 31–37)
MCV RBC AUTO: 74.3 FL (ref 80–100)
OSMOLALITY SERPL CALC.SUM OF ELEC: 294 MOSM/KG (ref 275–295)
PLATELET # BLD AUTO: 223 10(3)UL (ref 150–450)
POTASSIUM SERPL-SCNC: 4.3 MMOL/L (ref 3.5–5.1)
PSA SERPL DL<=0.01 NG/ML-MCNC: 18.3 SECONDS (ref 12.5–14.7)
RBC # BLD AUTO: 3.89 X10(6)UL (ref 3.8–5.3)
SODIUM SERPL-SCNC: 141 MMOL/L (ref 136–145)
WBC # BLD AUTO: 6.3 X10(3) UL (ref 4–11)

## 2019-03-02 PROCEDURE — 99233 SBSQ HOSP IP/OBS HIGH 50: CPT | Performed by: HOSPITALIST

## 2019-03-02 RX ORDER — TORSEMIDE 20 MG/1
20 TABLET ORAL
Status: DISCONTINUED | OUTPATIENT
Start: 2019-03-02 | End: 2019-03-04

## 2019-03-02 RX ORDER — WARFARIN SODIUM 10 MG/1
10 TABLET ORAL NIGHTLY
Status: COMPLETED | OUTPATIENT
Start: 2019-03-02 | End: 2019-03-02

## 2019-03-02 NOTE — PROGRESS NOTES
[facility] Cardiology Progress Note    Subjective:  Feels well. Daughter relates takes torsemide twice daily at home.      Objective:  /90 (BP Location: Left arm)   Pulse 89   Temp 97.7 °F (36.5 °C) (Oral)   Resp 18   Wt 199 lb 11.8 oz (90.6 kg)   SpO2 9 improved, HR controlled.      Zarina Cruz MD  [facility]  Cardiac Electrophysiolgy

## 2019-03-02 NOTE — PLAN OF CARE
Problem: RESPIRATORY - ADULT  Goal: Achieves optimal ventilation and oxygenation  INTERVENTIONS:  - Assess for changes in respiratory status  - Assess for changes in mentation and behavior  - Position to facilitate oxygenation and minimize respiratory effo return call pending, insulin held at this time  Goal: Electrolytes maintained within normal limits  INTERVENTIONS:  - Monitor labs and rhythm and assess patient for signs and symptoms of electrolyte imbalances  - Administer electrolyte replacement as order feeding, grooming, and bathing  - Educate and encourage patient/family in tolerated functional activity level and precautions during self-care    Outcome: Progressing  Stable  Needs assistance with ambulation    Problem: SAFETY ADULT - FALL  Goal: Free fro given

## 2019-03-02 NOTE — PROGRESS NOTES
[facility]  Progress Note     Karyle Miranda Patient Status:  Inpatient    1940 MRN [id number]   [facility] 2NE-A Attending Domi Overton MD   [facility] Day # 9 PCP Dyana Torre MD     Chief Complaint: sob    S: Patient denies Imaging: Imaging data reviewed in Epic.     Medications:   • Warfarin Sodium  10 mg Oral Nightly   • torsemide  20 mg Oral BID (Diuretic)   • Pantoprazole Sodium  40 mg Oral QAM AC   • ferrous sulfate  325 mg Oral BID with meals   • ipratropium-albutero

## 2019-03-03 LAB
ANION GAP SERPL CALC-SCNC: 7 MMOL/L (ref 0–18)
APTT PPP: 71.1 SECONDS (ref 26.1–34.6)
BUN BLD-MCNC: 24 MG/DL (ref 7–18)
BUN/CREAT SERPL: 23.8 (ref 10–20)
CALCIUM BLD-MCNC: 8.6 MG/DL (ref 8.5–10.1)
CHLORIDE SERPL-SCNC: 104 MMOL/L (ref 98–107)
CO2 SERPL-SCNC: 29 MMOL/L (ref 21–32)
CREAT BLD-MCNC: 1.01 MG/DL (ref 0.55–1.02)
GLUCOSE BLD-MCNC: 114 MG/DL (ref 70–99)
GLUCOSE BLD-MCNC: 115 MG/DL (ref 70–99)
GLUCOSE BLD-MCNC: 160 MG/DL (ref 70–99)
GLUCOSE BLD-MCNC: 79 MG/DL (ref 70–99)
INR BLD: 1.77 (ref 0.9–1.1)
OSMOLALITY SERPL CALC.SUM OF ELEC: 293 MOSM/KG (ref 275–295)
POTASSIUM SERPL-SCNC: 3.8 MMOL/L (ref 3.5–5.1)
PSA SERPL DL<=0.01 NG/ML-MCNC: 21.6 SECONDS (ref 12.5–14.7)
SODIUM SERPL-SCNC: 140 MMOL/L (ref 136–145)

## 2019-03-03 PROCEDURE — 99232 SBSQ HOSP IP/OBS MODERATE 35: CPT | Performed by: HOSPITALIST

## 2019-03-03 RX ORDER — WARFARIN SODIUM 10 MG/1
10 TABLET ORAL NIGHTLY
Status: COMPLETED | OUTPATIENT
Start: 2019-03-03 | End: 2019-03-03

## 2019-03-03 RX ORDER — IPRATROPIUM BROMIDE AND ALBUTEROL SULFATE 2.5; .5 MG/3ML; MG/3ML
3 SOLUTION RESPIRATORY (INHALATION) EVERY 6 HOURS PRN
Status: DISCONTINUED | OUTPATIENT
Start: 2019-03-03 | End: 2019-03-04

## 2019-03-03 RX ORDER — IPRATROPIUM BROMIDE AND ALBUTEROL SULFATE 2.5; .5 MG/3ML; MG/3ML
3 SOLUTION RESPIRATORY (INHALATION) EVERY 6 HOURS PRN
Status: DISCONTINUED | OUTPATIENT
Start: 2019-03-03 | End: 2019-03-03

## 2019-03-03 NOTE — PLAN OF CARE
Discussed with Dr. Oanh Issa , POC glucose results and Insulin dosing. Patient has not met parameters to given Novolog today,  AM blood sugar fasting 76. Patient takes Metformin at home. Order received  To hold all insulins today and tonight.

## 2019-03-03 NOTE — PROGRESS NOTES
Patient alert and oriented. SpO2 98% on room air. Afib on tele. Patient denies any pain or SOB. Patient is up with her own wheelchair and standby. PTT and INR in the AM.  Call light is within reach, will continue to monitor.

## 2019-03-03 NOTE — PROGRESS NOTES
[facility]  Progress Note     Ksenia Gutierrez Patient Status:  Inpatient    1940 MRN [id number]   [facility] 2NE-A Attending Balta Vu MD   Hosp Day # 10 PCP Isak Vasquez MD     Chief Complaint: sob    S: Patient jesus results for input(s): TROP, CK in the last 168 hours. Imaging: Imaging data reviewed in Epic.     Medications:   • Warfarin Sodium  10 mg Oral Nightly   • torsemide  20 mg Oral BID (Diuretic)   • Insulin Aspart Pen  1-10 Units Subcutaneous TID AC an

## 2019-03-03 NOTE — PHYSICAL THERAPY NOTE
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    bilat 1990s, both severe OA   • Seizure disorder [facility])     seizure when hospitalized for intracranial hemorrhage   • Sleep apnea    • Stroke [facility])    • Subdural hematoma ([facility] 12/ Position: Sitting    O2 WALK     SPO2 Ambulation on Room Air: 99      INTEGUMENTARY  Chronic R LE ulcer - dressings intact, c/d/i  BLE edema R>L      AM-PAC '6-Clicks' INPATIENT SHORT FORM - BASIC MOBILITY  How much difficulty does the patient currently ha presentation is stable and overall evaluation complexity is considered low. Pt appropriate for d/c home. PT Discharge Recommendations: Home    PLAN  Patient has been evaluated and presents with no skilled Physical Therapy needs at this time.   Patient dis

## 2019-03-03 NOTE — PROGRESS NOTES
[facility] Cardiology Progress Note    Subjective:  No complaints.      Objective:  /75 (BP Location: Left arm)   Pulse 65   Temp 98 °F (36.7 °C) (Oral)   Resp 16   Wt 197 lb 5 oz (89.5 kg)   SpO2 97%   BMI 31.85 kg/m²     Telemetry: Afib, BBB, PVC     [facility]  Cardiac Electrophysiolgy

## 2019-03-03 NOTE — PLAN OF CARE
Problem: RESPIRATORY - ADULT  Goal: Achieves optimal ventilation and oxygenation  INTERVENTIONS:  - Assess for changes in respiratory status  - Assess for changes in mentation and behavior  - Position to facilitate oxygenation and minimize respiratory effo limits  INTERVENTIONS:  - Monitor labs and rhythm and assess patient for signs and symptoms of electrolyte imbalances  - Administer electrolyte replacement as ordered  - Monitor response to electrolyte replacements, including rhythm and repeat lab results Progressing  Needs assistance with bathing, independent with grooming as combing hair, brushing teeth  Able to feed herself    Problem: SAFETY ADULT - FALL  Goal: Free from fall injury  INTERVENTIONS:  - Assess pt frequently for physical needs  - Identify

## 2019-03-04 ENCOUNTER — PRIOR ORIGINAL RECORDS (OUTPATIENT)
Dept: OTHER | Age: 79
End: 2019-03-04

## 2019-03-04 VITALS
DIASTOLIC BLOOD PRESSURE: 85 MMHG | RESPIRATION RATE: 18 BRPM | BODY MASS INDEX: 32 KG/M2 | OXYGEN SATURATION: 98 % | TEMPERATURE: 98 F | WEIGHT: 196.88 LBS | SYSTOLIC BLOOD PRESSURE: 154 MMHG | HEART RATE: 88 BPM

## 2019-03-04 LAB
APTT PPP: 41.5 SECONDS (ref 26.1–34.6)
APTT PPP: 79.3 SECONDS (ref 26.1–34.6)
GLUCOSE BLD-MCNC: 106 MG/DL (ref 70–99)
GLUCOSE BLD-MCNC: 112 MG/DL (ref 70–99)
GLUCOSE BLD-MCNC: 131 MG/DL (ref 70–99)
GLUCOSE BLD-MCNC: 96 MG/DL (ref 70–99)
INR BLD: 2.41 (ref 0.9–1.1)
INR: 0
PSA SERPL DL<=0.01 NG/ML-MCNC: 27.8 SECONDS (ref 12.5–14.7)

## 2019-03-04 PROCEDURE — 99238 HOSP IP/OBS DSCHRG MGMT 30/<: CPT | Performed by: HOSPITALIST

## 2019-03-04 RX ORDER — WARFARIN SODIUM 5 MG/1
5 TABLET ORAL NIGHTLY
Refills: 0 | Status: SHIPPED | COMMUNITY
Start: 2019-03-04 | End: 2019-03-27

## 2019-03-04 RX ORDER — AMIODARONE HYDROCHLORIDE 200 MG/1
200 TABLET ORAL 2 TIMES DAILY
Refills: 0 | Status: ON HOLD | COMMUNITY
Start: 2019-03-04 | End: 2019-04-17

## 2019-03-04 RX ORDER — PANTOPRAZOLE SODIUM 40 MG/1
40 TABLET, DELAYED RELEASE ORAL
Qty: 30 TABLET | Refills: 0 | Status: SHIPPED | OUTPATIENT
Start: 2019-03-05 | End: 2019-10-11

## 2019-03-04 RX ORDER — FOLIC ACID 1 MG/1
1 TABLET ORAL DAILY
Qty: 60 TABLET | Refills: 0 | Status: SHIPPED | OUTPATIENT
Start: 2019-03-05 | End: 2019-10-11 | Stop reason: ALTCHOICE

## 2019-03-04 RX ORDER — MELATONIN
325 2 TIMES DAILY WITH MEALS
Qty: 60 TABLET | Refills: 0 | Status: ON HOLD | OUTPATIENT
Start: 2019-03-04 | End: 2019-04-17

## 2019-03-04 RX ORDER — ALBUTEROL SULFATE 90 UG/1
1 AEROSOL, METERED RESPIRATORY (INHALATION) EVERY 4 HOURS PRN
Qty: 1 INHALER | Refills: 0 | Status: SHIPPED | OUTPATIENT
Start: 2019-03-04 | End: 2021-08-30

## 2019-03-04 NOTE — PLAN OF CARE
Problem: RESPIRATORY - ADULT  Goal: Achieves optimal ventilation and oxygenation  INTERVENTIONS:  - Assess for changes in respiratory status  - Assess for changes in mentation and behavior  - Position to facilitate oxygenation and minimize respiratory effo Administer electrolyte replacement as ordered  - Monitor response to electrolyte replacements, including rhythm and repeat lab results as appropriate  - Fluid restriction as ordered  - Instruct patient on fluid and nutrition restrictions as appropriate  Ou frequently for physical needs  - Identify cognitive and physical deficits and behaviors that affect risk of falls.   - [place] fall precautions as indicated by assessment.  - Educate pt/family on patient safety including physical limitations  - Instruct p

## 2019-03-04 NOTE — WOUND PROGRESS NOTE
[facility]  Report of Inpatient Wound Care Progress Note    Mark Friday Patient Status:  Inpatient    1940 MRN [id number]   [facility] 2NE-A Attending Winnie Saavedra MD   [facility] Day # 11 PCP Keven Parkinson MD       SUBJECTIVE: ([facility] 12/01/2016    s/p L craniotomy   • Varicose vein 9/18/2015   • Vitamin D deficiency 2012     Family History   Problem Relation Age of Onset   • Diabetes Father    • Heart Disease Father    • Diabetes Mother    • Diabetes Sister    • Heart Disease Sis --    CREATSERUM  0.86   --    --    --    --    --   0.96   --   1.01   --    --    --    --    --    --    BUN  24*   --    --    --    --    --   20*   --   24*   --    --    --    --    --    --    GLU  66*   --    --    --    --    --   83   --   79

## 2019-03-04 NOTE — PROGRESS NOTES
[facility]  Cardiology Progress Note    Subjective:  No chest pain or shortness of breath.endorses feeling ready to go home    Objective:  /52 (BP Location: Left arm)   Pulse 69   Temp 97.4 °F (36.3 °C) (Oral)   Resp 18   Wt 196 lb 13.9 oz (89.3

## 2019-03-04 NOTE — CM/SW NOTE
Brigid from [facility] called [phone]. She can be called if pt has any dc needs. Soraya Paige, [address] [facility] /Dischage Planner  [phone]  Pager [phone]

## 2019-03-04 NOTE — PAYOR COMM NOTE
--------------  CONTINUED STAY REVIEW    Payor: [employer] MEDICARE ADV PPO  Subscriber #:  [id number]  Authorization Number: [id number]    Admit date: 19  Admit time: 3622  Potential DC 3/4/19  Ksenia Gutierrez Patient Status:  Inpatient    1940 MR Clearance: 43.7 mL/min (based on SCr of 1.01 mg/dL).            Recent Labs   Lab  03/01/19   0551  03/02/19   0551  03/03/19   0614   PTP  16.3*  18.3*  21.6*   INR  1.25*  1.44*  1.77*         No results for input(s): TROP, CK in the last 168 hours.       procedure.   · Medtronic ICD w/ h/o VT with shocks therefore on Amiodarone  · H/o DVT s/p thrombectomy and IVC filter 2017  · Right shin wound followed in [facility]:      · Continue heparin, redose coumadin, will give 10 mg today  · Increase tor

## 2019-03-05 ENCOUNTER — TELEPHONE (OUTPATIENT)
Dept: FAMILY MEDICINE CLINIC | Facility: CLINIC | Age: 79
End: 2019-03-05

## 2019-03-05 ENCOUNTER — PATIENT OUTREACH (OUTPATIENT)
Dept: CASE MANAGEMENT | Age: 79
End: 2019-03-05

## 2019-03-05 DIAGNOSIS — I50.23 ACUTE ON CHRONIC SYSTOLIC CONGESTIVE HEART FAILURE (HCC): ICD-10-CM

## 2019-03-05 DIAGNOSIS — Z02.9 ENCOUNTERS FOR UNSPECIFIED ADMINISTRATIVE PURPOSE: ICD-10-CM

## 2019-03-05 PROCEDURE — 1111F DSCHRG MED/CURRENT MED MERGE: CPT

## 2019-03-05 RX ORDER — DILTIAZEM HYDROCHLORIDE 120 MG/1
120 CAPSULE, COATED, EXTENDED RELEASE ORAL DAILY
COMMUNITY

## 2019-03-05 NOTE — PLAN OF CARE
Patient tele d/c, IV discontinued with catheter intact. VSS. Pt escorted to lobby via wheelchair by transport. All belongings sent with patient.

## 2019-03-05 NOTE — PAYOR COMM NOTE
--------------  DISCHARGE REVIEW    Payor: [employer] MEDICARE ADV PPO  Subscriber #:  [id number]  Authorization Number: [id number]    Admit date: 2/21/19  Admit time:  7219  Discharge Date: 3/4/2019  8:57 PM     Admitting Physician: Pranay Lopez MD  Attend need any treatment for diabetes in the hospital.  Patient is refusing as she states that she will take her metformin as she used to before the admission. Lace+ Score: 81  59-90 High Risk  29-58 Medium Risk  0-28   Low Risk.     TCM Follow-Up Recommendati Tabs  Commonly known as:  ZOLOFT      Take 1 tablet (25 mg total) by mouth daily. Quantity:  90 tablet  Refills:  3     TAB-A-MAE MAXIMUM Tabs      Take 1 tablet by mouth daily.    Refills:  0     torsemide 20 MG Tabs  Commonly known as:  DEMADEX      Ta

## 2019-03-05 NOTE — TELEPHONE ENCOUNTER
Spoke to pt for TCM today. Pt does not have HFU appt scheduled at this time. TCM/HFU appt recommended by 3/11/19 as pt is a high risk for readmission. Please advise.     BOOK BY DATE (last date for TCM): 3/18/19    :  Please f/u with pt and try

## 2019-03-05 NOTE — PROGRESS NOTES
Initial Post Discharge Follow Up   Discharge Date: 3/4/19  Contact Date: 3/5/2019    Consent Verification:  Assessment Completed With: Caregiver: Jeri sandy Permission received per patient?  written  HIPAA Verified?   Yes    Discharge Dx:  CHF    General instructions to stop because she tends to go back into afib with increased activity and sugars increase. PCP notified.      Current Outpatient Medications:  ferrous sulfate 325 (65 FE) MG Oral Tab EC Take 1 tablet (325 mg total) by mouth 2 (two) times gracie explained? yes  o Was the new medication’s side effects discussed? yes  o Do you have any questions about your new medication?  No  • Did you  your discharge medications when you left the hospital? Yes  • May I go over your medications with you to ma within 7-14 days  no     NCM Reviewed/scheduled/rescheduled PCP TCM/HFU appointment with pt:  No      Have you made all of your follow up appointments? no    Is there any reason as to why you cannot make your appointments?    No     NCM Reviewed upcoming Sp

## 2019-03-06 ENCOUNTER — TELEPHONE (OUTPATIENT)
Dept: CARDIOLOGY | Age: 79
End: 2019-03-06

## 2019-03-06 NOTE — TELEPHONE ENCOUNTER
Pt's daughter, Suresh Roldan, stated they don't need a  TCM/HFU at this time and if anything changes she would call our office.

## 2019-03-07 ENCOUNTER — TELEPHONE (OUTPATIENT)
Dept: CARDIOLOGY | Age: 79
End: 2019-03-07

## 2019-03-14 ENCOUNTER — APPOINTMENT (OUTPATIENT)
Dept: CARDIOLOGY | Age: 79
End: 2019-03-14

## 2019-03-18 NOTE — TELEPHONE ENCOUNTER
Requested Medications      Name from pharmacy: metFORMIN HCl Oral Tablet 500 MG         Will file in chart as: METFORMIN  MG Oral Tab    Sig: TAKE 1 TABLET BY MOUTH TWO TIMES A DAY WITH MEALS    Disp:  180 tablet (Pharmacy requested: 180)    Refills

## 2019-03-23 ENCOUNTER — HOSPITAL ENCOUNTER (OUTPATIENT)
Dept: LAB | Facility: HOSPITAL | Age: 79
Discharge: HOME | End: 2019-03-23
Attending: INTERNAL MEDICINE
Payer: MEDICARE

## 2019-03-23 DIAGNOSIS — I48.91 ATRIAL FIBRILLATION (HCC): ICD-10-CM

## 2019-03-23 LAB
INR PPP: 4.4
POC INR: 4.4 (ref 0.8–1.3)

## 2019-03-23 PROCEDURE — 85610 PROTHROMBIN TIME: CPT

## 2019-03-25 ENCOUNTER — ANTI-COAG (OUTPATIENT)
Dept: CARDIOLOGY | Age: 79
End: 2019-03-25

## 2019-03-25 DIAGNOSIS — I48.91 ATRIAL FIBRILLATION, UNSPECIFIED TYPE (CMD): ICD-10-CM

## 2019-03-27 ENCOUNTER — HOSPITAL ENCOUNTER (INPATIENT)
Facility: HOSPITAL | Age: 79
LOS: 21 days | Discharge: HOME | DRG: 228 | End: 2019-04-17
Attending: EMERGENCY MEDICINE | Admitting: INTERNAL MEDICINE
Payer: MEDICARE

## 2019-03-27 ENCOUNTER — TELEPHONE (OUTPATIENT)
Dept: CARDIOLOGY | Age: 79
End: 2019-03-27

## 2019-03-27 ENCOUNTER — APPOINTMENT (OUTPATIENT)
Dept: GENERAL RADIOLOGY | Facility: HOSPITAL | Age: 79
DRG: 228 | End: 2019-03-27
Attending: EMERGENCY MEDICINE
Payer: MEDICARE

## 2019-03-27 DIAGNOSIS — J81.0 ACUTE PULMONARY EDEMA (HCC): Primary | ICD-10-CM

## 2019-03-27 DIAGNOSIS — D50.0 IRON DEFICIENCY ANEMIA DUE TO CHRONIC BLOOD LOSS: ICD-10-CM

## 2019-03-27 DIAGNOSIS — I48.19 PERSISTENT ATRIAL FIBRILLATION (HCC): ICD-10-CM

## 2019-03-27 DIAGNOSIS — D50.9 IRON DEFICIENCY ANEMIA, UNSPECIFIED IRON DEFICIENCY ANEMIA TYPE: ICD-10-CM

## 2019-03-27 DIAGNOSIS — I31.3 PERICARDIAL EFFUSION: ICD-10-CM

## 2019-03-27 PROBLEM — R73.9 HYPERGLYCEMIA: Status: ACTIVE | Noted: 2019-03-27

## 2019-03-27 PROBLEM — E87.3 METABOLIC ALKALOSIS: Status: ACTIVE | Noted: 2019-03-27

## 2019-03-27 PROBLEM — R79.89 AZOTEMIA: Status: ACTIVE | Noted: 2019-03-27

## 2019-03-27 PROCEDURE — 99223 1ST HOSP IP/OBS HIGH 75: CPT | Performed by: INTERNAL MEDICINE

## 2019-03-27 PROCEDURE — 71045 X-RAY EXAM CHEST 1 VIEW: CPT | Performed by: EMERGENCY MEDICINE

## 2019-03-27 PROCEDURE — 5A09357 ASSISTANCE WITH RESPIRATORY VENTILATION, LESS THAN 24 CONSECUTIVE HOURS, CONTINUOUS POSITIVE AIRWAY PRESSURE: ICD-10-PCS | Performed by: HOSPITALIST

## 2019-03-27 RX ORDER — LOSARTAN POTASSIUM 25 MG/1
25 TABLET ORAL DAILY
Status: DISCONTINUED | OUTPATIENT
Start: 2019-03-28 | End: 2019-04-02

## 2019-03-27 RX ORDER — ONDANSETRON 2 MG/ML
INJECTION INTRAMUSCULAR; INTRAVENOUS
Status: DISPENSED
Start: 2019-03-27 | End: 2019-03-27

## 2019-03-27 RX ORDER — DILTIAZEM HYDROCHLORIDE 120 MG/1
120 CAPSULE, EXTENDED RELEASE ORAL DAILY
Status: DISCONTINUED | OUTPATIENT
Start: 2019-03-28 | End: 2019-04-17

## 2019-03-27 RX ORDER — PANTOPRAZOLE SODIUM 40 MG/1
40 TABLET, DELAYED RELEASE ORAL
Status: DISCONTINUED | OUTPATIENT
Start: 2019-03-28 | End: 2019-04-17

## 2019-03-27 RX ORDER — METOCLOPRAMIDE HYDROCHLORIDE 5 MG/ML
5 INJECTION INTRAMUSCULAR; INTRAVENOUS EVERY 8 HOURS PRN
Status: DISCONTINUED | OUTPATIENT
Start: 2019-03-27 | End: 2019-04-03

## 2019-03-27 RX ORDER — ONDANSETRON 2 MG/ML
4 INJECTION INTRAMUSCULAR; INTRAVENOUS ONCE
Status: COMPLETED | OUTPATIENT
Start: 2019-03-27 | End: 2019-03-27

## 2019-03-27 RX ORDER — ONDANSETRON 2 MG/ML
4 INJECTION INTRAMUSCULAR; INTRAVENOUS EVERY 6 HOURS PRN
Status: DISCONTINUED | OUTPATIENT
Start: 2019-03-27 | End: 2019-04-17

## 2019-03-27 RX ORDER — SERTRALINE HYDROCHLORIDE 25 MG/1
25 TABLET, FILM COATED ORAL DAILY
Status: DISCONTINUED | OUTPATIENT
Start: 2019-03-28 | End: 2019-04-17

## 2019-03-27 RX ORDER — ACETAMINOPHEN 325 MG/1
650 TABLET ORAL EVERY 6 HOURS PRN
Status: DISCONTINUED | OUTPATIENT
Start: 2019-03-27 | End: 2019-04-15

## 2019-03-27 RX ORDER — DEXTROSE MONOHYDRATE 25 G/50ML
50 INJECTION, SOLUTION INTRAVENOUS
Status: DISCONTINUED | OUTPATIENT
Start: 2019-03-27 | End: 2019-04-17

## 2019-03-27 RX ORDER — WARFARIN SODIUM 2.5 MG/1
2.5 TABLET ORAL
Status: DISCONTINUED | OUTPATIENT
Start: 2019-03-27 | End: 2019-03-29

## 2019-03-27 RX ORDER — FUROSEMIDE 10 MG/ML
INJECTION INTRAMUSCULAR; INTRAVENOUS
Status: DISPENSED
Start: 2019-03-27 | End: 2019-03-27

## 2019-03-27 RX ORDER — IPRATROPIUM BROMIDE AND ALBUTEROL SULFATE 2.5; .5 MG/3ML; MG/3ML
3 SOLUTION RESPIRATORY (INHALATION) ONCE
Status: COMPLETED | OUTPATIENT
Start: 2019-03-27 | End: 2019-03-27

## 2019-03-27 RX ORDER — MELATONIN
325 2 TIMES DAILY WITH MEALS
Status: DISCONTINUED | OUTPATIENT
Start: 2019-03-27 | End: 2019-04-05

## 2019-03-27 RX ORDER — FUROSEMIDE 10 MG/ML
80 INJECTION INTRAMUSCULAR; INTRAVENOUS ONCE
Status: COMPLETED | OUTPATIENT
Start: 2019-03-27 | End: 2019-03-27

## 2019-03-27 RX ORDER — WARFARIN SODIUM 5 MG/1
2.5 TABLET ORAL SEE ADMIN INSTRUCTIONS
Status: ON HOLD | COMMUNITY
End: 2020-07-08

## 2019-03-27 RX ORDER — AMIODARONE HYDROCHLORIDE 200 MG/1
200 TABLET ORAL DAILY
Status: DISCONTINUED | OUTPATIENT
Start: 2019-03-28 | End: 2019-04-17

## 2019-03-27 RX ORDER — WARFARIN SODIUM 5 MG/1
5 TABLET ORAL
Status: DISCONTINUED | OUTPATIENT
Start: 2019-03-28 | End: 2019-03-29

## 2019-03-27 RX ORDER — WARFARIN SODIUM 5 MG/1
5 TABLET ORAL SEE ADMIN INSTRUCTIONS
Status: ON HOLD | COMMUNITY
End: 2020-07-08

## 2019-03-27 RX ORDER — METOPROLOL TARTRATE 50 MG/1
50 TABLET, FILM COATED ORAL
Status: DISCONTINUED | OUTPATIENT
Start: 2019-03-27 | End: 2019-04-01

## 2019-03-27 RX ORDER — FOLIC ACID 1 MG/1
1 TABLET ORAL DAILY
Status: DISCONTINUED | OUTPATIENT
Start: 2019-03-28 | End: 2019-04-17

## 2019-03-27 RX ORDER — FUROSEMIDE 10 MG/ML
40 INJECTION INTRAMUSCULAR; INTRAVENOUS
Status: DISCONTINUED | OUTPATIENT
Start: 2019-03-27 | End: 2019-04-06

## 2019-03-27 RX ORDER — MAGNESIUM OXIDE 400 MG (241.3 MG MAGNESIUM) TABLET
400 TABLET DAILY
Status: DISCONTINUED | OUTPATIENT
Start: 2019-03-28 | End: 2019-04-17

## 2019-03-27 NOTE — PAYOR COMM NOTE
--------------  ADMISSION REVIEW     Payor: BCBS MEDICARE ADV PPO  Subscriber #:  [id number]  Authorization Number: [id number]    Admit date: 3/27/19  Admit time: 46       Admitting Physician: Colin Cervantes MD  Attending Physician:  Colin Cervantes neuropathy associated with type 2 diabetes mellitus ([facility] 09/16/2016    Severe both legs   • Diverticulitis    • Essential hypertension    • Fracture of C1 vertebra, closed ([facility] 07/2016   • Gallstones    • High blood pressure    • High cholesterol    • Hi carotid bruits. Respiratory: Clear to auscultation bilaterally. No wheezes. No rhonchi. Cardiovascular: S1, S2. Regular rate and rhythm. No murmurs, rubs or gallops. Equal pulses. Chest and Back: No tenderness or deformity.   Abdomen: Soft, nontender, n Route User    3/27/2019 0815 Given 3 mL Nebulization [place] CARLOS RCP      nitroGLYCERIN (NITRO-BID 2 % TD OINT) 2 % ointment 1 inch     Date Action Dose Route User    3/27/2019 0800 Given (none) Topical Mary Dyer, RN      ondansetron HCl (ZOF

## 2019-03-27 NOTE — ED NOTES
VORB to take off bipap per MD. N/c applied at 4L. Pt tolerated well. Oxygen sats 96%. Pt calm and cooperative. NAD. resps easy nonlabored.

## 2019-03-27 NOTE — H&P
[facility]  History and Physical     Cathren [facility] Patient Status:  Emergency    1940 MRN [id number]   Location [address] [facility] Attending Jn Samson MD   [address] Day # 0 PCP Jason Barajas MD     Chief Comp 06/29/2012    Cannot tolerate CPAP machine- uses oxygen periodically    • Osteoarthritis of shoulders, bilateral 06/18/2015    Severe, frozen shoulder syndrome   • Pericardial effusion 6/5/2014    trivial   • Pulmonary HTN ([facility] [facility] 10/10/2014   • Rheumatoid a taking coumadin  Lisinopril              Coughing  Mexitil [Mexiletine]    RASH    Medications:    No current facility-administered medications on file prior to encounter.    Current Outpatient Medications on File Prior to Encounter:  dilTIAZem HCl ER Candace Rfl:    Calcium Carbonate-Vitamin D 600-400 MG-UNIT Oral Tab Take 1 tablet by mouth daily. Disp:  Rfl:        Review of Systems:   A comprehensive 14 point review of systems was completed. Pertinent positives and negatives noted in the HPI.     Physical goal  3. DM2  1. SSI  4. Anemia  1. Monitor CBC  5. Essential HTN  1. Continue home meds  6.  Dyslipidemia  1. statin    Quality:  · DVT Prophylaxis: coumadin  · CODE status: Full  · Guerrero: none    Plan of care discussed with patient, ED physician     Brooks Kwan

## 2019-03-27 NOTE — ED NOTES
Patient is resting comfortably with bipap intact. Pt awakens easily. resps easy nonlabored at this time.

## 2019-03-27 NOTE — CONSULTS
Cardiology Consult Note     PRIMARY CARDIOLOGIST: JAY/MIKEY      CONSULT FOR: ACUTE ON CHRONIC CHF/DIASTLIC WITH ASSO SEVERE MR, AFIB AND HX OF VTACH AND ICD       HISTORY: READMITTED WITH ACUTE CHF . WORSENING OVER LAST 48 HR. HAS BEEN COMPLIANT WITH MEDS.

## 2019-03-27 NOTE — DIETARY NOTE
[address] [facility]     Admitting diagnosis:  Acute pulmonary edema ([address] [J81.0]    Ht: 170.2 cm (5' 7\")  Wt: 90.6 kg (199 lb 12.8 oz). This is 147 % of IBW  BMI: Body mass index is 31.29 kg/m².   IBW: Ideal body weight: Quality 226: Preventive Care And Screening: Tobacco Use: Screening And Cessation Intervention: Patient screened for tobacco and never smoked Quality 110: Preventive Care And Screening: Influenza Immunization: Influenza Immunization previously received during influenza season Quality 111:Pneumonia Vaccination Status For Older Adults: Pneumococcal Vaccination Previously Received Detail Level: Detailed

## 2019-03-27 NOTE — ED PROVIDER NOTES
Patient Seen in: [facility] Emergency Department    History   Patient presents with:  Dyspnea HEIDE SOB (respiratory)    Stated Complaint: sob    HPI    Patient presents with shortness of breath.   Apparently the patient has been getting more short of shoulders, bilateral 06/18/2015    Severe, frozen shoulder syndrome   • Pericardial effusion 6/5/2014    trivial   • Pulmonary HTN ([facility] [facility] 10/10/2014   • Rheumatoid arthritis ([facility] [address] [facility]    • S/P ICD (internal cardiac defibrillator) procedure 7/7/2016    medtronic °C)   Temp src 03/27/19 0759 Temporal   SpO2 03/27/19 0759 100 %   O2 Device 03/27/19 0755 Non-rebreather mask       Current:/63 (BP Location: Left arm)   Pulse 83   Temp 98.1 °F (36.7 °C) (Oral)   Resp 26   Ht 170.2 cm (5' 7\")   Wt 90.6 kg   SpO2 9 (*)     HCT 29.7 (*)     MCV 76.9 (*)     MCH 22.0 (*)     MCHC 28.6 (*)     RDW 20.9 (*)     RDW-SD 57.7 (*)     Neutrophil Absolute Prelim 8.36 (*)     Neutrophil Absolute 8.36 (*)     All other components within normal limits   TROPONIN I - Normal   MAG MD on 3/27/2019 at 8:59     Approved by: Gamaliel Nolasco MD            The patient was placed on BiPAP after my initial assessment. She was given Nitropaste and IV Lasix additionally. The patient did appear more comfortable on BiPAP.   She had a diures

## 2019-03-27 NOTE — PROGRESS NOTES
[facility] Pharmacy Note:  Renal Dose Adjustment for Metoclopramide (REGLAN)    Krista Vazquez has been prescribed Metoclopramide (REGLAN) 10 mg every 6 hours as needed for nausea.     Estimated Creatinine Clearance: 39.2 mL/min (A) (based on SCr of 1.17 mg/dL (H)

## 2019-03-27 NOTE — ED NOTES
Pt stating she feels much better no respiratory distress noted. resps easy nonlabored. Oxygen per n/c 4L Urine output 1200ml.

## 2019-03-27 NOTE — ED NOTES
Per ems pt was 85% on oxygen per n/c 2L. Pt appears anxious and yelling I cant breathe. Pt is currently on 15L of o2 per NRB. Pt is attempting to pull o2 off of her face. Emotional support offered. RT notified.

## 2019-03-27 NOTE — CONSULTS
[address]    PATIENT'S NAME: Laith Keen AUTUMN   ATTENDING PHYSICIAN: Leavy Kanner. Bernie Hernandez M.D.   Layne Rochaache: Shelby Gan M.D.    PATIENT ACCOUNT#:   [de-identified]    LOCATION:  [address]  MEDICAL RECORD #:   [id number]       DATE OF B mg a day to an INR of 2 to 3 range for atrial fibrillation, amiodarone 200 mg once a day, torsemide 20 mg tablets twice a day, sertraline 25 mg p.o. daily, losartan 25 mg p.o. daily, potassium supplement 20 mg p.o. daily, metoprolol tartrate 50 mg p.o. b.i of congestive heart failure and acute nature. This has been discussed with the patient. Daughter has called into our office for consultation and already seeing the patient.   We will discuss with clip team and her EP specialist.    Thank you very much for

## 2019-03-27 NOTE — ED NOTES
Pt daughter called RN to ask what was going with patient. Daughter escalated voice stating pt needs to be admitted and she is POA .  daughter stating pt is admitted every month and needs to be admitted today and cardiology must be contacted to schedule a pr

## 2019-03-28 ENCOUNTER — APPOINTMENT (OUTPATIENT)
Dept: CV DIAGNOSTICS | Facility: HOSPITAL | Age: 79
DRG: 228 | End: 2019-03-28
Attending: INTERNAL MEDICINE
Payer: MEDICARE

## 2019-03-28 PROCEDURE — 93306 TTE W/DOPPLER COMPLETE: CPT | Performed by: INTERNAL MEDICINE

## 2019-03-28 PROCEDURE — 99232 SBSQ HOSP IP/OBS MODERATE 35: CPT | Performed by: INTERNAL MEDICINE

## 2019-03-28 NOTE — PROGRESS NOTES
[facility]  Cardiology Progress Note    Subjective:  No chest pain or shortness of breath.     Objective:  /66 (BP Location: Right arm)   Pulse 70   Temp 98.4 °F (36.9 °C) (Oral)   Resp 16   Ht 5' 7\" (1.702 m)   Wt 200 lb 13.4 oz (91.1 kg)   SpO pending  · Atrial fibrillation: INR 2.39  · HTN  · HLD  · DMII  · COPD  · Dementia    Plan:  · Awaiting echo results  · Continue diuresing with IV alexix    CRAIG Patel  3/28/2019  11:03 AM    Seen and examined. Complex history reviewed.  Feels that

## 2019-03-28 NOTE — PAYOR COMM NOTE
--------------  CONTINUED STAY REVIEW    Payor: BCBS MEDICARE ADV PPO  Subscriber #:  [id number]  Authorization Number: [id number]    Admit date: 3/27/19  Admit time: [address] Patient Status:  Inpatient    1940 MRN [id number]   Locat Lab  03/27/19   0806  03/28/19   0549   PTP  28.8*  27.6*   INR  2.52*  2.39*              Recent Labs   Lab  03/27/19   0806  03/28/19   0549   TROP  <0.045  <0.045            Imaging: Imaging data reviewed in Epic.     Medications:   • Insulin Aspart P two midnight's based on the clinical documentation in H+P. Based on patients current state of illness, I anticipate that, after discharge, patient will require TBD.                       Electronically signed by Tanmay Downs MD at 3/28/2019 10:25 AM

## 2019-03-28 NOTE — PROGRESS NOTES
[facility]  Progress Note     Isa Ortiz Patient Status:  Inpatient    1940 MRN [id number]   [place] 2NE-A Attending Corie Corey MD   Hosp Day # 1 PCP Armando Iraheta MD     Chief Complaint: SOB    S: Patient w Imaging: Imaging data reviewed in Epic.     Medications:   • Insulin Aspart Pen  1-5 Units Subcutaneous TID CC and HS   • amiodarone HCl  200 mg Oral Daily   • Calcium Carbonate-Vitamin D  2 tablet Oral Daily   • dilTIAZem HCl ER Coated Beads  120 mg Or

## 2019-03-29 PROCEDURE — 99232 SBSQ HOSP IP/OBS MODERATE 35: CPT | Performed by: INTERNAL MEDICINE

## 2019-03-29 NOTE — PLAN OF CARE
Pt a/ox4. Pt said that she had poor sleep overnight. 3L NC O2. Pt reports SOB with exertion. A-fib. Rate controlled. EF = 65-70%. AICD. 1+ edema to BLE. 3/29. Voids. SBA and a walker. Coumadin. INR therapeutic at 2.30.  Plan for Dr. Thelma Gama to see the philip

## 2019-03-29 NOTE — PLAN OF CARE
Pt denies pain at this time. Pt is A & O x 4 w/ forgetfulness. Pt is a fib on tele, S1 and S2 present w/ murmur, heart rate controlled and pt denies cardiac symptoms. Lung sounds diminished bilaterally. Abdomen soft and round.  Last BM on 3/27/19. +2 edema

## 2019-03-29 NOTE — PROGRESS NOTES
[facility] Cardiology Progress Note    Subjective:  Definitely feeling better, was unable to do any activity at all with progressive sob. Does use O2 at home. Always sleeps up in chair.      Objective:  /60 (BP Location: Left arm)   Pulse 75   Temp 97.7 plans for mitral clip, will need SRAVANTHI next week  · If plans for mitral clip, need anemia addressed prior as outlined by Dr. Ivan Coelho, APRN  3/29/2019  12:08 PM  I will contact the patient's daughter, but, as I stated multiple times in many pr

## 2019-03-29 NOTE — PAYOR COMM NOTE
--------------  CONTINUED STAY REVIEW    Payor: [employer] MEDIC[employer] ADV PPO  Subscriber #:  [id number]  Authorization Number: [id number]    Admit date: 3/27/19  Admit time: [address] Patient Status:  Inpatient    1940 MRN [id number]   Locat AST  29  20   --    ALT  34  30   --    BILT  0.7  0.5   --    TP  7.0  6.3*   --          Estimated Creatinine Clearance: 43.6 mL/min (A) (based on SCr of 1.05 mg/dL (H)).           Recent Labs   Lab  03/27/19   0806  03/28/19   0549  03/29/19   3861 MD              **Certification        PHYSICIAN Certification of Need for Inpatient Hospitalization - Initial Certification     Patient will require inpatient services that will reasonably be expected to span two midnight's based on the clinical documenta

## 2019-03-29 NOTE — PLAN OF CARE
Spoke with Dr. Faiza Abbott and then relayed information to daughter Kendra Tucker.   She recommends that Ms Jay Kiran undergo a bi-V upgrade to her device prior to considering the need for a mitral clip procedure, as CRT therapy has been shown in and of itself to improv

## 2019-03-29 NOTE — CM/SW NOTE
Call to daughter Gera Fields, who had just spoken with cardiology apn who answered all her questions regarding medical care and next steps. Reiterated plan for bi-v upgrade next week.  She denies further concerns with care/plan, only that inr is therapeutic prior

## 2019-03-29 NOTE — CM/SW NOTE
03/29/19 1500   CM/SW Referral Data   Referral Source Nurse   Reason for Referral Discharge planning   Informant Patient   Patient Info   Patient's Mental Status Alert;Oriented   Patient's Home Environment (hotel)   Patient lives with Alone   Patient St

## 2019-03-30 ENCOUNTER — APPOINTMENT (OUTPATIENT)
Dept: GENERAL RADIOLOGY | Facility: HOSPITAL | Age: 79
DRG: 228 | End: 2019-03-30
Attending: INTERNAL MEDICINE
Payer: MEDICARE

## 2019-03-30 PROCEDURE — 99232 SBSQ HOSP IP/OBS MODERATE 35: CPT | Performed by: INTERNAL MEDICINE

## 2019-03-30 PROCEDURE — 74018 RADEX ABDOMEN 1 VIEW: CPT | Performed by: INTERNAL MEDICINE

## 2019-03-30 RX ORDER — POLYETHYLENE GLYCOL 3350 17 G/17G
17 POWDER, FOR SOLUTION ORAL DAILY
Status: DISCONTINUED | OUTPATIENT
Start: 2019-03-30 | End: 2019-04-17

## 2019-03-30 RX ORDER — DOCUSATE SODIUM 100 MG/1
100 CAPSULE, LIQUID FILLED ORAL 2 TIMES DAILY
Status: DISCONTINUED | OUTPATIENT
Start: 2019-03-30 | End: 2019-04-11

## 2019-03-30 RX ORDER — POTASSIUM CHLORIDE 20 MEQ/1
40 TABLET, EXTENDED RELEASE ORAL ONCE
Status: COMPLETED | OUTPATIENT
Start: 2019-03-30 | End: 2019-03-30

## 2019-03-30 RX ORDER — SODIUM PHOSPHATE, DIBASIC AND SODIUM PHOSPHATE, MONOBASIC 7; 19 G/133ML; G/133ML
1 ENEMA RECTAL ONCE AS NEEDED
Status: COMPLETED | OUTPATIENT
Start: 2019-03-30 | End: 2019-03-30

## 2019-03-30 NOTE — PROGRESS NOTES
[facility]  Progress Note     Michaela Olivo Patient Status:  Inpatient    1940 MRN [id number]   [facility] 2NE-A Attending Jarocho Saldana MD   Hosp Day # 3 PCP Malissa Damon MD     Chief Complaint: SOB    S: Patient w 30   --    --    BILT  0.7  0.5   --    --    TP  7.0  6.3*   --    --        Estimated Creatinine Clearance: 44.1 mL/min (A) (based on SCr of 1.04 mg/dL (H)). Recent Labs   Lab  03/28/19   0549  03/29/19   0527  03/30/19   0549   PTP  27.6*  26.8*  24. MD          **Certification      PHYSICIAN Certification of Need for Inpatient Hospitalization - Initial Certification    Patient will require inpatient services that will reasonably be expected to span two midnight's based on the clinical documentation in

## 2019-03-30 NOTE — PROGRESS NOTES
[facility] Cardiology Progress Note    Subjective:  Constipated this am with some abdominal pain.     Objective:  /58 (BP Location: Left arm)   Pulse 60   Temp 98.6 °F (37 °C) (Oral)   Resp 16   Ht 170.2 cm (5' 7\")   Wt 200 lb 2.8 oz (90.8 kg)   SpO2 98 examination tentative plan is for by BiV upgrade early next week. Coumadin on hold as described above.   Francine Lesch

## 2019-03-31 PROCEDURE — 99232 SBSQ HOSP IP/OBS MODERATE 35: CPT | Performed by: INTERNAL MEDICINE

## 2019-03-31 RX ORDER — HEPARIN SODIUM AND DEXTROSE 10000; 5 [USP'U]/100ML; G/100ML
1000 INJECTION INTRAVENOUS ONCE
Status: COMPLETED | OUTPATIENT
Start: 2019-03-31 | End: 2019-03-31

## 2019-03-31 RX ORDER — HEPARIN SODIUM AND DEXTROSE 10000; 5 [USP'U]/100ML; G/100ML
INJECTION INTRAVENOUS CONTINUOUS
Status: DISCONTINUED | OUTPATIENT
Start: 2019-03-31 | End: 2019-04-11

## 2019-03-31 RX ORDER — HEPARIN SODIUM 5000 [USP'U]/ML
5000 INJECTION INTRAVENOUS; SUBCUTANEOUS ONCE
Status: COMPLETED | OUTPATIENT
Start: 2019-03-31 | End: 2019-03-31

## 2019-03-31 RX ORDER — POTASSIUM CHLORIDE 20 MEQ/1
40 TABLET, EXTENDED RELEASE ORAL EVERY 4 HOURS
Status: COMPLETED | OUTPATIENT
Start: 2019-03-31 | End: 2019-03-31

## 2019-03-31 NOTE — PROGRESS NOTES
[facility]  Progress Note     Reford Mariluz Patient Status:  Inpatient    1940 MRN [id number]   [facility] 2NE-A Attending Barbara Grady MD   Hosp Day # 4 PCP Kathi Smalls MD     Chief Complaint: SOB    S: Patient w 139  142  138  139   --   138   K  4.2  4.2  4.0  3.7  3.6  3.8   CL  104  106  102  102   --   101   CO2  27.0  30.0  31.0  31.0   --   31.0   ALKPHO  127  110   --    --    --    --    AST  29  20   --    --    --    --    ALT  34  30   --    --    -- of care: continue current management    Quality:  · DVT Prophylaxis: heparin gtt to start today  · CODE status: Full  · Guerrero: none  · Central line: none    Will the patient be referred to [facility] on discharge?: no  Estimated date of discharge: TBD  Discharge

## 2019-03-31 NOTE — PROGRESS NOTES
[facility] Cardiology Progress Note    Subjective:  Better after BM yesterday. Breathing overall improved. Walked in halls last evening with good tolerance.      Objective:  /62 (BP Location: Left arm)   Pulse 73   Temp 97.4 °F (36.3 °C) (Oral)   Resp 18 Plan then would be to see how she does before considering mitral clip procedure. · Hold coumadin     VIKKI Frausto  3/31/2019  Patient seen and examined. As outlined above possible upgrade to BiV ice this week. Continue to hold Coumadin.   Volume s

## 2019-03-31 NOTE — PLAN OF CARE
Problem: Diabetes/Glucose Control  Goal: Glucose maintained within prescribed range  INTERVENTIONS:  - Monitor Blood Glucose as ordered  - Assess for signs and symptoms of hyperglycemia and hypoglycemia  - Administer ordered medications to maintain glucose specific interventions  Outcome: Progressing  LTG: to return home with family  Goal: Patient/Family Short Term Goal  Patient's Short Term Goal:     Interventions:   -  - See additional Care Plan goals for specific interventions  Outcome: Progressing  STG:

## 2019-04-01 ENCOUNTER — APPOINTMENT (OUTPATIENT)
Dept: CV DIAGNOSTICS | Facility: HOSPITAL | Age: 79
DRG: 228 | End: 2019-04-01
Attending: INTERNAL MEDICINE
Payer: MEDICARE

## 2019-04-01 ENCOUNTER — APPOINTMENT (OUTPATIENT)
Dept: INTERVENTIONAL RADIOLOGY/VASCULAR | Facility: HOSPITAL | Age: 79
DRG: 228 | End: 2019-04-01
Attending: INTERNAL MEDICINE
Payer: MEDICARE

## 2019-04-01 PROCEDURE — 93325 DOPPLER ECHO COLOR FLOW MAPG: CPT | Performed by: INTERNAL MEDICINE

## 2019-04-01 PROCEDURE — 99232 SBSQ HOSP IP/OBS MODERATE 35: CPT | Performed by: INTERNAL MEDICINE

## 2019-04-01 PROCEDURE — B24BZZ4 ULTRASONOGRAPHY OF HEART WITH AORTA, TRANSESOPHAGEAL: ICD-10-PCS | Performed by: INTERNAL MEDICINE

## 2019-04-01 PROCEDURE — 93320 DOPPLER ECHO COMPLETE: CPT | Performed by: INTERNAL MEDICINE

## 2019-04-01 RX ORDER — EPLERENONE 25 MG/1
12.5 TABLET, FILM COATED ORAL DAILY
Status: DISCONTINUED | OUTPATIENT
Start: 2019-04-01 | End: 2019-04-12

## 2019-04-01 RX ORDER — POTASSIUM CHLORIDE 20 MEQ/1
40 TABLET, EXTENDED RELEASE ORAL ONCE
Status: COMPLETED | OUTPATIENT
Start: 2019-04-01 | End: 2019-04-01

## 2019-04-01 RX ORDER — MIDAZOLAM HYDROCHLORIDE 1 MG/ML
INJECTION INTRAMUSCULAR; INTRAVENOUS
Status: COMPLETED
Start: 2019-04-01 | End: 2019-04-01

## 2019-04-01 RX ORDER — WARFARIN SODIUM 5 MG/1
5 TABLET ORAL
Status: DISCONTINUED | OUTPATIENT
Start: 2019-04-01 | End: 2019-04-01

## 2019-04-01 RX ORDER — METOPROLOL SUCCINATE 100 MG/1
100 TABLET, EXTENDED RELEASE ORAL NIGHTLY
Status: DISCONTINUED | OUTPATIENT
Start: 2019-04-01 | End: 2019-04-16

## 2019-04-01 NOTE — PAYOR COMM NOTE
--------------  CONTINUED STAY REVIEW    Payor: BC[employer] MEDICARE ADV PPO  Subscriber #:  [id number]  Authorization Number: [id number]    Admit date: 3/27/19  Admit time: 46    Admitting Physician: Bharti Porter MD  Attending Physician:  Franky Salas RN    3/31/2019 1732 Given 325 mg Oral Ursula Swanson RN      folic acid (FOLVITE) tab 1 mg     Date Action Dose Route User    4/1/2019 8871 Given 1 mg Oral Ayah Lockett RN      furosemide (LASIX) injection 40 mg     Date Action Dose Route User does before considering mitral clip procedure. · Hold coumadin    As outlined above possible upgrade to BiV ice this week. Continue to hold Coumadin. Volume status appears to be slowly improving.   Begin IV heparin now that INR less than 1.8    4/1  A 7

## 2019-04-01 NOTE — CONSULTS
[facility]  Electrophysiology Initial Consult Note      Gwenlyn Siemens Patient Status:  Inpatient    1940 MRN [id number]   [facility] 2NE-A Attending Ewa Alicia MD   Hosp Day # 5 PCP Kayleen Cruz MD periodically    • Osteoarthritis of shoulders, bilateral 06/18/2015    Severe, frozen shoulder syndrome   • Pericardial effusion 6/5/2014    trivial   • Pulmonary HTN ([facility] 10/10/2014   • Rheumatoid arthritis ([facility]    • S/P ICD (internal cardiac defibrilla Coughing  Mexitil [Mexiletine]    RASH    Medications:    Current Facility-Administered Medications:   •  Potassium Chloride ER (K-DUR M20) CR tab 40 mEq, 40 mEq, Oral, Once  •  heparin (PORCINE) drip 90448uudjm/250mL infusion CONTINUOUS, 200-3,00 (LASIX) injection 40 mg, 40 mg, Intravenous, BID (Diuretic)    Review of Systems:  +dyspnea, +LE edema; no TIA symptoms, no vision change, no fever, no rash, no bleeding, no easy bruising, no chest pain, no cough, no abdominal pain, no hematuria    Physica --    --    WBC  11.6*  6.5  8.8  6.8   --    PLT  263.0  215.0  203.0  210.0  203.0       Recent Labs   Lab  03/31/19   0902  03/31/19   1444  03/31/19   1758  04/01/19   0128  04/01/19   0549   INR  1.64*  1.61*   --    --   1.34*   PTT  38.3*  55.6*  45 systolic congestive heart failure (HCC)     Primary osteoarthritis of both knees     Mild persistent asthma without complication     Pulmonary embolus (HCC)     Right leg DVT (HCC)     Chronic a-fib (HCC)     Anxiety     Acute on chronic systolic [place]

## 2019-04-01 NOTE — PROGRESS NOTES
Post valerie, vitals are stable, report given to [facility], patient was transferred to [facility] in stable condition with transport.

## 2019-04-01 NOTE — PROGRESS NOTES
[facility]  Progress Note     Raj Colon Patient Status:  Inpatient    1940 MRN [id number]   [facility] 2NE-A Attending Winsome Goetz MD   Hosp Day # 5 PCP Kevon Penaloza MD     Chief Complaint: SOB    S: Patient w 61   < >  60   --   56*   --   67   GFRNAA  45*  53*   < >  52*   --   49*   --   58*   CA  8.4*  8.0*   < >  8.2*   --   8.5   --   8.3*   ALB  3.4  2.9*   --    --    --    --    --    --    NA  139  142   < >  139   --   138   --   138   K  4.2  4.2   < Afib  1. Rate controlled  2. coumadin  3. DM2  1. SSI  4. Anemia  1. Monitor CBC  2. Transfuse below 7  5. Essential HTN  1. Continue home meds  6.  Dyslipidemia  1. statin      Plan of care: continue current management, await discussion with cardiology reg

## 2019-04-01 NOTE — PROCEDURES
Cardiology Transesophageal Echo Note    PRE-PROCEDURE DIAGNOSIS: moderate to severe mitral regurgitation    PROCEDURE: Transesophageal Echocardiogram.    SEDATION:   Topical spray x 1  Versed: 6 mg  Fentanyl: 100 mcg    I personally supervised the jaelyn in right upper and lower pulmonary veins and left upper pulmonary vein. · Tricuspid and pulmonic valves appear normal. There is mild TR. · There was a trileaflet aortic valve without stenosis or insufficiency.    · Color Doppler flow interrogation of the

## 2019-04-01 NOTE — PLAN OF CARE
Back in room post SRAVANTHI/3D echo  Pt awake,alert  No resp distress noted  baseline on 2l/nc per n/c sats 96%  Tolerated liquids and advance diet, no n/v noted  Will continue to monitor

## 2019-04-01 NOTE — PROGRESS NOTES
[facility]  Advanced Heart Failure Progress Note    Dylan Palacios Patient Status:  Inpatient    1940 MRN [id number]   [facility] 2NE-A Attending Kristeen Hashimoto, MD   Hosp Day # 5 PCP Mariam Burnette MD     Subjective:  Feel 04/01/2019     Lab Results   Component Value Date    PT 20.6 (H) 07/11/2014    PT 24.1 (H) 07/09/2014    PT 13.6 01/17/2014    INR 1.34 (H) 04/01/2019    INR 1.61 (H) 03/31/2019    INR 1.64 (H) 03/31/2019     No results for input(s): BNPML in the last 168 ferrous sulfate EC tab 325 mg 325 mg Oral BID with meals   folic acid (FOLVITE) tab 1 mg 1 mg Oral Daily   Losartan Potassium (COZAAR) tab 25 mg 25 mg Oral Daily   magnesium oxide (MAG-OX) tab 400 mg 400 mg Oral Daily   Metoprolol Tartrate (LOPRESSOR) ta failure, unspecified HF chronicity, unspecified heart failure type (HCC)     COPD exacerbation (HCC)     Chordae tendinae rupture (HCC)     Melena     Gastrointestinal hemorrhage     Azotemia     Metabolic alkalosis     Hyperglycemia     Acute pulmonary ed

## 2019-04-01 NOTE — CM/SW NOTE
Received call from [address] [place] [address] [phone], will follow patient after discharge and would like to be called w/ final d/c plan.     Amparo Coronel RN,   Phone [phone]  Pager [phone]

## 2019-04-02 ENCOUNTER — TELEPHONE (OUTPATIENT)
Dept: CARDIOLOGY | Age: 79
End: 2019-04-02

## 2019-04-02 PROCEDURE — 99232 SBSQ HOSP IP/OBS MODERATE 35: CPT | Performed by: INTERNAL MEDICINE

## 2019-04-02 RX ORDER — LOSARTAN POTASSIUM 25 MG/1
25 TABLET ORAL 2 TIMES DAILY
Status: DISCONTINUED | OUTPATIENT
Start: 2019-04-02 | End: 2019-04-17

## 2019-04-02 NOTE — CONSULTS
I spoke with Shivani's daughter Ruth Mccray [phone] regarding questions she had about follow up appointments, scheduling and heart failure discharge instructions.   All questions were answered and I provided my contact information so that Ruth Mccray can call the

## 2019-04-02 NOTE — PAYOR COMM NOTE
--------------  CONTINUED STAY REVIEW-----REQUESTING ADDITIONAL DAY 4/2      Payor: BC[employer] MEDICARE ADV PPO  Subscriber #:  [id number]  Authorization Number: [id number]    Admit date: 3/27/19  Admit time: 46    Admitting Physician: Linda Chapman MD WBC  11.6*  6.5  8.8   --    --   6.8   --    --    --    HGB  8.5*  7.9*  8.0*   --    --   7.8*   --    --    --    MCV  76.9*  77.9*  76.6*   --    --   77.1*   --    --    --    PLT  263.0  215.0  203.0   --    --   210.0   --   203.0  221.0   INR  2.5 • docusate sodium  100 mg Oral BID   • PEG 3350  17 g Oral Daily   • Insulin Aspart Pen  1-5 Units Subcutaneous TID CC and HS   • amiodarone HCl  200 mg Oral Daily   • Calcium Carbonate-Vitamin D  2 tablet Oral Daily   • dilTIAZem HCl ER Coated Beads  120 heparin (PORCINE) drip 34391yxeds/250mL infusion CONTINUOUS     Date Action Dose Route User    4/2/2019 0350 New Bag 1150 Units/hr Intravenous Sharad Ashley, RN      diltiazem (CARDIZEM CD) 24 hr cap 120 mg     Date Action Dose Route User    4/2/2019 Sertraline HCl (ZOLOFT) tab 25 mg     Date Action Dose Route User    4/2/2019 0855 Given 25 mg Oral Shayy Campos RN

## 2019-04-02 NOTE — PROGRESS NOTES
[facility]  Progress Note     Sheela Velasquez Patient Status:  Inpatient    1940 MRN [id number]   [facility] 2NE-A Attending Del Fraire MD   Hosp Day # 6 PCP Yovana Funk MD     Chief Complaint: SOB    S: Patient w --   22*   --    CREATSERUM  1.17*  1.03*   < >  1.04*   --   1.10*   --   0.95   --    GFRAA  52*  61   < >  60   --   56*   --   67   --    GFRNAA  45*  53*   < >  52*   --   49*   --   58*   --    CA  8.4*  8.0*   < >  8.2*   --   8.5   --   8.3*   -- 1. Acute on chronic diastolic CHF  1. Cardiology to eval  2. I/Os  3. ECHO reviewed  4. Await decision on bIV change  2. Chronic Afib  1. Rate controlled  2. Coumadin on hold, heparin gtt  3. DM2  1. SSI  4. Anemia  1. Monitor CBC  2.  Transfuse below 7

## 2019-04-02 NOTE — PROGRESS NOTES
[facility]  Advanced Heart Failure Progress Note    Alphonso Downs Patient Status:  Inpatient    1940 MRN [id number]   [facility] 2NE-A Attending Bharti Porter MD   Hosp Day # 6 PCP Sherrill Martines MD     Subjective:  Feel 03/31/2019     No results for input(s): BNPML in the last 168 hours.   BUN (mg/dL)   Date Value   04/01/2019 22 (H)   03/31/2019 22 (H)   03/30/2019 27 (H)   08/06/2014 13   07/16/2014 22 (H)   07/11/2014 30 (H)     CREATININE (mg/dL)   Date Value   08/06/2 (FOLVITE) tab 1 mg 1 mg Oral Daily   magnesium oxide (MAG-OX) tab 400 mg 400 mg Oral Daily   Pantoprazole Sodium (PROTONIX) EC tab 40 mg 40 mg Oral QAM AC   Sertraline HCl (ZOLOFT) tab 25 mg 25 mg Oral Daily   furosemide (LASIX) injection 40 mg 40 mg Intra alkalosis     Hyperglycemia     Acute pulmonary edema (HCC)     Acute on chronic diastolic CHF (congestive heart failure) ([facility])      Impression:  1. Severe MR, partial flail P2 component  2. Preserved LV function   3. Single chamber ICD   4.  LBBB  5. Persi

## 2019-04-03 PROCEDURE — 99232 SBSQ HOSP IP/OBS MODERATE 35: CPT | Performed by: HOSPITALIST

## 2019-04-03 RX ORDER — METOCLOPRAMIDE HYDROCHLORIDE 5 MG/ML
10 INJECTION INTRAMUSCULAR; INTRAVENOUS EVERY 8 HOURS PRN
Status: DISCONTINUED | OUTPATIENT
Start: 2019-04-03 | End: 2019-04-14
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
## 2019-04-03 NOTE — CONSULTS
[facility] Pharmacy Note:  Renal Dose Adjustment for Metoclopramide (REGLAN)    Olesya Almonte has been on Metoclopramide (REGLAN) 5 mg every 8 hours as needed for N/V. Estimated Creatinine Clearance: 45.4 mL/min (based on SCr of 1.01 mg/dL).     Her calculated

## 2019-04-03 NOTE — PAYOR COMM NOTE
--------------  CONTINUED STAY REVIEW----REQUESTING ADDITIONAL DAY 4/3      Payor: Gopi KEVIN PPO  Subscriber #:  [id number]  Authorization Number: [id number]    Admit date: 3/27/19  Admit time: 46    Admitting Physician: MD AUTUMN Akhtar HGB  7.9*  8.0*   --    --   7.8*   --    --    --   8.1*   MCV  77.9*  76.6*   --    --   77.1*   --    --    --   77.8*   PLT  215.0  203.0   --    --   210.0   --   203.0  221.0  209.0  205.0   INR  2.39*  2.30*   < >  1.64*   --   1.61*  1.34*  1.12* • Calcium Carbonate-Vitamin D  2 tablet Oral Daily   • dilTIAZem HCl ER Coated Beads  120 mg Oral Daily   • ferrous sulfate  325 mg Oral BID with meals   • folic acid  1 mg Oral Daily   • magnesium oxide  400 mg Oral Daily   • Pantoprazole Sodium  40 mg Or 4/3/2019 0933 Given 120 mg Oral Cecilio BOONE RN      eplerenone (INSPRA) tab 12.5 mg     Date Action Dose Route User    4/3/2019 0933 Given 12.5 mg Oral Elsada Juan BOONE RN      ferrous sulfate EC tab 325 mg     Date Action Dose Route User    4/3/2019 0

## 2019-04-03 NOTE — PROGRESS NOTES
[facility]  Cardiology Progress Note    Subjective:  No chest pain or shortness of breath.     Objective:  /60 (BP Location: Right arm)   Pulse 74   Temp 98 °F (36.7 °C) (Oral)   Resp 18   Ht 5' 7\" (1.702 m)   Wt 197 lb 5 oz (89.5 kg)   SpO2 96% regimen  · Spoke with the daughter who is insistent that the patient be evaluated for a mitral clip. CRAIG Ding  4/3/2019  12:39 PM    Notes reviewed. SRAVANTHI images reviewed.  Severely degenerated MV with large posterior leaflet flail -- wide flail

## 2019-04-03 NOTE — PLAN OF CARE
Pt walked with PCT at 1830. PCT called RN to say pt had made it to outside room 2612 and got very dizzy/light headed and had to sit down. Pt taken back to room by WC. BG checked 137. /60. Pt did walk further than she has thus far.   May be BP rel

## 2019-04-03 NOTE — PROGRESS NOTES
[facility]  Progress Note      Patient Status:  Inpatient    1940 MRN [id number]   [facility] 2NE-A Attending Cyntha Leventhal, MD   Hosp Day # 7 PCP Keven Parkinson MD     Chief Complaint: SOB    S: Patient w GFRAA  61   < >  56*   --   67   --   62   GFRNAA  53*   < >  49*   --   58*   --   54*   CA  8.0*   < >  8.5   --   8.3*   --   8.7   ALB  2.9*   --    --    --    --    --    --    NA  142   < >  138   --   138   --   138   K  4.2   < >  3.8   < >  3.8 7  5. Essential HTN  1. Continue home meds  6.  Dyslipidemia  1. statin      Plan of care: continue current management, await discussion with cardiology regarding bIV updrade and mitraclip    Quality:  · DVT Prophylaxis: heparin gtt  · CODE status: Full  ·

## 2019-04-04 ENCOUNTER — ANESTHESIA EVENT (OUTPATIENT)
Dept: ENDOSCOPY | Facility: HOSPITAL | Age: 79
End: 2019-04-04

## 2019-04-04 ENCOUNTER — APPOINTMENT (OUTPATIENT)
Dept: CV DIAGNOSTICS | Facility: HOSPITAL | Age: 79
DRG: 228 | End: 2019-04-04
Attending: INTERNAL MEDICINE
Payer: MEDICARE

## 2019-04-04 PROCEDURE — 99232 SBSQ HOSP IP/OBS MODERATE 35: CPT | Performed by: HOSPITALIST

## 2019-04-04 PROCEDURE — 99222 1ST HOSP IP/OBS MODERATE 55: CPT | Performed by: NURSE PRACTITIONER

## 2019-04-04 RX ORDER — DILTIAZEM HYDROCHLORIDE 120 MG/1
CAPSULE, EXTENDED RELEASE ORAL
COMMUNITY
End: 2019-07-30 | Stop reason: SDUPTHER

## 2019-04-04 RX ORDER — DIAPER,BRIEF,ADULT, DISPOSABLE
EACH MISCELLANEOUS
COMMUNITY
Start: 2018-03-01 | End: 2022-06-23 | Stop reason: CLARIF

## 2019-04-04 RX ORDER — WARFARIN SODIUM 5 MG/1
TABLET ORAL
COMMUNITY
Start: 2018-10-02 | End: 2019-07-30 | Stop reason: SDUPTHER

## 2019-04-04 RX ORDER — METOLAZONE 2.5 MG/1
TABLET ORAL
COMMUNITY
Start: 2018-07-12 | End: 2022-06-23 | Stop reason: CLARIF

## 2019-04-04 RX ORDER — ATORVASTATIN CALCIUM 10 MG/1
TABLET, FILM COATED ORAL
COMMUNITY
Start: 2010-08-11 | End: 2020-02-19 | Stop reason: CLARIF

## 2019-04-04 RX ORDER — METOPROLOL TARTRATE 50 MG/1
TABLET, FILM COATED ORAL
COMMUNITY
Start: 2018-07-12 | End: 2019-07-30 | Stop reason: SDUPTHER

## 2019-04-04 RX ORDER — POTASSIUM CHLORIDE 20 MEQ/1
TABLET, EXTENDED RELEASE ORAL
COMMUNITY
Start: 2016-08-05 | End: 2020-11-04 | Stop reason: SDUPTHER

## 2019-04-04 RX ORDER — METFORMIN HYDROCHLORIDE 500 MG/1
TABLET, EXTENDED RELEASE ORAL
COMMUNITY
Start: 2010-08-11 | End: 2019-04-04 | Stop reason: CLARIF

## 2019-04-04 RX ORDER — HEPARIN SODIUM 5000 [USP'U]/ML
30 INJECTION INTRAVENOUS; SUBCUTANEOUS ONCE
Status: COMPLETED | OUTPATIENT
Start: 2019-04-04 | End: 2019-04-04

## 2019-04-04 RX ORDER — TORSEMIDE 20 MG/1
TABLET ORAL
COMMUNITY
Start: 2018-03-01 | End: 2020-11-04 | Stop reason: SDUPTHER

## 2019-04-04 RX ORDER — AMIODARONE HYDROCHLORIDE 200 MG/1
TABLET ORAL
COMMUNITY
Start: 2018-07-12 | End: 2019-07-30 | Stop reason: SDUPTHER

## 2019-04-04 RX ORDER — MAGNESIUM OXIDE 400 MG/1
TABLET ORAL
COMMUNITY
Start: 2017-12-11 | End: 2020-02-19 | Stop reason: CLARIF

## 2019-04-04 RX ORDER — LOSARTAN POTASSIUM 50 MG/1
TABLET ORAL
COMMUNITY
Start: 2018-03-01 | End: 2020-11-04 | Stop reason: SDUPTHER

## 2019-04-04 NOTE — PLAN OF CARE
Tania SRINIVASAN spoke with daughter today with updates. RN also spoke with Carmita Sales daughter today with  updates and procedure EGD /colonoscopy for tomorrow  Verbal consent obtained from Carmita Sales with 2 RN witness signature.   poc updates given to pt and daughter

## 2019-04-04 NOTE — H&P (VIEW-ONLY)
[facility]                       Gastroenterology [address] [facility] Gastroenterology    Pawan Pruitt Patient Status:  Inpatient    1940 MRN [id number]   [place] 2NE-A Attending Cisco Edmonds MD   [facility] Day thrombosis ([facility]     R lower leg- 12/14/16, h/o IVC filter   • Dementia    • Diabetes ([facility]    • Diabetes mellitus type 2, noninsulin dependent ([facility]     Type !!   • Diabetic peripheral neuropathy associated with type 2 diabetes mellitus ([facility] 09/16/2016 3/24/2011:  PCP:  Dr. Eileen Rodriguez   • HERNIA SURGERY     • HYSTERECTOMY     • INJECT EPIDURAL ANEST,LUMB,CONTINOUS  2012   • OTHER  2016    surgery to relieve pressure on brain due to fall   • PACEMAKER/DEFIBRILLATOR      Medtronic single chamber ICD   • nitroGLYCERIN (NITRO-BID 2 % TD OINT) 2 % ointment 1 inch 1 inch Topical Once   glucose (DEX4) oral liquid 15 g 15 g Oral Q15 Min PRN   Or      Glucose-Vitamin C (DEX-4) 4-6 GM-MG chewable tab 4 tablet 4 tablet Oral Q15 Min PRN   Or      dextrose 50 % inje Respiratory: + asthma, COPD            Hematologic: + DVT;  + chronic anemia            Dermatologic: The patient reports no recent rashes or chronic skin disorders            Rheumatologic: + chronic arthritis, myalgias, arthralgias            Genitourina 3.8  4.1  4.1   CL  101   --   104   --   103   CO2  31.0   --   30.0   --   29.0    < > = values in this interval not displayed.      Recent Labs   Lab  04/03/19   0533   RBC  3.60*   HGB  8.1*   HCT  28.0*   MCV  77.8*   MCH  22.5*   MCHC  28.9*   RDW  21 perforation, missed lesions, need for surgery were discussed with the patient and her POA daughter Jacob Lagos via a telephone conversation. Both expressed understanding of the risks and are agreeable to proceed. Recommendations:     1.  Plan for EGD and colon

## 2019-04-04 NOTE — CONSULTS
Hem/Onc Report of Consultation    Patient Name: Sandra Munore   YOB: 1940   Medical Record Number: [id number]   CSN: [id number]   Consulting Physician: Dr. Sera Bowman  Referring Provider(s): Dr. Rosalee Ford   Date of Consultation: 4/4/2019     Reason thrombosis ([facility]     R lower leg- 12/14/16, h/o IVC filter   • Dementia    • Diabetes ([facility].)    • Diabetes mellitus type 2, noninsulin dependent ([facility].)     Type !!   • Diabetic peripheral neuropathy associated with type 2 diabetes mellitus ([facility] 09/16/2016 of adhesions on 3/24/2011:  PCP:  Dr. Pina Godinez   • HERNIA SURGERY     • HYSTERECTOMY     • INJECT EPIDURAL ANEST,LUMB,CONTINOUS  2012   • OTHER  2016    surgery to relieve pressure on brain due to fall   • PACEMAKER/DEFIBRILLATOR      Medtronic single sexual activity: Not on file    Other Topics      Concerns:        Not on file    Social History Narrative      Not on file      Allergies:     Aspirin                 OTHER (SEE COMMENTS)    Comment:Contraindicated 2/2 taking coumadin  Lisinopril injection 80 mg 80 mg Intravenous Once   [COMPLETED] ondansetron HCl (ZOFRAN) injection 4 mg 4 mg Intravenous Once   [COMPLETED] nitroGLYCERIN (NITRO-BID 2 % TD OINT) 2 % ointment 1 inch 1 inch Topical Once   glucose (DEX4) oral liquid 15 g 15 g Oral Q15 M ferrous sulfate 325 (65 FE) MG Oral Tab EC Take 1 tablet (325 mg total) by mouth 2 (two) times daily with meals. folic acid 1 MG Oral Tab Take 1 tablet (1 mg total) by mouth daily.    Pantoprazole Sodium 40 MG Oral Tab EC Take 1 tablet (40 mg total) by (Oral)   Resp 18   Ht 1.702 m (5' 7\")   Wt 89 kg (196 lb 3.4 oz)   SpO2 98%   BMI 30.73 kg/m²   Chest: Clear to auscultation. Heart: Regular rate and rhythm. Abdomen: Soft, non tender with good bowel sounds. Extremities: Pedal pulses are present.  No e folic acid. GI on consult--colonoscopy planned tomorrow. History of DVT/PE: on coumadin-->now heparin drip. IVC filter in place.      Dr. Khoi Marley to see tomorrow AM.     Electronically Signed by:    Ronald Zhu NP-C  Nurse Practitioner  Bud Devlin

## 2019-04-04 NOTE — PROGRESS NOTES
[facility]  Cardiology Progress Note    Subjective:  No chest pain or shortness of breath.     Objective:  /55 (BP Location: Right arm)   Pulse 73   Temp 98.1 °F (36.7 °C) (Oral)   Resp 18   Ht 5' 7\" (1.702 m)   Wt 196 lb 3.4 oz (89 kg)   SpO2 9 degenerated valve and we would consider an attempt at percutaneous repair pending course. Discussed case with Dr. Sammy Landis from [address] [facility] surgery who will assess the patient per protocol - yet she is clearly not a surgical candidate.     She is at acceptable cardiac

## 2019-04-04 NOTE — ANESTHESIA PREPROCEDURE EVALUATION
PRE-OP EVALUATION    Patient Name: Mark Friday    Pre-op Diagnosis: Iron deficiency anemia, unspecified iron deficiency anemia type [D50.9]    Procedure(s):  ESOPHAGOGASTRODUODENOSCOPY (EGD), COLONOSCOPY      Surgeon(s) and Role:     * Ivon Siu, [COMPLETED] ipratropium-albuterol (DUONEB) nebulizer solution 3 mL 3 mL Nebulization Once   [COMPLETED] furosemide (LASIX) injection 80 mg 80 mg Intravenous Once   [COMPLETED] ondansetron HCl (ZOFRAN) injection 4 mg 4 mg Intravenous Once   [COMPLETED] ni (two) times daily with meals. Disp:  Rfl:    ferrous sulfate 325 (65 FE) MG Oral Tab EC Take 1 tablet (325 mg total) by mouth 2 (two) times daily with meals. Disp: 60 tablet Rfl: 0   folic acid 1 MG Oral Tab Take 1 tablet (1 mg total) by mouth daily.  Disp: appears enlarged with pacemaker/ICD wires noted.  RV dilated with preserved function.    · Mitral annulus is dilated and mildly calcified.  Mitral valve leaflets are myxomatous and degenerated.  There is prolapse of the posterior leaflet noted with a mobile regurgitation.    ----------------------------------------------------------------------------  Procedure information:  A transthoracic complete 2D study was performed. Additional evaluation included M-mode, complete spectral Doppler, and color  Doppler. atrial volume was markedly increased. 6.  Right ventricle: The cavity size was mildly to moderately increased.      Pacer C/W ICD noted in the right ventricle. Systolic function was      normal.  7.  Right atrium: The atrium was markedly dilated.  Pacer C/  Doppler:  Transvalvular velocity was within the normal range. There was no evidence  for stenosis. There was severe regurgitation directed eccentrically,  anteriorly, and toward the septum. Aortic valve:   Trileaflet; mildly calcified leaflets.  Cusp sepa ratio, ED                          0.99              ---------   LV ejection fraction                         67    %           >=55      Ventricular septum                           Value             Reference   IVS thickness, ED, PLAX                     regurg PISA radius                    0.88  cm          ---------   Mitral maximal regurg velocity, PISA         4.5   m/sec       ---------   Mitral regurg VTI, PISA                      153.0 cm          ---------   Mitral ERO, PISA                       SURGERY  3/24/2011 [facility]    Repair of Incarcerated Complex Ventral Hernia w/mesh, bilateral component separation, removal previous mesh, partial omentectomy, lysis of adhesions on 3/24/2011:  PCP:  Dr. Damir Lujan   • Telly Hyperglycemia

## 2019-04-04 NOTE — CONSULTS
[facility]                       Gastroenterology [address] [facility] Gastroenterology    Betty Dennison Patient Status:  Inpatient    1940 MRN [id number]   [facility] 2NE-A Attending Ariella Hendricks MD   [address] [facility] thrombosis ([facility]     R lower leg- 12/14/16, h/o IVC filter   • Dementia    • Diabetes ([facility]    • Diabetes mellitus type 2, noninsulin dependent ([facility]     Type !!   • Diabetic peripheral neuropathy associated with type 2 diabetes mellitus ([facility] 09/16/2016 3/24/2011:  PCP:  Dr. Pina Godinez   • HERNIA SURGERY     • HYSTERECTOMY     • INJECT EPIDURAL ANEST,LUMB,CONTINOUS  2012   • OTHER  2016    surgery to relieve pressure on brain due to fall   • PACEMAKER/DEFIBRILLATOR      Medtronic single chamber ICD   • nitroGLYCERIN (NITRO-BID 2 % TD OINT) 2 % ointment 1 inch 1 inch Topical Once   glucose (DEX4) oral liquid 15 g 15 g Oral Q15 Min PRN   Or      Glucose-Vitamin C (DEX-4) 4-6 GM-MG chewable tab 4 tablet 4 tablet Oral Q15 Min PRN   Or      dextrose 50 % inje Respiratory: + asthma, COPD            Hematologic: + DVT;  + chronic anemia            Dermatologic: The patient reports no recent rashes or chronic skin disorders            Rheumatologic: + chronic arthritis, myalgias, arthralgias            Genitourina 3.8  4.1  4.1   CL  101   --   104   --   103   CO2  31.0   --   30.0   --   29.0    < > = values in this interval not displayed.      Recent Labs   Lab  04/03/19   0533   RBC  3.60*   HGB  8.1*   HCT  28.0*   MCV  77.8*   MCH  22.5*   MCHC  28.9*   RDW  21 perforation, missed lesions, need for surgery were discussed with the patient and her POA daughter Jarod Quintero via a telephone conversation. Both expressed understanding of the risks and are agreeable to proceed. Recommendations:     1.  Plan for EGD and colon

## 2019-04-04 NOTE — PROGRESS NOTES
Talked to Ivet Fonseca and her daughter Gibson Sahni about Mitraclip procedure. Procedure explained. Questions answered. Folder left with patient. Encouraged to call with any questions or concerns.

## 2019-04-04 NOTE — PAYOR COMM NOTE
--------------  CONTINUED STAY REVIEW----REQUESTING ADDITIONAL DAY 4/4      Payor: [employer] MEDICARE ADV PPO  Subscriber #:  [id number]  Authorization Number: [id number]    Admit date: 3/27/19  Admit time: 46    Admitting Physician: MD AUTUMN Stuart MCV  76.6*   --   77.1*   --    --    --   77.8*   --    PLT  203.0   --   210.0   --   203.0  221.0  209.0  205.0   --    INR  2.30*   < >   --   1.61*  1.34*  1.12*  0.99  0.99    < > = values in this interval not displayed.                 Recent Labs 2. Coumadin on hold, heparin gtt  3. DM2  1. SSI  4. Chronic anemia  1. Monitor CBC  2. Transfuse below 7  5. Essential HTN  1. Continue home meds  6. Dyslipidemia  1. statin        Plan of care: continue current management, await decision on mitraclip.   Kenia Dumont furosemide (LASIX) injection 40 mg     Date Action Dose Route User    4/4/2019 0857 Given 40 mg Intravenous Jocelyn Li RN    4/3/2019 1728 Given 40 mg Intravenous Monico Rocha RN      Losartan Potassium (COZAAR) tab 25 mg     Date Action Dose Route

## 2019-04-04 NOTE — PLAN OF CARE
Reyna KIMBALL CV aware of consult  Dr Jarquin Overall called vack aware of consult,  Ingris SRINIVASAN here with orders

## 2019-04-04 NOTE — PROGRESS NOTES
[facility]  Progress Note     Karyle Cheng Patient Status:  Inpatient    1940 MRN [id number]   [facility] 2NE-A Attending Neftaly Baez MD   Hosp Day # 8 PCP Dyana Torre MD     Chief Complaint: SOB    S: Patient w NA  138   --   138   --   138   K  3.8   < >  3.8  4.1  4.1   CL  101   --   104   --   103   CO2  31.0   --   30.0   --   29.0    < > = values in this interval not displayed.        Estimated Creatinine Clearance: 45.4 mL/min (based on SCr of 1.01 mg/dL) home    Plan of care discussed with patient, RN    Devan Segura, [address]

## 2019-04-05 ENCOUNTER — ANESTHESIA (OUTPATIENT)
Dept: ENDOSCOPY | Facility: HOSPITAL | Age: 79
End: 2019-04-05

## 2019-04-05 PROBLEM — D50.9 IRON DEFICIENCY ANEMIA: Status: ACTIVE | Noted: 2019-04-05

## 2019-04-05 PROBLEM — Z79.01 ANTICOAGULATED: Status: ACTIVE | Noted: 2019-04-05

## 2019-04-05 PROCEDURE — 99232 SBSQ HOSP IP/OBS MODERATE 35: CPT | Performed by: INTERNAL MEDICINE

## 2019-04-05 PROCEDURE — 0DBE8ZZ EXCISION OF LARGE INTESTINE, VIA NATURAL OR ARTIFICIAL OPENING ENDOSCOPIC: ICD-10-PCS | Performed by: INTERNAL MEDICINE

## 2019-04-05 PROCEDURE — 99232 SBSQ HOSP IP/OBS MODERATE 35: CPT | Performed by: HOSPITALIST

## 2019-04-05 PROCEDURE — 0DB68ZX EXCISION OF STOMACH, VIA NATURAL OR ARTIFICIAL OPENING ENDOSCOPIC, DIAGNOSTIC: ICD-10-PCS | Performed by: INTERNAL MEDICINE

## 2019-04-05 PROCEDURE — 0DB98ZX EXCISION OF DUODENUM, VIA NATURAL OR ARTIFICIAL OPENING ENDOSCOPIC, DIAGNOSTIC: ICD-10-PCS | Performed by: INTERNAL MEDICINE

## 2019-04-05 RX ORDER — MAGNESIUM CARB/ALUMINUM HYDROX 105-160MG
296 TABLET,CHEWABLE ORAL ONCE
Status: COMPLETED | OUTPATIENT
Start: 2019-04-05 | End: 2019-04-05

## 2019-04-05 RX ORDER — MELATONIN
325 EVERY OTHER DAY
Status: DISCONTINUED | OUTPATIENT
Start: 2019-04-05 | End: 2019-04-17

## 2019-04-05 NOTE — PROGRESS NOTES
[facility]  Progress Note    Crystal Mercdao Patient Status:  Inpatient    1940 MRN [id number]   [facility] 2NE-A Attending Saloni Britt MD   [facility] Day # 9 PCP Tj Oneal MD       SUBJECTIVE:    No new complaints, colonos Subcutaneous TID CC and HS   • amiodarone HCl  200 mg Oral Daily   • Calcium Carbonate-Vitamin D  2 tablet Oral Daily   • dilTIAZem HCl ER Coated Beads  120 mg Oral Daily   • folic acid  1 mg Oral Daily   • magnesium oxide  400 mg Oral Daily   • Pantoprazo

## 2019-04-05 NOTE — CM/SW NOTE
SW order for d/c plan. Pt staying in a hotel temporarily until home is ready. She plans to return to same at d/c, reports that her son and daughter are very supportive. SW called and s/w Pt's daughter Shady Niño, explained role in d/c planning process.  Jose A Mccartney

## 2019-04-05 NOTE — PROGRESS NOTES
[facility]  Progress Note     Sandra Shaneka Patient Status:  Inpatient    1940 MRN [id number]   [facility] 2NE-A Attending Mainor Palmer MD   [facility] Day # 9 PCP Merly Cunningham MD     Chief Complaint: SOB    S: Patient w --   29.0  30.0       Estimated Creatinine Clearance: 44.5 mL/min (A) (based on SCr of 1.03 mg/dL (H)).     Recent Labs   Lab  04/03/19   0533  04/04/19   0528  04/05/19   0542   PTP  13.5  13.5  13.6   INR  0.99  0.99  1.00       No results for input(s):

## 2019-04-05 NOTE — RESPIRATORY THERAPY NOTE
Patient ordered for RADHA eval, has previously failed cpap use. Will monitor sats and use O2 as needed.

## 2019-04-05 NOTE — OPERATIVE REPORT
Jesse Mcgraw Patient Status:  Inpatient    1940 MRN [id number]   [facility] ENDOSCOPY Attending Juan Francisco Lui MD   [facility] Day # 9 PCP Kathi Smalls MD       PREOPERATIVE DIAGNOSIS/INDICATION: Iron Deficiency Anemia  POST hot snare polypectomy. Descending colon: The mucosa and vascular pattern were normal.  Sigmoid colon: Diverticulosis. Rectum: The mucosa and vascular pattern were normal. Retroflexion revealed large Grade III internal hemorrhoids. IMPRESSION:   1.

## 2019-04-05 NOTE — OPERATIVE REPORT
Krista Vazquez Patient Status:  Inpatient    1940 MRN [id number]   [facility] ENDOSCOPY Attending Holden Coello MD   [facility] Day # 9 PCP Bernarda Grace MD       PREOPERATIVE DIAGNOSIS/INDICATION: Iron Deficiency Anemia  PO RECOMMENDATIONS:    1. Follow up pathology results. 2. Pantoprazole 40 mg daily.      Radha Agee  Gastroenterology/Advanced Endoscopy  [facility]

## 2019-04-05 NOTE — PHYSICAL THERAPY NOTE
Attempted to see Pt this AM - RN Bharti aware of attempt. Pt occupied with nursing staff, and Pt having liquid stools. RN requesting PT re-attempt in PM as able.   Will f/u later today if time permits, after all other patients are attempted per tentative adeola

## 2019-04-05 NOTE — INTERVAL H&P NOTE
Pre-op Diagnosis: Iron deficiency anemia, unspecified iron deficiency anemia type [D50.9]    The above referenced H&P was reviewed by Alec Dawson DO on 4/5/2019, the patient was examined and no significant changes have occurred in the patient's condit

## 2019-04-05 NOTE — ANESTHESIA POSTPROCEDURE EVALUATION
[address] Patient Status:  Inpatient   Age/Gender 70year old female MRN [id number]   Location [facility]. Attending Lana Holliday, [address] [facility] Day # 9 PCP Sunil Holly MD       Anesthesia Post-op Note    Procedure

## 2019-04-05 NOTE — PROGRESS NOTES
Pt scheduled for EGD/Colonoscopy tomorrow at 1pm. Pt continues on clear liquid diet and colonoscopy prep. Pt having frequent liquid stools- dk. Brown/black. Pt. continues on heparin drip at 14.5 ml/hr-continue to monitor.

## 2019-04-05 NOTE — PAYOR COMM NOTE
--------------  CONTINUED STAY REVIEW    Payor: [employer] MED[employer] ADV PPO  Subscriber #:  [id number]  Authorization Number: [id number]    Admit date: 3/27/19  Admit time: [address] Patient Status:  Inpatient    1940 MRN [id number]   Loc[place] GFRAA  67   --   62  61   GFRNAA  58*   --   54*  53*   CA  8.3*   --   8.7  9.1   NA  138   --   138  139   K  3.8  4.1  4.1  3.8   CL  104   --   103  104   CO2  30.0   --   29.0  30.0         Estimated Creatinine Clearance: 44.5 mL/min (A) (based on S progress  At this point Ms. Brandon Higuera is expected to be discharge to: home     Plan of care discussed with patient, RN     Carissa Ovalles MD  [facility]                         Electronically signed by Corbin Wisdom MD at 4/5/2019 11:54 AM

## 2019-04-05 NOTE — PLAN OF CARE
Spoke with José Miguel SRINIVASAN ok to restart Heparin gtt with same rate and PTT in 6 hrs  Heparin gtt restarted at 1300 units, 13cc/hr see MAR.

## 2019-04-05 NOTE — PROGRESS NOTES
[facility] at [address] [facility]  Progress Note    Krista Vazquez Patient Status:  Inpatient    1940 MRN [id number]   [facility] 2NE-A Attending Holden Coello MD   [address] Day # 9 PCP Bernarda Grace, 04/05/2019    CREATSERUM 1.03 04/05/2019    BUN 16 04/05/2019     04/05/2019    K 3.8 04/05/2019     04/05/2019    CO2 30.0 04/05/2019    GLU 81 04/05/2019    CA 9.1 04/05/2019    PTT 70.4 04/05/2019    INR 1.00 04/05/2019    PTP 13.6 04/05/201 Daily   Pantoprazole Sodium (PROTONIX) EC tab 40 mg 40 mg Oral QAM AC   Sertraline HCl (ZOLOFT) tab 25 mg 25 mg Oral Daily   furosemide (LASIX) injection 40 mg 40 mg Intravenous BID (Diuretic)         Patient Active Problem List:     Vitamin D deficiency heart failure) (HCC)     Iron deficiency anemia     Anticoagulated    Assessment: Antral erosions on EGD, likely the source of anemia                      S/P colon polypectomy today                      Atrial fibrillation                      Mitral regu

## 2019-04-06 PROCEDURE — 99232 SBSQ HOSP IP/OBS MODERATE 35: CPT | Performed by: HOSPITALIST

## 2019-04-06 RX ORDER — POTASSIUM CHLORIDE 20 MEQ/1
40 TABLET, EXTENDED RELEASE ORAL EVERY 4 HOURS
Status: COMPLETED | OUTPATIENT
Start: 2019-04-06 | End: 2019-04-06

## 2019-04-06 RX ORDER — FUROSEMIDE 10 MG/ML
40 INJECTION INTRAMUSCULAR; INTRAVENOUS DAILY
Status: DISCONTINUED | OUTPATIENT
Start: 2019-04-07 | End: 2019-04-12

## 2019-04-06 NOTE — PLAN OF CARE
Patient alert and oriented, per patient's daughter/POA Jeri, due to dementia Raleigh Hayliegretchen is to be contacted  with updates and any changes to medications and plan of care; VSS; cardiac monitor showing a-fib, rate controlled; heparin drip infusing as ordered, nex

## 2019-04-06 NOTE — PROGRESS NOTES
· [facility]      Subjective:  Up in chair no dyspnea, edema at baseline    Objective:  121/68  Afebrile  AFib rare v pacing  I/O equal  Hgb 8.5  BUN/cr 16/0.92  INR 1.04      Neuro: awake/alert  HEENT: no JVD  Cardiac: S1 S2 3/6 systol

## 2019-04-06 NOTE — PROGRESS NOTES
[facility]  Progress Note     Gemma Masoudisabelle Patient Status:  Inpatient    1940 MRN [id number]   [facility] 2NE-A Attending Yolanda Escalante MD   Hosp Day # 10 PCP Ernie Barrett MD     Chief Complaint: SOB    S: Patient 04/04/19  0528 04/05/19  0542 04/06/19  0604   PTP 13.5 13.6 14.0   INR 0.99 1.00 1.04       No results for input(s): TROP, CK in the last 168 hours. Imaging: Imaging data reviewed in Epic.     Medications:   • Potassium Chloride ER  40 mEq Oral Q4H

## 2019-04-07 PROCEDURE — 99231 SBSQ HOSP IP/OBS SF/LOW 25: CPT | Performed by: HOSPITALIST

## 2019-04-07 NOTE — PLAN OF CARE
Problem: Diabetes/Glucose Control  Goal: Glucose maintained within prescribed range  Description  INTERVENTIONS:  - Monitor Blood Glucose as ordered  - Assess for signs and symptoms of hyperglycemia and hypoglycemia  - Administer ordered medications to m Functional Mobility  Goal: Achieve highest/safest level of mobility/gait  Description  Interventions:  - Assess patient's functional ability and stability  - Promote increasing activity/tolerance for mobility and gait  - Educate and engage patient/family i

## 2019-04-07 NOTE — PROGRESS NOTES
· [facility]      Subjective:  Couldn't sleep but no dyspnea/orthopnea.       Objective:  119/68  Afebrile  AFib rare v pacing  I/O equal  BUN/cr 17/1.01    Neuro: awake/alert  HEENT: no JVD  Cardiac: S1 S2 3/6 systolic murmur  Lungs:  clear  A

## 2019-04-07 NOTE — PROGRESS NOTES
[facility]  Progress Note     Saurabh Slim Patient Status:  Inpatient    1940 MRN [id number]   [facility] 2NE-A Attending Jeanine Montero MD   [address] Day # 6 PCP Anisha Aldana MD     Chief Complaint: SOB    S: Patient Estimated Creatinine Clearance: 45.4 mL/min (based on SCr of 1.01 mg/dL). Recent Labs   Lab 04/05/19  0542 04/06/19  0604 04/07/19  0541   PTP 13.6 14.0 13.3   INR 1.00 1.04 0.97       No results for input(s): TROP, CK in the last 168 hours.

## 2019-04-08 PROCEDURE — 99231 SBSQ HOSP IP/OBS SF/LOW 25: CPT | Performed by: HOSPITALIST

## 2019-04-08 PROCEDURE — 99231 SBSQ HOSP IP/OBS SF/LOW 25: CPT | Performed by: NURSE PRACTITIONER

## 2019-04-08 NOTE — PHYSICAL THERAPY NOTE
PHYSICAL THERAPY TREATMENT NOTE - INPATIENT    Room Number: 9686/2427-F     Session:  1  Number of Visits to Meet Established Goals: 3    Presenting Problem: Acute Pulmonary Edema    Problem List  Principal Problem:    Acute pulmonary edema ([address]  Active • S/P total knee replacement 6/18/2015    bilat 1990s, both severe OA   • Seizure disorder [facility])     seizure when hospitalized for intracranial hemorrhage   • Sleep apnea    • Stroke [facility])    • Subdural hematoma ([facility] [address] 12/01/2016    s/p L craniotomy   • difficulty does the patient currently have. ..  -   Turning over in bed (including adjusting bedclothes, sheets and blankets)?: None   -   Sitting down on and standing up from a chair with arms (e.g., wheelchair, bedside commode, etc.): None   -   Moving fr for acute pulmonary edema. Pertinent comorbidities and personal factors impacting therapy include a-fib, CHF, anxiety, dementia, SOB, COPD, and DMII. Pt able to amb further and without SOB today.  Pt reports that she has had no difficulty breathing today

## 2019-04-08 NOTE — PAYOR COMM NOTE
--------------  CONTINUED STAY REVIEW    Payor: BCBS MEDICARE ADV PPO  Subscriber #:  [id number]  Authorization Number: [id number]    Admit date: 3/27/19  Admit time: 46    Admitting Physician: Julian Cade MD  Attending Physician:  Camacho Henderson, 106   CO2 29.0 30.0 27.0         Estimated Creatinine Clearance: 49.8 mL/min (based on SCr of 0.92 mg/dL).            Recent Labs   Lab 04/04/19  0528 04/05/19  0542 04/06/19  0604   PTP 13.5 13.6 14.0   INR 0.99 1.00 1.04         No results for input(s): T Electronically signed by Brissa Fair MD at 4/6/2019 11:58 AM   Assessment:  · Recurrent HF admission with severe MR, EFT 65%. · AFib, permanent HRs are controlled on IV Heparin while off Coumadin  · Chronic anemia.   EGD April 5th gastritis wit

## 2019-04-08 NOTE — PROGRESS NOTES
[facility]  Progress Note    Andrea Gill Patient Status:  Inpatient    1940 MRN [id number]   [facility] 2NE-A Attending Mary Peterson MD   Hosp Day # 12 PCP Kenney Wing MD       SUBJECTIVE: Sitting up in chair, no comp folic acid  1 mg Oral Daily   • magnesium oxide  400 mg Oral Daily   • Pantoprazole Sodium  40 mg Oral QAM AC   • Sertraline HCl  25 mg Oral Daily     Metoclopramide HCl, melatonin, glucose **OR** Glucose-Vitamin C **OR** dextrose **OR** glucose **OR** Glu

## 2019-04-08 NOTE — PLAN OF CARE
Pt alert able to make needs known   Pt denies any pain  A-fib on tele   Heparin cont to infuse next PTT in the morning   Daughter called wanting to know when procedure for mitral clip is. Plan of care discussed with pt and daughter.  Will call daughter with

## 2019-04-08 NOTE — PROGRESS NOTES
[facility]  Progress Note     Isa Ortiz Patient Status:  Inpatient    1940 MRN [id number]   [facility] 2NE-A Attending Corie Corey MD   Hosp Day # 15 PCP Armando Iraheta MD     Chief Complaint: SOB    S: Patient 04/07/19  0541 04/08/19  0534   PTP 14.0 13.3 13.3   INR 1.04 0.97 0.97       No results for input(s): TROP, CK in the last 168 hours. Imaging: Imaging data reviewed in Epic.     Medications:   • furosemide  40 mg Intravenous Daily   • ferrous sulfa

## 2019-04-08 NOTE — PAYOR COMM NOTE
--------------  CONTINUED STAY REVIEW    Payor: [employer] MEDICARE ADV PPO  Subscriber #:  [id number]  Authorization Number: [id number]    Admit date: 3/27/19  Admit time: [address] Patient Status:  Inpatient    1940 MRN [id number]   [place] Carbonate-Vitamin D  2 tablet Oral Daily   • dilTIAZem HCl ER Coated Beads  120 mg Oral Daily   • folic acid  1 mg Oral Daily   • magnesium oxide  400 mg Oral Daily   • Pantoprazole Sodium  40 mg Oral QAM AC   • Sertraline HCl  25 mg Oral Daily      • Cont

## 2019-04-08 NOTE — PROGRESS NOTES
[facility]  Cardiology Progress Note    Krista Vazquez Patient Status:  Inpatient    1940 MRN [id number]   [facility] 2NE-A Attending Holden Coello MD   [facility] Day # 12 PCP Bernarda Grace MD     Subjective:  No complaints of c oxide  400 mg Oral Daily   • Pantoprazole Sodium  40 mg Oral QAM AC   • Sertraline HCl  25 mg Oral Daily     • Continuous dose Heparin infusion 1,050 Units/hr (04/07/19 1830)       Assessment:  · Severe Mitral regurgitation  · Acute on Chronic diastolic he

## 2019-04-08 NOTE — PLAN OF CARE
Alert. Oriented. Afib per tele. Hr 60's. Denies pain. Still sleeps in recliner. Hep gtt infusing. Poc updated w. Pt. Voiced understanding. Cont. to monitor pt.

## 2019-04-09 PROCEDURE — 99231 SBSQ HOSP IP/OBS SF/LOW 25: CPT | Performed by: HOSPITALIST

## 2019-04-09 NOTE — CONSULTS
[address] [place]    PATIENT'S NAME: Kadi LEYVA   ATTENDING PHYSICIAN: Carmelina Johnson M.D.   [facility] Coup: Nohemi Vasquez M.D.    PATIENT ACCOUNT#:   [de-identified]    LOCATION:  [address] [place] [address]  MEDICAL RECORD #:   [id number]       DATE OF BIRTH:  2 HISTORY:  Denies tobacco use. FAMILY HISTORY:  Noncontributory. REVIEW OF SYSTEMS:  As above. The remainder was reviewed and is negative. PHYSICAL EXAMINATION:    GENERAL:  She is well developed, well nourished, in no acute distress.   VITAL S

## 2019-04-09 NOTE — PROGRESS NOTES
04/09/19 1330   Mobility   O2 walk?  Yes   SPO2 on Room Air at Rest 97   SPO2 Ambulation on Room Air 95  (walked 250ft. tolerated well . )

## 2019-04-09 NOTE — PLAN OF CARE
Pt alert able to make needs known   A-fib on tele   Cont on heparin gtts next PTT in the morning . Mitral clip scheduled for Thursday   Plan of care discussed with pt , daughter updated. Call light within reach, will cont to monitor.

## 2019-04-09 NOTE — PAYOR COMM NOTE
--------------  CONTINUED STAY REVIEW-----REQUESTING ADDITIONAL DAY 4/9      Payor: BCBS MEDICARE ADV PPO  Subscriber #:  [id number]  Authorization Number: [id number]    Admit date: 3/27/19  Admit time: 46    Admitting Physician: Barbara Grady MD • eplerenone  12.5 mg Oral Daily   • glycerin (laxative)  1 suppository Rectal Once   • docusate sodium  100 mg Oral BID   • PEG 3350  17 g Oral Daily   • Insulin Aspart Pen  1-5 Units Subcutaneous TID CC and HS   • amiodarone HCl  200 mg Oral Daily   • Ca Calcium Carbonate-Vitamin D (OYSTER-D) 250-125 MG-UNIT per tab 2 tablet     Date Action Dose Route User    4/9/2019 0910 Given 2 tablet Oral Sharon Valdes, RN      heparin (PORCINE) drip [id number]/250mL infusion CONTINUOUS     Date Action Dose Route Use 4/9/2019 0910 Given 25 mg Oral Esther French RN          Procedures:      Plan:

## 2019-04-09 NOTE — PROGRESS NOTES
[facility]  Cardiology Progress Note    Radha Naylor Patient Status:  Inpatient    1940 MRN [id number]   [facility] 2NE-A Attending Samantha Walls MD   [facility] Day # 15 PCP Scotty Olivas MD     Subjective:  SOB has improved 1,050 Units/hr (04/08/19 4027)       Assessment:  · Severe Mitral regurgitation  · Acute on Chronic diastolic heart failure, LVEF 65% - diuresing well on IV lasix  · Permanent atrial fibrillation, rates controlled. Coumadin held.  On IV heparin  · Chronic a

## 2019-04-09 NOTE — CONSULTS
[facility]  Progress Note    Jaziel Rose Patient Status:  Inpatient    1940 MRN [id number]   [facility] 2NE-A Attending Sonya Barrios MD   Hosp Day # 15 PCP Jason Barajas MD         Assessment/Plan:78 yo with multiple me HGBA1C (%)   Date Value   06/02/2014 7.3 (H)   01/12/2014 7.0 (H)   11/25/2013 7.1 (H)     HgbA1C (%)   Date Value   03/27/2019 5.4   02/21/2019 6.1 (H)   10/01/2018 6.1 (H)     No components found for: HGBA1C  Recent Labs   Lab 04/08/19  1125 04/08/19

## 2019-04-09 NOTE — PROGRESS NOTES
[facility]  Progress Note     Ramandeep Anne Patient Status:  Inpatient    1940 MRN [id number]   [facility] 2NE-A Attending Kimani Joiner MD   [facility] Day # 15 PCP Kendy Valdez MD     Chief Complaint: SOB    S: Patient 39.8 mL/min (A) (based on SCr of 1.15 mg/dL (H)). Recent Labs   Lab 04/07/19  0541 04/08/19  0534 04/09/19  0631   PTP 13.3 13.3 13.4   INR 0.97 0.97 0.98       No results for input(s): TROP, CK in the last 168 hours.          Imaging: Imaging data revie

## 2019-04-09 NOTE — CM/SW NOTE
Interdisciplinary Rounds: 04/09/19  Admitted: 3/27/2019 LOS: 13  Disciplines in attendance: charge nurse, staff nurse, CM, [address] and discharge plan reviewed. Outcome/issues identified: plan mitralclip Thursday, cont iv lasix bid and iv heparin.

## 2019-04-09 NOTE — PLAN OF CARE
Problem: Diabetes/Glucose Control  Goal: Glucose maintained within prescribed range  Description  INTERVENTIONS:  - Monitor Blood Glucose as ordered  - Assess for signs and symptoms of hyperglycemia and hypoglycemia  - Administer ordered medications to m awake, A&Ox4, calm, pleasant and cooperative, forgetful at times per report. Pt., sitting up in chair,  uses call light and calls appropriately for needs and before getting up. Pt., up to BR with walker and SBA, tolerated well.   Pt., is in AFib on Tele m

## 2019-04-10 ENCOUNTER — ANESTHESIA EVENT (OUTPATIENT)
Dept: CARDIAC SURGERY | Facility: HOSPITAL | Age: 79
End: 2019-04-10

## 2019-04-10 ENCOUNTER — APPOINTMENT (OUTPATIENT)
Dept: GENERAL RADIOLOGY | Facility: HOSPITAL | Age: 79
DRG: 228 | End: 2019-04-10
Attending: NURSE PRACTITIONER
Payer: MEDICARE

## 2019-04-10 ENCOUNTER — ANTI-COAG (OUTPATIENT)
Dept: CARDIOLOGY | Age: 79
End: 2019-04-10

## 2019-04-10 DIAGNOSIS — I48.91 ATRIAL FIBRILLATION, UNSPECIFIED TYPE (CMD): ICD-10-CM

## 2019-04-10 PROCEDURE — 71045 X-RAY EXAM CHEST 1 VIEW: CPT | Performed by: NURSE PRACTITIONER

## 2019-04-10 PROCEDURE — 99232 SBSQ HOSP IP/OBS MODERATE 35: CPT | Performed by: HOSPITALIST

## 2019-04-10 NOTE — DIETARY NOTE
[address] [facility]     Admitting diagnosis:  Acute pulmonary edema ([facility] [address] [J81.0]    Ht: 170.2 cm (5' 7\")  Wt: 88.6 kg (195 lb 5.2 oz). This is 144 % of IBW  BMI: Body mass index is 30.59 kg/m².   IBW: Ideal body weight:

## 2019-04-10 NOTE — PROGRESS NOTES
6 Minute Walk Test: total distance 83.82 meters     No pauses or stops during 6 minute walk, used walker, no O2 needed, felt slight dyspnea during walk, felt fatigued after walk but was able to catch breath after sitting down.     Baseline @ Rest   /6

## 2019-04-10 NOTE — PROGRESS NOTES
[facility]  Progress Note     Selina Turner Patient Status:  Inpatient    1940 MRN [id number]   [facility] 2NE-A Attending Dorina Knowles MD   [address] Day # 15 PCP Jun Guadalupe MD     Chief Complaint: SOB    S: Doing we 0. 98 0.98       No results for input(s): TROP, CK in the last 168 hours. Imaging: Imaging data reviewed in Epic.     Medications:   • furosemide  40 mg Intravenous Daily   • ferrous sulfate  325 mg Oral QOD   • Losartan Potassium  25 mg Oral BID   •

## 2019-04-10 NOTE — PAYOR COMM NOTE
--------------  CONTINUED STAY REVIEW-----PROGRESS NOTES FOR 4/10      Payor: BCBS MEDICARE ADV PPO  Subscriber #:  [id number]  Authorization Number: [id number]    Admit date: 3/27/19  Admit time: 46    Admitting Physician: Jacqueline Monroy MD  Attend .0 187.0  --   --  169.0 183.0  --    INR 1.00 1.04 0.97 0.97 0.98  --  0.98               Recent Labs   Lab 04/07/19  0541 04/09/19  0631 04/10/19  0627   GLU 86 80 81   BUN 17 25* 25*   CREATSERUM 1.01 1.15* 1.04*   GFRAA 62 53* 60   GFRNAA 54* 46 Will the patient be referred to [facility] on discharge?: no  Estimated date of discharge: TBD  Discharge is dependent on: progress  At this point Ms. Jose Garcia is expected to be discharge to: home     Plan of care discussed with patient, RN.  Jessica Pro MD Metoprolol Succinate ER (Toprol XL) 24 hr tab 100 mg     Date Action Dose Route User    4/9/2019 2021 Given 100 mg Oral Jannet Hassan RN      Pantoprazole Sodium (PROTONIX) EC tab 40 mg     Date Action Dose Route User    4/10/2019 0534 Given 36 m

## 2019-04-10 NOTE — PLAN OF CARE
Problem: Diabetes/Glucose Control  Goal: Glucose maintained within prescribed range  Description  INTERVENTIONS:  - Monitor Blood Glucose as ordered  - Assess for signs and symptoms of hyperglycemia and hypoglycemia  - Administer ordered medications to m

## 2019-04-10 NOTE — PROGRESS NOTES
[facility]  Cardiology Progress Note    Brendan Mcfarlane Patient Status:  Inpatient    1940 MRN [id number]   [facility] 2NE-A Attending Monica Mcgowan MD   [address] Day # 15 PCP Corie Calloway MD     Subjective:  Sitting up in ch controlled. Coumadin held. On IV heparin  · Chronic anemia, EGD on 4/5 with gastritis and antral erosions. On PPI. Hematology started oral iron supplementation.    · Normal coronary arteries per [facility] 2014  · HTN, stable  · History fo DVT, s/p thrombectomy an

## 2019-04-10 NOTE — PROGRESS NOTES
[facility]  Progress Note    Selina Turner Patient Status:  Inpatient    1940 MRN [id number]   [facility] 2NE-A Attending Gabriella Coleman MD   [facility] Day # 15 PCP Jun Guadalupe MD         Assessment/Plan:80 yo with multiple me 7.3 (H)   01/12/2014 7.0 (H)   11/25/2013 7.1 (H)     HgbA1C (%)   Date Value   03/27/2019 5.4   02/21/2019 6.1 (H)   10/01/2018 6.1 (H)     No components found for: HGBA1C  Recent Labs   Lab 04/09/19  0717 04/09/19  1149 04/09/19  1630 04/09/19  2126 04/1

## 2019-04-10 NOTE — ANESTHESIA PREPROCEDURE EVALUATION
PRE-OP EVALUATION    Patient Name: Sheri Apgar    Pre-op Diagnosis: mitral regurgitation    Procedure(s):  Mitraclip             Surgeon(s) and Role:     Erlin Hodge MD - Primary    Pre-op vitals reviewed.   Temp: 97.8 °F (36.6 °C)  Pulse: 69  Resp: suppository Rectal Once   docusate sodium (COLACE) cap 100 mg 100 mg Oral BID   PEG 3350 (MIRALAX) powder packet 17 g 17 g Oral Daily   [COMPLETED] FLEET ENEMA (FLEET) 7-19 GM/118ML enema 133 mL 1 enema Rectal Once PRN   melatonin cap/tab 5 mg 5 mg Oral Ni Coated Beads 120 MG Oral Capsule SR 24 Hr Take 120 mg by mouth daily. Disp:  Rfl:    metFORMIN HCl 500 MG Oral Tab Take 500 mg by mouth 2 (two) times daily with meals.  Disp:  Rfl:    ferrous sulfate 325 (65 FE) MG Oral Tab EC Take 1 tablet (325 mg total) b dilated.  Left ventricle is enlarged with preserved LVEF (~65%). Hypokinesis of basal inferior myocardium noted. · Right atrium appears enlarged with pacemaker/ICD wires noted.  RV dilated with preserved function.    · Mitral annulus is dilated and mildly  Surgical History:   Procedure Laterality Date   • ANGIOGRAM     • APPENDECTOMY     • CARDIAC DEFIBRILLATOR PLACEMENT     • COLONOSCOPY  04/05/2019    sessile serrated polyp, Dr. Nita Sharp, [facility] (during inpatient stay)   • COLONOSCOPY N/A 4/5/2019 opening: 3 FB  TM distance: 4 - 6 cm  Neck ROM: full Cardiovascular             Dental      Dental appliance(s): upper dentures       Pulmonary                     Other findings            ASA: 4   Plan: general  NPO status verified and patient meets guid

## 2019-04-11 ENCOUNTER — APPOINTMENT (OUTPATIENT)
Dept: GENERAL RADIOLOGY | Facility: HOSPITAL | Age: 79
DRG: 228 | End: 2019-04-11
Attending: INTERNAL MEDICINE
Payer: MEDICARE

## 2019-04-11 ENCOUNTER — APPOINTMENT (OUTPATIENT)
Dept: CV DIAGNOSTICS | Facility: HOSPITAL | Age: 79
DRG: 228 | End: 2019-04-11
Attending: NURSE PRACTITIONER
Payer: MEDICARE

## 2019-04-11 ENCOUNTER — APPOINTMENT (OUTPATIENT)
Dept: CV DIAGNOSTICS | Facility: HOSPITAL | Age: 79
DRG: 228 | End: 2019-04-11
Attending: INTERNAL MEDICINE
Payer: MEDICARE

## 2019-04-11 ENCOUNTER — ANESTHESIA (OUTPATIENT)
Dept: CARDIAC SURGERY | Facility: HOSPITAL | Age: 79
End: 2019-04-11

## 2019-04-11 PROCEDURE — 93355 ECHO TRANSESOPHAGEAL (TEE): CPT | Performed by: NURSE PRACTITIONER

## 2019-04-11 PROCEDURE — B245ZZ4 ULTRASONOGRAPHY OF LEFT HEART, TRANSESOPHAGEAL: ICD-10-PCS | Performed by: INTERNAL MEDICINE

## 2019-04-11 PROCEDURE — 93306 TTE W/DOPPLER COMPLETE: CPT | Performed by: INTERNAL MEDICINE

## 2019-04-11 PROCEDURE — 99232 SBSQ HOSP IP/OBS MODERATE 35: CPT | Performed by: HOSPITALIST

## 2019-04-11 PROCEDURE — B51VYZZ FLUOROSCOPY OF OTHER VEINS USING OTHER CONTRAST: ICD-10-PCS | Performed by: INTERNAL MEDICINE

## 2019-04-11 PROCEDURE — 71045 X-RAY EXAM CHEST 1 VIEW: CPT | Performed by: INTERNAL MEDICINE

## 2019-04-11 PROCEDURE — 02UG3JZ SUPPLEMENT MITRAL VALVE WITH SYNTHETIC SUBSTITUTE, PERCUTANEOUS APPROACH: ICD-10-PCS | Performed by: INTERNAL MEDICINE

## 2019-04-11 DEVICE — MITRACLIP NTR CLIP DELIVERY SYSTEM
Type: IMPLANTABLE DEVICE | Status: FUNCTIONAL
Brand: MITRACLIP

## 2019-04-11 RX ORDER — NALOXONE HYDROCHLORIDE 0.4 MG/ML
80 INJECTION, SOLUTION INTRAMUSCULAR; INTRAVENOUS; SUBCUTANEOUS AS NEEDED
Status: DISCONTINUED | OUTPATIENT
Start: 2019-04-11 | End: 2019-04-12

## 2019-04-11 RX ORDER — SODIUM CHLORIDE 9 MG/ML
INJECTION, SOLUTION INTRAVENOUS CONTINUOUS
Status: ACTIVE | OUTPATIENT
Start: 2019-04-11 | End: 2019-04-11

## 2019-04-11 RX ORDER — ACETAMINOPHEN 325 MG/1
650 TABLET ORAL EVERY 4 HOURS PRN
Status: DISCONTINUED | OUTPATIENT
Start: 2019-04-11 | End: 2019-04-17

## 2019-04-11 RX ORDER — ONDANSETRON 2 MG/ML
4 INJECTION INTRAMUSCULAR; INTRAVENOUS EVERY 6 HOURS PRN
Status: DISCONTINUED | OUTPATIENT
Start: 2019-04-11 | End: 2019-04-11

## 2019-04-11 RX ORDER — HYDROCODONE BITARTRATE AND ACETAMINOPHEN 5; 325 MG/1; MG/1
2 TABLET ORAL AS NEEDED
Status: ACTIVE | OUTPATIENT
Start: 2019-04-11 | End: 2019-04-11

## 2019-04-11 RX ORDER — DOCUSATE SODIUM 100 MG/1
100 CAPSULE, LIQUID FILLED ORAL 2 TIMES DAILY
Status: DISCONTINUED | OUTPATIENT
Start: 2019-04-11 | End: 2019-04-17

## 2019-04-11 RX ORDER — METOCLOPRAMIDE HYDROCHLORIDE 5 MG/ML
10 INJECTION INTRAMUSCULAR; INTRAVENOUS AS NEEDED
Status: DISCONTINUED | OUTPATIENT
Start: 2019-04-11 | End: 2019-04-12

## 2019-04-11 RX ORDER — WARFARIN SODIUM 10 MG/1
10 TABLET ORAL NIGHTLY
Status: COMPLETED | OUTPATIENT
Start: 2019-04-11 | End: 2019-04-11

## 2019-04-11 RX ORDER — ALBUTEROL SULFATE 2.5 MG/3ML
2.5 SOLUTION RESPIRATORY (INHALATION) AS NEEDED
Status: DISCONTINUED | OUTPATIENT
Start: 2019-04-11 | End: 2019-04-12

## 2019-04-11 RX ORDER — HYDROCODONE BITARTRATE AND ACETAMINOPHEN 5; 325 MG/1; MG/1
1 TABLET ORAL AS NEEDED
Status: ACTIVE | OUTPATIENT
Start: 2019-04-11 | End: 2019-04-11

## 2019-04-11 RX ORDER — FAMOTIDINE 10 MG/ML
20 INJECTION, SOLUTION INTRAVENOUS EVERY 24 HOURS
Status: DISCONTINUED | OUTPATIENT
Start: 2019-04-11 | End: 2019-04-12

## 2019-04-11 RX ORDER — CEFAZOLIN SODIUM/WATER 2 G/20 ML
2 SYRINGE (ML) INTRAVENOUS EVERY 8 HOURS
Status: COMPLETED | OUTPATIENT
Start: 2019-04-11 | End: 2019-04-12

## 2019-04-11 RX ORDER — HYDROMORPHONE HYDROCHLORIDE 1 MG/ML
0.4 INJECTION, SOLUTION INTRAMUSCULAR; INTRAVENOUS; SUBCUTANEOUS EVERY 5 MIN PRN
Status: ACTIVE | OUTPATIENT
Start: 2019-04-11 | End: 2019-04-11

## 2019-04-11 RX ORDER — SODIUM CHLORIDE 9 MG/ML
INJECTION, SOLUTION INTRAVENOUS CONTINUOUS
Status: DISCONTINUED | OUTPATIENT
Start: 2019-04-11 | End: 2019-04-15

## 2019-04-11 RX ORDER — ACETAMINOPHEN AND CODEINE PHOSPHATE 300; 30 MG/1; MG/1
1 TABLET ORAL EVERY 4 HOURS PRN
Status: DISCONTINUED | OUTPATIENT
Start: 2019-04-11 | End: 2019-04-17

## 2019-04-11 RX ORDER — ACETAMINOPHEN 325 MG/1
650 TABLET ORAL EVERY 4 HOURS PRN
Status: DISCONTINUED | OUTPATIENT
Start: 2019-04-11 | End: 2019-04-15

## 2019-04-11 RX ORDER — FAMOTIDINE 20 MG/1
20 TABLET ORAL EVERY 24 HOURS
Status: DISCONTINUED | OUTPATIENT
Start: 2019-04-11 | End: 2019-04-17

## 2019-04-11 RX ORDER — DEXAMETHASONE SODIUM PHOSPHATE 4 MG/ML
4 VIAL (ML) INJECTION AS NEEDED
Status: DISCONTINUED | OUTPATIENT
Start: 2019-04-11 | End: 2019-04-12

## 2019-04-11 RX ORDER — ACETAMINOPHEN AND CODEINE PHOSPHATE 300; 30 MG/1; MG/1
2 TABLET ORAL EVERY 4 HOURS PRN
Status: DISCONTINUED | OUTPATIENT
Start: 2019-04-11 | End: 2019-04-17

## 2019-04-11 RX ORDER — CEFAZOLIN SODIUM/WATER 2 G/20 ML
SYRINGE (ML) INTRAVENOUS
Status: DISCONTINUED | OUTPATIENT
Start: 2019-04-11 | End: 2019-04-13 | Stop reason: ALTCHOICE

## 2019-04-11 RX ORDER — ONDANSETRON 2 MG/ML
4 INJECTION INTRAMUSCULAR; INTRAVENOUS AS NEEDED
Status: DISCONTINUED | OUTPATIENT
Start: 2019-04-11 | End: 2019-04-12

## 2019-04-11 NOTE — PROGRESS NOTES
[facility] Pharmacy Note:  Renal Dose Adjustment    Dmitry Brayan has been prescribed famotidine (PEPCID) 20 mg intravenously or orally every 12 hours. Estimated Creatinine Clearance: 44.1 mL/min (A) (based on SCr of 1.04 mg/dL (H)).     Her calculated creatin

## 2019-04-11 NOTE — PAYOR COMM NOTE
--------------  CONTINUED STAY REVIEW    Payor: [employer] MEDIC[employer] ADV PPO  Subscriber #:  [id number]  Authorization Number: [id number]    Admit date: 3/27/19  Admit time: 46            [employer] Patient Status:  Inpatient    1940 MRN [id number] 26.0 28.0 29.0         Estimated Creatinine Clearance: 44.1 mL/min (A) (based on SCr of 1.04 mg/dL (H)).           Recent Labs   Lab 04/09/19  0631 04/10/19  0627 04/11/19  0543   PTP 13.4 13.4 13.6   INR 0.98 0.98 1.00         No results for input(s): TRO

## 2019-04-11 NOTE — PROGRESS NOTES
Prelim    Two Mitraclip NTR devices placed via right CFV encompassing the large posterior leaflet flail    Excellent acute echocardiographic and hemodynamic result with reduction in MR from torrential 4++ to mild    Mean gradient at rest 2 mmHg -- increase

## 2019-04-11 NOTE — PROGRESS NOTES
[facility]  Progress Note     Krista Vazquez Patient Status:  Inpatient    1940 MRN [id number]   [facility] 2NE-A Attending Zia Mari MD   [facility] Day # 13 PCP Bernarda Grace MD     Chief Complaint: SOB    S: No new c 13.4 13.4 13.6   INR 0.98 0.98 1.00       No results for input(s): TROP, CK in the last 168 hours. Imaging: Imaging data reviewed in Epic.     Medications:   • furosemide  40 mg Intravenous Daily   • ferrous sulfate  325 mg Oral QOD   • Losartan Pot

## 2019-04-11 NOTE — ANESTHESIA POSTPROCEDURE EVALUATION
[address] Patient Status:  Inpatient   Age/Gender 70year old female MRN [id number]   [facility] 6NE-A Attending Anu Mcgovern MD   [facility] Day # 13 PCP Armando Iraheta MD       Anesthesia Post-op Note    Procedure(

## 2019-04-11 NOTE — PROCEDURES
Cardiology Transesophageal Echo Note    PRE and POST PROCEDURE DIAGNOSIS:   1.  Severe Mitral regurgitation with flail posterior leaflet    PROCEDURE: Intra-operative transesophageal echocardiogram (SRAVANTHI) for [id number]-[id number] mitral clip procedure    The patient resultant mean gradient across the mitral valve of 2-4 mmHg depending on the R-R interval and reduction of mitral regurgitation from moderate to mild with small regurgitant jets medial > lateral to where the clips were positioned [id number] mitral clips.

## 2019-04-11 NOTE — PROGRESS NOTES
[facility]  Progress Note    Mark Friday Patient Status:  Inpatient    1940 MRN [id number]   [facility] 2NE-A Attending Ryne Mccloud MD   [facility] Day # 13 PCP Keven Parkinson MD         Assessment/Plan:78 yo with multiple me HGBA1C  Recent Labs   Lab 04/10/19  0715 04/10/19  1136 04/10/19  1631 04/10/19  2020 04/11/19  0823   PGLU 83 99 95 112* 96           Martha Monk MD FACE

## 2019-04-11 NOTE — PLAN OF CARE
Received patient from OR at 1615, a+ox4, can be forgetful at times. Tele on and hr in the 60's, denies chest pain or sob at this time, ekg done post op.  Groin site is intact, no hematoma noted, groin is oozing very minimally, no new drainage noted in last

## 2019-04-11 NOTE — PLAN OF CARE
Pt received sitting up in the chair in no apparent distress watching TV. IV Heparin infusing per ACS protocol with recent PTT at 50.6 and to continue gtt til surgery thus far. Monitor shows AF with BBB and stable BP. She is on RA without SOB or BLACK.   Sh

## 2019-04-11 NOTE — PLAN OF CARE
Problem: CARDIOVASCULAR - ADULT  Goal: Maintains optimal cardiac output and hemodynamic stability  Description  INTERVENTIONS:  - Monitor vital signs, rhythm, and trends  - Monitor for bleeding, hypotension and signs of decreased cardiac output  - Evalua

## 2019-04-12 ENCOUNTER — APPOINTMENT (OUTPATIENT)
Dept: CV DIAGNOSTICS | Facility: HOSPITAL | Age: 79
DRG: 228 | End: 2019-04-12
Attending: INTERNAL MEDICINE
Payer: MEDICARE

## 2019-04-12 PROBLEM — Z95.818 S/P MITRAL VALVE CLIP IMPLANTATION: Status: ACTIVE | Noted: 2019-04-12

## 2019-04-12 PROBLEM — Z98.890 S/P MITRAL VALVE CLIP IMPLANTATION: Status: ACTIVE | Noted: 2019-04-12

## 2019-04-12 PROCEDURE — 93306 TTE W/DOPPLER COMPLETE: CPT | Performed by: INTERNAL MEDICINE

## 2019-04-12 PROCEDURE — 99232 SBSQ HOSP IP/OBS MODERATE 35: CPT | Performed by: HOSPITALIST

## 2019-04-12 RX ORDER — WARFARIN SODIUM 7.5 MG/1
7.5 TABLET ORAL
Status: COMPLETED | OUTPATIENT
Start: 2019-04-12 | End: 2019-04-12

## 2019-04-12 RX ORDER — HEPARIN SODIUM AND DEXTROSE 10000; 5 [USP'U]/100ML; G/100ML
INJECTION INTRAVENOUS CONTINUOUS
Status: DISCONTINUED | OUTPATIENT
Start: 2019-04-12 | End: 2019-04-17

## 2019-04-12 NOTE — PROGRESS NOTES
[facility]  Progress Note    Sheri Apgar Patient Status:  Inpatient    1940 MRN [id number]   [facility] 6NE-A Attending Demetrice Willson MD   [facility] Day # 16 PCP Flaco Sánchez MD       Subjective:  No chest pain or shortnes 100 mg Oral BID    Or   • docusate sodium  100 mg Per NG Tube BID   • famoTIDine  20 mg Oral Q24H    Or   • famoTIDine  20 mg Intravenous Q24H   • furosemide  40 mg Intravenous Daily   • ferrous sulfate  325 mg Oral QOD   • Losartan Potassium  25 mg Oral B CRAIG  4/12/2019  11:05 AM    Seen and examined. Reviewed with Nurse Clomurielcy at bedside. Looks and feels well. Able to ambulate with walker today around unit. Atrial fibrillation controlled. No prominent MR murmur    No bleeding from access site.  Lungs cl

## 2019-04-12 NOTE — PAYOR COMM NOTE
--------------  CONTINUED STAY REVIEW    Payor: BC[employer] MEDICARE ADV PPO  Subscriber #:  [id number]  Authorization Number: [id number]    Admit date: 3/27/19  Admit time: 46    Admitting Physician: Tanmay Downs MD  Attending Physician:  Tami Lopez there was still fairly significant mobility of the long posterior flail. Hemodynamically, there was definitely some improvement. A-wave was down to 32, with a V-wave of 43 and a mean atrial pressure of 23. We placed a second NTR device just laterally. failure, LVEF 65%- diuresing well on IV lasix- Neg 10L, wt up- not correlating. Cxr today  shows increased pulm vasc congestion however lung exam overall clear  · Hypotension- bp running on low end today.  All antihypertensives held today  · Permanent atria

## 2019-04-12 NOTE — PROGRESS NOTES
[facility]  Progress Note     Olesya Balloon Patient Status:  Inpatient    1940 MRN [id number]   [facility] 2NE-A Attending Charu Lazo MD   [facility] Day # 12 PCP Shelby Chris MD     Chief Complaint: SOB    S: Doing we on SCr of 0.78 mg/dL). Recent Labs   Lab 04/11/19  0543 04/11/19  1620 04/12/19  0416   PTP 13.6 14.8* 13.7   INR 1.00 1.11* 1.01       No results for input(s): TROP, CK in the last 168 hours. Imaging: Imaging data reviewed in Epic.     Medicatio

## 2019-04-12 NOTE — PROGRESS NOTES
04/12/19 1434   Clinical Encounter Type   Visited With Patient   Sacramental Encounters   Sacrament of Sick-Anointing Anointed   The patient was seen by Serjio Fenton. Received prayer, Scripture, support and Sacrament of the Sick.

## 2019-04-12 NOTE — PLAN OF CARE
Assumed care of patient around 0730, alert and oriented X4, no c/o CP/SOB, SpO2 95% on 2L/min via NC  Rhythm/HR: afib 70s-coumadin- possibly start heparin gtt today, INR 1.01  PT/OT- to work with patient today    Right groin- perclose-dressing c/di, noc RN Patient/Family Long Term Goal  Description  Patient's Long Term Goal: pt declined    Interventions:  -   - See additional Care Plan goals for specific interventions   Outcome: Progressing  Goal: Patient/Family Short Term Goal  Description  Patient's Short

## 2019-04-12 NOTE — OCCUPATIONAL THERAPY NOTE
OCCUPATIONAL THERAPY QUICK EVALUATION - INPATIENT    Room Number: 8382/1782-P  Evaluation Date: 4/12/2019     Type of Evaluation: Initial and Quick Eval  Presenting Problem: TMVR    Physician Order: IP Consult to Occupational Therapy  Reason for Therapy: frozen shoulder syndrome   • Pericardial effusion 6/5/2014    trivial   • Pulmonary HTN ([facility] [address] 10/10/2014   • Rheumatoid arthritis ([facility] [address]    • S/P ICD (internal cardiac defibrillator) procedure 7/7/2016    medtronic    • S/P total knee replacement 6/18/2015 and son visit often)    Toilet and Equipment: Comfort height toilet  Shower/Tub and Equipment: Walk-in shower          Hand Dominance: Right  Drives: No  Patient Regularly Uses: Home O2(2L at night)    Prior Level of Function: Pt has been living in a hotel [facility] while standing at the sink. Pt then able to t/f back to the chair. No questions or concerns voiced. Patient End of Session: Up in chair;Needs met;Call light within reach;RN aware of session/findings; All patient questions and concerns addressed;SC level  Patient/Caregiver able to demonstrate safety with ADLS: safely and independently and at previous functional level

## 2019-04-12 NOTE — CM/SW NOTE
Sw met with pt to assess for dc needs. She did well with therapy today and they do not feel she has any HHC needs. Pt is presently staying in a hotel. She denies any dc needs. Pt states she will need to get therapeutic on her coumadin again.   She follo

## 2019-04-12 NOTE — PROGRESS NOTES
[facility]  Progress Note    Leonardo Gloria Patient Status:  Inpatient    1940 MRN [id number]   [facility] 6NE-A Attending Lupillo La MD   [address] Day # 12 PCP Tre Ramos MD     Assessment/Plan:  Patient Active Problem chronic diastolic CHF (congestive heart failure) (HCC)     Iron deficiency anemia     Anticoagulated      Assessment/Plan: 71 yo with multiple medical issues including DM2 with neuropathy, CHF, Afib admitted for acute on chronic CHF     1.  DM 2 with compli Component Value Date    PGLU 98 04/11/2019     Results for Marita Avila (MRN [id number]) as of 4/12/2019 06:40   Ref.  Range 4/11/2019 15:06 4/11/2019 15:20 4/11/2019 15:45 4/11/2019 16:15 4/11/2019 21:51   ISTAT ACTIVATED CLOTTING TIME Latest Ref Range

## 2019-04-12 NOTE — OPERATIVE REPORT
[facility]    PATIENT'S NAME: Ana Paula Caldera   ATTENDING PHYSICIAN: Jenny Walls MD   OPERATING PHYSICIAN: Dino Jean Baptiste M.D.    PATIENT ACCOUNT#:   [de-identified]    LOCATION:  [address] [place] [address]  MEDICAL RECORD #:   [id number]       DATE OF BIRTH: regurgitation, and there was still fairly significant mobility of the long posterior flail. Hemodynamically, there was definitely some improvement. A-wave was down to 32, with a V-wave of 43 and a mean atrial pressure of 23.   We placed a second NTR devic

## 2019-04-12 NOTE — PHYSICAL THERAPY NOTE
PHYSICAL THERAPY QUICK EVALUATION - INPATIENT    Room Number: 0750/0515-T  Evaluation Date: 4/12/2019  Presenting Problem: s/p MitraClip 4/11/19  Physician Order: PT Eval and Treat    History related to current admission: Pt is a 70 y.o.  Female admitted 7/11/2014    Recurrence 7.2016 Hx VT- s/p ICD implant on 7/7/16.      • Obesity, morbid, BMI 40.0-49.9 ([facility] [address] 6/18/2015   • RADHA (obstructive sleep apnea) 06/29/2012    Cannot tolerate CPAP machine- uses oxygen periodically    • Osteoarthritis of shoulders, b REPLACEMENT     • US FNA LYMPH NODE, GUIDE INCLD,  LEFT (CPT=10005)  1/14/14       HOME SITUATION  Type of Home: Independent living facility   Home Layout: One level  Stairs to Enter : 0     Stairs to Bedroom: 0       Lives With: Alone(Pt states dtr and so sitting on the side of the bed?: None   How much help from another person does the patient currently need. ..   -   Moving to and from a bed to a chair (including a wheelchair)?: None   -   Need to walk in hospital room?: None   -   Climbing 3-5 steps with time. Will D/C PT.     PT Discharge Recommendations: Home    PLAN  Patient has been evaluated and presents with no skilled Physical Therapy needs at this time. Patient discharged from Physical Therapy services.   Please re-order if a new functional limitat

## 2019-04-13 PROCEDURE — 99232 SBSQ HOSP IP/OBS MODERATE 35: CPT | Performed by: HOSPITALIST

## 2019-04-13 RX ORDER — WARFARIN SODIUM 5 MG/1
5 TABLET ORAL
Status: COMPLETED | OUTPATIENT
Start: 2019-04-13 | End: 2019-04-13

## 2019-04-13 NOTE — PROGRESS NOTES
No problems overnight with exception of difficulty urinating - straight cathed for 700 cc urine    Reviewed with nursing staff at bedside    Breathing well. Looks good.     Afebrile  132/74  72 irregular    Lungs clear  Ht irregular - no significant MR murm

## 2019-04-13 NOTE — PROGRESS NOTES
Dr. Shanon Workman paged that pt has been voiding small amts but had large residual and needed to be straight cathed again.

## 2019-04-13 NOTE — PROGRESS NOTES
[facility]  Progress Note    Dmitry Brayan Patient Status:  Inpatient    1940 MRN [id number]   [facility] 6NE-A Attending Vania Sierra MD   [facility] Day # 16 PCP Lola Rueda MD     Assessment/Plan:  Patient Active Problem chronic diastolic CHF (congestive heart failure) (HCC)     Iron deficiency anemia     Anticoagulated     S/P mitral valve clip implantation      Assessment/Plan: 69 yo with multiple medical issues including DM2 with neuropathy, CHF, Afib admitted for acute --  0.85   PGLU  --    < >  --    < >  --    < > 115*  --    CA 9.0  --  8.5  --  8.6  --   --  8.3*    < > = values in this interval not displayed.      Lab Results   Component Value Date    PGLU 115 04/12/2019   Results for Diamante Iverson (MRN [id number]

## 2019-04-13 NOTE — PROGRESS NOTES
[facility]  Progress Note     Crystal Mercado Patient Status:  Inpatient    1940 MRN [id number]   [facility] 2NE-A Attending Colin Cervantes MD   Hosp Day # 16 PCP Tj Oneal MD     Chief Complaint: SOB    S: Feels be PTP 14.8* 13.7 15.6*   INR 1.11* 1.01 1.18*       No results for input(s): TROP, CK in the last 168 hours. Imaging: Imaging data reviewed in Epic.     Medications:   • docusate sodium  100 mg Oral BID    Or   • docusate sodium  100 mg Per NG Tube

## 2019-04-13 NOTE — PLAN OF CARE
Tele Atrial fib. VSS. Afebrile. Walked in irene and tolerated well. Still having difficulty urinating. Had BM today. See EMR for further information.

## 2019-04-14 ENCOUNTER — APPOINTMENT (OUTPATIENT)
Dept: CT IMAGING | Facility: HOSPITAL | Age: 79
DRG: 228 | End: 2019-04-14
Attending: HOSPITALIST
Payer: MEDICARE

## 2019-04-14 PROCEDURE — 99232 SBSQ HOSP IP/OBS MODERATE 35: CPT | Performed by: INTERNAL MEDICINE

## 2019-04-14 PROCEDURE — 70450 CT HEAD/BRAIN W/O DYE: CPT | Performed by: HOSPITALIST

## 2019-04-14 PROCEDURE — 99232 SBSQ HOSP IP/OBS MODERATE 35: CPT | Performed by: HOSPITALIST

## 2019-04-14 RX ORDER — WARFARIN SODIUM 5 MG/1
5 TABLET ORAL
Status: COMPLETED | OUTPATIENT
Start: 2019-04-14 | End: 2019-04-14

## 2019-04-14 RX ORDER — METOCLOPRAMIDE HYDROCHLORIDE 5 MG/ML
10 INJECTION INTRAMUSCULAR; INTRAVENOUS EVERY 6 HOURS PRN
Status: DISCONTINUED | OUTPATIENT
Start: 2019-04-14 | End: 2019-04-17

## 2019-04-14 NOTE — PROGRESS NOTES
[facility]  Progress Note    Gwenlyn Siemens Patient Status:  Inpatient    1940 MRN [id number]   [facility] 8NE-A Attending Teddy Webster MD   [facility] Day # 25 PCP Kayleen Cruz MD       Assessment and Plan:  Patient Active P on chronic diastolic CHF (congestive heart failure) (HCC)     Iron deficiency anemia     Anticoagulated     S/P mitral valve clip implantation      1. POD # 4 s/p MitraClip repair of severe posterior flail leaflet  2. History of atrial fibrillation  3.  Lin 85.9 04/14/2019    INR 1.31 04/14/2019    PTP 16.9 04/14/2019    PGLU 94 04/14/2019       Medications:    Current Facility-Administered Medications:  Metoclopramide HCl (REGLAN) injection 10 mg 10 mg Intravenous Q6H PRN   heparin (PORCINE) drip 24942bdung/ Pantoprazole Sodium (PROTONIX) EC tab 40 mg 40 mg Oral QAM AC   Sertraline HCl (ZOLOFT) tab 25 mg 25 mg Oral Daily       Katie Gatica MD  4/14/2019  11:06 AM

## 2019-04-14 NOTE — PLAN OF CARE
Alert. Oriented. Afib per tele. Hr 70's. On coumadin and hep gtt. C/o headache tonight. Tylenol #3 given. Sleeps on recliner. Voided 500cc tonight. Bladder scan showed 252. Will monitor urine outpt. Up w/ standby and walker. Poc discussed with pt.

## 2019-04-14 NOTE — PLAN OF CARE
Patient is alert and oriented x4 with c/o severe 10 right sided headache. CT Brain was ordered since patient is on heparin and the results are negative. Bilateral breath sounds clear and patient remains on room air.   Cardiac rhythm is a-fib with a control electrolytes and administer replacement therapy as ordered  Outcome: Progressing     Problem: Patient/Family Goals  Goal: Patient/Family Long Term Goal  Description  Patient's Long Term Goal: pt declined    Interventions:  -   - See additional Care Plan go

## 2019-04-14 NOTE — PROGRESS NOTES
[facility]  Progress Note    Dmitry Brayan Patient Status:  Inpatient    1940 MRN [id number]   [facility] 2NE-A Attending Andree Valdivia MD   Hosp Day # 25 PCP Lola Rueda MD       SUBJECTIVE:    Urinary retention.     OBJ **OR** dextrose **OR** glucose **OR** Glucose-Vitamin C, acetaminophen, ondansetron HCl    PHYSICAL EXAM:    General: Patient is alert and oriented x 3, not in acute distress. Chest: Clear to auscultation.   Heart: irregular  Abdomen: Soft, non tender with

## 2019-04-14 NOTE — PROGRESS NOTES
[facility]  Progress Note     Saurabh Slim Patient Status:  Inpatient    1940 MRN [id number]   [facility] 2NE-A Attending Jeanine Montero MD   [facility] Day # 25 PCP Anisha Aldana MD     Chief Complaint: SOB    S: still ha Estimated Creatinine Clearance: 53.9 mL/min (based on SCr of 0.85 mg/dL). Recent Labs   Lab 04/12/19  0416 04/13/19  0413 04/14/19  0514   PTP 13.7 15.6* 16.9*   INR 1.01 1.18* 1.31*       No results for input(s): TROP, CK in the last 168 hours.

## 2019-04-14 NOTE — PROGRESS NOTES
[facility]  Progress Note    Olesya Balloon Patient Status:  Inpatient    1940 MRN [id number]   [facility] 8NE-A Attending Matthew Coyle MD   [address] Day # 25 PCP Shelby Chris MD     Assessment/Plan:  Patient Active Problem chronic diastolic CHF (congestive heart failure) (HCC)     Iron deficiency anemia     Anticoagulated     S/P mitral valve clip implantation      Assessment/Plan: 71 yo with multiple medical issues including DM2 with neuropathy, CHF, Afib admitted for acute --   --    BUN 20*  --  15  --  17  --   --    CREATSERUM 0.89  --  0.78  --  0.85  --   --    PGLU  --    < >  --    < >  --    < > 150*   CA 8.5  --  8.6  --  8.3*  --   --     < > = values in this interval not displayed.      Lab Results   Component Lakia

## 2019-04-14 NOTE — PROGRESS NOTES
Received a call from the patient's daughter stating her concern about her mom's headache. I informed her that Dr. Shawanda Cartagena and I were actually in the room with her mother and that a CT Brain had been ordered. Will update her when the results are received.

## 2019-04-14 NOTE — PROGRESS NOTES
Notified Dr. Denis Ferrera of patient's urinary retention. Patient voided around 100 cc this morning and post void was bladder scan was 550. TO/RB: Insert vázquez. Dr. Denis Ferrera to update the patient's daughter.

## 2019-04-15 ENCOUNTER — APPOINTMENT (OUTPATIENT)
Dept: CARDIOLOGY | Age: 79
End: 2019-04-15

## 2019-04-15 ENCOUNTER — TELEPHONE (OUTPATIENT)
Dept: CARDIOLOGY | Age: 79
End: 2019-04-15

## 2019-04-15 PROCEDURE — 99232 SBSQ HOSP IP/OBS MODERATE 35: CPT | Performed by: HOSPITALIST

## 2019-04-15 RX ORDER — WARFARIN SODIUM 10 MG/1
10 TABLET ORAL
Status: COMPLETED | OUTPATIENT
Start: 2019-04-15 | End: 2019-04-15

## 2019-04-15 RX ORDER — TORSEMIDE 20 MG/1
20 TABLET ORAL
Status: DISCONTINUED | OUTPATIENT
Start: 2019-04-16 | End: 2019-04-15

## 2019-04-15 RX ORDER — WARFARIN SODIUM 5 MG/1
5 TABLET ORAL
Status: DISCONTINUED | OUTPATIENT
Start: 2019-04-15 | End: 2019-04-15

## 2019-04-15 RX ORDER — TORSEMIDE 20 MG/1
20 TABLET ORAL DAILY
Status: DISCONTINUED | OUTPATIENT
Start: 2019-04-16 | End: 2019-04-17

## 2019-04-15 RX ORDER — FUROSEMIDE 10 MG/ML
20 INJECTION INTRAMUSCULAR; INTRAVENOUS ONCE
Status: COMPLETED | OUTPATIENT
Start: 2019-04-15 | End: 2019-04-15

## 2019-04-15 NOTE — PAYOR COMM NOTE
--------------  CONTINUED STAY REVIEW    Payor: [employer] MEDICARE ADV PPO  Subscriber #:  [id number]  Authorization Number: [id number]    Admit date: 3/27/19  Admit time: 46    Admitting Physician: Jeanine Montero MD  Attending Physician:  Richi Yuan 2119 Given 100 mg Oral Judge Augustin, RN      famoTIDine (PEPCID) tab 20 mg     Date Action Dose Route User    4/14/2019 1708 Given 20 mg Oral Bharti Corrales RN      ferrous sulfate EC tab 325 mg     Date Action Dose Route User    4/15/2019 0828 Given 325 mg scheduled for 4/11/19  -per CV     #Anemia  -on iron; due to gastric erosions  -confounds A1c result, as above     # Afib  -on heparin  7/03  DM 2 with complications of neuropathy, CVA  -A1c is inaccurate d/t concomitant anemia  -glucoses have been control

## 2019-04-15 NOTE — PLAN OF CARE
Alert & orientedx4; forgetful at times. A. Fib on tele. Pt denies chest pain & SOB. Complains of 8/10 headache. PRN tylenol w/ codeine administered. Spoke with Dr. Andrew Vitale regarding persistent HA; no new orders. Continent of bowel.  Guerrero in place for retent highest/safest level of mobility/gait  Description  Interventions:  - Assess patient's functional ability and stability  - Promote increasing activity/tolerance for mobility and gait  - Educate and engage patient/family in tolerated activity level and prec

## 2019-04-15 NOTE — PLAN OF CARE
A&ox3-4, forgetful about time/date  RA, NX=705, afib w/ BBB, coumadin given, bridging from heparin, next PTT @ 0400, BM tonight  Patient is normally on torsemide at home, dtr has questions about when it will be restarted  Guerrero for retention.  Patient state mobility/gait  Description  Interventions:  - Assess patient's functional ability and stability  - Promote increasing activity/tolerance for mobility and gait  - Educate and engage patient/family in tolerated activity level and precautions  - Recommend use

## 2019-04-15 NOTE — PROGRESS NOTES
[facility]  Progress Note    Sheela Velasquez Patient Status:  Inpatient    1940 MRN [id number]   [facility] 8NE-A Attending Chase Levi MD   [facility] Day # 23 PCP Yovana Funk MD     Assessment/Plan:  Patient Active Problem chronic diastolic CHF (congestive heart failure) (HCC)     Iron deficiency anemia     Anticoagulated     S/P mitral valve clip implantation      Assessment/Plan: 69 yo with multiple medical issues including DM2 with neuropathy, CHF, Afib admitted for acute Namrata Marilynn (MRN [id number]) as of 4/15/2019 07:00   Ref.  Range 4/14/2019 07:37 4/14/2019 11:48 4/14/2019 17:06 4/14/2019 21:13   POC GLUCOSE Latest Ref Range: 70 - 99 mg/dL 94 105 (H) 102 (H) 117 (H)     Antonietta Cole MD, [facility]   Endocrinology, D

## 2019-04-15 NOTE — PROGRESS NOTES
[facility]  Progress Note     Sheela Velasquez Patient Status:  Inpatient    1940 MRN [id number]   [facility] 2NE-A Attending Del Fraire MD   [address] Day # 23 PCP Yovana Funk MD     Chief Complaint: SOB    S: headache of 0.85 mg/dL). Recent Labs   Lab 04/13/19  0413 04/14/19  0514 04/15/19  0410   PTP 15.6* 16.9* 18.3*   INR 1.18* 1.31* 1.44*       No results for input(s): TROP, CK in the last 168 hours. Imaging: Imaging data reviewed in Epic.     Medications: .      Chavo Gonzalez MD

## 2019-04-15 NOTE — PROGRESS NOTES
[facility] Progress Note    Subjective:   OOB in chair on exam, c/o slight headache - received T # 3 with some relief, head CT performed 4/14 no acute intracranial findings. Guerrero placed 4/14 - PVR > 500cc.   She currently denies CP, dizziness [id number] MitraClip was present and functioning  normally. There was mild regurgitation. 5.  Left atrium: The left atrium was markedly dilated. 6.  Right ventricle: The cavity size was mildly to moderately increased.   Pacer C/W ICD noted in the right Disp:  Rfl:    ferrous sulfate 325 (65 FE) MG Oral Tab EC Take 1 tablet (325 mg total) by mouth 2 (two) times daily with meals. Disp: 60 tablet Rfl: 0   folic acid 1 MG Oral Tab Take 1 tablet (1 mg total) by mouth daily.  Disp: 60 tablet Rfl: 0   Pantoprazo antral erosions. On PPI/iron supplement.  Hgb overall stable post procedure, 8.5 today  · Mild thrombcytopenia post tmvr - plt plateau at 005S  · Postop urinary retention - Guerrero placed 4/14  · History of DVT, s/p thrombectomy and IVC filter 2016  · History

## 2019-04-16 PROCEDURE — 99232 SBSQ HOSP IP/OBS MODERATE 35: CPT | Performed by: HOSPITALIST

## 2019-04-16 RX ORDER — WARFARIN SODIUM 5 MG/1
5 TABLET ORAL
Status: DISCONTINUED | OUTPATIENT
Start: 2019-04-16 | End: 2019-04-16

## 2019-04-16 RX ORDER — CALCIUM CARBONATE/VITAMIN D3 250-3.125
2 TABLET ORAL
Status: DISCONTINUED | OUTPATIENT
Start: 2019-04-17 | End: 2019-04-17

## 2019-04-16 RX ORDER — EPLERENONE 25 MG/1
25 TABLET, FILM COATED ORAL DAILY
Status: DISCONTINUED | OUTPATIENT
Start: 2019-04-16 | End: 2019-04-17

## 2019-04-16 RX ORDER — WARFARIN SODIUM 7.5 MG/1
7.5 TABLET ORAL
Status: COMPLETED | OUTPATIENT
Start: 2019-04-16 | End: 2019-04-16

## 2019-04-16 NOTE — PLAN OF CARE
AOx4. Room Air. Denies SOB/CP. No apparent distress. A.Fib on tele (controlled). Heparin drip infusing per A. Fib protocol, Coumadin given tonight. INR draw in the morning. Guerrero in place-patent w/ yellow urine. Sleeps on recliner chair. SBA w/ RW. VSS.  Afe

## 2019-04-16 NOTE — PROGRESS NOTES
Removed pt's ávzquez cath for voiding trial.    1110: pt voided post vázquez removal, 100 ml.    1300: pt voided 400 ml.    1400: voided 200 ml.     1640: voided 400 ml.

## 2019-04-16 NOTE — PLAN OF CARE
Problem: Diabetes/Glucose Control  Goal: Glucose maintained within prescribed range  Description  INTERVENTIONS:  - Monitor Blood Glucose as ordered  - Assess for signs and symptoms of hyperglycemia and hypoglycemia  - Administer ordered medications to m urinary retention  Description  INTERVENTIONS:  - Assess patient?s ability to void and empty bladder  - Monitor intake/output and perform bladder scan as needed  - Follow urinary retention protocol/standard of care  - Consider collaborating with pharmacy t

## 2019-04-16 NOTE — PAYOR COMM NOTE
--------------  CONTINUED STAY REVIEW---PROGRESS NOTES FOR 4/15, AND 4/16     Payor: Kelsey KEVIN PPO  Subscriber #:  [id number]  Authorization Number: [id number]    Admit date: 3/27/19  Admit time: 46    Admitting Physician: Julian Cade MD HGB 9.0* 9.0*  --  8.9*  --  8.4* 8.5*  --   --    MCV 83.8 80.1  --  80.2  --  81.9 82.0  --   --    .0 183.0  --  177.0  --  140.0* 147.0*  --   --    INR 0.98  --    < > 1.00 1.11* 1.01 1.18* 1.31* 1.44*    < > = values in this interval not displ Plan of care:   Doing well - ok for discharge planning when cleared by cardiology and INR acceptable      Quality:  · DVT Prophylaxis:ehparin   · CODE status: Full  · Guerrero: none  · Central line: none     Will the patient be referred to TCC on discharge?: Musculoskeletal: Moves all extremities.   Extremities: trace  edema.     Diagnostic Data:       Labs:              Recent Labs   Lab 04/09/19  1102   04/11/19  0543   04/12/19  0416 04/13/19  0413 04/14/19  0514 04/15/19  0410 04/16/19  0501   WBC 6.7  -- 1. Coumadin resumed, heparin bridge  4. DM2  1. SSI, endo post op   5. Chronic iron def anemia, multifactorial,  1. Monitor CBC  2. Transfuse below 7  3. S/p IV iron   6. Essential HTN  1. Continue home meds  7. Dyslipidemia  1. Statin  8.  Thrombocytopenia 4/15/2019 2139 Given 25 mg Oral Bentley Christensen, RN      magnesium oxide (MAG-OX) tab 400 mg     Date Action Dose Route User    4/16/2019 0933 Given 400 mg Oral Simone Henderson RN      Metoprolol Succinate ER (Toprol XL) 24 hr tab 100 mg     Date

## 2019-04-16 NOTE — PROGRESS NOTES
[facility]  Cardiology Progress Note    Sandra Munroe Patient Status:  Inpatient    1940 MRN [id number]   [facility] 8NE-A Attending Idalmis Mederos MD   Hosp Day # 21 PCP Merly Cunningham MD     Subjective:  Sitting up in ch irregular rate and rhythm, S1, S2 normal, HERNANDEZ III/IV  Lungs: Clear   Abdomen: Soft, non-tender. Extremities: trace BLE edema. Skin: Warm and dry.      Medications:  • torsemide  20 mg Oral Daily   • docusate sodium  100 mg Oral BID   • famoTIDine  20 mg discharge. She will need close F/U in the outpatient. Jose Miguel Rivera M.D., F.AUTUMN.CHI.CHI., F.AUTUMN.MARLA.AUTUMN., F.E.S.C.   Medical Director, Heart Failure Research, [address] [place] [address] Director, [address]

## 2019-04-16 NOTE — PROGRESS NOTES
[facility]  Progress Note    Karyle Cheng Patient Status:  Inpatient    1940 MRN [id number]   [facility] 8NE-A Attending Jose M Mathias MD   [facility] Day # 21 PCP Dyana Torre MD     Assessment/Plan:  Patient Active Problem chronic diastolic CHF (congestive heart failure) (HCC)     Iron deficiency anemia     Anticoagulated     S/P mitral valve clip implantation      Assessment/Plan: 69 yo with multiple medical issues including DM2 with neuropathy, CHF, Afib admitted for acute [id number]) as of 4/16/2019 06:58   Ref.  Range 4/15/2019 07:30 4/15/2019 11:51 4/15/2019 17:10 4/15/2019 20:42   POC GLUCOSE Latest Ref Range: 70 - 99 mg/dL 87 101 (H) 84 102 (H)       Fareed Carpenter MD, [facility]   Endocrinology, Diabetes, and Metabolism

## 2019-04-16 NOTE — PROGRESS NOTES
[facility]  Progress Note     Betty Dennison Patient Status:  Inpatient    1940 MRN [id number]   [facility] 2NE-A Attending Morteza Jessica MD   Hosp Day # 21 PCP Karen Pena MD     Chief Complaint: SOB    S: No compl (based on SCr of 0.92 mg/dL). Recent Labs   Lab 04/14/19  0514 04/15/19  0410 04/16/19  0501   PTP 16.9* 18.3* 20.0*   INR 1.31* 1.44* 1.61*       No results for input(s): TROP, CK in the last 168 hours. Imaging: Imaging data reviewed in Epic.

## 2019-04-17 ENCOUNTER — ANTI-COAG (OUTPATIENT)
Dept: CARDIOLOGY | Age: 79
End: 2019-04-17

## 2019-04-17 ENCOUNTER — TELEPHONE (OUTPATIENT)
Dept: HEMATOLOGY/ONCOLOGY | Facility: HOSPITAL | Age: 79
End: 2019-04-17

## 2019-04-17 VITALS
RESPIRATION RATE: 18 BRPM | TEMPERATURE: 98 F | HEART RATE: 61 BPM | SYSTOLIC BLOOD PRESSURE: 121 MMHG | BODY MASS INDEX: 31.15 KG/M2 | DIASTOLIC BLOOD PRESSURE: 67 MMHG | HEIGHT: 67 IN | WEIGHT: 198.44 LBS | OXYGEN SATURATION: 98 %

## 2019-04-17 DIAGNOSIS — D50.0 IRON DEFICIENCY ANEMIA DUE TO CHRONIC BLOOD LOSS: Primary | ICD-10-CM

## 2019-04-17 DIAGNOSIS — I48.91 ATRIAL FIBRILLATION, UNSPECIFIED TYPE (CMD): ICD-10-CM

## 2019-04-17 PROCEDURE — 99239 HOSP IP/OBS DSCHRG MGMT >30: CPT | Performed by: HOSPITALIST

## 2019-04-17 PROCEDURE — 99232 SBSQ HOSP IP/OBS MODERATE 35: CPT | Performed by: NURSE PRACTITIONER

## 2019-04-17 RX ORDER — METOPROLOL SUCCINATE 50 MG/1
150 TABLET, EXTENDED RELEASE ORAL NIGHTLY
Qty: 90 TABLET | Refills: 3 | Status: SHIPPED | OUTPATIENT
Start: 2019-04-17 | End: 2019-10-11

## 2019-04-17 RX ORDER — LOSARTAN POTASSIUM 25 MG/1
25 TABLET ORAL 2 TIMES DAILY
Qty: 60 TABLET | Refills: 3 | Status: SHIPPED | OUTPATIENT
Start: 2019-04-17 | End: 2019-10-11 | Stop reason: DRUGHIGH

## 2019-04-17 RX ORDER — AMIODARONE HYDROCHLORIDE 200 MG/1
200 TABLET ORAL DAILY
Refills: 0 | Status: SHIPPED | COMMUNITY
Start: 2019-04-18

## 2019-04-17 RX ORDER — TORSEMIDE 20 MG/1
20 TABLET ORAL 2 TIMES DAILY
Qty: 30 TABLET | Refills: 0 | Status: SHIPPED | COMMUNITY
Start: 2019-04-18 | End: 2019-08-02

## 2019-04-17 RX ORDER — MELATONIN
325 EVERY OTHER DAY
Qty: 30 TABLET | Refills: 1 | Status: SHIPPED | OUTPATIENT
Start: 2019-04-19 | End: 2019-10-11 | Stop reason: ALTCHOICE

## 2019-04-17 NOTE — PAYOR COMM NOTE
--------------  CONTINUED STAY REVIEW------REQUESTING ADDITIONAL DAY 4/17      Payor: Georgie KEVIN PPO  Subscriber #:  [id number]  Authorization Number: [id number]    Admit date: 3/27/19  Admit time: 46    Admitting Physician: Dorina Knowles MD .0  --  140.0* 147.0*  --   --   --   --    INR 1.00   < > 1.01 1.18* 1.31* 1.44* 1.61* 2.02*    < > = values in this interval not displayed.               Recent Labs   Lab 04/12/19  0416 04/13/19  0413 04/16/19  0501   GLU 78 95 83   BUN 15 17 17 acetaminophen (TYLENOL) tab 650 mg     Date Action Dose Route User    4/17/2019 0606 Given 650 mg Oral Primitivo Castrejon RN    4/16/2019 1749 Given 650 mg Oral Lexi Beyer RN      amiodarone HCl (PACERONE) tab 200 mg     Date Action Dose Route Use Warfarin Sodium (COUMADIN) tab 7.5 mg     Date Action Dose Route User    4/16/2019 2107 Given 7.5 mg Oral Risa Magaña RN      Metoprolol Succinate ER (Toprol XL) 24 hr tab 150 mg     Date Action Dose Route User    4/16/2019 2107 Given 150 mg Oral

## 2019-04-17 NOTE — PROGRESS NOTES
[facility]  Progress Note    Crystal Mercado Patient Status:  Inpatient    1940 MRN [id number]   [facility] 8NE-A Attending Blanca Gonzalez MD   [address] Day # 21 PCP Tj Oneal MD     Assessment/Plan:  Patient Active Problem chronic diastolic CHF (congestive heart failure) (HCC)     Iron deficiency anemia     Anticoagulated     S/P mitral valve clip implantation      Assessment/Plan: 71 yo with multiple medical issues including DM2 with neuropathy, CHF, Afib admitted for acute not displayed. Lab Results   Component Value Date    PGLU 93 04/17/2019     Results for Ava Knight (MRN [id number]) as of 4/17/2019 07:18   Ref.  Range 4/16/2019 07:20 4/16/2019 11:52 4/16/2019 16:42 4/16/2019 20:24 4/17/2019 07:17   POC GLUCOSE Lat

## 2019-04-17 NOTE — PROGRESS NOTES
[facility]  Progress Note    Saurabh Slim Patient Status:  Inpatient    1940 MRN [id number]   [facility] 2NE-A Attending Rona Oconnor MD   Hosp Day # 21 PCP Anisha Aldana MD       SUBJECTIVE:    Wants to go home.  No comp Daily     Metoclopramide HCl, acetaminophen **OR** Acetaminophen-Codeine #3 **OR** Acetaminophen-Codeine #3, melatonin, glucose **OR** Glucose-Vitamin C **OR** dextrose **OR** glucose **OR** Glucose-Vitamin C, ondansetron HCl    PHYSICAL EXAM:    General:

## 2019-04-17 NOTE — PROGRESS NOTES
[facility]OSPITAL[facility]  Progress Note     Siddharth Bustamante Patient Status:  Inpatient    1940 MRN [id number]   [facility] 2NE-A Attending Kinza Russell MD   Hosp Day # 21 PCP Mikey Farrell MD     Chief Complaint: SOB    S: No compl INR 1.44* 1.61* 2.02*       No results for input(s): TROP, CK in the last 168 hours. Imaging: Imaging data reviewed in Epic.     Medications:   • metoprolol succinate  150 mg Oral Nightly   • Calcium Carbonate-Vitamin D  2 tablet Oral Daily with b

## 2019-04-17 NOTE — DIETARY NOTE
[address] [facility]     Admitting diagnosis:  Acute pulmonary edema ([facility] [address] [J81.0]    Ht: 170.2 cm (5' 7\")  Wt: 90 kg (198 lb 6.6 oz). This is 147 % of IBW  BMI: Body mass index is 31.08 kg/m².   IBW: Ideal body weight: 61

## 2019-04-17 NOTE — PROGRESS NOTES
[facility]  Cardiology Progress Note    Subjective:  No chest pain or shortness of breath.   INR therapeutic today at 2.02.    Objective:  /46 (BP Location: Right arm)   Pulse 58   Temp 98.4 °F (36.9 °C) (Oral)   Resp 18   Ht 5' 7\" (1.702 m)   W thrombcytopenia post tmvr - plt plateau at 736O  · Postop urinary retention - Guerrero placed 4/14  · History of DVT, s/p thrombectomy and IVC filter 2016  · History of SDH  · COPD  · HTN - BP stable, back on home meds  · Type II DM - per endocrinology  · ICD

## 2019-04-18 ENCOUNTER — TELEPHONE (OUTPATIENT)
Dept: FAMILY MEDICINE CLINIC | Facility: CLINIC | Age: 79
End: 2019-04-18

## 2019-04-18 ENCOUNTER — PATIENT OUTREACH (OUTPATIENT)
Dept: CASE MANAGEMENT | Age: 79
End: 2019-04-18

## 2019-04-18 DIAGNOSIS — Z02.9 ENCOUNTERS FOR UNSPECIFIED ADMINISTRATIVE PURPOSE: ICD-10-CM

## 2019-04-18 NOTE — PROGRESS NOTES
Initial Post Discharge Follow Up   Discharge Date: 4/17/19  Contact Date: 4/18/2019    Consent Verification:  Assessment Completed With: Caregiver: Jeri sandy Permission received per patient?  written  HIPAA Verified?   Yes    Discharge Dx:  CHF    Gener

## 2019-04-18 NOTE — TELEPHONE ENCOUNTER
Pt was just discharged today and is likely overwhelmed and declined TCM. Please call her next week and inform her that I'd like to see her after her 21 d stay in the hospital (TCM) to review all that was done and f/u how she is doing.

## 2019-04-18 NOTE — TELEPHONE ENCOUNTER
Spoke to pt's dtr per HIPPA for TCM today. Pt does not have HFU appt scheduled at this time. TCM/HFU appt recommended by 4/24/19 as pt is a high risk for readmission. Please advise.     BOOK BY DATE (last date for TCM): 5/1/19    TRIAGE:  Please f/u with

## 2019-04-18 NOTE — PAYOR COMM NOTE
--------------  DISCHARGE REVIEW    Payor: [employer] MEDICARE ADV PPO  Subscriber #:  [id number]  Authorization Number: [id number]    Admit date: 3/27/19  Admit time:  1140  Discharge Date: 4/17/2019  9:00 PM    Please confirm inpatient authorization until 4/ appears euvolemic  · Permanent atrial fibrillation, rates controlled. Coumadin resumed 4/11. INR therapeutic up to 2.02 today. · Chronic iron deficiency anemia, EGD on 4/5 with gastritis and antral erosions.  On PPI/iron supplement. Hgb overall stable post follow-up in [facility] Coumadin clinic. Ok to discharge with follow-up with Dr. Edgardo Jin and structural heart clinic.   Appreciate and note heme/onc evaluation and plan for anemia.     Carloz Cota MD      Electronically signed by Arnol Pearce MD at 4/17/2019  3:

## 2019-04-18 NOTE — DISCHARGE SUMMARY
[facility] [place] HOSPITALIST  DISCHARGE SUMMARY     Olesya Balloon Patient Status:  Inpatient    1940 MRN [id number]   [facility] 8NE-A Attending No att. providers found   Hosp Day # 21 PCP Shelby Chris MD     Date of Admission: 3/27/2019 chronic congestive heart failure. Patient was aggressively diuresed. Cardiology was placed on consultation. They suspected a component of the heart failure was due to severe mitral regurgitation.   Patient was found to be anemic and hematology and gastro MG Tbec  Start taking on:  4/19/2019  What changed:  when to take this      Take 1 tablet (325 mg total) by mouth every other day.    Quantity:  30 tablet  Refills:  1     Losartan Potassium 25 MG Tabs  Commonly known as:  COZAAR  What changed:  when to alexandr as: ZOLOFT      Take 1 tablet (25 mg total) by mouth daily. Quantity:  90 tablet  Refills:  3     TAB-A-MAE MAXIMUM Tabs      Take 1 tablet by mouth daily.    Refills:  0     Warfarin Sodium 5 MG Tabs  Commonly known as:  COUMADIN      Take 5 mg by mout

## 2019-04-18 NOTE — PLAN OF CARE
NURSING DISCHARGE NOTE    Discharged Home via [place]. Accompanied by Support staff  Belongings Taken by patient/family. Dc instructions given. IV removed. Tele DC. Questions answered.

## 2019-04-18 NOTE — PROGRESS NOTES
Called patient for CCM intro. Spoke with daughter Mari Blackman. Ok per mayela. At this time, Leah Murphy states that they are doing well with Shivani's care and do not wish to enroll her in CCM. I will mail out CCM program information.

## 2019-04-19 NOTE — TELEPHONE ENCOUNTER
Spoke to pt's daughter regarding TCM appt. She declined to schedule at this time. She states patient was in the hospital for quite sometime and is looking to let her mom relax since she has a lot of upcoming specialist appts.     Message routed to pcp

## 2019-04-23 DIAGNOSIS — I34.0 MITRAL VALVE INSUFFICIENCY, UNSPECIFIED ETIOLOGY: Primary | ICD-10-CM

## 2019-04-24 ENCOUNTER — APPOINTMENT (OUTPATIENT)
Dept: HEMATOLOGY/ONCOLOGY | Facility: HOSPITAL | Age: 79
End: 2019-04-24
Attending: INTERNAL MEDICINE
Payer: MEDICARE

## 2019-04-25 ENCOUNTER — APPOINTMENT (OUTPATIENT)
Dept: CARDIOLOGY | Age: 79
End: 2019-04-25

## 2019-05-01 ENCOUNTER — OFFICE VISIT (OUTPATIENT)
Dept: HEMATOLOGY/ONCOLOGY | Facility: HOSPITAL | Age: 79
End: 2019-05-01
Attending: INTERNAL MEDICINE
Payer: MEDICARE

## 2019-05-01 VITALS
SYSTOLIC BLOOD PRESSURE: 135 MMHG | DIASTOLIC BLOOD PRESSURE: 81 MMHG | RESPIRATION RATE: 18 BRPM | TEMPERATURE: 99 F | HEART RATE: 74 BPM

## 2019-05-01 VITALS
TEMPERATURE: 99 F | RESPIRATION RATE: 18 BRPM | OXYGEN SATURATION: 94 % | WEIGHT: 198 LBS | HEART RATE: 74 BPM | DIASTOLIC BLOOD PRESSURE: 73 MMHG | SYSTOLIC BLOOD PRESSURE: 130 MMHG | BODY MASS INDEX: 31 KG/M2

## 2019-05-01 DIAGNOSIS — I82.5Y9 CHRONIC DEEP VEIN THROMBOSIS (DVT) OF PROXIMAL VEIN OF LOWER EXTREMITY, UNSPECIFIED LATERALITY (HCC): ICD-10-CM

## 2019-05-01 DIAGNOSIS — D50.0 IRON DEFICIENCY ANEMIA DUE TO CHRONIC BLOOD LOSS: Primary | ICD-10-CM

## 2019-05-01 DIAGNOSIS — D50.9 IRON DEFICIENCY ANEMIA, UNSPECIFIED IRON DEFICIENCY ANEMIA TYPE: ICD-10-CM

## 2019-05-01 DIAGNOSIS — I82.A21 DVT OF AXILLARY VEIN, CHRONIC RIGHT (HCC): ICD-10-CM

## 2019-05-01 PROCEDURE — 82728 ASSAY OF FERRITIN: CPT

## 2019-05-01 PROCEDURE — 96374 THER/PROPH/DIAG INJ IV PUSH: CPT

## 2019-05-01 PROCEDURE — 83550 IRON BINDING TEST: CPT

## 2019-05-01 PROCEDURE — 83540 ASSAY OF IRON: CPT

## 2019-05-01 PROCEDURE — 99214 OFFICE O/P EST MOD 30 MIN: CPT | Performed by: INTERNAL MEDICINE

## 2019-05-01 PROCEDURE — 85025 COMPLETE CBC W/AUTO DIFF WBC: CPT

## 2019-05-01 NOTE — PROGRESS NOTES
Pt here for a single dose of venofer. Pt. Tolerated without incident. VSS upon discharge.       Education Record    Learner:  Patient    Disease / Diagnosis:    Barriers / Limitations:  None   Comments:    Method:  Discussion   Comments:    General Topics

## 2019-05-01 NOTE — PROGRESS NOTES
[facility] Progress Note      Patient Name:  Michaela Olivo  YOB: 1940  Medical Record Number:  [id number]    Date of visit:  5/1/2019    CHIEF COMPLAINT: History of DVT, PE and lower extremity edema.     HPI:     70year old female bleeding     ALLERGIES:    Aspirin                 OTHER (SEE COMMENTS)    Comment:Contraindicated 2/2 taking coumadin  Lisinopril              Coughing  Mexitil [Mexiletine]    RASH    MEDICATIONS:      Current Outpatient Medications:  ferrous sulfate 325 MG Oral Tab Take 1 tablet (400 mg total) by mouth daily. Disp: 90 tablet Rfl: 3   Multiple Vitamins-Minerals (TAB-A-MAE MAXIMUM) Oral Tab Take 1 tablet by mouth daily.  Disp:  Rfl:    Calcium Carbonate-Vitamin D 600-400 MG-UNIT Oral Tab Take 1 tablet by mo Lymphocyte Absolute 1.28 1.00 - 4.00 x10(3) uL    Monocyte Absolute 0.63 0.10 - 1.00 x10(3) uL    Eosinophil Absolute 0.08 0.00 - 0.70 x10(3) uL    Basophil Absolute 0.03 0.00 - 0.20 x10(3) uL    Immature Granulocyte Absolute 0.03 0.00 - 1.00 x10(3) uL

## 2019-05-02 ENCOUNTER — ANTI-COAG (OUTPATIENT)
Dept: CARDIOLOGY | Age: 79
End: 2019-05-02

## 2019-05-02 DIAGNOSIS — I48.91 ATRIAL FIBRILLATION, UNSPECIFIED TYPE (CMD): ICD-10-CM

## 2019-05-03 ENCOUNTER — TELEPHONE (OUTPATIENT)
Dept: HEMATOLOGY/ONCOLOGY | Facility: HOSPITAL | Age: 79
End: 2019-05-03

## 2019-05-03 NOTE — TELEPHONE ENCOUNTER
Ilir Varela MD  [facility] [address] [facility] [address]             Call, Hb better but Fe still low. Raymundo I.V iron 4 doses over next 3-4 week, then labs and MD with me in 2 months      Daughter made aware and verbalized understanding.  Will need to call pt's son Kristin Beauchamp jeanette

## 2019-05-07 ENCOUNTER — OFFICE VISIT (OUTPATIENT)
Dept: HEMATOLOGY/ONCOLOGY | Facility: HOSPITAL | Age: 79
End: 2019-05-07
Attending: INTERNAL MEDICINE
Payer: MEDICARE

## 2019-05-07 VITALS
HEART RATE: 80 BPM | RESPIRATION RATE: 16 BRPM | TEMPERATURE: 99 F | SYSTOLIC BLOOD PRESSURE: 136 MMHG | DIASTOLIC BLOOD PRESSURE: 83 MMHG | OXYGEN SATURATION: 98 %

## 2019-05-07 DIAGNOSIS — D50.9 IRON DEFICIENCY ANEMIA, UNSPECIFIED IRON DEFICIENCY ANEMIA TYPE: Primary | ICD-10-CM

## 2019-05-07 PROCEDURE — 96374 THER/PROPH/DIAG INJ IV PUSH: CPT

## 2019-05-07 NOTE — PROGRESS NOTES
Pt tolerated Venofer infusion well today without incident or complaint. Completed 30 minute post observation period.      Education Record    Learner:  Patient    Disease / Diagnosis: STEPHANIE    Barriers / Limitations:  None   Comments:    Method:  Discussion

## 2019-05-15 ENCOUNTER — APPOINTMENT (OUTPATIENT)
Dept: HEMATOLOGY/ONCOLOGY | Facility: HOSPITAL | Age: 79
End: 2019-05-15
Attending: INTERNAL MEDICINE
Payer: MEDICARE

## 2019-05-16 ENCOUNTER — OFFICE VISIT (OUTPATIENT)
Dept: HEMATOLOGY/ONCOLOGY | Facility: HOSPITAL | Age: 79
End: 2019-05-16
Attending: INTERNAL MEDICINE
Payer: MEDICARE

## 2019-05-16 ENCOUNTER — ANTI-COAG (OUTPATIENT)
Dept: CARDIOLOGY | Age: 79
End: 2019-05-16

## 2019-05-16 ENCOUNTER — HOSPITAL ENCOUNTER (OUTPATIENT)
Dept: LAB | Facility: HOSPITAL | Age: 79
Discharge: HOME | End: 2019-05-16
Attending: INTERNAL MEDICINE
Payer: MEDICARE

## 2019-05-16 VITALS
DIASTOLIC BLOOD PRESSURE: 85 MMHG | TEMPERATURE: 99 F | BODY MASS INDEX: 32 KG/M2 | RESPIRATION RATE: 18 BRPM | WEIGHT: 205 LBS | OXYGEN SATURATION: 95 % | SYSTOLIC BLOOD PRESSURE: 144 MMHG | HEART RATE: 83 BPM

## 2019-05-16 DIAGNOSIS — D50.9 IRON DEFICIENCY ANEMIA, UNSPECIFIED IRON DEFICIENCY ANEMIA TYPE: Primary | ICD-10-CM

## 2019-05-16 DIAGNOSIS — I48.91 ATRIAL FIBRILLATION (HCC): ICD-10-CM

## 2019-05-16 DIAGNOSIS — I48.91 ATRIAL FIBRILLATION, UNSPECIFIED TYPE (CMD): ICD-10-CM

## 2019-05-16 LAB — INR PPP: 4.1

## 2019-05-16 PROCEDURE — 85610 PROTHROMBIN TIME: CPT

## 2019-05-16 PROCEDURE — 96374 THER/PROPH/DIAG INJ IV PUSH: CPT

## 2019-05-16 NOTE — PROGRESS NOTES
Education Record    Learner:  Patient    Disease / Diagnosis:Iron deficiency anemia, unspecified iron deficiency anemia type    Barriers / Limitations:  None   Comments:    Method:  Brief focused   Comments:    General Topics:  Infection, Medication, Pain,

## 2019-05-17 ENCOUNTER — TELEPHONE (OUTPATIENT)
Dept: HEMATOLOGY/ONCOLOGY | Facility: HOSPITAL | Age: 79
End: 2019-05-17

## 2019-05-17 NOTE — TELEPHONE ENCOUNTER
Spoke with pt's daughter, Bradley Maravilla [facility]). Pt has Alyssia Dixon [phone] assistance and she is trying to talk to someone regarding a recent bill the pt received in the mail. She did e-mail someone in the billing dept, but has not heard back yet.      Will forward mes

## 2019-05-21 ENCOUNTER — HOSPITAL ENCOUNTER (OUTPATIENT)
Dept: CARDIOLOGY CLINIC | Facility: HOSPITAL | Age: 79
Discharge: HOME | End: 2019-05-21
Attending: INTERNAL MEDICINE
Payer: MEDICARE

## 2019-05-21 ENCOUNTER — HOSPITAL ENCOUNTER (OUTPATIENT)
Dept: CV DIAGNOSTICS | Facility: HOSPITAL | Age: 79
Discharge: HOME | End: 2019-05-21
Attending: INTERNAL MEDICINE
Payer: MEDICARE

## 2019-05-21 ENCOUNTER — OFFICE VISIT (OUTPATIENT)
Dept: HEMATOLOGY/ONCOLOGY | Facility: HOSPITAL | Age: 79
End: 2019-05-21
Attending: INTERNAL MEDICINE
Payer: MEDICARE

## 2019-05-21 ENCOUNTER — HOSPITAL ENCOUNTER (OUTPATIENT)
Dept: LAB | Facility: HOSPITAL | Age: 79
Discharge: HOME | End: 2019-05-21
Attending: INTERNAL MEDICINE
Payer: MEDICARE

## 2019-05-21 ENCOUNTER — ANTI-COAG (OUTPATIENT)
Dept: CARDIOLOGY | Age: 79
End: 2019-05-21

## 2019-05-21 VITALS
DIASTOLIC BLOOD PRESSURE: 84 MMHG | OXYGEN SATURATION: 93 % | SYSTOLIC BLOOD PRESSURE: 147 MMHG | TEMPERATURE: 99 F | HEART RATE: 73 BPM | RESPIRATION RATE: 16 BRPM

## 2019-05-21 VITALS
OXYGEN SATURATION: 98 % | DIASTOLIC BLOOD PRESSURE: 72 MMHG | SYSTOLIC BLOOD PRESSURE: 158 MMHG | RESPIRATION RATE: 16 BRPM | HEART RATE: 73 BPM | TEMPERATURE: 98 F

## 2019-05-21 DIAGNOSIS — Z95.818 S/P MITRAL VALVE CLIP IMPLANTATION: ICD-10-CM

## 2019-05-21 DIAGNOSIS — I48.20 CHRONIC A-FIB (HCC): ICD-10-CM

## 2019-05-21 DIAGNOSIS — I48.91 ATRIAL FIBRILLATION (HCC): ICD-10-CM

## 2019-05-21 DIAGNOSIS — D50.9 IRON DEFICIENCY ANEMIA, UNSPECIFIED IRON DEFICIENCY ANEMIA TYPE: Primary | ICD-10-CM

## 2019-05-21 DIAGNOSIS — I50.22 CHRONIC SYSTOLIC CONGESTIVE HEART FAILURE (HCC): ICD-10-CM

## 2019-05-21 DIAGNOSIS — I50.33 ACUTE ON CHRONIC DIASTOLIC CHF (CONGESTIVE HEART FAILURE) (HCC): ICD-10-CM

## 2019-05-21 DIAGNOSIS — I48.91 ATRIAL FIBRILLATION, UNSPECIFIED TYPE (CMD): ICD-10-CM

## 2019-05-21 DIAGNOSIS — Z98.890 S/P MITRAL VALVE CLIP IMPLANTATION: ICD-10-CM

## 2019-05-21 DIAGNOSIS — I34.0 MITRAL VALVE INSUFFICIENCY, UNSPECIFIED ETIOLOGY: ICD-10-CM

## 2019-05-21 LAB — INR PPP: 1.9

## 2019-05-21 PROCEDURE — 93306 TTE W/DOPPLER COMPLETE: CPT | Performed by: INTERNAL MEDICINE

## 2019-05-21 PROCEDURE — 96374 THER/PROPH/DIAG INJ IV PUSH: CPT

## 2019-05-21 PROCEDURE — 85610 PROTHROMBIN TIME: CPT

## 2019-05-21 PROCEDURE — 99213 OFFICE O/P EST LOW 20 MIN: CPT | Performed by: NURSE PRACTITIONER

## 2019-05-21 NOTE — PROGRESS NOTES
Education Record    Learner:  Patient    Disease / Diagnosis:    Barriers / Limitations:  None   Comments:    Method:  Brief focused and Discussion   Comments:    General Topics:  Medication and Plan of care reviewed   Comments:    Outcome:  Shows Paige

## 2019-05-21 NOTE — PROGRESS NOTES
Shyann Rosado Note    Subjective:   Patient presents for 1 month postop TMVR visit. She underwent Mitraclip placement on 4/11/19 for severe, symptomatic mitral regurgitation. Since d/c from the hospital she states she feels well.  She noti atrium: The left atrium was markedly dilated. 6.  Right ventricle: The cavity size was mildly to moderately increased. Pacer C/W ICD noted in the right ventricle. 7.  Right atrium: The atrium was markedly dilated. Pacer C/W ICD noted in right atrium.   8. torsemide 20 MG Oral Tab Take 1 tablet (20 mg total) by mouth daily. Disp: 30 tablet Rfl: 0   Metoprolol Succinate ER 50 MG Oral Tablet 24 Hr Take 3 tablets (150 mg total) by mouth nightly.  Disp: 90 tablet Rfl: 3   Warfarin Sodium 5 MG Oral Tab Take 5 mg erosions. On PPI/iron supplement. · History of DVT, s/p thrombectomy and IVC filter 2016  · History of SDH  · COPD  · HTN   · Type II DM   · ICD         Plan:   1. Complete TVT KCCQ today, 6min walk performed - results reviewed  2. Echo today  3.  Increase

## 2019-05-22 ENCOUNTER — TELEPHONE (OUTPATIENT)
Dept: CARDIOLOGY CLINIC | Facility: HOSPITAL | Age: 79
End: 2019-05-22

## 2019-05-22 DIAGNOSIS — I35.0 AORTIC VALVE STENOSIS, ETIOLOGY OF CARDIAC VALVE DISEASE UNSPECIFIED: Primary | ICD-10-CM

## 2019-05-22 DIAGNOSIS — I34.0 MITRAL VALVE INSUFFICIENCY, UNSPECIFIED ETIOLOGY: ICD-10-CM

## 2019-05-22 NOTE — TELEPHONE ENCOUNTER
Called patient with results from echocardiogram. EF 50-55%. AI trivial, MVR mild to moderate regurgitation. TR mild regurgitation. Reviewed results with Renan SRINIVASAN. Instructed to follow up with next appointment.

## 2019-05-29 ENCOUNTER — HOSPITAL ENCOUNTER (OUTPATIENT)
Dept: LAB | Facility: HOSPITAL | Age: 79
Discharge: HOME | End: 2019-05-29
Attending: INTERNAL MEDICINE
Payer: MEDICARE

## 2019-05-29 ENCOUNTER — TELEPHONE (OUTPATIENT)
Dept: CARDIOLOGY | Age: 79
End: 2019-05-29

## 2019-05-29 ENCOUNTER — OFFICE VISIT (OUTPATIENT)
Dept: HEMATOLOGY/ONCOLOGY | Facility: HOSPITAL | Age: 79
End: 2019-05-29
Attending: INTERNAL MEDICINE
Payer: MEDICARE

## 2019-05-29 VITALS
HEART RATE: 54 BPM | RESPIRATION RATE: 18 BRPM | WEIGHT: 204.81 LBS | TEMPERATURE: 100 F | SYSTOLIC BLOOD PRESSURE: 126 MMHG | HEIGHT: 67.01 IN | OXYGEN SATURATION: 95 % | BODY MASS INDEX: 32.15 KG/M2 | DIASTOLIC BLOOD PRESSURE: 66 MMHG

## 2019-05-29 DIAGNOSIS — D50.9 IRON DEFICIENCY ANEMIA, UNSPECIFIED IRON DEFICIENCY ANEMIA TYPE: Primary | ICD-10-CM

## 2019-05-29 DIAGNOSIS — I48.91 ATRIAL FIBRILLATION (HCC): ICD-10-CM

## 2019-05-29 LAB — INR PPP: 1.4

## 2019-05-29 PROCEDURE — 85610 PROTHROMBIN TIME: CPT

## 2019-05-29 PROCEDURE — 96374 THER/PROPH/DIAG INJ IV PUSH: CPT

## 2019-05-30 ENCOUNTER — TELEPHONE (OUTPATIENT)
Dept: CARDIOLOGY | Age: 79
End: 2019-05-30

## 2019-05-30 ENCOUNTER — ANTI-COAG (OUTPATIENT)
Dept: CARDIOLOGY | Age: 79
End: 2019-05-30

## 2019-05-30 DIAGNOSIS — I48.91 ATRIAL FIBRILLATION, UNSPECIFIED TYPE (CMD): ICD-10-CM

## 2019-06-06 ENCOUNTER — HOSPITAL ENCOUNTER (OUTPATIENT)
Dept: LAB | Facility: HOSPITAL | Age: 79
Discharge: HOME | End: 2019-06-06
Attending: INTERNAL MEDICINE
Payer: MEDICARE

## 2019-06-06 ENCOUNTER — ANTI-COAG (OUTPATIENT)
Dept: CARDIOLOGY | Age: 79
End: 2019-06-06

## 2019-06-06 DIAGNOSIS — I48.91 ATRIAL FIBRILLATION (HCC): ICD-10-CM

## 2019-06-06 DIAGNOSIS — I48.91 ATRIAL FIBRILLATION, UNSPECIFIED TYPE (CMD): ICD-10-CM

## 2019-06-06 LAB — INR PPP: 1.8

## 2019-06-06 PROCEDURE — 85610 PROTHROMBIN TIME: CPT

## 2019-06-10 ENCOUNTER — APPOINTMENT (OUTPATIENT)
Dept: CARDIOLOGY | Age: 79
End: 2019-06-10
Attending: INTERNAL MEDICINE

## 2019-06-27 ENCOUNTER — ANTI-COAG (OUTPATIENT)
Dept: CARDIOLOGY | Age: 79
End: 2019-06-27

## 2019-06-27 DIAGNOSIS — I48.91 ATRIAL FIBRILLATION, UNSPECIFIED TYPE (CMD): ICD-10-CM

## 2019-07-03 ENCOUNTER — ANTI-COAG (OUTPATIENT)
Dept: CARDIOLOGY | Age: 79
End: 2019-07-03

## 2019-07-03 ENCOUNTER — HOSPITAL ENCOUNTER (OUTPATIENT)
Dept: LAB | Facility: HOSPITAL | Age: 79
Discharge: HOME | End: 2019-07-03
Attending: INTERNAL MEDICINE
Payer: MEDICARE

## 2019-07-03 DIAGNOSIS — I48.91 ATRIAL FIBRILLATION, UNSPECIFIED TYPE (CMD): ICD-10-CM

## 2019-07-03 DIAGNOSIS — I48.91 ATRIAL FIBRILLATION (HCC): ICD-10-CM

## 2019-07-03 LAB
INR PPP: 4.1
POC INR: 4.1 (ref 0.8–1.3)

## 2019-07-03 PROCEDURE — 85610 PROTHROMBIN TIME: CPT

## 2019-07-05 NOTE — TELEPHONE ENCOUNTER
Requested Prescriptions     Pending Prescriptions Disp Refills   • METFORMIN  MG Oral Tab [Pharmacy Med Name: metFORMIN HCl Oral Tablet 500 MG] 180 tablet 0     Sig: TAKE 1 TABLET BY MOUTH TWO TIMES A DAY WITH MEALS     LOV: 10/16/18  RTC: PRN  Last

## 2019-07-09 NOTE — PROGRESS NOTES
· [facility] Cardiology Progress Note     Subjective:  Up in chair, feeling better today - no pain or dyspnea    Objective:  125/67 not orthostatic  AFib no VT  I/O + 1005   BUN/cr 26/1.07  Hgb 9.1  ALT 94 AST 66    Neuro: awake/alert  HEENT: cervical co 120

## 2019-07-16 ENCOUNTER — HOSPITAL ENCOUNTER (OUTPATIENT)
Facility: HOSPITAL | Age: 79
Setting detail: OBSERVATION
Discharge: HOME | End: 2019-07-19
Attending: EMERGENCY MEDICINE | Admitting: HOSPITALIST
Payer: MEDICARE

## 2019-07-16 ENCOUNTER — APPOINTMENT (OUTPATIENT)
Dept: CT IMAGING | Facility: HOSPITAL | Age: 79
End: 2019-07-16
Attending: EMERGENCY MEDICINE
Payer: MEDICARE

## 2019-07-16 DIAGNOSIS — R19.7 NAUSEA VOMITING AND DIARRHEA: ICD-10-CM

## 2019-07-16 DIAGNOSIS — R11.2 NAUSEA VOMITING AND DIARRHEA: ICD-10-CM

## 2019-07-16 DIAGNOSIS — R10.84 ABDOMINAL PAIN, GENERALIZED: Primary | ICD-10-CM

## 2019-07-16 DIAGNOSIS — E86.0 DEHYDRATION: ICD-10-CM

## 2019-07-16 LAB
ALBUMIN SERPL-MCNC: 3.3 G/DL (ref 3.4–5)
ALBUMIN/GLOB SERPL: 1 {RATIO} (ref 1–2)
ALP LIVER SERPL-CCNC: 117 U/L (ref 55–142)
ALT SERPL-CCNC: 35 U/L (ref 13–56)
ANION GAP SERPL CALC-SCNC: 7 MMOL/L (ref 0–18)
AST SERPL-CCNC: 26 U/L (ref 15–37)
BASOPHILS # BLD AUTO: 0.04 X10(3) UL (ref 0–0.2)
BASOPHILS NFR BLD AUTO: 0.4 %
BILIRUB SERPL-MCNC: 1.1 MG/DL (ref 0.1–2)
BUN BLD-MCNC: 20 MG/DL (ref 7–18)
BUN/CREAT SERPL: 18.9 (ref 10–20)
CALCIUM BLD-MCNC: 9 MG/DL (ref 8.5–10.1)
CHLORIDE SERPL-SCNC: 101 MMOL/L (ref 98–112)
CO2 SERPL-SCNC: 30 MMOL/L (ref 21–32)
CREAT BLD-MCNC: 1.06 MG/DL (ref 0.55–1.02)
DEPRECATED RDW RBC AUTO: 62.7 FL (ref 35.1–46.3)
EOSINOPHIL # BLD AUTO: 0.03 X10(3) UL (ref 0–0.7)
EOSINOPHIL NFR BLD AUTO: 0.3 %
ERYTHROCYTE [DISTWIDTH] IN BLOOD BY AUTOMATED COUNT: 18.7 % (ref 11–15)
GLOBULIN PLAS-MCNC: 3.4 G/DL (ref 2.8–4.4)
GLUCOSE BLD-MCNC: 96 MG/DL (ref 70–99)
HCT VFR BLD AUTO: 43.4 % (ref 35–48)
HGB BLD-MCNC: 14.3 G/DL (ref 12–16)
IMM GRANULOCYTES # BLD AUTO: 0.03 X10(3) UL (ref 0–1)
IMM GRANULOCYTES NFR BLD: 0.3 %
INR BLD: 3.3 (ref 0.9–1.1)
LIPASE SERPL-CCNC: 81 U/L (ref 73–393)
LYMPHOCYTES # BLD AUTO: 1.46 X10(3) UL (ref 1–4)
LYMPHOCYTES NFR BLD AUTO: 13.3 %
M PROTEIN MFR SERPL ELPH: 6.7 G/DL (ref 6.4–8.2)
MCH RBC QN AUTO: 30.2 PG (ref 26–34)
MCHC RBC AUTO-ENTMCNC: 32.9 G/DL (ref 31–37)
MCV RBC AUTO: 91.8 FL (ref 80–100)
MONOCYTES # BLD AUTO: 1.05 X10(3) UL (ref 0.1–1)
MONOCYTES NFR BLD AUTO: 9.5 %
NEUTROPHILS # BLD AUTO: 8.4 X10 (3) UL (ref 1.5–7.7)
NEUTROPHILS # BLD AUTO: 8.4 X10(3) UL (ref 1.5–7.7)
NEUTROPHILS NFR BLD AUTO: 76.2 %
OSMOLALITY SERPL CALC.SUM OF ELEC: 288 MOSM/KG (ref 275–295)
PLATELET # BLD AUTO: 171 10(3)UL (ref 150–450)
POTASSIUM SERPL-SCNC: 3.2 MMOL/L (ref 3.5–5.1)
PSA SERPL DL<=0.01 NG/ML-MCNC: 35.9 SECONDS (ref 12.5–14.7)
RBC # BLD AUTO: 4.73 X10(6)UL (ref 3.8–5.3)
SODIUM SERPL-SCNC: 138 MMOL/L (ref 136–145)
WBC # BLD AUTO: 11 X10(3) UL (ref 4–11)

## 2019-07-16 PROCEDURE — 74177 CT ABD & PELVIS W/CONTRAST: CPT | Performed by: EMERGENCY MEDICINE

## 2019-07-16 PROCEDURE — 99220 INITIAL OBSERVATION CARE,LEVL III: CPT | Performed by: HOSPITALIST

## 2019-07-16 RX ORDER — ACETAMINOPHEN 325 MG/1
650 TABLET ORAL EVERY 6 HOURS PRN
Status: DISCONTINUED | OUTPATIENT
Start: 2019-07-16 | End: 2019-07-19

## 2019-07-16 RX ORDER — HYDROMORPHONE HYDROCHLORIDE 1 MG/ML
0.5 INJECTION, SOLUTION INTRAMUSCULAR; INTRAVENOUS; SUBCUTANEOUS ONCE
Status: COMPLETED | OUTPATIENT
Start: 2019-07-16 | End: 2019-07-16

## 2019-07-16 RX ORDER — SODIUM CHLORIDE 9 MG/ML
INJECTION, SOLUTION INTRAVENOUS CONTINUOUS
Status: DISCONTINUED | OUTPATIENT
Start: 2019-07-16 | End: 2019-07-16

## 2019-07-16 RX ORDER — SERTRALINE HYDROCHLORIDE 25 MG/1
25 TABLET, FILM COATED ORAL DAILY
Status: DISCONTINUED | OUTPATIENT
Start: 2019-07-17 | End: 2019-07-19

## 2019-07-16 RX ORDER — DEXTROSE MONOHYDRATE 25 G/50ML
50 INJECTION, SOLUTION INTRAVENOUS
Status: DISCONTINUED | OUTPATIENT
Start: 2019-07-16 | End: 2019-07-19

## 2019-07-16 RX ORDER — SODIUM CHLORIDE 9 MG/ML
INJECTION, SOLUTION INTRAVENOUS CONTINUOUS
Status: ACTIVE | OUTPATIENT
Start: 2019-07-16 | End: 2019-07-17

## 2019-07-16 RX ORDER — MELATONIN
325 EVERY OTHER DAY
Status: DISCONTINUED | OUTPATIENT
Start: 2019-07-17 | End: 2019-07-19

## 2019-07-16 RX ORDER — LOSARTAN POTASSIUM 25 MG/1
25 TABLET ORAL 2 TIMES DAILY
Status: DISCONTINUED | OUTPATIENT
Start: 2019-07-16 | End: 2019-07-19

## 2019-07-16 RX ORDER — ONDANSETRON 2 MG/ML
4 INJECTION INTRAMUSCULAR; INTRAVENOUS EVERY 4 HOURS PRN
Status: DISCONTINUED | OUTPATIENT
Start: 2019-07-16 | End: 2019-07-16

## 2019-07-16 RX ORDER — SODIUM CHLORIDE 9 MG/ML
INJECTION, SOLUTION INTRAVENOUS CONTINUOUS
Status: DISCONTINUED | OUTPATIENT
Start: 2019-07-16 | End: 2019-07-19

## 2019-07-16 RX ORDER — ONDANSETRON 2 MG/ML
4 INJECTION INTRAMUSCULAR; INTRAVENOUS ONCE
Status: COMPLETED | OUTPATIENT
Start: 2019-07-16 | End: 2019-07-16

## 2019-07-16 RX ORDER — HYDROMORPHONE HYDROCHLORIDE 1 MG/ML
0.5 INJECTION, SOLUTION INTRAMUSCULAR; INTRAVENOUS; SUBCUTANEOUS EVERY 30 MIN PRN
Status: DISCONTINUED | OUTPATIENT
Start: 2019-07-16 | End: 2019-07-16

## 2019-07-16 RX ORDER — ONDANSETRON 2 MG/ML
4 INJECTION INTRAMUSCULAR; INTRAVENOUS EVERY 6 HOURS PRN
Status: DISCONTINUED | OUTPATIENT
Start: 2019-07-16 | End: 2019-07-19

## 2019-07-16 RX ORDER — DILTIAZEM HYDROCHLORIDE 120 MG/1
120 CAPSULE, EXTENDED RELEASE ORAL DAILY
Status: DISCONTINUED | OUTPATIENT
Start: 2019-07-17 | End: 2019-07-19

## 2019-07-16 RX ORDER — FOLIC ACID 1 MG/1
1 TABLET ORAL DAILY
Status: DISCONTINUED | OUTPATIENT
Start: 2019-07-17 | End: 2019-07-19

## 2019-07-16 RX ORDER — PANTOPRAZOLE SODIUM 40 MG/1
40 TABLET, DELAYED RELEASE ORAL
Status: DISCONTINUED | OUTPATIENT
Start: 2019-07-17 | End: 2019-07-19

## 2019-07-16 RX ORDER — HYDROMORPHONE HYDROCHLORIDE 1 MG/ML
0.5 INJECTION, SOLUTION INTRAMUSCULAR; INTRAVENOUS; SUBCUTANEOUS EVERY 30 MIN PRN
Status: ACTIVE | OUTPATIENT
Start: 2019-07-16 | End: 2019-07-17

## 2019-07-16 RX ORDER — AMIODARONE HYDROCHLORIDE 200 MG/1
200 TABLET ORAL DAILY
Status: DISCONTINUED | OUTPATIENT
Start: 2019-07-17 | End: 2019-07-19

## 2019-07-17 LAB
ALBUMIN SERPL-MCNC: 2.7 G/DL (ref 3.4–5)
ALBUMIN/GLOB SERPL: 0.9 {RATIO} (ref 1–2)
ALP LIVER SERPL-CCNC: 97 U/L (ref 55–142)
ALT SERPL-CCNC: 30 U/L (ref 13–56)
ANION GAP SERPL CALC-SCNC: 6 MMOL/L (ref 0–18)
AST SERPL-CCNC: 26 U/L (ref 15–37)
BASOPHILS # BLD AUTO: 0.02 X10(3) UL (ref 0–0.2)
BASOPHILS NFR BLD AUTO: 0.3 %
BILIRUB SERPL-MCNC: 0.7 MG/DL (ref 0.1–2)
BILIRUB UR QL STRIP.AUTO: NEGATIVE
BUN BLD-MCNC: 14 MG/DL (ref 7–18)
BUN/CREAT SERPL: 18.2 (ref 10–20)
C DIFF TOX B STL QL: NEGATIVE
CALCIUM BLD-MCNC: 8 MG/DL (ref 8.5–10.1)
CHLORIDE SERPL-SCNC: 105 MMOL/L (ref 98–112)
CLARITY UR REFRACT.AUTO: CLEAR
CO2 SERPL-SCNC: 29 MMOL/L (ref 21–32)
COLOR UR AUTO: YELLOW
CREAT BLD-MCNC: 0.77 MG/DL (ref 0.55–1.02)
DEPRECATED RDW RBC AUTO: 63.9 FL (ref 35.1–46.3)
EOSINOPHIL # BLD AUTO: 0.1 X10(3) UL (ref 0–0.7)
EOSINOPHIL NFR BLD AUTO: 1.3 %
ERYTHROCYTE [DISTWIDTH] IN BLOOD BY AUTOMATED COUNT: 18.5 % (ref 11–15)
GLOBULIN PLAS-MCNC: 2.9 G/DL (ref 2.8–4.4)
GLUCOSE BLD-MCNC: 102 MG/DL (ref 70–99)
GLUCOSE BLD-MCNC: 112 MG/DL (ref 70–99)
GLUCOSE BLD-MCNC: 80 MG/DL (ref 70–99)
GLUCOSE BLD-MCNC: 82 MG/DL (ref 70–99)
GLUCOSE BLD-MCNC: 82 MG/DL (ref 70–99)
GLUCOSE BLD-MCNC: 86 MG/DL (ref 70–99)
GLUCOSE UR STRIP.AUTO-MCNC: NEGATIVE MG/DL
HAV IGM SER QL: 2.6 MG/DL (ref 1.6–2.6)
HCT VFR BLD AUTO: 39.6 % (ref 35–48)
HGB BLD-MCNC: 12.6 G/DL (ref 12–16)
IMM GRANULOCYTES # BLD AUTO: 0.03 X10(3) UL (ref 0–1)
IMM GRANULOCYTES NFR BLD: 0.4 %
INR BLD: 3.53 (ref 0.9–1.1)
LEUKOCYTE ESTERASE UR QL STRIP.AUTO: NEGATIVE
LYMPHOCYTES # BLD AUTO: 1.08 X10(3) UL (ref 1–4)
LYMPHOCYTES NFR BLD AUTO: 13.7 %
M PROTEIN MFR SERPL ELPH: 5.6 G/DL (ref 6.4–8.2)
MCH RBC QN AUTO: 30.1 PG (ref 26–34)
MCHC RBC AUTO-ENTMCNC: 31.8 G/DL (ref 31–37)
MCV RBC AUTO: 94.5 FL (ref 80–100)
MONOCYTES # BLD AUTO: 0.83 X10(3) UL (ref 0.1–1)
MONOCYTES NFR BLD AUTO: 10.5 %
NEUTROPHILS # BLD AUTO: 5.81 X10 (3) UL (ref 1.5–7.7)
NEUTROPHILS # BLD AUTO: 5.81 X10(3) UL (ref 1.5–7.7)
NEUTROPHILS NFR BLD AUTO: 73.8 %
NITRITE UR QL STRIP.AUTO: NEGATIVE
OSMOLALITY SERPL CALC.SUM OF ELEC: 289 MOSM/KG (ref 275–295)
PH UR STRIP.AUTO: 5 [PH] (ref 4.5–8)
PLATELET # BLD AUTO: 141 10(3)UL (ref 150–450)
POTASSIUM SERPL-SCNC: 3 MMOL/L (ref 3.5–5.1)
POTASSIUM SERPL-SCNC: 4.6 MMOL/L (ref 3.5–5.1)
PROT UR STRIP.AUTO-MCNC: NEGATIVE MG/DL
PSA SERPL DL<=0.01 NG/ML-MCNC: 37.9 SECONDS (ref 12.5–14.7)
RBC # BLD AUTO: 4.19 X10(6)UL (ref 3.8–5.3)
SODIUM SERPL-SCNC: 140 MMOL/L (ref 136–145)
SP GR UR STRIP.AUTO: 1.06 (ref 1–1.03)
UROBILINOGEN UR STRIP.AUTO-MCNC: <2 MG/DL
WBC # BLD AUTO: 7.9 X10(3) UL (ref 4–11)

## 2019-07-17 PROCEDURE — 99226 SUBSEQUENT OBSERVATION CARE: CPT | Performed by: STUDENT IN AN ORGANIZED HEALTH CARE EDUCATION/TRAINING PROGRAM

## 2019-07-17 RX ORDER — HYDROCODONE BITARTRATE AND ACETAMINOPHEN 5; 325 MG/1; MG/1
1 TABLET ORAL EVERY 6 HOURS PRN
Status: DISCONTINUED | OUTPATIENT
Start: 2019-07-17 | End: 2019-07-19

## 2019-07-17 RX ORDER — POTASSIUM CHLORIDE 20 MEQ/1
40 TABLET, EXTENDED RELEASE ORAL EVERY 4 HOURS
Status: COMPLETED | OUTPATIENT
Start: 2019-07-17 | End: 2019-07-17

## 2019-07-17 NOTE — ED PROVIDER NOTES
Patient Seen in: [facility] Emergency Department    History   Patient presents with:  Abdomen/Flank Pain (GI/)    Stated Complaint: abd pain    HPI    57-year-old with history of atrial fibrillation on Coumadin, COPD, congestive heart failure, emory • Osteoarthritis of shoulders, bilateral 06/18/2015    Severe, frozen shoulder syndrome   • Pericardial effusion 6/5/2014    trivial   • Pulmonary HTN ([facility] [address] 10/10/2014   • Rheumatoid arthritis ([facility] [address]    • S/P ICD (internal cardiac defibrillator) procedur Social History    Tobacco Use      Smoking status: Never Smoker      Smokeless tobacco: Never Used    Alcohol use: No    Drug use: No      Review of Systems    Positive for stated complaint: abd pain  Other systems are as noted in HPI.   Constitut LIPASE - Normal   CBC WITH DIFFERENTIAL WITH PLATELET    Narrative: The following orders were created for panel order CBC WITH DIFFERENTIAL WITH PLATELET.   Procedure                               Abnormality         Status                     -------

## 2019-07-17 NOTE — DIETARY NOTE
[facility]   CLINICAL NUTRITION    Isa Ortiz     Admitting diagnosis:  Abdominal pain, generalized [R10.84]  Dehydration [E86.0]  Nausea vomiting and diarrhea [R11.2, R19.7]    Ht:  5' 7\"  Wt: 91.9 kg (202 lb 11.2 oz).  This is 150% of IBW  Body

## 2019-07-17 NOTE — PLAN OF CARE
Pt Aox4 with complaints of epigastric pain, which she received dilaudid for in the ED providing some relief. Pt reports having gallbladder issues in the past. Pt with extensive heart history. Cardiac monitorying,  sating mid 90's on 3L O2.  Pt INR 3.3 co for signs/symptoms of CO2 retention  Outcome: Progressing     Problem: GASTROINTESTINAL - ADULT  Goal: Minimal or absence of nausea and vomiting  Description  INTERVENTIONS:  - Maintain adequate hydration with IV or PO as ordered and tolerated  - Nasogastr

## 2019-07-17 NOTE — PLAN OF CARE
A/O. Drowsy but arousable. [place]. 3LNC at night. Pt states wears o2 at home but only at night. On RA during day. Mid 90's on RA. Sometimes needs O2 on while sleeping during the day. K 3. Replaced per [facility] protocol. Coumadin on hold. Controlled Afib on tele. nutritional consult as needed  - Evaluate fluid balance  Outcome: Progressing  Goal: Maintains or returns to baseline bowel function  Description  INTERVENTIONS:  - Assess bowel function  - Maintain adequate hydration with IV or PO as ordered and tolerated

## 2019-07-17 NOTE — PROGRESS NOTES
[facility]  Progress Note     Jaziel Rose Patient Status:  Observation    1940 MRN [id number]   [facility] 4NW-A Attending Richi Reed MD   Hosp Day # 0 PCP Jason Barajas MD     Chief Complaint: Chai Kemp: Patient last 168 hours. Imaging: Imaging data reviewed in Epic.     Medications:   • Potassium Chloride ER  40 mEq Oral Q4H   • amiodarone HCl  200 mg Oral Daily   • dilTIAZem HCl ER Coated Beads  120 mg Oral Daily   • ferrous sulfate  325 mg Oral QOD   • f

## 2019-07-17 NOTE — H&P
[facility]  History and Physical     Ksenia Matt Patient Status:  Emergency    1940 MRN [id number]   [address] Attending Erick De La Torre MD   Hosp Day # 0 PCP Isak Vasquez MD     Chief Compla tachycardia) ([facility] 7/11/2014    Recurrence 7.2016 Hx VT- s/p ICD implant on 7/7/16.      • Obesity, morbid, BMI 40.0-49.9 ([facility] 6/18/2015   • RADHA (obstructive sleep apnea) 06/29/2012    Cannot tolerate CPAP machine- uses oxygen periodically    • Shilpa Mazas due to fall   • PACEMAKER/DEFIBRILLATOR      Medtr[employer] single chamber ICD   • TOTAL KNEE REPLACEMENT     • US FNA LYMPH NODE, GUIDE INCLD,  LEFT (CPT=10005)  1/14/14   • VALVE REPAIR  05/03/2019    mitral valve clip placed 2/2 severe mitral regurgitation Oral Capsule SR 24 Hr Take 120 mg by mouth daily. Disp:  Rfl:    folic acid 1 MG Oral Tab Take 1 tablet (1 mg total) by mouth daily.  Disp: 60 tablet Rfl: 0   Pantoprazole Sodium 40 MG Oral Tab EC Take 1 tablet (40 mg total) by mouth every morning before br cyanosis. Integument: No rashes or lesions. Psychiatric: Appropriate mood and affect.       Diagnostic Data:      Labs:  Recent Labs   Lab 07/16/19  1738 07/16/19  1739   WBC 11.0  --    HGB 14.3  --    MCV 91.8  --    .0  --    INR  --  3.30*

## 2019-07-18 ENCOUNTER — APPOINTMENT (OUTPATIENT)
Dept: CARDIOLOGY | Age: 79
End: 2019-07-18
Attending: INTERNAL MEDICINE

## 2019-07-18 LAB
ANION GAP SERPL CALC-SCNC: 4 MMOL/L (ref 0–18)
BASOPHILS # BLD AUTO: 0.02 X10(3) UL (ref 0–0.2)
BASOPHILS NFR BLD AUTO: 0.4 %
BUN BLD-MCNC: 11 MG/DL (ref 7–18)
BUN/CREAT SERPL: 14.5 (ref 10–20)
CALCIUM BLD-MCNC: 8 MG/DL (ref 8.5–10.1)
CHLORIDE SERPL-SCNC: 109 MMOL/L (ref 98–112)
CO2 SERPL-SCNC: 28 MMOL/L (ref 21–32)
CREAT BLD-MCNC: 0.76 MG/DL (ref 0.55–1.02)
DEPRECATED RDW RBC AUTO: 65.1 FL (ref 35.1–46.3)
EOSINOPHIL # BLD AUTO: 0.07 X10(3) UL (ref 0–0.7)
EOSINOPHIL NFR BLD AUTO: 1.3 %
ERYTHROCYTE [DISTWIDTH] IN BLOOD BY AUTOMATED COUNT: 18.5 % (ref 11–15)
GLUCOSE BLD-MCNC: 102 MG/DL (ref 70–99)
GLUCOSE BLD-MCNC: 117 MG/DL (ref 70–99)
GLUCOSE BLD-MCNC: 93 MG/DL (ref 70–99)
GLUCOSE BLD-MCNC: 95 MG/DL (ref 70–99)
GLUCOSE BLD-MCNC: 97 MG/DL (ref 70–99)
HCT VFR BLD AUTO: 40 % (ref 35–48)
HGB BLD-MCNC: 12.5 G/DL (ref 12–16)
IMM GRANULOCYTES # BLD AUTO: 0.02 X10(3) UL (ref 0–1)
IMM GRANULOCYTES NFR BLD: 0.4 %
INR BLD: 3.36 (ref 0.9–1.1)
LYMPHOCYTES # BLD AUTO: 1.03 X10(3) UL (ref 1–4)
LYMPHOCYTES NFR BLD AUTO: 18.5 %
MCH RBC QN AUTO: 30 PG (ref 26–34)
MCHC RBC AUTO-ENTMCNC: 31.3 G/DL (ref 31–37)
MCV RBC AUTO: 96.2 FL (ref 80–100)
MONOCYTES # BLD AUTO: 0.63 X10(3) UL (ref 0.1–1)
MONOCYTES NFR BLD AUTO: 11.3 %
NEUTROPHILS # BLD AUTO: 3.8 X10 (3) UL (ref 1.5–7.7)
NEUTROPHILS # BLD AUTO: 3.8 X10(3) UL (ref 1.5–7.7)
NEUTROPHILS NFR BLD AUTO: 68.1 %
OSMOLALITY SERPL CALC.SUM OF ELEC: 291 MOSM/KG (ref 275–295)
PLATELET # BLD AUTO: 150 10(3)UL (ref 150–450)
PLATELET MORPHOLOGY: NORMAL
POTASSIUM SERPL-SCNC: 4.4 MMOL/L (ref 3.5–5.1)
PSA SERPL DL<=0.01 NG/ML-MCNC: 36.4 SECONDS (ref 12.5–14.7)
RBC # BLD AUTO: 4.16 X10(6)UL (ref 3.8–5.3)
SODIUM SERPL-SCNC: 141 MMOL/L (ref 136–145)
WBC # BLD AUTO: 5.6 X10(3) UL (ref 4–11)

## 2019-07-18 PROCEDURE — 99225 SUBSEQUENT OBSERVATION CARE: CPT | Performed by: HOSPITALIST

## 2019-07-18 RX ORDER — DICYCLOMINE HYDROCHLORIDE 10 MG/1
10 CAPSULE ORAL
Qty: 30 CAPSULE | Refills: 0 | Status: SHIPPED | OUTPATIENT
Start: 2019-07-18 | End: 2019-07-19

## 2019-07-18 RX ORDER — DICYCLOMINE HYDROCHLORIDE 10 MG/1
10 CAPSULE ORAL
Status: DISCONTINUED | OUTPATIENT
Start: 2019-07-18 | End: 2019-07-19

## 2019-07-18 RX ORDER — TORSEMIDE 20 MG/1
20 TABLET ORAL 2 TIMES DAILY WITH MEALS
Status: DISCONTINUED | OUTPATIENT
Start: 2019-07-18 | End: 2019-07-19

## 2019-07-18 NOTE — CONSULTS
[facility]                       Gastroenterology [address] [facility] Gastroenterology    Oriananathan Rocha Patient Status:  Observation    1940 MRN [id number]   [facility] 4NW-A Attending Saurabh Pimentel MD   Hosp Day # 0 PCP [facility] obstructive pulmonary disease) (HCC)    • Deep vein thrombosis (HCC)     R lower leg- 12/14/16, h/o IVC filter   • Dementia ([facility] [address]    • Diabetes ([facility] [address]    • Diabetes mellitus type 2, noninsulin dependent ([facility] [address]     Type !!   • Diabetic peripheral neuropathy a CHOLECYSTOSTOMY DRAIN  (FVK=37749/80670)     • EGD  04/05/2019    reactive gastropathy, Dr. Erika Billingsley GI   • ESOPHAGOGASTRODUODENOSCOPY (EGD) N/A 4/5/2019    Performed by Claire Chavez DO at [facility] ENDOSCOPY   • HERNIA SURGERY  3/24/2011 Richie AGUAYO Oral Nightly   Pantoprazole Sodium (PROTONIX) EC tab 40 mg 40 mg Oral QAM AC   Sertraline HCl (ZOLOFT) tab 25 mg 25 mg Oral Daily   glucose (DEX4) oral liquid 15 g 15 g Oral Q15 Min PRN   Or      Glucose-Vitamin C (DEX-4) 4-6 GM-MG chewable tab 4 tablet 4 tumor or hematologic malignancy           ENT: The patient reports no hoarseness of voice, hearing loss, sinus congestion, tinnitus           Neurologic: + seizure, stroke, dementia     PE: /69 (BP Location: Right arm)   Pulse 63   Temp 97.8 °F (36.6 ABDOMEN+PELVIS (CONTRAST ONLY) (CPT=74177)     COMPARISON:  [facility] , CT ABD   PEL W/CONT, 12/04/2011, 0:57. [facility] , ABD(S) / PELVIS(S)-SET, 3/22/2011, 10:51.      INDICATIONS:  abd pain     TECHNIQUE:  CT scanning was performed from the dome of the diaphr Infrarenal IVC filter. BONES:  Anterolisthesis of L4 on L5. Minimal retrolisthesis of L2 on L3 and L3 on L4. Multilevel degenerative disc disease.  =====  CONCLUSION:    1.  Wall thickening of small bowel within the left abdomen just distal to the anasto Symptoms most likely to a viral enteritis and are improving. Abd is soft, non-tender, non-distended, BS+. No fevers or leukocytosis; pt does not appear toxic      Recommendations:     1.  Trial of Bentyl 10 mg PO BID to improve with cramping; prescription p

## 2019-07-18 NOTE — PROGRESS NOTES
[facility]  Progress Note     Siddharth Bustamante Patient Status:  Observation    1940 MRN [id number]   [facility] 4NW-A Attending Saskia Jackson MD   Hosp Day # 0 PCP Mikey Farrell MD     Chief Complaint: nausea vomiting abd pa Creatinine Clearance: 60.3 mL/min (based on SCr of 0.76 mg/dL). Recent Labs   Lab 07/16/19  1739 07/17/19  0707 07/18/19  0619   PTP 35.9* 37.9* 36.4*   INR 3.30* 3.53* 3.36*       No results for input(s): TROP, CK in the last 168 hours.          Imaging

## 2019-07-18 NOTE — CM/SW NOTE
07/18/19 1000   CM/SW Referral Data   Referral Source Social Work (self-referral)   Reason for Referral Discharge planning   Informant Patient   Patient Info   Patient's Mental Status Alert;Oriented   Patient's Home Environment House   Patient lives wit

## 2019-07-18 NOTE — PROGRESS NOTES
Pt drowsy but arouseable with no complaints of pain, however does report some tingling in her right arm and hand. Upon assessment arm was edematous and cool to touch. IV infiltrated. IV removed and new one placed in left hand. Pt on 3L O2 nc sating at 97%.

## 2019-07-18 NOTE — PLAN OF CARE
Pt A&Ox4; pleasant/cooperative  Up with SBA and walker  Torsemide for BLE edema and cardiac hx; tele- afib; ICD; on coumadin at home-- currently being held for elevated PT/INR  GI consulted today- n/v/d resolved but pt c/o abd pain; bentyl added; toleratin as ordered  - Evaluate effectiveness of ordered antiemetic medications  - Provide nonpharmacologic comfort measures as appropriate  - Advance diet as tolerated, if ordered  - Obtain nutritional consult as needed  - Evaluate fluid balance  Outcome: Progress response  - Implement non-pharmacological measures as appropriate and evaluate response  - Consider cultural and social influences on pain and pain management  - Manage/alleviate anxiety  - Utilize distraction and/or relaxation techniques  - Monitor for op

## 2019-07-19 VITALS
OXYGEN SATURATION: 99 % | WEIGHT: 202.69 LBS | HEART RATE: 64 BPM | BODY MASS INDEX: 32 KG/M2 | RESPIRATION RATE: 16 BRPM | DIASTOLIC BLOOD PRESSURE: 68 MMHG | TEMPERATURE: 98 F | SYSTOLIC BLOOD PRESSURE: 129 MMHG

## 2019-07-19 LAB
GLUCOSE BLD-MCNC: 110 MG/DL (ref 70–99)
GLUCOSE BLD-MCNC: 86 MG/DL (ref 70–99)
INR BLD: 2.53 (ref 0.9–1.1)
PSA SERPL DL<=0.01 NG/ML-MCNC: 28.9 SECONDS (ref 12.5–14.7)

## 2019-07-19 PROCEDURE — 99217 OBSERVATION CARE DISCHARGE: CPT | Performed by: HOSPITALIST

## 2019-07-19 RX ORDER — DICYCLOMINE HYDROCHLORIDE 10 MG/1
10 CAPSULE ORAL 2 TIMES DAILY PRN
Qty: 30 CAPSULE | Refills: 0 | Status: SHIPPED | OUTPATIENT
Start: 2019-07-19 | End: 2019-10-11 | Stop reason: ALTCHOICE

## 2019-07-19 NOTE — PROGRESS NOTES
NURSING DISCHARGE NOTE    Discharged Home via Wheelchair. Accompanied by Family member  Belongings Taken by patient/family. Pt assessed and ready for discharge.   Discharge instructions and medications given and reviewed with patient as well as with

## 2019-07-19 NOTE — DISCHARGE SUMMARY
[facility] HOSPITAL[facility]  DISCHARGE SUMMARY     Gemma Rosa Patient Status:  Observation    1940 MRN [id number]   [facility] 4NW-A Attending Dash Choudhary MD   Hosp Day # 0 PCP Ernie Barrett MD     Date of Admission: 2019  Date these medications      Instructions Prescription details   Dicyclomine HCl 10 MG Caps  Commonly known as:  BENTYL      Take 1 capsule (10 mg total) by mouth 2 (two) times daily as needed.    Quantity:  30 capsule  Refills:  0        ASK your doctor about th Take 1 tablet (40 mg total) by mouth every morning before breakfast.   Quantity:  30 tablet  Refills:  0     Potassium Chloride ER 20 MEQ Tbcr  Commonly known as:  K-DUR M20      Take 20 mEq by mouth daily.    Refills:  0     Sertraline HCl 25 MG Tabs  Comm left past the Information Desk and proceed to the [place] Registration desk. 7/23/2019  2:00 PM  [facility] CARD [facility] ECHO 2D W DOP [1205] 60 min. [facility] Echocardiography [facility] CARD ECHO RM 1    Patient Instructions:       This exam could the time of service.  - (Questions) If you would like more information about your Patient Responsibility Estimate in advance of your appointment, you can speak to a  ([phone], option 5).               7/25/2019  1:00 PM  FOLLOW-UP

## 2019-07-19 NOTE — PROGRESS NOTES
Gastroenterology Progress Note  Patient Name: Dylan Palacios  Chief Complaint: N/V/D, abdominal pain  S: Pt reports that her abdominal pain has improved. She ate an English muffin this am. She had a normal BM today.  She denies N/V.  O: /72 (BP Locati AM  7/19/2019  [facility] Gastroenterology  [phone]

## 2019-07-22 ENCOUNTER — PATIENT OUTREACH (OUTPATIENT)
Dept: CASE MANAGEMENT | Age: 79
End: 2019-07-22

## 2019-07-23 ENCOUNTER — ANTI-COAG (OUTPATIENT)
Dept: CARDIOLOGY | Age: 79
End: 2019-07-23

## 2019-07-23 ENCOUNTER — HOSPITAL ENCOUNTER (OUTPATIENT)
Dept: LAB | Facility: HOSPITAL | Age: 79
Discharge: HOME | End: 2019-07-23
Attending: INTERNAL MEDICINE
Payer: MEDICARE

## 2019-07-23 ENCOUNTER — HOSPITAL ENCOUNTER (OUTPATIENT)
Dept: CARDIOLOGY CLINIC | Facility: HOSPITAL | Age: 79
Discharge: HOME | End: 2019-07-23
Attending: INTERNAL MEDICINE
Payer: MEDICARE

## 2019-07-23 ENCOUNTER — HOSPITAL ENCOUNTER (OUTPATIENT)
Dept: CV DIAGNOSTICS | Facility: HOSPITAL | Age: 79
Discharge: HOME | End: 2019-07-23
Attending: INTERNAL MEDICINE
Payer: MEDICARE

## 2019-07-23 VITALS
RESPIRATION RATE: 18 BRPM | DIASTOLIC BLOOD PRESSURE: 66 MMHG | HEART RATE: 66 BPM | OXYGEN SATURATION: 99 % | SYSTOLIC BLOOD PRESSURE: 133 MMHG | TEMPERATURE: 98 F

## 2019-07-23 DIAGNOSIS — I48.20 CHRONIC A-FIB (HCC): ICD-10-CM

## 2019-07-23 DIAGNOSIS — I48.91 ATRIAL FIBRILLATION (HCC): ICD-10-CM

## 2019-07-23 DIAGNOSIS — I10 ESSENTIAL HYPERTENSION: ICD-10-CM

## 2019-07-23 DIAGNOSIS — I35.0 AORTIC VALVE STENOSIS, ETIOLOGY OF CARDIAC VALVE DISEASE UNSPECIFIED: ICD-10-CM

## 2019-07-23 DIAGNOSIS — Z95.818 S/P MITRAL VALVE CLIP IMPLANTATION: ICD-10-CM

## 2019-07-23 DIAGNOSIS — D50.9 IRON DEFICIENCY ANEMIA, UNSPECIFIED IRON DEFICIENCY ANEMIA TYPE: ICD-10-CM

## 2019-07-23 DIAGNOSIS — Z98.890 S/P MITRAL VALVE CLIP IMPLANTATION: ICD-10-CM

## 2019-07-23 DIAGNOSIS — E11.8 TYPE 2 DIABETES MELLITUS WITH COMPLICATION (HCC): ICD-10-CM

## 2019-07-23 DIAGNOSIS — I48.91 ATRIAL FIBRILLATION, UNSPECIFIED TYPE (CMD): ICD-10-CM

## 2019-07-23 LAB
INR PPP: 2.8
POC INR: 2.8 (ref 0.8–1.3)

## 2019-07-23 PROCEDURE — 85610 PROTHROMBIN TIME: CPT

## 2019-07-23 PROCEDURE — 93306 TTE W/DOPPLER COMPLETE: CPT | Performed by: INTERNAL MEDICINE

## 2019-07-23 PROCEDURE — 99214 OFFICE O/P EST MOD 30 MIN: CPT | Performed by: NURSE PRACTITIONER

## 2019-07-23 NOTE — PROGRESS NOTES
Shyann Rosado Note    Subjective:   Patient presents for 3 month postop TMVR visit. She underwent Mitraclip placement on 4/11/19 for severe, symptomatic mitral regurgitation.   She was recently hospitalized and just discharged 7/21 d/t gastro by  continuity equation (using LVOT flow): 1.7cm^2. Indexed valve area by  continuity equation (using LVOT flow): 0.85cm^2/m^2.  4. Left atrium: The left atrium was massively dilated. 5. Right ventricle:  The cavity size was moderately to markedly increase other day. Disp: 30 tablet Rfl: 1   amiodarone HCl 200 MG Oral Tab Take 1 tablet (200 mg total) by mouth daily. Disp:  Rfl: 0   Losartan Potassium 25 MG Oral Tab Take 1 tablet (25 mg total) by mouth 2 (two) times daily.  Disp: 60 tablet Rfl: 3   torsemide 2 65% - mild vol overload  · Permanent atrial fibrillation, on coumadin  · Chronic iron deficiency anemia. · History of DVT, s/p thrombectomy and IVC filter 2016  · History of SDH  · COPD  · HTN   · Type II DM   · ICD  · Colitis    Plan:   1. Echo today  2.

## 2019-07-24 DIAGNOSIS — I34.0 MITRAL VALVE INSUFFICIENCY, UNSPECIFIED ETIOLOGY: Primary | ICD-10-CM

## 2019-07-25 ENCOUNTER — TELEPHONE (OUTPATIENT)
Dept: CARDIOLOGY CLINIC | Facility: HOSPITAL | Age: 79
End: 2019-07-25

## 2019-07-25 DIAGNOSIS — I35.0 AORTIC VALVE STENOSIS, ETIOLOGY OF CARDIAC VALVE DISEASE UNSPECIFIED: ICD-10-CM

## 2019-07-25 NOTE — PROGRESS NOTES
I called patient with results of echo from Tuesday 7/23/2019 and LM on designated line per patient consent. Patient's EF remains at 60-65%, clip present with mild-mod regurgitation.  Overall, there has been no significant interval changes compared to previo

## 2019-07-26 DIAGNOSIS — I34.0 MITRAL VALVE INSUFFICIENCY, UNSPECIFIED ETIOLOGY: ICD-10-CM

## 2019-07-30 ENCOUNTER — ANCILLARY PROCEDURE (OUTPATIENT)
Dept: CARDIOLOGY | Age: 79
End: 2019-07-30
Attending: INTERNAL MEDICINE

## 2019-07-30 ENCOUNTER — OFFICE VISIT (OUTPATIENT)
Dept: CARDIOLOGY | Age: 79
End: 2019-07-30

## 2019-07-30 VITALS
SYSTOLIC BLOOD PRESSURE: 124 MMHG | HEART RATE: 72 BPM | WEIGHT: 199 LBS | DIASTOLIC BLOOD PRESSURE: 64 MMHG | BODY MASS INDEX: 32.12 KG/M2

## 2019-07-30 DIAGNOSIS — I48.21 PERMANENT ATRIAL FIBRILLATION (CMD): Primary | ICD-10-CM

## 2019-07-30 DIAGNOSIS — Z92.29 HISTORY OF AMIODARONE THERAPY: ICD-10-CM

## 2019-07-30 DIAGNOSIS — Z95.810 ICD (IMPLANTABLE CARDIOVERTER-DEFIBRILLATOR) IN PLACE: ICD-10-CM

## 2019-07-30 DIAGNOSIS — Z45.018 PACEMAKER REPROGRAMMING/CHECK: ICD-10-CM

## 2019-07-30 DIAGNOSIS — I47.20 VENTRICULAR TACHYCARDIA (CMD): ICD-10-CM

## 2019-07-30 PROCEDURE — 99215 OFFICE O/P EST HI 40 MIN: CPT | Performed by: INTERNAL MEDICINE

## 2019-07-30 PROCEDURE — 93290 INTERROG DEV EVAL ICPMS IP: CPT | Performed by: INTERNAL MEDICINE

## 2019-07-30 PROCEDURE — 93282 PRGRMG EVAL IMPLANTABLE DFB: CPT | Performed by: INTERNAL MEDICINE

## 2019-07-30 PROCEDURE — 93000 ELECTROCARDIOGRAM COMPLETE: CPT | Performed by: INTERNAL MEDICINE

## 2019-07-30 RX ORDER — WARFARIN SODIUM 5 MG/1
TABLET ORAL
Qty: 20 TABLET | Refills: 2 | Status: SHIPPED | OUTPATIENT
Start: 2019-07-30 | End: 2019-10-15 | Stop reason: SDUPTHER

## 2019-07-30 RX ORDER — DILTIAZEM HYDROCHLORIDE 120 MG/1
120 CAPSULE, EXTENDED RELEASE ORAL DAILY
Qty: 90 CAPSULE | Refills: 1 | Status: SHIPPED | OUTPATIENT
Start: 2019-07-30 | End: 2020-06-25 | Stop reason: SDUPTHER

## 2019-07-30 RX ORDER — AMIODARONE HYDROCHLORIDE 200 MG/1
200 TABLET ORAL DAILY
Qty: 90 TABLET | Refills: 1 | Status: SHIPPED | OUTPATIENT
Start: 2019-07-30 | End: 2020-06-25 | Stop reason: SDUPTHER

## 2019-07-30 RX ORDER — METOPROLOL TARTRATE 50 MG/1
50 TABLET, FILM COATED ORAL 2 TIMES DAILY
Qty: 180 TABLET | Refills: 1 | Status: SHIPPED | OUTPATIENT
Start: 2019-07-30 | End: 2020-11-04 | Stop reason: SDUPTHER

## 2019-07-30 ASSESSMENT — ENCOUNTER SYMPTOMS
SUSPICIOUS LESIONS: 0
ALLERGIC/IMMUNOLOGIC COMMENTS: NO NEW FOOD ALLERGIES
HEMOPTYSIS: 0
HEMATOCHEZIA: 0
COUGH: 0
BRUISES/BLEEDS EASILY: 0
WEIGHT GAIN: 0
CHILLS: 0
FEVER: 0
WEIGHT LOSS: 0

## 2019-07-31 DIAGNOSIS — Z76.0 MEDICATION REFILL: ICD-10-CM

## 2019-08-01 DIAGNOSIS — Z76.0 MEDICATION REFILL: ICD-10-CM

## 2019-08-02 ENCOUNTER — TELEPHONE (OUTPATIENT)
Dept: FAMILY MEDICINE CLINIC | Facility: CLINIC | Age: 79
End: 2019-08-02

## 2019-08-02 DIAGNOSIS — Z76.0 MEDICATION REFILL: ICD-10-CM

## 2019-08-02 RX ORDER — TORSEMIDE 20 MG/1
TABLET ORAL
Qty: 180 TABLET | Refills: 0 | OUTPATIENT
Start: 2019-08-02

## 2019-08-02 NOTE — TELEPHONE ENCOUNTER
Requesting Torsemide 20mg, Potassium Chloride 20 meq, and Losartan 25mg  LOV: 10/16/18   RTC: none specified   Last Labs: CMP 7/17/19  Potassium 3.5 - 5.1 mmol/L 3.0Low    Filled:  Torsemide 4/18/19 #30 with 0 refills  Potassium Chloride 5/30/18 #90 with 3

## 2019-08-02 NOTE — TELEPHONE ENCOUNTER
Dr. Josh Nguyen- Daughter Delano Lang called the office questioning why we will not fill medications without an appointment. I called Jalen Regalado son to schedule an ov for the refills and he states he did get the message and will call back on Monday to schedule an appt.

## 2019-08-02 NOTE — TELEPHONE ENCOUNTER
Pt would like an update on her med refills she has requested, pt would like to  scripts today please advise

## 2019-08-02 NOTE — TELEPHONE ENCOUNTER
Rocio Puckett  8/2/19 1:04 PM Note      Dr. Sumaya Arias- Daughter Raul Arthru called the office questioning why we will not fill medications without an appointment.   I called Angie Nobles son to schedule an ov for the refills and he states he did get the message and will rosalie

## 2019-08-02 NOTE — TELEPHONE ENCOUNTER
See refill request. Pt due for OV, left detailed VM advising patient she needs to schedule OV.  Refill request sent to MD

## 2019-08-03 RX ORDER — TORSEMIDE 20 MG/1
20 TABLET ORAL 2 TIMES DAILY
Qty: 60 TABLET | Refills: 0 | Status: SHIPPED | OUTPATIENT
Start: 2019-08-03 | End: 2019-08-03

## 2019-08-03 RX ORDER — TORSEMIDE 20 MG/1
TABLET ORAL
Qty: 180 TABLET | Refills: 0 | Status: SHIPPED | OUTPATIENT
Start: 2019-08-03 | End: 2019-10-11

## 2019-08-03 RX ORDER — POTASSIUM CHLORIDE 20 MEQ/1
TABLET, EXTENDED RELEASE ORAL
Qty: 90 TABLET | Refills: 0 | Status: SHIPPED | OUTPATIENT
Start: 2019-08-03 | End: 2019-10-11

## 2019-08-03 RX ORDER — LOSARTAN POTASSIUM 25 MG/1
TABLET ORAL
Qty: 90 TABLET | Refills: 0 | Status: ON HOLD | OUTPATIENT
Start: 2019-08-03 | End: 2019-10-11

## 2019-08-03 NOTE — TELEPHONE ENCOUNTER
Hypertension Medications Protocol Failed8/3 7:14 AM   Appointment in past 6 or next 3 months    CMP or BMP in past 12 months    Last serum creatinine< 2.0     Requesting TORSEMIDE 20 MG   Requesting 90 days, please approve if ok.      Future Appointments

## 2019-08-16 NOTE — PROGRESS NOTES
Attended Phase II Cardiac Rehab. No medication or health history changes reported. See [facility] for details.   [facility]  Cardiology Progress Note    Radha Naylor Patient Status:  Inpatient    1940 MRN [id number]   [facility] 8NE-A Attending Nadine Rodriguez MD   Hosp Day # 5 PCP Bettye Leonardo MD     Subjective:  No changes.      Objective

## 2019-08-27 ENCOUNTER — ANTI-COAG (OUTPATIENT)
Dept: CARDIOLOGY | Age: 79
End: 2019-08-27

## 2019-08-27 DIAGNOSIS — I48.91 ATRIAL FIBRILLATION, UNSPECIFIED TYPE (CMD): ICD-10-CM

## 2019-08-28 NOTE — TELEPHONE ENCOUNTER
----- Message from CRAIG Munson sent at 4/23/2017  4:21 PM CDT -----  Your urine culture test came back negative. You can stop taking cipro (antibotic). General Surgery End of Shift Nursing Note    Bedside shift change report given to Lambert (oncoming nurse) by Cesar Andres (offgoing nurse). Report included the following information SBAR, Kardex, Intake/Output and MAR. Shift worked:   7p to [employer] of shift:  Pt given prn pain meds. Pt ambulated in the hallway prior to bedtime. Issues for physician to address: Pts wife wraps pts legs at home for lymphedema. Wife is wanting to know if we can continue wrapping legs. Wound care consult put in to evaluate if wrapping can be continued or if wrapping is needed.           Tedyd Leahy RN

## 2019-09-03 ENCOUNTER — APPOINTMENT (OUTPATIENT)
Dept: ULTRASOUND IMAGING | Facility: HOSPITAL | Age: 79
DRG: 445 | End: 2019-09-03
Attending: EMERGENCY MEDICINE
Payer: MEDICARE

## 2019-09-03 ENCOUNTER — HOSPITAL ENCOUNTER (INPATIENT)
Facility: HOSPITAL | Age: 79
LOS: 3 days | Discharge: HOME | DRG: 445 | End: 2019-09-07
Attending: EMERGENCY MEDICINE | Admitting: STUDENT IN AN ORGANIZED HEALTH CARE EDUCATION/TRAINING PROGRAM
Payer: MEDICARE

## 2019-09-03 ENCOUNTER — APPOINTMENT (OUTPATIENT)
Dept: GENERAL RADIOLOGY | Facility: HOSPITAL | Age: 79
DRG: 445 | End: 2019-09-03
Attending: EMERGENCY MEDICINE
Payer: MEDICARE

## 2019-09-03 DIAGNOSIS — L03.115 CELLULITIS OF RIGHT LOWER EXTREMITY: ICD-10-CM

## 2019-09-03 DIAGNOSIS — R74.01 TRANSAMINITIS: ICD-10-CM

## 2019-09-03 DIAGNOSIS — K81.1 CHRONIC CHOLECYSTITIS: Primary | ICD-10-CM

## 2019-09-03 LAB
ALBUMIN SERPL-MCNC: 3.5 G/DL (ref 3.4–5)
ALBUMIN/GLOB SERPL: 1 {RATIO} (ref 1–2)
ALP LIVER SERPL-CCNC: 489 U/L (ref 55–142)
ALT SERPL-CCNC: 159 U/L (ref 13–56)
ANION GAP SERPL CALC-SCNC: 6 MMOL/L (ref 0–18)
AST SERPL-CCNC: 225 U/L (ref 15–37)
BASOPHILS # BLD AUTO: 0.02 X10(3) UL (ref 0–0.2)
BASOPHILS NFR BLD AUTO: 0.2 %
BILIRUB SERPL-MCNC: 1.6 MG/DL (ref 0.1–2)
BUN BLD-MCNC: 15 MG/DL (ref 7–18)
BUN/CREAT SERPL: 13.9 (ref 10–20)
CALCIUM BLD-MCNC: 8.8 MG/DL (ref 8.5–10.1)
CHLORIDE SERPL-SCNC: 103 MMOL/L (ref 98–112)
CO2 SERPL-SCNC: 30 MMOL/L (ref 21–32)
CREAT BLD-MCNC: 1.08 MG/DL (ref 0.55–1.02)
DEPRECATED RDW RBC AUTO: 53.8 FL (ref 35.1–46.3)
EOSINOPHIL # BLD AUTO: 0.05 X10(3) UL (ref 0–0.7)
EOSINOPHIL NFR BLD AUTO: 0.5 %
ERYTHROCYTE [DISTWIDTH] IN BLOOD BY AUTOMATED COUNT: 15.3 % (ref 11–15)
GLOBULIN PLAS-MCNC: 3.5 G/DL (ref 2.8–4.4)
GLUCOSE BLD-MCNC: 140 MG/DL (ref 70–99)
HCT VFR BLD AUTO: 40.2 % (ref 35–48)
HGB BLD-MCNC: 13.1 G/DL (ref 12–16)
IMM GRANULOCYTES # BLD AUTO: 0.05 X10(3) UL (ref 0–1)
IMM GRANULOCYTES NFR BLD: 0.5 %
INR BLD: 4.18 (ref 0.9–1.1)
INR BLD: 4.25 (ref 0.9–1.1)
LIPASE SERPL-CCNC: 633 U/L (ref 73–393)
LYMPHOCYTES # BLD AUTO: 1.07 X10(3) UL (ref 1–4)
LYMPHOCYTES NFR BLD AUTO: 11.4 %
M PROTEIN MFR SERPL ELPH: 7 G/DL (ref 6.4–8.2)
MCH RBC QN AUTO: 31.6 PG (ref 26–34)
MCHC RBC AUTO-ENTMCNC: 32.6 G/DL (ref 31–37)
MCV RBC AUTO: 97.1 FL (ref 80–100)
MONOCYTES # BLD AUTO: 0.79 X10(3) UL (ref 0.1–1)
MONOCYTES NFR BLD AUTO: 8.4 %
NEUTROPHILS # BLD AUTO: 7.41 X10 (3) UL (ref 1.5–7.7)
NEUTROPHILS # BLD AUTO: 7.41 X10(3) UL (ref 1.5–7.7)
NEUTROPHILS NFR BLD AUTO: 79 %
NT-PROBNP SERPL-MCNC: 1983 PG/ML (ref ?–450)
OSMOLALITY SERPL CALC.SUM OF ELEC: 291 MOSM/KG (ref 275–295)
PLATELET # BLD AUTO: 160 10(3)UL (ref 150–450)
POTASSIUM SERPL-SCNC: 3.5 MMOL/L (ref 3.5–5.1)
PSA SERPL DL<=0.01 NG/ML-MCNC: 43.5 SECONDS (ref 12.5–14.7)
PSA SERPL DL<=0.01 NG/ML-MCNC: 44.1 SECONDS (ref 12.5–14.7)
RBC # BLD AUTO: 4.14 X10(6)UL (ref 3.8–5.3)
SODIUM SERPL-SCNC: 139 MMOL/L (ref 136–145)
TROPONIN I SERPL-MCNC: <0.045 NG/ML (ref ?–0.04)
WBC # BLD AUTO: 9.4 X10(3) UL (ref 4–11)

## 2019-09-03 PROCEDURE — 71045 X-RAY EXAM CHEST 1 VIEW: CPT | Performed by: EMERGENCY MEDICINE

## 2019-09-03 PROCEDURE — 76700 US EXAM ABDOM COMPLETE: CPT | Performed by: EMERGENCY MEDICINE

## 2019-09-03 PROCEDURE — 99223 1ST HOSP IP/OBS HIGH 75: CPT | Performed by: STUDENT IN AN ORGANIZED HEALTH CARE EDUCATION/TRAINING PROGRAM

## 2019-09-03 RX ORDER — CLINDAMYCIN PHOSPHATE 600 MG/50ML
600 INJECTION INTRAVENOUS ONCE
Status: COMPLETED | OUTPATIENT
Start: 2019-09-03 | End: 2019-09-03

## 2019-09-03 RX ORDER — ONDANSETRON 2 MG/ML
4 INJECTION INTRAMUSCULAR; INTRAVENOUS ONCE
Status: COMPLETED | OUTPATIENT
Start: 2019-09-03 | End: 2019-09-03

## 2019-09-03 RX ORDER — MORPHINE SULFATE 4 MG/ML
4 INJECTION, SOLUTION INTRAMUSCULAR; INTRAVENOUS EVERY 30 MIN PRN
Status: DISCONTINUED | OUTPATIENT
Start: 2019-09-03 | End: 2019-09-08

## 2019-09-03 RX ORDER — SODIUM CHLORIDE 9 MG/ML
INJECTION, SOLUTION INTRAVENOUS CONTINUOUS
Status: DISCONTINUED | OUTPATIENT
Start: 2019-09-03 | End: 2019-09-03

## 2019-09-03 NOTE — ED PROVIDER NOTES
Patient Seen in: [facility] Emergency Department    History   Patient presents with:  Abdomen/Flank Pain (GI/)    Stated Complaint: abdominal pain    HPI    Patient is a 24-year-old female with multiple past medical problems, including recurrent c 1/18/2014    bx 2014- benign   • Migraines    • NSVT (nonsustained ventricular tachycardia) ([facility] 7/11/2014    Recurrence 7.2016 Hx VT- s/p ICD implant on 7/7/16.      • Obesity, morbid, BMI 40.0-49.9 ([facility] 6/18/2015   • RADHA (obstructive sleep apnea) 06/29/ surgery to relieve pressure on brain due to fall   • PACEMAKER/DEFIBRILLATOR      Medtronic single chamber ICD   • TOTAL KNEE REPLACEMENT     • US FNA LYMPH NODE, GUIDE INCLD,  LEFT (CPT=10005)  1/14/14   • VALVE REPAIR  05/03/2019    mitral valve clip and normal active range of motion of all 4 extremities.   Distal pulses normal and symmetric  Skin: On the right lower extremity, there is erythema with some induration from the ankle to the mid shin that is mildly tender to palpation without any focal fluc RAINBOW DRAW GOLD   BLOOD CULTURE   BLOOD CULTURE     EKG    Rate, intervals and axes as noted on EKG Report.   Rate: 67  Rhythm: Atrial Fibrillation  Reading: Atrial fibrillation, normal axis, widened QRS duration of 150 ms, with left bundle branch block acute hepatic abnormality. Calcification within the medial segment     left hepatic lobe 1.6 cm           =====    CONCLUSION:  Numerous gallstones are present, without gallbladder     dilation, surrounding fluid, Bojorquez sign or biliary ductal dilation. chronic cholecystitis. She will need to be admitted for IV antibiotics and gastroenterology and surgical consultations. Discussed patient with Dr. Lizet Cotton of the hospitalist service who will admit her to her service primarily.   She was admitted in stable

## 2019-09-03 NOTE — ED INITIAL ASSESSMENT (HPI)
Pt presents to the ED with complaints of abdominal pain, vomit x 1 after going out to eat. Pt concerned it is her gallbladder. Pt awake and alert, skin w/d,resps reg/unlabored.

## 2019-09-04 PROBLEM — R74.01 TRANSAMINITIS: Status: ACTIVE | Noted: 2019-09-04

## 2019-09-04 LAB
ALBUMIN SERPL-MCNC: 2.9 G/DL (ref 3.4–5)
ALBUMIN/GLOB SERPL: 1 {RATIO} (ref 1–2)
ALP LIVER SERPL-CCNC: 413 U/L (ref 55–142)
ALT SERPL-CCNC: 248 U/L (ref 13–56)
ANION GAP SERPL CALC-SCNC: 7 MMOL/L (ref 0–18)
AST SERPL-CCNC: 269 U/L (ref 15–37)
ATRIAL RATE: 214 BPM
BASOPHILS # BLD AUTO: 0.03 X10(3) UL (ref 0–0.2)
BASOPHILS NFR BLD AUTO: 0.5 %
BILIRUB SERPL-MCNC: 1.1 MG/DL (ref 0.1–2)
BUN BLD-MCNC: 15 MG/DL (ref 7–18)
BUN/CREAT SERPL: 14.6 (ref 10–20)
CALCIUM BLD-MCNC: 8 MG/DL (ref 8.5–10.1)
CHLORIDE SERPL-SCNC: 104 MMOL/L (ref 98–112)
CO2 SERPL-SCNC: 32 MMOL/L (ref 21–32)
CREAT BLD-MCNC: 1.03 MG/DL (ref 0.55–1.02)
DEPRECATED RDW RBC AUTO: 55.4 FL (ref 35.1–46.3)
EOSINOPHIL # BLD AUTO: 0.09 X10(3) UL (ref 0–0.7)
EOSINOPHIL NFR BLD AUTO: 1.4 %
ERYTHROCYTE [DISTWIDTH] IN BLOOD BY AUTOMATED COUNT: 15.4 % (ref 11–15)
GLOBULIN PLAS-MCNC: 3 G/DL (ref 2.8–4.4)
GLUCOSE BLD-MCNC: 73 MG/DL (ref 70–99)
GLUCOSE BLD-MCNC: 83 MG/DL (ref 70–99)
GLUCOSE BLD-MCNC: 88 MG/DL (ref 70–99)
GLUCOSE BLD-MCNC: 88 MG/DL (ref 70–99)
GLUCOSE BLD-MCNC: 97 MG/DL (ref 70–99)
HAV IGM SER QL: 2.3 MG/DL (ref 1.6–2.6)
HCT VFR BLD AUTO: 36.8 % (ref 35–48)
HGB BLD-MCNC: 12 G/DL (ref 12–16)
IMM GRANULOCYTES # BLD AUTO: 0.07 X10(3) UL (ref 0–1)
IMM GRANULOCYTES NFR BLD: 1.1 %
INR BLD: 4.05 (ref 0.9–1.1)
LIPASE SERPL-CCNC: 335 U/L (ref 73–393)
LYMPHOCYTES # BLD AUTO: 1.23 X10(3) UL (ref 1–4)
LYMPHOCYTES NFR BLD AUTO: 19.3 %
M PROTEIN MFR SERPL ELPH: 5.9 G/DL (ref 6.4–8.2)
MCH RBC QN AUTO: 31.9 PG (ref 26–34)
MCHC RBC AUTO-ENTMCNC: 32.6 G/DL (ref 31–37)
MCV RBC AUTO: 97.9 FL (ref 80–100)
MONOCYTES # BLD AUTO: 0.64 X10(3) UL (ref 0.1–1)
MONOCYTES NFR BLD AUTO: 10 %
NEUTROPHILS # BLD AUTO: 4.32 X10 (3) UL (ref 1.5–7.7)
NEUTROPHILS # BLD AUTO: 4.32 X10(3) UL (ref 1.5–7.7)
NEUTROPHILS NFR BLD AUTO: 67.7 %
OSMOLALITY SERPL CALC.SUM OF ELEC: 296 MOSM/KG (ref 275–295)
PLATELET # BLD AUTO: 134 10(3)UL (ref 150–450)
POTASSIUM SERPL-SCNC: 3.3 MMOL/L (ref 3.5–5.1)
PSA SERPL DL<=0.01 NG/ML-MCNC: 42.4 SECONDS (ref 12.5–14.7)
Q-T INTERVAL: 474 MS
QRS DURATION: 150 MS
QTC CALCULATION (BEZET): 500 MS
R AXIS: -5 DEGREES
RBC # BLD AUTO: 3.76 X10(6)UL (ref 3.8–5.3)
SODIUM SERPL-SCNC: 143 MMOL/L (ref 136–145)
T AXIS: 100 DEGREES
VENTRICULAR RATE: 67 BPM
WBC # BLD AUTO: 6.4 X10(3) UL (ref 4–11)

## 2019-09-04 PROCEDURE — 99233 SBSQ HOSP IP/OBS HIGH 50: CPT | Performed by: HOSPITALIST

## 2019-09-04 PROCEDURE — 99223 1ST HOSP IP/OBS HIGH 75: CPT | Performed by: SURGERY

## 2019-09-04 RX ORDER — TORSEMIDE 20 MG/1
20 TABLET ORAL
Status: DISCONTINUED | OUTPATIENT
Start: 2019-09-04 | End: 2019-09-04

## 2019-09-04 RX ORDER — MORPHINE SULFATE 4 MG/ML
2 INJECTION, SOLUTION INTRAMUSCULAR; INTRAVENOUS EVERY 2 HOUR PRN
Status: DISCONTINUED | OUTPATIENT
Start: 2019-09-04 | End: 2019-09-08

## 2019-09-04 RX ORDER — ALBUTEROL SULFATE 90 UG/1
1 AEROSOL, METERED RESPIRATORY (INHALATION) EVERY 4 HOURS PRN
Status: DISCONTINUED | OUTPATIENT
Start: 2019-09-04 | End: 2019-09-08

## 2019-09-04 RX ORDER — TORSEMIDE 20 MG/1
20 TABLET ORAL
Status: DISCONTINUED | OUTPATIENT
Start: 2019-09-04 | End: 2019-09-08

## 2019-09-04 RX ORDER — SODIUM CHLORIDE 9 MG/ML
INJECTION, SOLUTION INTRAVENOUS CONTINUOUS
Status: DISCONTINUED | OUTPATIENT
Start: 2019-09-04 | End: 2019-09-05

## 2019-09-04 RX ORDER — DILTIAZEM HYDROCHLORIDE 120 MG/1
120 CAPSULE, EXTENDED RELEASE ORAL DAILY
Status: DISCONTINUED | OUTPATIENT
Start: 2019-09-04 | End: 2019-09-08

## 2019-09-04 RX ORDER — FOLIC ACID 1 MG/1
1 TABLET ORAL DAILY
Status: DISCONTINUED | OUTPATIENT
Start: 2019-09-05 | End: 2019-09-08

## 2019-09-04 RX ORDER — MORPHINE SULFATE 4 MG/ML
4 INJECTION, SOLUTION INTRAMUSCULAR; INTRAVENOUS EVERY 2 HOUR PRN
Status: DISCONTINUED | OUTPATIENT
Start: 2019-09-04 | End: 2019-09-08

## 2019-09-04 RX ORDER — ACETAMINOPHEN 325 MG/1
650 TABLET ORAL EVERY 6 HOURS PRN
Status: DISCONTINUED | OUTPATIENT
Start: 2019-09-04 | End: 2019-09-08

## 2019-09-04 RX ORDER — SODIUM CHLORIDE 9 MG/ML
INJECTION, SOLUTION INTRAVENOUS CONTINUOUS
Status: ACTIVE | OUTPATIENT
Start: 2019-09-04 | End: 2019-09-04

## 2019-09-04 RX ORDER — SERTRALINE HYDROCHLORIDE 25 MG/1
25 TABLET, FILM COATED ORAL DAILY
Status: DISCONTINUED | OUTPATIENT
Start: 2019-09-04 | End: 2019-09-08

## 2019-09-04 RX ORDER — METOCLOPRAMIDE HYDROCHLORIDE 5 MG/ML
10 INJECTION INTRAMUSCULAR; INTRAVENOUS EVERY 8 HOURS PRN
Status: DISCONTINUED | OUTPATIENT
Start: 2019-09-04 | End: 2019-09-08

## 2019-09-04 RX ORDER — DEXTROSE MONOHYDRATE 25 G/50ML
50 INJECTION, SOLUTION INTRAVENOUS
Status: DISCONTINUED | OUTPATIENT
Start: 2019-09-04 | End: 2019-09-08

## 2019-09-04 RX ORDER — SODIUM CHLORIDE 9 MG/ML
INJECTION, SOLUTION INTRAVENOUS CONTINUOUS
Status: DISCONTINUED | OUTPATIENT
Start: 2019-09-04 | End: 2019-09-08

## 2019-09-04 RX ORDER — MORPHINE SULFATE 4 MG/ML
1 INJECTION, SOLUTION INTRAMUSCULAR; INTRAVENOUS EVERY 2 HOUR PRN
Status: DISCONTINUED | OUTPATIENT
Start: 2019-09-04 | End: 2019-09-08

## 2019-09-04 RX ORDER — LOSARTAN POTASSIUM 25 MG/1
25 TABLET ORAL DAILY
Status: DISCONTINUED | OUTPATIENT
Start: 2019-09-05 | End: 2019-09-07

## 2019-09-04 RX ORDER — ONDANSETRON 2 MG/ML
4 INJECTION INTRAMUSCULAR; INTRAVENOUS EVERY 6 HOURS PRN
Status: DISCONTINUED | OUTPATIENT
Start: 2019-09-04 | End: 2019-09-08

## 2019-09-04 RX ORDER — AMIODARONE HYDROCHLORIDE 200 MG/1
200 TABLET ORAL DAILY
Status: DISCONTINUED | OUTPATIENT
Start: 2019-09-04 | End: 2019-09-08

## 2019-09-04 NOTE — ED NOTES
Talked with pt daughter Luke Melchor, updated on plan of care and current diagnosis, states she will call in the morning for an update.  States she would like Dr Deann Valdez notified because he knows the patient and has been caring for her in the past, MD aware of

## 2019-09-04 NOTE — CM/SW NOTE
69 yo admitted with abdominal pain due recurrent cholecystitis due to gallstones. Surgery and GI to follow. PT eval done and recommending home discharge.         HOME SITUATION  Type of Home: House(in nursing home community)   Home Layout: One level  [employer]

## 2019-09-04 NOTE — H&P
[facility]  History and Physical     Radha Naylor Patient Status:  Emergency    1940 MRN [id number]   Location [facility] Attending WillAraceli MD   Hosp Day # 0 PCP Scotty Olivas MD     Chief Complai • Pericardial effusion 6/5/2014    trivial   • Pulmonary HTN ([facility] 10/10/2014   • Rheumatoid arthritis ([facility]    • S/P ICD (internal cardiac defibrillator) procedure 7/7/2016    medtronic    • S/P total knee replacement 6/18/2015    daphne [facility], both christen She reports that she does not drink alcohol or use drugs.     Family History:   Family History   Problem Relation Age of Onset   • Diabetes Father    • Heart Disease Father    • Diabetes Mother    • Diabetes Sister    • Heart Disease Sister    • Diabetes Br mouth daily.  Disp: 60 tablet Rfl: 0   Pantoprazole Sodium 40 MG Oral Tab EC Take 1 tablet (40 mg total) by mouth every morning before breakfast. Disp: 30 tablet Rfl: 0   Albuterol Sulfate  (90 Base) MCG/ACT Inhalation Aero Soln Inhale 1 puff into th 09/03/19  1720   WBC 9.4   HGB 13.1   MCV 97.1   .0   INR 4.25*       Recent Labs   Lab 09/03/19  1720   *   BUN 15   CREATSERUM 1.08*   GFRAA 57*   GFRNAA 50*   CA 8.8   ALB 3.5      K 3.5      CO2 30.0   ALKPHO 489*   *

## 2019-09-04 NOTE — PAYOR COMM NOTE
--------------  ADMISSION REVIEW     Payor: BCBS MEDICARE ADV PPO  Subscriber #:  [id number]  Authorization Number: [id number]    Admit date: 9/4/19  Admit time: [address]       Admitting Physician: Roverto Loyola MD  Attending Physician:  Jd Benton MD • COPD (chronic obstructive pulmonary disease) (HCC)    • Deep vein thrombosis (HCC)     R lower leg- 12/14/16, h/o IVC filter   • Dementia ([facility]    • Diabetes ([facility]    • Diabetes mellitus type 2, noninsulin dependent ([facility]     Type !!   • Diabetic perip PERCUTANEOUS CHOLECYSTOSTOMY DRAIN  (KHX=70016/18643)     • EGD  04/05/2019    reactive gastropathy, Dr. Rey Lyn GI   • ESOPHAGOGASTRODUODENOSCOPY (EGD) N/A 4/5/2019    Performed by Chevy Cano DO at [facility] ENDOSCOPY   • HERNIA SURGERY  3/24/2 nonpitting pretibial edema that is symmetric. No JVD noted. Lungs: Speaking full sentences without any distress or retractions. Clear to auscultation bilaterally no wheezes or rales or rhonchi. Abdomen: Normoactive bowel sounds. Soft, nondistended.  No WITH DIFFERENTIAL WITH PLATELET.   Procedure                               Abnormality         Status                     ---------                               -----------         ------                     CBC W/ DIFFERENTIAL[[id number]]          Abnormal granulomas appearing     calcifications. The pancreatic tail is obscured by bowel gas, but the portions visualized     showed no abnormality.           Numerous gallstones are present, with gallbladder wall 3 mm upper limits     of normal.  No repor Dictated by: Kirt Taylor MD on 9/03/2019 at 18:00         Approved by: Kirt Taylor MD                Ultrasound shows gallstones, and she also has an elevated LFTs.   I discussed this patient with Dr. Nicole Hernandez of gastroenterology, who also recommends multiple episodes of non bloody no projectile emesis, chills, malaise. She is also reporting abdominal pain involving her upper abdomen on left and right with radiation to her back. No diarrhea.         Current Outpatient Medications on File Prior to [facility] total) by mouth daily. Disp: 90 tablet Rfl: 3   acetaminophen 500 MG Oral Tab Take 500 mg by mouth every 6 (six) hours as needed for Pain. Disp:  Rfl:    Potassium Chloride ER 20 MEQ Oral Tab CR Take 20 mEq by mouth daily.  Disp:  Rfl:    magnesium oxide 40 Labs   Lab 09/03/19  1720   PTP 44.1*   INR 4.25*       Recent Labs   Lab 09/03/19  1720   TROP <0.045       Imaging: Imaging data reviewed in Epic. ASSESSMENT / PLAN:     1. Abdominal pain, N, V suspect acute cholecystitis/ biliary colic   1.  Elevate in dextrose 5 % 100 mL ADD-vantage     Date Action Dose Route User    9/4/2019 0646 New Bag 3.375 g Intravenous (Right Lower Arm) CirblancheciNash dennis RN      potassium chloride 40 mEq in sodium chloride 0.9% 250 mL IVPB     Date Action Dose Route User

## 2019-09-04 NOTE — PROGRESS NOTES
Pt seen, interviewed and examined  Full consult to follow  70 yr old female with recurrent cholecystitis due to gallstones  Pt has been deemed high risk for general anesthesia due to pulmonary and cardiac status and chronic anti coagulation  She has had 2

## 2019-09-04 NOTE — PROGRESS NOTES
[facility]  Progress Note     Nola Velasco Patient Status:  Inpatient    1940 MRN [id number]   [facility] 3NW-A Attending David Colindres MD   Hosp Day # 0 PCP Michele Lawrence MD     Chief Complaint: Abd pain   S:  Feel management per patient request  3. Empiric antibiotics  2. Right lower extremity cellulitis- add mupirocin  3. Diabetes type 2-hyperglycemic protocol  4. Essential hypertension-  5. Chronic A. fib on Coumadin INR > 4-continue to hold Coumadin and monitor.

## 2019-09-04 NOTE — PHYSICAL THERAPY NOTE
PHYSICAL THERAPY QUICK EVALUATION - INPATIENT    Room Number: 613/744-T  Evaluation Date: 9/4/2019  Presenting Problem: cholecystitis  Physician Order: PT Eval and Treat    Problem List  Principal Problem:    Chronic cholecystitis  Active Problems:     At total knee replacement 6/18/2015    bilat 1990s, both severe OA   • Seizure disorder [facility])     seizure when hospitalized for intracranial hemorrhage   • Sleep apnea    • Subdural hematoma ([facility] 12/01/2016    s/p L craniotomy   • Varicose vein 9/18/2015   • however self corrects with RW.  Pt's son and DIL are staying in pt's home with her. Pt reports she sleeps in a lift chair at home. SUBJECTIVE  \"I do pretty well with that. \" re: RW    OBJECTIVE  Precautions: Bed/chair alarm  Fall Risk: Standard fall r with self as turning to chair. No LOB present. Pt encouraged to ambulate multiple times per day to prevent deconditioning. Patient End of Session: Up in chair;Needs met;Call light within reach;RN aware of session/findings; Alarm set; Discussed recomme

## 2019-09-04 NOTE — PROGRESS NOTES
09/04/19 1642   Clinical Encounter Type   Visited With Patient; Health care provider  (RN Angelo Max)   Continue Visiting No   Patient Spiritual Encounters   Spiritual Interventions  responded to a Consult for advance directives.   There is an AD in th

## 2019-09-04 NOTE — RESPIRATORY THERAPY NOTE
Patient on RADHA protocol but does not wear CPAP at home. Will monitor patient with pulse ox at night.

## 2019-09-04 NOTE — PAYOR COMM NOTE
--------------  CONTINUED STAY REVIEW    Payor: [facility] MEDICARE ADV PPO  Subscriber #:  [id number]  Authorization Number: [id number]    Admit date: 9/4/19  Admit time: [address]    Admitting Physician: Lori Johnson MD  Attending Physician:  Blanca Gonzalez MD surgical candidate and refusing cholecystostomy tube  1. Appreciate surgery recommendations  2. Conservative management per patient request  3. Empiric antibiotics  2. Right lower extremity cellulitis- add mupirocin  3.  Diabetes type 2-hyperglycemic protoc Dose Route User    9/3/2019 1747 Given 4 mg Intravenous Mikey Wells RN      Pantoprazole Sodium (PROTONIX) 40 mg in Sodium Chloride 0.9 % 10 mL IV push     Date Action Dose Route User    9/4/2019 0151 Given 40 mg Intravenous Jaime Jara, RN      [place]

## 2019-09-04 NOTE — CONSULTS
[facility]                       Gastroenterology [address] [facility] Gastroenterology    Krista Vazquez Patient Status:  Inpatient    1940 MRN [id number]   [facility] 3NW-A Attending Daly Simon MD   Hosp Day # 0 PCP [facility] ([facility] 07/2016   • Gallstones    • High blood pressure    • High cholesterol    • History of syncope     cardiogenic   • Insomnia 11/25/2013   • KIDNEY STONE    • LAD (lymphadenopathy), cervical 1/18/2014    bx 2014- benign   • Migraines    • NSVT (nonsusta Dr. Jonnie Gilford   • HERNIA SURGERY     • HYSTERECTOMY     • INJECT EPIDURAL ANEST,LUMB,CONTINOUS  2012   • Rachelle  N/A 4/11/2019    Performed by Lesli Bowman MD at [address] [facility]  2016    surgery to relieve pressure on brain due to fall   • [facility] tab 150 mg 150 mg Oral Nightly   [START ON 8/9/9745] folic acid (FOLVITE) tab 1 mg 1 mg Oral Daily   [START ON 9/5/2019] Losartan Potassium (COZAAR) tab 25 mg 25 mg Oral Daily   torsemide (DEMADEX) tab 20 mg 20 mg Oral BID (Diuretic)   Insulin Aspart Pen ( solid tumor or hematologic malignancy           ENT: The patient reports no hoarseness of voice, hearing loss, sinus congestion, tinnitus           Neurologic: + seizure, stroke, dementia     PE: /75 (BP Location: Left arm)   Pulse 65   Temp 97.9 °F Lab 09/03/19  1720 09/04/19  0522   * 248*   * 269*       Imaging:   PROCEDURE:  US ABDOMEN COMPLETE (CPT=76700)     COMPARISON:  [facility] , CT ABDOMEN+PELVIS(CONTRAST ONLY)(CPT=74177), 7/16/2019, 20:01.      INDICATIONS:  RUQ abdominal pain ICD  7. Pulmonary HTN  8. Dementia   RUQ pain and LFT elevation most likely d/t acute on chronic cholecystitis. Currently CBD 7 mm and no signs of obstruction. Will defer management to general surgery recommendations   Recommendations:     1.  Continue IV a

## 2019-09-04 NOTE — CONSULTS
[facility]  Report of Consultation    Courtneyanthony Harrison Patient Status:  Inpatient    1940 MRN [id number]   [facility] 3NW-A Attending Will Gonzalez MD   Hosp Day # 0 PCP Katelynn Keita MD     Reason for Consultation:  Consult listed above as well as an appendectomy, hysterectomy, and ventral hernia repair. The patient most recently underwent an EGD and colonoscopy with Dr. Renetta poe in April 2019.     History:  Past Medical History:   Diagnosis Date   • A-fib [facility])    • Anxiety deficiency 2012     Past Surgical History:   Procedure Laterality Date   • ANGIOGRAM     • APPENDECTOMY     • CARDIAC DEFIBRILLATOR PLACEMENT     • COLONOSCOPY  04/05/2019    sessile serrated polyp,  3 Cyrus Wiggins, [facility] GI (during inpatient stay)   • COL Facility-Administered Medications:   •  Albuterol Sulfate  (90 Base) MCG/ACT inhaler 1 puff, 1 puff, Inhalation, Q4H PRN  •  amiodarone HCl (PACERONE) tab 200 mg, 200 mg, Oral, Daily  •  diltiazem (CARDIZEM CD) 24 hr cap 120 mg, 120 mg, Oral, Daily Units, 1-5 Units, Subcutaneous, TID CC and HS  •  morphINE sulfate (PF) 4 MG/ML injection 4 mg, 4 mg, Intravenous, Q30 Min PRN    Review of Systems:    Allergic/Immuno:  Review of patient's allergies complete, up to date.   Cardiovascular: See HPI  Constitu and ventral hernia repair. Extremities:  No lower extremity edema noted. Without clubbing or cyanosis. Skin: Normal texture and turgor.       Laboratory Data:  Recent Labs   Lab 09/03/19  1720 09/04/19  0522   RBC 4.14 3.76*   HGB 13.1 12.0   HCT 4 duct is normal in caliber for age, 7 mm. No acute hepatic abnormality.   Calcification within the medial segment left hepatic lobe 1.6 cm       =====  CONCLUSION:  Numerous gallstones are present, without gallbladder dilation, surrounding fluid, Esta Petit head, initial encounter     Fall     Congestive heart failure ([facility]     Acute respiratory failure with hypoxia (HCC)     Coagulopathy (HCC)     Pneumonia of both lungs due to infectious organism     Congestive heart failure, unspecified HF chronicity, unsp Sofi Guevara  9/4/2019  1:07 PM

## 2019-09-05 LAB
ALBUMIN SERPL-MCNC: 2.8 G/DL (ref 3.4–5)
ALBUMIN/GLOB SERPL: 1 {RATIO} (ref 1–2)
ALP LIVER SERPL-CCNC: 360 U/L (ref 55–142)
ALT SERPL-CCNC: 243 U/L (ref 13–56)
ANION GAP SERPL CALC-SCNC: 8 MMOL/L (ref 0–18)
AST SERPL-CCNC: 177 U/L (ref 15–37)
BILIRUB SERPL-MCNC: 1 MG/DL (ref 0.1–2)
BUN BLD-MCNC: 11 MG/DL (ref 7–18)
BUN/CREAT SERPL: 11.8 (ref 10–20)
CALCIUM BLD-MCNC: 7.9 MG/DL (ref 8.5–10.1)
CHLORIDE SERPL-SCNC: 106 MMOL/L (ref 98–112)
CO2 SERPL-SCNC: 29 MMOL/L (ref 21–32)
CREAT BLD-MCNC: 0.93 MG/DL (ref 0.55–1.02)
GLOBULIN PLAS-MCNC: 2.9 G/DL (ref 2.8–4.4)
GLUCOSE BLD-MCNC: 103 MG/DL (ref 70–99)
GLUCOSE BLD-MCNC: 83 MG/DL (ref 70–99)
GLUCOSE BLD-MCNC: 88 MG/DL (ref 70–99)
GLUCOSE BLD-MCNC: 90 MG/DL (ref 70–99)
GLUCOSE BLD-MCNC: 92 MG/DL (ref 70–99)
INR BLD: 3.56 (ref 0.9–1.1)
M PROTEIN MFR SERPL ELPH: 5.7 G/DL (ref 6.4–8.2)
OSMOLALITY SERPL CALC.SUM OF ELEC: 295 MOSM/KG (ref 275–295)
POTASSIUM SERPL-SCNC: 3.1 MMOL/L (ref 3.5–5.1)
POTASSIUM SERPL-SCNC: 4.2 MMOL/L (ref 3.5–5.1)
PSA SERPL DL<=0.01 NG/ML-MCNC: 38.2 SECONDS (ref 12.5–14.7)
SODIUM SERPL-SCNC: 143 MMOL/L (ref 136–145)

## 2019-09-05 PROCEDURE — 99232 SBSQ HOSP IP/OBS MODERATE 35: CPT | Performed by: HOSPITALIST

## 2019-09-05 PROCEDURE — 99232 SBSQ HOSP IP/OBS MODERATE 35: CPT | Performed by: SURGERY

## 2019-09-05 RX ORDER — POTASSIUM CHLORIDE 20 MEQ/1
40 TABLET, EXTENDED RELEASE ORAL EVERY 4 HOURS
Status: COMPLETED | OUTPATIENT
Start: 2019-09-05 | End: 2019-09-05

## 2019-09-05 NOTE — CONSULTS
[facility] Dosing Service  Warfarin (Coumadin) Initial Dosing    Leonardo Gloria is a 70year old female for whom pharmacy has been consulted to dose warfarin (COUMADIN) for Afib  by Dr. Ruddy Harrell. Based on this indication, goal INR is 2-3.     Surgery Date (i

## 2019-09-05 NOTE — PAYOR COMM NOTE
--------------  CONTINUED STAY REVIEW    Payor: [employer] MEDICARE ADV PPO  Subscriber #:  [id number]  Authorization Number: [id number]    Admit date: 9/4/19  Admit time: [address]    Admitting Physician: Richi Reed MD  Attending Physician:  Pranay Lopez MD Lab 09/04/19  0522   RBC 3.76*   HGB 12.0   HCT 36.8   MCV 97.9   MCH 31.9   MCHC 32.6   RDW 15.4*   NEPRELIM 4.32   WBC 6.4   .0*              Recent Labs   Lab 09/03/19  1720 09/04/19  0522   * 248*   * 269*        Imaging:   PROCE Maura Matt MD         Impression:      1. RUQ pain  2. Elevated LFTs  3. Chronic cholecystitis   4. Mild lipase elevation   5. AF on chronic Coumadin  6. HF s/p ICD  7. Pulmonary HTN  8.  Dementia   RUQ pain and LFT elevation most likely d/t acute on c temperature 97.4 °F (36.3 °C), temperature source Oral, resp. rate 18, height 66\", weight 210 lb, SpO2 98 %. Lungs: No respiratory distress. Cardiac: Regular rate and rhythm.    Abdomen:  Soft, non distended, mildly tender in epigastrium, with no rebound ([facility]     Right leg DVT (HCC)     Atrial fibrillation (HCC)     Anxiety     Acute on chronic systolic congestive heart failure (HCC)     Type 2 diabetes mellitus without complication, without long-term current use of insulin (HCC)     Autonomic dysfunction Nicolás  9/5/2019  8:08 AM               Electronically signed by JIGAR Warren at 9/5/2019  8:11 AM             MEDICATIONS ADMINISTERED IN LAST 1 DAY:  acetaminophen (TYLENOL) tab 650 mg     Date Action Dose Route User    9/4/2019 1739 Given 650 Intravenous Tiffanie Munoz RN      torsemide [facility]) tab 20 mg     Date Action Dose Route User    9/5/2019 0845 Given 20 mg Oral Janet Coffman RN    9/4/2019 1736 Given 20 mg Oral Tiffanie Munoz RN      Metoprolol Succinate ER (Toprol XL) 24 hr tab

## 2019-09-05 NOTE — PROGRESS NOTES
[facility]  Progress Note     Brendan Mcfarlane Patient Status:  Inpatient    1940 MRN [id number]   [facility] 3NW-A Attending Harper Menchaca MD   Hosp Day # 1 PCP Corie Calloway MD     Chief Complaint: Abd pain   S:  Feel surgical candidate and refusing cholecystostomy tube  1. Appreciate surgery recommendations  2. Conservative management per patient request  3. Empiric antibiotics  2. Right lower extremity cellulitis- add mupirocin. Empiric IV abx for above.    3. Diabete

## 2019-09-05 NOTE — PROGRESS NOTES
[facility]  Progress Note    Isa Ortiz Patient Status:  Inpatient    1940 MRN [id number]   [facility] 3NW-A Attending Chrystal Oppenheim, MD   Hosp Day # 1 PCP Armando Iraheta MD     Subjective:  Pt feeling well, pain improve (benign paroxysmal positional vertigo)     Arthritis of shoulder region, left, degenerative     Essential hypertension     ICD (implantable cardioverter-defibrillator) in place     Neuropathy     Plantar fasciitis     Anticoagulated on Coumadin     Chronic obstruction      Chronic cholecystitis  LFTs improving  Afebrile  Anticoagulated, INR 3.56    Plan:  1. May try full liquid diet today  2. Continue antibiotics  3. Continue non-operative management   4.  Ambulate    My total face time with this patient was

## 2019-09-06 LAB
ALBUMIN SERPL-MCNC: 2.9 G/DL (ref 3.4–5)
ALBUMIN/GLOB SERPL: 0.9 {RATIO} (ref 1–2)
ALP LIVER SERPL-CCNC: 359 U/L (ref 55–142)
ALT SERPL-CCNC: 233 U/L (ref 13–56)
ANION GAP SERPL CALC-SCNC: 7 MMOL/L (ref 0–18)
AST SERPL-CCNC: 121 U/L (ref 15–37)
BILIRUB SERPL-MCNC: 1 MG/DL (ref 0.1–2)
BUN BLD-MCNC: 10 MG/DL (ref 7–18)
BUN/CREAT SERPL: 10.9 (ref 10–20)
CALCIUM BLD-MCNC: 8.4 MG/DL (ref 8.5–10.1)
CHLORIDE SERPL-SCNC: 108 MMOL/L (ref 98–112)
CO2 SERPL-SCNC: 28 MMOL/L (ref 21–32)
CREAT BLD-MCNC: 0.92 MG/DL (ref 0.55–1.02)
GLOBULIN PLAS-MCNC: 3.2 G/DL (ref 2.8–4.4)
GLUCOSE BLD-MCNC: 119 MG/DL (ref 70–99)
GLUCOSE BLD-MCNC: 130 MG/DL (ref 70–99)
GLUCOSE BLD-MCNC: 84 MG/DL (ref 70–99)
GLUCOSE BLD-MCNC: 90 MG/DL (ref 70–99)
GLUCOSE BLD-MCNC: 97 MG/DL (ref 70–99)
INR BLD: 2.88 (ref 0.9–1.1)
M PROTEIN MFR SERPL ELPH: 6.1 G/DL (ref 6.4–8.2)
OSMOLALITY SERPL CALC.SUM OF ELEC: 295 MOSM/KG (ref 275–295)
POTASSIUM SERPL-SCNC: 3.8 MMOL/L (ref 3.5–5.1)
PSA SERPL DL<=0.01 NG/ML-MCNC: 32.1 SECONDS (ref 12.5–14.7)
SODIUM SERPL-SCNC: 143 MMOL/L (ref 136–145)

## 2019-09-06 PROCEDURE — 99232 SBSQ HOSP IP/OBS MODERATE 35: CPT | Performed by: SURGERY

## 2019-09-06 PROCEDURE — 99232 SBSQ HOSP IP/OBS MODERATE 35: CPT | Performed by: INTERNAL MEDICINE

## 2019-09-06 RX ORDER — POTASSIUM CHLORIDE 1.5 G/1.77G
40 POWDER, FOR SOLUTION ORAL ONCE
Status: DISCONTINUED | OUTPATIENT
Start: 2019-09-06 | End: 2019-09-06 | Stop reason: SDUPTHER

## 2019-09-06 RX ORDER — POTASSIUM CHLORIDE 20 MEQ/1
40 TABLET, EXTENDED RELEASE ORAL ONCE
Status: COMPLETED | OUTPATIENT
Start: 2019-09-06 | End: 2019-09-06

## 2019-09-06 RX ORDER — WARFARIN SODIUM 2 MG/1
2 TABLET ORAL
Status: COMPLETED | OUTPATIENT
Start: 2019-09-06 | End: 2019-09-06

## 2019-09-06 NOTE — PAYOR COMM NOTE
--------------  CONTINUED STAY REVIEW    Payor: [employer]BS MEDICARE ADV PPO  Subscriber #:  [id number]  Authorization Number: [id number]    Admit date: 9/4/19  Admit time: 0014    Admitting Physician: Roshni Bashir MD  Attending Physician:  Salazar Caballero DO RADHA (obstructive sleep apnea)     Depression     Insomnia     Atypical lymphoproliferative disorder (HCC)     Chronic obstructive pulmonary disease (HCC)     Gallstones     Hypokalemia     Hearing loss     Pulmonary HTN (HCC)     BPPV (benign paroxysmal po respiratory failure with hypoxia (HCC)     INR (international normal ratio) abnormal     Chronic cholecystitis     Cellulitis of right lower extremity     Transaminitis     Calculus of gallbladder with acute on chronic cholecystitis without obstruction    Intravenous Madeleine Mehta RN    9/5/2019 2230 New Bag 3.375 g Intravenous Ham Guardado RN    9/5/2019 1521 New Bag 3.375 g Intravenous Jenna Gordon RN      Potassium Chloride ER (K-DUR M20) CR tab 40 mEq     Date Action Dose Route User    9/5/2

## 2019-09-06 NOTE — CM/SW NOTE
Call from [facility] SHAUNA MENSAH RN case mgr with [employer]. She is available to assist with post acute discharge plans if needed. Her contact is [phone].     Mayur Cedeno RN, [address] [place] [address]  Extension [phone]

## 2019-09-06 NOTE — PLAN OF CARE
Problem: PAIN - ADULT  Goal: Verbalizes/displays adequate comfort level or patient's stated pain goal  Description  INTERVENTIONS:  - Encourage pt to monitor pain and request assistance  - Assess pain using appropriate pain scale  - Administer analgesics appropriate  - Identify discharge learning needs (meds, wound care, etc)  - Arrange for interpreters to assist at discharge as needed  - Consider post-discharge preferences of patient/family/discharge partner  - Complete POLST form as appropriate  - Assess PATIENT. WILL MONITOR.

## 2019-09-06 NOTE — PROGRESS NOTES
[facility]  Progress Note    Isa Ortiz Patient Status:  Inpatient    1940 MRN [id number]   [facility] 3NW-A Attending Arielle Kidd, DO   Hosp Day # 2 PCP Armando Iraheta MD     Subjective:  Patient states that she is tiny obstructive pulmonary disease (HCC)     Gallstones     Hypokalemia     Hearing loss     Pulmonary HTN (HCC)     BPPV (benign paroxysmal positional vertigo)     Arthritis of shoulder region, left, degenerative     Essential hypertension     ICD (implantable of right lower extremity     Transaminitis     Calculus of gallbladder with acute on chronic cholecystitis without obstruction      Impression: Resolving biliary colic  Plan: Continue to advance diet May discharge at discretion of medical service.   I spent

## 2019-09-06 NOTE — PLAN OF CARE
Assumed care at 1930  A&O x 4  Lungs clear diminished  HR afib/ a flutter  on tele  Meds given per orders  Tylenol given for HA 9/10 x1  Urine output adequate. Up stand by to bathroom.   Sleeping in chair with alarm overnight  Pt needs attended to   Call li

## 2019-09-06 NOTE — PROGRESS NOTES
[facility]  Progress Note     Michaela Olivo Patient Status:  Inpatient    1940 MRN [id number]   [facility] 3NW-A Attending Radha Hanley, DO   Hosp Day # 2 PCP Malissa Damon MD     Chief Complaint: Abdominal Pain    S: P --  28.0   ALKPHO 413* 360*  --  359*   * 177*  --  121*   * 243*  --  233*   BILT 1.1 1.0  --  1.0   TP 5.9* 5.7*  --  6.1*       Estimated Creatinine Clearance: 47.9 mL/min (based on SCr of 0.92 mg/dL).     Recent Labs   Lab 09/04/19  0522 0

## 2019-09-06 NOTE — CONSULTS
Pharmacy Dosing Service: Warfarin (Coumadin)  Petros Rocha is a 70year old female for whom pharmacy has been dosing warfarin (Coumadin).  Goal INR is 2-3    Recent Labs   Lab 09/03/19  1720 09/03/19  2253 09/04/19  0522 09/05/19  0506 09/06/19  0543   IN

## 2019-09-07 VITALS
WEIGHT: 210.81 LBS | RESPIRATION RATE: 18 BRPM | HEART RATE: 64 BPM | SYSTOLIC BLOOD PRESSURE: 159 MMHG | OXYGEN SATURATION: 94 % | DIASTOLIC BLOOD PRESSURE: 66 MMHG | BODY MASS INDEX: 33.88 KG/M2 | TEMPERATURE: 98 F | HEIGHT: 66 IN

## 2019-09-07 LAB
ALBUMIN SERPL-MCNC: 2.7 G/DL (ref 3.4–5)
ALP LIVER SERPL-CCNC: 311 U/L (ref 55–142)
ALT SERPL-CCNC: 188 U/L (ref 13–56)
AST SERPL-CCNC: 79 U/L (ref 15–37)
BILIRUB DIRECT SERPL-MCNC: 0.2 MG/DL (ref 0–0.2)
BILIRUB SERPL-MCNC: 0.8 MG/DL (ref 0.1–2)
GLUCOSE BLD-MCNC: 114 MG/DL (ref 70–99)
GLUCOSE BLD-MCNC: 87 MG/DL (ref 70–99)
GLUCOSE BLD-MCNC: 99 MG/DL (ref 70–99)
GLUCOSE BLD-MCNC: 99 MG/DL (ref 70–99)
INR BLD: 1.85 (ref 0.9–1.1)
M PROTEIN MFR SERPL ELPH: 6 G/DL (ref 6.4–8.2)
POTASSIUM SERPL-SCNC: 3.7 MMOL/L (ref 3.5–5.1)
PSA SERPL DL<=0.01 NG/ML-MCNC: 22.4 SECONDS (ref 12.5–14.7)

## 2019-09-07 PROCEDURE — 99232 SBSQ HOSP IP/OBS MODERATE 35: CPT | Performed by: SURGERY

## 2019-09-07 PROCEDURE — 99239 HOSP IP/OBS DSCHRG MGMT >30: CPT | Performed by: INTERNAL MEDICINE

## 2019-09-07 RX ORDER — URSODIOL 300 MG/1
300 CAPSULE ORAL 2 TIMES DAILY
Qty: 60 CAPSULE | Refills: 0 | Status: SHIPPED | OUTPATIENT
Start: 2019-09-07 | End: 2019-10-08

## 2019-09-07 RX ORDER — POTASSIUM CHLORIDE 20 MEQ/1
40 TABLET, EXTENDED RELEASE ORAL ONCE
Status: COMPLETED | OUTPATIENT
Start: 2019-09-07 | End: 2019-09-07

## 2019-09-07 RX ORDER — LOSARTAN POTASSIUM 25 MG/1
25 TABLET ORAL 2 TIMES DAILY
Status: DISCONTINUED | OUTPATIENT
Start: 2019-09-07 | End: 2019-09-08

## 2019-09-07 RX ORDER — WARFARIN SODIUM 5 MG/1
5 TABLET ORAL SEE ADMIN INSTRUCTIONS
Status: DISCONTINUED | OUTPATIENT
Start: 2019-09-07 | End: 2019-09-07

## 2019-09-07 RX ORDER — AMOXICILLIN AND CLAVULANATE POTASSIUM 875; 125 MG/1; MG/1
1 TABLET, FILM COATED ORAL 2 TIMES DAILY
Qty: 14 TABLET | Refills: 0 | Status: SHIPPED | OUTPATIENT
Start: 2019-09-07 | End: 2019-10-11 | Stop reason: ALTCHOICE

## 2019-09-07 RX ORDER — WARFARIN SODIUM 2 MG/1
4 TABLET ORAL
Status: COMPLETED | OUTPATIENT
Start: 2019-09-07 | End: 2019-09-07

## 2019-09-07 RX ORDER — WARFARIN SODIUM 2.5 MG/1
2.5 TABLET ORAL SEE ADMIN INSTRUCTIONS
Status: DISCONTINUED | OUTPATIENT
Start: 2019-09-07 | End: 2019-09-07

## 2019-09-07 NOTE — PLAN OF CARE
Problem: PAIN - ADULT  Goal: Verbalizes/displays adequate comfort level or patient's stated pain goal  Description  INTERVENTIONS:  - Encourage pt to monitor pain and request assistance  - Assess pain using appropriate pain scale  - Administer analgesics mobility/gait  Description  Interventions:  - Assess patient's functional ability and stability  - Promote increasing activity/tolerance for mobility and gait  - Educate and engage patient/family in tolerated activity level and precautions  - Ambulate 3+ t

## 2019-09-07 NOTE — DISCHARGE SUMMARY
[facility] [place] HOSPITALIST  DISCHARGE SUMMARY     Sheela Velasquez Patient Status:  Inpatient    1940 MRN [id number]   [facility] 3NW-A Attending Edmar Boothe, DO   Hosp Day # 3 PCP Yovana Funk MD     Date of Admission: 9/3/2019  Date o next week    Consultants:  • GS    Discharge Medication List:     Discharge Medications      START taking these medications      Instructions Prescription details   Amoxicillin-Pot Clavulanate 875-125 MG Tabs  Commonly known as:  AUGMENTIN  Notes to jerry Quantity:  30 tablet  Refills:  1     folic acid 1 MG Tabs  Commonly known as:  FOLVITE      Take 1 tablet (1 mg total) by mouth daily.    Quantity:  60 tablet  Refills:  0     magnesium oxide 400 MG Tabs  Commonly known as:  MAG-OX      Take 1 tablet ([address] [facility] [address]    Phone:  [phone]   · Amoxicillin-Pot Clavulanate 875-125 MG Tabs  · ursodiol 300 MG Caps         [place]: [place]    Follow-up appointment:   Henrique Wright

## 2019-09-07 NOTE — PROGRESS NOTES
200: [address] DIANA TO INFORM HIM OF PATIENTS DAUGHTER WISHING TO SPEAK WITH HIM AND GAVE HER PHONE NUMBER. DR. Naima Silva CALLED AND SPOKE WITH PATIENT'S DAUGHTER.

## 2019-09-07 NOTE — CONSULTS
Pharmacy Dosing Service: Warfarin (Coumadin)    Leonardo Gloria is a 70year old female for whom pharmacy has been dosing warfarin (Coumadin).  Goal INR is 2-3    Recent Labs   Lab 09/03/19  2253 09/04/19  0522 09/05/19  0506 09/06/19  0543 09/07/19  5523

## 2019-09-07 NOTE — PLAN OF CARE
Problem: PAIN - ADULT  Goal: Verbalizes/displays adequate comfort level or patient's stated pain goal  Description  INTERVENTIONS:  - Encourage pt to monitor pain and request assistance  - Assess pain using appropriate pain scale  - Administer analgesics resources and transportation as appropriate  - Identify discharge learning needs (meds, wound care, etc)  - Arrange for interpreters to assist at discharge as needed  - Consider post-discharge preferences of patient/family/discharge partner  - Complete NIGHAT AND TELEMETRY REMOVED. PATIENT AWAITING RIDE FOR DISCHARGE. WILL HAND OFF TO NIGHT SHIFT RN.

## 2019-09-07 NOTE — PROGRESS NOTES
[facility]  Progress Note    Michaela Olivo Patient Status:  Inpatient    1940 MRN [id number]   [facility] 3NW-A Attending Radha Hanley,    Hosp Day # 3 PCP Malissa Damon MD     Subjective:  Patient continues to deny abdo cardioverter-defibrillator) in place     Neuropathy     Plantar fasciitis     Anticoagulated on Coumadin     Chronic systolic congestive heart failure (HCC)     Primary osteoarthritis of both knees     Mild persistent asthma without complication     Pulmon or colic versus cholecystitis. If she should rebound and have increasing abdominal pain then she would require cholecystostomy tube. Patient will follow-up with Dr. Kulwinder Kohli on outpatient basis. I spent  34  minutes face to face with the patient.  More than

## 2019-09-08 NOTE — PROGRESS NOTES
WRITER OF NOTED DISCUSSED DISCHARGE ORDERS. GAVE PT DISCHARGE PAPERWORK. ALL QUESTIONS ANSWERED. PT ESCORTED BY WRITER OF NOTE TO [facility] VIA WHEELCHAIR IN STABLE CONDITON.

## 2019-09-08 NOTE — PROGRESS NOTES
PT RESTING IN RECLINER, EASY NON LABORED BREATHING ON RA, VS WNL. UP WITH SB ASSIST. PT TOLERATING REGULAR DIET. RLE DRESSING IN PLACE. C/D/I. IV HAS BEEN D/C, PT DENIES ANY PAIN OR NAUSEA. PM MMEDS ADMIN PLAN OF CARE DISCUSSED, ALL QUESTIONS ANSWERED.  INS

## 2019-09-09 ENCOUNTER — PATIENT OUTREACH (OUTPATIENT)
Dept: CASE MANAGEMENT | Age: 79
End: 2019-09-09

## 2019-09-09 DIAGNOSIS — Z02.9 ENCOUNTERS FOR ADMINISTRATIVE PURPOSE: ICD-10-CM

## 2019-09-10 ENCOUNTER — TELEPHONE (OUTPATIENT)
Dept: FAMILY MEDICINE CLINIC | Facility: CLINIC | Age: 79
End: 2019-09-10

## 2019-09-10 NOTE — PAYOR COMM NOTE
--------------  DISCHARGE REVIEW    Payor: [employer] MEDICARE ADV PPO  Subscriber #:  [id number]  Authorization Number: [id number]    Admit date: 9/4/19  Admit time:  0014  Discharge Date: 9/7/2019 11:39 PM     Admitting Physician: Abigail Spain MD  Attendin Cholecystitis, evaluated by surgery and deemed poor surgical candidate. It was recommended that she undergo IR cholecystostomy but patient and daughter refused. She was instead treated conservatively with NPO and Zosyn. She improved.  She is being discharge needed for Wheezing. Quantity:  1 Inhaler  Refills:  0     amiodarone HCl 200 MG Tabs  Commonly known as:  PACERONE      Take 1 tablet (200 mg total) by mouth daily.    Refills:  0     Calcium Carbonate-Vitamin D 600-400 MG-UNIT Tabs      Take 1 tablet by daily.    Refills:  0     torsemide 20 MG Tabs  Commonly known as:  DEMADEX      TAKE 1 TABLET(20 MG) BY MOUTH TWICE DAILY   Quantity:  180 tablet  Refills:  0     Warfarin Sodium 5 MG Tabs  Commonly known as:  COUMADIN      Take 5 mg by mouth See Admin Ins REVIEWER COMMENTS

## 2019-09-10 NOTE — TELEPHONE ENCOUNTER
You are right, please encourage her to have a hospital f/u. Inform her as she's been in the hospital 6 times since last year it is a good idea to get caught up on all that has happened.     Next, offer her an Annual Wellness Visit for a later time (these ar

## 2019-09-10 NOTE — PROGRESS NOTES
Initial Post Discharge Follow Up   Discharge Date: 9/7/19  Contact Date: 9/9/2019    Consent Verification:  Assessment Completed With: Other: wally Grubbs Permission received per patient?  written  HIPAA Verified?   Yes    Discharge Dx:   Chronic Rfl: 0   Dicyclomine HCl 10 MG Oral Cap Take 1 capsule (10 mg total) by mouth 2 (two) times daily as needed.  Disp: 30 capsule Rfl: 0   METFORMIN  MG Oral Tab TAKE 1 TABLET BY MOUTH TWO TIMES A DAY WITH MEALS Disp: 180 tablet Rfl: 0   ferrous sulfate Calcium Carbonate-Vitamin D 600-400 MG-UNIT Oral Tab Take 1 tablet by mouth daily.  Disp:  Rfl:      • When you were leaving the hospital were any medication changes discussed with you? yes  • If you were prescribed a new medication:   o Was the new medic NCM Reviewed/scheduled/rescheduled PCP TCM/HFU appointment with pt:  ARIEL sent TE to PCP staff to follow up on appt      Have you made all of your follow up appointments? no    Is there any reason as to why you cannot make your appointments?    Patient d

## 2019-09-10 NOTE — TELEPHONE ENCOUNTER
S/w Mickie Early. Pt was dc'd 9/7/2019, chronic cholecystitis. She states this is an ongoing issue. ARIEL attempted to schedule TCM HFU but Anish Mercado stated that at this point, pt does not want to be going in and out of doctor visits.  She states that the patient

## 2019-09-10 NOTE — TELEPHONE ENCOUNTER
See attached phone message from Chronic Care RN. Pt and pt's daughter are requesting scheduling a Medicare Annual physical NOT a HFU, please advise. Last OV 9/28/18 Dr. Errol Marin Physical.  6 Hospitalizations since 9/28/18 to present.      D/C Summary pa

## 2019-09-11 ENCOUNTER — ANTI-COAG (OUTPATIENT)
Dept: CARDIOLOGY | Age: 79
End: 2019-09-11

## 2019-09-11 DIAGNOSIS — I48.91 ATRIAL FIBRILLATION, UNSPECIFIED TYPE (CMD): ICD-10-CM

## 2019-09-12 NOTE — TELEPHONE ENCOUNTER
Jeri absolutely declines scheduling HFU, requesting ONLY to schedule Annual Physical.     Daughter reports \"Mother just wants to LEFT ALONE\". Every doctor telling her to not be invasive with her mother.      Daughter hoping to do the Annual on a Saturday

## 2019-09-12 NOTE — TELEPHONE ENCOUNTER
Per Dr. Artemio Prater: schedule Annual Physical.  Call Daughter, Kendra Tucker, to schedule: [phone]. Last Annual Physical 9/28/2018 Dr. Artemio Prater. Thank you.

## 2019-09-13 ENCOUNTER — HOSPITAL ENCOUNTER (OUTPATIENT)
Dept: LAB | Facility: HOSPITAL | Age: 79
Discharge: HOME | End: 2019-09-13
Attending: INTERNAL MEDICINE
Payer: MEDICARE

## 2019-09-13 ENCOUNTER — ANTI-COAG (OUTPATIENT)
Dept: CARDIOLOGY | Age: 79
End: 2019-09-13

## 2019-09-13 ENCOUNTER — TELEPHONE (OUTPATIENT)
Dept: FAMILY MEDICINE CLINIC | Facility: CLINIC | Age: 79
End: 2019-09-13

## 2019-09-13 DIAGNOSIS — I48.91 ATRIAL FIBRILLATION, UNSPECIFIED TYPE (CMD): ICD-10-CM

## 2019-09-13 DIAGNOSIS — I48.91 ATRIAL FIBRILLATION (HCC): ICD-10-CM

## 2019-09-13 LAB
INR PPP: 2.1
POC INR: 2.1 (ref 0.8–1.3)

## 2019-09-13 PROCEDURE — 85610 PROTHROMBIN TIME: CPT

## 2019-09-13 NOTE — TELEPHONE ENCOUNTER
There is not a generic for ursodiol. I tried seeing if the caps or tabs made a difference and even checked for cost with Good Rx coupon and was similar. Sorry.

## 2019-09-13 NOTE — TELEPHONE ENCOUNTER
Informed daughter, Suresh Roldan, of Dr. Carl Gutiérrez' research and she expressed understanding and agreement and states the medication is worth it to avoid mother having a colostomy. Confirmed upcoming appointment. Task completed.

## 2019-09-13 NOTE — TELEPHONE ENCOUNTER
Pts daughter states that the Ursodiol 300 mg is working well for her mom. She would like to know if there is a generic option for this medication, as it costs $1 per pill.  Please call daughter with possible options, and then send new prescription to the lo

## 2019-09-13 NOTE — TELEPHONE ENCOUNTER
Future Appointments   Date Time Provider Sara Boo   10/11/2019  1:40 PM Collins Self MD [facility] [address] [facility]   10/29/2019 12:15 PM Apn, [facility] Heart [address] [facility]   10/29/2019 12:45 PM [facility] CARD ECHO RM 2 ECARD ECHO [facility] AT [facility]

## 2019-09-13 NOTE — TELEPHONE ENCOUNTER
See attached message.   RX 9/7/19 per Hospitalist, pt started on this medication at last hospitalization:  Date of Admission: 9/3/2019  Date of Discharge: 9/7/2019  Discharge Disposition: Home or Self Care     Admitting Diagnosis:   Chronic cholecystitis [K

## 2019-09-30 NOTE — H&P
"S-(situation): Pt here for pessary with OB/Gyn, and has questions about her knees.    B-(background): 9/4/19 appt with Dr. Camacho and had Kenalog injection of both knees done.     A-(assessment):   Had injections 4/24/19 and they helped, had injections done again 9/4/19, helped for \"maybe about a week or so, not too long.\"  She likes to bowl and she's afraid she might fall, and the steps are \"killers, otherwise it's not too bad walking.\"  She takes Tylenol Arthritis, 650mg, one tab po every 8 hours, she \"thinks so\" that this helps. She still takes a baby ASA every day.   Pt says when she saw Dr. Camacho he said: \"If they don't work then we have to try something else.\" She also thinks she got a call from the X-ray dept on Friday, and was wondering what is she supposed to have X-rayed?     R-(recommendations): She is questioning Brett Byrne, as this helped her brother in law. Or does she need X-rays? Wanting to know what her next step/options are.     Advise,     Mili GUNTER RN    " [facility]  History and Physical     Ednicko Coleman Patient Status:  Emergency    1940 MRN [id number]   Location [address] [facility] Attending Aravind Montero MD   Hosp Day # 0 PCP Dorrie Gitelman, MD     Chief Complaint: tolerate CPAP machine- uses oxygen periodically    • Other and unspecified hyperlipidemia    • Pericardial effusion 6/5/2014    trivial   • Pulmonary HTN ([facility] [address] 10/10/2014   • Rheumatoid arthritis ([facility] [address]    • S/P ICD (internal cardiac defibrillator) procedur Disp: 20 tablet Rfl: 0   torsemide 20 MG Oral Tab Take 1 tablet (20 mg total) by mouth daily. Disp: 30 tablet Rfl: 6   Potassium Chloride ER 20 MEQ Oral Tab CR Take 20 mEq by mouth daily.  Disp:  Rfl:    Sertraline HCl 25 MG Oral Tab Take 1 tablet (25 mg to extremities. Extremities: No edema or cyanosis. Integument: No rashes or lesions. Psychiatric: Appropriate mood and affect.       Diagnostic Data:      Labs:  Recent Labs   Lab  07/25/18   1402  07/26/18   0824  07/31/18   1829   WBC  10.5  8.4  14.0*

## 2019-10-08 ENCOUNTER — TELEPHONE (OUTPATIENT)
Dept: FAMILY MEDICINE CLINIC | Facility: CLINIC | Age: 79
End: 2019-10-08

## 2019-10-08 RX ORDER — URSODIOL 300 MG/1
300 CAPSULE ORAL 2 TIMES DAILY
Qty: 180 CAPSULE | Refills: 3 | Status: SHIPPED | OUTPATIENT
Start: 2019-10-08 | End: 2020-12-03

## 2019-10-08 NOTE — TELEPHONE ENCOUNTER
Informed Jeri of Dr. Jesús Cortez' input and Jeri expressed understanding and thanks. Task completed.

## 2019-10-08 NOTE — TELEPHONE ENCOUNTER
Pt has scheduled 9-11-19 Annual Physical with Dr. Carl Gutiérrez. Spoke to pt's daughter, Suresh Roldan, as her  is bringing pt to the appointment. Suresh Roldan is requesting pt continue with medications that are working well, not force pt to do any extensive testing.  Juan Mehta

## 2019-10-08 NOTE — TELEPHONE ENCOUNTER
Please let beverly know I erxed the ursodiol and will keep the items mentioned when her mother comes for her appt this Friday. Thank her for the heads up and wanting the best for her mom!

## 2019-10-08 NOTE — TELEPHONE ENCOUNTER
Future Appointments   Date Time Provider Sara Boo   10/11/2019  1:40 PM Torin Carpenter MD [facility] [address]     Daughter would like to speak to either Lorena Bee or Dr. Ame Jacobo regarding pt.  Daughter, Iva Meyer, is not able to bring pt to her appt

## 2019-10-09 ENCOUNTER — TELEPHONE (OUTPATIENT)
Dept: FAMILY MEDICINE CLINIC | Facility: CLINIC | Age: 79
End: 2019-10-09

## 2019-10-09 NOTE — TELEPHONE ENCOUNTER
Called [facility], spoke to [place], gave DX Chronic Cholecystitis K81.1, recent hospitalization Z92.89. Review information, presently at a Tier 3, will send letter with decision. See first TE 10-9-19. Updated Dr. Reese Boone.

## 2019-10-09 NOTE — TELEPHONE ENCOUNTER
Received fax from [address] [place] [address] stating \"denied coverage or payment under your Medicare Part D Benefit for Ursodiol\". \"This request for coverage was based on our Tier Exception criteria. It cannot be approved at this time.  This decision is abas

## 2019-10-09 NOTE — TELEPHONE ENCOUNTER
Reviewed per Dr. Barahona Printers: inform daughter, daughter can consult [facility] GI MD for alternative therapy. Daughter expressed understanding and agreement. Task completed.

## 2019-10-11 ENCOUNTER — OFFICE VISIT (OUTPATIENT)
Dept: FAMILY MEDICINE CLINIC | Facility: CLINIC | Age: 79
End: 2019-10-11
Payer: MEDICARE

## 2019-10-11 ENCOUNTER — APPOINTMENT (OUTPATIENT)
Dept: LAB | Age: 79
End: 2019-10-11
Attending: FAMILY MEDICINE
Payer: MEDICARE

## 2019-10-11 VITALS
SYSTOLIC BLOOD PRESSURE: 130 MMHG | RESPIRATION RATE: 16 BRPM | TEMPERATURE: 98 F | BODY MASS INDEX: 36.7 KG/M2 | WEIGHT: 202 LBS | DIASTOLIC BLOOD PRESSURE: 70 MMHG | HEART RATE: 64 BPM | HEIGHT: 62.25 IN

## 2019-10-11 DIAGNOSIS — Z95.818 S/P MITRAL VALVE CLIP IMPLANTATION: ICD-10-CM

## 2019-10-11 DIAGNOSIS — Z86.718 HISTORY OF DEEP VENOUS THROMBOSIS (DVT) OF DISTAL VEIN OF RIGHT LOWER EXTREMITY: ICD-10-CM

## 2019-10-11 DIAGNOSIS — G90.9 AUTONOMIC DYSFUNCTION: ICD-10-CM

## 2019-10-11 DIAGNOSIS — D47.9 ATYPICAL LYMPHOPROLIFERATIVE DISORDER (HCC): ICD-10-CM

## 2019-10-11 DIAGNOSIS — Z86.79 H/O VENTRICULAR TACHYCARDIA: ICD-10-CM

## 2019-10-11 DIAGNOSIS — F32.5 MAJOR DEPRESSIVE DISORDER WITH SINGLE EPISODE, IN REMISSION (HCC): ICD-10-CM

## 2019-10-11 DIAGNOSIS — E55.9 VITAMIN D DEFICIENCY: ICD-10-CM

## 2019-10-11 DIAGNOSIS — I10 ESSENTIAL HYPERTENSION: ICD-10-CM

## 2019-10-11 DIAGNOSIS — I26.99 OTHER PULMONARY EMBOLISM WITHOUT ACUTE COR PULMONALE, UNSPECIFIED CHRONICITY (HCC): ICD-10-CM

## 2019-10-11 DIAGNOSIS — J45.30 MILD PERSISTENT ASTHMA WITHOUT COMPLICATION: ICD-10-CM

## 2019-10-11 DIAGNOSIS — H91.90 HEARING LOSS, UNSPECIFIED HEARING LOSS TYPE, UNSPECIFIED LATERALITY: ICD-10-CM

## 2019-10-11 DIAGNOSIS — M19.012 OSTEOARTHRITIS OF LEFT SHOULDER, UNSPECIFIED OSTEOARTHRITIS TYPE: ICD-10-CM

## 2019-10-11 DIAGNOSIS — R74.01 TRANSAMINITIS: ICD-10-CM

## 2019-10-11 DIAGNOSIS — I27.20 PULMONARY HTN (HCC): ICD-10-CM

## 2019-10-11 DIAGNOSIS — Z98.890 S/P MITRAL VALVE CLIP IMPLANTATION: ICD-10-CM

## 2019-10-11 DIAGNOSIS — M17.0 PRIMARY OSTEOARTHRITIS OF BOTH KNEES: ICD-10-CM

## 2019-10-11 DIAGNOSIS — Z86.711 HISTORY OF PULMONARY EMBOLISM: ICD-10-CM

## 2019-10-11 DIAGNOSIS — K81.1 CHRONIC CHOLECYSTITIS: ICD-10-CM

## 2019-10-11 DIAGNOSIS — E11.8 TYPE 2 DIABETES MELLITUS WITH COMPLICATION (HCC): ICD-10-CM

## 2019-10-11 DIAGNOSIS — G47.09 OTHER INSOMNIA: ICD-10-CM

## 2019-10-11 DIAGNOSIS — F41.9 ANXIETY: ICD-10-CM

## 2019-10-11 DIAGNOSIS — I50.22 CHRONIC SYSTOLIC CONGESTIVE HEART FAILURE (HCC): ICD-10-CM

## 2019-10-11 DIAGNOSIS — I48.19 PERSISTENT ATRIAL FIBRILLATION (HCC): ICD-10-CM

## 2019-10-11 DIAGNOSIS — J44.9 CHRONIC OBSTRUCTIVE PULMONARY DISEASE, UNSPECIFIED COPD TYPE (HCC): ICD-10-CM

## 2019-10-11 DIAGNOSIS — Z00.00 ENCOUNTER FOR ANNUAL HEALTH EXAMINATION: Primary | ICD-10-CM

## 2019-10-11 DIAGNOSIS — G47.33 OSA (OBSTRUCTIVE SLEEP APNEA): ICD-10-CM

## 2019-10-11 DIAGNOSIS — G62.9 NEUROPATHY: ICD-10-CM

## 2019-10-11 DIAGNOSIS — M72.2 PLANTAR FASCIITIS: ICD-10-CM

## 2019-10-11 DIAGNOSIS — E11.42 TYPE 2 DIABETES MELLITUS WITH DIABETIC POLYNEUROPATHY, WITHOUT LONG-TERM CURRENT USE OF INSULIN (HCC): ICD-10-CM

## 2019-10-11 DIAGNOSIS — I83.93 VARICOSE VEINS OF BOTH LOWER EXTREMITIES, UNSPECIFIED WHETHER COMPLICATED: ICD-10-CM

## 2019-10-11 DIAGNOSIS — D50.9 IRON DEFICIENCY ANEMIA, UNSPECIFIED IRON DEFICIENCY ANEMIA TYPE: ICD-10-CM

## 2019-10-11 DIAGNOSIS — Z95.810 ICD (IMPLANTABLE CARDIOVERTER-DEFIBRILLATOR) IN PLACE: ICD-10-CM

## 2019-10-11 DIAGNOSIS — I48.91 ATRIAL FIBRILLATION, UNSPECIFIED TYPE (HCC): ICD-10-CM

## 2019-10-11 DIAGNOSIS — Z79.01 ANTICOAGULATED ON COUMADIN: ICD-10-CM

## 2019-10-11 DIAGNOSIS — K92.2 GASTROINTESTINAL HEMORRHAGE, UNSPECIFIED GASTROINTESTINAL HEMORRHAGE TYPE: ICD-10-CM

## 2019-10-11 DIAGNOSIS — K80.20 GALLSTONES: ICD-10-CM

## 2019-10-11 DIAGNOSIS — H81.10 BENIGN PAROXYSMAL POSITIONAL VERTIGO, UNSPECIFIED LATERALITY: ICD-10-CM

## 2019-10-11 DIAGNOSIS — I51.1: ICD-10-CM

## 2019-10-11 PROBLEM — K81.0 ACUTE CHOLECYSTITIS: Status: RESOLVED | Noted: 2017-08-31 | Resolved: 2019-10-11

## 2019-10-11 PROBLEM — R79.89 AZOTEMIA: Status: RESOLVED | Noted: 2019-03-27 | Resolved: 2019-10-11

## 2019-10-11 PROBLEM — E87.3 METABOLIC ALKALOSIS: Status: RESOLVED | Noted: 2019-03-27 | Resolved: 2019-10-11

## 2019-10-11 PROBLEM — R19.7 NAUSEA VOMITING AND DIARRHEA: Status: RESOLVED | Noted: 2019-07-16 | Resolved: 2019-10-11

## 2019-10-11 PROBLEM — J44.1 COPD EXACERBATION (HCC): Status: RESOLVED | Noted: 2019-02-21 | Resolved: 2019-10-11

## 2019-10-11 PROBLEM — R73.9 HYPERGLYCEMIA: Status: RESOLVED | Noted: 2019-03-27 | Resolved: 2019-10-11

## 2019-10-11 PROBLEM — L84 CALLUS OF FOOT: Status: RESOLVED | Noted: 2017-10-23 | Resolved: 2019-10-11

## 2019-10-11 PROBLEM — I50.9 CONGESTIVE HEART FAILURE, UNSPECIFIED HF CHRONICITY, UNSPECIFIED HEART FAILURE TYPE (HCC): Status: RESOLVED | Noted: 2019-02-21 | Resolved: 2019-10-11

## 2019-10-11 PROBLEM — J81.0 ACUTE PULMONARY EDEMA (HCC): Status: RESOLVED | Noted: 2019-03-27 | Resolved: 2019-10-11

## 2019-10-11 PROBLEM — E86.0 DEHYDRATION: Status: RESOLVED | Noted: 2019-07-16 | Resolved: 2019-10-11

## 2019-10-11 PROBLEM — I50.9 CONGESTIVE HEART FAILURE (HCC): Status: RESOLVED | Noted: 2019-02-21 | Resolved: 2019-10-11

## 2019-10-11 PROBLEM — S00.93XA HEAD CONTUSION: Status: RESOLVED | Noted: 2018-10-17 | Resolved: 2019-10-11

## 2019-10-11 PROBLEM — R10.84 ABDOMINAL PAIN, GENERALIZED: Status: RESOLVED | Noted: 2019-07-16 | Resolved: 2019-10-11

## 2019-10-11 PROBLEM — R11.2 NAUSEA VOMITING AND DIARRHEA: Status: RESOLVED | Noted: 2019-07-16 | Resolved: 2019-10-11

## 2019-10-11 PROBLEM — S00.93XA CONTUSION OF HEAD, UNSPECIFIED PART OF HEAD, INITIAL ENCOUNTER: Status: RESOLVED | Noted: 2018-10-17 | Resolved: 2019-10-11

## 2019-10-11 PROBLEM — R79.89 ELEVATED LFTS: Status: RESOLVED | Noted: 2017-08-31 | Resolved: 2019-10-11

## 2019-10-11 PROCEDURE — 36415 COLL VENOUS BLD VENIPUNCTURE: CPT

## 2019-10-11 PROCEDURE — G0439 PPPS, SUBSEQ VISIT: HCPCS | Performed by: FAMILY MEDICINE

## 2019-10-11 PROCEDURE — 84439 ASSAY OF FREE THYROXINE: CPT

## 2019-10-11 PROCEDURE — 99397 PER PM REEVAL EST PAT 65+ YR: CPT | Performed by: FAMILY MEDICINE

## 2019-10-11 PROCEDURE — 83036 HEMOGLOBIN GLYCOSYLATED A1C: CPT

## 2019-10-11 PROCEDURE — 84443 ASSAY THYROID STIM HORMONE: CPT

## 2019-10-11 PROCEDURE — 96160 PT-FOCUSED HLTH RISK ASSMT: CPT | Performed by: FAMILY MEDICINE

## 2019-10-11 PROCEDURE — 82306 VITAMIN D 25 HYDROXY: CPT

## 2019-10-11 RX ORDER — LOSARTAN POTASSIUM 25 MG/1
25 TABLET ORAL DAILY
COMMUNITY
End: 2019-10-11

## 2019-10-11 RX ORDER — LOSARTAN POTASSIUM 25 MG/1
25 TABLET ORAL DAILY
Qty: 90 TABLET | Refills: 3 | Status: SHIPPED | OUTPATIENT
Start: 2019-10-11 | End: 2020-12-03

## 2019-10-11 RX ORDER — TORSEMIDE 20 MG/1
TABLET ORAL
Qty: 180 TABLET | Refills: 3 | Status: SHIPPED | OUTPATIENT
Start: 2019-10-11 | End: 2020-05-09 | Stop reason: CLARIF

## 2019-10-11 RX ORDER — LOSARTAN POTASSIUM 25 MG/1
25 TABLET ORAL 2 TIMES DAILY
Qty: 180 TABLET | Refills: 3 | Status: CANCELLED | OUTPATIENT
Start: 2019-10-11

## 2019-10-11 RX ORDER — POTASSIUM CHLORIDE 20 MEQ/1
20 TABLET, EXTENDED RELEASE ORAL DAILY
Qty: 90 TABLET | Refills: 3 | Status: SHIPPED | OUTPATIENT
Start: 2019-10-11 | End: 2020-12-03

## 2019-10-11 RX ORDER — SERTRALINE HYDROCHLORIDE 25 MG/1
25 TABLET, FILM COATED ORAL DAILY
Qty: 90 TABLET | Refills: 3 | Status: SHIPPED | OUTPATIENT
Start: 2019-10-11 | End: 2020-12-03

## 2019-10-11 RX ORDER — METOPROLOL TARTRATE 50 MG/1
TABLET, FILM COATED ORAL
Refills: 1 | COMMUNITY
Start: 2019-08-01 | End: 2019-10-11

## 2019-10-11 RX ORDER — METOPROLOL SUCCINATE 50 MG/1
150 TABLET, EXTENDED RELEASE ORAL NIGHTLY
Qty: 90 TABLET | Refills: 3 | Status: SHIPPED | OUTPATIENT
Start: 2019-10-11 | End: 2019-10-14

## 2019-10-11 NOTE — PATIENT INSTRUCTIONS
Halina Cheadle A Henning's SCREENING SCHEDULE   Tests on this list are recommended by your physician but may not be covered, or covered at this frequency, by your insurer. Please check with your insurance carrier before scheduling to verify coverage.    PREVENTATIV Limited to patients who meet one of the following criteria:   • Men who are 73-68 years old and have smoked more than 100 cigarettes in their lifetime   • Anyone with a family history    Colorectal Cancer Screening  Covered up to Age 76     Colonoscopy Scr and as needed after 75 There are no preventive care reminders to display for this patient. Please get this Mammogram regularly   Immunizations      Influenza  Covered Annually No orders found for this or any previous visit.  Please get every year    Pneumoc [place])  [email or web address] org  This link also has information from the [address] [place] [address] regarding Advance Directives.

## 2019-10-11 NOTE — PROGRESS NOTES
HPI:   Dmtiry Brayan is a 70year old female who presents for a MA (Medicare Advantage) [facility] (Once per calendar year).       Her last annual assessment has been over 1 year: Annual Physical due on 09/28/2019           Imms- up to date with both pneum controlled     Persistent atrial fibrillation (HCC)  -cont coumadin and f/u with cardiology     Pulmonary HTN ([facility] [address]  -cont current meds, moderate     Essential hypertension  -well controlled, cont current meds     ICD (implantable cardioverter-defibrillato more medical conditions?: 1-Yes  Have you fallen in the last 12 months?: 1-Yes  Do you accidently lose urine?: 1-Yes  Do you have difficulty seeing?: 1-Yes  Do you have any difficulty walking or getting up?: 1-Yes  Do you have any tripping hazards?: 0-No (Family Practice)  Bakari Bernal MD (Nahomi Rushing)  Marvel Valente MD (Cardiovascular Diseases)  Mamta Duff MD (ONCOLOGY)  Lara Hernandez MD (SURGERY, GENERAL)  Balta Banks MD (OTOLARYNGOLOGY)  Estefanía Ware (Physician Assistant CHOLEST 173 03/28/2019    HDL 71 (H) 03/28/2019    LDL 85 03/28/2019    TRIG 84 03/28/2019          Last Chemistry Labs:   Lab Results   Component Value Date    AST 79 (H) 09/07/2019     (H) 09/07/2019    CA 8.4 (L) 09/06/2019    ALB 2.7 (L) 09/07/2 Soln, Inhale 1 puff into the lungs every 4 (four) hours as needed for Wheezing. acetaminophen 500 MG Oral Tab, Take 500 mg by mouth every 6 (six) hours as needed for Pain.  magnesium oxide 400 MG Oral Tab, Take 1 tablet (400 mg total) by mouth daily.   Mul egd (04/05/2019); colonoscopy (04/05/2019); colonoscopy (N/A, 4/5/2019); and valve repair (05/03/2019). Her family history includes Cancer in her brother; Diabetes in her brother, father, mother, and sister;  Heart Disease in her brother, father, and sis Normal mood, affect, and hygiene.   SKIN: R lower leg with skin graft and erythema around the edges family and pt say the same x 6 months and not more painful than usual.    Vaccination History     Immunization History   Administered Date(s) Administered hypertension  -     torsemide 20 MG Oral Tab; TAKE 1 TABLET(20 MG) BY MOUTH TWICE DAILY  -     Potassium Chloride ER 20 MEQ Oral Tab CR; Take 1 tablet (20 mEq total) by mouth daily. TAKE 1 TABLET BY MOUTH DAILY  -     Losartan Potassium 25 MG Oral Tab;  Jeevan Daniel mouth daily. -     metFORMIN HCl 500 MG Oral Tab; Take 1 tablet (500 mg total) by mouth 2 (two) times daily with meals.  -     HEMOGLOBIN A1C; Future  -     MICROALB/CREAT RATIO, RANDOM URINE; Future  -     LIPID PANEL;  Future  -     COMP METABOLIC PANEL appetite been poor?: No  How does the patient maintain a good energy level?: Daily Walks  How would you describe your daily physical activity?: Moderate  How would you describe your current health state?: Fair  How do you maintain positive mental well-bein if applicable    Chlamydia  Annually if high risk No results found for: CHLAMYDIA No flowsheet data found. Screening Mammogram      Mammogram Annually to 76, then as discussed There are no preventive care reminders to display for this patient.  Update He 02/06/2018 1.74    No flowsheet data found. Diabetes      HgbA1C  Annually HEMOGLOBIN A1C (%)   Date Value   04/18/2017 6.0 (A)     HgbA1C (%)   Date Value   10/11/2019 5.4       No flowsheet data found.     Creat/alb ratio  Annually Malb/Cre Calc (

## 2019-10-12 PROBLEM — I26.99 OTHER PULMONARY EMBOLISM WITHOUT ACUTE COR PULMONALE (HCC): Status: ACTIVE | Noted: 2019-10-12

## 2019-10-12 PROBLEM — S81.801A OPEN WOUND OF RIGHT LOWER LEG: Status: RESOLVED | Noted: 2018-10-16 | Resolved: 2019-10-12

## 2019-10-12 PROBLEM — D64.9 ANEMIA: Status: RESOLVED | Noted: 2018-07-25 | Resolved: 2019-10-12

## 2019-10-12 PROBLEM — Z86.718 HISTORY OF DEEP VENOUS THROMBOSIS (DVT) OF DISTAL VEIN OF RIGHT LOWER EXTREMITY: Status: ACTIVE | Noted: 2017-01-05

## 2019-10-14 ENCOUNTER — TELEPHONE (OUTPATIENT)
Dept: FAMILY MEDICINE CLINIC | Facility: CLINIC | Age: 79
End: 2019-10-14

## 2019-10-14 DIAGNOSIS — I50.22 CHRONIC SYSTOLIC CONGESTIVE HEART FAILURE (HCC): ICD-10-CM

## 2019-10-14 DIAGNOSIS — I48.19 PERSISTENT ATRIAL FIBRILLATION (HCC): ICD-10-CM

## 2019-10-14 DIAGNOSIS — I48.19 PERSISTENT ATRIAL FIBRILLATION (HCC): Primary | ICD-10-CM

## 2019-10-14 DIAGNOSIS — Z86.79 H/O VENTRICULAR TACHYCARDIA: ICD-10-CM

## 2019-10-14 DIAGNOSIS — I27.20 PULMONARY HTN (HCC): ICD-10-CM

## 2019-10-14 DIAGNOSIS — Z95.810 ICD (IMPLANTABLE CARDIOVERTER-DEFIBRILLATOR) IN PLACE: ICD-10-CM

## 2019-10-14 DIAGNOSIS — E11.42 TYPE 2 DIABETES MELLITUS WITH DIABETIC POLYNEUROPATHY, WITHOUT LONG-TERM CURRENT USE OF INSULIN (HCC): ICD-10-CM

## 2019-10-14 DIAGNOSIS — I10 ESSENTIAL HYPERTENSION: ICD-10-CM

## 2019-10-14 RX ORDER — ERGOCALCIFEROL 1.25 MG/1
50000 CAPSULE ORAL WEEKLY
Qty: 4 CAPSULE | Refills: 1 | Status: ON HOLD | OUTPATIENT
Start: 2019-10-14 | End: 2019-11-14

## 2019-10-14 RX ORDER — METOPROLOL TARTRATE 50 MG/1
50 TABLET, FILM COATED ORAL 2 TIMES DAILY
Qty: 180 TABLET | Refills: 3 | Status: SHIPPED | OUTPATIENT
Start: 2019-10-14 | End: 2020-12-03

## 2019-10-14 RX ORDER — METOPROLOL SUCCINATE 50 MG/1
50 TABLET, EXTENDED RELEASE ORAL NIGHTLY
Qty: 90 TABLET | Refills: 3 | Status: SHIPPED | OUTPATIENT
Start: 2019-10-14 | End: 2019-10-14

## 2019-10-14 NOTE — TELEPHONE ENCOUNTER
Spoke with [facility]	[place], requesting clarification on Metoprolol script that was sent over on 10/11/19. Metoprolol Succinate ER 50 MG Oral Tablet 24 Hr 90 tablet 3 10/11/2019     Sig - Route: Take 3 tablets (150 mg total) by mouth nightly.  - Oral

## 2019-10-14 NOTE — TELEPHONE ENCOUNTER
Dr. Cammie Collins is calling to verify dose? Metoprolol Succinate ER 50 MG Oral Tablet 24 Hr 90 tablet 3 10/11/2019     Please advise.  [phone]  [facility]

## 2019-10-15 ENCOUNTER — TELEPHONE (OUTPATIENT)
Dept: CARDIOLOGY | Age: 79
End: 2019-10-15

## 2019-10-15 RX ORDER — WARFARIN SODIUM 5 MG/1
TABLET ORAL
Qty: 90 TABLET | Refills: 1 | Status: SHIPPED | OUTPATIENT
Start: 2019-10-15 | End: 2020-06-25

## 2019-10-25 ENCOUNTER — ANTI-COAG (OUTPATIENT)
Dept: CARDIOLOGY | Age: 79
End: 2019-10-25

## 2019-10-25 DIAGNOSIS — I34.0 MITRAL VALVE INSUFFICIENCY, UNSPECIFIED ETIOLOGY: Primary | ICD-10-CM

## 2019-10-25 DIAGNOSIS — I48.91 ATRIAL FIBRILLATION, UNSPECIFIED TYPE (CMD): ICD-10-CM

## 2019-10-28 NOTE — PROGRESS NOTES
Shyann Rosado Note    Subjective:   Patient presents for 6 month postop TMVR APN visit. She underwent Mitraclip placement x 2  on 4/11/19 d/t severe, symptomatic mitral regurgitation.    Since our last visit in July, she feels overall she i single [id number] MitraClip was present and     functioning normally across the A2-P2 leaflets. There was mild to     moderate regurgitation directed eccentrically.  Mean gradient (D): 4-7mm     Hg depending on the R-R interval at HR 74 bpm.  3. Left atri week. After prescription is complete take over the counter Vitamin D 2000 units daily. , Disp: 4 capsule, Rfl: 1  torsemide 20 MG Oral Tab, TAKE 1 TABLET(20 MG) BY MOUTH TWICE DAILY, Disp: 180 tablet, Rfl: 3  Potassium Chloride ER 20 MEQ Oral Tab CR, Take 1 Mitraclip placement x 2 for severe MR/post flail leaflet 4/11/19  · Chronic diastolic heart failure, preserved EF 65%  - mod vol overload on exam  · Permanent atrial fibrillation, on coumadin  · Chronic iron deficiency anemia  · History of DVT, s/p thrombe

## 2019-10-29 ENCOUNTER — HOSPITAL ENCOUNTER (OUTPATIENT)
Dept: CARDIOLOGY CLINIC | Facility: HOSPITAL | Age: 79
Discharge: HOME | End: 2019-10-29
Attending: INTERNAL MEDICINE
Payer: MEDICARE

## 2019-10-29 ENCOUNTER — HOSPITAL ENCOUNTER (OUTPATIENT)
Dept: LAB | Facility: HOSPITAL | Age: 79
Discharge: HOME | End: 2019-10-29
Attending: INTERNAL MEDICINE
Payer: MEDICARE

## 2019-10-29 ENCOUNTER — HOSPITAL ENCOUNTER (OUTPATIENT)
Dept: CV DIAGNOSTICS | Facility: HOSPITAL | Age: 79
Discharge: HOME | End: 2019-10-29
Attending: INTERNAL MEDICINE
Payer: MEDICARE

## 2019-10-29 VITALS
DIASTOLIC BLOOD PRESSURE: 91 MMHG | OXYGEN SATURATION: 99 % | SYSTOLIC BLOOD PRESSURE: 157 MMHG | RESPIRATION RATE: 15 BRPM | HEART RATE: 72 BPM | TEMPERATURE: 99 F

## 2019-10-29 DIAGNOSIS — Z95.818 S/P MITRAL VALVE CLIP IMPLANTATION: Primary | ICD-10-CM

## 2019-10-29 DIAGNOSIS — I48.19 PERSISTENT ATRIAL FIBRILLATION (HCC): ICD-10-CM

## 2019-10-29 DIAGNOSIS — Z95.818 S/P MITRAL VALVE CLIP IMPLANTATION: ICD-10-CM

## 2019-10-29 DIAGNOSIS — I50.22 CHRONIC SYSTOLIC CONGESTIVE HEART FAILURE (HCC): ICD-10-CM

## 2019-10-29 DIAGNOSIS — I34.0 MITRAL VALVE INSUFFICIENCY, UNSPECIFIED ETIOLOGY: Primary | ICD-10-CM

## 2019-10-29 DIAGNOSIS — I34.0 MITRAL VALVE INSUFFICIENCY, UNSPECIFIED ETIOLOGY: ICD-10-CM

## 2019-10-29 DIAGNOSIS — I10 ESSENTIAL HYPERTENSION: ICD-10-CM

## 2019-10-29 DIAGNOSIS — Z98.890 S/P MITRAL VALVE CLIP IMPLANTATION: Primary | ICD-10-CM

## 2019-10-29 DIAGNOSIS — Z98.890 S/P MITRAL VALVE CLIP IMPLANTATION: ICD-10-CM

## 2019-10-29 LAB
ANION GAP SERPL CALC-SCNC: NORMAL MMOL/L
BUN SERPL-MCNC: 20 MG/DL
BUN/CREAT SERPL: NORMAL
CALCIUM SERPL-MCNC: 8.6 MG/DL
CHLORIDE SERPL-SCNC: 104 MMOL/L
CO2 SERPL-SCNC: NORMAL MMOL/L
CREAT SERPL-MCNC: 1.33 MG/DL
GLUCOSE SERPL-MCNC: 134 MG/DL
INR PPP: 3.56
LENGTH OF FAST TIME PATIENT: NORMAL H
MAGNESIUM SERPL-MCNC: 2.3 MG/DL
POTASSIUM SERPL-SCNC: 3.7 MMOL/L
SODIUM SERPL-SCNC: 138 MMOL/L

## 2019-10-29 PROCEDURE — 36415 COLL VENOUS BLD VENIPUNCTURE: CPT | Performed by: INTERNAL MEDICINE

## 2019-10-29 PROCEDURE — 85610 PROTHROMBIN TIME: CPT | Performed by: INTERNAL MEDICINE

## 2019-10-29 PROCEDURE — 83735 ASSAY OF MAGNESIUM: CPT | Performed by: INTERNAL MEDICINE

## 2019-10-29 PROCEDURE — 93306 TTE W/DOPPLER COMPLETE: CPT | Performed by: INTERNAL MEDICINE

## 2019-10-29 PROCEDURE — 99213 OFFICE O/P EST LOW 20 MIN: CPT | Performed by: NURSE PRACTITIONER

## 2019-10-29 PROCEDURE — 80048 BASIC METABOLIC PNL TOTAL CA: CPT | Performed by: INTERNAL MEDICINE

## 2019-10-29 NOTE — PATIENT INSTRUCTIONS
Schedule appointment with Dr. Chantal Oglesby for next week, call [phone].      Increase Torsemide to 40 mg in am and 40 mg in pm x 3 days, then resume 20mg in am and 20mg in pm    Increase Potassium to 1 tablet twice a day x 3 days, then decrease to 1 tab daily

## 2019-10-29 NOTE — ADDENDUM NOTE
Encounter addended by:  Dennie Rooks, APRN on: 10/29/2019 2:33 PM   Actions taken: Charge Capture section accepted, Visit diagnoses modified, Clinical Note Signed

## 2019-10-30 ENCOUNTER — TELEPHONE (OUTPATIENT)
Dept: CARDIOLOGY CLINIC | Facility: HOSPITAL | Age: 79
End: 2019-10-30

## 2019-10-30 DIAGNOSIS — I34.0 MITRAL VALVE INSUFFICIENCY, UNSPECIFIED ETIOLOGY: ICD-10-CM

## 2019-10-30 NOTE — TELEPHONE ENCOUNTER
Called patient with results of echocardiogram. EF 60%, both Mitraclips present with moderate mitral regurgitation. Patient instructed to call with any questions and follow with next appointment.

## 2019-11-04 ENCOUNTER — ANTI-COAG (OUTPATIENT)
Dept: CARDIOLOGY | Age: 79
End: 2019-11-04

## 2019-11-04 DIAGNOSIS — I48.91 ATRIAL FIBRILLATION, UNSPECIFIED TYPE (CMD): ICD-10-CM

## 2019-11-08 ENCOUNTER — CLINICAL ABSTRACT (OUTPATIENT)
Dept: CARDIOLOGY | Age: 79
End: 2019-11-08

## 2019-11-10 ENCOUNTER — APPOINTMENT (OUTPATIENT)
Dept: GENERAL RADIOLOGY | Facility: HOSPITAL | Age: 79
DRG: 292 | End: 2019-11-10
Attending: EMERGENCY MEDICINE
Payer: MEDICARE

## 2019-11-10 ENCOUNTER — HOSPITAL ENCOUNTER (INPATIENT)
Facility: HOSPITAL | Age: 79
LOS: 3 days | Discharge: HOME | DRG: 292 | End: 2019-11-14
Attending: EMERGENCY MEDICINE | Admitting: INTERNAL MEDICINE
Payer: MEDICARE

## 2019-11-10 DIAGNOSIS — J81.0 ACUTE PULMONARY EDEMA (HCC): Primary | ICD-10-CM

## 2019-11-10 DIAGNOSIS — I48.19 PERSISTENT ATRIAL FIBRILLATION (HCC): ICD-10-CM

## 2019-11-10 PROCEDURE — 71045 X-RAY EXAM CHEST 1 VIEW: CPT | Performed by: EMERGENCY MEDICINE

## 2019-11-10 RX ORDER — FUROSEMIDE 10 MG/ML
40 INJECTION INTRAMUSCULAR; INTRAVENOUS ONCE
Status: COMPLETED | OUTPATIENT
Start: 2019-11-10 | End: 2019-11-10

## 2019-11-11 ENCOUNTER — ANTI-COAG (OUTPATIENT)
Dept: CARDIOLOGY | Age: 79
End: 2019-11-11

## 2019-11-11 DIAGNOSIS — I48.91 ATRIAL FIBRILLATION, UNSPECIFIED TYPE (CMD): ICD-10-CM

## 2019-11-11 PROCEDURE — 99223 1ST HOSP IP/OBS HIGH 75: CPT | Performed by: INTERNAL MEDICINE

## 2019-11-11 RX ORDER — SERTRALINE HYDROCHLORIDE 25 MG/1
25 TABLET, FILM COATED ORAL DAILY
Status: DISCONTINUED | OUTPATIENT
Start: 2019-11-11 | End: 2019-11-14

## 2019-11-11 RX ORDER — ACETAMINOPHEN 500 MG
500 TABLET ORAL EVERY 6 HOURS PRN
Status: DISCONTINUED | OUTPATIENT
Start: 2019-11-11 | End: 2019-11-14

## 2019-11-11 RX ORDER — WARFARIN SODIUM 2.5 MG/1
2.5 TABLET ORAL NIGHTLY
Status: DISCONTINUED | OUTPATIENT
Start: 2019-11-11 | End: 2019-11-14

## 2019-11-11 RX ORDER — FUROSEMIDE 10 MG/ML
40 INJECTION INTRAMUSCULAR; INTRAVENOUS
Status: DISCONTINUED | OUTPATIENT
Start: 2019-11-11 | End: 2019-11-13

## 2019-11-11 RX ORDER — FUROSEMIDE 10 MG/ML
20 INJECTION INTRAMUSCULAR; INTRAVENOUS
Status: DISCONTINUED | OUTPATIENT
Start: 2019-11-11 | End: 2019-11-11

## 2019-11-11 RX ORDER — POTASSIUM CHLORIDE 20 MEQ/1
20 TABLET, EXTENDED RELEASE ORAL DAILY
Status: DISCONTINUED | OUTPATIENT
Start: 2019-11-11 | End: 2019-11-14

## 2019-11-11 RX ORDER — URSODIOL 300 MG/1
300 CAPSULE ORAL 2 TIMES DAILY
Status: DISCONTINUED | OUTPATIENT
Start: 2019-11-11 | End: 2019-11-14

## 2019-11-11 RX ORDER — AMIODARONE HYDROCHLORIDE 200 MG/1
200 TABLET ORAL DAILY
Status: DISCONTINUED | OUTPATIENT
Start: 2019-11-11 | End: 2019-11-14

## 2019-11-11 RX ORDER — DILTIAZEM HYDROCHLORIDE 120 MG/1
120 CAPSULE, EXTENDED RELEASE ORAL DAILY
Status: DISCONTINUED | OUTPATIENT
Start: 2019-11-11 | End: 2019-11-14

## 2019-11-11 RX ORDER — METOPROLOL TARTRATE 50 MG/1
50 TABLET, FILM COATED ORAL
Status: DISCONTINUED | OUTPATIENT
Start: 2019-11-11 | End: 2019-11-14

## 2019-11-11 RX ORDER — ACETAMINOPHEN 325 MG/1
650 TABLET ORAL EVERY 6 HOURS PRN
Status: DISCONTINUED | OUTPATIENT
Start: 2019-11-11 | End: 2019-11-14

## 2019-11-11 RX ORDER — POTASSIUM CHLORIDE 20 MEQ/1
40 TABLET, EXTENDED RELEASE ORAL EVERY 4 HOURS
Status: COMPLETED | OUTPATIENT
Start: 2019-11-11 | End: 2019-11-11

## 2019-11-11 RX ORDER — CALCIUM CARBONATE/VITAMIN D3 250-3.125
2 TABLET ORAL DAILY
Status: DISCONTINUED | OUTPATIENT
Start: 2019-11-11 | End: 2019-11-14

## 2019-11-11 RX ORDER — ALBUTEROL SULFATE 90 UG/1
1 AEROSOL, METERED RESPIRATORY (INHALATION) EVERY 4 HOURS PRN
Status: DISCONTINUED | OUTPATIENT
Start: 2019-11-11 | End: 2019-11-14

## 2019-11-11 RX ORDER — HYDRALAZINE HYDROCHLORIDE 20 MG/ML
5 INJECTION INTRAMUSCULAR; INTRAVENOUS EVERY 6 HOURS PRN
Status: DISCONTINUED | OUTPATIENT
Start: 2019-11-11 | End: 2019-11-14

## 2019-11-11 RX ORDER — DEXTROSE MONOHYDRATE 25 G/50ML
50 INJECTION, SOLUTION INTRAVENOUS
Status: DISCONTINUED | OUTPATIENT
Start: 2019-11-11 | End: 2019-11-14

## 2019-11-11 RX ORDER — LOSARTAN POTASSIUM 25 MG/1
25 TABLET ORAL DAILY
Status: DISCONTINUED | OUTPATIENT
Start: 2019-11-11 | End: 2019-11-14

## 2019-11-11 RX ORDER — MAGNESIUM OXIDE 400 MG (241.3 MG MAGNESIUM) TABLET
400 TABLET DAILY
Status: DISCONTINUED | OUTPATIENT
Start: 2019-11-11 | End: 2019-11-14

## 2019-11-11 NOTE — ED PROVIDER NOTES
Patient Seen in: [facility] Emergency Department      History   Patient presents with:  Cough/URI    Stated Complaint: URI, HEIDE    HPI    Clarissa Abdullahi is a pleasant 71-year-old female presenting with difficulty breathing.   She is been having exertional dysp medtronic    • S/P total knee replacement 6/18/2015    bilat 1990s, both severe OA   • Subdural hematoma ([facility] [address] 12/01/2016    s/p L craniotomy, seizure during that time none since   • Varicose vein 9/18/2015   • Vitamin D deficiency 2012              Pas reviewed and negative except as noted above.     Physical Exam     ED Triage Vitals [11/10/19 2146]   BP (!) 157/94   Pulse 78   Resp 23   Temp 97.7 °F (36.5 °C)   Temp src Temporal   SpO2 96 %   O2 Device None (Room air)       Current:/80   Pulse 56 RAINBOW DRAW LIGHT GREEN   RAINBOW DRAW GOLD   BLOOD CULTURE   BLOOD CULTURE   RESPIRATORY PANEL FLU EXPANDED     EKG    Rate, intervals and axes as noted on EKG Report.   Rate: 74  Rhythm: Atrial Fibrillation  Reading: No areas of acute ST segment elevat

## 2019-11-11 NOTE — DIETARY NOTE
[facility]    NUTRITION INITIAL ASSESSMENT    Pt does not meet malnutrition criteria. NUTRITION DIAGNOSIS/PROBLEM:    Increased nutrient needs related to wound healing as evidenced by ulcer on left leg per RN documentation.      NUTRITION INTERVENTI Allergies: No  Cultural/Ethnic/Hinduism Preferences Addresses: Yes    NUTRITION RELATED PHYSICAL FINDINGS:     1. Body Fat/Muscle Mass: well nourished per visual exam.    NUTRITION PRESCRIPTION:  Calories: 0228-8831 calories/day (17-20 calories per kg)  P

## 2019-11-11 NOTE — HISTORICAL OFFICE NOTE
Young Hill MD - 2019 9:30 AM CDT  Formatting of this note might be different from the original.  2019    [address] [facility]  Clinic Note    Crystal Mercado  : 1940  PCP fibrillation (CMS/HCC)   • Cholecystitis   recurring   • Essential hypertension, benign   • Heart failure (CMS/HCC)   • ICD (implantable cardioverter-defibrillator) in place 2016   • Ventricular tachycardia (CMS/HCC)     PSHx:  No past surgical history on facility-administered medications for this visit. Review of Systems:  Review of Systems   Constitution: Negative for chills, fever, weight gain and weight loss. HENT: Negative for hearing loss.    Eyes:   Patient denies significant visual changes   Ca 53-year-old female with a history of permanent atrial fibrillation, Medtronic single-chamber ICD, and ventricular tachycardia well-controlled on amiodarone presents for follow-up. Plan:  1. We will get amiodarone laboratories including LFTs and TSH.

## 2019-11-11 NOTE — H&P
[facility]                                                               History & Physical         Annamary Mal Patient Status:  Observation    1940 MRN [id number]   [facility] 2NE-A Attending Lul Singh MD   [facility] Day # • Obesity, morbid, BMI 40.0-49.9 ([facility] [address] 6/18/2015   • RADHA (obstructive sleep apnea) 06/29/2012    Cannot tolerate CPAP machine- uses oxygen periodically    • Osteoarthritis of shoulders, bilateral 06/18/2015    Severe, frozen shoulder syndrome   • Rosy INCLD,  LEFT (CPT=10005)  1/14/14   • VALVE REPAIR  05/03/2019    mitral valve clip placed 2/2 severe mitral regurgitation       Family history:  Family History   Problem Relation Age of Onset   • Diabetes Father    • Heart Disease Father    • Diabetes Mot mouth daily. , Disp: , Rfl: 0, 11/10/2019 at am  Warfarin Sodium 5 MG Oral Tab, Take 2.5 mg by mouth See Admin Instructions.  Take 2.5 mg on Wednesday, Sunday, Disp: , Rfl: , 11/10/2019 at pm  dilTIAZem HCl ER Coated Beads 120 MG Oral Capsule SR 24 Hr, Take rhonchi. Cardiovascular: S1, S2.  Regular rate and rhythm. No murmurs. Equal pulses ; AICD present  Abdomen: Soft, nontender, nondistended. Positive bowel sounds.  No rebound tenderness  Neurologic: Awake alert oriented x3, no focal neurological deficits due to acute on chronic CHF exacerbation with acute pulmonary edema; pulmonary hypertension  1. IV Lasix  2. Cardiology consult  3. 2D echo done recently on 10/29/2019 showed EF of 60% to gmbc-ac-yiei mitral clips present, moderate mitral regurgitation  4.

## 2019-11-11 NOTE — PLAN OF CARE
Assumed care of patient around 0730. Patient up in chair, ox4. Denies any chest pain or SOB. Mild SOB with exertion noted. Mild dizziness with ambulation. Room air, . Afib controlled on tele. Right lower leg would with foam, CDI.  Plan for IV lasix and

## 2019-11-11 NOTE — PROGRESS NOTES
[facility]  Progress Note     Betty Dennison Patient Status:  Observation    1940 MRN [id number]   [facility] 2NE-A Attending Kee Swanson,    Hosp Day # 0 PCP Karen Pena MD     Chief Complaint:  Sob   S:  Up in c showed EF of 60% to zcrv-ck-lave mitral clips present, moderate mitral regurgitation  4. Respiratory expanded flu panel negative  2. Hypokalemia  1. Replace  3. Atrial fibrillation  1.  Continue home medications including amiodarone, metoprolol , diltiazem

## 2019-11-11 NOTE — CONSULTS
[address] [facility] CONSULT      Patient: Ksenia Gutierrez Date: 2019     : 1940 Attending: Zane Vivar DO   70year old female      A/P:  Acute on chronic HFpEF, EF 65%  Severe MR s/p MitraClip x 2 2019  Permanent AF on coumadi it as moderate intensity. There is no associated nausea/vomiting/diaphoresis. She admits to non-compliance with diet in that she has been drinking more fluids and eating more fast food. Otherwise she has been compliant with her medications.  Since being in [place]       Past Surgical History:   Procedure Laterality Date   • ANGIOGRAM     • APPENDECTOMY     • CARDIAC DEFIBRILLATOR PLACEMENT     • CARDIAC PACEMAKER PLACEMENT     • COLONOSCOPY  04/05/2019    sessile serrated polyp, Dr. Jeri Garcia Never Smoker      Smokeless tobacco: Never Used    Substance and Sexual Activity      Alcohol use: No      Drug use: No      Sexual activity: Not on file    Lifestyle      Physical activity:        Days per week: Not on file        Minutes per session: Not AF    Arrythmias: None noted    Vital Last Value 24 Hour Range   Temperature 97.8 °F (36.6 °C) Temp  Min: 97.7 °F (36.5 °C)  Max: 97.9 °F (36.6 °C)   Pulse 75 Pulse  Min: 56  Max: 78   Respiratory 20 Resp  Min: 16  Max: 23   Blood Pressure 159/88 BP  Min: AST 45 11/10/2019    ALT 56 11/10/2019    INR 3.12 11/11/2019    MG 2.3 11/11/2019    TROP <0.045 11/10/2019     TTE 10/29/19  1. Left ventricle: The cavity size was mildly to moderately increased.  Wall     thickness was at the upper limits of normal. Syst

## 2019-11-11 NOTE — PLAN OF CARE
Pt admitted to floor from ED around [address] with increasing BLACK. Pt alert and oriented x4, saturating well on 2L NC. She wears 2L HS at home in place of a CPAP. Afib on tele with rates in the 60-70's.  40 IV lasix given in ER. 20 IV lasix BID ordered for for t

## 2019-11-12 PROCEDURE — 99232 SBSQ HOSP IP/OBS MODERATE 35: CPT | Performed by: INTERNAL MEDICINE

## 2019-11-12 NOTE — PROGRESS NOTES
[facility]  Progress Note     Raj Colon Patient Status:  Observation    1940 MRN [id number]   [facility] 2NE-A Attending Roseline Ayala DO   Hosp Day # 1 PCP Kevon Penaloza MD     Chief Complaint:  Sob   S:  Just sh Epic.  ASSESSMENT / PLAN:   1. Exertional shortness of breath and pedal edema due to acute on chronic CHF exacerbation with acute pulmonary edema; pulmonary hypertension  1. IV Lasix   2.  Cardiology following   3. 2D echo done recently on 10/29/2019 showed s/p Mitral clip    Cardiology following  Continue IV diuresis  Daily weights and strict I/O's    Much improved but not ready for discharge yet      Gabriela Edward, 11/12/19, 4:40 PM

## 2019-11-12 NOTE — PAYOR COMM NOTE
--------------  CONTINUED STAY REVIEW    Payor: BCBS MEDICARE ADV PPO  Subscriber #:  [id number]  Authorization Number: [id number]    Admit date: 11/11/19  Admit time: 0    Admitting Physician: Danni Kennedy MD  Attending Physician:  Cheryl Hi 3.12*           Recent Labs   Lab 11/10/19  2205 11/11/19  1143   TROP <0.045 <0.045      Imaging: Imaging data reviewed in Epic. ASSESSMENT / PLAN:   1.  Exertional shortness of breath and pedal edema due to acute on chronic CHF exacerbation with acute pu Soft, NTND  Extremities: 1+ pitting edema b/l     Assessment and Plan:     Acute on Chronic HFpEF  Permanent Afib  Type II DM  MR s/p Mitral clip     Cardiology following  Continue IV diuresis  Daily weights and strict I/O's     Much improved but not ready tab 2.5 mg     Date Action Dose Route User    11/11/2019 2256 Given 2.5 mg Oral Dinomelida Yap RN

## 2019-11-12 NOTE — PROGRESS NOTES
[facility]  Cardiology Progress Note    Subjective:  No chest pain or shortness of breath.     Objective:  /66 (BP Location: Right arm)   Pulse 70   Temp 98 °F (36.7 °C) (Oral)   Resp 16   Ht 5' 4\" (1.626 m)   Wt 202 lb 9.6 oz (91.9 kg)   SpO2 9 mild     stenosis. There was moderate regurgitation directed eccentrically. The     mitral regurgitation jet appears lateral to the MitraClips.  Mean gradient     (D): 3mm Hg at an effective heart rate of 59-62 bpm. Valve area by     continuity equation (us clinically, renal function, I/O, daily weights and lytes. On coumadin with therapeutic INR 2.71. On amiodarone, CCB, BB and ARB. Follows as outpatient with Dr. Buzz Zapata and [facility] heart failure clinic.     Lillian Avila MD

## 2019-11-12 NOTE — PAYOR COMM NOTE
--------------  ADMISSION REVIEW     Payor: BCBS MEDICARE ADV PPO  Subscriber #:  [id number]  Authorization Number: [id number]    Admit date: 11/11/19  Admit time: 0       Admitting Physician: Ron Gimenez MD  Attending Physician:  Nando Grant, • History of syncope     cardiogenic   • Insomnia 11/25/2013   • Kidney stone    • Migraines    • NSVT (nonsustained ventricular tachycardia) ([facility] [address] 7/11/2014    Recurrence 7.2016 Hx VT- s/p ICD implant on 7/7/16.      • Obesity, morbid, BMI 40.0-49.9 (HC • OTHER  2016    surgery to relieve pressure on brain due to fall   • PACEMAKER/DEFIBRILLATOR      Medtronic single chamber ICD   • TOTAL KNEE REPLACEMENT     • US FNA LYMPH NODE, GUIDE INCLD,  LEFT (CPT=10005)  1/14/14   • VALVE REPAIR  05/03/2019    mi -----------         ------                     CBC W/ DIFFERENTIAL[[id number]]          Abnormal            Final result                 Please view results for these tests on the individual orders.    [address] mild  in intensity and progressively worsened over the last couple of days. Associated with fever of 101 °F.  Associated with some chest congestion. Associated with some headache on and off. Denies any chest pain.   Shortness of breath aggravated with ex AP PORTABLE  (CPT=71045)     TECHNIQUE:  AP chest radiograph was obtained. COMPARISON:  [facility] , XR, XR CHEST AP PORTABLE  (CPT=71045), 9/03/2019, 17:44.      INDICATIONS:  URI, HEIDE     PATIENT STATED HISTORY: (As transcribed by Technologist)  Pt. with normal        Discussed with ER Physician.   Dr. Cari Acevedo will follow from morning today    Lexie Rosenberg MD  11/11/2019  2:09 AM      Electronically signed by Ron Gimenez MD on 11/11/2019  9:09 AM         MEDICATIONS ADMINISTERED IN LAST 1 DAY:  acetamin 11/11  A/P:  Acute on chronic HFpEF, EF 65%  Severe MR s/p MitraClip x 2 4/2019  Permanent AF on coumadin  VT history s/p ICD with history of ATP/ICD shocks  DVT history s/p thrombectomy and IVC filter 2016  SDH history  COPD  Chronic iron deficiency a

## 2019-11-12 NOTE — PLAN OF CARE
Rec in bed pt and VS appear stable. Denies CP/SOB. sats maintained in 90's on RA. POC updated with dtr (POA) will cont to monitor PRN.

## 2019-11-13 PROCEDURE — 99232 SBSQ HOSP IP/OBS MODERATE 35: CPT | Performed by: INTERNAL MEDICINE

## 2019-11-13 PROCEDURE — 99232 SBSQ HOSP IP/OBS MODERATE 35: CPT | Performed by: HOSPITALIST

## 2019-11-13 NOTE — PLAN OF CARE
Assumed care 1500. Pt up in chair. Ambulated in halls and tolerated well. Voiding without difficulty on Lasix. Edema improved. No crackles. Drsng to RLE changed. Coumadin given this evening, INR 2.71. Pt's dtr (DERIK) Jeri called RN for update.  Update given

## 2019-11-13 NOTE — PROGRESS NOTES
[facility]  Progress Note     Brendan Mcfarlane Patient Status:  Observation    1940 MRN [id number]   [facility] 2NE-A Attending Bishop Sheriff MD    Hosp Day # 2 PCP Corie Calloway MD     Chief Complaint:  Sob   S:   Up in ch 11/10/19  2205 11/11/19  1143   TROP <0.045 <0.045        Imaging: Imaging data reviewed in Epic. ASSESSMENT / PLAN:   1.  Exertional shortness of breath and pedal edema due to acute on chronic CHF exacerbation with acute pulmonary edema; pulmonary hyperte long conversation on safety and why she is cleared for dc but still protesting discharge    /67 (BP Location: Right arm)   Pulse 68   Temp 97.7 °F (36.5 °C) (Oral)   Resp 18   Ht 162.6 cm (5' 4\")   Wt 200 lb 13.4 oz (91.1 kg)   SpO2 98%   BMI 34.47

## 2019-11-13 NOTE — PROGRESS NOTES
Problem: Patient/Family Goals  Goal: Patient/Family Long Term Goal  Description  Patient's Long Term Goal: \"Keep extra water weight off\"    Interventions:  - attend f/u visits w/md's  - weight daily  - medication adherence  - See additional Care Plan goa rate control medications as ordered  - Initiate emergency measures for life threatening arrhythmias  - Monitor electrolytes and administer replacement therapy as ordered  Outcome: Progressing     Problem: METABOLIC/FLUID AND ELECTROLYTES - ADULT  Goal: [place] Outcome: Progressing  Goal: Patient/Family Short Term Goal  Description  Patient's Short Term Goal: \"Breathe better and go home\"    Interventions:   - Meet with MD's, take prescribed medications, follow POC  - follow fluid restriction  - follow recomme normal limits  Description  INTERVENTIONS:  - Monitor labs and rhythm and assess patient for signs and symptoms of electrolyte imbalances  - Administer electrolyte replacement as ordered  - Monitor response to electrolyte replacements, including rhythm and

## 2019-11-13 NOTE — PAYOR COMM NOTE
--------------  CONTINUED STAY REVIEW    Payor: BCBS MEDICARE ADV PPO  Subscriber #:  [id number]  Authorization Number: [id number]    Admit date: 11/11/19  Admit time: 0    Admitting Physician: Cathy Clark MD  Attending Physician:  Stephanie Mock daily  -She will need follow up in HF-NP Clinic after discharge in [facility]    INR 2.47   BMP cr lower    diuetrics per cards   IV lasix dc'ed   Off O2   Has home O2 for nocs         MEDICATIONS ADMINISTERED IN LAST 1 DAY:  amiodarone HCl (PACERONE) tab 200 mg

## 2019-11-13 NOTE — PLAN OF CARE
Assumed care of pt at 0000 a/o x3 in no apparent distress sleeping in the recliner. She had gotten up to the bathroom with minimal assistance and walker shortly after midnight and tolerated well. She stated she also sleeps in a recliner at home as well. arterial and/or venous puncture sites for bleeding and/or hematoma  - Assess quality of pulses, skin color and temperature  - Assess for signs of decreased coronary artery perfusion - ex.  Angina  - Evaluate fluid balance, assess for edema, trend weights  O development  - Assess and document skin integrity  - Monitor for areas of redness and/or skin breakdown  - Initiate interventions, skin care algorithm/standards of care as needed  Outcome: Progressing

## 2019-11-13 NOTE — PROGRESS NOTES
ADVANCED HEART FAILURE SERVICE PROGRESS NOTE      Patient: Clemencia Beltre Date: 2019     : 1940 Attending: Subhash Meraz DO   70year old female      A/P:  Acute on chronic HFpEF, EF 65%  Severe MR s/p MitraClip x 2 2019  Permanent AF on c 24 Hour Range   Temperature 97.9 °F (36.6 °C) Temp  Min: 97.7 °F (36.5 °C)  Max: 97.9 °F (36.6 °C)   Pulse 68 Pulse  Min: 56  Max: 78   Respiratory 18 Resp  Min: 16  Max: 23   Blood Pressure 160/79 BP  Min: 154/82  Max: 181/94   Pulse Oximetry 92 % SpO2  M 3.8cm at the sinus of Valsalva. 4. Mitral valve: Mildly calcified annulus. Two cded-se-gbud MitraClips were     present. Transvalvular velocity was increased, consistent with mild     stenosis. There was moderate regurgitation directed eccentrically.  The

## 2019-11-14 ENCOUNTER — ANTI-COAG (OUTPATIENT)
Dept: CARDIOLOGY | Age: 79
End: 2019-11-14

## 2019-11-14 VITALS
OXYGEN SATURATION: 99 % | SYSTOLIC BLOOD PRESSURE: 160 MMHG | TEMPERATURE: 98 F | HEIGHT: 64 IN | RESPIRATION RATE: 18 BRPM | BODY MASS INDEX: 34.25 KG/M2 | WEIGHT: 200.63 LBS | HEART RATE: 60 BPM | DIASTOLIC BLOOD PRESSURE: 73 MMHG

## 2019-11-14 DIAGNOSIS — I48.91 ATRIAL FIBRILLATION, UNSPECIFIED TYPE (CMD): ICD-10-CM

## 2019-11-14 PROCEDURE — 99232 SBSQ HOSP IP/OBS MODERATE 35: CPT | Performed by: NURSE PRACTITIONER

## 2019-11-14 PROCEDURE — 99239 HOSP IP/OBS DSCHRG MGMT >30: CPT | Performed by: HOSPITALIST

## 2019-11-14 RX ORDER — ERGOCALCIFEROL 1.25 MG/1
50000 CAPSULE ORAL WEEKLY
Qty: 4 CAPSULE | Refills: 1 | Status: SHIPPED | OUTPATIENT
Start: 2019-11-14 | End: 2019-12-14

## 2019-11-14 RX ORDER — TORSEMIDE 20 MG/1
20 TABLET ORAL ONCE
Status: COMPLETED | OUTPATIENT
Start: 2019-11-14 | End: 2019-11-14

## 2019-11-14 NOTE — PLAN OF CARE
Pt is A&OX4, VSS on RA, and maintaining chronic a.fib on telemetry. Plan for d/c home today when son is able to pick pt up (approx. 3pm).     Currently, denies any generalized/localized pain, chest pain, chest pressure, palpitations, SOB, N/V, dizziness, b bleeding, hypotension and signs of decreased cardiac output  - Evaluate effectiveness of vasoactive medications to optimize hemodynamic stability  - Monitor arterial and/or venous puncture sites for bleeding and/or hematoma  - Assess quality of pulses, ski appropriate  Outcome: Adequate for Discharge     Problem: SKIN/TISSUE INTEGRITY - ADULT  Goal: Skin integrity remains intact  Description  INTERVENTIONS  - Assess and document risk factors for pressure ulcer development  - Assess and document skin integrit

## 2019-11-14 NOTE — PROGRESS NOTES
Notified daughter/Jeri MORE, re: POC & discharge plan for today when her brother is able to pick their mother up. Left message w/pt's son, Kristin Beauchamp, to confirm p/u time. ADDENDUM:  1500: Rec'd telephone call back from pt's son: Yariel Dixon.   He is stuck

## 2019-11-14 NOTE — PAYOR COMM NOTE
--------------  CONTINUED STAY REVIEW    Payor: BCBS MEDICARE ADV PPO  Subscriber #:  [id number]  Authorization Number: [id number]    Admit date: 11/11/19  Admit time: 0    Admitting Physician: Roxanne Basurto MD  Attending Physician:  Juan Nicholas > = values in this interval not displayed.      Estimated Creatinine Clearance: 44.2 mL/min (based on SCr of 0.92 mg/dL).         Recent Labs   Lab 11/12/19  1018 11/13/19  0619 11/14/19  0623   PTP 30.6* 28.4* 24.9*   INR 2.71* 2.47* 2.10*           Recent therapeutic  · CODE status: full  · Guerrero: no        Will the patient be referred to TCC on discharge?: no   Estimated date of discharge: todaY   Discharge is dependent on: vol status stable   At this point Ms. Jocelyn Grimaldo is expected to be discharge to:  Home Sodium (COUMADIN) tab 2.5 mg     Date Action Dose Route User    11/13/2019 2100 Given 2.5 mg Oral Mayra Starch T, RN

## 2019-11-14 NOTE — CM/SW NOTE
For discharge planning needs call [address] [facility] with [facility] at [phone].

## 2019-11-14 NOTE — PLAN OF CARE
Received patient, alert and oriented. Denied pain. Up with walker and SBA. Discussed POC. Due meds given. Monitored I&O. Safety measures reinforced, call light within reach. Needs attended to. Will continue to monitor.     Problem: [address] Saman Small Absence of cardiac arrhythmias or at baseline  Description  INTERVENTIONS:  - Continuous cardiac monitoring, monitor vital signs, obtain 12 lead EKG if indicated  - Evaluate effectiveness of antiarrhythmic and heart rate control medications as ordered  - I

## 2019-11-14 NOTE — PROGRESS NOTES
[facility]  Cardiology Progress Note    Subjective:  No chest pain or shortness of breath.     Objective:  /72 (BP Location: Right arm)   Pulse 60   Temp 98.4 °F (36.9 °C) (Oral)   Resp 20   Ht 5' 4\" (1.626 m)   Wt 200 lb 9.9 oz (91 kg)   SpO2 9

## 2019-11-14 NOTE — HOME CARE LIAISON
Received referral from [address] [place] [address],[address]. Met with patient at the bedside. Patient refusing home health services. FAYE Eng notified.  Thank you for this referral.

## 2019-11-14 NOTE — PROGRESS NOTES
[facility]  Progress Note     Siddharth Bustmaante Patient Status:  Observation    1940 MRN [id number]   [facility] 2NE-A Attending Arthur Ardon MD    Hosp Day # 3 PCP Mikey Farrell MD     Chief Complaint:  Sob   S:  No issues of 0.92 mg/dL). Recent Labs   Lab 11/12/19  1018 11/13/19  0619 11/14/19  0623   PTP 30.6* 28.4* 24.9*   INR 2.71* 2.47* 2.10*     Recent Labs   Lab 11/10/19  2205 11/11/19  1143   TROP <0.045 <0.045        Imaging: Imaging data reviewed in Epic.   ASSESSM discharge: todaY   Discharge is dependent on: vol status stable   At this point Ms. Cates Sayprosper is expected to be discharge to:  [address] [facility], NP    ADDENDUM:  Pt seen and examined.   Agree w/ plan as above.     #acute on chronic HFpEF  #perm afib

## 2019-11-15 ENCOUNTER — PATIENT OUTREACH (OUTPATIENT)
Dept: CASE MANAGEMENT | Age: 79
End: 2019-11-15

## 2019-11-15 DIAGNOSIS — Z02.9 ENCOUNTERS FOR UNSPECIFIED ADMINISTRATIVE PURPOSE: ICD-10-CM

## 2019-11-15 DIAGNOSIS — I50.33 ACUTE ON CHRONIC HEART FAILURE WITH PRESERVED EJECTION FRACTION (HFPEF) (HCC): ICD-10-CM

## 2019-11-15 PROCEDURE — 1111F DSCHRG MED/CURRENT MED MERGE: CPT

## 2019-11-15 RX ORDER — DILTIAZEM HYDROCHLORIDE 120 MG/1
CAPSULE, EXTENDED RELEASE ORAL
Refills: 1 | COMMUNITY
Start: 2019-10-17 | End: 2019-11-15

## 2019-11-15 NOTE — PAYOR COMM NOTE
--------------  DISCHARGE REVIEW    Payor: BCBS MEDICARE ADV PPO  Subscriber #:  [id number]  Authorization Number: [id number]    Admit date: 11/11/19  Admit time:  2203  Discharge Date: 11/14/2019  9:33 PM     Admitting Physician: Danni Kennedy MD  P

## 2019-11-15 NOTE — PROGRESS NOTES
NCM contacted patient for TCM. Spoke with daughter, Stef Medina, who stated patient was doing well and did not have any swelling. She switched over to another call and our phone call was disconnected.   Called back and left message with [facility] contact information

## 2019-11-15 NOTE — DISCHARGE SUMMARY
[facility]  DISCHARGE SUMMARY     Alphonso Downs Patient Status:  Inpatient    1940 MRN [id number]   [facility] 2NE-A Attending No att. providers found   Hosp Day # 3 PCP Sherrill Martines MD     Date of Admission: 11/10/2019 Denies any nausea vomiting. Denies any diarrhea constipation. Denies any focal weakness or numbness.     Brief Synopsis:   53-year-old with acute on chronic heart failure preserved EF, diastolic dysfunction secondary to A. fib, residual valve disease pulm · None    Consultants:  • Cardiology, nutrition,     Discharge Medication List:     Discharge Medications      CONTINUE taking these medications      Instructions Prescription details   acetaminophen 500 MG Tabs  Commonly known as:  TYLENOL E tablet  Refills:  3     Potassium Chloride ER 20 MEQ Tbcr  Commonly known as:  K-DUR M20      Take 1 tablet (20 mEq total) by mouth daily.  TAKE 1 TABLET BY MOUTH DAILY   Quantity:  90 tablet  Refills:  3     Sertraline HCl 25 MG Tabs  Commonly known as:  Z -----------------------------------------------------------------------------------------------  PATIENT DISCHARGE INSTRUCTIONS: See electronic chart    Dustin Young NP,   11/14/2019    Time spent:  > 30 minutes

## 2019-11-15 NOTE — PROGRESS NOTES
Initial Post Discharge Follow Up   Discharge Date: 11/14/19  Contact Date: 11/15/2019    Consent Verification:  Assessment Completed With: Other: Daughter, Korin Santos received per patient?  written  HIPAA Verified?   Yes    Discharge Dx:  Mary Shea prescription is complete take over the counter Vitamin D 2000 units daily. 4 capsule 1   • Metoprolol Tartrate 50 MG Oral Tab Take 1 tablet (50 mg total) by mouth 2 (two) times daily.  180 tablet 3   • metFORMIN HCl 500 MG Oral Tab Take 1 tablet (500 mg tot • (NCM)  Were there any medication changes noted on AVS?  no  • May I go over your medications with you to make sure we are not missing anything? yes  • Are there any reasons that keep you from taking your medication as prescribed?  No  Are you having an spoke with patient's daughter about recent admission and reviewed discharge instructions.   She denies any new or worsening symptoms and states patient's swelling has resolved and they are aware to contact PCP office or return to ER if patient experiences a

## 2019-11-21 NOTE — PAYOR COMM NOTE
--------------  DISCHARGE REVIEW    Payor: BCBS MEDICARE ADV PPO  Subscriber #:  [id number]  Authorization Number: [id number]    Admit date: 11/11/19  Admit time:  7770  Discharge Date: 11/14/2019  9:33 PM    Requesting reconsideration of inpatient st Illness:   Gwenlyn Siemens is a 70year old female admitted with shortness of breath started 2 to 3 days back according to patient. Initially mild  in intensity and progressively worsened over the last couple of days.   Associated with fever of 101 °F.  [facility].   Discharged home this afternoon    Procedures during hospitalization:   • Chest x-ray mild to moderate pulmonary edema    furosemide (LASIX) injection 20 mg   Dose: 20 mg  Freq: 2 times daily (diuretic) 11/11/19 0050 203 lb 7.8 oz Standing scale TC     11/11  Chief Complaint:  Sob   S:  Up in chair breathing better      Review of Systems:   6 point ROS completed and was negative, except for pertinent positive and negatives stated in subjective.   Vital s negative  2. Hypokalemia  1. Replace  3. Atrial fibrillation  1. Continue home medications including amiodarone, metoprolol , diltiazem and Coumadin  2. Follow INR  4. Anxiety  1. Continue home medications  5.  Non-insulin-dependent diabetes mellitus type 2 S1 S2 regular +murmur  Lungs: Clear  Abdomen: Soft, non-tender. Extremities: +mild pitting LE edema. Peripheral pulses are 2+. mepilex on the right shin CDI.    Skin: Warm and dry.      • Calcium Carbonate-Vitamin D  2 tablet Oral Daily   • magnesium oxi estimated     to be 52mm Hg. Estimated pulmonary artery diastolic pressure was 95FH Hg.   Impressions:  This study is compared with previous dated 07/23/2019: Mitral  regurgitation severity appears slightly worse.     Admitting weight: 202lbs  Todays weight today - may consider switching to oral diuretics vs one more day of IV diuretics  -Continue amiodarone  -Continue diltiazem (cardizem CD) 120 mg daily  -Continue losartan 25 mg daily, lopressor 50 mg BID  -Continue coumadin - INR goal is 2-3  -Please follo the past 48 hrs:    Weight   11/10/19 2146 202 lb (91.6 kg)   11/11/19 0050 203 lb 7.8 oz (92.3 kg)                   Intake/Output:  No intake/output data recorded. I/O last 3 completed shifts:   In: 700 [P.O.:700]  Out: 3725 [JJNVD:1615]     Intake/Outpu left atrium was markedly dilated. 6. Right ventricle: Pacer C/W ICD noted in the right ventricle. 7. Right atrium: The atrium was markedly dilated. Pacer C/W ICD noted in     right atrium.   8. Atrial septum: There was a residual atrial shunt post-transse

## 2019-12-02 ENCOUNTER — ANTI-COAG (OUTPATIENT)
Dept: CARDIOLOGY | Age: 79
End: 2019-12-02

## 2019-12-02 DIAGNOSIS — I48.91 ATRIAL FIBRILLATION, UNSPECIFIED TYPE (CMD): ICD-10-CM

## 2019-12-05 ENCOUNTER — HOSPITAL (OUTPATIENT)
Dept: OTHER | Age: 79
End: 2019-12-05

## 2019-12-06 ENCOUNTER — HOSPITAL (OUTPATIENT)
Dept: OTHER | Age: 79
End: 2019-12-06

## 2019-12-06 PROCEDURE — 99221 1ST HOSP IP/OBS SF/LOW 40: CPT | Performed by: INTERNAL MEDICINE

## 2019-12-06 PROCEDURE — 93306 TTE W/DOPPLER COMPLETE: CPT | Performed by: INTERNAL MEDICINE

## 2019-12-06 PROCEDURE — 99219 INITIAL OBSERVATION CARE,LEVL II: CPT | Performed by: NURSE PRACTITIONER

## 2019-12-06 PROCEDURE — 99497 ADVNCD CARE PLAN 30 MIN: CPT | Performed by: NURSE PRACTITIONER

## 2019-12-07 PROCEDURE — 99232 SBSQ HOSP IP/OBS MODERATE 35: CPT | Performed by: INTERNAL MEDICINE

## 2019-12-08 PROCEDURE — 99232 SBSQ HOSP IP/OBS MODERATE 35: CPT | Performed by: INTERNAL MEDICINE

## 2019-12-09 PROCEDURE — 99232 SBSQ HOSP IP/OBS MODERATE 35: CPT | Performed by: NURSE PRACTITIONER

## 2019-12-09 PROCEDURE — 99232 SBSQ HOSP IP/OBS MODERATE 35: CPT | Performed by: INTERNAL MEDICINE

## 2019-12-10 PROCEDURE — 99233 SBSQ HOSP IP/OBS HIGH 50: CPT | Performed by: NURSE PRACTITIONER

## 2019-12-10 PROCEDURE — 99232 SBSQ HOSP IP/OBS MODERATE 35: CPT | Performed by: NURSE PRACTITIONER

## 2019-12-11 ENCOUNTER — DOCUMENTATION (OUTPATIENT)
Dept: CARDIOLOGY | Age: 79
End: 2019-12-11

## 2019-12-19 ENCOUNTER — APPOINTMENT (OUTPATIENT)
Dept: CARDIOLOGY | Age: 79
End: 2019-12-19

## 2019-12-30 ENCOUNTER — APPOINTMENT (OUTPATIENT)
Dept: CT IMAGING | Facility: HOSPITAL | Age: 79
End: 2019-12-30
Attending: EMERGENCY MEDICINE
Payer: MEDICARE

## 2019-12-30 ENCOUNTER — ANTI-COAG (OUTPATIENT)
Dept: CARDIOLOGY | Age: 79
End: 2019-12-30

## 2019-12-30 ENCOUNTER — APPOINTMENT (OUTPATIENT)
Dept: GENERAL RADIOLOGY | Facility: HOSPITAL | Age: 79
End: 2019-12-30
Attending: EMERGENCY MEDICINE
Payer: MEDICARE

## 2019-12-30 ENCOUNTER — HOSPITAL ENCOUNTER (EMERGENCY)
Facility: HOSPITAL | Age: 79
Discharge: HOME | End: 2019-12-31
Attending: EMERGENCY MEDICINE
Payer: MEDICARE

## 2019-12-30 VITALS
HEART RATE: 68 BPM | BODY MASS INDEX: 33.12 KG/M2 | RESPIRATION RATE: 18 BRPM | TEMPERATURE: 98 F | OXYGEN SATURATION: 95 % | SYSTOLIC BLOOD PRESSURE: 133 MMHG | HEIGHT: 64 IN | DIASTOLIC BLOOD PRESSURE: 76 MMHG | WEIGHT: 194 LBS

## 2019-12-30 DIAGNOSIS — I48.91 ATRIAL FIBRILLATION, UNSPECIFIED TYPE (CMD): ICD-10-CM

## 2019-12-30 DIAGNOSIS — I10 HYPERTENSION, UNSPECIFIED TYPE: Primary | ICD-10-CM

## 2019-12-30 LAB
ALBUMIN SERPL-MCNC: 3.3 G/DL (ref 3.4–5)
ALBUMIN/GLOB SERPL: 0.8 {RATIO} (ref 1–2)
ALP LIVER SERPL-CCNC: 125 U/L (ref 55–142)
ALT SERPL-CCNC: 36 U/L (ref 13–56)
ANION GAP SERPL CALC-SCNC: 4 MMOL/L (ref 0–18)
APTT PPP: 48.3 SECONDS (ref 25.4–36.1)
BASOPHILS # BLD AUTO: 0.04 X10(3) UL (ref 0–0.2)
BASOPHILS NFR BLD AUTO: 0.4 %
BILIRUB SERPL-MCNC: 0.6 MG/DL (ref 0.1–2)
BILIRUB UR QL STRIP.AUTO: NEGATIVE
BUN BLD-MCNC: 19 MG/DL (ref 7–18)
BUN/CREAT SERPL: 16.8 (ref 10–20)
CALCIUM BLD-MCNC: 9 MG/DL (ref 8.5–10.1)
CHLORIDE SERPL-SCNC: 107 MMOL/L (ref 98–112)
CLARITY UR REFRACT.AUTO: CLEAR
CO2 SERPL-SCNC: 27 MMOL/L (ref 21–32)
CREAT BLD-MCNC: 1.13 MG/DL (ref 0.55–1.02)
DEPRECATED RDW RBC AUTO: 48.4 FL (ref 35.1–46.3)
EOSINOPHIL # BLD AUTO: 0.1 X10(3) UL (ref 0–0.7)
EOSINOPHIL NFR BLD AUTO: 1 %
ERYTHROCYTE [DISTWIDTH] IN BLOOD BY AUTOMATED COUNT: 13.5 % (ref 11–15)
GLOBULIN PLAS-MCNC: 4.1 G/DL (ref 2.8–4.4)
GLUCOSE BLD-MCNC: 100 MG/DL (ref 70–99)
GLUCOSE BLD-MCNC: 93 MG/DL (ref 70–99)
GLUCOSE UR STRIP.AUTO-MCNC: NEGATIVE MG/DL
HCT VFR BLD AUTO: 42.4 % (ref 35–48)
HGB BLD-MCNC: 13.9 G/DL (ref 12–16)
IMM GRANULOCYTES # BLD AUTO: 0.04 X10(3) UL (ref 0–1)
IMM GRANULOCYTES NFR BLD: 0.4 %
INR BLD: 2.6 (ref 0.9–1.1)
KETONES UR STRIP.AUTO-MCNC: NEGATIVE MG/DL
LEUKOCYTE ESTERASE UR QL STRIP.AUTO: NEGATIVE
LYMPHOCYTES # BLD AUTO: 1.45 X10(3) UL (ref 1–4)
LYMPHOCYTES NFR BLD AUTO: 14.9 %
M PROTEIN MFR SERPL ELPH: 7.4 G/DL (ref 6.4–8.2)
MCH RBC QN AUTO: 32.1 PG (ref 26–34)
MCHC RBC AUTO-ENTMCNC: 32.8 G/DL (ref 31–37)
MCV RBC AUTO: 97.9 FL (ref 80–100)
MONOCYTES # BLD AUTO: 0.84 X10(3) UL (ref 0.1–1)
MONOCYTES NFR BLD AUTO: 8.7 %
NEUTROPHILS # BLD AUTO: 7.23 X10 (3) UL (ref 1.5–7.7)
NEUTROPHILS # BLD AUTO: 7.23 X10(3) UL (ref 1.5–7.7)
NEUTROPHILS NFR BLD AUTO: 74.6 %
NITRITE UR QL STRIP.AUTO: NEGATIVE
OSMOLALITY SERPL CALC.SUM OF ELEC: 288 MOSM/KG (ref 275–295)
PH UR STRIP.AUTO: 7 [PH] (ref 4.5–8)
PLATELET # BLD AUTO: 184 10(3)UL (ref 150–450)
POTASSIUM SERPL-SCNC: 3.8 MMOL/L (ref 3.5–5.1)
PROT UR STRIP.AUTO-MCNC: NEGATIVE MG/DL
PSA SERPL DL<=0.01 NG/ML-MCNC: 29.6 SECONDS (ref 12.5–14.7)
RBC # BLD AUTO: 4.33 X10(6)UL (ref 3.8–5.3)
RBC UR QL AUTO: NEGATIVE
SODIUM SERPL-SCNC: 138 MMOL/L (ref 136–145)
SP GR UR STRIP.AUTO: 1.01 (ref 1–1.03)
TROPONIN I SERPL-MCNC: <0.045 NG/ML (ref ?–0.04)
UROBILINOGEN UR STRIP.AUTO-MCNC: <2 MG/DL
WBC # BLD AUTO: 9.7 X10(3) UL (ref 4–11)

## 2019-12-30 PROCEDURE — 82962 GLUCOSE BLOOD TEST: CPT

## 2019-12-30 PROCEDURE — 93010 ELECTROCARDIOGRAM REPORT: CPT

## 2019-12-30 PROCEDURE — 71045 X-RAY EXAM CHEST 1 VIEW: CPT | Performed by: EMERGENCY MEDICINE

## 2019-12-30 PROCEDURE — 85730 THROMBOPLASTIN TIME PARTIAL: CPT | Performed by: EMERGENCY MEDICINE

## 2019-12-30 PROCEDURE — 99285 EMERGENCY DEPT VISIT HI MDM: CPT

## 2019-12-30 PROCEDURE — 96374 THER/PROPH/DIAG INJ IV PUSH: CPT

## 2019-12-30 PROCEDURE — 70450 CT HEAD/BRAIN W/O DYE: CPT | Performed by: EMERGENCY MEDICINE

## 2019-12-30 PROCEDURE — 85610 PROTHROMBIN TIME: CPT | Performed by: EMERGENCY MEDICINE

## 2019-12-30 PROCEDURE — 84132 ASSAY OF SERUM POTASSIUM: CPT | Performed by: EMERGENCY MEDICINE

## 2019-12-30 PROCEDURE — 85025 COMPLETE CBC W/AUTO DIFF WBC: CPT | Performed by: EMERGENCY MEDICINE

## 2019-12-30 PROCEDURE — 81003 URINALYSIS AUTO W/O SCOPE: CPT | Performed by: EMERGENCY MEDICINE

## 2019-12-30 PROCEDURE — 84484 ASSAY OF TROPONIN QUANT: CPT | Performed by: EMERGENCY MEDICINE

## 2019-12-30 PROCEDURE — 80053 COMPREHEN METABOLIC PANEL: CPT | Performed by: EMERGENCY MEDICINE

## 2019-12-30 PROCEDURE — 93005 ELECTROCARDIOGRAM TRACING: CPT

## 2019-12-30 RX ORDER — ACETAMINOPHEN 500 MG
1000 TABLET ORAL ONCE
Status: COMPLETED | OUTPATIENT
Start: 2019-12-30 | End: 2019-12-30

## 2019-12-30 RX ORDER — FUROSEMIDE 10 MG/ML
20 INJECTION INTRAMUSCULAR; INTRAVENOUS ONCE
Status: COMPLETED | OUTPATIENT
Start: 2019-12-30 | End: 2019-12-30

## 2019-12-31 LAB
ATRIAL RATE: 220 BPM
Q-T INTERVAL: 458 MS
QRS DURATION: 158 MS
QTC CALCULATION (BEZET): 497 MS
R AXIS: -15 DEGREES
T AXIS: 43 DEGREES
VENTRICULAR RATE: 71 BPM

## 2019-12-31 NOTE — ED PROVIDER NOTES
Patient Seen in: [facility] Emergency Department      History   Patient presents with:  Dizziness  Hypertension    Stated Complaint: chills, fevers, HTN, dizziness    HPI    Patient is a pleasant 74-year-old female, with multiple past medical proble defibrillator) procedure 7/7/2016    medtronic    • S/P total knee replacement 6/18/2015    bilat 1990s, both severe OA   • Subdural hematoma ([facility] [address] 12/01/2016    s/p L craniotomy, seizure during that time none since   • Varicose vein 9/18/2015   • Vitamin systems are as noted in HPI. Constitutional and vital signs reviewed. All other systems reviewed and negative except as noted above.     Physical Exam     ED Triage Vitals [12/30/19 2049]   BP (!) 176/105   Pulse 66   Resp 19   Temp 98.3 °F (36.8 °C) All other components within normal limits   CBC W/ DIFFERENTIAL - Abnormal; Notable for the following components:    RDW-SD 48.4 (*)     All other components within normal limits   TROPONIN I - Normal   POTASSIUM - Normal   POCT GLUCOSE - Normal   CBC W No sign of acute sinusitis. MASTOIDS:          No sign of acute inflammation. SKULL:             Left parietal craniectomy change again demonstrated. No fracture. OTHER:             No significant change.       CONCLUSION:  No acute intracranial patholog course. Patient should be brought back for any problems, worsening symptoms, or as discussed. Patient was subsequently discharged without incident.     Disposition and Plan     Clinical Impression:  Hypertension, unspecified type  (primary encounter emory

## 2019-12-31 NOTE — ED INITIAL ASSESSMENT (HPI)
Pt to ED with son with reports of headache, dizziness and hypertension at home. Son reports pt took all medications for days except for coumadin due to coumadin levels being elevated. Pt recently discharged from [facility] now at home with family.

## 2020-01-07 ENCOUNTER — TELEPHONE (OUTPATIENT)
Dept: FAMILY MEDICINE CLINIC | Facility: CLINIC | Age: 80
End: 2020-01-07

## 2020-01-07 NOTE — TELEPHONE ENCOUNTER
Pt's son is requesting an order for physical therapy at [facility]. Pt had a fall in December. Now pt's sciatica is acting up. Andrés Blush would like for her to come back but needs an order from PCP. Pt's son, Kartik Somers, would also like to speak to a nurse.

## 2020-01-07 NOTE — TELEPHONE ENCOUNTER
Spoke to pt's son, scheduled appt for pt    Future Appointments   Date Time Provider Sara Boo   1/8/2020 11:00 AM Amol Juarez MD [facility] [address]   4/28/2020 10:45 AM EH CARD ECHO RM 1 ECARD ECHO [facility] AT [facility]   4/28/2020 12:00 PM Estefanía S

## 2020-01-08 ENCOUNTER — TELEPHONE (OUTPATIENT)
Dept: FAMILY MEDICINE CLINIC | Facility: CLINIC | Age: 80
End: 2020-01-08

## 2020-01-08 ENCOUNTER — OFFICE VISIT (OUTPATIENT)
Dept: FAMILY MEDICINE CLINIC | Facility: CLINIC | Age: 80
End: 2020-01-08
Payer: MEDICARE

## 2020-01-08 VITALS
RESPIRATION RATE: 16 BRPM | HEIGHT: 64 IN | SYSTOLIC BLOOD PRESSURE: 138 MMHG | HEART RATE: 68 BPM | DIASTOLIC BLOOD PRESSURE: 80 MMHG | WEIGHT: 195 LBS | BODY MASS INDEX: 33.29 KG/M2 | TEMPERATURE: 98 F

## 2020-01-08 DIAGNOSIS — Z91.81 AT HIGH RISK FOR FALLS: ICD-10-CM

## 2020-01-08 DIAGNOSIS — M54.32 SCIATICA, LEFT SIDE: Primary | ICD-10-CM

## 2020-01-08 DIAGNOSIS — M17.0 BILATERAL PRIMARY OSTEOARTHRITIS OF KNEE: ICD-10-CM

## 2020-01-08 DIAGNOSIS — Z96.653 STATUS POST BILATERAL KNEE REPLACEMENTS: ICD-10-CM

## 2020-01-08 PROCEDURE — 99214 OFFICE O/P EST MOD 30 MIN: CPT | Performed by: FAMILY MEDICINE

## 2020-01-08 RX ORDER — TRAMADOL HYDROCHLORIDE 50 MG/1
50 TABLET ORAL EVERY 8 HOURS PRN
Qty: 60 TABLET | Refills: 0 | Status: ON HOLD | OUTPATIENT
Start: 2020-01-08 | End: 2020-05-13

## 2020-01-08 RX ORDER — METHYLPREDNISOLONE 4 MG/1
TABLET ORAL
Qty: 1 KIT | Refills: 0 | Status: SHIPPED | OUTPATIENT
Start: 2020-01-08 | End: 2020-05-09 | Stop reason: CLARIF

## 2020-01-08 NOTE — TELEPHONE ENCOUNTER
Spoke with pharmacy, needs okay to dispense. Drug interaction between Tramadol and Sertraline: possible serotonin syndrome. Please advise.

## 2020-01-08 NOTE — TELEPHONE ENCOUNTER
Spoke to Amanda Leo in Admissions who states that an order will be needed for readmission. Also needs the most recent office notes faxed and any imaging so they can send this to the insurance to get approval for readmission.   Order pended   [address]

## 2020-01-08 NOTE — PROGRESS NOTES
[facility] Group Visit Note  1/8/2020      Subjective:      Patient ID: Brendan Mcfarlane is a 70year old female.     Chief Complaint:  Patient presents with:  Fall: back in Dec she fell while inside [place] [address] bathroom was taking to Ziyad Gamble by Ambulance Clear to auscultation B, no wheezes, rales, or rhonchi, normal respiratory effort  Abd:  +bowel sounds, soft  Ext:  No pedal edema,  Pedal pulses 2+ B  MS: Unsafe to get her up on the table.    Neuro: neg SLR test on the L, unable to move legs well enough t

## 2020-01-08 NOTE — TELEPHONE ENCOUNTER
Order signed. No imaging ordered today, but if need some may print off her L & R knee and lumbar x-rays from 2018.

## 2020-01-08 NOTE — TELEPHONE ENCOUNTER
Tahmina from [facility] is calling for possible drug interaction, patient given Tramadol and is taking Sertraline. Wants to know if there is a med interaction or is it ok for patient to take.

## 2020-01-08 NOTE — TELEPHONE ENCOUNTER
Pt was admitted to [facility] on 12/30/19 for a fall, syncope and then went to [facility] for [employer]. She was sent home, but since then has fallen and now her sciatica is acting up.  I feel she would benefit from more physical therapy for her R

## 2020-01-27 ENCOUNTER — ANTI-COAG (OUTPATIENT)
Dept: CARDIOLOGY | Age: 80
End: 2020-01-27

## 2020-01-27 DIAGNOSIS — I48.91 ATRIAL FIBRILLATION, UNSPECIFIED TYPE (CMD): ICD-10-CM

## 2020-01-30 ENCOUNTER — APPOINTMENT (OUTPATIENT)
Dept: CARDIOLOGY | Age: 80
End: 2020-01-30

## 2020-02-04 NOTE — ED INITIAL ASSESSMENT (HPI)
Patient arrives via ambulance she is currently staying iun a hotel.  Medics were call for SOB o2 sat 84 % they placed her on cpapa gave her 2 rounds of nitro   Patient presents SOB rr 35 very labored
no

## 2020-02-12 ENCOUNTER — TELEPHONE (OUTPATIENT)
Dept: CARDIOLOGY | Age: 80
End: 2020-02-12

## 2020-02-19 ENCOUNTER — ANCILLARY PROCEDURE (OUTPATIENT)
Dept: CARDIOLOGY | Age: 80
End: 2020-02-19
Attending: INTERNAL MEDICINE

## 2020-02-19 DIAGNOSIS — Z95.810 ICD (IMPLANTABLE CARDIOVERTER-DEFIBRILLATOR) IN PLACE: Primary | ICD-10-CM

## 2020-02-19 DIAGNOSIS — I47.20 VENTRICULAR TACHYCARDIA (CMD): ICD-10-CM

## 2020-02-19 PROCEDURE — 93289 INTERROG DEVICE EVAL HEART: CPT | Performed by: INTERNAL MEDICINE

## 2020-04-27 ENCOUNTER — HOSPITAL ENCOUNTER (OUTPATIENT)
Age: 80
Discharge: HOME | End: 2020-04-27
Attending: FAMILY MEDICINE
Payer: MEDICARE

## 2020-04-27 VITALS
RESPIRATION RATE: 16 BRPM | HEIGHT: 66 IN | BODY MASS INDEX: 31.82 KG/M2 | WEIGHT: 198 LBS | SYSTOLIC BLOOD PRESSURE: 153 MMHG | OXYGEN SATURATION: 97 % | DIASTOLIC BLOOD PRESSURE: 84 MMHG | HEART RATE: 64 BPM

## 2020-04-27 DIAGNOSIS — S61.402A AVULSION OF SKIN OF LEFT HAND, INITIAL ENCOUNTER: Primary | ICD-10-CM

## 2020-04-27 PROCEDURE — 99212 OFFICE O/P EST SF 10 MIN: CPT

## 2020-04-28 NOTE — ED PROVIDER NOTES
Patient Seen in: THE [facility] END      History   Patient presents with:  Wound    Stated Complaint: WOUND ON HAND    HPI  This is a 69 yo F here with complaints of skin avulsion on the R hand   Thin skin and has had this issue in the 9/18/2015   • Vitamin D deficiency 2012              Past Surgical History:   Procedure Laterality Date   • ANGIOGRAM     • APPENDECTOMY     • CARDIAC DEFIBRILLATOR PLACEMENT     • CARDIAC PACEMAKER PLACEMENT     • COLONOSCOPY  04/05/2019    sessile serrat Triage Vitals [04/27/20 1012]   /84   Pulse 63   Resp 18   Temp    Temp src    SpO2 97 %   O2 Device None (Room air)       Current:/84   Pulse 64   Resp 16   Ht 167.6 cm (5' 6\")   Wt 89.8 kg   SpO2 97%   BMI 31.96 kg/m²       Physical Exam  GE

## 2020-05-09 ENCOUNTER — HOSPITAL ENCOUNTER (INPATIENT)
Facility: HOSPITAL | Age: 80
LOS: 6 days | Discharge: NURSING, REHAB OR LONG-TERM CARE | DRG: 178 | End: 2020-05-15
Attending: EMERGENCY MEDICINE | Admitting: INTERNAL MEDICINE
Payer: MEDICARE

## 2020-05-09 ENCOUNTER — APPOINTMENT (OUTPATIENT)
Dept: GENERAL RADIOLOGY | Facility: HOSPITAL | Age: 80
DRG: 178 | End: 2020-05-09
Attending: EMERGENCY MEDICINE
Payer: MEDICARE

## 2020-05-09 DIAGNOSIS — M54.32 SCIATICA, LEFT SIDE: ICD-10-CM

## 2020-05-09 DIAGNOSIS — M17.0 BILATERAL PRIMARY OSTEOARTHRITIS OF KNEE: ICD-10-CM

## 2020-05-09 DIAGNOSIS — E87.6 HYPOKALEMIA: ICD-10-CM

## 2020-05-09 DIAGNOSIS — R55 SYNCOPE, UNSPECIFIED SYNCOPE TYPE: ICD-10-CM

## 2020-05-09 DIAGNOSIS — Z96.653 STATUS POST BILATERAL KNEE REPLACEMENTS: ICD-10-CM

## 2020-05-09 DIAGNOSIS — U07.1 COVID-19 VIRUS INFECTION: Primary | ICD-10-CM

## 2020-05-09 PROCEDURE — 71045 X-RAY EXAM CHEST 1 VIEW: CPT | Performed by: EMERGENCY MEDICINE

## 2020-05-09 PROCEDURE — 99223 1ST HOSP IP/OBS HIGH 75: CPT | Performed by: INTERNAL MEDICINE

## 2020-05-09 RX ORDER — ALBUTEROL SULFATE 90 UG/1
1 AEROSOL, METERED RESPIRATORY (INHALATION) EVERY 4 HOURS PRN
Status: DISCONTINUED | OUTPATIENT
Start: 2020-05-09 | End: 2020-05-15

## 2020-05-09 RX ORDER — TRAMADOL HYDROCHLORIDE 50 MG/1
50 TABLET ORAL EVERY 8 HOURS PRN
Status: DISCONTINUED | OUTPATIENT
Start: 2020-05-09 | End: 2020-05-15

## 2020-05-09 RX ORDER — SERTRALINE HYDROCHLORIDE 25 MG/1
25 TABLET, FILM COATED ORAL DAILY
Status: DISCONTINUED | OUTPATIENT
Start: 2020-05-09 | End: 2020-05-15

## 2020-05-09 RX ORDER — DEXTROSE MONOHYDRATE 25 G/50ML
50 INJECTION, SOLUTION INTRAVENOUS
Status: DISCONTINUED | OUTPATIENT
Start: 2020-05-09 | End: 2020-05-15

## 2020-05-09 RX ORDER — HYDRALAZINE HYDROCHLORIDE 20 MG/ML
10 INJECTION INTRAMUSCULAR; INTRAVENOUS EVERY 6 HOURS PRN
Status: DISCONTINUED | OUTPATIENT
Start: 2020-05-09 | End: 2020-05-15

## 2020-05-09 RX ORDER — ONDANSETRON 2 MG/ML
4 INJECTION INTRAMUSCULAR; INTRAVENOUS EVERY 6 HOURS PRN
Status: DISCONTINUED | OUTPATIENT
Start: 2020-05-09 | End: 2020-05-09

## 2020-05-09 RX ORDER — TORSEMIDE 20 MG/1
20 TABLET ORAL 2 TIMES DAILY
COMMUNITY

## 2020-05-09 RX ORDER — AMIODARONE HYDROCHLORIDE 200 MG/1
200 TABLET ORAL DAILY
Status: DISCONTINUED | OUTPATIENT
Start: 2020-05-10 | End: 2020-05-15

## 2020-05-09 RX ORDER — URSODIOL 300 MG/1
300 CAPSULE ORAL 2 TIMES DAILY
Status: DISCONTINUED | OUTPATIENT
Start: 2020-05-09 | End: 2020-05-15

## 2020-05-09 RX ORDER — POTASSIUM CHLORIDE 20 MEQ/1
40 TABLET, EXTENDED RELEASE ORAL EVERY 4 HOURS
Status: DISCONTINUED | OUTPATIENT
Start: 2020-05-09 | End: 2020-05-10

## 2020-05-09 RX ORDER — ACETAMINOPHEN 500 MG
500 TABLET ORAL EVERY 6 HOURS PRN
Status: DISCONTINUED | OUTPATIENT
Start: 2020-05-09 | End: 2020-05-15

## 2020-05-09 RX ORDER — LOSARTAN POTASSIUM 25 MG/1
25 TABLET ORAL DAILY
Status: DISCONTINUED | OUTPATIENT
Start: 2020-05-09 | End: 2020-05-15

## 2020-05-09 RX ORDER — DILTIAZEM HYDROCHLORIDE 120 MG/1
120 CAPSULE, EXTENDED RELEASE ORAL DAILY
Status: DISCONTINUED | OUTPATIENT
Start: 2020-05-09 | End: 2020-05-15

## 2020-05-09 RX ORDER — METOPROLOL TARTRATE 50 MG/1
50 TABLET, FILM COATED ORAL 2 TIMES DAILY
Status: DISCONTINUED | OUTPATIENT
Start: 2020-05-09 | End: 2020-05-15

## 2020-05-09 RX ORDER — ENOXAPARIN SODIUM 100 MG/ML
40 INJECTION SUBCUTANEOUS DAILY
Status: CANCELLED | OUTPATIENT
Start: 2020-05-09

## 2020-05-09 NOTE — PLAN OF CARE
NURSING ADMISSION NOTE      Patient admitted via Cart  Oriented to room. Safety precautions initiated. Bed in low position. Call light in reach. Admission navigator completed with patient and daughter [facility].  Reviewed isolation guidelines and prec

## 2020-05-09 NOTE — ED PROVIDER NOTES
Patient Seen in: [facility] Emergency Department      History   Patient presents with:  Syncope  Fever  Cough/URI  Headache  Dizziness    Stated Complaint: flu like symptoms    HPI    Presents with flulike symptoms.   The patient states that for the oxygen periodically    • Osteoarthritis of shoulders, bilateral 06/18/2015    Severe, frozen shoulder syndrome   • Pericardial effusion 6/5/2014    trivial   • Pulmonary HTN ([facility] 10/10/2014   • Rheumatoid arthritis ([facility]    • S/P ICD (internal cardiac def Social History    Tobacco Use      Smoking status: Never Smoker      Smokeless tobacco: Never Used    Alcohol use: No    Drug use:  No             Review of Systems    Positive for stated complaint: flu like symptoms  Other systems are as WITH CULTURE REFLEX - Abnormal; Notable for the following components:    Blood Urine Small (*)     Leukocyte Esterase Urine Moderate (*)     WBC Urine 21-50 (*)     RBC URINE 6-10 (*)     Bacteria Urine Rare (*)     Squamous Epi.  Cells Large (*)     Hyalin order CBC WITH DIFFERENTIAL WITH PLATELET.   Procedure                               Abnormality         Status                     ---------                               -----------         ------                     CBC W/ DIFFERENTIAL[[id number]] Lois Calhoun MD on 5/09/2020 at 11:18 AM     Finalized by: Ye Bennett MD on 5/09/2020 at 11:19 AM          The patient was started on a potassium rider for her critically low potassium as well as IV fluid.     MDM     The patient has tested positive for polyneuropathy, without long-term current use of insulin (HCC) E11.42 Unknown Yes    Vasovagal syncope R55 7/25/2018 Yes

## 2020-05-09 NOTE — H&P
[facility] HOSPITAL[facility]                                                               History & Physical         Kevin Iban Patient Status:  Emergency    1940 MRN [id number]   Location [facility] [facility] Attending [facility] mellitus ([facility] 09/16/2016    Severe both legs   • Diverticulitis    • Essential hypertension    • Fracture of C1 vertebra, closed ([facility] 07/2016   • Gallstones    • High cholesterol    • History of syncope     cardiogenic   • Insomnia 11/25/2013   • Kidney PCP:   Dr. Ahmadi Backbone   • HERNIA SURGERY     • HYSTERECTOMY      2/2 prolapse, no cancer   • INJECT EPIDURAL ANEST,LUMB,CONTINOUS  2012   • MITRACLIP N/A 4/11/2019    Performed by Rita Mcclelland MD at [address] [place] [address]  2016    surgery to relieve pressure deficits. Musculoskeletal: Full range of motion of all extremities. No swelling noted. Integument: No lesions. No erythema. Psychiatric: Appropriate mood and affect.       Diagnostic Data:      Laboratory Data:   Lab Results   Component Value Date    CR coagulopathy  5. Anxiety  1. Patient takes Zoloft at home for this  6. Asthma, COPD  1. Stable at this time  2. Patient on albuterol inhaler as needed at home  7. 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 Hyperglycemia protocol  2.  Last hemog

## 2020-05-09 NOTE — ED INITIAL ASSESSMENT (HPI)
Pt c/o flu  Like symptoms for the past week with fever and productive cough. Pt states she has recently had a loss of taste but not smell. Pt denies exposure to COVID and lives at home.  Pt had syncopal episode upon arriving to immediate care today; pt sent

## 2020-05-09 NOTE — ED NOTES
Report called to [facility], Janette Howe, room 502 ready. Transport notified.  Daughter notified of pt adm status and room number

## 2020-05-10 PROCEDURE — 99233 SBSQ HOSP IP/OBS HIGH 50: CPT | Performed by: INTERNAL MEDICINE

## 2020-05-10 PROCEDURE — 99232 SBSQ HOSP IP/OBS MODERATE 35: CPT | Performed by: INTERNAL MEDICINE

## 2020-05-10 RX ORDER — PROCHLORPERAZINE EDISYLATE 5 MG/ML
10 INJECTION INTRAMUSCULAR; INTRAVENOUS EVERY 6 HOURS PRN
Status: DISCONTINUED | OUTPATIENT
Start: 2020-05-10 | End: 2020-05-15

## 2020-05-10 RX ORDER — POTASSIUM CHLORIDE 20 MEQ/1
40 TABLET, EXTENDED RELEASE ORAL EVERY 4 HOURS
Status: COMPLETED | OUTPATIENT
Start: 2020-05-10 | End: 2020-05-10

## 2020-05-10 NOTE — PLAN OF CARE
On RA, vitals stable, sitting in chair, states she sleeps in chair at home. Very weak lower extremities, fall precautions in place and reinforced with pt, verbalized understanding. LS diminished ladonna but very coarse bronchial sounds.   Instructed how to us

## 2020-05-10 NOTE — PLAN OF CARE
Pt nauseous with small emesis, notified Dr. Freya Bowen order received. Also informed phys of daughter's request for mitral valve check, per phys, will inform cardiologist.  Page sent to Dr. Natalee Fields.     Rec'd call from Dr. Natalee Fields, informed of request, p

## 2020-05-10 NOTE — DIETARY NOTE
[facility]   CLINICAL NUTRITION    Dmitry      Admitting diagnosis:  Hypokalemia [E87.6]  Syncope, unspecified syncope type [R55]  COVID-19 virus infection [U07.1]    Ht: 167.6 cm (5' 6\")  Wt: 85.7 kg (189 lb).  This is 145 % of IBW  Body mass

## 2020-05-10 NOTE — PLAN OF CARE
Temp elevated at 100.5, BP stable, O2 86-88% on RA, applied 2 L per nasal can, O2 now mid 90's, RR 28.  C/O headache and nausea, but states nausea is improved post anti-emetic, tylenol administered. Oral care and toileting performed.   Pt sitting comfortab

## 2020-05-10 NOTE — PLAN OF CARE
Rec'd call from Lab with critical INR of 6.34, paged Dr. Suzette Carrasco, rec'd return call, no new orders rec'd.

## 2020-05-10 NOTE — PLAN OF CARE
Spoke with Daughter, Mari Blackman, updated with plan of care. Daughter stated she was due for mitral valve check and is requesting we do that while she is here.

## 2020-05-10 NOTE — PROGRESS NOTES
Called by RN d/t abnormal UA  Patient is asymptomatic  No fever  PCT neg    Plan:  Hold abx  Follow-up cultures  If febrile/clinical change, will start abx  Discussed with CASSIE Gomes MD

## 2020-05-10 NOTE — PLAN OF CARE
Problem: Patient/Family Goals  Goal: Patient/Family Long Term Goal  Description  Patient's Long Term Goal: Discharge home     Interventions:  - IS encouragement  - Ambulate in room  - prone  - See additional Care Plan goals for specific interventions   O risk of injury  - Provide assistive devices as appropriate  - Consider OT/PT consult to assist with strengthening/mobility  - Encourage toileting schedule  Outcome: Progressing     Problem: DISCHARGE PLANNING  Goal: Discharge to home or other facility with

## 2020-05-10 NOTE — PLAN OF CARE
Updated Dr. Jayashree Eden with abnormal lab and pt status, no new orders at this time, phys will see pt.

## 2020-05-10 NOTE — CONSULTS
[facility]  Cardiology Consultation    Sheela Velasquez Patient Status:  Inpatient    1940 MRN [id number]   [facility] 5NW-A Attending Mikayla Gupta MD   Hosp Day # 1 PCP Yovana Funk MD     Reason for Consultation:  CHF shoulders, bilateral 06/18/2015    Severe, frozen shoulder syndrome   • Pericardial effusion 6/5/2014    trivial   • Pulmonary HTN ([facility] 10/10/2014   • Rheumatoid arthritis ([facility]    • S/P ICD (internal cardiac defibrillator) procedure 7/7/2016    medtronic Diabetes Father    • Heart Disease Father    • Diabetes Mother    • Diabetes Sister    • Heart Disease Sister    • Diabetes Brother    • Heart Disease Brother    • Cancer Brother       reports that she has never smoked.  She has never used smokeless tobacco Wt 189 lb   SpO2 90%   BMI 30.51 kg/m²   Temp (24hrs), Av.6 °F (37 °C), Min:97.9 °F (36.6 °C), Max:99.6 °F (37.6 °C)       Intake/Output Summary (Last 24 hours) at 5/10/2020 1136  Last data filed at 2020 1958  Gross per 24 hour   Intake 240 ml   Ashwin Plume ventricle. 7. Right atrium: The atrium was markedly dilated. Pacer C/W ICD noted in     right atrium. 8. Atrial septum: There was a residual atrial shunt post-transseptal     catheterization.   9. Pulmonary arteries: Systolic pressure was moderately incre

## 2020-05-10 NOTE — PROGRESS NOTES
[facility]  Progress Note    Dmitry Brayan Patient Status:  Inpatient    1940 MRN [id number]   [facility] 5NW-A Attending April Haines MD   [facility] Day # 1 PCP Lola Rueda MD     CC: Fever, headache, cough, dizziness, sync 141 05/10/2020    K 4.3 05/10/2020     05/10/2020    CO2 24.0 05/10/2020    GLU 99 05/10/2020    CA 8.0 05/10/2020    ALB 2.8 05/10/2020    ALKPHO 93 05/10/2020    BILT 0.7 05/10/2020    TP 6.2 05/10/2020    AST 36 05/10/2020    ALT 40 05/10/2020 today  3. Coagulopathy due to Coumadin  1. Hold Coumadin as INR continues to used to be high, 6.28 today. No signs of bleeding. 2. Follow INR  4. Chronic atrial fibrillation  1. Continue home Cardizem and amiodarone and metoprolol  2.  Hold Coumadin due t will require inpatient services that will reasonably be expected to span two midnight's based on the clinical documentation in H+P. Based on patients current state of illness, I anticipate that, after discharge, patient will require TBD.     D/w daughter

## 2020-05-11 PROCEDURE — 99441 TELEPHONE E&M BY PHYSICIAN EST PT NOT ORIG PREV 7 DAYS 5-10 MIN: CPT | Performed by: INTERNAL MEDICINE

## 2020-05-11 PROCEDURE — 99232 SBSQ HOSP IP/OBS MODERATE 35: CPT | Performed by: INTERNAL MEDICINE

## 2020-05-11 RX ORDER — TORSEMIDE 20 MG/1
20 TABLET ORAL
Status: DISCONTINUED | OUTPATIENT
Start: 2020-05-11 | End: 2020-05-15

## 2020-05-11 NOTE — PROGRESS NOTES
[facility]  Progress Note    Ramandeep Anne Patient Status:  Inpatient    1940 MRN [id number]   [facility] 5NW-A Attending Jayleen Santiago MD   [facility] Day # 2 PCP Kendy Valdez MD     CC: Fever, headache, cough, dizziness, sync 6.39 05/11/2020    PTP 57.5 05/11/2020    DDIMER <0.27 05/11/2020    CRP 15.00 05/11/2020    PGLU 109 05/11/2020       Imaging:  Imaging reviewed in Epic.     Meds:   torsemide (DEMADEX) tab 20 mg, 20 mg, Oral, BID (Diuretic)  benzocaine-menthol (CEPACOL (S Coumadin  1. Hold Coumadin as INR continues to used to be high, 6.28 today. No signs of bleeding. Vitamin K given by cardiology today for coagulopathy  2. Follow INR  4. Chronic atrial fibrillation  1.  Continue home Cardizem and amiodarone and metoprolol patient and RN  D/w daughter  Silas Copier of [facility] [phone]   Daughter still feels mom may need ALYSHA at time of d/c and that she is too weak at this time to come home with Lorena Oh MD  5/11/2020

## 2020-05-11 NOTE — PAYOR COMM NOTE
--------------  ADMISSION REVIEW     Payor: [employer] MEDICARE ADV PPO  Subscriber #:  [id number]  Authorization Number: [id number]    Admit date: 5/9/20  Admit time: 8854       Admitting Physician: Reina Wright MD  Attending Physician:  Radha Mendoza • Diabetes mellitus type 2, noninsulin dependent ([facility]     Type !!   • Diabetic peripheral neuropathy associated with type 2 diabetes mellitus ([facility] 09/16/2016    Severe both legs   • Diverticulitis    • Essential hypertension    • Fracture of C1 vertebra, Repair of Incarcerated Complex Ventral Hernia w/mesh, bilateral component separation, removal previous mesh, partial omentectomy, lysis of adhesions on 3/24/2011:  PCP:  Dr. Roberto Carlos De Leon   • [address]     • HYSTERECTOMY      2/2 prolapse, no cancer GFR, Non- 55 (*)     AST 39 (*)     A/G Ratio 0.9 (*)     All other components within normal limits   LDH - Abnormal; Notable for the following components:     (*)     All other components within normal limits   C-REACTIVE PROTEIN - MAGNESIUM - Normal   TROPONIN I - Normal   TROPONIN I - Normal   HEMOGLOBIN A1C - Normal   POCT GLUCOSE - Normal   C. DIFFICILE(TOXIGENIC)PCR - Normal   CBC WITH DIFFERENTIAL WITH PLATELET    Narrative:      The following orders were created for panel order Rhythm: Atrial Fibrillation  Reading: Left bundle branch block-old              Xr Chest Ap Portable  (cpt=71045)    Result Date: 5/9/2020  PROCEDURE:  XR CHEST AP PORTABLE  (CPT=71045)  TECHNIQUE:  AP chest radiograph was obtained.   COMPARISON:  [facility] , Chronic obstructive pulmonary disease (HCC) J44.9 3/7/2014 Yes    Chronic systolic congestive heart failure ([facility] I50.22 12/1/2016 Yes    COVID-19 U07.1 5/9/2020 Yes    Essential hypertension I10 9/1/2015 Yes    Hypokalemia E87.6 Unknown Yes    RADHA (obstr Petros Rocha is a 68year old female admitted with fever, cough, headache, dizziness and syncopal episode at home. Associated loss of taste. Symptoms started yesterday.   Patient states she has been self-monitoring at home and did not have any exposure • Obesity, morbid, BMI 40.0-49.9 ([facility] 6/18/2015   • RADHA (obstructive sleep apnea) 06/29/2012    Cannot tolerate CPAP machine- uses oxygen periodically    • Osteoarthritis of shoulders, bilateral 06/18/2015    Severe, frozen shoulder syndrome   • Pericardi • US FNA LYMPH NODE, GUIDE INCLD,  LEFT (CPT=10005)  1/14/14   • VALVE REPAIR  05/03/2019    mitral valve clip placed 2/2 severe mitral regurgitation       Family history:  Family History   Problem Relation Age of Onset   • Diabetes Father    • Heart Disea CO2 28.0 05/09/2020    GLU 92 05/09/2020    CA 8.8 05/09/2020    ALB 3.4 05/09/2020    ALKPHO 103 05/09/2020    BILT 0.6 05/09/2020    TP 7.3 05/09/2020    AST 39 05/09/2020    ALT 46 05/09/2020    INR 5.34 05/09/2020    PTP 50.0 05/09/2020    CRP 2.15 05 8. Essential hypertension  1. Patient on metoprolol and Cardizem and losartan at home which we will continue  2. Follow blood pressure  9. Obstructive sleep apnea  1. RADHA protocol  10. Mild dementia  11. Rheumatoid arthritis  12.  History of subdural hemato Selina Turner is a a(n) 68year old female with complex cardiac history including long standing afib on Coumadin, CAD and VT s/p ICD, severe MR s/p MitraClip X2 (most recently 4/2019 with reduction of MR severity from severe to mild post-op) who presents   medtronic    • S/P total knee replacement 6/18/2015     bilat 1990s, both severe OA   • Subdural hematoma ([facility] 12/01/2016     s/p L craniotomy, seizure during that time none since   • Varicose vein 9/18/2015   • Vitamin D deficiency 2012            Past reports that she has never smoked.  She has never used smokeless tobacco. She reports that she does not drink alcohol or use drugs.     Allergies:     Aspirin                 OTHER (SEE COMMENTS)    Comment:Contraindicated 2/2 taking coumadin  Mexitil [Bertram Intake/Output Summary (Last 24 hours) at 5/10/2020 1136  Last data filed at 5/9/2020 1958      Gross per 24 hour   Intake 240 ml   Output 600 ml   Net -360 ml      Wt Readings from Last 3 Encounters:  05/09/20 : 189 lb  04/27/20 : 198 lb  01/08/20 : 195 lb    right atrium. 8. Atrial septum: There was a residual atrial shunt post-transseptal     catheterization. 9. Pulmonary arteries: Systolic pressure was moderately increased, estimated     to be 52mm Hg.  Estimated pulmonary artery diastolic pressure was 1 Has intractable cough. Still feels weak and tired. Denies any headache or dizziness. Had bowel movement normal in color. No blood in stools. Fever and 9.6 °F today.   Was on 2 L of oxygen via nasal cannula overnight, weaned to room air again today as i   ALT 40 05/10/2020     INR 6.28 05/10/2020     PTP 56.7 05/10/2020     DDIMER <0.27 05/10/2020     CRP 3.84 05/10/2020     TROP <0.045 05/09/2020     PGLU 160 05/10/2020         Imaging:  Imaging reviewed in Epic.     Meds:   Prochlorperazine Edisylate (C 2. Hold Coumadin due to coagulopathy  5. Anxiety  1. Patient takes Zoloft at home for this  6. Asthma, COPD  1. Stable at this time  2. Patient on albuterol inhaler as needed at home  7. 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  Hyperglycemia Based on patients current state of illness, I anticipate that, after discharge, patient will require TBD.     D/w daughter  Sarah Florian of [address] [place] [address]            Elliott Feldman MD  5/10/2020  1:18 PM                       MEDICATIONS

## 2020-05-11 NOTE — OCCUPATIONAL THERAPY NOTE
OCCUPATIONAL THERAPY EVALUATION - INPATIENT     Room Number: 502/502-A  Evaluation Date: 5/11/2020  Type of Evaluation: Initial  Presenting Problem: COVID, syncope    Physician Order: IP Consult to Occupational Therapy  Reason for Therapy: ADL/IADL Dysfunc (obstructive sleep apnea) 06/29/2012    Cannot tolerate CPAP machine- uses oxygen periodically    • Osteoarthritis of shoulders, bilateral 06/18/2015    Severe, frozen shoulder syndrome   • Pericardial effusion 6/5/2014    trivial   • Pulmonary HTN ([facility] 1 VALVE REPAIR  05/03/2019    mitral valve clip placed 2/2 severe mitral regurgitation       OCCUPATIONAL PROFILE    HOME SITUATION  Type of Home: House  Home Layout: One level  Lives With: (family has been staying with almost 24hrs)    Toilet and Equipment: Little  -   Putting on and taking off regular upper body clothing?: A Little  -   Taking care of personal grooming such as brushing teeth?: A Little  -   Eating meals?: A Little    AM-PAC Score:  Score: 18  Approx Degree of Impairment: 46.65%  Standardized review of medical or therapy record   Specific performance deficits impacting engagement in ADL/IADL MODERATE  3 - 5 performance deficits   Client Assessment/Performance Deficits MODERATE - Comorbidities and min to mod modifications of tasks    Ji Henry

## 2020-05-11 NOTE — PROGRESS NOTES
[facility] Cardiology  Progress Note    This patient is COVID-19 positive.  For purposes of responsible PPE use in this time of PPE shortage during COVID-19 pandemic and to limit possibility of further viral transmission, the following is a vi and discussion with the patient and nursing.     Laboratory/Data:    Labs:  Lab Results   Component Value Date    DDIMER <0.27 05/11/2020    CRP 15.00 05/11/2020       Recent Labs   Lab 05/09/20  1042 05/09/20  1711 05/10/20  0633 05/11/20  0635   WBC  -- Subcutaneous, TID CC and HS  sodium chloride 0.9% IV bolus 500 mL, 500 mL, Intravenous, PRN  glucose (DEX4) oral liquid 15 g, 15 g, Oral, Q15 Min PRN    Or  Glucose-Vitamin C (DEX-4) chewable tab 4 tablet, 4 tablet, Oral, Q15 Min PRN    Or  dextrose 50 % i -Coumadin was held. No signs of bleeding. Hemoglobin stable above 13. INR reversed with vitamin K 5 mg IV. 3.  Chronic atrial fibrillation -controlled with medications. Troponin negative and no ischemia on EKG.   No angina or CHF-like symptoms -edema is diltiazem  -Bronchodilators  -Amiodarone maintenance dose as at home   -Oxygen  -Headache management with Tylenol  -COVID symptomatic treatment  -Urine culture if okay with hospitalist based on urine analysis  -No need for additional cardiac testing at pre

## 2020-05-11 NOTE — PLAN OF CARE
Orthostatic when up w/ PT/ OT- bp=91/47 but back to 109/47 when sitting (refused to lay in bed), [place] SRINIVASAN notified, no changes to meds at this time, c/o sore throat, Cepacol lozenges ordered prn.

## 2020-05-11 NOTE — CM/SW NOTE
Plan of care reviewed for care coordination and discharge planning.  Pt from home admitted for fever, cough, syncopal episode, found to have COVID-19 infection with underlying multiple medical problems including: CAF, COPD, DVT, DM, HTN, PAH, CAD, VT, s/p I

## 2020-05-11 NOTE — PLAN OF CARE
A/OX4, RA O2sats>92% , VSS, A.fib per tele  Generalized weakness, able to stand X1 assist w/ walker and gaitbelt  C/o HA, managed w/ tylenol  States mildly sob, no cp, lungs clear/diminished  Needs attended to, Will cont to monitor.       Problem: RISK FOR

## 2020-05-11 NOTE — PHYSICAL THERAPY NOTE
PHYSICAL THERAPY EVALUATION - INPATIENT     Room Number: 502/502-A  Evaluation Date: 5/11/2020  Type of Evaluation: Initial  Physician Order: PT Eval and Treat    Presenting Problem: +COVID19, near syncope  Reason for Therapy: Mobility Dysfunction an (obstructive sleep apnea) 06/29/2012    Cannot tolerate CPAP machine- uses oxygen periodically    • Osteoarthritis of shoulders, bilateral 06/18/2015    Severe, frozen shoulder syndrome   • Pericardial effusion 6/5/2014    trivial   • Pulmonary HTN ([facility] 1 VALVE REPAIR  05/03/2019    mitral valve clip placed 2/2 severe mitral regurgitation       HOME SITUATION  Type of Home: House   Home Layout: One level  Stairs to Enter : 1             Lives With: (family has been staying with almost 24hrs)             Lacy of Impairment: 50.57%   Standardized Score (AM-PAC Scale): 42.13   CMS Modifier (G-Code): CK    FUNCTIONAL ABILITY STATUS  Gait Assessment   Gait Assistance: Minimum assistance  Distance (ft): 2  Assistive Device: Rolling walker  Pattern: Shuffle to Meet Established Goals: 3      CURRENT GOALS    Goal #1 Patient is able to demonstrate supine - sit EOB @ level: supervision     Goal #2 Patient is able to demonstrate transfers Sit to/from Stand at assistance level: supervision     Goal #3 Patient is a

## 2020-05-11 NOTE — PLAN OF CARE
Critical INR level called =6.3- was 6.2 yesterday, Dr. Jamaica Marinelli notified, called by Dr. Chelsie Hoff , Vit K ordered, no bleeding noted,started on Torsemide,unable to do prone position & states only able to sit up in chair, dsg changed to Lt hand , steri-strips i

## 2020-05-12 PROCEDURE — 99232 SBSQ HOSP IP/OBS MODERATE 35: CPT | Performed by: INTERNAL MEDICINE

## 2020-05-12 PROCEDURE — G2012 BRIEF CHECK IN BY MD/QHP: HCPCS | Performed by: NURSE PRACTITIONER

## 2020-05-12 RX ORDER — POTASSIUM CHLORIDE 20 MEQ/1
40 TABLET, EXTENDED RELEASE ORAL EVERY 4 HOURS
Status: COMPLETED | OUTPATIENT
Start: 2020-05-12 | End: 2020-05-12

## 2020-05-12 RX ORDER — WARFARIN SODIUM 5 MG/1
5 TABLET ORAL
Status: COMPLETED | OUTPATIENT
Start: 2020-05-12 | End: 2020-05-12

## 2020-05-12 RX ORDER — POTASSIUM CHLORIDE 20 MEQ/1
40 TABLET, EXTENDED RELEASE ORAL ONCE
Status: COMPLETED | OUTPATIENT
Start: 2020-05-12 | End: 2020-05-12

## 2020-05-12 RX ORDER — WARFARIN SODIUM 2.5 MG/1
2.5 TABLET ORAL NIGHTLY
Status: DISCONTINUED | OUTPATIENT
Start: 2020-05-12 | End: 2020-05-12

## 2020-05-12 NOTE — PROGRESS NOTES
[facility]  Cardiology Progress Note      This patient is COVID-19 positive.  For purposes of responsible PPE use in this time of PPE shortage during COVID-19 pandemic and to limit possibility of further viral transmission, the following is a virtual f Warfarin 2.5mg PO nightly and adjust accordingly based on INR.  - Continue baseline cardiac regimen  - Symptomatic treatment for acute viral illness per hospitalist    VIKKI Tena  5/12/2020  11:49 AM

## 2020-05-12 NOTE — PLAN OF CARE
Pt is A&O x4, forgetful at times. VSS and afebrile. A fib, rate controlled in 60s on tele. O2 sats WNL on room air. Lung sounds diminished bilaterally. Reports productive cough with thick sputum. Reports mild SOB and dyspnea on exertion.  C/o intermittent h anticipated neutropenic period  Description  INTERVENTIONS  - Monitor WBC  - Administer growth factors as ordered  - Implement neutropenic guidelines  Outcome: Progressing     Problem: SAFETY ADULT - FALL  Goal: Free from fall injury  Description  INTERVEN and engage patient/family in tolerated activity level and precautions  - Up to chair x3 daily   Outcome: Progressing     Problem: Impaired Activities of Daily Living  Goal: Achieve highest/safest level of independence in self care  Description  Interventio

## 2020-05-12 NOTE — PROGRESS NOTES
[facility]  Progress Note    Alphonso Downs Patient Status:  Inpatient    1940 MRN [id number]   [facility] 5NW-A Attending Marcella Jones MD   [facility] Day # 3 PCP Sherrill Martines MD     CC: Fever, headache, cough, dizziness, sync  05/12/2020    K 3.4 05/12/2020     05/12/2020    CO2 28.0 05/12/2020    GLU 88 05/12/2020    CA 8.4 05/12/2020    INR 1.23 05/12/2020    PTP 15.9 05/12/2020    DDIMER 0.27 05/12/2020    CRP 11.40 05/12/2020    PGLU 93 05/12/2020       Imaging: and CRP trending down today. 2. D-dimer normal  3. Follow pulse ox  4. Symptomatic management for now  5. PT OT, discharge planning  2. Hypokalemia  1. Replaced and came back to normal today  3. Coagulopathy due to Coumadin  1.  Hold Coumadin on admission physician Dr. Woodrow Cleaning  Estimated date of discharge: To be decided  Discharge is dependent on: Clinical progress, follow INR , cardiology clearance, discharge planning  At this point Ms. Jocelyn Grimaldo  is expected to be discharge to: PT OT recommends 24-hour care

## 2020-05-12 NOTE — CM/SW NOTE
Spoke with pt's dtr Jeri about PT/OT recommending home with 24 hr supervision and HHPT. Gibson Sahni said she will never get [facility] for her pt and wants pt to go to [facility] for [employer]. Explained to her that [facility] is not taking new pts at this time.   Melony Stone

## 2020-05-12 NOTE — PROGRESS NOTES
[facility]  Progress Note     Gwenlyn Siemens Patient Status:  Inpatient    1940 MRN [id number]   [facility] 5NW-A Attending Ron Gimenez MD   Hosp Day # 3 PCP Kayleen Cruz MD     Chief Complaint: cough    S: Patient fe --  28.0   ALKPHO 103  --   --  93  --   --    AST 39*  --   --  36  --   --    ALT 46  --   --  40  --   --    BILT 0.6  --   --  0.7  --   --    TP 7.3  --   --  6.2*  --   --     < > = values in this interval not displayed.        Estimated Creatinine Cl warfarin and torsemide dosing on med rec to cardiology.     Quality:  · DVT Prophylaxis: warfarin  · CODE status: full  · Guerrero: no  · Central line: no    Will the patient be referred to TCC on discharge?: no, pcp  Estimated date of discharge: today  [address]

## 2020-05-12 NOTE — HOME CARE LIAISON
TNL rec'd verbal order to offer HH on dc. TNL reach out to ptnt dtr Jeri MORE to discuss [place] for her Mom on dc. Dtr declined stated she would want rehab on dc. Dtr stated she has spoken to the RN and DR about it.   Ptnt has a history with [facility] and dtr

## 2020-05-12 NOTE — PAYOR COMM NOTE
--------------  CONTINUED STAY REVIEW-------REQUESTING ADDITIONAL DAY 5/11  Payor: BCBS MEDICARE ADV PPO  Subscriber #:  [id number]  Authorization Number: [id number]    Admit date: 5/9/20  Admit time: Bassem Haynes 6    Admitting Physician: Edison Ford MD  At Lungs: clear to auscultation bilaterally  Heart: S1, S2 normal, no murmur,  regular rate and rhythm  Abdomen: soft, non-tender; bowel sounds normal  Extremities: extremities normal,no cyanosis or edema  Pulses: 2+ and symmetric  Skin: Skin color, texture, ursodiol (ACTIGALL) cap 300 mg, 300 mg, Oral, BID  traMADol HCl (ULTRAM) tab 50 mg, 50 mg, Oral, Q8H PRN  hydrALAzine HCl (APRESOLINE) injection 10 mg, 10 mg, Intravenous, Q6H PRN           ASSESSMENT / PLAN:         1.  Fever, cough, headache, dizziness an Quality:  · DVT Prophylaxis: INR supratherapeutic  · CODE status: full  · Guerrero: External catheter      Will the patient be referred to TCC on discharge?:  Follow-up with regular outpatient primary care physician Dr. Reyes Davies  Estimated date of discharge:  Cl Griffith Date Action Dose Route User    5/12/2020 0913 Given 25 mg Oral Analia Gongora RN      torsemide [facility]) tab 20 mg     Date Action Dose Route User    5/12/2020 0913 Given 20 mg Oral Rubi Justice RN    5/11/2020 1645 Given 20 mg Oral TERRANCE Kowalski

## 2020-05-12 NOTE — PLAN OF CARE
Patient received sitting up in there recliner chair this morning, alert and oriented x 4. She is at room air with saturation of 96%. Reinforced instruction regarding use of IS and self proning when in bed.   Patient verbalized not able to tolerate proning

## 2020-05-13 PROCEDURE — 99441 TELEPHONE E&M BY PHYSICIAN EST PT NOT ORIG PREV 7 DAYS 5-10 MIN: CPT | Performed by: INTERNAL MEDICINE

## 2020-05-13 PROCEDURE — 99232 SBSQ HOSP IP/OBS MODERATE 35: CPT | Performed by: HOSPITALIST

## 2020-05-13 RX ORDER — POTASSIUM CHLORIDE 20 MEQ/1
40 TABLET, EXTENDED RELEASE ORAL ONCE
Status: COMPLETED | OUTPATIENT
Start: 2020-05-13 | End: 2020-05-13

## 2020-05-13 RX ORDER — WARFARIN SODIUM 5 MG/1
5 TABLET ORAL
Status: COMPLETED | OUTPATIENT
Start: 2020-05-13 | End: 2020-05-13

## 2020-05-13 RX ORDER — ENOXAPARIN SODIUM 100 MG/ML
1 INJECTION SUBCUTANEOUS EVERY 12 HOURS SCHEDULED
Status: DISCONTINUED | OUTPATIENT
Start: 2020-05-13 | End: 2020-05-15

## 2020-05-13 RX ORDER — TRAMADOL HYDROCHLORIDE 50 MG/1
50 TABLET ORAL EVERY 8 HOURS PRN
Qty: 20 TABLET | Refills: 0 | Status: ON HOLD | OUTPATIENT
Start: 2020-05-13 | End: 2020-07-08

## 2020-05-13 NOTE — PLAN OF CARE
Assumed pt care at 0730  VSS, A&Ox3-4, forgetful at times  RA  Pt complaint of head and thoat pain, PRN Tramadol and Cepacol given w/relief  Pt up in chair  Encouraged use of IS   K+ replaced per protocol   Left hand skin tear Mepilex changed, c/d/i  Thien SAFETY ADULT - FALL  Goal: Free from fall injury  Description  INTERVENTIONS:  - Assess pt frequently for physical needs  - Identify cognitive and physical deficits and behaviors that affect risk of falls.   - [place] fall precautions as indicated by asse highest/safest level of independence in self care  Description  Interventions:  - Assess ability and encourage patient to participate in ADLs to maximize function  - Promote sitting position while performing ADLs such as feeding, grooming, and bathing  - E

## 2020-05-13 NOTE — CM/SW NOTE
Cardiology anticipating d/c in 1-2 days. [facility] has approved [facility], will need NPI of facility when known. Call to [facility] at [facility], will not be able to accept patient in the next 1-2 days. Call to dtkris Cartwright [phone] to discuss above.  Ramila Reardon

## 2020-05-13 NOTE — CM/SW NOTE
Call to [facility], they are unable to accept patient for laura and/or ltc at this time, they are looking at possibilities in the future/next week. Call to dtr Ruth Mccray [phone] to discuss d/c plan.  Had lengthy discussion regarding patient'

## 2020-05-13 NOTE — DISCHARGE SUMMARY
[facility] [place] [facility]PITAL[facility]  DISCHARGE SUMMARY     Pawan Pruitt Patient Status:  Inpatient    1940 MRN [id number]   [facility] 3SW-A Attending Richard Low MD   [facility] Day # 6 PCP Hannah Coleman MD     Date of Admission: 2020  Date supportive care and was able to d/c on room air. Did require vit K for INR reversal given elevation earlier in stay. Following normalization of INR, warfarin was resumed. No complications.     Lace+ Score: 82  59-90 High Risk  29-58 Medium Risk  0-28   L mouth daily. Quantity:  90 tablet  Refills:  3     magnesium oxide 400 MG Tabs  Commonly known as:  MAG-OX      Take 1 tablet (400 mg total) by mouth daily.    Quantity:  90 tablet  Refills:  3     metFORMIN HCl 500 MG Tabs  Commonly known as:  GLUCOPHAGE · Enoxaparin Sodium 80 MG/0.8ML Soln  · guaiFENesin  MG Tb12     Please  your prescriptions at the location directed by your doctor or nurse    Bring a paper prescription for each of these medications  · traMADol HCl 50 MG Tabs         [place]

## 2020-05-13 NOTE — PLAN OF CARE
PROBLEM:  + COVID    ASSESSMENT: Pt is A&OX3-4, hx dementia, forgetful at times. VSS, afebrile. Maintaining O2 sats >94% on RA. . Hx RADHA. Tele, controlled afib. Voids, tolerating diet no c/o n/v/d this shift. Mepilex to left hand for skin tear c/d/i.  Orion Kristi Progressing     Problem: SAFETY ADULT - FALL  Goal: Free from fall injury  Description  INTERVENTIONS:  - Assess pt frequently for physical needs  - Identify cognitive and physical deficits and behaviors that affect risk of falls.   - [place] fall precaut Living  Goal: Achieve highest/safest level of independence in self care  Description  Interventions:  - Assess ability and encourage patient to participate in ADLs to maximize function  - Promote sitting position while performing ADLs such as feeding, groo

## 2020-05-13 NOTE — DIETARY NOTE
[facility]   CLINICAL NUTRITION    Sheela Velasquez     Admitting diagnosis:  Hypokalemia [E87.6]  Syncope, unspecified syncope type [R55]  COVID-19 virus infection [U07.1]    Ht: 167.6 cm (5' 6\")  Wt: 83.5 kg (184 lb 1.4 oz).  This is 145 % of IBW  Bod

## 2020-05-13 NOTE — PROGRESS NOTES
[facility] Cardiology  Progress Note    This patient is COVID-19 positive.  For purposes of responsible PPE use in this time of PPE shortage during COVID-19 pandemic and to limit possibility of further viral transmission, the following is a vi the following is a virtual follow-up visit completed without examining the patient at bedside but rather with chart review, and discussion with the patient and nursing.     Laboratory/Data:    Labs:  Lab Results   Component Value Date    DDIMER 0.43 05/13/2 unremarkable. Interstitial abnormalities the lungs are noted. No pneumothorax.   CONCLUSION:  No significant changes since prior exam.    Dictated by: Kourtney Pardo MD on 5/09/2020 at 11:18 AM     Finalized by: Kourtney Pardo MD on 5/09/2020 at 1 effects      Impression:  1. Chronic cardiac conditions in patient admitted with COVID infection.   Patient presented with syncope at the immediate care center, low-grade fever, dyspnea on exertion, desaturation in 80s percent on room air, fatigue, headach fib.    Creatinine 1.0  Platelet count 658  Potassium 3.8  D-dimer negative  Troponin negative  Normal liver enzymes  Hemoglobin 12.6  Glucose 88  Urine analysis suspected for urine infection with negative urine culture.     Plan:  -Coumadin 5 mg today-at h

## 2020-05-13 NOTE — PAYOR COMM NOTE
--------------  CONTINUED STAY REVIEW-------REQUESTING ADDITIONAL DAYS 5/12 AND 5/13      Payor: BCBS MEDICARE ADV PPO  Subscriber #:  [id number]  Authorization Number: [id number]    Admit date: 5/9/20  Admit time: 0798    Admitting Physician: Ivanna Germain, Lungs: clear to auscultation bilaterally  Heart: S1, S2 normal, no murmur,  regular rate and rhythm  Abdomen: soft, non-tender; bowel sounds normal  Extremities: extremities normal,no cyanosis or edema  Pulses: 2+ and symmetric  Skin: Skin color, texture, diltiazem (CARDIZEM CD) 24 hr cap 120 mg, 120 mg, Oral, Daily  Losartan Potassium (COZAAR) tab 25 mg, 25 mg, Oral, Daily  Metoprolol Tartrate (LOPRESSOR) tab 50 mg, 50 mg, Oral, BID  Sertraline HCl (ZOLOFT) tab 25 mg, 25 mg, Oral, Daily  ursodiol (ACTIGALL 12. History of subdural hematoma with previous craniotomy  13. Pulmonary hypertension, Chronic systolic heart failure, history of VT, history of mitral clip, history of AICD  1. Patient follows up with [place] [facility], seen by cardiology   2.  Hold 10 point ROS completed and was negative, except for pertinent positive and negatives stated in subjective.     Vital signs:  Temp:  [97.3 °F (36.3 °C)-98.2 °F (36.8 °C)] 97.5 °F (36.4 °C)  Pulse:  [58-67] 65  Resp:  [16-19] 19  BP: (114-144)/(50-68) 144/66 Recent Labs   Lab 05/10/20  0633 05/11/20  0635 05/12/20  0526   PTP 56.7* 57.5* 15.9*   INR 6.28* 6.39* 1.23*               Recent Labs   Lab 05/09/20  1042 05/09/20  1708 05/09/20  2100   TROP <0.045 <0.045 <0.045   CK 36  --   --                Imaging: At this point Ms. Brandon Higuera is expected to be discharge to: snf??  Awaiting insurance decision     Plan of care discussed with patient and rn     Star Means MD                                 MEDICATIONS ADMINISTERED IN LAST 1 DAY:  acetaminophen (TYLENOL Date Action Dose Route User    5/13/2020 1012 Given 50 mg Oral Latonya Jung RN    5/12/2020 2132 Given 50 mg Oral Yazmin Garcia RN      ursodiol (ACTIGALL) cap 300 mg     Date Action Dose Route User    5/13/2020 8903 Given 300 mg Oral Patti,

## 2020-05-14 ENCOUNTER — TELEPHONE (OUTPATIENT)
Dept: CARDIOLOGY | Age: 80
End: 2020-05-14

## 2020-05-14 PROCEDURE — 99232 SBSQ HOSP IP/OBS MODERATE 35: CPT | Performed by: HOSPITALIST

## 2020-05-14 PROCEDURE — 99441 TELEPHONE E&M BY PHYSICIAN EST PT NOT ORIG PREV 7 DAYS 5-10 MIN: CPT | Performed by: INTERNAL MEDICINE

## 2020-05-14 RX ORDER — ENOXAPARIN SODIUM 100 MG/ML
1 INJECTION SUBCUTANEOUS EVERY 12 HOURS SCHEDULED
Qty: 10 SYRINGE | Refills: 2 | Status: ON HOLD | OUTPATIENT
Start: 2020-05-14 | End: 2020-07-08

## 2020-05-14 RX ORDER — WARFARIN SODIUM 5 MG/1
5 TABLET ORAL
Status: COMPLETED | OUTPATIENT
Start: 2020-05-14 | End: 2020-05-14

## 2020-05-14 RX ORDER — POTASSIUM CHLORIDE 20 MEQ/1
40 TABLET, EXTENDED RELEASE ORAL ONCE
Status: COMPLETED | OUTPATIENT
Start: 2020-05-14 | End: 2020-05-14

## 2020-05-14 NOTE — PLAN OF CARE
Clarified with Samina SRINIVASAN that ok for patient to d/c to [facility] today on a Lovenox bridge. Samina Ellison placed orders for the anticoagulants. Spoke with  Dayanara Quiroga and patients daughter refuses discharge today to [facility].  Dayanara Quiroga to page Dr Khanna Dad

## 2020-05-14 NOTE — CM/SW NOTE
Voice message earlier today that [facility] is now able to accept pt. Insurance [address] is obtained. Call from RN Bharti that pt could discharge.    Noted cardiology note that pt could discharge in am.     Call to pts DERIK Wilcox to notify that [facility]

## 2020-05-14 NOTE — PLAN OF CARE
PROBLEM:  + COVID     ASSESSMENT: Pt is A&OX3-4, hx dementia, forgetful at times. VSS, afebrile. Maintaining O2 sats >94% on RA. . Hx RADHA. Tele, controlled afib. Voids, tolerating diet no c/o n/v/d this shift. Mepilex to left hand for skin tear c/d/i.  P ordered  - Implement neutropenic guidelines  Outcome: Progressing     Problem: SAFETY ADULT - FALL  Goal: Free from fall injury  Description  INTERVENTIONS:  - Assess pt frequently for physical needs  - Identify cognitive and physical deficits and behavior Progressing     Problem: Impaired Activities of Daily Living  Goal: Achieve highest/safest level of independence in self care  Description  Interventions:  - Assess ability and encourage patient to participate in ADLs to maximize function  - Promote sittin

## 2020-05-14 NOTE — PROGRESS NOTES
[facility]  Progress Note     Clara Coleman Patient Status:  Inpatient    1940 MRN [id number]   [facility] 5NW-A Attending Margy Bliss MD   [facility] Day # 5 PCP Dorrie Gitelman, MD     Chief Complaint: cough    S: Patient fe --   --     143  --  141  --  141  --   --    K 2.9* 3.0*   < > 3.9   < > 3.4* 3.8 3.8    109  --  110  --  109  --   --    CO2 28.0 29.0  --  24.0  --  28.0  --   --    ALKPHO 103  --   --  93  --   --   --   --    AST 39*  --   --  36  --   - meds  11. RADHA-avoid cpap while infected with COVID  12. Mild dementia  13. Rheumatoid arthritis  14. Hx SDH with hx craniotomy    Plan of care: medically cleared for d/c IMO if OK with specialists. Await  assistance with placement.   Defer discharge warf

## 2020-05-14 NOTE — PROGRESS NOTES
[facility] Cardiology  Progress Note    This patient is COVID-19 positive.  For purposes of responsible PPE use in this time of PPE shortage during COVID-19 pandemic and to limit possibility of further viral transmission, the following is a vi Component Value Date    DDIMER 0.47 05/14/2020    CRP 4.41 05/14/2020       Recent Labs   Lab 05/09/20  1711 05/10/20  0633 05/11/20  0635 05/12/20  0526 05/13/20  0531 05/14/20  0508   WBC 7.9 9.1 13.1* 7.8  --   --    HGB 13.5 13.7 13.1 12.6  --   -- Billy Martinez MD on 5/09/2020 at 11:18 AM     Finalized by: Daya Escobar MD on 5/09/2020 at 11:19 AM            Medications:  Potassium Chloride ER (K-DUR M20) CR tab 40 mEq, 40 mEq, Oral, Once  Warfarin Sodium (COUMADIN) tab 5 mg, 5 mg, Oral, Once at nigh takes losartan without adverse             effects      Impression:  1. Chronic cardiac conditions in patient admitted with COVID infection.   Patient presented with syncope at the immediate care center, low-grade fever, dyspnea on exertion, desaturation i catheterization. Both atria markedly dilated. Patient has A. fib.     Creatinine 1.0  Platelet count 702  Potassium 3.8  D-dimer negative  Troponin negative  Normal liver enzymes  Hemoglobin 12.6  Glucose 88  Urine analysis suspected for urine infection w

## 2020-05-14 NOTE — PLAN OF CARE
Patient A/ox4. To rehab in AM per Cardiology. Daughter  Tish Ribera called and stated that she is working with social work to place patient in rehab facility.    Awaiting callback from [facility] [address] RN to discuss d/c plan progress

## 2020-05-15 VITALS
RESPIRATION RATE: 20 BRPM | HEIGHT: 66 IN | TEMPERATURE: 98 F | DIASTOLIC BLOOD PRESSURE: 60 MMHG | OXYGEN SATURATION: 97 % | WEIGHT: 185 LBS | BODY MASS INDEX: 29.73 KG/M2 | SYSTOLIC BLOOD PRESSURE: 123 MMHG | HEART RATE: 59 BPM

## 2020-05-15 PROCEDURE — 99239 HOSP IP/OBS DSCHRG MGMT >30: CPT | Performed by: HOSPITALIST

## 2020-05-15 PROCEDURE — 99441 TELEPHONE E&M BY PHYSICIAN EST PT NOT ORIG PREV 7 DAYS 5-10 MIN: CPT | Performed by: INTERNAL MEDICINE

## 2020-05-15 RX ORDER — GUAIFENESIN 600 MG
600 TABLET, EXTENDED RELEASE 12 HR ORAL 2 TIMES DAILY
Qty: 30 TABLET | Refills: 0 | Status: ON HOLD | OUTPATIENT
Start: 2020-05-15 | End: 2020-07-08

## 2020-05-15 RX ORDER — GUAIFENESIN 600 MG
600 TABLET, EXTENDED RELEASE 12 HR ORAL 2 TIMES DAILY
Status: DISCONTINUED | OUTPATIENT
Start: 2020-05-15 | End: 2020-05-15

## 2020-05-15 RX ORDER — WARFARIN SODIUM 5 MG/1
5 TABLET ORAL
Status: DISCONTINUED | OUTPATIENT
Start: 2020-05-15 | End: 2020-05-15

## 2020-05-15 NOTE — PAYOR COMM NOTE
--------------  CONTINUED STAY REVIEW    Payor: BCBS MEDICARE ADV PPO  Subscriber #:  [id number]  Authorization Number: [id number]    Admit date: 5/9/20  Admit time: 1268    Admitting Physician: Teresa Velasquez MD  Attending Physician:  Raymond Reed, Date Action Dose Route User    5/14/2020 2044 Given 300 mg Oral Kelly Grijalva RN    5/14/2020 0066 Given 300 mg Oral Bharti Light RN      Warfarin Sodium (COUMADIN) tab 5 mg     Date Action Dose Route User    5/14/2020 2044 Given 5 mg Oral Angelina Doshi Physical Exam:    This patient is COVID-19 positive.  For purposes of responsible PPE use in this time of PPE shortage during COVID-19 pandemic and to limit possibility of further viral transmission, the following is a virtual follow-up visit completed with PROCEDURE:  XR CHEST AP PORTABLE  (CPT=71045)  TECHNIQUE:  AP chest radiograph was obtained.   COMPARISON:  [facility] , XR, XR CHEST AP PORTABLE  (CPT=71045), 12/30/2019, 9:40 PM.  INDICATIONS:  cough  PATIENT STATED HISTORY: (As transcribed by Technologist) diltiazem (CARDIZEM CD) 24 hr cap 120 mg, 120 mg, Oral, Daily  Losartan Potassium (COZAAR) tab 25 mg, 25 mg, Oral, Daily  Metoprolol Tartrate (LOPRESSOR) tab 50 mg, 50 mg, Oral, BID  Sertraline HCl (ZOLOFT) tab 25 mg, 25 mg, Oral, Daily  ursodiol (ACTIGALL 7.  Hypertension -high in the emergency room but then controlled with his home medications.     8. Dyslipidemia -on statin. 9.  Diabetes mellitus type 2 -per primary medicine service. 10.  History of bilateral knee replacement. Rheumatoid arthritis.   1 -COVID infection symptomatic treatment per hospitalist but no specific treatment     -No need for additional cardiac testing at present time     -Continue physical therapy here and patient would like to go to subacute rehab rather than home yet     -[address] Abdomen: Soft, nontender, nondistended. No rebound or guarding. Neurologic: No focal neurological deficits. Musculoskeletal: Moves all extremities.   Extremities: No edema.     Diagnostic Data:       Labs:            Recent Labs   Lab 05/09/20  7806 90 • enoxaparin  1 mg/kg Subcutaneous 2 times per day   • torsemide  20 mg Oral BID (Diuretic)   • Insulin Aspart Pen  1-10 Units Subcutaneous TID CC and HS   • amiodarone HCl  200 mg Oral Daily   • dilTIAZem HCl ER Coated Beads  120 mg Oral Daily   • [place] Chart Review: Note Routing History     No routing history on file.      PLEASE FAX DAYS CERTIFIED AND NEXT REVIEW DATE

## 2020-05-15 NOTE — PLAN OF CARE
PROBLEM:  + COVID     ASSESSMENT: Pt is A&OX3-4, hx dementia, forgetful at times. VSS, afebrile. Maintaining O2 sats >94% on RA. . Hx RADHA. Tele, controlled afib.  Voids, tolerating diet no c/o n/v/d this shift. Mepilex to left hand for skin tear c/d/i. P growth factors as ordered  - Implement neutropenic guidelines  Outcome: Progressing     Problem: SAFETY ADULT - FALL  Goal: Free from fall injury  Description  INTERVENTIONS:  - Assess pt frequently for physical needs  - Identify cognitive and physical def daily   Outcome: Progressing     Problem: Impaired Activities of Daily Living  Goal: Achieve highest/safest level of independence in self care  Description  Interventions:  - Assess ability and encourage patient to participate in ADLs to maximize function

## 2020-05-15 NOTE — CM/SW NOTE
05/15/20 1000   Discharge disposition   Expected discharge disposition Skilled Nurs   Name of Facillity/Home Care/Hospice [facility] [place] [facility]   Discharge transportation TELECARE [facility] lewsi/Sondra at [facility], patient can admit today.   rn to call

## 2020-05-15 NOTE — PLAN OF CARE
NURSING DISCHARGE NOTE    Discharged to [facility] via Ambulance. Accompanied by Support staff  Belongings Taken by patient/family. PIV removed. Discharge navigator complete. Discharge instructions reviewed with patient, family.  Report called to MAXINE

## 2020-05-15 NOTE — PROGRESS NOTES
[facility]  Progress Note     Courtney Harrison Patient Status:  Inpatient    1940 MRN [id number]   [facility] 5NW-A Attending Ana Menjivar MD   [address] Day # 6 PCP Katelynn Keita MD     Chief Complaint: cough    S: Patient fe 2.8*  --   --   --   --   --   --       < > 141  --  141  --   --   --  140   K 2.9*   < > 3.9   < > 3.4*   < > 3.8 3.7 3.8      < > 110  --  109  --   --   --  103   CO2 28.0   < > 24.0  --  28.0  --   --   --  30.0   ALKPHO 103  --  93  -- COPD  9. DM2 w/peripheral neuropathy-monitor on insulin  10. Essential HTN-monitor on current meds  11. RADHA-avoid cpap while infected with COVID  12. Mild dementia  13. Rheumatoid arthritis  14.  Hx SDH with hx craniotomy    Plan of care: medically cleared

## 2020-05-15 NOTE — PLAN OF CARE
Patient is AO x 4, can be forgetful at times. Maintains O2 sats > 95% on room air. Cepacol PRN for sore throat. PRN APAP for headache, mucinex added for c/o sinus congestion. Tele - controlled AFIB.  Currently on lovenox/coumadin bridge until INR is therape Monitor WBC  - Administer growth factors as ordered  - Implement neutropenic guidelines  Outcome: Progressing     Problem: SAFETY ADULT - FALL  Goal: Free from fall injury  Description  INTERVENTIONS:  - Assess pt frequently for physical needs  - Identify precautions  - Up to chair x3 daily   Outcome: Progressing     Problem: Impaired Activities of Daily Living  Goal: Achieve highest/safest level of independence in self care  Description  Interventions:  - Assess ability and encourage patient to participate

## 2020-05-15 NOTE — PROGRESS NOTES
[facility] Cardiology  Progress Note    This patient is COVID-19 positive.  For purposes of responsible PPE use in this time of PPE shortage during COVID-19 pandemic and to limit possibility of further viral transmission, the following is a vi with the patient and nursing.     Laboratory/Data:    Labs:       Recent Labs   Lab 05/09/20  1711 05/10/20  1246 05/11/20  4008 05/12/20  0526 05/13/20  0531 05/14/20  0508 05/15/20  0443   WBC 7.9 9.1 13.1* 7.8  --   --   --    HGB 13.5 13.7 13.1 12.6  -- No significant changes since prior exam.    Dictated by: Shawnee Francis MD on 5/09/2020 at 11:18 AM     Finalized by: Shawnee Francis MD on 5/09/2020 at 11:19 AM            Medications:  Enoxaparin Sodium (LOVENOX) 80 MG/0.8ML injection 80 mg, 1 mg/ effects      Impression:  1. Chronic cardiac conditions in patient admitted with COVID infection.   Patient presented with syncope at the immediate care center, low-grade fever, dyspnea on exertion, desaturation in 80s percent on room air, fatigue, headach present with 2+ MR and no significant elevation of mean gradient across mitral valve. Residual atrial shunt post transseptal catheterization. Both atria markedly dilated. Patient has A. fib.     Creatinine 0.96  Platelet count 113  Potassium 3.8  D-dimer MEGAN STEWART.   Advanced Heart Failure  [facility]    5/15/2020  9:37 AM

## 2020-05-15 NOTE — CM/SW NOTE
Updates sent to [facility], await confirmation they will admit patient today. npi # for [facility] sent to Parveen Camargo to complete authorization for lauraJose Platt RN,   Phone [phone]

## 2020-05-15 NOTE — CM/SW NOTE
[facility] confirmed they can accept patient today. Received final authorization from ry, copy sent to [facility] and copy to chart. Will coordinate time w/unit rn.     Andie Stevens RN,   Phone [phone]

## 2020-05-16 NOTE — PAYOR COMM NOTE
--------------  DISCHARGE REVIEW    Payor: BCBS MEDICARE ADV PPO  Subscriber #:  [id number]  Authorization Number: [id number]    Admit date: 5/9/20  Admit time:  7500  Discharge Date: 5/15/2020  1:30 PM     Admitting Physician: MD Vicenta Broderick states she went to immediate care with her family to get appropriate testing and states she was in the waiting room sitting in a chair and that is a lasting she remembers, apparently patient had a syncopal episode. Patient states she felt dizzy.   Patient hours as needed for Pain. Refills:  0     Albuterol Sulfate  (90 Base) MCG/ACT Aers      Inhale 1 puff into the lungs every 4 (four) hours as needed for Wheezing.    Quantity:  1 Inhaler  Refills:  0     amiodarone HCl 200 MG Tabs  Commonly known a (two) times daily. Quantity:  180 capsule  Refills:  3     Warfarin Sodium 5 MG Tabs  Commonly known as:  COUMADIN      Take 5 mg by mouth See Admin Instructions.  Take 5 mg on Monday, Tuesday, Thursday, Friday, Saturday   Refills:  0     Warfarin Sodium COMMENTS

## 2020-06-24 ENCOUNTER — TELEPHONE (OUTPATIENT)
Dept: CARDIOLOGY | Age: 80
End: 2020-06-24

## 2020-06-25 ENCOUNTER — ANTI-COAG (OUTPATIENT)
Dept: CARDIOLOGY | Age: 80
End: 2020-06-25

## 2020-06-25 DIAGNOSIS — I48.91 ATRIAL FIBRILLATION, UNSPECIFIED TYPE (CMD): ICD-10-CM

## 2020-06-25 RX ORDER — AMIODARONE HYDROCHLORIDE 200 MG/1
TABLET ORAL
Qty: 90 TABLET | Refills: 1 | Status: CANCELLED | OUTPATIENT
Start: 2020-06-25

## 2020-06-25 RX ORDER — DILTIAZEM HYDROCHLORIDE 120 MG/1
CAPSULE, EXTENDED RELEASE ORAL
Qty: 90 CAPSULE | Status: CANCELLED | OUTPATIENT
Start: 2020-06-25

## 2020-06-25 RX ORDER — DILTIAZEM HYDROCHLORIDE 120 MG/1
120 CAPSULE, EXTENDED RELEASE ORAL DAILY
Qty: 90 CAPSULE | Refills: 0 | Status: SHIPPED | OUTPATIENT
Start: 2020-06-25 | End: 2020-11-04 | Stop reason: SDUPTHER

## 2020-06-25 RX ORDER — WARFARIN SODIUM 5 MG/1
TABLET ORAL
Qty: 90 TABLET | Refills: 0 | Status: SHIPPED | OUTPATIENT
Start: 2020-06-25 | End: 2020-11-04 | Stop reason: SDUPTHER

## 2020-06-25 RX ORDER — AMIODARONE HYDROCHLORIDE 200 MG/1
200 TABLET ORAL DAILY
Qty: 90 TABLET | Refills: 0 | Status: SHIPPED | OUTPATIENT
Start: 2020-06-25 | End: 2020-11-04 | Stop reason: SDUPTHER

## 2020-06-25 RX ORDER — WARFARIN SODIUM 5 MG/1
TABLET ORAL
Qty: 90 TABLET | Refills: 1 | Status: CANCELLED | OUTPATIENT
Start: 2020-06-25

## 2020-07-05 ENCOUNTER — HOSPITAL ENCOUNTER (INPATIENT)
Facility: HOSPITAL | Age: 80
LOS: 4 days | Discharge: NURSING, REHAB OR LONG-TERM CARE | DRG: 052 | End: 2020-07-09
Attending: EMERGENCY MEDICINE | Admitting: HOSPITALIST
Payer: MEDICARE

## 2020-07-05 ENCOUNTER — APPOINTMENT (OUTPATIENT)
Dept: CT IMAGING | Facility: HOSPITAL | Age: 80
DRG: 052 | End: 2020-07-05
Attending: EMERGENCY MEDICINE
Payer: MEDICARE

## 2020-07-05 DIAGNOSIS — S01.01XA LACERATION OF SCALP, INITIAL ENCOUNTER: ICD-10-CM

## 2020-07-05 DIAGNOSIS — S12.100A CLOSED ODONTOID FRACTURE, INITIAL ENCOUNTER (HCC): ICD-10-CM

## 2020-07-05 DIAGNOSIS — G06.2 EPIDURAL ABSCESS: Primary | ICD-10-CM

## 2020-07-05 DIAGNOSIS — T14.8XXA MULTIPLE SKIN TEARS: ICD-10-CM
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
## 2020-07-05 PROBLEM — R73.9 HYPERGLYCEMIA: Status: ACTIVE | Noted: 2020-07-05

## 2020-07-05 LAB
ALBUMIN SERPL-MCNC: 3.2 G/DL (ref 3.4–5)
ALBUMIN/GLOB SERPL: 0.8 {RATIO} (ref 1–2)
ALP LIVER SERPL-CCNC: 109 U/L (ref 55–142)
ALT SERPL-CCNC: 20 U/L (ref 13–56)
ANION GAP SERPL CALC-SCNC: 6 MMOL/L (ref 0–18)
ANTIBODY SCREEN: NEGATIVE
APTT PPP: 42.2 SECONDS (ref 25.4–36.1)
AST SERPL-CCNC: 40 U/L (ref 15–37)
BASOPHILS # BLD AUTO: 0.06 X10(3) UL (ref 0–0.2)
BASOPHILS NFR BLD AUTO: 0.6 %
BILIRUB SERPL-MCNC: 0.7 MG/DL (ref 0.1–2)
BUN BLD-MCNC: 22 MG/DL (ref 7–18)
BUN/CREAT SERPL: 17.6 (ref 10–20)
CALCIUM BLD-MCNC: 8.3 MG/DL (ref 8.5–10.1)
CHLORIDE SERPL-SCNC: 106 MMOL/L (ref 98–112)
CHOLEST SMN-MCNC: 212 MG/DL (ref ?–200)
CO2 SERPL-SCNC: 28 MMOL/L (ref 21–32)
CREAT BLD-MCNC: 1.25 MG/DL (ref 0.55–1.02)
DEPRECATED RDW RBC AUTO: 48.1 FL (ref 35.1–46.3)
EOSINOPHIL # BLD AUTO: 0.15 X10(3) UL (ref 0–0.7)
EOSINOPHIL NFR BLD AUTO: 1.6 %
ERYTHROCYTE [DISTWIDTH] IN BLOOD BY AUTOMATED COUNT: 13.3 % (ref 11–15)
GLOBULIN PLAS-MCNC: 4 G/DL (ref 2.8–4.4)
GLUCOSE BLD-MCNC: 101 MG/DL (ref 70–99)
GLUCOSE BLD-MCNC: 144 MG/DL (ref 70–99)
HAV IGM SER QL: 2.4 MG/DL (ref 1.6–2.6)
HCT VFR BLD AUTO: 41.8 % (ref 35–48)
HDLC SERPL-MCNC: 55 MG/DL (ref 40–59)
HGB BLD-MCNC: 14 G/DL (ref 12–16)
IMM GRANULOCYTES # BLD AUTO: 0.05 X10(3) UL (ref 0–1)
IMM GRANULOCYTES NFR BLD: 0.5 %
INR BLD: 3.61 (ref 0.89–1.11)
LDLC SERPL CALC-MCNC: 134 MG/DL (ref ?–100)
LYMPHOCYTES # BLD AUTO: 1.92 X10(3) UL (ref 1–4)
LYMPHOCYTES NFR BLD AUTO: 20.1 %
M PROTEIN MFR SERPL ELPH: 7.2 G/DL (ref 6.4–8.2)
MCH RBC QN AUTO: 32.8 PG (ref 26–34)
MCHC RBC AUTO-ENTMCNC: 33.5 G/DL (ref 31–37)
MCV RBC AUTO: 97.9 FL (ref 80–100)
MONOCYTES # BLD AUTO: 0.69 X10(3) UL (ref 0.1–1)
MONOCYTES NFR BLD AUTO: 7.2 %
NEUTROPHILS # BLD AUTO: 6.66 X10 (3) UL (ref 1.5–7.7)
NEUTROPHILS # BLD AUTO: 6.66 X10(3) UL (ref 1.5–7.7)
NEUTROPHILS NFR BLD AUTO: 70 %
NONHDLC SERPL-MCNC: 157 MG/DL (ref ?–130)
OSMOLALITY SERPL CALC.SUM OF ELEC: 296 MOSM/KG (ref 275–295)
PLATELET # BLD AUTO: 197 10(3)UL (ref 150–450)
POTASSIUM SERPL-SCNC: 4.2 MMOL/L (ref 3.5–5.1)
PSA SERPL DL<=0.01 NG/ML-MCNC: 36.8 SECONDS (ref 12.4–14.6)
RBC # BLD AUTO: 4.27 X10(6)UL (ref 3.8–5.3)
RH BLOOD TYPE: POSITIVE
SARS-COV-2 RNA RESP QL NAA+PROBE: NOT DETECTED
SODIUM SERPL-SCNC: 140 MMOL/L (ref 136–145)
TRIGL SERPL-MCNC: 115 MG/DL (ref 30–149)
TROPONIN I SERPL-MCNC: <0.045 NG/ML (ref ?–0.04)
VLDLC SERPL CALC-MCNC: 23 MG/DL (ref 0–30)
WBC # BLD AUTO: 9.5 X10(3) UL (ref 4–11)

## 2020-07-05 PROCEDURE — 99223 1ST HOSP IP/OBS HIGH 75: CPT | Performed by: HOSPITALIST

## 2020-07-05 PROCEDURE — 70450 CT HEAD/BRAIN W/O DYE: CPT | Performed by: EMERGENCY MEDICINE

## 2020-07-05 PROCEDURE — 72125 CT NECK SPINE W/O DYE: CPT | Performed by: EMERGENCY MEDICINE

## 2020-07-05 RX ORDER — MORPHINE SULFATE 4 MG/ML
1 INJECTION, SOLUTION INTRAMUSCULAR; INTRAVENOUS EVERY 2 HOUR PRN
Status: DISCONTINUED | OUTPATIENT
Start: 2020-07-05 | End: 2020-07-09

## 2020-07-05 RX ORDER — MORPHINE SULFATE 4 MG/ML
2 INJECTION, SOLUTION INTRAMUSCULAR; INTRAVENOUS ONCE
Status: COMPLETED | OUTPATIENT
Start: 2020-07-05 | End: 2020-07-05

## 2020-07-05 RX ORDER — PHENYLEPHRINE HCL IN 0.9% NACL 50MG/250ML
PLASTIC BAG, INJECTION (ML) INTRAVENOUS CONTINUOUS PRN
Status: DISCONTINUED | OUTPATIENT
Start: 2020-07-05 | End: 2020-07-07

## 2020-07-05 RX ORDER — ACETAMINOPHEN 650 MG/1
650 SUPPOSITORY RECTAL EVERY 4 HOURS PRN
Status: DISCONTINUED | OUTPATIENT
Start: 2020-07-05 | End: 2020-07-09

## 2020-07-05 RX ORDER — ONDANSETRON 2 MG/ML
4 INJECTION INTRAMUSCULAR; INTRAVENOUS ONCE
Status: COMPLETED | OUTPATIENT
Start: 2020-07-05 | End: 2020-07-05

## 2020-07-05 RX ORDER — FAMOTIDINE 10 MG/ML
20 INJECTION, SOLUTION INTRAVENOUS DAILY
Status: DISCONTINUED | OUTPATIENT
Start: 2020-07-05 | End: 2020-07-09

## 2020-07-05 RX ORDER — METOCLOPRAMIDE HYDROCHLORIDE 5 MG/ML
5 INJECTION INTRAMUSCULAR; INTRAVENOUS EVERY 8 HOURS PRN
Status: DISCONTINUED | OUTPATIENT
Start: 2020-07-05 | End: 2020-07-09

## 2020-07-05 RX ORDER — DOCUSATE SODIUM 100 MG/1
100 CAPSULE, LIQUID FILLED ORAL 2 TIMES DAILY
Status: DISCONTINUED | OUTPATIENT
Start: 2020-07-05 | End: 2020-07-09

## 2020-07-05 RX ORDER — MORPHINE SULFATE 4 MG/ML
2 INJECTION, SOLUTION INTRAMUSCULAR; INTRAVENOUS EVERY 30 MIN PRN
Status: CANCELLED | OUTPATIENT
Start: 2020-07-05 | End: 2020-07-05

## 2020-07-05 RX ORDER — ONDANSETRON 2 MG/ML
4 INJECTION INTRAMUSCULAR; INTRAVENOUS EVERY 6 HOURS PRN
Status: DISCONTINUED | OUTPATIENT
Start: 2020-07-05 | End: 2020-07-09

## 2020-07-05 RX ORDER — METOCLOPRAMIDE HYDROCHLORIDE 5 MG/ML
10 INJECTION INTRAMUSCULAR; INTRAVENOUS EVERY 8 HOURS PRN
Status: DISCONTINUED | OUTPATIENT
Start: 2020-07-05 | End: 2020-07-05

## 2020-07-05 RX ORDER — POLYETHYLENE GLYCOL 3350 17 G/17G
17 POWDER, FOR SOLUTION ORAL DAILY PRN
Status: DISCONTINUED | OUTPATIENT
Start: 2020-07-05 | End: 2020-07-09

## 2020-07-05 RX ORDER — FAMOTIDINE 20 MG/1
20 TABLET ORAL DAILY
Status: DISCONTINUED | OUTPATIENT
Start: 2020-07-05 | End: 2020-07-09

## 2020-07-05 RX ORDER — SENNOSIDES 8.6 MG
17.2 TABLET ORAL NIGHTLY
Status: DISCONTINUED | OUTPATIENT
Start: 2020-07-05 | End: 2020-07-09

## 2020-07-05 RX ORDER — MORPHINE SULFATE 4 MG/ML
4 INJECTION, SOLUTION INTRAMUSCULAR; INTRAVENOUS ONCE
Status: COMPLETED | OUTPATIENT
Start: 2020-07-05 | End: 2020-07-05

## 2020-07-05 RX ORDER — ONDANSETRON 2 MG/ML
4 INJECTION INTRAMUSCULAR; INTRAVENOUS EVERY 6 HOURS PRN
Status: DISCONTINUED | OUTPATIENT
Start: 2020-07-05 | End: 2020-07-05

## 2020-07-05 RX ORDER — SODIUM PHOSPHATE, DIBASIC AND SODIUM PHOSPHATE, MONOBASIC 7; 19 G/133ML; G/133ML
1 ENEMA RECTAL ONCE AS NEEDED
Status: DISCONTINUED | OUTPATIENT
Start: 2020-07-05 | End: 2020-07-09

## 2020-07-05 RX ORDER — ONDANSETRON 2 MG/ML
4 INJECTION INTRAMUSCULAR; INTRAVENOUS EVERY 4 HOURS PRN
Status: CANCELLED | OUTPATIENT
Start: 2020-07-05

## 2020-07-05 RX ORDER — LORAZEPAM 2 MG/ML
1 INJECTION INTRAMUSCULAR
Status: DISCONTINUED | OUTPATIENT
Start: 2020-07-05 | End: 2020-07-09

## 2020-07-05 RX ORDER — SODIUM CHLORIDE 9 MG/ML
INJECTION, SOLUTION INTRAVENOUS CONTINUOUS
Status: DISCONTINUED | OUTPATIENT
Start: 2020-07-05 | End: 2020-07-06

## 2020-07-05 RX ORDER — MORPHINE SULFATE 4 MG/ML
2 INJECTION, SOLUTION INTRAMUSCULAR; INTRAVENOUS EVERY 2 HOUR PRN
Status: DISCONTINUED | OUTPATIENT
Start: 2020-07-05 | End: 2020-07-09

## 2020-07-05 RX ORDER — LABETALOL HYDROCHLORIDE 5 MG/ML
10 INJECTION, SOLUTION INTRAVENOUS EVERY 10 MIN PRN
Status: DISCONTINUED | OUTPATIENT
Start: 2020-07-05 | End: 2020-07-07

## 2020-07-05 RX ORDER — BISACODYL 10 MG
10 SUPPOSITORY, RECTAL RECTAL
Status: DISCONTINUED | OUTPATIENT
Start: 2020-07-05 | End: 2020-07-09

## 2020-07-05 RX ORDER — ACETAMINOPHEN 325 MG/1
650 TABLET ORAL EVERY 4 HOURS PRN
Status: DISCONTINUED | OUTPATIENT
Start: 2020-07-05 | End: 2020-07-09

## 2020-07-05 NOTE — ED INITIAL ASSESSMENT (HPI)
Pt here via [address] [place] [address] EMS after patient attempted to  a bottle cap off the floor and then fell forward, hitting her head on a TV and fell backward hitting her posterior head on the wall. Pt also c/o LT arm pain d/t fall.    Pt on coumadin and h

## 2020-07-06 ENCOUNTER — APPOINTMENT (OUTPATIENT)
Dept: CT IMAGING | Facility: HOSPITAL | Age: 80
DRG: 052 | End: 2020-07-06
Attending: HOSPITALIST
Payer: MEDICARE

## 2020-07-06 PROBLEM — S06.4XAA EPIDURAL HEMATOMA: Status: ACTIVE | Noted: 2020-07-06

## 2020-07-06 PROBLEM — S06.4XAA EPIDURAL HEMATOMA (HCC): Status: ACTIVE | Noted: 2020-07-06

## 2020-07-06 PROBLEM — S06.4X9A EPIDURAL HEMATOMA (HCC): Status: ACTIVE | Noted: 2020-07-06

## 2020-07-06 LAB
ANION GAP SERPL CALC-SCNC: 4 MMOL/L (ref 0–18)
ATRIAL RATE: 62 BPM
BUN BLD-MCNC: 17 MG/DL (ref 7–18)
BUN/CREAT SERPL: 21 (ref 10–20)
CALCIUM BLD-MCNC: 8.2 MG/DL (ref 8.5–10.1)
CHLORIDE SERPL-SCNC: 110 MMOL/L (ref 98–112)
CO2 SERPL-SCNC: 30 MMOL/L (ref 21–32)
CREAT BLD-MCNC: 0.81 MG/DL (ref 0.55–1.02)
DEPRECATED RDW RBC AUTO: 49.1 FL (ref 35.1–46.3)
ERYTHROCYTE [DISTWIDTH] IN BLOOD BY AUTOMATED COUNT: 13.1 % (ref 11–15)
GLUCOSE BLD-MCNC: 104 MG/DL (ref 70–99)
GLUCOSE BLD-MCNC: 112 MG/DL (ref 70–99)
GLUCOSE BLD-MCNC: 122 MG/DL (ref 70–99)
GLUCOSE BLD-MCNC: 90 MG/DL (ref 70–99)
HAV IGM SER QL: 2.2 MG/DL (ref 1.6–2.6)
HCT VFR BLD AUTO: 35.6 % (ref 35–48)
HGB BLD-MCNC: 11.6 G/DL (ref 12–16)
INR BLD: 1.12 (ref 0.89–1.11)
MCH RBC QN AUTO: 32.7 PG (ref 26–34)
MCHC RBC AUTO-ENTMCNC: 32.6 G/DL (ref 31–37)
MCV RBC AUTO: 100.3 FL (ref 80–100)
OSMOLALITY SERPL CALC.SUM OF ELEC: 300 MOSM/KG (ref 275–295)
PLATELET # BLD AUTO: 150 10(3)UL (ref 150–450)
POTASSIUM SERPL-SCNC: 3.3 MMOL/L (ref 3.5–5.1)
POTASSIUM SERPL-SCNC: 4.5 MMOL/L (ref 3.5–5.1)
PSA SERPL DL<=0.01 NG/ML-MCNC: 14.7 SECONDS (ref 12.4–14.6)
Q-T INTERVAL: 486 MS
QRS DURATION: 154 MS
QTC CALCULATION (BEZET): 505 MS
R AXIS: 27 DEGREES
RBC # BLD AUTO: 3.55 X10(6)UL (ref 3.8–5.3)
SODIUM SERPL-SCNC: 144 MMOL/L (ref 136–145)
T AXIS: 37 DEGREES
VENTRICULAR RATE: 65 BPM
WBC # BLD AUTO: 9.3 X10(3) UL (ref 4–11)

## 2020-07-06 PROCEDURE — 0HQ0XZZ REPAIR SCALP SKIN, EXTERNAL APPROACH: ICD-10-PCS | Performed by: EMERGENCY MEDICINE

## 2020-07-06 PROCEDURE — 99291 CRITICAL CARE FIRST HOUR: CPT | Performed by: OTHER

## 2020-07-06 PROCEDURE — 99233 SBSQ HOSP IP/OBS HIGH 50: CPT | Performed by: HOSPITALIST

## 2020-07-06 PROCEDURE — 72125 CT NECK SPINE W/O DYE: CPT | Performed by: HOSPITALIST

## 2020-07-06 PROCEDURE — 99223 1ST HOSP IP/OBS HIGH 75: CPT | Performed by: NEUROLOGICAL SURGERY

## 2020-07-06 PROCEDURE — 99291 CRITICAL CARE FIRST HOUR: CPT | Performed by: NURSE PRACTITIONER

## 2020-07-06 PROCEDURE — 30233K1 TRANSFUSION OF NONAUTOLOGOUS FROZEN PLASMA INTO PERIPHERAL VEIN, PERCUTANEOUS APPROACH: ICD-10-PCS | Performed by: HOSPITALIST

## 2020-07-06 RX ORDER — DILTIAZEM HYDROCHLORIDE 120 MG/1
120 CAPSULE, EXTENDED RELEASE ORAL DAILY
Status: DISCONTINUED | OUTPATIENT
Start: 2020-07-06 | End: 2020-07-09

## 2020-07-06 RX ORDER — POTASSIUM CHLORIDE 20 MEQ/1
40 TABLET, EXTENDED RELEASE ORAL EVERY 4 HOURS
Status: COMPLETED | OUTPATIENT
Start: 2020-07-06 | End: 2020-07-06

## 2020-07-06 RX ORDER — AMIODARONE HYDROCHLORIDE 200 MG/1
200 TABLET ORAL DAILY
Status: DISCONTINUED | OUTPATIENT
Start: 2020-07-06 | End: 2020-07-09

## 2020-07-06 RX ORDER — METOPROLOL TARTRATE 50 MG/1
50 TABLET, FILM COATED ORAL
Status: DISCONTINUED | OUTPATIENT
Start: 2020-07-06 | End: 2020-07-09

## 2020-07-06 RX ORDER — HYDROCODONE BITARTRATE AND ACETAMINOPHEN 5; 325 MG/1; MG/1
1 TABLET ORAL EVERY 4 HOURS PRN
Status: DISCONTINUED | OUTPATIENT
Start: 2020-07-06 | End: 2020-07-09

## 2020-07-06 RX ORDER — TIZANIDINE 2 MG/1
2 TABLET ORAL EVERY 6 HOURS PRN
Status: DISCONTINUED | OUTPATIENT
Start: 2020-07-06 | End: 2020-07-09

## 2020-07-06 RX ORDER — HYDRALAZINE HYDROCHLORIDE 20 MG/ML
10 INJECTION INTRAMUSCULAR; INTRAVENOUS EVERY 6 HOURS PRN
Status: DISCONTINUED | OUTPATIENT
Start: 2020-07-06 | End: 2020-07-09

## 2020-07-06 RX ORDER — HYDROCODONE BITARTRATE AND ACETAMINOPHEN 5; 325 MG/1; MG/1
2 TABLET ORAL EVERY 4 HOURS PRN
Status: DISCONTINUED | OUTPATIENT
Start: 2020-07-06 | End: 2020-07-09

## 2020-07-06 NOTE — PROGRESS NOTES
[facility]  Progress Note     Radha Cyndy Patient Status:  Inpatient    1940 MRN [id number]   [facility] 6NE-A Attending Ruben Damon MD   [facility] Day # 1 PCP Scotty Olivas MD     Chief Complaint: HA, fall    S: Mellissa Jane Lab 07/05/20  1719 07/06/20  0434   PTP 36.8* 14.7*   INR 3.61* 1.12*       Recent Labs   Lab 07/05/20  2253   TROP <0.045            Imaging: Imaging data reviewed in Epic.     Medications:   • Potassium Chloride ER  40 mEq Oral Q4H   • Senna  17.2 mg Or

## 2020-07-06 NOTE — CM/SW NOTE
07/06/20 1600   CM/SW Referral Data   Referral Source    Reason for Referral Discharge planning   Informant   (daughter)   Patient Info   Patient lives with   (son)   Patient Status Prior to Admission   Independent with ADLs and Mobility Yes

## 2020-07-06 NOTE — PROGRESS NOTES
[facility] Pharmacy Note:  Renal Dose Adjustment for Metoclopramide (REGLAN)    Amado Resendez has been prescribed Metoclopramide (REGLAN) 10 mg every 8 hours as needed for nausea/vomiting.     Estimated Creatinine Clearance: 34.7 mL/min (A) (based on SCr of 1.25

## 2020-07-06 NOTE — PAYOR COMM NOTE
--------------  ADMISSION REVIEW     Payor: BCBS MEDICARE ADV PPO  Subscriber #:  [id number]  Authorization Number: [id number]    Admit date: 7/5/20  Admit time: 2228       Admitting Physician: Vernon Herrera DO  Attending Physician:  Ciera Alexander lymphadenopathy. No JVD. No carotid bruits. Respiratory: Clear to auscultation bilaterally. No wheezes. No rhonchi. Cardiovascular: S1, S2. Regular rate and rhythm. No murmurs, rubs or gallops. Equal pulses. Chest and Back: No tenderness or deformity. b-blocker and losartan. 7.    Asthma/COPD- No active issues. Will order prn nebs. 8.    Depression- Will continue on zoloft. 9.    Mild dementia- Pt on no meds. 10. RADHA- Will place on RADHA protocol.         Quality:  · DVT Prophylaxis: SCD's  · CODE stat 7/6/2020 0000 Rate/Dose Verify (none) Intravenous Shelley Mcnamara RN    7/5/2020 2230 New Bag (none) Intravenous Shelley Mcnamara RN      History Of Present Illness:  Solange Duncan is a 68year old female with PMHx significant for Afib, DVT/PE on Couma 7/11/2014     Recurrence 7.2016 Hx VT- s/p ICD implant on 7/7/16.      • Obesity, morbid, BMI 40.0-49.9 ([facility] 6/18/2015   • RADHA (obstructive sleep apnea) 06/29/2012     Cannot tolerate CPAP machine- uses oxygen periodically    • Osteoarthritis of shoulders, edema, Left leg coolor than right which is normal per patient report, varicose veins bilaterally, nonpitting edema bilaterally, capillary refill <3 sec.     Pulses: 2+ and symmetric all extremities  Skin: Skin color, texture, turgor normal for ethnicity, wa gtt PRN to maintain -150  -Labetalol 10mg IV PRN   -0.9NS @ 75/hr  -Lipid panel.   Restart/Continue Statin, goal LDL<70 and HDL>40  -Hold all anticoagulants and antiplatelets   -Neuro checks Q1h  -Repeat cervical spine CT in am  -Aspen collar  -NS on

## 2020-07-06 NOTE — PLAN OF CARE
Pt transferred up to CNICU at 2238. FFP given for high INR. Aspen collar inplace. EKG done. A fib. SBP remained in 130's - 150 all night, HR in low 60's. Neuros q1, fully intact. Accuchecks Q6, blood glucose was within desired range. . Pt complained of neck

## 2020-07-06 NOTE — PROGRESS NOTES
07/06/20 0928   Clinical Encounter Type   Visited With Patient not available  (sleeping)   Continue Visiting Yes

## 2020-07-06 NOTE — H&P
[facility]  History and Physical     Selina Johnny Patient Status:  Emergency    1940 MRN [id number]   Location [address] [facility] Attending Risa Levine MD   Hosp Day # 0 PCP Jun Guadalupe MD     Chief Complaint periodically    • Osteoarthritis of shoulders, bilateral 06/18/2015    Severe, frozen shoulder syndrome   • Pericardial effusion 6/5/2014    trivial   • Pulmonary HTN ([facility] 10/10/2014   • Rheumatoid arthritis ([facility]    • S/P ICD (internal cardiac defibrilla History:  reports that she has never smoked. She has never used smokeless tobacco. She reports that she does not drink alcohol or use drugs.     Family History:   Family History   Problem Relation Age of Onset   • Diabetes Father    • Heart Disease Father Rfl: 3  amiodarone HCl 200 MG Oral Tab, Take 1 tablet (200 mg total) by mouth daily. , Disp: , Rfl: 0  Warfarin Sodium 5 MG Oral Tab, Take 5 mg by mouth See Admin Instructions.  Take 5 mg on Monday, Tuesday, Thursday, Friday, Saturday, Disp: , Rfl:   [employer] intact. Musculoskeletal: Moves all extremities. Extremities: No edema or cyanosis. Integument: No rashes or lesions. Psychiatric: Appropriate mood and affect.       Diagnostic Data:      Labs:  Recent Labs   Lab 07/05/20  1719 07/05/20  1722   WBC  --

## 2020-07-06 NOTE — PHYSICAL THERAPY NOTE
PHYSICAL THERAPY EVALUATION - INPATIENT     Room Number: 0547/5353-A  Evaluation Date: 7/6/2020  Type of Evaluation: Initial  Physician Order: PT Eval and Treat    Presenting Problem: Epidural hematoma  Reason for Therapy: Mobility Dysfunction and Discha [facility])      Past Medical History  Past Medical History:   Diagnosis Date   • A-fib 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    OBJECTIVE  Precautions: Seizure;Cervical brace(-150)  Fall Risk: High fall risk    WEIGHT BEARING RESTRICTION  Weight Bearing Restriction: None                PAIN ASSESSMENT  Ratin  Location: neck and back  Management Techniques:  Activ therapy lying semi-reclined in bed with Aspen collar donned. Pt educated in log rolling. Pt completed supine>sidelying>sit with verbal cues for hand placement.   Upon sitting the pt reported spinning and required Min A in order to maintain static sitting presentation is unstable and overall the evaluation complexity is considered high. DISCHARGE RECOMMENDATIONS  PT Discharge Recommendations: Home with home health PT/OT    PLAN  PT Treatment Plan: Bed mobility; Endurance; Patient education;Gait training;Ne

## 2020-07-06 NOTE — PLAN OF CARE
Assumed care at 0730, patient resting in bed in Aspen collar, A/Ox4, GALAVIZ, VSS, Afib per tele, for full assessment see charting. Neuro checks discontinued by Neurologist. Neurosurgery at bedside, no surgical intervention warranted.  Transfer orders to Ortho/

## 2020-07-06 NOTE — CONSULTS
[facility]  Neurosurgery Consult    [facility] Patient Status:  Inpatient    1940 MRN [id number]   [facility] 6NE-A Attending Sada Shultz MD   [facility] Day # 1 PCP Sunil Holly MD     REASON FOR CONSULTATION:  Oumar Pelletier hearing aid   • High blood pressure    • High cholesterol    • History of syncope     cardiogenic   • Insomnia 11/25/2013   • Kidney stone    • Migraines    • Neuropathy     bilateral feet   • NSVT (nonsustained ventricular tachycardia) ([facility] 7/11/2014 PCP:   Dr. Mehta Plate   • HERNIA SURGERY     • HYSTERECTOMY      2/2 prolapse, no cancer   • INJECT EPIDURAL ANEST,LUMB,CONTINOUS  2012   • MITRACLIP N/A 4/11/2019    Performed by Parrish Mckoy MD at [facility]  2016    surgery to relieve pressur 7/5/2020 at 1600  Losartan Potassium 25 MG Oral Tab, Take 1 tablet (25 mg total) by mouth daily. , Disp: 90 tablet, Rfl: 3, 7/5/2020 at 1600  ursodiol 300 MG Oral Cap, Take 1 capsule (300 mg total) by mouth 2 (two) times daily. , Disp: 180 capsule, Rfl: 3, 7 Or  HYDROcodone-acetaminophen (NORCO) 5-325 MG per tab 2 tablet, 2 tablet, Oral, Q4H PRN  tiZANidine HCl (ZANAFLEX) tab 2 mg, 2 mg, Oral, Q6H PRN  diltiazem (CARDIZEM CD) 24 hr cap 120 mg, 120 mg, Oral, Daily  amiodarone HCl (PACERONE) tab 200 mg, 200 mg, (90.2 kg)   SpO2 94%   BMI 32.10 kg/m²   GENERAL:  Patient is a 68year old female in no acute distress. HEENT:  Normocephalic, atraumatic. NEUROLOGICAL:  This patient is alert and orientated x 3. Speech fluent. Comprehension intact.    PERRLA +3 brisk, angulation of the superior fracture fragment which is a stable finding. CERVICAL DISC LEVELS:  C2-C3:  Mild degenerative disc bulge. There is mild to moderate bilateral facet joint degenerative change. C3-C4:   Moderate degenerative disc bulge osteoph with mild posterior angulation of the superior fracture fragment. The fracture plane measures 2.5 mm in thickness.   There is mild to moderate amount of   epidural hematoma within the ventral aspect of the spinal canal at the C1-C2 level which results in m

## 2020-07-06 NOTE — CONSULTS
NEUROCRITICAL CARE CONSULTATION NOTE    Sheela Eva Patient Status:  Inpatient    1940 MRN [id number]   [facility] 6NE-A Attending Bakari Phelan MD   Hosp Day # 1 PCP Yovana Funk MD     Chief Complaint/Reason for Admission 12/14/16, h/o IVC filter   • Dementia ([facility]    • Depression    • Diabetes ([facility]    • Diabetes mellitus type 2, noninsulin dependent ([facility]     Type !!   • Diabetic peripheral neuropathy associated with type 2 diabetes mellitus ([facility] 09/16/2016    Severe bot CT DRAIN CHOLECYSTOSTOMY (CPT=47490)     • EGD  04/05/2019    reactive gastropathy, Dr. Ladarius Bonner GI   • ESOPHAGOGASTRODUODENOSCOPY (EGD) N/A 4/5/2019    Performed by Jolanta Pickett DO at [facility] ENDOSCOPY   • HERNIA SURGERY  3/24/2011 Marcia AGUAYO auditory hallucinations. Neuro: as per HPI    Home Medications:  No current facility-administered medications on file prior to encounter.    traMADol HCl 50 MG Oral Tab, Take 1 tablet (50 mg total) by mouth every 8 (eight) hours as needed for Pain., Disp: Inject 0.84 mL (84 mg total) into the skin every 12 (twelve) hours. Discontinue when INR is 2.0 or greater. , Disp: 10 Syringe, Rfl: 2  Albuterol Sulfate  (90 Base) MCG/ACT Inhalation Aero Soln, Inhale 1 puff into the lungs every 4 (four) hours as ne Intact  Neurological Examination  Awake, alert, oriented x 3. No dysarthria or dysphasia. Visual fields are full. Pupils equal (~ 2 OD, 2 OS) and reactive OU. Extraocular motility is intact. Face is symmetric. Normal  facial sensation.  Normal strength and s

## 2020-07-06 NOTE — PROGRESS NOTES
07/06/20 8564   Clinical Encounter Type   Visited With Health care provider  (pt not available)   Referral To   (Advance directives complete)

## 2020-07-06 NOTE — SLP NOTE
ADULT SWALLOWING EVALUATION    ASSESSMENT    ASSESSMENT/OVERALL IMPRESSION:  Patient seen for swallowing evaluation per protocol. Patient alert and up in bed.   She was admitted due to falling and hitting her head on a TV then falling backward and hitting Major depressive disorder with single episode, in remission ([facility]    Hyperglycemia    Epidural abscess    Closed odontoid fracture, initial encounter ([facility]    Fall at home, initial encounter    Multiple skin tears    Laceration of scalp, initial encounter replacement 6/18/2015    bilat 1990s, both severe OA   • Sleep apnea    • Subdural hematoma ([facility] 12/01/2016    s/p L craniotomy, seizure during that time none since   • Varicose vein 9/18/2015   • Visual impairment     reading glasses   • Vitamin D defici Limits                      Pharyngeal Phase of Swallow: Within Functional Limits           (Please note: Silent aspiration cannot be evaluated clinically.  Videofluoroscopic Swallow Study is required to rule-out silent aspiration.)    Esophageal Phase of S

## 2020-07-06 NOTE — PROGRESS NOTES
[facility]  Neurocritical Care APRN Progress Note    NAME: Andrea Gill - ROOM: [facility] - MRN: [id number] - Age: 68year old - :1940    History Of Present Illness:  Andrea Gill is a 68year old female with PMHx significant for Recurrence 7.2016 Hx VT- s/p ICD implant on 7/7/16.      • Obesity, morbid, BMI 40.0-49.9 ([facility] 6/18/2015   • RADHA (obstructive sleep apnea) 06/29/2012    Cannot tolerate CPAP machine- uses oxygen periodically    • Osteoarthritis of shoulders, bilateral 06/1 normal per patient report, varicose veins bilaterally, nonpitting edema bilaterally, capillary refill <3 sec.     Pulses: 2+ and symmetric all extremities  Skin: Skin color, texture, turgor normal for ethnicity, warm and dry, left temporal abrasion, Left ar 75/hr  -Lipid panel. Restart/Continue Statin, goal LDL<70 and HDL>40  -Hold all anticoagulants and antiplatelets   -Neuro checks Q1h  -Repeat cervical spine CT in am  -Aspen collar  -NS on consult    3.  Afib - on Coumadin, now reversed, rates controlled

## 2020-07-06 NOTE — OCCUPATIONAL THERAPY NOTE
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 King Keels, [facility] (during inpatient stay)   • COLONOSCOPY N/A 4/5/2019    Perform just so stupid. \"    Patient self-stated goal is to have better pain control.      OBJECTIVE  Precautions: Seizure;Cervical brace(-150)  Fall Risk: High fall risk    WEIGHT BEARING RESTRICTION  Weight Bearing Restriction: None                PAIN ASS assistance    Skilled Therapy Provided: PPE worn: surgical mask, gloves.   Pt was received supine in bed for session, pt educated on role of OT and spine precautions, pt edcuated on log roll technique, pt t/f to EOB with log roll and min assist, pt unable t of tasks    Clinical Decision Making MODERATE - Analysis of occupational profile, detailed assessments, several treatment options    Overall Complexity MODERATE     OT Discharge Recommendations: Home with home health PT/OT       PLAN  OT Treatment Plan:  Michael

## 2020-07-07 ENCOUNTER — APPOINTMENT (OUTPATIENT)
Dept: GENERAL RADIOLOGY | Facility: HOSPITAL | Age: 80
DRG: 052 | End: 2020-07-07
Attending: NURSE PRACTITIONER
Payer: MEDICARE

## 2020-07-07 LAB
ANION GAP SERPL CALC-SCNC: <0 MMOL/L (ref 0–18)
BLOOD TYPE BARCODE: 6200
BUN BLD-MCNC: 12 MG/DL (ref 7–18)
BUN/CREAT SERPL: 13.8 (ref 10–20)
CALCIUM BLD-MCNC: 8.6 MG/DL (ref 8.5–10.1)
CHLORIDE SERPL-SCNC: 110 MMOL/L (ref 98–112)
CO2 SERPL-SCNC: 31 MMOL/L (ref 21–32)
CREAT BLD-MCNC: 0.87 MG/DL (ref 0.55–1.02)
GLUCOSE BLD-MCNC: 100 MG/DL (ref 70–99)
GLUCOSE BLD-MCNC: 107 MG/DL (ref 70–99)
GLUCOSE BLD-MCNC: 84 MG/DL (ref 70–99)
GLUCOSE BLD-MCNC: 98 MG/DL (ref 70–99)
HAV IGM SER QL: 2.5 MG/DL (ref 1.6–2.6)
INR BLD: 1.05 (ref 0.89–1.11)
OSMOLALITY SERPL CALC.SUM OF ELEC: 290 MOSM/KG (ref 275–295)
POTASSIUM SERPL-SCNC: 5 MMOL/L (ref 3.5–5.1)
PSA SERPL DL<=0.01 NG/ML-MCNC: 14 SECONDS (ref 12.4–14.6)
SODIUM SERPL-SCNC: 140 MMOL/L (ref 136–145)

## 2020-07-07 PROCEDURE — 99231 SBSQ HOSP IP/OBS SF/LOW 25: CPT | Performed by: NEUROLOGICAL SURGERY

## 2020-07-07 PROCEDURE — 72040 X-RAY EXAM NECK SPINE 2-3 VW: CPT | Performed by: NURSE PRACTITIONER

## 2020-07-07 PROCEDURE — 99232 SBSQ HOSP IP/OBS MODERATE 35: CPT | Performed by: HOSPITALIST

## 2020-07-07 RX ORDER — TORSEMIDE 20 MG/1
20 TABLET ORAL
Status: DISCONTINUED | OUTPATIENT
Start: 2020-07-07 | End: 2020-07-09

## 2020-07-07 RX ORDER — LOSARTAN POTASSIUM 25 MG/1
25 TABLET ORAL DAILY
Status: DISCONTINUED | OUTPATIENT
Start: 2020-07-07 | End: 2020-07-09

## 2020-07-07 NOTE — PROGRESS NOTES
[facility]  Progress Note     Leonardo Rise Patient Status:  Inpatient    1940 MRN [id number]   [facility] 6NE-A Attending Olivia Vu MD   Hosp Day # 2 PCP Tre Ramos MD     Chief Complaint: HA, fall    S: Evangelist [employer] 106 110  --  110   CO2 28.0 30.0  --  31.0   ALKPHO 109  --   --   --    AST 40*  --   --   --    ALT 20  --   --   --    BILT 0.7  --   --   --    TP 7.2  --   --   --        Estimated Creatinine Clearance: 49.9 mL/min (based on SCr of 0.87 mg/dL).     Rec NAD  CVS: s1s2  Resp: CTA  Abd: soft    -doing well  -pain fairly stable  -hold AC  -keep brace on  -resume torsemide      Juan M Marshall MD

## 2020-07-07 NOTE — PLAN OF CARE
Drowsy,easy to wake, oriented x 4. BP elevated (see note). O2 2L per NC. Tele- A-Fib (per hx). Neck pain well controlled with Norco PRN. Denies N/T to BUE's, neuropathy to bilateral feet per baseline. Linda BRIGHT collar on and aligned.  Nilda patent and ced

## 2020-07-07 NOTE — PHYSICAL THERAPY NOTE
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 Ryland Ruvalcaba, [facility] GI (during inpatient stay)   • COLONOSCOPY N/A 4/5/2019    Performed by Katarzyna Alves DO at [facility] ENDOSCOPY   • [id number] Left 12/4/2016    Performed by Yuriy Montes De Oca FORM - BASIC MOBILITY  How much difficulty does the patient currently have. ..  -   Turning over in bed (including adjusting bedclothes, sheets and blankets)?: A Little   -   Sitting down on and standing up from a chair with arms (e.g., wheelchair, bedside place    ASSESSMENT   Pt continues to present with impaired strength  , decreased endurance and impaired balance below PLOF s/p fall resulting in epidural hematoma and C2 fracture  .    Pt will continue to benefit from ongoing IP PT to maximize functional i

## 2020-07-07 NOTE — ED PROVIDER NOTES
Patient Seen in: [facility] 3sw-a      History   Patient presents with:  Fall  Upper Extremity Injury  Head Neck Injury    Stated Complaint: Went to  a bottle cap off the floor and hit her head on a TV then hit on*    HPI    68-year-old woman does not wear a hearing aid   • High blood pressure    • High cholesterol    • History of syncope     cardiogenic   • Insomnia 11/25/2013   • Kidney stone    • Migraines    • Neuropathy     bilateral feet   • NSVT (nonsustained ventricular tachycardia) (H Repair of Incarcerated Complex Ventral Hernia w/mesh, bilateral component separation, removal previous mesh, partial omentectomy, lysis of adhesions on 3/24/2011:  PCP:  Dr. Efren Anderson   • [address]     • HYSTERECTOMY      2/2 prolapse, no cancer Comments: Awake alert and oriented x4 however, very anxious, somewhat labile and repetitive. Has obvious abrasion over forehead, bloodsoaked gauze wrapped around her head cervical collar in place   HENT:      Head: Atraumatic. Jaw:  There is normal Skin:     General: Skin is warm and dry. Capillary Refill: Capillary refill takes less than 2 seconds. Coloration: Skin is not pale. Findings: Bruising present. No erythema or rash.       Comments: Diffuse skin bruising and a 10 x 10 cm skin CBC, PLATELET; NO DIFFERENTIAL - Abnormal; Notable for the following components:    RBC 3.55 (*)     HGB 11.6 (*)     .3 (*)     RDW-SD 49.1 (*)     All other components within normal limits   PROTHROMBIN TIME (PT) - Abnormal; Notable for the follow Procedure                               Abnormality         Status                     ---------                               -----------         ------                     ABORH (BLOOD TYPE)[[id number]]                               Final result the midportion of the dense with minimal posterior angulation of the     superior fracture fragment which is a stable finding. CERVICAL DISC LEVELS:    C2-C3:  Mild degenerative disc bulge.   There is mild to moderate bilateral     facet joint deg PATIENT STATED HISTORY: (As transcribed by Technologist)  Patient hit her     top of the head with tv corner and fell. Head and neck pain               FINDINGS:      Ventricles and sulci are within normal limits.   Trace chronic small vessel     ischemic PATIENT STATED HISTORY: (As transcribed by Technologist)  Patient hit her     top of the head with tv corner and fell.  Head and neck pain               FINDINGS:           There is an acute transverse fracture through the mid/superior aspect of     the d 44-year-old woman presents to the emergency department after traumatic fall at home when she leaned over to  a cap off the ground lost her balance fell forward into the television stand did not lose consciousness but arrives with an abrasion on her to the CnICU for care and then likely no intervention if remains well. Discussed this with the patient and she expressed understanding.   Discussed with hospitalist Dr. Lisa Abbott  Admission disposition: 7/5/2020  7:32 PM           Total critical care time

## 2020-07-07 NOTE — PLAN OF CARE
OOB with mod assist and walker. Taking Norco prn for c/o neck pain. C spine xray done as ordered. Stark J collar on at all times. Staples intact to top of head. Wound care consulted for left forearm skin tear. Coumadin remains on hold per neurosurg.

## 2020-07-07 NOTE — PLAN OF CARE
BP elevated above MD parameter (-150) @ 168/82. HR is 58-62. Dr. Simone Ashford paged for Hydralazine as HR is too low for PRN Labetalol. Order rec'd. Hydralazine 10 mg IV given. BP recheck is 129/67.

## 2020-07-07 NOTE — PLAN OF CARE
Called dtr with update on plans to hold [facility] for 5 days and [facility] as d/w ANGELIKA morataya. Routed a message to her cardiologist also.

## 2020-07-07 NOTE — PAYOR COMM NOTE
--------------  CONTINUED STAY REVIEW    Payor: BCBS MEDICARE ADV PPO  Subscriber #:  [id number]  Authorization Number: [id number]    Admit date: 7/5/20  Admit time: 2228    Admitting Physician: Nadeem Alonzo DO  Attending Physician:  Keyona Marley Action Dose Route User    7/7/2020 0304 Given 200 mg Oral Clide Lucita, RN      diltiazem (CARDIZEM CD) 24 hr cap 120 mg     Date Action Dose Route User    7/7/2020 2273 Given 120 mg Oral Clide Havedelroy, RN      docusate sodium (COLACE) cap 100 mg diastolic CHF, EF of 07%, compensated  1. Resume torsemide if tolerating po  8. Asthma/COPD, stable  9. Recent COVID, repeat testing neg  10. Depression  11. Mild dementia  12. DL  13. Hypokalemia, resolved  14. RADHA  1. Refused cpap, on O2 while sleeping.

## 2020-07-07 NOTE — PROGRESS NOTES
[facility]  Neurosurgery Progress Note    Pawan Pruitt Patient Status:  Inpatient    1940 MRN [id number]   [facility] 3SW-A Attending Mike Moralez MD   [facility] Day # 2 PCP Hannah Coleman MD     Chief Complaint:  Demian Martni

## 2020-07-07 NOTE — CM/SW NOTE
PT recommendation changed to ALYSHA. Updated therapy note sent to [facility] via [address]. Exchanged messages on AIDIN with Nick Goldstein from [facility] [place] regarding DC planning. They will require long term care application due to \"high risk for readmission. \"  Insurance

## 2020-07-08 LAB
ANION GAP SERPL CALC-SCNC: 2 MMOL/L (ref 0–18)
BILIRUB UR QL STRIP.AUTO: NEGATIVE
BUN BLD-MCNC: 17 MG/DL (ref 7–18)
BUN/CREAT SERPL: 16.5 (ref 10–20)
CALCIUM BLD-MCNC: 8.5 MG/DL (ref 8.5–10.1)
CHLORIDE SERPL-SCNC: 107 MMOL/L (ref 98–112)
CO2 SERPL-SCNC: 29 MMOL/L (ref 21–32)
COLOR UR AUTO: YELLOW
CREAT BLD-MCNC: 1.03 MG/DL (ref 0.55–1.02)
GLUCOSE BLD-MCNC: 100 MG/DL (ref 70–99)
GLUCOSE BLD-MCNC: 102 MG/DL (ref 70–99)
GLUCOSE BLD-MCNC: 103 MG/DL (ref 70–99)
GLUCOSE BLD-MCNC: 136 MG/DL (ref 70–99)
GLUCOSE UR STRIP.AUTO-MCNC: NEGATIVE MG/DL
HYALINE CASTS #/AREA URNS AUTO: PRESENT /LPF
INR BLD: 1.11 (ref 0.89–1.11)
KETONES UR STRIP.AUTO-MCNC: NEGATIVE MG/DL
NITRITE UR QL STRIP.AUTO: POSITIVE
OSMOLALITY SERPL CALC.SUM OF ELEC: 288 MOSM/KG (ref 275–295)
PH UR STRIP.AUTO: 5 [PH] (ref 4.5–8)
POTASSIUM SERPL-SCNC: 4.2 MMOL/L (ref 3.5–5.1)
PROT UR STRIP.AUTO-MCNC: NEGATIVE MG/DL
PSA SERPL DL<=0.01 NG/ML-MCNC: 14.6 SECONDS (ref 12.4–14.6)
RBC #/AREA URNS AUTO: >10 /HPF
SODIUM SERPL-SCNC: 138 MMOL/L (ref 136–145)
SP GR UR STRIP.AUTO: 1.01 (ref 1–1.03)
UROBILINOGEN UR STRIP.AUTO-MCNC: <2 MG/DL
WBC #/AREA URNS AUTO: >50 /HPF
WBC CLUMPS UR QL AUTO: PRESENT

## 2020-07-08 PROCEDURE — 99232 SBSQ HOSP IP/OBS MODERATE 35: CPT | Performed by: HOSPITALIST

## 2020-07-08 PROCEDURE — 99231 SBSQ HOSP IP/OBS SF/LOW 25: CPT | Performed by: NEUROLOGICAL SURGERY

## 2020-07-08 RX ORDER — ACETAMINOPHEN 325 MG/1
650 TABLET ORAL EVERY 4 HOURS PRN
Refills: 0 | Status: SHIPPED | COMMUNITY
Start: 2020-07-08

## 2020-07-08 RX ORDER — POLYETHYLENE GLYCOL 3350 17 G/17G
17 POWDER, FOR SOLUTION ORAL DAILY PRN
Refills: 0 | Status: SHIPPED | COMMUNITY
Start: 2020-07-08 | End: 2020-08-05 | Stop reason: ALTCHOICE

## 2020-07-08 RX ORDER — TIZANIDINE 2 MG/1
2 TABLET ORAL EVERY 6 HOURS PRN
Refills: 0 | Status: SHIPPED | COMMUNITY
Start: 2020-07-08 | End: 2020-12-03 | Stop reason: ALTCHOICE

## 2020-07-08 RX ORDER — HYDROCODONE BITARTRATE AND ACETAMINOPHEN 5; 325 MG/1; MG/1
1-2 TABLET ORAL EVERY 4 HOURS PRN
Qty: 20 TABLET | Refills: 0 | Status: SHIPPED | OUTPATIENT
Start: 2020-07-08 | End: 2020-12-03 | Stop reason: ALTCHOICE

## 2020-07-08 RX ORDER — FAMOTIDINE 20 MG/1
20 TABLET ORAL DAILY
Qty: 30 TABLET | Refills: 0 | Status: SHIPPED | COMMUNITY
Start: 2020-07-09 | End: 2020-12-03 | Stop reason: ALTCHOICE

## 2020-07-08 NOTE — PLAN OF CARE
Pt received new Hampton J collar for better fit. Brace on and aligned, pt states it's more comfortable.

## 2020-07-08 NOTE — PROGRESS NOTES
[facility] H[facility]PITAL[facility]  Progress Note     Clemencia Beltre Patient Status:  Inpatient    1940 MRN [id number]   [facility] 6NE-A Attending Yariel Dumont MD   [address] Day # 3 PCP Ivor Schilder, MD     Chief Complaint: HA, fall    S: Patien GFRNAA 41* 70  --  64 52*   CA 8.3* 8.2*  --  8.6 8.5   ALB 3.2*  --   --   --   --     144  --  140 138   K 4.2 3.3* 4.5 5.0 4.2    110  --  110 107   CO2 28.0 30.0  --  31.0 29.0   ALKPHO 109  --   --   --   --    AST 40*  --   --   --   -- Prophylaxis: off AC/scds  · CODE status: full    Estimated date of discharge: Today to  [facility] if cleared by consultants. Plan of care discussed with pt, MD.  Hold AC for 5 days and then resume coumadin w/ INR drift up - reviewed w/ NS apn yesterday.    Sp

## 2020-07-08 NOTE — PLAN OF CARE
Alert and oriented,miami J collar in placed,denies any numbness or tingling sensation to both upper and lower extremities,able to move them without difficulty. Up in the chair,feels better to sleep in the chair than her bed. Swallowing pills better  with yessica

## 2020-07-08 NOTE — PROGRESS NOTES
[facility]  Neurosurgery Progress Note    Alphonso Downs Patient Status:  Inpatient    1940 MRN [id number]   [facility] 3SW-A Attending Ankur Swann MD   [facility] Day # 3 PCP Sherrill Martines MD     Chief Complaint:  Cande Brazil practitioner  Case discussed  Feels better today  X-rays look good  We will have the collar adjusted  Following

## 2020-07-08 NOTE — DISCHARGE SUMMARY
[facility]OSPITAL[facility]  DISCHARGE SUMMARY     Radha Naylor Patient Status:  Inpatient    1940 MRN [id number]   [facility] 3SW-A Attending Ruben Damon MD   [address] Day # 4 PCP Scotty Olivas MD     Date of Admission: 2020  Date Transitioned to [facility] at d/c for 6 more days. Script for norco given. She was discharged to [facility] once cleared by consultants. She will f/u with PCP in 1 week, NS on 8/5 at 11am w/ repeat xrays prior to appt.     Lace+ Score: 78  59-90 High Ris hours as needed.    Refills:  0     Warfarin Sodium 2.5 MG Tabs  Commonly known as:  Coumadin  Start taking on:  July 11, 2020  What changed:    · medication strength  · how much to take  · when to take this  · additional instructions  · These instructions ZOLOFT      Take 1 tablet (25 mg total) by mouth daily. Quantity:  90 tablet  Refills:  3     torsemide 20 MG Tabs  Commonly known as:  DEMADEX      Take 20 mg by mouth 2 (two) times a day.    Refills:  0     ursodiol 300 MG Caps  Commonly known as:  ACTI

## 2020-07-08 NOTE — PAYOR COMM NOTE
--------------  CONTINUED STAY REVIEW    Payor: BCBS MEDICARE ADV PPO  Subscriber #:  [id number]  Authorization Number: [id number]    Admit date: 7/5/20  Admit time: 2228    Admitting Physician: Kasia Salazar DO  Attending Physician:  Manuel Ribera 07/08/20  0657   * 112*  --  100* 102*   BUN 22* 17  --  12 17   CREATSERUM 1.25* 0.81  --  0.87 1.03*   GFRAA 48* 80  --  74 60   GFRNAA 41* 70  --  64 52*   CA 8.3* 8.2*  --  8.6 8.5   ALB 3.2*  --   --   --   --     144  --  140 138   K 4.2 testing neg  10. Depression  11. Mild dementia  12. DL  13. Hypokalemia, resolved  14. RADHA  1. Refused cpap, on O2 while sleeping.      Quality:  · DVT Prophylaxis: off AC/scds  · CODE status: full     Estimated date of discharge:  Today to  [facility] if cleared 7/7/2020 2047 Given 17.2 mg Oral Whitney Pickens RN      torsemide [facility]) tab 20 mg     Date Action Dose Route User    7/8/2020 0834 Given 20 mg Oral Lisa Quintero RN    7/7/2020 1648 Given 20 mg Oral Lisa Quintero RN

## 2020-07-08 NOTE — CM/SW NOTE
Spoke with pt's RN regarding anticipated DC to [place] [facility] today. Message left for [facility] with [facility] and also sent via Dorene Schwab to determine status of insurance approval for ALYSHA.   / to remain available for support and/or discharg

## 2020-07-08 NOTE — CM/SW NOTE
Received call from pt's dtr, Jeri expressing concern about DC plan for [facility] and their request for LTC application.   Pt's family only intending pt to go to [facility] [place] for ALYSHA and so pt's dtr stating they do not feel they should need to complete LTC applicat

## 2020-07-08 NOTE — PHYSICAL THERAPY NOTE
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 Priscilla Gonzalez, [facility] GI (during inpatient stay)   • COLONOSCOPY N/A 4/5/2019    Performed by Salvador Eng DO at [facility] ENDOSCOPY   • [id number] Left 12/4/2016    Performed by Laverne Primrose AM-PAC '6-Clicks' INPATIENT SHORT FORM - BASIC MOBILITY  How much difficulty does the patient currently have. ..  -   Turning over in bed (including adjusting bedclothes, sheets and blankets)?: A Little   -   Sitting down on and standing up from a c however, continues to be limited by pain in cervical spine and head. The AM-PAC '6-Clicks' Inpatient Basic Mobility Short Form was completed and this patient is demonstrating a 57% degree of impairment in mobility.  Research supports that patients with th

## 2020-07-08 NOTE — CONSULTS
[facility]  Report of Inpatient Wound Care Consultation    Jesse Mcgraw Patient Status:  Inpatient    1940 MRN [id number]   [facility] 3SW-A Attending Chase Tolbert MD   [facility] Day # 3 PCP Kathi Smalls MD     Reason for C bilateral 06/18/2015    Severe, frozen shoulder syndrome   • Pericardial effusion 6/5/2014    trivial   • Pulmonary embolism [facility])    • Pulmonary HTN ([facility] 10/10/2014   • Rheumatoid arthritis ([facility]    • S/P ICD (internal cardiac defibrillator) procedure 7/ reports that she has never smoked. She has never used smokeless tobacco. She reports that she does not drink alcohol or use drugs.     Allergies:  @ALLERGY    Laboratory Data:  Lab Results   Component Value Date    BRITANYERUM 1.03 07/08/2020    BUN 17

## 2020-07-09 VITALS
HEIGHT: 66 IN | OXYGEN SATURATION: 98 % | HEART RATE: 62 BPM | SYSTOLIC BLOOD PRESSURE: 158 MMHG | RESPIRATION RATE: 19 BRPM | BODY MASS INDEX: 32.89 KG/M2 | DIASTOLIC BLOOD PRESSURE: 64 MMHG | TEMPERATURE: 98 F | WEIGHT: 204.63 LBS

## 2020-07-09 LAB
GLUCOSE BLD-MCNC: 106 MG/DL (ref 70–99)
GLUCOSE BLD-MCNC: 138 MG/DL (ref 70–99)
GLUCOSE BLD-MCNC: 97 MG/DL (ref 70–99)
INR BLD: 1.19 (ref 0.89–1.11)
PSA SERPL DL<=0.01 NG/ML-MCNC: 15.5 SECONDS (ref 12.4–14.6)

## 2020-07-09 PROCEDURE — 99231 SBSQ HOSP IP/OBS SF/LOW 25: CPT | Performed by: NEUROLOGICAL SURGERY

## 2020-07-09 PROCEDURE — 99239 HOSP IP/OBS DSCHRG MGMT >30: CPT | Performed by: HOSPITALIST

## 2020-07-09 RX ORDER — SENNOSIDES 8.6 MG
17.2 TABLET ORAL DAILY PRN
Status: DISCONTINUED | OUTPATIENT
Start: 2020-07-09 | End: 2020-07-09

## 2020-07-09 RX ORDER — DOCUSATE SODIUM 100 MG/1
100 CAPSULE, LIQUID FILLED ORAL 2 TIMES DAILY PRN
Status: DISCONTINUED | OUTPATIENT
Start: 2020-07-09 | End: 2020-07-09

## 2020-07-09 RX ORDER — CEFADROXIL 500 MG/1
500 CAPSULE ORAL 2 TIMES DAILY
Qty: 12 CAPSULE | Refills: 0 | Status: SHIPPED | OUTPATIENT
Start: 2020-07-09 | End: 2020-07-15

## 2020-07-09 RX ORDER — WARFARIN SODIUM 2.5 MG/1
2.5 TABLET ORAL NIGHTLY
Qty: 30 TABLET | Refills: 0 | Status: SHIPPED | OUTPATIENT
Start: 2020-07-11 | End: 2020-12-03 | Stop reason: DRUGHIGH

## 2020-07-09 RX ORDER — PSEUDOEPHEDRINE HCL 30 MG
100 TABLET ORAL 2 TIMES DAILY PRN
Qty: 60 CAPSULE | Refills: 0 | Status: SHIPPED | OUTPATIENT
Start: 2020-07-09 | End: 2020-12-03 | Stop reason: ALTCHOICE

## 2020-07-09 NOTE — PROGRESS NOTES
Called about urinary retention; u/a ordered. Addendum:  U/a suggestive of infection so rocephin started.

## 2020-07-09 NOTE — PROGRESS NOTES
Report called to [facility]. Updated daughter, Marina Hill as well. Reviewed discharge instructions with Jeri prior to discharge this afternoon. Verbalized understanding of teaching.    Stressed to RN at [facility] that coumadin is not to restart until 7/11/20

## 2020-07-09 NOTE — PROGRESS NOTES
[facility]  Progress Note     Krista Vazquez Patient Status:  Inpatient    1940 MRN [id number]   [facility] 6NE-A Attending Zac Summers MD   [facility] Day # 4 PCP Bernarda Grace MD     Chief Complaint: HA, fall    S: [place] 0.81  --  0.87 1.03*   GFRAA 48* 80  --  74 60   GFRNAA 41* 70  --  64 52*   CA 8.3* 8.2*  --  8.6 8.5   ALB 3.2*  --   --   --   --     144  --  140 138   K 4.2 3.3* 4.5 5.0 4.2    110  --  110 107   CO2 28.0 30.0  --  31.0 29.0   ALKPHO 109 [facility] discussed with pt, MD.  Resume coumadin on 7/11. Duricef script printed and william signed on chart. [place], [place]  12:06 PM     Addendum:    Agree with above note. Pt. Seen and examined.     Gen: NAD  CVS: s1s2  Resp: CTA  A

## 2020-07-09 NOTE — CM/SW NOTE
07/09/20 1100   Discharge disposition   Expected discharge disposition Skilled Nurs   Name of Facillity/Home Care/Hospice [facility] [place] [facility]   Discharge transportation [facility]

## 2020-07-09 NOTE — PHYSICAL THERAPY NOTE
PHYSICAL THERAPY TREATMENT NOTE - INPATIENT    Room Number: 351/351-A     Session: 3  Number of Visits to Meet Established Goals: 5    Presenting Problem: Epidural hematoma    History related to current admission: Pt is 68year old female admitted on 7/5/ Arrhythmia    • Asthma    • Atypical lymphoproliferative disorder ([facility] 3/7/2014    Low grade on LN bx 2014   • Cataract     RIGHT   • Congestive heart disease ([facility]    • COPD (chronic obstructive pulmonary disease) ([facility]    • Deep vein thrombosis (HCC) COLONOSCOPY  04/05/2019    sessile serrated polyp, Dr. Caio Stewart, [facility] (during inpatient stay)   • COLONOSCOPY N/A 4/5/2019    Performed by Jolene Means DO at [facility] ENDOSCOPY   • [id number] Left 12/4/2016    Performed by Dave Rodriguez [facility] WALK                    AM-PAC '6-Clicks' INPATIENT SHORT FORM - BASIC MOBILITY  How much difficulty does the patient currently have. ..  -   Turning over in bed (including adjusting bedclothes, sheets and blankets)?: A Lot   -   Sitting down on a staff;Needs met;Call light within reach;RN aware of session/findings; All patient questions and concerns addressed;SCDs in place    ASSESSMENT   Pt continues to present with impaired strength  , decreased endurance and impaired balance below PLOF s/p fall r

## 2020-07-09 NOTE — PROGRESS NOTES
[facility]  Neurosurgery Progress Note    Kevin Ferrera Patient Status:  Inpatient    1940 MRN [id number]   [facility] 3SW-A Attending Kailey Oneal MD   [facility] Day # 4 PCP Anita Ann MD     Chief Complaint:  Erick Merritt

## 2020-07-09 NOTE — CM/SW NOTE
Spoke with Kori Quintana from admissions at [facility]. They have received insurance auth and can accept pt for admission today. Updated pt's RN. Await medical clearance for DC.   / to remain available for support and/or discharge p

## 2020-07-09 NOTE — PLAN OF CARE
Pt AOX4. Has moderate pain on her posterior neck. Miami J collar on @ all times. Pain management plan explained. C/o burning sensation and was having difficulty urinating in the beg of the shift. Bladder scan showed >999cc. Straight cath.  UA obtained and s

## 2020-07-09 NOTE — CM/SW NOTE
Per RN, pt has been cleared by MDs to discharge. Reserved [address] [place] [address] with [employer] for 2pm [phone]. PCS form completed in epic. Spoke with pts vika Wilcox on phone.  She is aware of plan for discharge today and discussed approximate cost of Medivan not c

## 2020-07-09 NOTE — PAYOR COMM NOTE
--------------  CONTINUED STAY REVIEW    Payor: BCBS MEDICARE ADV PPO  Subscriber #:  [id number]  Authorization Number: [id number]    Admit date: 7/5/20  Admit time: 2228    Admitting Physician: Shirley Cruz DO  Attending Physician:  Ernesto Paige 07/05/20  1722 07/06/20  0434 07/06/20  1545 07/07/20  0510 07/08/20  0657   * 112*  --  100* 102*   BUN 22* 17  --  12 17   CREATSERUM 1.25* 0.81  --  0.87 1.03*   GFRAA 48* 80  --  74 60   GFRNAA 41* 70  --  64 52*   CA 8.3* 8.2*  --  8.6 8.5   AL dementia  12. DL  13. RADHA  1. Refused cpap, on O2 while sleeping.      Quality:  · DVT Prophylaxis: off AC/scds  · CODE status: full     Estimated date of discharge: Today to [address] [place] [address] discussed with MD luz maria.  Resume coumadin on 7/11.   Du [facility]) tab 20 mg     Date Action Dose Route User    7/9/2020 0844 Given 20 mg Oral Jasmina Alexander RN    7/8/2020 1604 Given 20 mg Oral Emory Moulton RN

## 2020-07-09 NOTE — PLAN OF CARE
Rates pain high, 7/10 on assessment this am. Reports that this was tolerable, just constant when asked. Continuing with PO norco x2 tabs Q4 hr for pain control. Will monitor.  Patient c/o more irritation of neck around and underneath MiamiJ collar, no redne

## 2020-07-10 ENCOUNTER — EXTERNAL FACILITY (OUTPATIENT)
Dept: FAMILY MEDICINE CLINIC | Facility: CLINIC | Age: 80
End: 2020-07-10

## 2020-07-10 DIAGNOSIS — S06.4X9A EPIDURAL HEMATOMA (HCC): Primary | ICD-10-CM

## 2020-07-10 DIAGNOSIS — E11.51 DM (DIABETES MELLITUS), TYPE 2 WITH PERIPHERAL VASCULAR COMPLICATIONS (HCC): ICD-10-CM

## 2020-07-10 DIAGNOSIS — I10 ESSENTIAL HYPERTENSION: ICD-10-CM

## 2020-07-10 DIAGNOSIS — J44.9 CHRONIC OBSTRUCTIVE PULMONARY DISEASE, UNSPECIFIED COPD TYPE (HCC): ICD-10-CM

## 2020-07-10 DIAGNOSIS — Z86.16 HISTORY OF 2019 NOVEL CORONAVIRUS DISEASE (COVID-19): ICD-10-CM

## 2020-07-10 DIAGNOSIS — I50.32 CHRONIC HEART FAILURE WITH PRESERVED EJECTION FRACTION (HCC): ICD-10-CM

## 2020-07-10 DIAGNOSIS — S12.100A DENS FRACTURE, CLOSED, INITIAL ENCOUNTER (HCC): ICD-10-CM

## 2020-07-10 DIAGNOSIS — I48.21 PERMANENT ATRIAL FIBRILLATION (HCC): ICD-10-CM

## 2020-07-10 DIAGNOSIS — Z91.81 AT HIGH RISK FOR FALLS: ICD-10-CM

## 2020-07-10 PROCEDURE — 99306 1ST NF CARE HIGH MDM 50: CPT | Performed by: FAMILY MEDICINE

## 2020-07-10 PROCEDURE — 1111F DSCHRG MED/CURRENT MED MERGE: CPT | Performed by: FAMILY MEDICINE

## 2020-07-10 NOTE — PAYOR COMM NOTE
--------------  DISCHARGE REVIEW    Payor: BCBS MEDICARE ADV PPO  Subscriber #:  [id number]  Authorization Number: [id number]    Admit date: 7/5/20  Admit time:  2228  Discharge Date: 7/9/2020  2:00 PM     Admitting Physician: Omero Wolfe 7/11 - 2.5mg to start as pt on abx (normally alternates 5mg and 2.5mg). She was given Rocephin IV last night and pending urine culture. Transitioned to [facility] at d/c for 6 more days. Script for norco given.   She was discharged to [address] [place] [address] once cl known as:  TYLENOL  What changed:    · medication strength  · how much to take  · when to take this  · reasons to take this       Take 2 tablets (650 mg total) by mouth every 4 (four) hours as needed.    Refills:  0      Warfarin Sodium 2.5 MG Tabs  Common ER 20 MEQ Tbcr  Commonly known as:  K-DUR M20       Take 1 tablet (20 mEq total) by mouth daily.  TAKE 1 TABLET BY MOUTH DAILY    Quantity:  90 tablet  Refills:  3      Sertraline HCl 25 MG Tabs  Commonly known as:  ZOLOFT       Take 1 tablet (25 mg total) minutes               Electronically signed by JIGAR Stringer at 7/9/2020  2:33 PM

## 2020-07-10 NOTE — PROGRESS NOTES
Lencho Kelly Author: Shauna Edward MD     1940 MRN [id number]   [facility]  Admission 20      Last Hospital Discharge 20 PCP Linda Perez MD   Hospital of Discharge  [facility]        CC -admitted to [facility] from [place] thrombosis ([facility]     R lower leg- 12/14/16, h/o IVC filter   • Dementia ([facility]    • Depression    • Diabetes ([facility]    • Diabetes mellitus type 2, noninsulin dependent ([facility]     Type !!   • Diabetic peripheral neuropathy associated with type 2 diabetes ethan MAIN OR   • CT DRAIN CHOLECYSTOSTOMY (CPT=47490)     • EGD  04/05/2019    reactive gastropathy, Dr. Mateus Bonner GI   • ESOPHAGOGASTRODUODENOSCOPY (EGD) N/A 4/5/2019    Performed by Riddhi Morales DO at [facility] ENDOSCOPY   • HERNIA SURGERY  3/24/2011 G • [START ON 7/11/2020] Warfarin Sodium (COUMADIN) 2.5 MG Oral Tab Take 1 tablet (2.5 mg total) by mouth nightly. DO NOT START UNTIL 7/11 per NEUROSURGERY RECOMMENDATIONS. Patient alternates 5mg and 2.5mg however she will be on antibiotics for UTI.  30 tab sweats. ENT: No mouth pain, neck pain, running nose, headaches or swollen glands. Skin: No rashes, pruritus or skin changes,  Respiratory: Denies cough, wheezing or shortness of breath. CV: Denies chest pain, palpitations, orthopnea, PND or dizziness. --    AST 40*  --   --   --   --    ALT 20  --   --   --   --    BILT 0.7  --   --   --   --    TP 7.2  --   --   --   --         ASSESSMENT AND PLAN:  Diagnoses and all orders for this visit:    Epidural hematoma ([facility]    Dens fracture, closed, initial en

## 2020-07-14 ENCOUNTER — INITIAL APN SNF VISIT (OUTPATIENT)
Dept: INTERNAL MEDICINE CLINIC | Age: 80
End: 2020-07-14

## 2020-07-14 DIAGNOSIS — K59.00 CONSTIPATION, UNSPECIFIED CONSTIPATION TYPE: Primary | ICD-10-CM

## 2020-07-14 DIAGNOSIS — Z74.09 IMPAIRED MOBILITY AND ADLS: ICD-10-CM

## 2020-07-14 DIAGNOSIS — Z91.81 HISTORY OF BAD FALL: ICD-10-CM

## 2020-07-14 DIAGNOSIS — Z78.9 IMPAIRED MOBILITY AND ADLS: ICD-10-CM

## 2020-07-14 DIAGNOSIS — Z79.01 CURRENT USE OF LONG TERM ANTICOAGULATION: ICD-10-CM

## 2020-07-14 DIAGNOSIS — Z79.899 MEDICATION MANAGEMENT: ICD-10-CM

## 2020-07-14 DIAGNOSIS — I48.19 PERSISTENT ATRIAL FIBRILLATION (HCC): ICD-10-CM

## 2020-07-14 PROCEDURE — 3079F DIAST BP 80-89 MM HG: CPT | Performed by: NURSE PRACTITIONER

## 2020-07-14 PROCEDURE — 99310 SBSQ NF CARE HIGH MDM 45: CPT | Performed by: NURSE PRACTITIONER

## 2020-07-14 PROCEDURE — 3077F SYST BP >= 140 MM HG: CPT | Performed by: NURSE PRACTITIONER

## 2020-07-15 VITALS
DIASTOLIC BLOOD PRESSURE: 89 MMHG | OXYGEN SATURATION: 94 % | RESPIRATION RATE: 19 BRPM | SYSTOLIC BLOOD PRESSURE: 143 MMHG | TEMPERATURE: 98 F | HEART RATE: 87 BPM

## 2020-07-15 RX ORDER — HYDROCORTISONE 25 MG/G
1 CREAM TOPICAL 3 TIMES DAILY PRN
COMMUNITY
End: 2020-12-03 | Stop reason: ALTCHOICE

## 2020-07-15 NOTE — PROGRESS NOTES
Selina Turner  : 1940  Age 68year old  female patient is admitted to Facility: [facility] for [address] [place] [address] date:  20  Discharge date to [facility]:  20  ELOS:  10-11 days  Anticipated discharge date:  20  Advanced Directive appt.\"    Hospital Discharge Diagnoses:  1. Epidural hematoma At the level of C1/C2  2. C1 fracture yrs ago and now acute C2 dens fx  3. Chronic Atrial fib, controlled  4. H/o DVT/PE, ivc filter  5. Hypertension, Stable  6. DM 2 5.3%  7.  Chronic diastolic • High cholesterol    • History of syncope     cardiogenic   • Insomnia 11/25/2013   • Kidney stone    • Migraines    • Neuropathy     bilateral feet   • NSVT (nonsustained ventricular tachycardia) ([facility] 7/11/2014    Recurrence 7.2016 Hx VT- s/p ICD impl 2/2 prolapse, no cancer   • INJECT EPIDURAL ANEST,LUMB,CONTINOUS  2012   • MITRACLIP N/A 4/11/2019    Performed by Crow Ovalles MD at [facility]  2016    surgery to relieve pressure on brain due to fall   • PACEMAKER/DEFIBRILLATOR      [facility] famoTIDine 20 MG Oral Tab Take 1 tablet (20 mg total) by mouth daily. 30 tablet 0   • HYDROcodone-acetaminophen 5-325 MG Oral Tab Take 1-2 tablets by mouth every 4 (four) hours as needed.  20 tablet 0   • acetaminophen 325 MG Oral Tab Take 2 tablets (650 mg throat;  RESPIRATORY: denies shortness of breath, wheezing or cough   CARDIOVASCULAR:denies chest pain, no palpitations , denies cough  GI: denies nausea, vomiting, constipation, diarrhea; no rectal bleeding; no heartburn  :no dysuria, urgency or frequen ALT 20 07/05/2020    BILT 0.7 07/05/2020    TP 7.2 07/05/2020    ALB 3.2 (L) 07/05/2020    GLOBULIN 4.0 07/05/2020     07/08/2020    K 4.2 07/08/2020     07/08/2020    CO2 29.0 07/08/2020       Lab Results   Component Value Date    WBC 9.3 0 wk  Diltiazem 120mg po daily   Amiodarone 200mg po daily   Losartan 25mg po daily   Metoprolol 50mg po BID  KCl 20mEq po daily   Torsemide 20mg po BID  Labs weekly in ALYSHA; more often if clinically indicated  Follow-up with PCP/Cards post-ALYSHA     Depression

## 2020-07-22 ENCOUNTER — SNF VISIT (OUTPATIENT)
Dept: INTERNAL MEDICINE CLINIC | Age: 80
End: 2020-07-22

## 2020-07-22 VITALS
SYSTOLIC BLOOD PRESSURE: 121 MMHG | TEMPERATURE: 98 F | OXYGEN SATURATION: 98 % | RESPIRATION RATE: 19 BRPM | DIASTOLIC BLOOD PRESSURE: 56 MMHG | HEART RATE: 76 BPM

## 2020-07-22 DIAGNOSIS — R60.0 BILATERAL LOWER EXTREMITY EDEMA: ICD-10-CM

## 2020-07-22 DIAGNOSIS — Z78.9 IMPAIRED MOBILITY AND ADLS: Primary | ICD-10-CM

## 2020-07-22 DIAGNOSIS — Z79.01 CURRENT USE OF LONG TERM ANTICOAGULATION: ICD-10-CM

## 2020-07-22 DIAGNOSIS — S06.4X9A EPIDURAL HEMATOMA (HCC): ICD-10-CM

## 2020-07-22 DIAGNOSIS — I48.19 PERSISTENT ATRIAL FIBRILLATION (HCC): ICD-10-CM

## 2020-07-22 DIAGNOSIS — Z74.09 IMPAIRED MOBILITY AND ADLS: Primary | ICD-10-CM

## 2020-07-22 PROCEDURE — 3074F SYST BP LT 130 MM HG: CPT | Performed by: NURSE PRACTITIONER

## 2020-07-22 PROCEDURE — 99309 SBSQ NF CARE MODERATE MDM 30: CPT | Performed by: NURSE PRACTITIONER

## 2020-07-22 PROCEDURE — 3078F DIAST BP <80 MM HG: CPT | Performed by: NURSE PRACTITIONER

## 2020-07-22 NOTE — PROGRESS NOTES
Petros Rocha, 1/27/1940, 68year old, female    Chief Complaint:  Patient presents with:   Follow - Up  Musculoskeletal Problem  Weakness  Edema: BLEs  Wound: staples removed     [facility] [address] date:  7/5/20  Discharge date to [facility]:  7/9/20  GRACIEOS: is working with the patient due to Cognitive needs. This patient will discharge to home with her children and their spouses. No further questions/concerns per patient/staff. Verified call light is within reach.     Objective:  /56   Pulse 76 ambulation/exercise/participation with therapy  *Consider diuretic dose increase if weight gain/resp symptoms/worsening edema     Pain Management  Tylenol 650mg po Q 4 hrs PRN mild pain  Norco 5mg tab; 1-2 tabs po Q 4 hrs PRN mod-severe pain   Tizandidine/

## 2020-07-28 ENCOUNTER — SNF DISCHARGE (OUTPATIENT)
Dept: INTERNAL MEDICINE CLINIC | Age: 80
End: 2020-07-28

## 2020-07-28 DIAGNOSIS — S06.4X9A EPIDURAL HEMATOMA (HCC): ICD-10-CM

## 2020-07-28 DIAGNOSIS — R79.1 SUPRATHERAPEUTIC INR: Primary | ICD-10-CM

## 2020-07-28 DIAGNOSIS — Z91.81 HISTORY OF BAD FALL: ICD-10-CM

## 2020-07-28 DIAGNOSIS — I48.19 PERSISTENT ATRIAL FIBRILLATION (HCC): ICD-10-CM

## 2020-07-28 PROCEDURE — 3074F SYST BP LT 130 MM HG: CPT | Performed by: NURSE PRACTITIONER

## 2020-07-28 PROCEDURE — 3078F DIAST BP <80 MM HG: CPT | Performed by: NURSE PRACTITIONER

## 2020-07-28 PROCEDURE — 99316 NF DSCHRG MGMT 30 MIN+: CPT | Performed by: NURSE PRACTITIONER

## 2020-07-30 VITALS
SYSTOLIC BLOOD PRESSURE: 124 MMHG | OXYGEN SATURATION: 98 % | TEMPERATURE: 98 F | HEART RATE: 63 BPM | DIASTOLIC BLOOD PRESSURE: 68 MMHG | RESPIRATION RATE: 18 BRPM

## 2020-07-30 NOTE — PROGRESS NOTES
Amado Resendez, 1/27/1940, 68year old, female is being discharged from Facility: [address] [place] [address]    Date of Admission: 7/9/20  Date of Discharge: 7/29; Per Insurance.   Patient appealed however lost the appeal.                  Admitting D appropriate    Skilled Nursing Facility Course:    · Patient did well working with therapy  · Declined home health in the past; advised upon discharge home  · 7/28 INR supra-therapeutic; Coumadin to be held; repeat INR with home health  · Follow-up with Ne >150  Follow-up with PCP/Endo post-ALYSHA     Presumed UTI; given Rocephin IV  Cefadroxil 500mg po BID po x 6 days; END DATE 715/20     COPD, asthma, RADHA  Albuterol inhaler; 1 puff INH Q 4 hrs PRN wheezing  Follow-up with PCP/pulm post-ALYSHA     GERD  Pepcid 20

## 2020-08-03 ENCOUNTER — TELEPHONE (OUTPATIENT)
Dept: NEUROLOGY | Facility: CLINIC | Age: 80
End: 2020-08-03

## 2020-08-04 NOTE — TELEPHONE ENCOUNTER
Called daughter, Kiley Martínez, to get more clarification on letter that they need. She stated that her mother's insurance denied additional time at [facility] and that is why she was discharged.  She thought that [facility] was trying to file an appeal and that

## 2020-08-04 NOTE — TELEPHONE ENCOUNTER
Returned patients daughter Jeri's call explaining we unfortunately are not able to provide an order for patient who family feels was was kicked out too soon from [facility].   Daughter states she is not looking for an order, [facility] simply is asking f

## 2020-08-05 ENCOUNTER — HOSPITAL ENCOUNTER (OUTPATIENT)
Dept: GENERAL RADIOLOGY | Facility: HOSPITAL | Age: 80
Discharge: HOME | End: 2020-08-05
Attending: NURSE PRACTITIONER
Payer: MEDICARE

## 2020-08-05 ENCOUNTER — HOSPITAL ENCOUNTER (OUTPATIENT)
Dept: LAB | Facility: HOSPITAL | Age: 80
Discharge: HOME | End: 2020-08-05
Attending: INTERNAL MEDICINE
Payer: MEDICARE

## 2020-08-05 ENCOUNTER — OFFICE VISIT (OUTPATIENT)
Dept: SURGERY | Facility: CLINIC | Age: 80
End: 2020-08-05
Payer: MEDICARE

## 2020-08-05 ENCOUNTER — ANTI-COAG (OUTPATIENT)
Dept: CARDIOLOGY | Age: 80
End: 2020-08-05

## 2020-08-05 VITALS
HEIGHT: 66 IN | WEIGHT: 189 LBS | BODY MASS INDEX: 30.37 KG/M2 | HEART RATE: 60 BPM | DIASTOLIC BLOOD PRESSURE: 78 MMHG | SYSTOLIC BLOOD PRESSURE: 134 MMHG

## 2020-08-05 DIAGNOSIS — S12.100A CLOSED ODONTOID FRACTURE, INITIAL ENCOUNTER (HCC): ICD-10-CM

## 2020-08-05 DIAGNOSIS — I48.91 ATRIAL FIBRILLATION, UNSPECIFIED TYPE (CMD): ICD-10-CM

## 2020-08-05 DIAGNOSIS — I48.91 ATRIAL FIBRILLATION (HCC): ICD-10-CM

## 2020-08-05 DIAGNOSIS — S12.121D OTHER CLOSED NONDISPLACED ODONTOID FRACTURE WITH ROUTINE HEALING, SUBSEQUENT ENCOUNTER: Primary | ICD-10-CM

## 2020-08-05 LAB
INR PPP: 3.2
POC INR: 3.2 (ref 0.8–1.3)

## 2020-08-05 PROCEDURE — 3078F DIAST BP <80 MM HG: CPT | Performed by: PHYSICIAN ASSISTANT

## 2020-08-05 PROCEDURE — 3075F SYST BP GE 130 - 139MM HG: CPT | Performed by: PHYSICIAN ASSISTANT

## 2020-08-05 PROCEDURE — 99214 OFFICE O/P EST MOD 30 MIN: CPT | Performed by: PHYSICIAN ASSISTANT

## 2020-08-05 PROCEDURE — 85610 PROTHROMBIN TIME: CPT

## 2020-08-05 PROCEDURE — 72040 X-RAY EXAM NECK SPINE 2-3 VW: CPT | Performed by: NURSE PRACTITIONER

## 2020-08-05 PROCEDURE — 3008F BODY MASS INDEX DOCD: CPT | Performed by: PHYSICIAN ASSISTANT

## 2020-08-05 NOTE — PROGRESS NOTES
Pt is here for hospital follow up     s/p fall with C2 dens fx, epidural hematoma    Xray of the cervical: Obtained     Pt states that she feels like pain is still the same since hospital D/C, No new symptome's to be reported.

## 2020-08-05 NOTE — PROGRESS NOTES
Patient: Sandra Munroe  Medical Record Number: [id number]  Referring Physician: No ref.  provider found  PCP: Merly Cunningham MD    HISTORY OF CHIEF COMPLAINT:    Sandra Munroe is a 68year old female, who presents for f/u of of a C2 dens fracture aft 11/25/2013   • Kidney stone    • Migraines    • Neuropathy     bilateral feet   • NSVT (nonsustained ventricular tachycardia) ([facility] 7/11/2014    Recurrence 7.2016 Hx VT- s/p ICD implant on 7/7/16.      • Obesity, morbid, BMI 40.0-49.9 ([facility] 6/18/2015   • OS 2012   • MITRACLIP N/A 4/11/2019    Performed by Td Lord MD at [facility] CVOR   • OTHER  2016    surgery to relieve pressure on brain due to fall   • PACEMAKER/DEFIBRILLATOR      [employer] single chamber ICD   • TOTAL KNEE REPLACEMENT     • US FNA LYMPH NO hours as needed. 0   • Senna 17.2 MG Oral Tab Take 1 tablet (17.2 mg total) by mouth nightly. 0   • tiZANidine HCl 2 MG Oral Tab Take 1 tablet (2 mg total) by mouth every 6 (six) hours as needed.   0   • torsemide 20 MG Oral Tab Take 20 mg by mouth 2 (two multiple levels most severe C4-5 through C6-7. PARASPINOUS:  Negative. No paraspinous abnormality is seen. OTHER:  Negative.            =====  CONCLUSION:  Overall stable appearance to the odontoid fracture involving the base of the odontoid.   Kari Miguel if they change their mind, they may call for a referral. I also discussed with them that I talked with [facility] yesterday and was told there is no option to have her re-admitted.  The appeal was denied and I was told that writing a letter of necessity fo

## 2020-08-25 ENCOUNTER — ANTI-COAG (OUTPATIENT)
Dept: CARDIOLOGY | Age: 80
End: 2020-08-25

## 2020-08-25 DIAGNOSIS — I48.91 ATRIAL FIBRILLATION, UNSPECIFIED TYPE (CMD): ICD-10-CM

## 2020-08-28 ENCOUNTER — ANTI-COAG (OUTPATIENT)
Dept: CARDIOLOGY | Age: 80
End: 2020-08-28

## 2020-08-28 ENCOUNTER — APPOINTMENT (OUTPATIENT)
Dept: CARDIOLOGY | Age: 80
End: 2020-08-28
Attending: INTERNAL MEDICINE

## 2020-08-28 DIAGNOSIS — I48.91 ATRIAL FIBRILLATION, UNSPECIFIED TYPE (CMD): ICD-10-CM

## 2020-09-03 ENCOUNTER — ANTI-COAG (OUTPATIENT)
Dept: CARDIOLOGY | Age: 80
End: 2020-09-03

## 2020-09-03 ENCOUNTER — HOSPITAL ENCOUNTER (OUTPATIENT)
Dept: GENERAL RADIOLOGY | Facility: HOSPITAL | Age: 80
Discharge: HOME | End: 2020-09-03
Attending: PHYSICIAN ASSISTANT
Payer: MEDICARE

## 2020-09-03 ENCOUNTER — TELEPHONE (OUTPATIENT)
Dept: SURGERY | Facility: CLINIC | Age: 80
End: 2020-09-03

## 2020-09-03 ENCOUNTER — HOSPITAL ENCOUNTER (OUTPATIENT)
Dept: LAB | Facility: HOSPITAL | Age: 80
Discharge: HOME | End: 2020-09-03
Attending: INTERNAL MEDICINE
Payer: MEDICARE

## 2020-09-03 ENCOUNTER — OFFICE VISIT (OUTPATIENT)
Dept: SURGERY | Facility: CLINIC | Age: 80
End: 2020-09-03
Payer: MEDICARE

## 2020-09-03 VITALS — SYSTOLIC BLOOD PRESSURE: 124 MMHG | DIASTOLIC BLOOD PRESSURE: 74 MMHG | HEART RATE: 60 BPM

## 2020-09-03 DIAGNOSIS — S12.121D OTHER CLOSED NONDISPLACED ODONTOID FRACTURE WITH ROUTINE HEALING, SUBSEQUENT ENCOUNTER: ICD-10-CM

## 2020-09-03 DIAGNOSIS — I48.91 ATRIAL FIBRILLATION (HCC): ICD-10-CM

## 2020-09-03 DIAGNOSIS — I48.91 ATRIAL FIBRILLATION, UNSPECIFIED TYPE (CMD): ICD-10-CM

## 2020-09-03 DIAGNOSIS — S12.121D OTHER CLOSED NONDISPLACED ODONTOID FRACTURE WITH ROUTINE HEALING, SUBSEQUENT ENCOUNTER: Primary | ICD-10-CM

## 2020-09-03 LAB
INR BLD: 5.07 (ref 0.89–1.11)
INR PPP: 5.07
POC INR: 6.6 (ref 0.8–1.3)
PSA SERPL DL<=0.01 NG/ML-MCNC: 48 SECONDS (ref 12.4–14.6)

## 2020-09-03 PROCEDURE — 3078F DIAST BP <80 MM HG: CPT | Performed by: PHYSICIAN ASSISTANT

## 2020-09-03 PROCEDURE — 3074F SYST BP LT 130 MM HG: CPT | Performed by: PHYSICIAN ASSISTANT

## 2020-09-03 PROCEDURE — 36415 COLL VENOUS BLD VENIPUNCTURE: CPT

## 2020-09-03 PROCEDURE — 99213 OFFICE O/P EST LOW 20 MIN: CPT | Performed by: PHYSICIAN ASSISTANT

## 2020-09-03 PROCEDURE — 85610 PROTHROMBIN TIME: CPT

## 2020-09-03 PROCEDURE — 72050 X-RAY EXAM NECK SPINE 4/5VWS: CPT | Performed by: PHYSICIAN ASSISTANT

## 2020-09-03 NOTE — PATIENT INSTRUCTIONS
[facility]  [address], [address], [address]  Ph: [phone]  Fax: [phone]  Business hours: Monday through Friday                             8:30am-5pm

## 2020-09-03 NOTE — TELEPHONE ENCOUNTER
Pt's daughter Marina Hill called requesting LUCIAN to call her brother Liz Maurice (ok per HIPAA) to schedule pt's next FU; when asked if Liz Kaylene could call LUCIAN to schedule appt at his convenience Jeri stated \"no, because he won't call\".  LMTCB on [employer] unidentified VM, n

## 2020-09-03 NOTE — PROGRESS NOTES
Patient here for a follow up visit with Dr. Ivette Colon. Patient states pain is 8/10 currently at the chest area where the collar rests, and neck pain persists on left side, radiating to shoulder area.       Patient taking tylenol Q6 hours and Norco once a we

## 2020-09-03 NOTE — PROGRESS NOTES
Neurosurgery Clinic Visit  9/3/2020    Petros Rocha PCP:  Belinda Jordan MD    1940 MRN [id number]     HISTORY OF PRESENT ILLNESS:  Petros Rocha is a(n) 68year old female who is here for follow-up for odontoid fracture.   She c/o pain in t

## 2020-09-11 NOTE — TELEPHONE ENCOUNTER
LVM for patient marita Snyder to call office to schedule fup appointment with Brandon Long for mother Fabián Paiz. Patient should fup sometime 1st week of October.

## 2020-09-15 ENCOUNTER — HOSPITAL ENCOUNTER (EMERGENCY)
Facility: HOSPITAL | Age: 80
Discharge: HOME | End: 2020-09-15
Attending: EMERGENCY MEDICINE
Payer: MEDICARE

## 2020-09-15 ENCOUNTER — APPOINTMENT (OUTPATIENT)
Dept: GENERAL RADIOLOGY | Facility: HOSPITAL | Age: 80
End: 2020-09-15
Attending: EMERGENCY MEDICINE
Payer: MEDICARE

## 2020-09-15 ENCOUNTER — TELEPHONE (OUTPATIENT)
Dept: CARDIOLOGY | Age: 80
End: 2020-09-15

## 2020-09-15 VITALS
BODY MASS INDEX: 28.93 KG/M2 | WEIGHT: 180 LBS | RESPIRATION RATE: 18 BRPM | HEART RATE: 66 BPM | DIASTOLIC BLOOD PRESSURE: 77 MMHG | TEMPERATURE: 97 F | HEIGHT: 66 IN | SYSTOLIC BLOOD PRESSURE: 130 MMHG | OXYGEN SATURATION: 97 %

## 2020-09-15 DIAGNOSIS — R60.0 BILATERAL LEG EDEMA: Primary | ICD-10-CM

## 2020-09-15 DIAGNOSIS — R79.1 SUPRATHERAPEUTIC INR: ICD-10-CM

## 2020-09-15 LAB
ALBUMIN SERPL-MCNC: 3.5 G/DL (ref 3.4–5)
ALBUMIN/GLOB SERPL: 0.9 {RATIO} (ref 1–2)
ALP LIVER SERPL-CCNC: 97 U/L (ref 55–142)
ALT SERPL-CCNC: 14 U/L (ref 13–56)
ANION GAP SERPL CALC-SCNC: 4 MMOL/L (ref 0–18)
APTT PPP: 49.3 SECONDS (ref 25.4–36.1)
AST SERPL-CCNC: 18 U/L (ref 15–37)
BASOPHILS # BLD AUTO: 0.03 X10(3) UL (ref 0–0.2)
BASOPHILS NFR BLD AUTO: 0.3 %
BILIRUB SERPL-MCNC: 0.6 MG/DL (ref 0.1–2)
BUN BLD-MCNC: 22 MG/DL (ref 7–18)
BUN/CREAT SERPL: 17.3 (ref 10–20)
CALCIUM BLD-MCNC: 9 MG/DL (ref 8.5–10.1)
CHLORIDE SERPL-SCNC: 108 MMOL/L (ref 98–112)
CO2 SERPL-SCNC: 27 MMOL/L (ref 21–32)
CREAT BLD-MCNC: 1.27 MG/DL (ref 0.55–1.02)
DEPRECATED RDW RBC AUTO: 49.8 FL (ref 35.1–46.3)
EOSINOPHIL # BLD AUTO: 0.11 X10(3) UL (ref 0–0.7)
EOSINOPHIL NFR BLD AUTO: 1.2 %
ERYTHROCYTE [DISTWIDTH] IN BLOOD BY AUTOMATED COUNT: 14 % (ref 11–15)
GLOBULIN PLAS-MCNC: 3.8 G/DL (ref 2.8–4.4)
GLUCOSE BLD-MCNC: 126 MG/DL (ref 70–99)
HCT VFR BLD AUTO: 41.8 % (ref 35–48)
HGB BLD-MCNC: 13.6 G/DL (ref 12–16)
IMM GRANULOCYTES # BLD AUTO: 0.03 X10(3) UL (ref 0–1)
IMM GRANULOCYTES NFR BLD: 0.3 %
INR BLD: 5.23 (ref 0.89–1.11)
LYMPHOCYTES # BLD AUTO: 1.52 X10(3) UL (ref 1–4)
LYMPHOCYTES NFR BLD AUTO: 16.5 %
M PROTEIN MFR SERPL ELPH: 7.3 G/DL (ref 6.4–8.2)
MCH RBC QN AUTO: 31.3 PG (ref 26–34)
MCHC RBC AUTO-ENTMCNC: 32.5 G/DL (ref 31–37)
MCV RBC AUTO: 96.3 FL (ref 80–100)
MONOCYTES # BLD AUTO: 0.7 X10(3) UL (ref 0.1–1)
MONOCYTES NFR BLD AUTO: 7.6 %
NEUTROPHILS # BLD AUTO: 6.83 X10 (3) UL (ref 1.5–7.7)
NEUTROPHILS # BLD AUTO: 6.83 X10(3) UL (ref 1.5–7.7)
NEUTROPHILS NFR BLD AUTO: 74.1 %
NT-PROBNP SERPL-MCNC: 591 PG/ML (ref ?–450)
OSMOLALITY SERPL CALC.SUM OF ELEC: 293 MOSM/KG (ref 275–295)
PLATELET # BLD AUTO: 187 10(3)UL (ref 150–450)
POTASSIUM SERPL-SCNC: 3.8 MMOL/L (ref 3.5–5.1)
PSA SERPL DL<=0.01 NG/ML-MCNC: 49.2 SECONDS (ref 12.4–14.6)
RBC # BLD AUTO: 4.34 X10(6)UL (ref 3.8–5.3)
SODIUM SERPL-SCNC: 139 MMOL/L (ref 136–145)
TROPONIN I SERPL-MCNC: <0.045 NG/ML (ref ?–0.04)
WBC # BLD AUTO: 9.2 X10(3) UL (ref 4–11)

## 2020-09-15 PROCEDURE — 71045 X-RAY EXAM CHEST 1 VIEW: CPT | Performed by: EMERGENCY MEDICINE

## 2020-09-15 PROCEDURE — 85730 THROMBOPLASTIN TIME PARTIAL: CPT | Performed by: EMERGENCY MEDICINE

## 2020-09-15 PROCEDURE — 96374 THER/PROPH/DIAG INJ IV PUSH: CPT

## 2020-09-15 PROCEDURE — 84484 ASSAY OF TROPONIN QUANT: CPT | Performed by: EMERGENCY MEDICINE

## 2020-09-15 PROCEDURE — 83880 ASSAY OF NATRIURETIC PEPTIDE: CPT | Performed by: EMERGENCY MEDICINE

## 2020-09-15 PROCEDURE — 85610 PROTHROMBIN TIME: CPT | Performed by: EMERGENCY MEDICINE

## 2020-09-15 PROCEDURE — 99284 EMERGENCY DEPT VISIT MOD MDM: CPT

## 2020-09-15 PROCEDURE — 85025 COMPLETE CBC W/AUTO DIFF WBC: CPT | Performed by: EMERGENCY MEDICINE

## 2020-09-15 PROCEDURE — 80053 COMPREHEN METABOLIC PANEL: CPT | Performed by: EMERGENCY MEDICINE

## 2020-09-15 RX ORDER — FUROSEMIDE 10 MG/ML
40 INJECTION INTRAMUSCULAR; INTRAVENOUS ONCE
Status: COMPLETED | OUTPATIENT
Start: 2020-09-15 | End: 2020-09-15

## 2020-09-15 NOTE — ED INITIAL ASSESSMENT (HPI)
Hx of CHF, swelling in BLE , R leg is now leaking fluids. Denies chest pain or shortness of breath. Reports a 7# weight gain.

## 2020-09-16 NOTE — ED NOTES
Received pt a/o x3 c/o bilat lower extremity swelling with increased reddness and area of weeping to the rle, pt c/o heaviness to bilat legs stating \"they feel like bricks when Im walking\" s/s started 1 week ago, called md, lasix increased without improv

## 2020-09-16 NOTE — ED PROVIDER NOTES
Patient Seen in: [facility] Emergency Department      History   Patient presents with:  Swelling Edema    Stated Complaint: Hx of CHF and has weepy swollen legs     HPI    This is a 74-year-old woman history of A. fib on Coumadin, diastolic heart fa oxygen periodically    • Osteoarthritis of shoulders, bilateral 06/18/2015    Severe, frozen shoulder syndrome   • Pericardial effusion 6/5/2014    trivial   • Pulmonary embolism (HCC)    • Pulmonary HTN ([facility] 10/10/2014   • Rheumatoid arthritis ([address]    • 05/03/2019    mitral valve clip placed 2/2 severe mitral regurgitation                    Social History    Tobacco Use      Smoking status: Never Smoker      Smokeless tobacco: Never Used    Alcohol use: No    Drug use:  No             Review of Systems Abnormal; Notable for the following components:    Pro-Beta Natriuretic Peptide 591 (*)     All other components within normal limits   PROTHROMBIN TIME (PT) - Abnormal; Notable for the following components:    PT 49.2 (*)     INR 5.23 (*)     All other co encounter for fracture with routine healing  PATIENT STATED HISTORY: (As transcribed by Technologist)  Patient shares she fell on 7/4/20 and fractured her cervical spine. Patient states she never takes collar off.     FINDINGS:   Redemonstration of odontoid PM     Finalized by (CST): Bird Lala MD on 9/15/2020 at 7:13 PM             MDM     This is a 62-year-old woman, history of diastolic heart failure, A. fib on Coumadin here for evaluation of bilateral lower extremity edema she does chronically sleep

## 2020-09-16 NOTE — ED NOTES
Lab calling with inr 5.2, md padilla, pt stating it has been high for past month and md lowered the level of coumadin

## 2020-09-24 ENCOUNTER — ANTI-COAG (OUTPATIENT)
Dept: CARDIOLOGY | Age: 80
End: 2020-09-24

## 2020-10-22 ENCOUNTER — ANTI-COAG (OUTPATIENT)
Dept: CARDIOLOGY | Age: 80
End: 2020-10-22

## 2020-11-04 ENCOUNTER — TELEPHONE (OUTPATIENT)
Dept: CARDIOLOGY | Age: 80
End: 2020-11-04

## 2020-11-04 DIAGNOSIS — I48.91 ATRIAL FIBRILLATION, UNSPECIFIED TYPE (CMD): Primary | ICD-10-CM

## 2020-11-04 RX ORDER — DILTIAZEM HYDROCHLORIDE 120 MG/1
120 CAPSULE, EXTENDED RELEASE ORAL DAILY
Qty: 90 CAPSULE | Refills: 0 | Status: SHIPPED | OUTPATIENT
Start: 2020-11-04 | End: 2020-12-03 | Stop reason: SDUPTHER

## 2020-11-04 RX ORDER — TORSEMIDE 20 MG/1
20 TABLET ORAL 2 TIMES DAILY
Qty: 180 TABLET | Refills: 0 | Status: SHIPPED | OUTPATIENT
Start: 2020-11-04 | End: 2021-04-26

## 2020-11-04 RX ORDER — POTASSIUM CHLORIDE 20 MEQ/1
20 TABLET, EXTENDED RELEASE ORAL DAILY
Qty: 90 TABLET | Refills: 0 | Status: SHIPPED | OUTPATIENT
Start: 2020-11-04 | End: 2021-04-28 | Stop reason: SDUPTHER

## 2020-11-04 RX ORDER — METOPROLOL TARTRATE 50 MG/1
50 TABLET, FILM COATED ORAL 2 TIMES DAILY
Qty: 180 TABLET | Refills: 1 | Status: SHIPPED | OUTPATIENT
Start: 2020-11-04 | End: 2021-04-28 | Stop reason: SDUPTHER

## 2020-11-04 RX ORDER — LOSARTAN POTASSIUM 50 MG/1
50 TABLET ORAL DAILY
Qty: 90 TABLET | Refills: 0 | Status: SHIPPED | OUTPATIENT
Start: 2020-11-04 | End: 2020-12-03

## 2020-11-04 RX ORDER — AMIODARONE HYDROCHLORIDE 200 MG/1
200 TABLET ORAL DAILY
Qty: 90 TABLET | Refills: 0 | Status: SHIPPED | OUTPATIENT
Start: 2020-11-04 | End: 2020-12-03 | Stop reason: SDUPTHER

## 2020-11-04 RX ORDER — WARFARIN SODIUM 5 MG/1
TABLET ORAL
Qty: 90 TABLET | Refills: 0 | Status: SHIPPED | OUTPATIENT
Start: 2020-11-04 | End: 2020-12-03 | Stop reason: SDUPTHER

## 2020-11-06 ENCOUNTER — APPOINTMENT (OUTPATIENT)
Dept: CARDIOLOGY | Age: 80
End: 2020-11-06
Attending: INTERNAL MEDICINE

## 2020-11-19 ENCOUNTER — ANTI-COAG (OUTPATIENT)
Dept: CARDIOLOGY | Age: 80
End: 2020-11-19

## 2020-11-21 NOTE — PROGRESS NOTES
[facility]  Progress Note     Reford Mariluz Patient Status:  Inpatient    1940 MRN [id number]   [facility] 3NE-A Attending Danis Gimenez [address] Day # 1 PCP Kathi Smalls MD     Chief Complaint: Abdominal pain --        Estimated Creatinine Clearance: 60.5 mL/min (based on SCr of 0.74 mg/dL). Recent Labs   Lab  07/25/18   1402  07/26/18   0824  07/31/18   1829   PTP  13.6  13.9  13.5   INR  1.00  1.03  0.99       Recent Labs   Lab  07/25/18   1402   TROP  <0. no

## 2020-12-03 ENCOUNTER — HOSPITAL ENCOUNTER (OUTPATIENT)
Dept: LAB | Facility: HOSPITAL | Age: 80
Discharge: HOME | End: 2020-12-03
Attending: INTERNAL MEDICINE
Payer: MEDICARE

## 2020-12-03 ENCOUNTER — ANTI-COAG (OUTPATIENT)
Dept: CARDIOLOGY | Age: 80
End: 2020-12-03

## 2020-12-03 ENCOUNTER — OFFICE VISIT (OUTPATIENT)
Dept: CARDIOLOGY | Age: 80
End: 2020-12-03

## 2020-12-03 ENCOUNTER — ANCILLARY PROCEDURE (OUTPATIENT)
Dept: CARDIOLOGY | Age: 80
End: 2020-12-03
Attending: INTERNAL MEDICINE

## 2020-12-03 ENCOUNTER — OFFICE VISIT (OUTPATIENT)
Dept: FAMILY MEDICINE CLINIC | Facility: CLINIC | Age: 80
End: 2020-12-03
Payer: MEDICARE

## 2020-12-03 VITALS
DIASTOLIC BLOOD PRESSURE: 70 MMHG | HEART RATE: 68 BPM | BODY MASS INDEX: 31.02 KG/M2 | SYSTOLIC BLOOD PRESSURE: 120 MMHG | WEIGHT: 193 LBS | HEIGHT: 66 IN

## 2020-12-03 VITALS
OXYGEN SATURATION: 97 % | RESPIRATION RATE: 16 BRPM | HEART RATE: 73 BPM | HEIGHT: 66 IN | DIASTOLIC BLOOD PRESSURE: 70 MMHG | SYSTOLIC BLOOD PRESSURE: 130 MMHG | WEIGHT: 195 LBS | BODY MASS INDEX: 31.34 KG/M2 | TEMPERATURE: 98 F

## 2020-12-03 DIAGNOSIS — I10 ESSENTIAL HYPERTENSION: ICD-10-CM

## 2020-12-03 DIAGNOSIS — Z79.01 ANTICOAGULATED ON COUMADIN: ICD-10-CM

## 2020-12-03 DIAGNOSIS — Z95.810 ICD (IMPLANTABLE CARDIOVERTER-DEFIBRILLATOR) IN PLACE: ICD-10-CM

## 2020-12-03 DIAGNOSIS — E11.51 DM (DIABETES MELLITUS), TYPE 2 WITH PERIPHERAL VASCULAR COMPLICATIONS (HCC): ICD-10-CM

## 2020-12-03 DIAGNOSIS — M17.0 PRIMARY OSTEOARTHRITIS OF BOTH KNEES: ICD-10-CM

## 2020-12-03 DIAGNOSIS — I50.22 CHRONIC SYSTOLIC CONGESTIVE HEART FAILURE (HCC): ICD-10-CM

## 2020-12-03 DIAGNOSIS — S12.100A CLOSED ODONTOID FRACTURE, INITIAL ENCOUNTER (HCC): ICD-10-CM

## 2020-12-03 DIAGNOSIS — Z86.711 HISTORY OF PULMONARY EMBOLISM: ICD-10-CM

## 2020-12-03 DIAGNOSIS — J45.30 MILD PERSISTENT ASTHMA WITHOUT COMPLICATION: ICD-10-CM

## 2020-12-03 DIAGNOSIS — Z86.718 HISTORY OF DEEP VENOUS THROMBOSIS (DVT) OF DISTAL VEIN OF RIGHT LOWER EXTREMITY: ICD-10-CM

## 2020-12-03 DIAGNOSIS — G47.33 OSA (OBSTRUCTIVE SLEEP APNEA): ICD-10-CM

## 2020-12-03 DIAGNOSIS — I48.91 ATRIAL FIBRILLATION (HCC): ICD-10-CM

## 2020-12-03 DIAGNOSIS — I83.93 VARICOSE VEINS OF BOTH LOWER EXTREMITIES, UNSPECIFIED WHETHER COMPLICATED: ICD-10-CM

## 2020-12-03 DIAGNOSIS — D50.9 IRON DEFICIENCY ANEMIA, UNSPECIFIED IRON DEFICIENCY ANEMIA TYPE: ICD-10-CM

## 2020-12-03 DIAGNOSIS — E11.42 TYPE 2 DIABETES MELLITUS WITH DIABETIC POLYNEUROPATHY, WITHOUT LONG-TERM CURRENT USE OF INSULIN (HCC): ICD-10-CM

## 2020-12-03 DIAGNOSIS — N18.30 STAGE 3 CHRONIC KIDNEY DISEASE, UNSPECIFIED WHETHER STAGE 3A OR 3B CKD (HCC): ICD-10-CM

## 2020-12-03 DIAGNOSIS — K81.1 CHRONIC CHOLECYSTITIS: ICD-10-CM

## 2020-12-03 DIAGNOSIS — Z00.00 ENCOUNTER FOR ANNUAL HEALTH EXAMINATION: Primary | ICD-10-CM

## 2020-12-03 DIAGNOSIS — I48.21 PERMANENT ATRIAL FIBRILLATION (CMD): Primary | ICD-10-CM

## 2020-12-03 DIAGNOSIS — G62.9 NEUROPATHY: ICD-10-CM

## 2020-12-03 DIAGNOSIS — Z98.890 S/P MITRAL VALVE CLIP IMPLANTATION: ICD-10-CM

## 2020-12-03 DIAGNOSIS — K80.20 GALLSTONES: ICD-10-CM

## 2020-12-03 DIAGNOSIS — I27.20 PULMONARY HTN (HCC): ICD-10-CM

## 2020-12-03 DIAGNOSIS — H91.90 HEARING LOSS, UNSPECIFIED HEARING LOSS TYPE, UNSPECIFIED LATERALITY: ICD-10-CM

## 2020-12-03 DIAGNOSIS — H81.10 BENIGN PAROXYSMAL POSITIONAL VERTIGO, UNSPECIFIED LATERALITY: ICD-10-CM

## 2020-12-03 DIAGNOSIS — E55.9 VITAMIN D DEFICIENCY: ICD-10-CM

## 2020-12-03 DIAGNOSIS — G90.9 AUTONOMIC DYSFUNCTION: ICD-10-CM

## 2020-12-03 DIAGNOSIS — D47.9 ATYPICAL LYMPHOPROLIFERATIVE DISORDER (HCC): ICD-10-CM

## 2020-12-03 DIAGNOSIS — J44.9 CHRONIC OBSTRUCTIVE PULMONARY DISEASE, UNSPECIFIED COPD TYPE (HCC): ICD-10-CM

## 2020-12-03 DIAGNOSIS — M72.2 PLANTAR FASCIITIS: ICD-10-CM

## 2020-12-03 DIAGNOSIS — S12.100A DENS FRACTURE, CLOSED, INITIAL ENCOUNTER (HCC): ICD-10-CM

## 2020-12-03 DIAGNOSIS — Z86.79 H/O VENTRICULAR TACHYCARDIA: ICD-10-CM

## 2020-12-03 DIAGNOSIS — F32.5 MAJOR DEPRESSIVE DISORDER WITH SINGLE EPISODE, IN REMISSION (HCC): ICD-10-CM

## 2020-12-03 DIAGNOSIS — F41.9 ANXIETY: ICD-10-CM

## 2020-12-03 DIAGNOSIS — I51.1: ICD-10-CM

## 2020-12-03 DIAGNOSIS — I48.19 PERSISTENT ATRIAL FIBRILLATION (HCC): ICD-10-CM

## 2020-12-03 DIAGNOSIS — I50.32 CHRONIC HEART FAILURE WITH PRESERVED EJECTION FRACTION (HCC): ICD-10-CM

## 2020-12-03 DIAGNOSIS — Z95.818 S/P MITRAL VALVE CLIP IMPLANTATION: ICD-10-CM

## 2020-12-03 DIAGNOSIS — M19.012 OSTEOARTHRITIS OF LEFT SHOULDER, UNSPECIFIED OSTEOARTHRITIS TYPE: ICD-10-CM

## 2020-12-03 PROBLEM — J81.0 ACUTE PULMONARY EDEMA (HCC): Status: RESOLVED | Noted: 2019-11-10 | Resolved: 2020-12-03

## 2020-12-03 PROBLEM — U07.1 COVID-19: Status: RESOLVED | Noted: 2020-05-09 | Resolved: 2020-12-03

## 2020-12-03 PROBLEM — U07.1 COVID-19 VIRUS INFECTION: Status: RESOLVED | Noted: 2020-05-09 | Resolved: 2020-12-03

## 2020-12-03 PROBLEM — S01.01XA LACERATION OF SCALP, INITIAL ENCOUNTER: Status: RESOLVED | Noted: 2020-07-05 | Resolved: 2020-12-03

## 2020-12-03 LAB — INR PPP: 4.3

## 2020-12-03 PROCEDURE — 82043 UR ALBUMIN QUANTITATIVE: CPT

## 2020-12-03 PROCEDURE — G0439 PPPS, SUBSEQ VISIT: HCPCS | Performed by: FAMILY MEDICINE

## 2020-12-03 PROCEDURE — 93282 PRGRMG EVAL IMPLANTABLE DFB: CPT | Performed by: INTERNAL MEDICINE

## 2020-12-03 PROCEDURE — 3075F SYST BP GE 130 - 139MM HG: CPT | Performed by: FAMILY MEDICINE

## 2020-12-03 PROCEDURE — 83880 ASSAY OF NATRIURETIC PEPTIDE: CPT

## 2020-12-03 PROCEDURE — 85610 PROTHROMBIN TIME: CPT

## 2020-12-03 PROCEDURE — 96160 PT-FOCUSED HLTH RISK ASSMT: CPT | Performed by: FAMILY MEDICINE

## 2020-12-03 PROCEDURE — 80053 COMPREHEN METABOLIC PANEL: CPT | Performed by: INTERNAL MEDICINE

## 2020-12-03 PROCEDURE — 99397 PER PM REEVAL EST PAT 65+ YR: CPT | Performed by: FAMILY MEDICINE

## 2020-12-03 PROCEDURE — 84443 ASSAY THYROID STIM HORMONE: CPT | Performed by: INTERNAL MEDICINE

## 2020-12-03 PROCEDURE — 83036 HEMOGLOBIN GLYCOSYLATED A1C: CPT

## 2020-12-03 PROCEDURE — 3008F BODY MASS INDEX DOCD: CPT | Performed by: FAMILY MEDICINE

## 2020-12-03 PROCEDURE — 82306 VITAMIN D 25 HYDROXY: CPT

## 2020-12-03 PROCEDURE — 36415 COLL VENOUS BLD VENIPUNCTURE: CPT | Performed by: INTERNAL MEDICINE

## 2020-12-03 PROCEDURE — 99215 OFFICE O/P EST HI 40 MIN: CPT | Performed by: INTERNAL MEDICINE

## 2020-12-03 PROCEDURE — 3078F DIAST BP <80 MM HG: CPT | Performed by: FAMILY MEDICINE

## 2020-12-03 PROCEDURE — 82570 ASSAY OF URINE CREATININE: CPT

## 2020-12-03 RX ORDER — DILTIAZEM HYDROCHLORIDE 120 MG/1
120 CAPSULE, EXTENDED RELEASE ORAL DAILY
Qty: 90 CAPSULE | Refills: 3 | Status: SHIPPED | OUTPATIENT
Start: 2020-12-03 | End: 2021-04-28 | Stop reason: SDUPTHER

## 2020-12-03 RX ORDER — WARFARIN SODIUM 5 MG/1
TABLET ORAL
Qty: 90 TABLET | Refills: 1 | Status: SHIPPED | OUTPATIENT
Start: 2020-12-03 | End: 2021-04-28 | Stop reason: SDUPTHER

## 2020-12-03 RX ORDER — TORSEMIDE 20 MG/1
20 TABLET ORAL 2 TIMES DAILY
Qty: 180 TABLET | Refills: 1 | Status: CANCELLED | OUTPATIENT
Start: 2020-12-03

## 2020-12-03 RX ORDER — POTASSIUM CHLORIDE 20 MEQ/1
20 TABLET, EXTENDED RELEASE ORAL DAILY
Qty: 90 TABLET | Refills: 1 | Status: SHIPPED | OUTPATIENT
Start: 2020-12-03 | End: 2021-04-29

## 2020-12-03 RX ORDER — WARFARIN SODIUM 5 MG/1
TABLET ORAL
COMMUNITY
Start: 2020-11-04 | End: 2020-12-03

## 2020-12-03 RX ORDER — SERTRALINE HYDROCHLORIDE 25 MG/1
25 TABLET, FILM COATED ORAL DAILY
Qty: 90 TABLET | Refills: 3 | Status: SHIPPED | OUTPATIENT
Start: 2020-12-03 | End: 2021-10-15

## 2020-12-03 RX ORDER — AMIODARONE HYDROCHLORIDE 200 MG/1
200 TABLET ORAL DAILY
Qty: 90 TABLET | Refills: 0 | Status: SHIPPED | OUTPATIENT
Start: 2020-12-03 | End: 2021-04-28 | Stop reason: SDUPTHER

## 2020-12-03 RX ORDER — METOPROLOL TARTRATE 50 MG/1
50 TABLET, FILM COATED ORAL 2 TIMES DAILY
Qty: 180 TABLET | Refills: 1 | Status: SHIPPED | OUTPATIENT
Start: 2020-12-03 | End: 2021-04-29

## 2020-12-03 RX ORDER — LOSARTAN POTASSIUM 25 MG/1
25 TABLET ORAL DAILY
Qty: 90 TABLET | Refills: 1 | Status: SHIPPED | OUTPATIENT
Start: 2020-12-03 | End: 2021-04-29

## 2020-12-03 RX ORDER — URSODIOL 300 MG/1
300 CAPSULE ORAL 2 TIMES DAILY
Qty: 180 CAPSULE | Refills: 3 | Status: SHIPPED | OUTPATIENT
Start: 2020-12-03

## 2020-12-03 ASSESSMENT — PATIENT HEALTH QUESTIONNAIRE - PHQ9
1. LITTLE INTEREST OR PLEASURE IN DOING THINGS: NOT AT ALL
SUM OF ALL RESPONSES TO PHQ9 QUESTIONS 1 AND 2: 0
CLINICAL INTERPRETATION OF PHQ9 SCORE: NO FURTHER SCREENING NEEDED
2. FEELING DOWN, DEPRESSED OR HOPELESS: NOT AT ALL
SUM OF ALL RESPONSES TO PHQ9 QUESTIONS 1 AND 2: 0
CLINICAL INTERPRETATION OF PHQ2 SCORE: NO FURTHER SCREENING NEEDED

## 2020-12-03 ASSESSMENT — ENCOUNTER SYMPTOMS
FEVER: 0
ALLERGIC/IMMUNOLOGIC COMMENTS: NO NEW FOOD ALLERGIES
WEIGHT LOSS: 0
HEMOPTYSIS: 0
BRUISES/BLEEDS EASILY: 0
WEIGHT GAIN: 0
CHILLS: 0
COUGH: 0
SUSPICIOUS LESIONS: 0
HEMATOCHEZIA: 0

## 2020-12-03 NOTE — PROGRESS NOTES
HPI:   Radha Naylor is a 68year old female who presents for a MA (Medicare Advantage) [facility] (Once per calendar year).       Her last annual assessment has been over 1 year: Annual Physical due on 10/11/2020       Son-in-law Hanna Margarito present with pt Cholecystitis  Gallstones-    GB attack but not a surgical candidate, they are afraid to do surgery due to her heart and also b/c of the presence of mesh.  They had placed a tube/bad for drainage 2 different times and if has to be done again the bag w Coumadin managed by coumadin clinic        Varicose veins of both lower extremities, unspecified whether complicated  -aware, uses ace bandage prn, elevating legs.     Mild persistent asthma without complication  -stable,not a bother, not on any meds for t Petros Rocha has some abnormal functions as listed below:  She has Dressing and/or Bathing issues based on screening of functional status.    Bathing or Showering: Need some help  Dressing: Cannot do without help      She has Toileting difficulties base standard forms performed Face to Face with patient and Family/surrogate (if present), and forms available to patient in AVS  already on file     She has a Power of  for [employer] on file in [place].     She has never smoked tobacco.    CAGE Alcohol s mitral valve clip implantation     Chronic cholecystitis     Major depressive disorder with single episode, in remission ([address]     Closed odontoid fracture, initial encounter ([address]     Dens fracture, closed, initial encounter ([address]     Heart failure with p HCl 200 MG Oral Tab, Take 1 tablet (200 mg total) by mouth daily. •  dilTIAZem HCl ER Coated Beads 120 MG Oral Capsule SR 24 Hr, Take 120 mg by mouth daily.     •  Albuterol Sulfate  (90 Base) MCG/ACT Inhalation Aero Soln, Inhale 1 puff into the l pacemaker/defibrillator; total knee replacement; other (2016);  Cardiac defibrillator placement; ct drain cholecystostomy (cpt=47490); egd (04/05/2019); colonoscopy (04/05/2019); colonoscopy (N/A, 4/5/2019); valve repair (05/03/2019); and Cardiac pacemaker sounds, soft, nontender, nondistended. + obese  MUSCULOSKELETAL: Using rolling walker  PSYCHIATRIC:  Normal mood, affect, and hygiene.   Bilateral barefoot skin diabetic exam is abnormal (thickened yellow toenails), visualized feet and the appearance is nor Chloride ER 20 MEQ Oral Tab CR; Take 1 tablet (20 mEq total) by mouth daily.  -     Losartan Potassium 25 MG Oral Tab; Take 1 tablet (25 mg total) by mouth daily. -well controlled.     Major depressive disorder with single episode, in remission ([facility]  - of pulmonary embolism  History of deep venous thrombosis (DVT) of distal vein of right lower extremity  -cont coumadin, resolved    Chronic heart failure with preserved ejection fraction ([address]  -cont to f/u with chf clinic    H/O ventricular tachycardia  - 2 - No  Has your appetite been poor?: Yes  How does the patient maintain a good energy level?: Stretching;Daily Walks  How would you describe your daily physical activity?: Light  How would you describe your current health state?: Fair  How do you maintain Update Health Maintenance if applicable    Chlamydia  Annually if high risk No results found for: CHLAMYDIA No flowsheet data found.     Screening Mammogram      Mammogram Annually to 76, then as discussed There are no preventive care reminders to display f Digoxin (ng/mL)   Date Value   02/06/2018 1.74    No flowsheet data found. Diabetes      HgbA1C  Annually HEMOGLOBIN A1C (%)   Date Value   04/18/2017 6.0 (A)     HgbA1C (%)   Date Value   12/03/2020 5.4       No flowsheet data found.     Allison/al

## 2020-12-03 NOTE — PATIENT INSTRUCTIONS
Katya Pacheco's SCREENING SCHEDULE   Tests on this list are recommended by your physician but may not be covered, or covered at this frequency, by your insurer. Please check with your insurance carrier before scheduling to verify coverage.    PREVENTATIV previous visit.  Limited to patients who meet one of the following criteria:   • Men who are 73-68 years old and have smoked more than 100 cigarettes in their lifetime   • Anyone with a family history    Colorectal Cancer Screening  Covered up to Age 76 at least age 76, and as needed after 76 There are no preventive care reminders to display for this patient. Please get this Mammogram regularly   Immunizations      Influenza  Covered Annually No orders found for this or any previous visit.  Please get ever [place])  [email or web address]  This link also has information from the [address] [place] [address] regarding Advance Directives.
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
## 2020-12-05 PROBLEM — T14.8XXA MULTIPLE SKIN TEARS: Status: RESOLVED | Noted: 2020-07-05 | Resolved: 2020-12-05

## 2020-12-05 PROBLEM — S06.4X9A EPIDURAL HEMATOMA (HCC): Status: RESOLVED | Noted: 2020-07-06 | Resolved: 2020-12-05

## 2020-12-05 PROBLEM — G06.2 EPIDURAL ABSCESS: Status: RESOLVED | Noted: 2020-07-05 | Resolved: 2020-12-05

## 2020-12-05 PROBLEM — R73.9 HYPERGLYCEMIA: Status: RESOLVED | Noted: 2020-07-05 | Resolved: 2020-12-05

## 2020-12-05 PROBLEM — S06.4XAA EPIDURAL HEMATOMA: Status: RESOLVED | Noted: 2020-07-06 | Resolved: 2020-12-05

## 2020-12-05 PROBLEM — R55 SYNCOPE, UNSPECIFIED SYNCOPE TYPE: Status: RESOLVED | Noted: 2020-05-09 | Resolved: 2020-12-05

## 2020-12-05 PROBLEM — R55 VASOVAGAL SYNCOPE: Status: RESOLVED | Noted: 2018-07-25 | Resolved: 2020-12-05

## 2020-12-05 PROBLEM — R74.01 TRANSAMINITIS: Status: RESOLVED | Noted: 2019-09-04 | Resolved: 2020-12-05

## 2020-12-05 PROBLEM — S06.4XAA EPIDURAL HEMATOMA (HCC): Status: RESOLVED | Noted: 2020-07-06 | Resolved: 2020-12-05

## 2020-12-05 PROBLEM — I26.99 OTHER PULMONARY EMBOLISM WITHOUT ACUTE COR PULMONALE (HCC): Status: RESOLVED | Noted: 2019-10-12 | Resolved: 2020-12-05

## 2020-12-11 ENCOUNTER — TELEPHONE (OUTPATIENT)
Dept: FAMILY MEDICINE CLINIC | Facility: CLINIC | Age: 80
End: 2020-12-11

## 2020-12-11 NOTE — TELEPHONE ENCOUNTER
Attempted to reach pt's daughter, LMOM advising that Dr. Woodrow Cleaning recommends pt schedule an appointment to go over recent lab results. Please schedule pt for appt if her daughter returns call. Thanks!

## 2021-01-01 ENCOUNTER — EXTERNAL RECORD (OUTPATIENT)
Dept: OTHER | Age: 81
End: 2021-01-01

## 2021-01-25 DIAGNOSIS — Z23 NEED FOR VACCINATION: ICD-10-CM

## 2021-01-27 NOTE — PAYOR COMM NOTE
01/27/21 11:40 AM     See documentation in the VB [employer] SmartForm       Jay Vázquez MA Attending Physician: Tita Perea MD    Review Type: ADMISSION   Reviewer: Erickson Foster       Date: January 6, 2017 - 1:35 PM  Payor: JAN BOWEN  Authorization Number: N/A  Admit date: 1/5/2017  9:21 PM   Admitted from Emergency Dept.: No    REVIEWE chills. No other complaints at this time. Past Medical History:  Past Medical History    Diagnosis  Date    •  Arthritis      •  DIABETES          Type ! !    •  Diverticulitis      •  HYPERTENSION      •  KIDNEY STONE      •  HIGH CHOLESTEROL      •  G Osteoarthritis        Past Surgical History:       Past Surgical History      HYSTERECTOMY          APPENDECTOMY          HERNIA SURGERY    3/24/2011 [facility]      Comment  Repair of Incarcerated Complex Ventral Hernia w/mesh, bilateral component separati   Rfl:     Potassium Chloride ER 20 MEQ Oral Tab CR  Take 20 mEq by mouth daily.  Disp:   Rfl:     digoxin 0.125 MG Oral Tab  Take 1 tablet by mouth daily.  Disp:   Rfl:     betamethasone valerate 0.1 % External Cream  Apply 1 Application topically daily. GLU   115*    BUN   22*    CREATSERUM   1.22*    CA   9.3    ALB   3.2*    NA   134*    K   4.3    CL   99*    CO2   28.0    ALKPHO   226*    AST   64*    ALT   81*    BILT   0.5    TP   7.0      Recent Labs    Lab  01/03/17   1837    PTP   13.4    INR  documentation in H+P. Based on patients current state of illness, I anticipate that, after discharge, patient will require TBD.            Consults by Jonathon Hernández MD at 1/6/2017 11:31 AM      Author: Jonathon Hernández MD Service: (none) Author lethargic and not answering questions.  A CT scan of the brain showed a slight increase in her left subdural hemorrhage with some acute blood and increase in her left shift.  Lovenox had been initiated for DVTs at [facility] [place] [place] This was reversed with protami ventricular     relaxation - grade 1 diastolic dysfunction. 2. Aortic valve: Trivial regurgitation. 3. Mitral valve: Densed calcified annulus with calcification extending into     the posterior mitral leaflet. There was trivial to mild regurgitation.   4. 3+/4+ edema but no compartment sx at present time. Peripheral pulses are 1+. Neurologic: Alert and oriented, normal affect. Forgetful. Skin: Warm and dry.      Laboratories and Data:    Imaging:  Ct Brain Or Head ([id number])    1/4/2017  PROCEDURE:  CT OF THE measuring up to 1.3 cm. Stable localized mass effect on the left lateral lobe. There is mild midline shift to the right of approximately 4 mm unchanged.  Stable 5 mm left para midline interhemisphere acute subdural hematoma overlying the left parietal lobe showed partial thrombus in the mid and distal right superficial femoral vein and proximal and distal right popliteal vein, and also a complete thrombus in the proximal mid and distal right paired posterior tibial veins.   2.  Recent traumatic left-sided sub HCl ER (UROXATRAL) 24 hr tab 10 mg     Date Action Dose Route User    1/5/2017 2340 Given 10 mg Oral Tracey Nelson RN      Atorvastatin Calcium (LIPITOR) tab 15 mg     Date Action Dose Route User    1/5/2017 2344 Given 15 mg Oral Tracey Nelson RN Action Dose Route User    1/6/2017 1027 Given 20 mg Oral Bruna Velasquez, RN    1/5/2017 2340 Given 20 mg Oral Dru Cook RN        RESULTS LAST 24HRS:  Labs Reviewed   BASIC METABOLIC PANEL (8) - Abnormal; Notable for the following:     Glucose 118

## 2021-03-29 ENCOUNTER — TELEPHONE (OUTPATIENT)
Dept: CARDIOLOGY | Age: 81
End: 2021-03-29

## 2021-04-26 ENCOUNTER — TELEPHONE (OUTPATIENT)
Dept: CARDIOLOGY | Age: 81
End: 2021-04-26

## 2021-04-26 DIAGNOSIS — I50.22 CHRONIC SYSTOLIC CONGESTIVE HEART FAILURE (HCC): ICD-10-CM

## 2021-04-26 DIAGNOSIS — I10 ESSENTIAL HYPERTENSION: ICD-10-CM

## 2021-04-26 DIAGNOSIS — I48.19 PERSISTENT ATRIAL FIBRILLATION (HCC): ICD-10-CM

## 2021-04-26 DIAGNOSIS — E11.42 TYPE 2 DIABETES MELLITUS WITH DIABETIC POLYNEUROPATHY, WITHOUT LONG-TERM CURRENT USE OF INSULIN (HCC): ICD-10-CM

## 2021-04-26 RX ORDER — TORSEMIDE 20 MG/1
TABLET ORAL
Qty: 180 TABLET | Refills: 0 | Status: SHIPPED | OUTPATIENT
Start: 2021-04-26 | End: 2021-04-28 | Stop reason: SDUPTHER

## 2021-04-26 NOTE — TELEPHONE ENCOUNTER
Requested Prescriptions     Pending Prescriptions Disp Refills   • Metoprolol Tartrate 50 MG Oral Tab 180 tablet 1     Sig: Take 1 tablet (50 mg total) by mouth 2 (two) times daily.    • metFORMIN HCl 500 MG Oral Tab 180 tablet 1     Sig: Take 1 tablet (500

## 2021-04-28 RX ORDER — WARFARIN SODIUM 5 MG/1
TABLET ORAL
Qty: 90 TABLET | Refills: 3 | Status: SHIPPED | OUTPATIENT
Start: 2021-04-28 | End: 2021-07-22

## 2021-04-28 RX ORDER — DILTIAZEM HYDROCHLORIDE 120 MG/1
120 CAPSULE, EXTENDED RELEASE ORAL DAILY
Qty: 90 CAPSULE | Refills: 3 | Status: SHIPPED | OUTPATIENT
Start: 2021-04-28 | End: 2021-07-20 | Stop reason: SDUPTHER

## 2021-04-28 RX ORDER — METOPROLOL TARTRATE 50 MG/1
50 TABLET, FILM COATED ORAL 2 TIMES DAILY
Qty: 180 TABLET | Refills: 3 | Status: SHIPPED | OUTPATIENT
Start: 2021-04-28 | End: 2021-07-20 | Stop reason: SDUPTHER

## 2021-04-28 RX ORDER — AMIODARONE HYDROCHLORIDE 200 MG/1
200 TABLET ORAL DAILY
Qty: 90 TABLET | Refills: 3 | Status: SHIPPED | OUTPATIENT
Start: 2021-04-28 | End: 2021-07-21

## 2021-04-28 RX ORDER — TORSEMIDE 20 MG/1
20 TABLET ORAL 2 TIMES DAILY
Qty: 180 TABLET | Refills: 3 | Status: SHIPPED | OUTPATIENT
Start: 2021-04-28 | End: 2021-07-21

## 2021-04-28 RX ORDER — LOSARTAN POTASSIUM 25 MG/1
25 TABLET ORAL DAILY
COMMUNITY
End: 2022-06-23 | Stop reason: CLARIF

## 2021-04-28 RX ORDER — POTASSIUM CHLORIDE 20 MEQ/1
20 TABLET, EXTENDED RELEASE ORAL DAILY
Qty: 90 TABLET | Refills: 3 | Status: SHIPPED | OUTPATIENT
Start: 2021-04-28 | End: 2021-07-20 | Stop reason: SDUPTHER

## 2021-04-29 RX ORDER — POTASSIUM CHLORIDE 20 MEQ/1
20 TABLET, EXTENDED RELEASE ORAL DAILY
Qty: 90 TABLET | Refills: 1 | Status: SHIPPED | OUTPATIENT
Start: 2021-04-29 | End: 2021-07-20

## 2021-04-29 RX ORDER — METOPROLOL TARTRATE 50 MG/1
50 TABLET, FILM COATED ORAL 2 TIMES DAILY
Qty: 180 TABLET | Refills: 1 | Status: SHIPPED | OUTPATIENT
Start: 2021-04-29 | End: 2021-07-20

## 2021-04-29 RX ORDER — LOSARTAN POTASSIUM 25 MG/1
25 TABLET ORAL DAILY
Qty: 90 TABLET | Refills: 1 | Status: SHIPPED | OUTPATIENT
Start: 2021-04-29

## 2021-06-03 NOTE — TELEPHONE ENCOUNTER
11am on 9/4 How Severe Is Your Skin Lesion?: mild Have Your Skin Lesions Been Treated?: not been treated Is This A New Presentation, Or A Follow-Up?: Skin Lesions

## 2021-06-28 ENCOUNTER — TELEPHONE (OUTPATIENT)
Dept: CARDIOLOGY | Age: 81
End: 2021-06-28

## 2021-07-20 ENCOUNTER — TELEPHONE (OUTPATIENT)
Dept: CARDIOLOGY | Age: 81
End: 2021-07-20

## 2021-07-20 DIAGNOSIS — I10 ESSENTIAL HYPERTENSION: ICD-10-CM

## 2021-07-20 DIAGNOSIS — I50.22 CHRONIC SYSTOLIC CONGESTIVE HEART FAILURE (HCC): ICD-10-CM

## 2021-07-20 DIAGNOSIS — I48.19 PERSISTENT ATRIAL FIBRILLATION (HCC): ICD-10-CM

## 2021-07-20 RX ORDER — METOPROLOL TARTRATE 50 MG/1
TABLET, FILM COATED ORAL
Qty: 180 TABLET | Refills: 1 | Status: SHIPPED | OUTPATIENT
Start: 2021-07-20

## 2021-07-20 RX ORDER — POTASSIUM CHLORIDE 20 MEQ/1
TABLET, EXTENDED RELEASE ORAL
Qty: 90 TABLET | Refills: 1 | Status: SHIPPED | OUTPATIENT
Start: 2021-07-20

## 2021-07-21 ENCOUNTER — ANTI-COAG (OUTPATIENT)
Dept: CARDIOLOGY | Age: 81
End: 2021-07-21

## 2021-07-21 DIAGNOSIS — I48.11 LONGSTANDING PERSISTENT ATRIAL FIBRILLATION (CMD): Primary | ICD-10-CM

## 2021-07-21 RX ORDER — AMIODARONE HYDROCHLORIDE 200 MG/1
TABLET ORAL
Qty: 90 TABLET | Refills: 3 | Status: SHIPPED | OUTPATIENT
Start: 2021-07-21 | End: 2022-06-08 | Stop reason: DRUGHIGH

## 2021-07-21 RX ORDER — TORSEMIDE 20 MG/1
TABLET ORAL
Qty: 180 TABLET | Refills: 3 | Status: SHIPPED | OUTPATIENT
Start: 2021-07-21 | End: 2022-06-10 | Stop reason: SDUPTHER

## 2021-07-21 RX ORDER — LOSARTAN POTASSIUM 25 MG/1
25 TABLET ORAL DAILY
Status: CANCELLED | OUTPATIENT
Start: 2021-07-21

## 2021-07-21 RX ORDER — POTASSIUM CHLORIDE 20 MEQ/1
20 TABLET, EXTENDED RELEASE ORAL DAILY
Qty: 90 TABLET | Refills: 3 | Status: SHIPPED | OUTPATIENT
Start: 2021-07-21 | End: 2022-06-10 | Stop reason: SDUPTHER

## 2021-07-21 RX ORDER — TORSEMIDE 20 MG/1
20 TABLET ORAL 2 TIMES DAILY
Qty: 180 TABLET | Refills: 3 | Status: CANCELLED | OUTPATIENT
Start: 2021-07-21

## 2021-07-21 RX ORDER — DILTIAZEM HYDROCHLORIDE 120 MG/1
120 CAPSULE, EXTENDED RELEASE ORAL DAILY
Qty: 90 CAPSULE | Refills: 3 | Status: SHIPPED | OUTPATIENT
Start: 2021-07-21 | End: 2022-06-10 | Stop reason: SDUPTHER

## 2021-07-21 RX ORDER — METOPROLOL TARTRATE 50 MG/1
50 TABLET, FILM COATED ORAL 2 TIMES DAILY
Qty: 180 TABLET | Refills: 3 | Status: SHIPPED | OUTPATIENT
Start: 2021-07-21 | End: 2022-06-10 | Stop reason: SDUPTHER

## 2021-07-21 RX ORDER — WARFARIN SODIUM 5 MG/1
TABLET ORAL
Qty: 90 TABLET | Refills: 3 | Status: CANCELLED | OUTPATIENT
Start: 2021-07-21

## 2021-07-21 RX ORDER — LOSARTAN POTASSIUM 25 MG/1
25 TABLET ORAL DAILY
Qty: 90 TABLET | Refills: 1 | Status: SHIPPED | OUTPATIENT
Start: 2021-07-21 | End: 2022-07-05

## 2021-07-21 RX ORDER — AMIODARONE HYDROCHLORIDE 200 MG/1
200 TABLET ORAL DAILY
Qty: 90 TABLET | Refills: 3 | Status: CANCELLED | OUTPATIENT
Start: 2021-07-21

## 2021-07-22 RX ORDER — WARFARIN SODIUM 5 MG/1
TABLET ORAL
Qty: 14 TABLET | Refills: 0 | Status: SHIPPED | OUTPATIENT
Start: 2021-07-22 | End: 2021-07-26

## 2021-07-24 ENCOUNTER — LAB SERVICES (OUTPATIENT)
Dept: LAB | Age: 81
End: 2021-07-24

## 2021-07-24 DIAGNOSIS — Z79.01 BLOOD THINNED DUE TO LONG-TERM ANTICOAGULANT USE: Primary | ICD-10-CM

## 2021-07-24 DIAGNOSIS — I48.19 PERSISTENT ATRIAL FIBRILLATION (CMD): Primary | ICD-10-CM

## 2021-07-24 DIAGNOSIS — I48.91 ATRIAL FIBRILLATION, UNSPECIFIED TYPE (CMD): Primary | ICD-10-CM

## 2021-07-24 DIAGNOSIS — I48.0 PAROXYSMAL ATRIAL FIBRILLATION (CMD): Primary | ICD-10-CM

## 2021-07-24 LAB
INR BLDC: >4.5
INR PPP: 4.6
PROTHROMBIN TIME: 45.7 SEC (ref 9.7–11.8)

## 2021-07-24 PROCEDURE — 85610 PROTHROMBIN TIME: CPT | Performed by: INTERNAL MEDICINE

## 2021-07-24 PROCEDURE — 36415 COLL VENOUS BLD VENIPUNCTURE: CPT | Performed by: INTERNAL MEDICINE

## 2021-07-24 RX ORDER — WARFARIN SODIUM 5 MG/1
5 TABLET ORAL DAILY
Qty: 90 TABLET | Refills: 3 | Status: SHIPPED | OUTPATIENT
Start: 2021-07-24 | End: 2022-06-23

## 2021-07-26 ENCOUNTER — ANTI-COAG (OUTPATIENT)
Dept: CARDIOLOGY | Age: 81
End: 2021-07-26

## 2021-07-26 ENCOUNTER — TELEPHONE (OUTPATIENT)
Dept: CARDIOLOGY | Age: 81
End: 2021-07-26

## 2021-07-26 DIAGNOSIS — I48.91 ATRIAL FIBRILLATION, UNSPECIFIED TYPE (CMD): Primary | ICD-10-CM

## 2021-07-26 RX ORDER — WARFARIN SODIUM 5 MG/1
TABLET ORAL
Qty: 30 TABLET | Refills: 5 | Status: SHIPPED | OUTPATIENT
Start: 2021-07-26 | End: 2021-12-27

## 2021-07-28 LAB — INR PPP: 2

## 2021-07-29 ENCOUNTER — ANTI-COAG (OUTPATIENT)
Dept: CARDIOLOGY | Age: 81
End: 2021-07-29

## 2021-08-06 ENCOUNTER — ANTI-COAG (OUTPATIENT)
Dept: CARDIOLOGY | Age: 81
End: 2021-08-06

## 2021-08-06 LAB — INR PPP: 2.57

## 2021-08-30 ENCOUNTER — TELEPHONE (OUTPATIENT)
Dept: CARDIOLOGY | Age: 81
End: 2021-08-30

## 2021-08-30 RX ORDER — ALBUTEROL SULFATE 90 UG/1
1 AEROSOL, METERED RESPIRATORY (INHALATION) EVERY 4 HOURS PRN
Qty: 8.5 G | Refills: 2 | Status: SHIPPED | OUTPATIENT
Start: 2021-08-30

## 2021-08-30 NOTE — TELEPHONE ENCOUNTER
Requested Prescriptions     Pending Prescriptions Disp Refills   • ALBUTEROL SULFATE  (90 Base) MCG/ACT Inhalation Aero Soln [Pharmacy Med Name: ALBUTEROL HFA INH (200 PUFFS)8.5GM] 8.5 g 0     Sig: INHALE 1 PUFF BY MOUTH EVERY 4 HOURS AS NEEDED FOR

## 2021-09-06 NOTE — PLAN OF CARE
Pt walking with assist of one person. Wound RN applied mepilex border to left arm abrasion. Dulcolax supp given for constipation. Large BM this afternoon. Seen by hospitalist and neuro. OK for dc to rehab when insurance approved.   Coumadin remains on 30F with PMH IBS constipation type who presents to [facility] for an episode of fecal incontinence that occurred while going to dinner. She reports this happened once before when starting Linzess for her IBS, and she is now on max daily dose of 290mg. She is followed by neurology at [facility] and had MRI C, T, L-spine performed yesterday which showed no evidence of cord compression and normal conus medullaris. She denies saddle anesthesias, bladder changes, f/c, ivda, and has a f/u apt with her PCP tmw at 9:30am. Her main complaint is paresthesias to b/l UE/LE for years now, but worsening over the last few months. Characterized as pins and needles, denies focal weakness and has not been on gabapentin.     Gen - NAD, Head - NCAT, Pharynx - clear, MMM, Heart - RRR, no m/g/r, Lungs - CTAB, no w/c/r, Abdomen - soft, NT, ND, Skin - No rash, Extremities - FROM, no edema, erythema, ecchymosis, brisk cap refill, Neuro - A&O x3, equal strength and sensation, non-focal exam    a/p: paresthesias; will check electrolytes; fecal incontinence- may be 2/2 to meds and recent MRI reassuring; spoke to neurology however pt prefers to f/u with her PCP tmw and will take gabapentin for temporary symptomatic relief.

## 2021-09-21 ENCOUNTER — HOSPITAL ENCOUNTER (EMERGENCY)
Facility: HOSPITAL | Age: 81
Discharge: LEFT BEFORE DISCHARGE | End: 2021-09-21
Payer: MEDICARE

## 2021-10-12 ENCOUNTER — ANTI-COAG (OUTPATIENT)
Dept: CARDIOLOGY | Age: 81
End: 2021-10-12

## 2021-10-12 DIAGNOSIS — I48.91 ATRIAL FIBRILLATION, UNSPECIFIED TYPE (CMD): Primary | ICD-10-CM

## 2021-10-13 ENCOUNTER — TELEPHONE (OUTPATIENT)
Dept: CARDIOLOGY | Age: 81
End: 2021-10-13

## 2021-10-13 LAB — INR PPP: 6.89

## 2021-10-15 ENCOUNTER — ANTI-COAG (OUTPATIENT)
Dept: CARDIOLOGY | Age: 81
End: 2021-10-15

## 2021-10-15 DIAGNOSIS — I48.91 ATRIAL FIBRILLATION, UNSPECIFIED TYPE (CMD): ICD-10-CM

## 2021-10-15 DIAGNOSIS — F32.5 MAJOR DEPRESSIVE DISORDER WITH SINGLE EPISODE, IN REMISSION (HCC): ICD-10-CM

## 2021-10-15 DIAGNOSIS — F41.9 ANXIETY: ICD-10-CM

## 2021-10-15 DIAGNOSIS — I48.11 LONGSTANDING PERSISTENT ATRIAL FIBRILLATION (CMD): Primary | ICD-10-CM

## 2021-10-15 LAB — INR PPP: 5.69

## 2021-10-15 RX ORDER — SERTRALINE HYDROCHLORIDE 25 MG/1
TABLET, FILM COATED ORAL
Qty: 90 TABLET | Refills: 3 | Status: SHIPPED | OUTPATIENT
Start: 2021-10-15

## 2021-10-18 ENCOUNTER — ANTI-COAG (OUTPATIENT)
Dept: CARDIOLOGY | Age: 81
End: 2021-10-18

## 2021-10-18 DIAGNOSIS — I48.91 ATRIAL FIBRILLATION, UNSPECIFIED TYPE (CMD): Primary | ICD-10-CM

## 2021-10-18 LAB — INR PPP: 2.55

## 2021-10-22 ENCOUNTER — ANTI-COAG (OUTPATIENT)
Dept: CARDIOLOGY | Age: 81
End: 2021-10-22

## 2021-10-22 DIAGNOSIS — I48.91 ATRIAL FIBRILLATION, UNSPECIFIED TYPE (CMD): Primary | ICD-10-CM

## 2021-10-22 LAB — INR PPP: 3.2

## 2021-10-29 ENCOUNTER — ANTI-COAG (OUTPATIENT)
Dept: CARDIOLOGY | Age: 81
End: 2021-10-29

## 2021-10-29 ENCOUNTER — TELEPHONE (OUTPATIENT)
Dept: CARDIOLOGY | Age: 81
End: 2021-10-29

## 2021-10-29 DIAGNOSIS — I48.91 ATRIAL FIBRILLATION, UNSPECIFIED TYPE (CMD): ICD-10-CM

## 2021-10-29 DIAGNOSIS — I48.11 LONGSTANDING PERSISTENT ATRIAL FIBRILLATION (CMD): Primary | ICD-10-CM

## 2021-10-29 PROBLEM — Z79.01 LONG TERM (CURRENT) USE OF ANTICOAGULANTS: Status: ACTIVE | Noted: 2021-10-29

## 2021-10-29 LAB — INR PPP: 8.31

## 2021-10-29 RX ORDER — PHYTONADIONE 5 MG/1
TABLET ORAL
Qty: 1 TABLET | Refills: 0 | Status: SHIPPED | OUTPATIENT
Start: 2021-10-29 | End: 2022-06-23 | Stop reason: CLARIF

## 2021-11-01 ENCOUNTER — TELEPHONE (OUTPATIENT)
Dept: CARDIOLOGY | Age: 81
End: 2021-11-01

## 2021-11-01 ENCOUNTER — ANTI-COAG (OUTPATIENT)
Dept: CARDIOLOGY | Age: 81
End: 2021-11-01

## 2021-11-01 DIAGNOSIS — I48.91 ATRIAL FIBRILLATION, UNSPECIFIED TYPE (CMD): Primary | ICD-10-CM

## 2021-11-01 DIAGNOSIS — I48.11 LONGSTANDING PERSISTENT ATRIAL FIBRILLATION (CMD): ICD-10-CM

## 2021-11-01 DIAGNOSIS — Z79.01 LONG TERM (CURRENT) USE OF ANTICOAGULANTS: ICD-10-CM

## 2021-11-01 LAB — INR PPP: 2.36

## 2021-11-04 ENCOUNTER — TELEPHONE (OUTPATIENT)
Dept: CARDIOLOGY | Age: 81
End: 2021-11-04

## 2021-11-05 ENCOUNTER — ANTI-COAG (OUTPATIENT)
Dept: CARDIOLOGY | Age: 81
End: 2021-11-05

## 2021-11-05 DIAGNOSIS — I48.11 LONGSTANDING PERSISTENT ATRIAL FIBRILLATION (CMD): ICD-10-CM

## 2021-11-05 DIAGNOSIS — Z79.01 LONG TERM (CURRENT) USE OF ANTICOAGULANTS: ICD-10-CM

## 2021-11-05 DIAGNOSIS — I48.91 ATRIAL FIBRILLATION, UNSPECIFIED TYPE (CMD): Primary | ICD-10-CM

## 2021-11-05 LAB — INR PPP: 2.5

## 2021-11-10 LAB — INR PPP: 2.16

## 2021-11-11 ENCOUNTER — ANTI-COAG (OUTPATIENT)
Dept: CARDIOLOGY | Age: 81
End: 2021-11-11

## 2021-11-11 DIAGNOSIS — Z79.01 LONG TERM (CURRENT) USE OF ANTICOAGULANTS: ICD-10-CM

## 2021-11-11 DIAGNOSIS — I48.91 ATRIAL FIBRILLATION, UNSPECIFIED TYPE (CMD): Primary | ICD-10-CM

## 2021-11-17 ENCOUNTER — TELEPHONE (OUTPATIENT)
Dept: CARDIOLOGY | Age: 81
End: 2021-11-17

## 2021-11-23 NOTE — TELEPHONE ENCOUNTER
Per protocol, refer to provider    Pt was seen today for wellness visit and tells me she was a candidate for hearing aids and did a trial pair that worked well, but in the end couldn't afford them. She was wondering if there are any assistance programs for individuals in her situation.  Kate

## 2021-11-24 ENCOUNTER — TELEPHONE (OUTPATIENT)
Dept: CARDIOLOGY | Age: 81
End: 2021-11-24

## 2021-12-03 ENCOUNTER — APPOINTMENT (OUTPATIENT)
Dept: CARDIOLOGY | Age: 81
End: 2021-12-03
Attending: INTERNAL MEDICINE

## 2021-12-03 ENCOUNTER — ANTI-COAG (OUTPATIENT)
Dept: CARDIOLOGY | Age: 81
End: 2021-12-03

## 2021-12-03 ENCOUNTER — APPOINTMENT (OUTPATIENT)
Dept: CARDIOLOGY | Age: 81
End: 2021-12-03

## 2021-12-03 DIAGNOSIS — I48.91 ATRIAL FIBRILLATION, UNSPECIFIED TYPE (CMD): Primary | ICD-10-CM

## 2021-12-03 DIAGNOSIS — Z79.01 LONG TERM (CURRENT) USE OF ANTICOAGULANTS: ICD-10-CM

## 2021-12-09 ENCOUNTER — ANTI-COAG (OUTPATIENT)
Dept: CARDIOLOGY | Age: 81
End: 2021-12-09

## 2021-12-09 DIAGNOSIS — Z79.01 LONG TERM (CURRENT) USE OF ANTICOAGULANTS: ICD-10-CM

## 2021-12-09 DIAGNOSIS — I48.91 ATRIAL FIBRILLATION, UNSPECIFIED TYPE (CMD): Primary | ICD-10-CM

## 2021-12-10 ENCOUNTER — APPOINTMENT (OUTPATIENT)
Dept: CARDIOLOGY | Age: 81
End: 2021-12-10

## 2021-12-20 ENCOUNTER — ANTI-COAG (OUTPATIENT)
Dept: CARDIOLOGY | Age: 81
End: 2021-12-20

## 2021-12-20 ENCOUNTER — TELEPHONE (OUTPATIENT)
Dept: CARDIOLOGY | Age: 81
End: 2021-12-20

## 2021-12-20 DIAGNOSIS — Z79.01 LONG TERM (CURRENT) USE OF ANTICOAGULANTS: ICD-10-CM

## 2021-12-20 DIAGNOSIS — I48.91 ATRIAL FIBRILLATION, UNSPECIFIED TYPE (CMD): Primary | ICD-10-CM

## 2021-12-20 DIAGNOSIS — I48.11 LONGSTANDING PERSISTENT ATRIAL FIBRILLATION (CMD): ICD-10-CM

## 2021-12-20 LAB — INR PPP: 4.57

## 2021-12-22 ENCOUNTER — APPOINTMENT (OUTPATIENT)
Dept: CARDIOLOGY | Age: 81
End: 2021-12-22

## 2021-12-22 ENCOUNTER — APPOINTMENT (OUTPATIENT)
Dept: CARDIOLOGY | Age: 81
End: 2021-12-22
Attending: INTERNAL MEDICINE

## 2021-12-27 ENCOUNTER — ANTI-COAG (OUTPATIENT)
Dept: CARDIOLOGY | Age: 81
End: 2021-12-27

## 2021-12-27 DIAGNOSIS — I48.91 ATRIAL FIBRILLATION, UNSPECIFIED TYPE (CMD): Primary | ICD-10-CM

## 2021-12-27 DIAGNOSIS — Z79.01 LONG TERM (CURRENT) USE OF ANTICOAGULANTS: ICD-10-CM

## 2021-12-27 DIAGNOSIS — I48.11 LONGSTANDING PERSISTENT ATRIAL FIBRILLATION (CMD): ICD-10-CM

## 2021-12-27 LAB — INR PPP: 3.21

## 2021-12-27 RX ORDER — WARFARIN SODIUM 5 MG/1
TABLET ORAL
Qty: 30 TABLET | Refills: 5 | Status: SHIPPED | OUTPATIENT
Start: 2021-12-27 | End: 2022-06-23

## 2022-01-01 ENCOUNTER — TELEPHONE (OUTPATIENT)
Dept: POST ACUTE CARE | Age: 82
End: 2022-01-01

## 2022-01-01 ENCOUNTER — ANTI-COAG (OUTPATIENT)
Dept: CARDIOLOGY | Age: 82
End: 2022-01-01

## 2022-01-01 ENCOUNTER — APPOINTMENT (OUTPATIENT)
Dept: CARDIOLOGY | Age: 82
End: 2022-01-01
Attending: INTERNAL MEDICINE

## 2022-01-01 ENCOUNTER — EXTERNAL RECORD (OUTPATIENT)
Dept: OTHER | Age: 82
End: 2022-01-01

## 2022-01-01 ENCOUNTER — E-ADVICE (OUTPATIENT)
Dept: CARDIOLOGY | Age: 82
End: 2022-01-01

## 2022-01-01 ENCOUNTER — HOME/GROUP HOME VISIT (OUTPATIENT)
Dept: POST ACUTE CARE | Age: 82
End: 2022-01-01

## 2022-01-01 ENCOUNTER — ANTI-COAG (OUTPATIENT)
Dept: CHRONIC DISEASE MANAGEMENT | Age: 82
End: 2022-01-01

## 2022-01-01 ENCOUNTER — APPOINTMENT (OUTPATIENT)
Dept: POST ACUTE CARE | Age: 82
End: 2022-01-01

## 2022-01-01 ENCOUNTER — TELEPHONE (OUTPATIENT)
Dept: CARDIOLOGY | Age: 82
End: 2022-01-01

## 2022-01-01 ENCOUNTER — DOCUMENTATION (OUTPATIENT)
Dept: POST ACUTE CARE | Age: 82
End: 2022-01-01

## 2022-01-01 ENCOUNTER — TELEPHONE (OUTPATIENT)
Dept: CHRONIC DISEASE MANAGEMENT | Age: 82
End: 2022-01-01

## 2022-01-01 ENCOUNTER — ANTI-COAG (OUTPATIENT)
Dept: ANTICOAGULATION | Age: 82
End: 2022-01-01

## 2022-01-01 ENCOUNTER — TELEPHONE (OUTPATIENT)
Dept: SCHEDULING | Age: 82
End: 2022-01-01

## 2022-01-01 VITALS
HEART RATE: 71 BPM | DIASTOLIC BLOOD PRESSURE: 88 MMHG | TEMPERATURE: 97.3 F | SYSTOLIC BLOOD PRESSURE: 138 MMHG | OXYGEN SATURATION: 94 %

## 2022-01-01 DIAGNOSIS — I48.20 CHRONIC ATRIAL FIBRILLATION (CMD): Primary | ICD-10-CM

## 2022-01-01 DIAGNOSIS — E06.3 CHRONIC LYMPHOCYTIC THYROIDITIS: ICD-10-CM

## 2022-01-01 DIAGNOSIS — I82.403 ACUTE DEEP VEIN THROMBOSIS (DVT) OF BOTH LOWER EXTREMITIES (CMD): ICD-10-CM

## 2022-01-01 DIAGNOSIS — Z79.01 CURRENT USE OF LONG TERM ANTICOAGULATION: ICD-10-CM

## 2022-01-01 DIAGNOSIS — Z71.89 ACP (ADVANCE CARE PLANNING): ICD-10-CM

## 2022-01-01 DIAGNOSIS — I50.32 CHRONIC DIASTOLIC CONGESTIVE HEART FAILURE (CMD): Primary | ICD-10-CM

## 2022-01-01 DIAGNOSIS — F33.42 MAJOR DEPRESSIVE DISORDER, RECURRENT EPISODE, IN FULL REMISSION (CMD): Primary | ICD-10-CM

## 2022-01-01 DIAGNOSIS — R79.89 ABNORMAL THYROID STIMULATING HORMONE (TSH) LEVEL: ICD-10-CM

## 2022-01-01 DIAGNOSIS — M79.605 PAIN IN BOTH LOWER EXTREMITIES: ICD-10-CM

## 2022-01-01 DIAGNOSIS — L89.91: ICD-10-CM

## 2022-01-01 DIAGNOSIS — Z86.711 HISTORY OF PULMONARY EMBOLISM: ICD-10-CM

## 2022-01-01 DIAGNOSIS — I48.20 CHRONIC ATRIAL FIBRILLATION (CMD): ICD-10-CM

## 2022-01-01 DIAGNOSIS — E87.6 HYPOKALEMIA: Primary | ICD-10-CM

## 2022-01-01 DIAGNOSIS — E06.9 THYROIDITIS, UNSPECIFIED: ICD-10-CM

## 2022-01-01 DIAGNOSIS — F33.42 MAJOR DEPRESSIVE DISORDER, RECURRENT EPISODE, IN FULL REMISSION (CMD): ICD-10-CM

## 2022-01-01 DIAGNOSIS — M79.604 PAIN IN BOTH LOWER EXTREMITIES: ICD-10-CM

## 2022-01-01 LAB
INR PPP: 1.08
INR PPP: 1.53
INR PPP: 1.66
INR PPP: 3.06
INR PPP: 4.45

## 2022-01-01 PROCEDURE — 1126F AMNT PAIN NOTED NONE PRSNT: CPT | Performed by: NURSE PRACTITIONER

## 2022-01-01 PROCEDURE — 1160F RVW MEDS BY RX/DR IN RCRD: CPT | Performed by: NURSE PRACTITIONER

## 2022-01-01 PROCEDURE — X1094 NO CHARGE VISIT: HCPCS | Performed by: NURSE PRACTITIONER

## 2022-01-01 PROCEDURE — 99349 HOME/RES VST EST MOD MDM 40: CPT | Performed by: NURSE PRACTITIONER

## 2022-01-01 PROCEDURE — 1170F FXNL STATUS ASSESSED: CPT | Performed by: NURSE PRACTITIONER

## 2022-01-01 RX ORDER — TRAMADOL HYDROCHLORIDE 50 MG/1
50 TABLET ORAL 2 TIMES DAILY PRN
Qty: 30 TABLET | Refills: 0 | Status: SHIPPED | OUTPATIENT
Start: 2022-01-01 | End: 2023-01-01 | Stop reason: SDUPTHER

## 2022-01-01 RX ORDER — DILTIAZEM HYDROCHLORIDE 120 MG/1
120 TABLET, FILM COATED ORAL DAILY
Qty: 90 TABLET | Refills: 1 | Status: ON HOLD | OUTPATIENT
Start: 2022-01-01 | End: 2023-01-01

## 2022-01-01 RX ORDER — TORSEMIDE 20 MG/1
20 TABLET ORAL 2 TIMES DAILY
Qty: 60 TABLET | Refills: 1 | Status: ON HOLD | OUTPATIENT
Start: 2022-01-01 | End: 2023-01-01 | Stop reason: HOSPADM

## 2022-01-01 RX ORDER — AMIODARONE HYDROCHLORIDE 200 MG/1
100 TABLET ORAL DAILY
Qty: 45 TABLET | Refills: 0 | Status: SHIPPED | OUTPATIENT
Start: 2022-01-01 | End: 2023-09-09

## 2022-01-01 RX ORDER — POTASSIUM CHLORIDE 20 MEQ/1
20 TABLET, EXTENDED RELEASE ORAL DAILY
Qty: 90 TABLET | Refills: 1 | Status: SHIPPED | OUTPATIENT
Start: 2022-01-01 | End: 2023-09-09

## 2022-01-01 RX ORDER — METOPROLOL TARTRATE 50 MG/1
50 TABLET, FILM COATED ORAL 2 TIMES DAILY
Qty: 180 TABLET | Refills: 1 | Status: SHIPPED | OUTPATIENT
Start: 2022-01-01 | End: 2023-09-09

## 2022-01-01 RX ORDER — OMEPRAZOLE 40 MG/1
40 CAPSULE, DELAYED RELEASE ORAL DAILY
COMMUNITY
End: 2022-01-01 | Stop reason: SDUPTHER

## 2022-01-01 RX ORDER — OMEPRAZOLE 40 MG/1
40 CAPSULE, DELAYED RELEASE ORAL DAILY
Qty: 90 CAPSULE | Refills: 0 | Status: SHIPPED | OUTPATIENT
Start: 2022-01-01 | End: 2023-01-01 | Stop reason: SDUPTHER

## 2022-01-01 ASSESSMENT — FRAILTY ASSESSMENTS
DO YOU HAVE DIFFICULTY CLIMBING A FLIGHT OF STAIRS: YES
DO YOU HAVE DIFFICULTY WALKING ONE BLOCK: YES
HAVE YOU LOST MORE THAN 5 PERCENT OF YOUR WEIGHT IN THE PAST YEAR: NO
FRAILTY SCALE TOTAL SCORE: 4
HAVE YOU FELT FATIGUED? MOST OR ALL OF THE TIME OVER THE PAST MONTH: YES
DO YOU HAVE ANY OF THESE ILLNESSES: HYPERTENSION, DIABETES, CANCER (OTHER THAN A MINOR SKIN CANCER), CHRONIC LUNG DISEASE, HEART ATTACK, CONGESTIVE HEART FAILURE, ANGINA, ASTHMA, ARTHRITIS, STROKE, AND KIDNEY DISEASE: YES

## 2022-01-01 ASSESSMENT — ENCOUNTER SYMPTOMS
CONFUSION: 0
NEUROLOGICAL NEGATIVE: 1
RESPIRATORY NEGATIVE: 1
AGITATION: 0
WHEEZING: 0
ALLERGIC/IMMUNOLOGIC NEGATIVE: 1
DIZZINESS: 0
COUGH: 0
NAUSEA: 0
FEVER: 0
ENDOCRINE NEGATIVE: 1
SHORTNESS OF BREATH: 0
ABDOMINAL DISTENTION: 0
EYES NEGATIVE: 1
ABDOMINAL PAIN: 0
PSYCHIATRIC NEGATIVE: 1
ACTIVITY CHANGE: 1
APNEA: 0
FATIGUE: 1
VOMITING: 0
GASTROINTESTINAL NEGATIVE: 1
HEMATOLOGIC/LYMPHATIC NEGATIVE: 1
COLOR CHANGE: 0

## 2022-01-04 LAB
INR PPP: 1.53 (ref 2–3)
PROTHROMBIN TIME: 15.5 SEC (ref 9.4–12.9)

## 2022-01-05 ENCOUNTER — ANTI-COAG (OUTPATIENT)
Dept: CARDIOLOGY | Age: 82
End: 2022-01-05

## 2022-01-05 DIAGNOSIS — I48.91 ATRIAL FIBRILLATION, UNSPECIFIED TYPE (CMD): Primary | ICD-10-CM

## 2022-01-05 DIAGNOSIS — Z79.01 LONG TERM (CURRENT) USE OF ANTICOAGULANTS: ICD-10-CM

## 2022-01-12 ENCOUNTER — TELEPHONE (OUTPATIENT)
Dept: CARDIOLOGY | Age: 82
End: 2022-01-12

## 2022-01-14 ENCOUNTER — TELEPHONE (OUTPATIENT)
Dept: CARDIOLOGY | Age: 82
End: 2022-01-14

## 2022-01-21 ENCOUNTER — TELEPHONE (OUTPATIENT)
Dept: CARDIOLOGY | Age: 82
End: 2022-01-21

## 2022-01-28 ENCOUNTER — TELEPHONE (OUTPATIENT)
Dept: CARDIOLOGY | Age: 82
End: 2022-01-28

## 2022-02-02 ENCOUNTER — ANTI-COAG (OUTPATIENT)
Dept: CARDIOLOGY | Age: 82
End: 2022-02-02

## 2022-02-02 DIAGNOSIS — I48.91 ATRIAL FIBRILLATION, UNSPECIFIED TYPE (CMD): Primary | ICD-10-CM

## 2022-02-02 DIAGNOSIS — Z79.01 LONG TERM (CURRENT) USE OF ANTICOAGULANTS: ICD-10-CM

## 2022-02-10 ENCOUNTER — TELEPHONE (OUTPATIENT)
Dept: CARDIOLOGY | Age: 82
End: 2022-02-10

## 2022-02-14 ENCOUNTER — TELEPHONE (OUTPATIENT)
Dept: CARDIOLOGY | Age: 82
End: 2022-02-14

## 2022-02-18 ENCOUNTER — TELEPHONE (OUTPATIENT)
Dept: CARDIOLOGY | Age: 82
End: 2022-02-18

## 2022-02-25 ENCOUNTER — ANTI-COAG (OUTPATIENT)
Dept: CARDIOLOGY | Age: 82
End: 2022-02-25

## 2022-02-25 ENCOUNTER — TELEPHONE (OUTPATIENT)
Dept: CARDIOLOGY | Age: 82
End: 2022-02-25

## 2022-02-25 DIAGNOSIS — I48.91 ATRIAL FIBRILLATION, UNSPECIFIED TYPE (CMD): Primary | ICD-10-CM

## 2022-02-25 DIAGNOSIS — Z79.01 LONG TERM (CURRENT) USE OF ANTICOAGULANTS: ICD-10-CM

## 2022-02-25 LAB — INR PPP: 2.29

## 2022-03-28 ENCOUNTER — ANTI-COAG (OUTPATIENT)
Dept: CARDIOLOGY | Age: 82
End: 2022-03-28

## 2022-03-28 DIAGNOSIS — I48.91 ATRIAL FIBRILLATION, UNSPECIFIED TYPE (CMD): Primary | ICD-10-CM

## 2022-03-28 DIAGNOSIS — Z79.01 LONG TERM (CURRENT) USE OF ANTICOAGULANTS: ICD-10-CM

## 2022-03-28 DIAGNOSIS — I48.11 LONGSTANDING PERSISTENT ATRIAL FIBRILLATION (CMD): ICD-10-CM

## 2022-04-05 ENCOUNTER — ANTI-COAG (OUTPATIENT)
Dept: CARDIOLOGY | Age: 82
End: 2022-04-05

## 2022-04-05 DIAGNOSIS — Z79.01 LONG TERM (CURRENT) USE OF ANTICOAGULANTS: ICD-10-CM

## 2022-04-05 DIAGNOSIS — I48.11 LONGSTANDING PERSISTENT ATRIAL FIBRILLATION (CMD): ICD-10-CM

## 2022-04-05 DIAGNOSIS — I48.91 ATRIAL FIBRILLATION, UNSPECIFIED TYPE (CMD): Primary | ICD-10-CM

## 2022-04-13 ENCOUNTER — ANTI-COAG (OUTPATIENT)
Dept: CARDIOLOGY | Age: 82
End: 2022-04-13

## 2022-04-13 DIAGNOSIS — Z79.01 LONG TERM (CURRENT) USE OF ANTICOAGULANTS: ICD-10-CM

## 2022-04-13 DIAGNOSIS — I48.11 LONGSTANDING PERSISTENT ATRIAL FIBRILLATION (CMD): ICD-10-CM

## 2022-04-13 DIAGNOSIS — I48.91 ATRIAL FIBRILLATION, UNSPECIFIED TYPE (CMD): Primary | ICD-10-CM

## 2022-04-22 ENCOUNTER — ANTI-COAG (OUTPATIENT)
Dept: CARDIOLOGY | Age: 82
End: 2022-04-22

## 2022-04-22 DIAGNOSIS — I48.91 ATRIAL FIBRILLATION, UNSPECIFIED TYPE (CMD): Primary | ICD-10-CM

## 2022-04-22 DIAGNOSIS — Z79.01 LONG TERM (CURRENT) USE OF ANTICOAGULANTS: ICD-10-CM

## 2022-04-22 DIAGNOSIS — I48.11 LONGSTANDING PERSISTENT ATRIAL FIBRILLATION (CMD): ICD-10-CM

## 2022-04-26 ENCOUNTER — TELEPHONE (OUTPATIENT)
Dept: CARDIOLOGY | Age: 82
End: 2022-04-26

## 2022-05-05 ENCOUNTER — TELEPHONE (OUTPATIENT)
Dept: CARDIOLOGY | Age: 82
End: 2022-05-05

## 2022-05-05 ENCOUNTER — ANTI-COAG (OUTPATIENT)
Dept: CARDIOLOGY | Age: 82
End: 2022-05-05

## 2022-05-05 DIAGNOSIS — I48.91 ATRIAL FIBRILLATION, UNSPECIFIED TYPE (CMD): Primary | ICD-10-CM

## 2022-05-05 DIAGNOSIS — Z79.01 LONG TERM (CURRENT) USE OF ANTICOAGULANTS: ICD-10-CM

## 2022-05-05 LAB — INR PPP: 10.04

## 2022-05-09 ENCOUNTER — TELEPHONE (OUTPATIENT)
Dept: CARDIOLOGY | Age: 82
End: 2022-05-09

## 2022-05-10 ENCOUNTER — ANTI-COAG (OUTPATIENT)
Dept: CARDIOLOGY | Age: 82
End: 2022-05-10

## 2022-05-10 DIAGNOSIS — I48.91 ATRIAL FIBRILLATION, UNSPECIFIED TYPE (CMD): Primary | ICD-10-CM

## 2022-05-10 DIAGNOSIS — Z79.01 LONG TERM (CURRENT) USE OF ANTICOAGULANTS: ICD-10-CM

## 2022-05-11 ENCOUNTER — TELEPHONE (OUTPATIENT)
Dept: CARDIOLOGY | Age: 82
End: 2022-05-11

## 2022-06-08 ENCOUNTER — ANCILLARY PROCEDURE (OUTPATIENT)
Dept: CARDIOLOGY | Age: 82
End: 2022-06-08
Attending: INTERNAL MEDICINE

## 2022-06-08 ENCOUNTER — LAB SERVICES (OUTPATIENT)
Dept: LAB | Age: 82
End: 2022-06-08

## 2022-06-08 VITALS — SYSTOLIC BLOOD PRESSURE: 120 MMHG | HEART RATE: 82 BPM | DIASTOLIC BLOOD PRESSURE: 72 MMHG

## 2022-06-08 VITALS — SYSTOLIC BLOOD PRESSURE: 120 MMHG | DIASTOLIC BLOOD PRESSURE: 72 MMHG | HEART RATE: 82 BPM

## 2022-06-08 DIAGNOSIS — I48.20 CHRONIC ATRIAL FIBRILLATION (CMD): ICD-10-CM

## 2022-06-08 DIAGNOSIS — I47.20 VT (VENTRICULAR TACHYCARDIA) (CMD): ICD-10-CM

## 2022-06-08 DIAGNOSIS — Z95.810 ICD (IMPLANTABLE CARDIOVERTER-DEFIBRILLATOR) IN PLACE: Primary | ICD-10-CM

## 2022-06-08 DIAGNOSIS — Z95.810 ICD (IMPLANTABLE CARDIOVERTER-DEFIBRILLATOR) IN PLACE: ICD-10-CM

## 2022-06-08 DIAGNOSIS — I48.19 PERSISTENT ATRIAL FIBRILLATION (CMD): ICD-10-CM

## 2022-06-08 LAB
ALBUMIN SERPL-MCNC: 3.1 G/DL (ref 3.6–5.1)
ALBUMIN/GLOB SERPL: 0.9 {RATIO} (ref 1–2.4)
ALP SERPL-CCNC: 93 UNITS/L (ref 45–117)
ALT SERPL-CCNC: 16 UNITS/L
ANION GAP SERPL CALC-SCNC: 10 MMOL/L (ref 7–19)
AST SERPL-CCNC: 17 UNITS/L
BILIRUB SERPL-MCNC: 0.7 MG/DL (ref 0.2–1)
BUN SERPL-MCNC: 15 MG/DL (ref 6–20)
BUN/CREAT SERPL: 12 (ref 7–25)
CALCIUM SERPL-MCNC: 9.1 MG/DL (ref 8.4–10.2)
CHLORIDE SERPL-SCNC: 109 MMOL/L (ref 97–110)
CO2 SERPL-SCNC: 28 MMOL/L (ref 21–32)
CREAT SERPL-MCNC: 1.21 MG/DL (ref 0.51–0.95)
FASTING DURATION TIME PATIENT: ABNORMAL H
GFR SERPLBLD BASED ON 1.73 SQ M-ARVRAT: 45 ML/MIN
GLOBULIN SER-MCNC: 3.5 G/DL (ref 2–4)
GLUCOSE SERPL-MCNC: 119 MG/DL (ref 70–99)
INR BLDC: 1
POTASSIUM SERPL-SCNC: 3.7 MMOL/L (ref 3.4–5.1)
PROT SERPL-MCNC: 6.6 G/DL (ref 6.4–8.2)
SODIUM SERPL-SCNC: 143 MMOL/L (ref 135–145)
TSH SERPL-ACNC: 0.31 MCUNITS/ML (ref 0.35–5)

## 2022-06-08 PROCEDURE — 84443 ASSAY THYROID STIM HORMONE: CPT | Performed by: INTERNAL MEDICINE

## 2022-06-08 PROCEDURE — 85610 PROTHROMBIN TIME: CPT | Performed by: INTERNAL MEDICINE

## 2022-06-08 PROCEDURE — 36415 COLL VENOUS BLD VENIPUNCTURE: CPT | Performed by: INTERNAL MEDICINE

## 2022-06-08 PROCEDURE — 99215 OFFICE O/P EST HI 40 MIN: CPT | Performed by: INTERNAL MEDICINE

## 2022-06-08 PROCEDURE — 80053 COMPREHEN METABOLIC PANEL: CPT | Performed by: INTERNAL MEDICINE

## 2022-06-08 PROCEDURE — 93282 PRGRMG EVAL IMPLANTABLE DFB: CPT | Performed by: INTERNAL MEDICINE

## 2022-06-08 RX ORDER — SERTRALINE HYDROCHLORIDE 25 MG/1
25 TABLET, FILM COATED ORAL DAILY
COMMUNITY
Start: 2022-03-24 | End: 2022-08-30 | Stop reason: DRUGHIGH

## 2022-06-08 RX ORDER — AMIODARONE HYDROCHLORIDE 100 MG/1
100 TABLET ORAL DAILY
Qty: 30 TABLET | Refills: 5 | Status: SHIPPED | OUTPATIENT
Start: 2022-06-08 | End: 2022-06-10 | Stop reason: SDUPTHER

## 2022-06-10 RX ORDER — DILTIAZEM HYDROCHLORIDE 120 MG/1
120 CAPSULE, EXTENDED RELEASE ORAL DAILY
Qty: 90 CAPSULE | Refills: 3 | Status: SHIPPED | OUTPATIENT
Start: 2022-06-10 | End: 2022-07-29

## 2022-06-10 RX ORDER — POTASSIUM CHLORIDE 20 MEQ/1
20 TABLET, EXTENDED RELEASE ORAL DAILY
Qty: 90 TABLET | Refills: 3 | Status: SHIPPED | OUTPATIENT
Start: 2022-06-10 | End: 2022-01-01 | Stop reason: SDUPTHER

## 2022-06-10 RX ORDER — SERTRALINE HYDROCHLORIDE 25 MG/1
25 TABLET, FILM COATED ORAL DAILY
Status: CANCELLED | OUTPATIENT
Start: 2022-06-10

## 2022-06-10 RX ORDER — AMIODARONE HYDROCHLORIDE 200 MG/1
TABLET ORAL
Qty: 90 TABLET | Refills: 3 | Status: CANCELLED | OUTPATIENT
Start: 2022-06-10

## 2022-06-10 RX ORDER — AMIODARONE HYDROCHLORIDE 100 MG/1
100 TABLET ORAL DAILY
Qty: 90 TABLET | Refills: 1 | Status: SHIPPED | OUTPATIENT
Start: 2022-06-10 | End: 2022-06-13 | Stop reason: DRUGHIGH

## 2022-06-10 RX ORDER — METOPROLOL TARTRATE 50 MG/1
50 TABLET, FILM COATED ORAL 2 TIMES DAILY
Qty: 180 TABLET | Refills: 3 | Status: SHIPPED | OUTPATIENT
Start: 2022-06-10 | End: 2022-01-01 | Stop reason: SDUPTHER

## 2022-06-10 RX ORDER — TORSEMIDE 20 MG/1
20 TABLET ORAL 2 TIMES DAILY
Qty: 180 TABLET | Refills: 3 | Status: ON HOLD | OUTPATIENT
Start: 2022-06-10 | End: 2022-07-05 | Stop reason: SDUPTHER

## 2022-06-13 ENCOUNTER — ANTI-COAG (OUTPATIENT)
Dept: CARDIOLOGY | Age: 82
End: 2022-06-13

## 2022-06-13 ENCOUNTER — TELEPHONE (OUTPATIENT)
Dept: CARDIOLOGY | Age: 82
End: 2022-06-13

## 2022-06-13 DIAGNOSIS — Z79.01 LONG TERM (CURRENT) USE OF ANTICOAGULANTS: ICD-10-CM

## 2022-06-13 DIAGNOSIS — I48.0 PAROXYSMAL ATRIAL FIBRILLATION (CMD): ICD-10-CM

## 2022-06-13 DIAGNOSIS — I11.0 HYPERTENSIVE HEART DISEASE WITH CONGESTIVE HEART FAILURE, UNSPECIFIED HEART FAILURE TYPE (CMD): ICD-10-CM

## 2022-06-13 DIAGNOSIS — R00.1 BRADYCARDIA, UNSPECIFIED: ICD-10-CM

## 2022-06-13 DIAGNOSIS — R94.31 ABNORMAL ELECTROCARDIOGRAM (ECG) (EKG): Primary | ICD-10-CM

## 2022-06-13 DIAGNOSIS — R55 SYNCOPE AND COLLAPSE: ICD-10-CM

## 2022-06-13 DIAGNOSIS — I48.11 LONGSTANDING PERSISTENT ATRIAL FIBRILLATION (CMD): ICD-10-CM

## 2022-06-13 DIAGNOSIS — I48.91 ATRIAL FIBRILLATION, UNSPECIFIED TYPE (CMD): Primary | ICD-10-CM

## 2022-06-13 DIAGNOSIS — I20.0 UNSTABLE ANGINA PECTORIS (CMD): ICD-10-CM

## 2022-06-13 DIAGNOSIS — R06.02 SHORTNESS OF BREATH: ICD-10-CM

## 2022-06-13 RX ORDER — AMIODARONE HYDROCHLORIDE 200 MG/1
100 TABLET ORAL DAILY
Qty: 45 TABLET | Refills: 1 | Status: SHIPPED | OUTPATIENT
Start: 2022-06-13 | End: 2022-01-01 | Stop reason: SDUPTHER

## 2022-06-23 ENCOUNTER — APPOINTMENT (OUTPATIENT)
Dept: GENERAL RADIOLOGY | Age: 82
DRG: 314 | End: 2022-06-23
Attending: INTERNAL MEDICINE

## 2022-06-23 ENCOUNTER — HOSPITAL ENCOUNTER (INPATIENT)
Age: 82
LOS: 12 days | Discharge: NURSING, REHAB OR LONG-TERM CARE | DRG: 314 | End: 2022-07-05
Attending: EMERGENCY MEDICINE | Admitting: INTERNAL MEDICINE

## 2022-06-23 ENCOUNTER — APPOINTMENT (OUTPATIENT)
Dept: ULTRASOUND IMAGING | Age: 82
DRG: 314 | End: 2022-06-23
Attending: INTERNAL MEDICINE

## 2022-06-23 ENCOUNTER — APPOINTMENT (OUTPATIENT)
Dept: GENERAL RADIOLOGY | Age: 82
DRG: 314 | End: 2022-06-23
Attending: EMERGENCY MEDICINE

## 2022-06-23 ENCOUNTER — TELEPHONE (OUTPATIENT)
Dept: CARDIOLOGY | Age: 82
End: 2022-06-23

## 2022-06-23 DIAGNOSIS — Z71.89 COUNSELING REGARDING ADVANCE CARE PLANNING AND GOALS OF CARE: ICD-10-CM

## 2022-06-23 DIAGNOSIS — E86.1 HYPOTENSION DUE TO HYPOVOLEMIA: Primary | ICD-10-CM

## 2022-06-23 DIAGNOSIS — I48.20 CHRONIC ATRIAL FIBRILLATION (CMD): ICD-10-CM

## 2022-06-23 DIAGNOSIS — N17.9 AKI (ACUTE KIDNEY INJURY) (CMD): ICD-10-CM

## 2022-06-23 DIAGNOSIS — R79.89 CREATININE ELEVATION: ICD-10-CM

## 2022-06-23 DIAGNOSIS — Z79.01 LONG TERM (CURRENT) USE OF ANTICOAGULANTS: ICD-10-CM

## 2022-06-23 DIAGNOSIS — R19.7 DIARRHEA, UNSPECIFIED TYPE: ICD-10-CM

## 2022-06-23 DIAGNOSIS — Z51.5 PALLIATIVE CARE ENCOUNTER: ICD-10-CM

## 2022-06-23 DIAGNOSIS — I95.89 HYPOTENSION DUE TO HYPOVOLEMIA: Primary | ICD-10-CM

## 2022-06-23 DIAGNOSIS — R63.4 WEIGHT LOSS: ICD-10-CM

## 2022-06-23 DIAGNOSIS — I82.403 DEEP VEIN THROMBOSIS (DVT) OF BOTH LOWER EXTREMITIES, UNSPECIFIED CHRONICITY, UNSPECIFIED VEIN (CMD): ICD-10-CM

## 2022-06-23 LAB
ALBUMIN SERPL-MCNC: 3.1 G/DL (ref 3.6–5.1)
ALBUMIN/GLOB SERPL: 0.9 {RATIO} (ref 1–2.4)
ALP SERPL-CCNC: 91 UNITS/L (ref 45–117)
ALT SERPL-CCNC: 14 UNITS/L
ANION GAP SERPL CALC-SCNC: 14 MMOL/L (ref 7–19)
APTT PPP: 30 SEC (ref 22–30)
AST SERPL-CCNC: 14 UNITS/L
BASOPHILS # BLD: 0 K/MCL (ref 0–0.3)
BASOPHILS NFR BLD: 0 %
BILIRUB SERPL-MCNC: 0.6 MG/DL (ref 0.2–1)
BUN SERPL-MCNC: 33 MG/DL (ref 6–20)
BUN/CREAT SERPL: 13 (ref 7–25)
CALCIUM SERPL-MCNC: 8.4 MG/DL (ref 8.4–10.2)
CHLORIDE SERPL-SCNC: 105 MMOL/L (ref 97–110)
CO2 SERPL-SCNC: 23 MMOL/L (ref 21–32)
CREAT SERPL-MCNC: 2.54 MG/DL (ref 0.51–0.95)
DEPRECATED RDW RBC: 63.9 FL (ref 39–50)
EOSINOPHIL # BLD: 0 K/MCL (ref 0–0.5)
EOSINOPHIL NFR BLD: 0 %
ERYTHROCYTE [DISTWIDTH] IN BLOOD: 16.2 % (ref 11–15)
FASTING DURATION TIME PATIENT: ABNORMAL H
GFR SERPLBLD BASED ON 1.73 SQ M-ARVRAT: 19 ML/MIN
GLOBULIN SER-MCNC: 3.4 G/DL (ref 2–4)
GLUCOSE SERPL-MCNC: 148 MG/DL (ref 70–99)
HCT VFR BLD CALC: 33.2 % (ref 36–46.5)
HGB BLD-MCNC: 10.8 G/DL (ref 12–15.5)
IMM GRANULOCYTES # BLD AUTO: 0.1 K/MCL (ref 0–0.2)
IMM GRANULOCYTES # BLD: 1 %
INR PPP: 1.1
LYMPHOCYTES # BLD: 1.2 K/MCL (ref 1–4)
LYMPHOCYTES NFR BLD: 9 %
MCH RBC QN AUTO: 34.7 PG (ref 26–34)
MCHC RBC AUTO-ENTMCNC: 32.5 G/DL (ref 32–36.5)
MCV RBC AUTO: 106.8 FL (ref 78–100)
MONOCYTES # BLD: 0.9 K/MCL (ref 0.3–0.9)
MONOCYTES NFR BLD: 7 %
NEUTROPHILS # BLD: 10.3 K/MCL (ref 1.8–7.7)
NEUTROPHILS NFR BLD: 83 %
NRBC BLD MANUAL-RTO: 0 /100 WBC
NT-PROBNP SERPL-MCNC: 1969 PG/ML
PLATELET # BLD AUTO: 128 K/MCL (ref 140–450)
POTASSIUM SERPL-SCNC: 4.1 MMOL/L (ref 3.4–5.1)
PROT SERPL-MCNC: 6.5 G/DL (ref 6.4–8.2)
PROTHROMBIN TIME: 12 SEC (ref 9.7–11.8)
RAINBOW EXTRA TUBES HOLD SPECIMEN: NORMAL
RBC # BLD: 3.11 MIL/MCL (ref 4–5.2)
SARS-COV-2 RNA RESP QL NAA+PROBE: NOT DETECTED
SERVICE CMNT-IMP: NORMAL
SERVICE CMNT-IMP: NORMAL
SODIUM SERPL-SCNC: 138 MMOL/L (ref 135–145)
TROPONIN I SERPL DL<=0.01 NG/ML-MCNC: 9 NG/L
WBC # BLD: 12.5 K/MCL (ref 4.2–11)

## 2022-06-23 PROCEDURE — 96360 HYDRATION IV INFUSION INIT: CPT

## 2022-06-23 PROCEDURE — 10002800 HB RX 250 W HCPCS: Performed by: INTERNAL MEDICINE

## 2022-06-23 PROCEDURE — 93970 EXTREMITY STUDY: CPT

## 2022-06-23 PROCEDURE — 13003289 HB OXYGEN THERAPY DAILY

## 2022-06-23 PROCEDURE — 10002803 HB RX 637: Performed by: INTERNAL MEDICINE

## 2022-06-23 PROCEDURE — 84484 ASSAY OF TROPONIN QUANT: CPT | Performed by: EMERGENCY MEDICINE

## 2022-06-23 PROCEDURE — 93010 ELECTROCARDIOGRAM REPORT: CPT | Performed by: INTERNAL MEDICINE

## 2022-06-23 PROCEDURE — 93005 ELECTROCARDIOGRAM TRACING: CPT | Performed by: EMERGENCY MEDICINE

## 2022-06-23 PROCEDURE — 76700 US EXAM ABDOM COMPLETE: CPT

## 2022-06-23 PROCEDURE — 87635 SARS-COV-2 COVID-19 AMP PRB: CPT | Performed by: EMERGENCY MEDICINE

## 2022-06-23 PROCEDURE — 99223 1ST HOSP IP/OBS HIGH 75: CPT | Performed by: INTERNAL MEDICINE

## 2022-06-23 PROCEDURE — 85025 COMPLETE CBC W/AUTO DIFF WBC: CPT | Performed by: EMERGENCY MEDICINE

## 2022-06-23 PROCEDURE — 85610 PROTHROMBIN TIME: CPT | Performed by: EMERGENCY MEDICINE

## 2022-06-23 PROCEDURE — G0378 HOSPITAL OBSERVATION PER HR: HCPCS

## 2022-06-23 PROCEDURE — 10006031 HB ROOM CHARGE TELEMETRY

## 2022-06-23 PROCEDURE — 71046 X-RAY EXAM CHEST 2 VIEWS: CPT

## 2022-06-23 PROCEDURE — 10004651 HB RX, NO CHARGE ITEM: Performed by: INTERNAL MEDICINE

## 2022-06-23 PROCEDURE — 71045 X-RAY EXAM CHEST 1 VIEW: CPT

## 2022-06-23 PROCEDURE — 85730 THROMBOPLASTIN TIME PARTIAL: CPT | Performed by: EMERGENCY MEDICINE

## 2022-06-23 PROCEDURE — 83880 ASSAY OF NATRIURETIC PEPTIDE: CPT | Performed by: EMERGENCY MEDICINE

## 2022-06-23 PROCEDURE — 99285 EMERGENCY DEPT VISIT HI MDM: CPT

## 2022-06-23 PROCEDURE — 80053 COMPREHEN METABOLIC PANEL: CPT | Performed by: EMERGENCY MEDICINE

## 2022-06-23 PROCEDURE — 10002807 HB RX 258: Performed by: EMERGENCY MEDICINE

## 2022-06-23 RX ORDER — URSODIOL 300 MG/1
300 CAPSULE ORAL 2 TIMES DAILY
Status: ON HOLD | COMMUNITY
End: 2022-07-24

## 2022-06-23 RX ORDER — ALBUTEROL SULFATE 90 UG/1
2 AEROSOL, METERED RESPIRATORY (INHALATION) EVERY 4 HOURS PRN
COMMUNITY

## 2022-06-23 RX ORDER — HEPARIN SODIUM 1000 [USP'U]/ML
80 INJECTION, SOLUTION INTRAVENOUS; SUBCUTANEOUS PRN
Status: DISCONTINUED | OUTPATIENT
Start: 2022-06-23 | End: 2022-07-05

## 2022-06-23 RX ORDER — DILTIAZEM HYDROCHLORIDE 120 MG/1
120 CAPSULE, EXTENDED RELEASE ORAL DAILY
Status: DISCONTINUED | OUTPATIENT
Start: 2022-06-24 | End: 2022-07-05 | Stop reason: HOSPADM

## 2022-06-23 RX ORDER — ENOXAPARIN SODIUM 100 MG/ML
40 INJECTION SUBCUTANEOUS EVERY 24 HOURS
Status: DISCONTINUED | OUTPATIENT
Start: 2022-06-23 | End: 2022-06-23

## 2022-06-23 RX ORDER — ACETAMINOPHEN 325 MG/1
650 TABLET ORAL EVERY 4 HOURS PRN
Status: DISCONTINUED | OUTPATIENT
Start: 2022-06-23 | End: 2022-07-05 | Stop reason: HOSPADM

## 2022-06-23 RX ORDER — HEPARIN SODIUM 10000 [USP'U]/100ML
1-40 INJECTION, SOLUTION INTRAVENOUS CONTINUOUS
Status: DISCONTINUED | OUTPATIENT
Start: 2022-06-23 | End: 2022-07-05

## 2022-06-23 RX ORDER — METOPROLOL TARTRATE 50 MG/1
50 TABLET, FILM COATED ORAL 2 TIMES DAILY
Status: DISCONTINUED | OUTPATIENT
Start: 2022-06-23 | End: 2022-07-05 | Stop reason: HOSPADM

## 2022-06-23 RX ORDER — LOSARTAN POTASSIUM 25 MG/1
25 TABLET ORAL DAILY
Status: DISCONTINUED | OUTPATIENT
Start: 2022-06-24 | End: 2022-06-24

## 2022-06-23 RX ORDER — AMIODARONE HYDROCHLORIDE 200 MG/1
100 TABLET ORAL DAILY
Status: DISCONTINUED | OUTPATIENT
Start: 2022-06-23 | End: 2022-07-05 | Stop reason: HOSPADM

## 2022-06-23 RX ORDER — SERTRALINE HYDROCHLORIDE 25 MG/1
25 TABLET, FILM COATED ORAL DAILY
Status: DISCONTINUED | OUTPATIENT
Start: 2022-06-24 | End: 2022-07-05 | Stop reason: HOSPADM

## 2022-06-23 RX ORDER — HEPARIN SODIUM 1000 [USP'U]/ML
40 INJECTION, SOLUTION INTRAVENOUS; SUBCUTANEOUS PRN
Status: DISCONTINUED | OUTPATIENT
Start: 2022-06-23 | End: 2022-07-05

## 2022-06-23 RX ORDER — 0.9 % SODIUM CHLORIDE 0.9 %
2 VIAL (ML) INJECTION EVERY 12 HOURS SCHEDULED
Status: DISCONTINUED | OUTPATIENT
Start: 2022-06-23 | End: 2022-07-05 | Stop reason: HOSPADM

## 2022-06-23 RX ORDER — HEPARIN SODIUM 1000 [USP'U]/ML
80 INJECTION, SOLUTION INTRAVENOUS; SUBCUTANEOUS ONCE
Status: COMPLETED | OUTPATIENT
Start: 2022-06-23 | End: 2022-06-23

## 2022-06-23 RX ADMIN — SODIUM CHLORIDE, PRESERVATIVE FREE 2 ML: 5 INJECTION INTRAVENOUS at 21:00

## 2022-06-23 RX ADMIN — AMIODARONE HYDROCHLORIDE 100 MG: 200 TABLET ORAL at 21:51

## 2022-06-23 RX ADMIN — HEPARIN SODIUM 18 UNITS/KG/HR: 1000 INJECTION, SOLUTION INTRAVENOUS; SUBCUTANEOUS at 21:55

## 2022-06-23 RX ADMIN — HEPARIN SODIUM 5100 UNITS: 1000 INJECTION, SOLUTION INTRAVENOUS; SUBCUTANEOUS at 21:49

## 2022-06-23 RX ADMIN — METOPROLOL TARTRATE 50 MG: 50 TABLET, FILM COATED ORAL at 21:52

## 2022-06-23 RX ADMIN — SODIUM CHLORIDE 1000 ML: 9 INJECTION, SOLUTION INTRAVENOUS at 13:46

## 2022-06-23 RX ADMIN — ACETAMINOPHEN 650 MG: 325 TABLET ORAL at 22:07

## 2022-06-23 ASSESSMENT — PATIENT HEALTH QUESTIONNAIRE - PHQ9
IS PATIENT ABLE TO COMPLETE PHQ2 OR PHQ9: YES
SUM OF ALL RESPONSES TO PHQ9 QUESTIONS 1 AND 2: 0
1. LITTLE INTEREST OR PLEASURE IN DOING THINGS: NOT AT ALL
2. FEELING DOWN, DEPRESSED OR HOPELESS: NOT AT ALL
CLINICAL INTERPRETATION OF PHQ2 SCORE: NO FURTHER SCREENING NEEDED
SUM OF ALL RESPONSES TO PHQ9 QUESTIONS 1 AND 2: 0

## 2022-06-23 ASSESSMENT — LIFESTYLE VARIABLES
HOW OFTEN DO YOU HAVE A DRINK CONTAINING ALCOHOL: NEVER
ALCOHOL_USE_STATUS: NO OR LOW RISK WITH VALIDATED TOOL
HOW OFTEN DO YOU HAVE 6 OR MORE DRINKS ON ONE OCCASION: NEVER
AUDIT-C TOTAL SCORE: 0
HOW MANY STANDARD DRINKS CONTAINING ALCOHOL DO YOU HAVE ON A TYPICAL DAY: 0,1 OR 2

## 2022-06-23 ASSESSMENT — ENCOUNTER SYMPTOMS
SORE THROAT: 0
APPETITE CHANGE: 1
RHINORRHEA: 0
WEAKNESS: 1
FEVER: 0
AGITATION: 0
LIGHT-HEADEDNESS: 1
NAUSEA: 0
CHILLS: 0
DIZZINESS: 1
UNEXPECTED WEIGHT CHANGE: 1
BLOOD IN STOOL: 0
COUGH: 0
VOMITING: 0
ACTIVITY CHANGE: 1
ABDOMINAL PAIN: 0
DIARRHEA: 1
HEADACHES: 0
SHORTNESS OF BREATH: 1

## 2022-06-23 ASSESSMENT — COGNITIVE AND FUNCTIONAL STATUS - GENERAL
BECAUSE OF A PHYSICAL, MENTAL, OR EMOTIONAL CONDITION, DO YOU HAVE SERIOUS DIFFICULTY CONCENTRATING, REMEMBERING OR MAKING DECISIONS: NO
BECAUSE OF A PHYSICAL, MENTAL, OR EMOTIONAL CONDITION, DO YOU HAVE DIFFICULTY DOING ERRANDS ALONE: YES
DO YOU HAVE SERIOUS DIFFICULTY WALKING OR CLIMBING STAIRS: YES
ARE YOU BLIND OR DO YOU HAVE SERIOUS DIFFICULTY SEEING, EVEN WHEN WEARING GLASSES: NO
DO YOU HAVE DIFFICULTY DRESSING OR BATHING: NO
ARE YOU DEAF OR DO YOU HAVE SERIOUS DIFFICULTY  HEARING: NO

## 2022-06-23 ASSESSMENT — ACTIVITIES OF DAILY LIVING (ADL)
MOBILITY_ASSIST_DEVICES: STANDARD WALKER
FEEDING YOURSELF: INDEPENDENT
DRESSING YOURSELF: INDEPENDENT
BATHING: INDEPENDENT
ADL_SHORT_OF_BREATH: YES
TOILETING: INDEPENDENT
TRANSFERRING: INDEPENDENT
CHRONIC_PAIN_PRESENT: NO
CONTINENCE: INDEPENDENT
RECENT_DECLINE_ADL: YES, DECLINE IN BATHING/DRESSING/FEEDING, COLLABORATE WITH PROVIDER (T)
ADL_SCORE: 24
ADL_BEFORE_ADMISSION: INDEPENDENT

## 2022-06-23 ASSESSMENT — PAIN SCALES - GENERAL: PAINLEVEL_OUTOF10: 5

## 2022-06-24 ENCOUNTER — APPOINTMENT (OUTPATIENT)
Dept: CARDIOLOGY | Age: 82
DRG: 314 | End: 2022-06-24
Attending: INTERNAL MEDICINE

## 2022-06-24 LAB
ALBUMIN SERPL-MCNC: 2.9 G/DL (ref 3.6–5.1)
ALBUMIN/GLOB SERPL: 1 {RATIO} (ref 1–2.4)
ALP SERPL-CCNC: 83 UNITS/L (ref 45–117)
ALT SERPL-CCNC: 12 UNITS/L
ANION GAP SERPL CALC-SCNC: 11 MMOL/L (ref 7–19)
AORTIC VALVE AREA: NORMAL
APTT PPP: 141 SEC (ref 22–30)
APTT PPP: 58 SEC (ref 22–30)
APTT PPP: 59 SEC (ref 22–30)
APTT PPP: 61 SEC (ref 22–30)
ASCENDING AORTA (AAD): 3.6
AST SERPL-CCNC: 10 UNITS/L
ATRIAL RATE (BPM): 94
AV MEAN GRADIENT (AVMG): NORMAL
AV MEAN VELOCITY (AVMV): NORMAL
AV PEAK GRADIENT (AVPG): NORMAL
AV PEAK VELOCITY (AVPV): NORMAL
AV STENOSIS SEVERITY TEXT: NORMAL
BASOPHILS # BLD: 0 K/MCL (ref 0–0.3)
BASOPHILS NFR BLD: 0 %
BILIRUB SERPL-MCNC: 0.7 MG/DL (ref 0.2–1)
BUN SERPL-MCNC: 35 MG/DL (ref 6–20)
BUN/CREAT SERPL: 13 (ref 7–25)
CALCIUM SERPL-MCNC: 8.5 MG/DL (ref 8.4–10.2)
CHLORIDE SERPL-SCNC: 107 MMOL/L (ref 97–110)
CO2 SERPL-SCNC: 23 MMOL/L (ref 21–32)
CREAT SERPL-MCNC: 2.6 MG/DL (ref 0.51–0.95)
DEPRECATED RDW RBC: 63.7 FL (ref 39–50)
DOP CALC LVOT PEAK VEL (LVOTPV): 0.47
E WAVE DECELARATION TIME (MDT): 341
EOSINOPHIL # BLD: 0.1 K/MCL (ref 0–0.5)
EOSINOPHIL NFR BLD: 1 %
ERYTHROCYTE [DISTWIDTH] IN BLOOD: 16.1 % (ref 11–15)
EST RIGHT VENT SYSTOLIC PRESSURE BY TRICUSPID REGURGITATION JET (RVSP): 30
FASTING DURATION TIME PATIENT: ABNORMAL H
GFR SERPLBLD BASED ON 1.73 SQ M-ARVRAT: 18 ML/MIN
GLOBULIN SER-MCNC: 3 G/DL (ref 2–4)
GLUCOSE BLDC GLUCOMTR-MCNC: 128 MG/DL (ref 70–99)
GLUCOSE SERPL-MCNC: 107 MG/DL (ref 70–99)
HCT VFR BLD CALC: 30.2 % (ref 36–46.5)
HGB BLD-MCNC: 9.8 G/DL (ref 12–15.5)
IMM GRANULOCYTES # BLD AUTO: 0.1 K/MCL (ref 0–0.2)
IMM GRANULOCYTES # BLD: 1 %
INR PPP: 1.2
LEFT INTERNAL DIMENSION IN SYSTOLE (LVSD): 4.9
LEFT VENTRICULAR INTERNAL DIMENSION IN DIASTOLE (LVDD): 6
LV EF: NORMAL %
LVOT VTI (LVOTVTI): 10.3
LYMPHOCYTES # BLD: 1.3 K/MCL (ref 1–4)
LYMPHOCYTES NFR BLD: 14 %
MAGNESIUM SERPL-MCNC: 1.9 MG/DL (ref 1.7–2.4)
MCH RBC QN AUTO: 34.3 PG (ref 26–34)
MCHC RBC AUTO-ENTMCNC: 32.5 G/DL (ref 32–36.5)
MCV RBC AUTO: 105.6 FL (ref 78–100)
MONOCYTES # BLD: 0.8 K/MCL (ref 0.3–0.9)
MONOCYTES NFR BLD: 8 %
MV E TISSUE VEL LAT (MELV): 9
MV E TISSUE VEL MED (MESV): 5.63
MV E WAVE VEL/E TISSUE VEL MED(MSR): 23.8
NEUTROPHILS # BLD: 7.6 K/MCL (ref 1.8–7.7)
NEUTROPHILS NFR BLD: 76 %
NRBC BLD MANUAL-RTO: 0 /100 WBC
PLATELET # BLD AUTO: 104 K/MCL (ref 140–450)
POTASSIUM SERPL-SCNC: 4.2 MMOL/L (ref 3.4–5.1)
PROT SERPL-MCNC: 5.9 G/DL (ref 6.4–8.2)
PROTHROMBIN TIME: 12.8 SEC (ref 9.7–11.8)
QRS-INTERVAL (MSEC): 180
QT-INTERVAL (MSEC): 468
QTC: 533
R AXIS (DEGREES): -42
RBC # BLD: 2.86 MIL/MCL (ref 4–5.2)
REPORT TEXT: NORMAL
SODIUM SERPL-SCNC: 137 MMOL/L (ref 135–145)
T AXIS (DEGREES): 138
TRICUSPID ANNULAR PLANE SYSTOLIC EXCURSION (TAPSE): 1.57
VENTRICULAR RATE EKG/MIN (BPM): 78
WBC # BLD: 9.9 K/MCL (ref 4.2–11)

## 2022-06-24 PROCEDURE — 36415 COLL VENOUS BLD VENIPUNCTURE: CPT | Performed by: INTERNAL MEDICINE

## 2022-06-24 PROCEDURE — 93306 TTE W/DOPPLER COMPLETE: CPT | Performed by: INTERNAL MEDICINE

## 2022-06-24 PROCEDURE — 85610 PROTHROMBIN TIME: CPT | Performed by: INTERNAL MEDICINE

## 2022-06-24 PROCEDURE — 85730 THROMBOPLASTIN TIME PARTIAL: CPT | Performed by: INTERNAL MEDICINE

## 2022-06-24 PROCEDURE — 10002803 HB RX 637: Performed by: INTERNAL MEDICINE

## 2022-06-24 PROCEDURE — 99233 SBSQ HOSP IP/OBS HIGH 50: CPT | Performed by: INTERNAL MEDICINE

## 2022-06-24 PROCEDURE — 99223 1ST HOSP IP/OBS HIGH 75: CPT | Performed by: INTERNAL MEDICINE

## 2022-06-24 PROCEDURE — 76376 3D RENDER W/INTRP POSTPROCES: CPT | Performed by: INTERNAL MEDICINE

## 2022-06-24 PROCEDURE — 85025 COMPLETE CBC W/AUTO DIFF WBC: CPT | Performed by: INTERNAL MEDICINE

## 2022-06-24 PROCEDURE — 10006031 HB ROOM CHARGE TELEMETRY

## 2022-06-24 PROCEDURE — 10002800 HB RX 250 W HCPCS: Performed by: INTERNAL MEDICINE

## 2022-06-24 PROCEDURE — 10004651 HB RX, NO CHARGE ITEM: Performed by: INTERNAL MEDICINE

## 2022-06-24 PROCEDURE — 80053 COMPREHEN METABOLIC PANEL: CPT | Performed by: INTERNAL MEDICINE

## 2022-06-24 PROCEDURE — 85730 THROMBOPLASTIN TIME PARTIAL: CPT | Performed by: HOSPITALIST

## 2022-06-24 PROCEDURE — 10002807 HB RX 258: Performed by: INTERNAL MEDICINE

## 2022-06-24 PROCEDURE — 93306 TTE W/DOPPLER COMPLETE: CPT

## 2022-06-24 PROCEDURE — 83735 ASSAY OF MAGNESIUM: CPT | Performed by: INTERNAL MEDICINE

## 2022-06-24 RX ORDER — SODIUM CHLORIDE 9 MG/ML
INJECTION, SOLUTION INTRAVENOUS CONTINUOUS
Status: DISCONTINUED | OUTPATIENT
Start: 2022-06-24 | End: 2022-06-25

## 2022-06-24 RX ADMIN — ACETAMINOPHEN 650 MG: 325 TABLET ORAL at 09:09

## 2022-06-24 RX ADMIN — AMIODARONE HYDROCHLORIDE 100 MG: 200 TABLET ORAL at 21:33

## 2022-06-24 RX ADMIN — ACETAMINOPHEN 650 MG: 325 TABLET ORAL at 21:33

## 2022-06-24 RX ADMIN — HEPARIN SODIUM 15 UNITS/KG/HR: 1000 INJECTION, SOLUTION INTRAVENOUS; SUBCUTANEOUS at 23:55

## 2022-06-24 RX ADMIN — SODIUM CHLORIDE, PRESERVATIVE FREE 2 ML: 5 INJECTION INTRAVENOUS at 09:09

## 2022-06-24 RX ADMIN — SODIUM CHLORIDE: 9 INJECTION, SOLUTION INTRAVENOUS at 17:42

## 2022-06-24 RX ADMIN — LOSARTAN POTASSIUM 25 MG: 25 TABLET, FILM COATED ORAL at 09:09

## 2022-06-24 RX ADMIN — METOPROLOL TARTRATE 50 MG: 50 TABLET, FILM COATED ORAL at 09:08

## 2022-06-24 RX ADMIN — DILTIAZEM HYDROCHLORIDE 120 MG: 120 CAPSULE, EXTENDED RELEASE ORAL at 09:09

## 2022-06-24 RX ADMIN — SERTRALINE HYDROCHLORIDE 25 MG: 25 TABLET, FILM COATED ORAL at 09:08

## 2022-06-24 ASSESSMENT — ENCOUNTER SYMPTOMS
SHORTNESS OF BREATH: 1
FATIGUE: 1

## 2022-06-24 ASSESSMENT — PAIN SCALES - GENERAL
PAINLEVEL_OUTOF10: 1
PAINLEVEL_OUTOF10: 3
PAINLEVEL_OUTOF10: 1
PAINLEVEL_OUTOF10: 4

## 2022-06-25 LAB
ANION GAP SERPL CALC-SCNC: 13 MMOL/L (ref 7–19)
APPEARANCE UR: ABNORMAL
APTT PPP: 49 SEC (ref 22–30)
BACTERIA #/AREA URNS HPF: ABNORMAL /HPF
BILIRUB UR QL STRIP: NEGATIVE
BUN SERPL-MCNC: 41 MG/DL (ref 6–20)
BUN/CREAT SERPL: 16 (ref 7–25)
CALCIUM SERPL-MCNC: 8.3 MG/DL (ref 8.4–10.2)
CHLORIDE SERPL-SCNC: 104 MMOL/L (ref 97–110)
CO2 SERPL-SCNC: 23 MMOL/L (ref 21–32)
COLOR UR: YELLOW
CREAT SERPL-MCNC: 2.58 MG/DL (ref 0.51–0.95)
CREAT UR-MCNC: 78.7 MG/DL
DEPRECATED RDW RBC: 61.7 FL (ref 39–50)
ERYTHROCYTE [DISTWIDTH] IN BLOOD: 15.8 % (ref 11–15)
FASTING DURATION TIME PATIENT: ABNORMAL H
GFR SERPLBLD BASED ON 1.73 SQ M-ARVRAT: 18 ML/MIN
GLUCOSE BLDC GLUCOMTR-MCNC: 105 MG/DL (ref 70–99)
GLUCOSE BLDC GLUCOMTR-MCNC: 139 MG/DL (ref 70–99)
GLUCOSE SERPL-MCNC: 96 MG/DL (ref 70–99)
GLUCOSE UR STRIP-MCNC: NEGATIVE MG/DL
HCT VFR BLD CALC: 28.4 % (ref 36–46.5)
HGB BLD-MCNC: 9.4 G/DL (ref 12–15.5)
HGB UR QL STRIP: NEGATIVE
HYALINE CASTS #/AREA URNS LPF: ABNORMAL /LPF
KETONES UR STRIP-MCNC: NEGATIVE MG/DL
LEUKOCYTE ESTERASE UR QL STRIP: ABNORMAL
MCH RBC QN AUTO: 34.9 PG (ref 26–34)
MCHC RBC AUTO-ENTMCNC: 33.1 G/DL (ref 32–36.5)
MCV RBC AUTO: 105.6 FL (ref 78–100)
NITRITE UR QL STRIP: NEGATIVE
NRBC BLD MANUAL-RTO: 0 /100 WBC
PH UR STRIP: 5 [PH] (ref 5–7)
PLATELET # BLD AUTO: 109 K/MCL (ref 140–450)
POTASSIUM SERPL-SCNC: 4.5 MMOL/L (ref 3.4–5.1)
PROT UR STRIP-MCNC: NEGATIVE MG/DL
RBC # BLD: 2.69 MIL/MCL (ref 4–5.2)
RBC #/AREA URNS HPF: ABNORMAL /HPF
SODIUM SERPL-SCNC: 135 MMOL/L (ref 135–145)
SODIUM UR-SCNC: 9 MMOL/L
SP GR UR STRIP: 1.01 (ref 1–1.03)
SQUAMOUS #/AREA URNS HPF: ABNORMAL /HPF
TRANS CELLS #/AREA URNS HPF: ABNORMAL /HPF
UROBILINOGEN UR STRIP-MCNC: 0.2 MG/DL
WBC # BLD: 8.4 K/MCL (ref 4.2–11)
WBC #/AREA URNS HPF: ABNORMAL /HPF

## 2022-06-25 PROCEDURE — 10004651 HB RX, NO CHARGE ITEM: Performed by: INTERNAL MEDICINE

## 2022-06-25 PROCEDURE — 84300 ASSAY OF URINE SODIUM: CPT | Performed by: INTERNAL MEDICINE

## 2022-06-25 PROCEDURE — 80048 BASIC METABOLIC PNL TOTAL CA: CPT | Performed by: INTERNAL MEDICINE

## 2022-06-25 PROCEDURE — 36415 COLL VENOUS BLD VENIPUNCTURE: CPT | Performed by: INTERNAL MEDICINE

## 2022-06-25 PROCEDURE — 85730 THROMBOPLASTIN TIME PARTIAL: CPT | Performed by: INTERNAL MEDICINE

## 2022-06-25 PROCEDURE — 87086 URINE CULTURE/COLONY COUNT: CPT | Performed by: EMERGENCY MEDICINE

## 2022-06-25 PROCEDURE — 81001 URINALYSIS AUTO W/SCOPE: CPT | Performed by: EMERGENCY MEDICINE

## 2022-06-25 PROCEDURE — 99232 SBSQ HOSP IP/OBS MODERATE 35: CPT | Performed by: INTERNAL MEDICINE

## 2022-06-25 PROCEDURE — 10006031 HB ROOM CHARGE TELEMETRY

## 2022-06-25 PROCEDURE — 99233 SBSQ HOSP IP/OBS HIGH 50: CPT | Performed by: INTERNAL MEDICINE

## 2022-06-25 PROCEDURE — 10002803 HB RX 637: Performed by: INTERNAL MEDICINE

## 2022-06-25 PROCEDURE — 85027 COMPLETE CBC AUTOMATED: CPT | Performed by: INTERNAL MEDICINE

## 2022-06-25 PROCEDURE — 82570 ASSAY OF URINE CREATININE: CPT | Performed by: INTERNAL MEDICINE

## 2022-06-25 RX ORDER — CYCLOBENZAPRINE HCL 5 MG
5 TABLET ORAL 3 TIMES DAILY PRN
Status: DISCONTINUED | OUTPATIENT
Start: 2022-06-25 | End: 2022-07-05 | Stop reason: HOSPADM

## 2022-06-25 RX ADMIN — SODIUM CHLORIDE, PRESERVATIVE FREE 2 ML: 5 INJECTION INTRAVENOUS at 21:01

## 2022-06-25 RX ADMIN — ACETAMINOPHEN 650 MG: 325 TABLET ORAL at 17:05

## 2022-06-25 RX ADMIN — ACETAMINOPHEN 650 MG: 325 TABLET ORAL at 09:24

## 2022-06-25 RX ADMIN — SERTRALINE HYDROCHLORIDE 25 MG: 25 TABLET, FILM COATED ORAL at 09:22

## 2022-06-25 RX ADMIN — CYCLOBENZAPRINE HYDROCHLORIDE 5 MG: 5 TABLET, FILM COATED ORAL at 21:01

## 2022-06-25 RX ADMIN — DILTIAZEM HYDROCHLORIDE 120 MG: 120 CAPSULE, EXTENDED RELEASE ORAL at 09:22

## 2022-06-25 RX ADMIN — AMIODARONE HYDROCHLORIDE 100 MG: 200 TABLET ORAL at 21:01

## 2022-06-25 RX ADMIN — ACETAMINOPHEN 650 MG: 325 TABLET ORAL at 23:05

## 2022-06-25 RX ADMIN — ACETAMINOPHEN 650 MG: 325 TABLET ORAL at 05:45

## 2022-06-25 ASSESSMENT — ENCOUNTER SYMPTOMS
APPETITE CHANGE: 1
WEAKNESS: 1
ACTIVITY CHANGE: 1
FATIGUE: 1

## 2022-06-25 ASSESSMENT — PAIN SCALES - GENERAL
PAINLEVEL_OUTOF10: 2
PAINLEVEL_OUTOF10: 0
PAINLEVEL_OUTOF10: 9
PAINLEVEL_OUTOF10: 8
PAINLEVEL_OUTOF10: 1

## 2022-06-26 LAB
ANION GAP SERPL CALC-SCNC: 13 MMOL/L (ref 7–19)
APTT PPP: 50 SEC (ref 22–30)
BUN SERPL-MCNC: 47 MG/DL (ref 6–20)
BUN/CREAT SERPL: 22 (ref 7–25)
CALCIUM SERPL-MCNC: 8.7 MG/DL (ref 8.4–10.2)
CHLORIDE SERPL-SCNC: 99 MMOL/L (ref 97–110)
CO2 SERPL-SCNC: 23 MMOL/L (ref 21–32)
CREAT SERPL-MCNC: 2.1 MG/DL (ref 0.51–0.95)
DEPRECATED RDW RBC: 60.1 FL (ref 39–50)
ERYTHROCYTE [DISTWIDTH] IN BLOOD: 15.7 % (ref 11–15)
FASTING DURATION TIME PATIENT: ABNORMAL H
GFR SERPLBLD BASED ON 1.73 SQ M-ARVRAT: 23 ML/MIN
GLUCOSE BLDC GLUCOMTR-MCNC: 113 MG/DL (ref 70–99)
GLUCOSE BLDC GLUCOMTR-MCNC: 95 MG/DL (ref 70–99)
GLUCOSE BLDC GLUCOMTR-MCNC: 98 MG/DL (ref 70–99)
GLUCOSE SERPL-MCNC: 113 MG/DL (ref 70–99)
HCT VFR BLD CALC: 28.5 % (ref 36–46.5)
HGB BLD-MCNC: 9.5 G/DL (ref 12–15.5)
MCH RBC QN AUTO: 34.5 PG (ref 26–34)
MCHC RBC AUTO-ENTMCNC: 33.3 G/DL (ref 32–36.5)
MCV RBC AUTO: 103.6 FL (ref 78–100)
NRBC BLD MANUAL-RTO: 0 /100 WBC
PLATELET # BLD AUTO: 135 K/MCL (ref 140–450)
POTASSIUM SERPL-SCNC: 4.7 MMOL/L (ref 3.4–5.1)
RBC # BLD: 2.75 MIL/MCL (ref 4–5.2)
SODIUM SERPL-SCNC: 130 MMOL/L (ref 135–145)
WBC # BLD: 9.1 K/MCL (ref 4.2–11)

## 2022-06-26 PROCEDURE — 10002803 HB RX 637: Performed by: INTERNAL MEDICINE

## 2022-06-26 PROCEDURE — 80048 BASIC METABOLIC PNL TOTAL CA: CPT | Performed by: INTERNAL MEDICINE

## 2022-06-26 PROCEDURE — 36415 COLL VENOUS BLD VENIPUNCTURE: CPT | Performed by: INTERNAL MEDICINE

## 2022-06-26 PROCEDURE — 13003289 HB OXYGEN THERAPY DAILY

## 2022-06-26 PROCEDURE — 10006031 HB ROOM CHARGE TELEMETRY

## 2022-06-26 PROCEDURE — 85730 THROMBOPLASTIN TIME PARTIAL: CPT | Performed by: INTERNAL MEDICINE

## 2022-06-26 PROCEDURE — 10004651 HB RX, NO CHARGE ITEM: Performed by: INTERNAL MEDICINE

## 2022-06-26 PROCEDURE — 85027 COMPLETE CBC AUTOMATED: CPT | Performed by: INTERNAL MEDICINE

## 2022-06-26 PROCEDURE — 99232 SBSQ HOSP IP/OBS MODERATE 35: CPT | Performed by: INTERNAL MEDICINE

## 2022-06-26 PROCEDURE — 99233 SBSQ HOSP IP/OBS HIGH 50: CPT | Performed by: INTERNAL MEDICINE

## 2022-06-26 RX ORDER — WARFARIN SODIUM 2.5 MG/1
2.5 TABLET ORAL ONCE
Status: COMPLETED | OUTPATIENT
Start: 2022-06-26 | End: 2022-06-26

## 2022-06-26 RX ADMIN — AMIODARONE HYDROCHLORIDE 100 MG: 200 TABLET ORAL at 20:21

## 2022-06-26 RX ADMIN — DILTIAZEM HYDROCHLORIDE 120 MG: 120 CAPSULE, EXTENDED RELEASE ORAL at 09:16

## 2022-06-26 RX ADMIN — CYCLOBENZAPRINE HYDROCHLORIDE 5 MG: 5 TABLET, FILM COATED ORAL at 14:03

## 2022-06-26 RX ADMIN — WARFARIN SODIUM 2.5 MG: 2.5 TABLET ORAL at 17:22

## 2022-06-26 RX ADMIN — SODIUM CHLORIDE, PRESERVATIVE FREE 2 ML: 5 INJECTION INTRAVENOUS at 09:16

## 2022-06-26 RX ADMIN — SERTRALINE HYDROCHLORIDE 25 MG: 25 TABLET, FILM COATED ORAL at 09:15

## 2022-06-26 RX ADMIN — ACETAMINOPHEN 650 MG: 325 TABLET ORAL at 05:53

## 2022-06-26 RX ADMIN — SODIUM CHLORIDE, PRESERVATIVE FREE 2 ML: 5 INJECTION INTRAVENOUS at 20:22

## 2022-06-26 RX ADMIN — ACETAMINOPHEN 650 MG: 325 TABLET ORAL at 14:03

## 2022-06-26 ASSESSMENT — PAIN SCALES - GENERAL
PAINLEVEL_OUTOF10: 8
PAINLEVEL_OUTOF10: 4
PAINLEVEL_OUTOF10: 0
PAINLEVEL_OUTOF10: 8

## 2022-06-26 ASSESSMENT — ENCOUNTER SYMPTOMS
WEAKNESS: 1
FATIGUE: 1
ACTIVITY CHANGE: 1
APPETITE CHANGE: 1

## 2022-06-27 LAB
ANION GAP SERPL CALC-SCNC: 10 MMOL/L (ref 7–19)
APTT PPP: 41 SEC (ref 22–30)
APTT PPP: 43 SEC (ref 22–30)
APTT PPP: 47 SEC (ref 22–30)
BUN SERPL-MCNC: 49 MG/DL (ref 6–20)
BUN/CREAT SERPL: 24 (ref 7–25)
CALCIUM SERPL-MCNC: 9.1 MG/DL (ref 8.4–10.2)
CHLORIDE SERPL-SCNC: 98 MMOL/L (ref 97–110)
CO2 SERPL-SCNC: 25 MMOL/L (ref 21–32)
CREAT SERPL-MCNC: 2.01 MG/DL (ref 0.51–0.95)
DEPRECATED RDW RBC: 59.9 FL (ref 39–50)
ERYTHROCYTE [DISTWIDTH] IN BLOOD: 15.5 % (ref 11–15)
FASTING DURATION TIME PATIENT: ABNORMAL H
GFR SERPLBLD BASED ON 1.73 SQ M-ARVRAT: 25 ML/MIN
GLUCOSE SERPL-MCNC: 116 MG/DL (ref 70–99)
HCT VFR BLD CALC: 29.6 % (ref 36–46.5)
HGB BLD-MCNC: 9.7 G/DL (ref 12–15.5)
INR PPP: 0.9
MCH RBC QN AUTO: 34.5 PG (ref 26–34)
MCHC RBC AUTO-ENTMCNC: 32.8 G/DL (ref 32–36.5)
MCV RBC AUTO: 105.3 FL (ref 78–100)
NRBC BLD MANUAL-RTO: 0 /100 WBC
PLATELET # BLD AUTO: 173 K/MCL (ref 140–450)
POTASSIUM SERPL-SCNC: 5.1 MMOL/L (ref 3.4–5.1)
PROTHROMBIN TIME: 10 SEC (ref 9.7–11.8)
RBC # BLD: 2.81 MIL/MCL (ref 4–5.2)
SODIUM SERPL-SCNC: 128 MMOL/L (ref 135–145)
WBC # BLD: 10.1 K/MCL (ref 4.2–11)

## 2022-06-27 PROCEDURE — 85027 COMPLETE CBC AUTOMATED: CPT | Performed by: INTERNAL MEDICINE

## 2022-06-27 PROCEDURE — 10002803 HB RX 637: Performed by: INTERNAL MEDICINE

## 2022-06-27 PROCEDURE — 10002800 HB RX 250 W HCPCS: Performed by: INTERNAL MEDICINE

## 2022-06-27 PROCEDURE — 85730 THROMBOPLASTIN TIME PARTIAL: CPT | Performed by: INTERNAL MEDICINE

## 2022-06-27 PROCEDURE — 85610 PROTHROMBIN TIME: CPT | Performed by: INTERNAL MEDICINE

## 2022-06-27 PROCEDURE — 10006031 HB ROOM CHARGE TELEMETRY

## 2022-06-27 PROCEDURE — 99233 SBSQ HOSP IP/OBS HIGH 50: CPT | Performed by: INTERNAL MEDICINE

## 2022-06-27 PROCEDURE — 36415 COLL VENOUS BLD VENIPUNCTURE: CPT | Performed by: INTERNAL MEDICINE

## 2022-06-27 PROCEDURE — 80048 BASIC METABOLIC PNL TOTAL CA: CPT | Performed by: INTERNAL MEDICINE

## 2022-06-27 PROCEDURE — 97166 OT EVAL MOD COMPLEX 45 MIN: CPT

## 2022-06-27 PROCEDURE — 10004651 HB RX, NO CHARGE ITEM: Performed by: INTERNAL MEDICINE

## 2022-06-27 RX ORDER — WARFARIN SODIUM 2.5 MG/1
2.5 TABLET ORAL ONCE
Status: COMPLETED | OUTPATIENT
Start: 2022-06-27 | End: 2022-06-27

## 2022-06-27 RX ORDER — TRAMADOL HYDROCHLORIDE 50 MG/1
50 TABLET ORAL EVERY 6 HOURS PRN
Status: DISCONTINUED | OUTPATIENT
Start: 2022-06-27 | End: 2022-06-27

## 2022-06-27 RX ORDER — FUROSEMIDE 10 MG/ML
40 INJECTION INTRAMUSCULAR; INTRAVENOUS ONCE
Status: COMPLETED | OUTPATIENT
Start: 2022-06-27 | End: 2022-06-27

## 2022-06-27 RX ORDER — HYDROCODONE BITARTRATE AND ACETAMINOPHEN 5; 325 MG/1; MG/1
1 TABLET ORAL EVERY 4 HOURS PRN
Status: DISCONTINUED | OUTPATIENT
Start: 2022-06-27 | End: 2022-07-05 | Stop reason: HOSPADM

## 2022-06-27 RX ADMIN — DILTIAZEM HYDROCHLORIDE 120 MG: 120 CAPSULE, EXTENDED RELEASE ORAL at 09:21

## 2022-06-27 RX ADMIN — ACETAMINOPHEN 650 MG: 325 TABLET ORAL at 09:20

## 2022-06-27 RX ADMIN — SODIUM CHLORIDE, PRESERVATIVE FREE 2 ML: 5 INJECTION INTRAVENOUS at 09:21

## 2022-06-27 RX ADMIN — HYDROCODONE BITARTRATE AND ACETAMINOPHEN 1 TABLET: 5; 325 TABLET ORAL at 21:22

## 2022-06-27 RX ADMIN — CYCLOBENZAPRINE HYDROCHLORIDE 5 MG: 5 TABLET, FILM COATED ORAL at 19:44

## 2022-06-27 RX ADMIN — FUROSEMIDE 40 MG: 10 INJECTION, SOLUTION INTRAMUSCULAR; INTRAVENOUS at 13:39

## 2022-06-27 RX ADMIN — HEPARIN SODIUM 19 UNITS/KG/HR: 1000 INJECTION, SOLUTION INTRAVENOUS; SUBCUTANEOUS at 19:38

## 2022-06-27 RX ADMIN — HYDROCODONE BITARTRATE AND ACETAMINOPHEN 1 TABLET: 5; 325 TABLET ORAL at 17:12

## 2022-06-27 RX ADMIN — SERTRALINE HYDROCHLORIDE 25 MG: 25 TABLET, FILM COATED ORAL at 09:20

## 2022-06-27 RX ADMIN — AMIODARONE HYDROCHLORIDE 100 MG: 200 TABLET ORAL at 19:37

## 2022-06-27 RX ADMIN — WARFARIN SODIUM 2.5 MG: 2.5 TABLET ORAL at 17:12

## 2022-06-27 RX ADMIN — HEPARIN SODIUM 2600 UNITS: 1000 INJECTION, SOLUTION INTRAVENOUS; SUBCUTANEOUS at 06:35

## 2022-06-27 RX ADMIN — HEPARIN SODIUM 17 UNITS/KG/HR: 1000 INJECTION, SOLUTION INTRAVENOUS; SUBCUTANEOUS at 06:35

## 2022-06-27 RX ADMIN — CYCLOBENZAPRINE HYDROCHLORIDE 5 MG: 5 TABLET, FILM COATED ORAL at 09:20

## 2022-06-27 RX ADMIN — HEPARIN SODIUM 2600 UNITS: 1000 INJECTION, SOLUTION INTRAVENOUS; SUBCUTANEOUS at 19:36

## 2022-06-27 ASSESSMENT — PAIN SCALES - GENERAL
PAINLEVEL_OUTOF10: 10
PAINLEVEL_OUTOF10: 9
PAINLEVEL_OUTOF10: 10
PAINLEVEL_OUTOF10: 7

## 2022-06-27 ASSESSMENT — COGNITIVE AND FUNCTIONAL STATUS - GENERAL
HELP NEEDED FOR PERSONAL GROOMING: A LITTLE
HELP NEEDED FOR TOILETING: A LOT
HELP NEEDED FOR BATHING: A LOT
DAILY_ACTIVITY_RAW_SCORE: 15
HELP NEEDED DRESSING REGULAR LOWER BODY CLOTHING: A LOT
HELP NEEDED DRESSING REGULAR UPPER BODY CLOTHING: A LITTLE
DAILY_ACTIVITY_CONVERTED_SCORE: 34.69

## 2022-06-27 ASSESSMENT — ENCOUNTER SYMPTOMS
ACTIVITY CHANGE: 1
FATIGUE: 1
APPETITE CHANGE: 1
WEAKNESS: 1

## 2022-06-27 ASSESSMENT — ACTIVITIES OF DAILY LIVING (ADL): PRIOR_ADL: INDEPENDENT

## 2022-06-28 LAB
ANION GAP SERPL CALC-SCNC: 13 MMOL/L (ref 7–19)
APTT PPP: 50 SEC (ref 22–30)
APTT PPP: 51 SEC (ref 22–30)
BUN SERPL-MCNC: 53 MG/DL (ref 6–20)
BUN/CREAT SERPL: 27 (ref 7–25)
CALCIUM SERPL-MCNC: 9 MG/DL (ref 8.4–10.2)
CHLORIDE SERPL-SCNC: 97 MMOL/L (ref 97–110)
CO2 SERPL-SCNC: 25 MMOL/L (ref 21–32)
CREAT SERPL-MCNC: 1.99 MG/DL (ref 0.51–0.95)
DEPRECATED RDW RBC: 60.9 FL (ref 39–50)
ERYTHROCYTE [DISTWIDTH] IN BLOOD: 15.5 % (ref 11–15)
FASTING DURATION TIME PATIENT: ABNORMAL H
GFR SERPLBLD BASED ON 1.73 SQ M-ARVRAT: 25 ML/MIN
GLUCOSE SERPL-MCNC: 112 MG/DL (ref 70–99)
HCT VFR BLD CALC: 30 % (ref 36–46.5)
HGB BLD-MCNC: 9.6 G/DL (ref 12–15.5)
INR PPP: 0.9
MCH RBC QN AUTO: 33.8 PG (ref 26–34)
MCHC RBC AUTO-ENTMCNC: 32 G/DL (ref 32–36.5)
MCV RBC AUTO: 105.6 FL (ref 78–100)
NRBC BLD MANUAL-RTO: 0 /100 WBC
PLATELET # BLD AUTO: 198 K/MCL (ref 140–450)
POTASSIUM SERPL-SCNC: 5.1 MMOL/L (ref 3.4–5.1)
PROTHROMBIN TIME: 10.1 SEC (ref 9.7–11.8)
RBC # BLD: 2.84 MIL/MCL (ref 4–5.2)
SODIUM SERPL-SCNC: 130 MMOL/L (ref 135–145)
WBC # BLD: 9.6 K/MCL (ref 4.2–11)

## 2022-06-28 PROCEDURE — 85610 PROTHROMBIN TIME: CPT | Performed by: INTERNAL MEDICINE

## 2022-06-28 PROCEDURE — 10002800 HB RX 250 W HCPCS: Performed by: INTERNAL MEDICINE

## 2022-06-28 PROCEDURE — 97530 THERAPEUTIC ACTIVITIES: CPT

## 2022-06-28 PROCEDURE — 99233 SBSQ HOSP IP/OBS HIGH 50: CPT | Performed by: INTERNAL MEDICINE

## 2022-06-28 PROCEDURE — 80048 BASIC METABOLIC PNL TOTAL CA: CPT | Performed by: INTERNAL MEDICINE

## 2022-06-28 PROCEDURE — 10006031 HB ROOM CHARGE TELEMETRY

## 2022-06-28 PROCEDURE — 10002803 HB RX 637: Performed by: INTERNAL MEDICINE

## 2022-06-28 PROCEDURE — 10004651 HB RX, NO CHARGE ITEM: Performed by: INTERNAL MEDICINE

## 2022-06-28 PROCEDURE — 85730 THROMBOPLASTIN TIME PARTIAL: CPT | Performed by: INTERNAL MEDICINE

## 2022-06-28 PROCEDURE — 36415 COLL VENOUS BLD VENIPUNCTURE: CPT | Performed by: INTERNAL MEDICINE

## 2022-06-28 PROCEDURE — 85027 COMPLETE CBC AUTOMATED: CPT | Performed by: INTERNAL MEDICINE

## 2022-06-28 PROCEDURE — 97162 PT EVAL MOD COMPLEX 30 MIN: CPT

## 2022-06-28 RX ORDER — WARFARIN SODIUM 2.5 MG/1
5 TABLET ORAL ONCE
Status: COMPLETED | OUTPATIENT
Start: 2022-06-28 | End: 2022-06-28

## 2022-06-28 RX ORDER — FUROSEMIDE 10 MG/ML
40 INJECTION INTRAMUSCULAR; INTRAVENOUS ONCE
Status: COMPLETED | OUTPATIENT
Start: 2022-06-28 | End: 2022-06-28

## 2022-06-28 RX ADMIN — HYDROCODONE BITARTRATE AND ACETAMINOPHEN 1 TABLET: 5; 325 TABLET ORAL at 21:48

## 2022-06-28 RX ADMIN — HYDROCODONE BITARTRATE AND ACETAMINOPHEN 1 TABLET: 5; 325 TABLET ORAL at 18:00

## 2022-06-28 RX ADMIN — SERTRALINE HYDROCHLORIDE 25 MG: 25 TABLET, FILM COATED ORAL at 08:28

## 2022-06-28 RX ADMIN — HEPARIN SODIUM 19 UNITS/KG/HR: 1000 INJECTION, SOLUTION INTRAVENOUS; SUBCUTANEOUS at 05:28

## 2022-06-28 RX ADMIN — AMIODARONE HYDROCHLORIDE 100 MG: 200 TABLET ORAL at 21:32

## 2022-06-28 RX ADMIN — ACETAMINOPHEN 650 MG: 325 TABLET ORAL at 08:36

## 2022-06-28 RX ADMIN — WARFARIN SODIUM 5 MG: 2.5 TABLET ORAL at 18:00

## 2022-06-28 RX ADMIN — DILTIAZEM HYDROCHLORIDE 120 MG: 120 CAPSULE, EXTENDED RELEASE ORAL at 08:28

## 2022-06-28 RX ADMIN — FUROSEMIDE 40 MG: 10 INJECTION, SOLUTION INTRAMUSCULAR; INTRAVENOUS at 13:08

## 2022-06-28 RX ADMIN — SODIUM CHLORIDE, PRESERVATIVE FREE 2 ML: 5 INJECTION INTRAVENOUS at 21:35

## 2022-06-28 RX ADMIN — CYCLOBENZAPRINE HYDROCHLORIDE 5 MG: 5 TABLET, FILM COATED ORAL at 21:48

## 2022-06-28 RX ADMIN — CYCLOBENZAPRINE HYDROCHLORIDE 5 MG: 5 TABLET, FILM COATED ORAL at 13:08

## 2022-06-28 ASSESSMENT — PAIN SCALES - GENERAL
PAINLEVEL_OUTOF10: 10

## 2022-06-28 ASSESSMENT — ENCOUNTER SYMPTOMS
WEAKNESS: 1
FATIGUE: 1
APPETITE CHANGE: 1
ACTIVITY CHANGE: 1

## 2022-06-28 ASSESSMENT — COGNITIVE AND FUNCTIONAL STATUS - GENERAL
BASIC_MOBILITY_RAW_SCORE: 13
BASIC_MOBILITY_CONVERTED_SCORE: 33.99

## 2022-06-29 ENCOUNTER — APPOINTMENT (OUTPATIENT)
Dept: GENERAL RADIOLOGY | Age: 82
DRG: 314 | End: 2022-06-29
Attending: FAMILY MEDICINE

## 2022-06-29 LAB
ANION GAP SERPL CALC-SCNC: 13 MMOL/L (ref 7–19)
APTT PPP: 41 SEC (ref 22–30)
APTT PPP: 52 SEC (ref 22–30)
APTT PPP: 56 SEC (ref 22–30)
BACTERIA UR CULT: NORMAL
BUN SERPL-MCNC: 61 MG/DL (ref 6–20)
BUN/CREAT SERPL: 30 (ref 7–25)
CALCIUM SERPL-MCNC: 9.2 MG/DL (ref 8.4–10.2)
CHLORIDE SERPL-SCNC: 98 MMOL/L (ref 97–110)
CO2 SERPL-SCNC: 22 MMOL/L (ref 21–32)
CREAT SERPL-MCNC: 2.02 MG/DL (ref 0.51–0.95)
DEPRECATED RDW RBC: 60.2 FL (ref 39–50)
ERYTHROCYTE [DISTWIDTH] IN BLOOD: 15.5 % (ref 11–15)
FASTING DURATION TIME PATIENT: ABNORMAL H
GFR SERPLBLD BASED ON 1.73 SQ M-ARVRAT: 24 ML/MIN
GLUCOSE SERPL-MCNC: 109 MG/DL (ref 70–99)
HCT VFR BLD CALC: 27.3 % (ref 36–46.5)
HGB BLD-MCNC: 9 G/DL (ref 12–15.5)
INR PPP: 1
MCH RBC QN AUTO: 34.7 PG (ref 26–34)
MCHC RBC AUTO-ENTMCNC: 33 G/DL (ref 32–36.5)
MCV RBC AUTO: 105.4 FL (ref 78–100)
NRBC BLD MANUAL-RTO: 0 /100 WBC
PLATELET # BLD AUTO: 179 K/MCL (ref 140–450)
POTASSIUM SERPL-SCNC: 5 MMOL/L (ref 3.4–5.1)
PROTHROMBIN TIME: 10.2 SEC (ref 9.7–11.8)
RBC # BLD: 2.59 MIL/MCL (ref 4–5.2)
SODIUM SERPL-SCNC: 128 MMOL/L (ref 135–145)
WBC # BLD: 7.4 K/MCL (ref 4.2–11)

## 2022-06-29 PROCEDURE — 99233 SBSQ HOSP IP/OBS HIGH 50: CPT | Performed by: FAMILY MEDICINE

## 2022-06-29 PROCEDURE — 10002803 HB RX 637: Performed by: INTERNAL MEDICINE

## 2022-06-29 PROCEDURE — 80048 BASIC METABOLIC PNL TOTAL CA: CPT | Performed by: INTERNAL MEDICINE

## 2022-06-29 PROCEDURE — 10006031 HB ROOM CHARGE TELEMETRY

## 2022-06-29 PROCEDURE — 99233 SBSQ HOSP IP/OBS HIGH 50: CPT | Performed by: NURSE PRACTITIONER

## 2022-06-29 PROCEDURE — 85027 COMPLETE CBC AUTOMATED: CPT | Performed by: INTERNAL MEDICINE

## 2022-06-29 PROCEDURE — 71046 X-RAY EXAM CHEST 2 VIEWS: CPT

## 2022-06-29 PROCEDURE — 85730 THROMBOPLASTIN TIME PARTIAL: CPT | Performed by: INTERNAL MEDICINE

## 2022-06-29 PROCEDURE — 85610 PROTHROMBIN TIME: CPT | Performed by: INTERNAL MEDICINE

## 2022-06-29 PROCEDURE — 10004651 HB RX, NO CHARGE ITEM: Performed by: INTERNAL MEDICINE

## 2022-06-29 PROCEDURE — 36415 COLL VENOUS BLD VENIPUNCTURE: CPT | Performed by: INTERNAL MEDICINE

## 2022-06-29 PROCEDURE — 13003289 HB OXYGEN THERAPY DAILY

## 2022-06-29 PROCEDURE — 85730 THROMBOPLASTIN TIME PARTIAL: CPT | Performed by: FAMILY MEDICINE

## 2022-06-29 PROCEDURE — 10002800 HB RX 250 W HCPCS: Performed by: INTERNAL MEDICINE

## 2022-06-29 RX ORDER — WARFARIN SODIUM 2.5 MG/1
7.5 TABLET ORAL ONCE
Status: COMPLETED | OUTPATIENT
Start: 2022-06-29 | End: 2022-06-29

## 2022-06-29 RX ADMIN — HYDROCODONE BITARTRATE AND ACETAMINOPHEN 1 TABLET: 5; 325 TABLET ORAL at 19:16

## 2022-06-29 RX ADMIN — HEPARIN SODIUM 21 UNITS/KG/HR: 1000 INJECTION, SOLUTION INTRAVENOUS; SUBCUTANEOUS at 23:27

## 2022-06-29 RX ADMIN — HEPARIN SODIUM 2600 UNITS: 1000 INJECTION, SOLUTION INTRAVENOUS; SUBCUTANEOUS at 07:58

## 2022-06-29 RX ADMIN — WARFARIN SODIUM 7.5 MG: 2.5 TABLET ORAL at 18:16

## 2022-06-29 RX ADMIN — HYDROCODONE BITARTRATE AND ACETAMINOPHEN 1 TABLET: 5; 325 TABLET ORAL at 10:35

## 2022-06-29 RX ADMIN — CYCLOBENZAPRINE HYDROCHLORIDE 5 MG: 5 TABLET, FILM COATED ORAL at 10:35

## 2022-06-29 RX ADMIN — HEPARIN SODIUM 19 UNITS/KG/HR: 1000 INJECTION, SOLUTION INTRAVENOUS; SUBCUTANEOUS at 03:02

## 2022-06-29 RX ADMIN — CYCLOBENZAPRINE HYDROCHLORIDE 5 MG: 5 TABLET, FILM COATED ORAL at 19:16

## 2022-06-29 RX ADMIN — SERTRALINE HYDROCHLORIDE 25 MG: 25 TABLET, FILM COATED ORAL at 10:35

## 2022-06-29 RX ADMIN — SODIUM CHLORIDE, PRESERVATIVE FREE 2 ML: 5 INJECTION INTRAVENOUS at 10:36

## 2022-06-29 RX ADMIN — DILTIAZEM HYDROCHLORIDE 120 MG: 120 CAPSULE, EXTENDED RELEASE ORAL at 10:35

## 2022-06-29 RX ADMIN — AMIODARONE HYDROCHLORIDE 100 MG: 200 TABLET ORAL at 21:17

## 2022-06-29 ASSESSMENT — PAIN SCALES - GENERAL
PAINLEVEL_OUTOF10: 4
PAINLEVEL_OUTOF10: 9
PAINLEVEL_OUTOF10: 5
PAINLEVEL_OUTOF10: 10

## 2022-06-29 ASSESSMENT — ENCOUNTER SYMPTOMS
APPETITE CHANGE: 1
FATIGUE: 1
WEAKNESS: 1
ACTIVITY CHANGE: 1
RESPIRATORY NEGATIVE: 1

## 2022-06-30 LAB
ANION GAP SERPL CALC-SCNC: 13 MMOL/L (ref 7–19)
APTT PPP: 48 SEC (ref 22–30)
BUN SERPL-MCNC: 70 MG/DL (ref 6–20)
BUN/CREAT SERPL: 32 (ref 7–25)
CALCIUM SERPL-MCNC: 9.3 MG/DL (ref 8.4–10.2)
CHLORIDE SERPL-SCNC: 97 MMOL/L (ref 97–110)
CO2 SERPL-SCNC: 24 MMOL/L (ref 21–32)
CREAT SERPL-MCNC: 2.2 MG/DL (ref 0.51–0.95)
DEPRECATED RDW RBC: 60.5 FL (ref 39–50)
ERYTHROCYTE [DISTWIDTH] IN BLOOD: 15.3 % (ref 11–15)
FASTING DURATION TIME PATIENT: ABNORMAL H
GFR SERPLBLD BASED ON 1.73 SQ M-ARVRAT: 22 ML/MIN
GLUCOSE SERPL-MCNC: 124 MG/DL (ref 70–99)
HCT VFR BLD CALC: 29.5 % (ref 36–46.5)
HGB BLD-MCNC: 9.5 G/DL (ref 12–15.5)
INR PPP: 1
MCH RBC QN AUTO: 34.3 PG (ref 26–34)
MCHC RBC AUTO-ENTMCNC: 32.2 G/DL (ref 32–36.5)
MCV RBC AUTO: 106.5 FL (ref 78–100)
NRBC BLD MANUAL-RTO: 0 /100 WBC
PLATELET # BLD AUTO: 256 K/MCL (ref 140–450)
POTASSIUM SERPL-SCNC: 5 MMOL/L (ref 3.4–5.1)
PROTHROMBIN TIME: 10.7 SEC (ref 9.7–11.8)
RBC # BLD: 2.77 MIL/MCL (ref 4–5.2)
SODIUM SERPL-SCNC: 129 MMOL/L (ref 135–145)
WBC # BLD: 9 K/MCL (ref 4.2–11)

## 2022-06-30 PROCEDURE — 85730 THROMBOPLASTIN TIME PARTIAL: CPT | Performed by: INTERNAL MEDICINE

## 2022-06-30 PROCEDURE — 99232 SBSQ HOSP IP/OBS MODERATE 35: CPT | Performed by: NURSE PRACTITIONER

## 2022-06-30 PROCEDURE — 99233 SBSQ HOSP IP/OBS HIGH 50: CPT | Performed by: INTERNAL MEDICINE

## 2022-06-30 PROCEDURE — 85027 COMPLETE CBC AUTOMATED: CPT | Performed by: INTERNAL MEDICINE

## 2022-06-30 PROCEDURE — 10002800 HB RX 250 W HCPCS: Performed by: INTERNAL MEDICINE

## 2022-06-30 PROCEDURE — 10002803 HB RX 637: Performed by: INTERNAL MEDICINE

## 2022-06-30 PROCEDURE — 85610 PROTHROMBIN TIME: CPT | Performed by: INTERNAL MEDICINE

## 2022-06-30 PROCEDURE — 10004651 HB RX, NO CHARGE ITEM: Performed by: INTERNAL MEDICINE

## 2022-06-30 PROCEDURE — 36415 COLL VENOUS BLD VENIPUNCTURE: CPT | Performed by: INTERNAL MEDICINE

## 2022-06-30 PROCEDURE — 97530 THERAPEUTIC ACTIVITIES: CPT

## 2022-06-30 PROCEDURE — 80048 BASIC METABOLIC PNL TOTAL CA: CPT | Performed by: INTERNAL MEDICINE

## 2022-06-30 PROCEDURE — 10006031 HB ROOM CHARGE TELEMETRY

## 2022-06-30 PROCEDURE — 97110 THERAPEUTIC EXERCISES: CPT

## 2022-06-30 RX ORDER — WARFARIN SODIUM 10 MG/1
10 TABLET ORAL ONCE
Status: COMPLETED | OUTPATIENT
Start: 2022-06-30 | End: 2022-06-30

## 2022-06-30 RX ORDER — FUROSEMIDE 10 MG/ML
40 INJECTION INTRAMUSCULAR; INTRAVENOUS ONCE
Status: COMPLETED | OUTPATIENT
Start: 2022-06-30 | End: 2022-06-30

## 2022-06-30 RX ADMIN — CYCLOBENZAPRINE HYDROCHLORIDE 5 MG: 5 TABLET, FILM COATED ORAL at 06:04

## 2022-06-30 RX ADMIN — SERTRALINE HYDROCHLORIDE 25 MG: 25 TABLET, FILM COATED ORAL at 09:00

## 2022-06-30 RX ADMIN — SODIUM CHLORIDE, PRESERVATIVE FREE 2 ML: 5 INJECTION INTRAVENOUS at 09:08

## 2022-06-30 RX ADMIN — SODIUM CHLORIDE, PRESERVATIVE FREE 2 ML: 5 INJECTION INTRAVENOUS at 21:06

## 2022-06-30 RX ADMIN — WARFARIN SODIUM 10 MG: 10 TABLET ORAL at 17:49

## 2022-06-30 RX ADMIN — DILTIAZEM HYDROCHLORIDE 120 MG: 120 CAPSULE, EXTENDED RELEASE ORAL at 09:00

## 2022-06-30 RX ADMIN — HYDROCODONE BITARTRATE AND ACETAMINOPHEN 1 TABLET: 5; 325 TABLET ORAL at 06:04

## 2022-06-30 RX ADMIN — FUROSEMIDE 40 MG: 10 INJECTION, SOLUTION INTRAMUSCULAR; INTRAVENOUS at 12:17

## 2022-06-30 RX ADMIN — HYDROCODONE BITARTRATE AND ACETAMINOPHEN 1 TABLET: 5; 325 TABLET ORAL at 21:13

## 2022-06-30 RX ADMIN — HEPARIN SODIUM 21 UNITS/KG/HR: 1000 INJECTION, SOLUTION INTRAVENOUS; SUBCUTANEOUS at 17:49

## 2022-06-30 RX ADMIN — AMIODARONE HYDROCHLORIDE 100 MG: 200 TABLET ORAL at 21:05

## 2022-06-30 ASSESSMENT — PAIN SCALES - GENERAL
PAINLEVEL_OUTOF10: 2
PAINLEVEL_OUTOF10: 5
PAINLEVEL_OUTOF10: 9
PAINLEVEL_OUTOF10: 0

## 2022-06-30 ASSESSMENT — COGNITIVE AND FUNCTIONAL STATUS - GENERAL
DAILY_ACTIVITY_RAW_SCORE: 14
BASIC_MOBILITY_RAW_SCORE: 14
HELP NEEDED FOR BATHING: A LOT
HELP NEEDED DRESSING REGULAR UPPER BODY CLOTHING: A LITTLE
HELP NEEDED DRESSING REGULAR LOWER BODY CLOTHING: TOTAL
DAILY_ACTIVITY_CONVERTED_SCORE: 33.39
HELP NEEDED FOR TOILETING: TOTAL
BASIC_MOBILITY_CONVERTED_SCORE: 35.55

## 2022-06-30 ASSESSMENT — ENCOUNTER SYMPTOMS
WEAKNESS: 1
ACTIVITY CHANGE: 1
FATIGUE: 1
APPETITE CHANGE: 1

## 2022-07-01 LAB
ANION GAP SERPL CALC-SCNC: 15 MMOL/L (ref 7–19)
APTT PPP: 38 SEC (ref 22–30)
APTT PPP: 54 SEC (ref 22–30)
APTT PPP: 86 SEC (ref 22–30)
BUN SERPL-MCNC: 78 MG/DL (ref 6–20)
BUN/CREAT SERPL: 41 (ref 7–25)
CALCIUM SERPL-MCNC: 9 MG/DL (ref 8.4–10.2)
CHLORIDE SERPL-SCNC: 96 MMOL/L (ref 97–110)
CO2 SERPL-SCNC: 23 MMOL/L (ref 21–32)
CREAT SERPL-MCNC: 1.88 MG/DL (ref 0.51–0.95)
DEPRECATED RDW RBC: 60.2 FL (ref 39–50)
ERYTHROCYTE [DISTWIDTH] IN BLOOD: 15.5 % (ref 11–15)
FASTING DURATION TIME PATIENT: ABNORMAL H
GFR SERPLBLD BASED ON 1.73 SQ M-ARVRAT: 27 ML/MIN
GLUCOSE SERPL-MCNC: 99 MG/DL (ref 70–99)
HCT VFR BLD CALC: 27 % (ref 36–46.5)
HGB BLD-MCNC: 8.8 G/DL (ref 12–15.5)
INR PPP: 1.2
MCH RBC QN AUTO: 34.4 PG (ref 26–34)
MCHC RBC AUTO-ENTMCNC: 32.6 G/DL (ref 32–36.5)
MCV RBC AUTO: 105.5 FL (ref 78–100)
NRBC BLD MANUAL-RTO: 0 /100 WBC
PLATELET # BLD AUTO: 243 K/MCL (ref 140–450)
POTASSIUM SERPL-SCNC: 4.7 MMOL/L (ref 3.4–5.1)
PROTHROMBIN TIME: 12.5 SEC (ref 9.7–11.8)
RBC # BLD: 2.56 MIL/MCL (ref 4–5.2)
SODIUM SERPL-SCNC: 129 MMOL/L (ref 135–145)
WBC # BLD: 7.2 K/MCL (ref 4.2–11)

## 2022-07-01 PROCEDURE — 85730 THROMBOPLASTIN TIME PARTIAL: CPT | Performed by: INTERNAL MEDICINE

## 2022-07-01 PROCEDURE — 10002800 HB RX 250 W HCPCS: Performed by: INTERNAL MEDICINE

## 2022-07-01 PROCEDURE — 10002803 HB RX 637: Performed by: INTERNAL MEDICINE

## 2022-07-01 PROCEDURE — 85027 COMPLETE CBC AUTOMATED: CPT | Performed by: INTERNAL MEDICINE

## 2022-07-01 PROCEDURE — 10004651 HB RX, NO CHARGE ITEM: Performed by: INTERNAL MEDICINE

## 2022-07-01 PROCEDURE — 85610 PROTHROMBIN TIME: CPT | Performed by: INTERNAL MEDICINE

## 2022-07-01 PROCEDURE — 99232 SBSQ HOSP IP/OBS MODERATE 35: CPT | Performed by: INTERNAL MEDICINE

## 2022-07-01 PROCEDURE — 10006031 HB ROOM CHARGE TELEMETRY

## 2022-07-01 PROCEDURE — 36415 COLL VENOUS BLD VENIPUNCTURE: CPT | Performed by: INTERNAL MEDICINE

## 2022-07-01 PROCEDURE — 80048 BASIC METABOLIC PNL TOTAL CA: CPT | Performed by: INTERNAL MEDICINE

## 2022-07-01 RX ORDER — FUROSEMIDE 10 MG/ML
40 INJECTION INTRAMUSCULAR; INTRAVENOUS ONCE
Status: COMPLETED | OUTPATIENT
Start: 2022-07-01 | End: 2022-07-01

## 2022-07-01 RX ORDER — WARFARIN SODIUM 2.5 MG/1
10 TABLET ORAL ONCE
Status: COMPLETED | OUTPATIENT
Start: 2022-07-01 | End: 2022-07-01

## 2022-07-01 RX ORDER — TORSEMIDE 10 MG/1
10 TABLET ORAL DAILY
Status: DISCONTINUED | OUTPATIENT
Start: 2022-07-02 | End: 2022-07-05 | Stop reason: HOSPADM

## 2022-07-01 RX ADMIN — DILTIAZEM HYDROCHLORIDE 120 MG: 120 CAPSULE, EXTENDED RELEASE ORAL at 08:42

## 2022-07-01 RX ADMIN — SODIUM CHLORIDE, PRESERVATIVE FREE 2 ML: 5 INJECTION INTRAVENOUS at 20:05

## 2022-07-01 RX ADMIN — AMIODARONE HYDROCHLORIDE 100 MG: 200 TABLET ORAL at 20:05

## 2022-07-01 RX ADMIN — HEPARIN SODIUM 2600 UNITS: 1000 INJECTION, SOLUTION INTRAVENOUS; SUBCUTANEOUS at 07:30

## 2022-07-01 RX ADMIN — HYDROCODONE BITARTRATE AND ACETAMINOPHEN 1 TABLET: 5; 325 TABLET ORAL at 20:08

## 2022-07-01 RX ADMIN — HEPARIN SODIUM 20 UNITS/KG/HR: 1000 INJECTION, SOLUTION INTRAVENOUS; SUBCUTANEOUS at 17:10

## 2022-07-01 RX ADMIN — SERTRALINE HYDROCHLORIDE 25 MG: 25 TABLET, FILM COATED ORAL at 08:42

## 2022-07-01 RX ADMIN — WARFARIN SODIUM 10 MG: 2.5 TABLET ORAL at 17:15

## 2022-07-01 RX ADMIN — HYDROCODONE BITARTRATE AND ACETAMINOPHEN 1 TABLET: 5; 325 TABLET ORAL at 08:42

## 2022-07-01 RX ADMIN — HEPARIN SODIUM 23 UNITS/KG/HR: 1000 INJECTION, SOLUTION INTRAVENOUS; SUBCUTANEOUS at 07:31

## 2022-07-01 RX ADMIN — FUROSEMIDE 40 MG: 10 INJECTION, SOLUTION INTRAMUSCULAR; INTRAVENOUS at 14:53

## 2022-07-01 ASSESSMENT — PAIN SCALES - GENERAL
PAINLEVEL_OUTOF10: 9
PAINLEVEL_OUTOF10: 7
PAINLEVEL_OUTOF10: 9
PAINLEVEL_OUTOF10: 9

## 2022-07-01 ASSESSMENT — ENCOUNTER SYMPTOMS
WEAKNESS: 1
APPETITE CHANGE: 1
FATIGUE: 1
ACTIVITY CHANGE: 1

## 2022-07-02 LAB
ANION GAP SERPL CALC-SCNC: 10 MMOL/L (ref 7–19)
APTT PPP: 61 SEC (ref 22–30)
BUN SERPL-MCNC: 76 MG/DL (ref 6–20)
BUN/CREAT SERPL: 42 (ref 7–25)
CALCIUM SERPL-MCNC: 8.7 MG/DL (ref 8.4–10.2)
CHLORIDE SERPL-SCNC: 98 MMOL/L (ref 97–110)
CO2 SERPL-SCNC: 26 MMOL/L (ref 21–32)
CREAT SERPL-MCNC: 1.79 MG/DL (ref 0.51–0.95)
DEPRECATED RDW RBC: 60.6 FL (ref 39–50)
ERYTHROCYTE [DISTWIDTH] IN BLOOD: 15.8 % (ref 11–15)
FASTING DURATION TIME PATIENT: ABNORMAL H
GFR SERPLBLD BASED ON 1.73 SQ M-ARVRAT: 28 ML/MIN
GLUCOSE SERPL-MCNC: 117 MG/DL (ref 70–99)
HCT VFR BLD CALC: 26.2 % (ref 36–46.5)
HGB BLD-MCNC: 8.6 G/DL (ref 12–15.5)
INR PPP: 1.6
MCH RBC QN AUTO: 34.5 PG (ref 26–34)
MCHC RBC AUTO-ENTMCNC: 32.8 G/DL (ref 32–36.5)
MCV RBC AUTO: 105.2 FL (ref 78–100)
NRBC BLD MANUAL-RTO: 0 /100 WBC
PLATELET # BLD AUTO: 261 K/MCL (ref 140–450)
POTASSIUM SERPL-SCNC: 4.9 MMOL/L (ref 3.4–5.1)
PROTHROMBIN TIME: 16.2 SEC (ref 9.7–11.8)
RBC # BLD: 2.49 MIL/MCL (ref 4–5.2)
SODIUM SERPL-SCNC: 129 MMOL/L (ref 135–145)
WBC # BLD: 7.8 K/MCL (ref 4.2–11)

## 2022-07-02 PROCEDURE — 10006031 HB ROOM CHARGE TELEMETRY

## 2022-07-02 PROCEDURE — 10004651 HB RX, NO CHARGE ITEM: Performed by: INTERNAL MEDICINE

## 2022-07-02 PROCEDURE — 85730 THROMBOPLASTIN TIME PARTIAL: CPT | Performed by: INTERNAL MEDICINE

## 2022-07-02 PROCEDURE — 36415 COLL VENOUS BLD VENIPUNCTURE: CPT | Performed by: INTERNAL MEDICINE

## 2022-07-02 PROCEDURE — 99233 SBSQ HOSP IP/OBS HIGH 50: CPT | Performed by: INTERNAL MEDICINE

## 2022-07-02 PROCEDURE — 10002803 HB RX 637: Performed by: INTERNAL MEDICINE

## 2022-07-02 PROCEDURE — 85610 PROTHROMBIN TIME: CPT | Performed by: INTERNAL MEDICINE

## 2022-07-02 PROCEDURE — 80048 BASIC METABOLIC PNL TOTAL CA: CPT | Performed by: INTERNAL MEDICINE

## 2022-07-02 PROCEDURE — 85027 COMPLETE CBC AUTOMATED: CPT | Performed by: INTERNAL MEDICINE

## 2022-07-02 PROCEDURE — 10002800 HB RX 250 W HCPCS: Performed by: INTERNAL MEDICINE

## 2022-07-02 RX ORDER — WARFARIN SODIUM 2.5 MG/1
7.5 TABLET ORAL ONCE
Status: COMPLETED | OUTPATIENT
Start: 2022-07-02 | End: 2022-07-02

## 2022-07-02 RX ADMIN — WARFARIN SODIUM 7.5 MG: 2.5 TABLET ORAL at 17:38

## 2022-07-02 RX ADMIN — SERTRALINE HYDROCHLORIDE 25 MG: 25 TABLET, FILM COATED ORAL at 08:17

## 2022-07-02 RX ADMIN — ACETAMINOPHEN 650 MG: 325 TABLET ORAL at 13:59

## 2022-07-02 RX ADMIN — DILTIAZEM HYDROCHLORIDE 120 MG: 120 CAPSULE, EXTENDED RELEASE ORAL at 08:17

## 2022-07-02 RX ADMIN — SODIUM CHLORIDE, PRESERVATIVE FREE 2 ML: 5 INJECTION INTRAVENOUS at 20:17

## 2022-07-02 RX ADMIN — HYDROCODONE BITARTRATE AND ACETAMINOPHEN 1 TABLET: 5; 325 TABLET ORAL at 21:33

## 2022-07-02 RX ADMIN — TORSEMIDE 10 MG: 10 TABLET ORAL at 08:59

## 2022-07-02 RX ADMIN — AMIODARONE HYDROCHLORIDE 100 MG: 200 TABLET ORAL at 20:17

## 2022-07-02 RX ADMIN — SODIUM CHLORIDE, PRESERVATIVE FREE 2 ML: 5 INJECTION INTRAVENOUS at 08:17

## 2022-07-02 RX ADMIN — HEPARIN SODIUM 20 UNITS/KG/HR: 1000 INJECTION, SOLUTION INTRAVENOUS; SUBCUTANEOUS at 13:13

## 2022-07-02 ASSESSMENT — PAIN SCALES - GENERAL
PAINLEVEL_OUTOF10: 4
PAINLEVEL_OUTOF10: 0
PAINLEVEL_OUTOF10: 9
PAINLEVEL_OUTOF10: 2
PAINLEVEL_OUTOF10: 9

## 2022-07-02 ASSESSMENT — ENCOUNTER SYMPTOMS
FATIGUE: 1
WEAKNESS: 1
APPETITE CHANGE: 1
ACTIVITY CHANGE: 1

## 2022-07-03 LAB
ANION GAP SERPL CALC-SCNC: 15 MMOL/L (ref 7–19)
APTT PPP: 58 SEC (ref 22–30)
APTT PPP: 63 SEC (ref 22–30)
APTT PPP: 97 SEC (ref 22–30)
BUN SERPL-MCNC: 69 MG/DL (ref 6–20)
BUN/CREAT SERPL: 35 (ref 7–25)
CALCIUM SERPL-MCNC: 8.6 MG/DL (ref 8.4–10.2)
CHLORIDE SERPL-SCNC: 97 MMOL/L (ref 97–110)
CO2 SERPL-SCNC: 25 MMOL/L (ref 21–32)
CREAT SERPL-MCNC: 1.95 MG/DL (ref 0.51–0.95)
DEPRECATED RDW RBC: 61.7 FL (ref 39–50)
ERYTHROCYTE [DISTWIDTH] IN BLOOD: 15.8 % (ref 11–15)
FASTING DURATION TIME PATIENT: ABNORMAL H
GFR SERPLBLD BASED ON 1.73 SQ M-ARVRAT: 26 ML/MIN
GLUCOSE SERPL-MCNC: 119 MG/DL (ref 70–99)
HCT VFR BLD CALC: 27.2 % (ref 36–46.5)
HGB BLD-MCNC: 8.8 G/DL (ref 12–15.5)
INR PPP: 2
MAGNESIUM SERPL-MCNC: 2.5 MG/DL (ref 1.7–2.4)
MCH RBC QN AUTO: 34.4 PG (ref 26–34)
MCHC RBC AUTO-ENTMCNC: 32.4 G/DL (ref 32–36.5)
MCV RBC AUTO: 106.3 FL (ref 78–100)
NRBC BLD MANUAL-RTO: 0 /100 WBC
PLATELET # BLD AUTO: 293 K/MCL (ref 140–450)
POTASSIUM SERPL-SCNC: 4.7 MMOL/L (ref 3.4–5.1)
PROTHROMBIN TIME: 20.8 SEC (ref 9.7–11.8)
RBC # BLD: 2.56 MIL/MCL (ref 4–5.2)
SODIUM SERPL-SCNC: 132 MMOL/L (ref 135–145)
WBC # BLD: 7.2 K/MCL (ref 4.2–11)

## 2022-07-03 PROCEDURE — 80048 BASIC METABOLIC PNL TOTAL CA: CPT | Performed by: INTERNAL MEDICINE

## 2022-07-03 PROCEDURE — 10006031 HB ROOM CHARGE TELEMETRY

## 2022-07-03 PROCEDURE — 36415 COLL VENOUS BLD VENIPUNCTURE: CPT | Performed by: INTERNAL MEDICINE

## 2022-07-03 PROCEDURE — 10002803 HB RX 637: Performed by: INTERNAL MEDICINE

## 2022-07-03 PROCEDURE — 85610 PROTHROMBIN TIME: CPT | Performed by: INTERNAL MEDICINE

## 2022-07-03 PROCEDURE — 99233 SBSQ HOSP IP/OBS HIGH 50: CPT | Performed by: INTERNAL MEDICINE

## 2022-07-03 PROCEDURE — 85730 THROMBOPLASTIN TIME PARTIAL: CPT | Performed by: INTERNAL MEDICINE

## 2022-07-03 PROCEDURE — 10002800 HB RX 250 W HCPCS: Performed by: INTERNAL MEDICINE

## 2022-07-03 PROCEDURE — 83735 ASSAY OF MAGNESIUM: CPT | Performed by: INTERNAL MEDICINE

## 2022-07-03 PROCEDURE — 85027 COMPLETE CBC AUTOMATED: CPT | Performed by: INTERNAL MEDICINE

## 2022-07-03 PROCEDURE — 10004651 HB RX, NO CHARGE ITEM: Performed by: INTERNAL MEDICINE

## 2022-07-03 RX ORDER — WARFARIN SODIUM 2.5 MG/1
7.5 TABLET ORAL ONCE
Status: COMPLETED | OUTPATIENT
Start: 2022-07-03 | End: 2022-07-03

## 2022-07-03 RX ADMIN — TORSEMIDE 10 MG: 10 TABLET ORAL at 09:25

## 2022-07-03 RX ADMIN — WARFARIN SODIUM 7.5 MG: 2.5 TABLET ORAL at 18:18

## 2022-07-03 RX ADMIN — HYDROCODONE BITARTRATE AND ACETAMINOPHEN 1 TABLET: 5; 325 TABLET ORAL at 09:28

## 2022-07-03 RX ADMIN — AMIODARONE HYDROCHLORIDE 100 MG: 200 TABLET ORAL at 21:07

## 2022-07-03 RX ADMIN — SODIUM CHLORIDE, PRESERVATIVE FREE 2 ML: 5 INJECTION INTRAVENOUS at 09:25

## 2022-07-03 RX ADMIN — SODIUM CHLORIDE, PRESERVATIVE FREE 2 ML: 5 INJECTION INTRAVENOUS at 21:08

## 2022-07-03 RX ADMIN — HEPARIN SODIUM 17 UNITS/KG/HR: 1000 INJECTION, SOLUTION INTRAVENOUS; SUBCUTANEOUS at 10:10

## 2022-07-03 RX ADMIN — HYDROCODONE BITARTRATE AND ACETAMINOPHEN 1 TABLET: 5; 325 TABLET ORAL at 21:23

## 2022-07-03 RX ADMIN — SERTRALINE HYDROCHLORIDE 25 MG: 25 TABLET, FILM COATED ORAL at 09:25

## 2022-07-03 RX ADMIN — DILTIAZEM HYDROCHLORIDE 120 MG: 120 CAPSULE, EXTENDED RELEASE ORAL at 09:25

## 2022-07-03 ASSESSMENT — ENCOUNTER SYMPTOMS
ACTIVITY CHANGE: 0
APPETITE CHANGE: 0
FATIGUE: 0
WEAKNESS: 0

## 2022-07-03 ASSESSMENT — PAIN SCALES - GENERAL
PAINLEVEL_OUTOF10: 8
PAINLEVEL_OUTOF10: 2
PAINLEVEL_OUTOF10: 2

## 2022-07-04 LAB
ANION GAP SERPL CALC-SCNC: 12 MMOL/L (ref 7–19)
ANION GAP SERPL CALC-SCNC: 13 MMOL/L (ref 7–19)
APTT PPP: 65 SEC (ref 22–30)
BUN SERPL-MCNC: 61 MG/DL (ref 6–20)
BUN SERPL-MCNC: 69 MG/DL (ref 6–20)
BUN/CREAT SERPL: 36 (ref 7–25)
BUN/CREAT SERPL: 40 (ref 7–25)
CALCIUM SERPL-MCNC: 8.5 MG/DL (ref 8.4–10.2)
CALCIUM SERPL-MCNC: 8.6 MG/DL (ref 8.4–10.2)
CHLORIDE SERPL-SCNC: 98 MMOL/L (ref 97–110)
CHLORIDE SERPL-SCNC: 99 MMOL/L (ref 97–110)
CO2 SERPL-SCNC: 26 MMOL/L (ref 21–32)
CO2 SERPL-SCNC: 26 MMOL/L (ref 21–32)
CREAT SERPL-MCNC: 1.69 MG/DL (ref 0.51–0.95)
CREAT SERPL-MCNC: 1.74 MG/DL (ref 0.51–0.95)
DEPRECATED RDW RBC: 61.1 FL (ref 39–50)
ERYTHROCYTE [DISTWIDTH] IN BLOOD: 15.9 % (ref 11–15)
FASTING DURATION TIME PATIENT: ABNORMAL H
FASTING DURATION TIME PATIENT: ABNORMAL H
GFR SERPLBLD BASED ON 1.73 SQ M-ARVRAT: 29 ML/MIN
GFR SERPLBLD BASED ON 1.73 SQ M-ARVRAT: 30 ML/MIN
GLUCOSE SERPL-MCNC: 155 MG/DL (ref 70–99)
GLUCOSE SERPL-MCNC: 87 MG/DL (ref 70–99)
HCT VFR BLD CALC: 25.4 % (ref 36–46.5)
HGB BLD-MCNC: 8.3 G/DL (ref 12–15.5)
INR PPP: 2.1
MCH RBC QN AUTO: 34.3 PG (ref 26–34)
MCHC RBC AUTO-ENTMCNC: 32.7 G/DL (ref 32–36.5)
MCV RBC AUTO: 105 FL (ref 78–100)
NRBC BLD MANUAL-RTO: 0 /100 WBC
PLATELET # BLD AUTO: 279 K/MCL (ref 140–450)
POTASSIUM SERPL-SCNC: 4.5 MMOL/L (ref 3.4–5.1)
POTASSIUM SERPL-SCNC: 5 MMOL/L (ref 3.4–5.1)
PROTHROMBIN TIME: 21.9 SEC (ref 9.7–11.8)
RAINBOW EXTRA TUBES HOLD SPECIMEN: NORMAL
RBC # BLD: 2.42 MIL/MCL (ref 4–5.2)
SODIUM SERPL-SCNC: 132 MMOL/L (ref 135–145)
SODIUM SERPL-SCNC: 132 MMOL/L (ref 135–145)
WBC # BLD: 6.7 K/MCL (ref 4.2–11)

## 2022-07-04 PROCEDURE — 97110 THERAPEUTIC EXERCISES: CPT

## 2022-07-04 PROCEDURE — 10004651 HB RX, NO CHARGE ITEM: Performed by: INTERNAL MEDICINE

## 2022-07-04 PROCEDURE — 80048 BASIC METABOLIC PNL TOTAL CA: CPT | Performed by: INTERNAL MEDICINE

## 2022-07-04 PROCEDURE — 10002800 HB RX 250 W HCPCS: Performed by: INTERNAL MEDICINE

## 2022-07-04 PROCEDURE — 85610 PROTHROMBIN TIME: CPT | Performed by: INTERNAL MEDICINE

## 2022-07-04 PROCEDURE — 10002803 HB RX 637: Performed by: INTERNAL MEDICINE

## 2022-07-04 PROCEDURE — 10006031 HB ROOM CHARGE TELEMETRY

## 2022-07-04 PROCEDURE — 36415 COLL VENOUS BLD VENIPUNCTURE: CPT | Performed by: INTERNAL MEDICINE

## 2022-07-04 PROCEDURE — 97530 THERAPEUTIC ACTIVITIES: CPT

## 2022-07-04 PROCEDURE — 85730 THROMBOPLASTIN TIME PARTIAL: CPT | Performed by: INTERNAL MEDICINE

## 2022-07-04 PROCEDURE — 99232 SBSQ HOSP IP/OBS MODERATE 35: CPT | Performed by: INTERNAL MEDICINE

## 2022-07-04 PROCEDURE — 85027 COMPLETE CBC AUTOMATED: CPT | Performed by: INTERNAL MEDICINE

## 2022-07-04 RX ORDER — WARFARIN SODIUM 2.5 MG/1
7.5 TABLET ORAL ONCE
Status: COMPLETED | OUTPATIENT
Start: 2022-07-04 | End: 2022-07-04

## 2022-07-04 RX ADMIN — SERTRALINE HYDROCHLORIDE 25 MG: 25 TABLET, FILM COATED ORAL at 08:56

## 2022-07-04 RX ADMIN — HEPARIN SODIUM 17 UNITS/KG/HR: 1000 INJECTION, SOLUTION INTRAVENOUS; SUBCUTANEOUS at 08:30

## 2022-07-04 RX ADMIN — SODIUM CHLORIDE, PRESERVATIVE FREE 2 ML: 5 INJECTION INTRAVENOUS at 19:43

## 2022-07-04 RX ADMIN — DILTIAZEM HYDROCHLORIDE 120 MG: 120 CAPSULE, EXTENDED RELEASE ORAL at 08:56

## 2022-07-04 RX ADMIN — CYCLOBENZAPRINE HYDROCHLORIDE 5 MG: 5 TABLET, FILM COATED ORAL at 09:43

## 2022-07-04 RX ADMIN — TORSEMIDE 10 MG: 10 TABLET ORAL at 08:56

## 2022-07-04 RX ADMIN — AMIODARONE HYDROCHLORIDE 100 MG: 200 TABLET ORAL at 20:02

## 2022-07-04 RX ADMIN — HYDROCODONE BITARTRATE AND ACETAMINOPHEN 1 TABLET: 5; 325 TABLET ORAL at 19:43

## 2022-07-04 RX ADMIN — WARFARIN SODIUM 7.5 MG: 2.5 TABLET ORAL at 17:34

## 2022-07-04 ASSESSMENT — COGNITIVE AND FUNCTIONAL STATUS - GENERAL
HELP NEEDED FOR TOILETING: TOTAL
HELP NEEDED FOR BATHING: A LOT
DAILY_ACTIVITY_CONVERTED_SCORE: 33.39
HELP NEEDED DRESSING REGULAR LOWER BODY CLOTHING: TOTAL
DAILY_ACTIVITY_RAW_SCORE: 14
HELP NEEDED DRESSING REGULAR UPPER BODY CLOTHING: A LITTLE

## 2022-07-04 ASSESSMENT — ENCOUNTER SYMPTOMS
FATIGUE: 0
ACTIVITY CHANGE: 0
WEAKNESS: 0
APPETITE CHANGE: 0

## 2022-07-04 ASSESSMENT — PAIN SCALES - GENERAL
PAINLEVEL_OUTOF10: 0
PAINLEVEL_OUTOF10: 0
PAINLEVEL_OUTOF10: 6
PAINLEVEL_OUTOF10: 0
PAINLEVEL_OUTOF10: 0

## 2022-07-05 VITALS
SYSTOLIC BLOOD PRESSURE: 99 MMHG | WEIGHT: 160.5 LBS | HEIGHT: 66 IN | HEART RATE: 85 BPM | BODY MASS INDEX: 25.79 KG/M2 | TEMPERATURE: 97.7 F | RESPIRATION RATE: 18 BRPM | OXYGEN SATURATION: 99 % | DIASTOLIC BLOOD PRESSURE: 59 MMHG

## 2022-07-05 PROBLEM — I95.89 HYPOTENSION DUE TO HYPOVOLEMIA: Status: RESOLVED | Noted: 2022-06-23 | Resolved: 2022-07-05

## 2022-07-05 PROBLEM — E86.1 HYPOTENSION DUE TO HYPOVOLEMIA: Status: RESOLVED | Noted: 2022-06-23 | Resolved: 2022-07-05

## 2022-07-05 LAB
ANION GAP SERPL CALC-SCNC: 10 MMOL/L (ref 7–19)
APTT PPP: 81 SEC (ref 22–30)
BUN SERPL-MCNC: 63 MG/DL (ref 6–20)
BUN/CREAT SERPL: 37 (ref 7–25)
CALCIUM SERPL-MCNC: 8.4 MG/DL (ref 8.4–10.2)
CHLORIDE SERPL-SCNC: 100 MMOL/L (ref 97–110)
CO2 SERPL-SCNC: 28 MMOL/L (ref 21–32)
CREAT SERPL-MCNC: 1.69 MG/DL (ref 0.51–0.95)
DEPRECATED RDW RBC: 61.4 FL (ref 39–50)
ERYTHROCYTE [DISTWIDTH] IN BLOOD: 15.7 % (ref 11–15)
FASTING DURATION TIME PATIENT: ABNORMAL H
GFR SERPLBLD BASED ON 1.73 SQ M-ARVRAT: 30 ML/MIN
GLUCOSE SERPL-MCNC: 86 MG/DL (ref 70–99)
HCT VFR BLD CALC: 26.2 % (ref 36–46.5)
HGB BLD-MCNC: 8.5 G/DL (ref 12–15.5)
INR PPP: 2.4
MAGNESIUM SERPL-MCNC: 2.5 MG/DL (ref 1.7–2.4)
MCH RBC QN AUTO: 34.4 PG (ref 26–34)
MCHC RBC AUTO-ENTMCNC: 32.4 G/DL (ref 32–36.5)
MCV RBC AUTO: 106.1 FL (ref 78–100)
NRBC BLD MANUAL-RTO: 0 /100 WBC
PLATELET # BLD AUTO: 280 K/MCL (ref 140–450)
POTASSIUM SERPL-SCNC: 4.8 MMOL/L (ref 3.4–5.1)
PROTHROMBIN TIME: 24.6 SEC (ref 9.7–11.8)
RBC # BLD: 2.47 MIL/MCL (ref 4–5.2)
SODIUM SERPL-SCNC: 133 MMOL/L (ref 135–145)
WBC # BLD: 6.3 K/MCL (ref 4.2–11)

## 2022-07-05 PROCEDURE — 83735 ASSAY OF MAGNESIUM: CPT | Performed by: INTERNAL MEDICINE

## 2022-07-05 PROCEDURE — 10002800 HB RX 250 W HCPCS: Performed by: INTERNAL MEDICINE

## 2022-07-05 PROCEDURE — 85730 THROMBOPLASTIN TIME PARTIAL: CPT | Performed by: INTERNAL MEDICINE

## 2022-07-05 PROCEDURE — 99223 1ST HOSP IP/OBS HIGH 75: CPT | Performed by: NURSE PRACTITIONER

## 2022-07-05 PROCEDURE — 99356 PROLONGED SERV INPT OR OBS REQ UNIT FLOOR TIME BEYOND USUAL SER 1ST HR: CPT | Performed by: NURSE PRACTITIONER

## 2022-07-05 PROCEDURE — 10004651 HB RX, NO CHARGE ITEM: Performed by: INTERNAL MEDICINE

## 2022-07-05 PROCEDURE — 10002803 HB RX 637: Performed by: INTERNAL MEDICINE

## 2022-07-05 PROCEDURE — 85027 COMPLETE CBC AUTOMATED: CPT | Performed by: INTERNAL MEDICINE

## 2022-07-05 PROCEDURE — 80048 BASIC METABOLIC PNL TOTAL CA: CPT | Performed by: INTERNAL MEDICINE

## 2022-07-05 PROCEDURE — 99239 HOSP IP/OBS DSCHRG MGMT >30: CPT | Performed by: INTERNAL MEDICINE

## 2022-07-05 PROCEDURE — 85610 PROTHROMBIN TIME: CPT | Performed by: INTERNAL MEDICINE

## 2022-07-05 PROCEDURE — 99497 ADVNCD CARE PLAN 30 MIN: CPT | Performed by: NURSE PRACTITIONER

## 2022-07-05 PROCEDURE — 36415 COLL VENOUS BLD VENIPUNCTURE: CPT | Performed by: INTERNAL MEDICINE

## 2022-07-05 RX ORDER — HYDROCODONE BITARTRATE AND ACETAMINOPHEN 5; 325 MG/1; MG/1
1 TABLET ORAL EVERY 4 HOURS PRN
Qty: 20 TABLET | Refills: 0 | Status: SHIPPED | OUTPATIENT
Start: 2022-07-05 | End: 2022-07-08 | Stop reason: SDUPTHER

## 2022-07-05 RX ORDER — WARFARIN SODIUM 2.5 MG/1
5 TABLET ORAL ONCE
Status: DISCONTINUED | OUTPATIENT
Start: 2022-07-05 | End: 2022-07-05 | Stop reason: HOSPADM

## 2022-07-05 RX ORDER — TORSEMIDE 20 MG/1
20 TABLET ORAL DAILY
Qty: 180 TABLET | Refills: 3 | Status: SHIPPED | OUTPATIENT
Start: 2022-07-05 | End: 2022-07-24 | Stop reason: DRUGHIGH

## 2022-07-05 RX ORDER — ACETAMINOPHEN 325 MG/1
650 TABLET ORAL EVERY 4 HOURS PRN
Status: SHIPPED | COMMUNITY
Start: 2022-07-05 | End: 2022-07-08 | Stop reason: SDUPTHER

## 2022-07-05 RX ADMIN — SERTRALINE HYDROCHLORIDE 25 MG: 25 TABLET, FILM COATED ORAL at 08:28

## 2022-07-05 RX ADMIN — DILTIAZEM HYDROCHLORIDE 120 MG: 120 CAPSULE, EXTENDED RELEASE ORAL at 08:28

## 2022-07-05 RX ADMIN — TORSEMIDE 10 MG: 10 TABLET ORAL at 08:28

## 2022-07-05 RX ADMIN — HEPARIN SODIUM 17 UNITS/KG/HR: 1000 INJECTION, SOLUTION INTRAVENOUS; SUBCUTANEOUS at 05:20

## 2022-07-05 RX ADMIN — SODIUM CHLORIDE, PRESERVATIVE FREE 2 ML: 5 INJECTION INTRAVENOUS at 08:31

## 2022-07-05 RX ADMIN — HYDROCODONE BITARTRATE AND ACETAMINOPHEN 1 TABLET: 5; 325 TABLET ORAL at 09:06

## 2022-07-05 ASSESSMENT — PAIN SCALES - GENERAL
PAINLEVEL_OUTOF10: 0
PAINLEVEL_OUTOF10: 10
PAINLEVEL_OUTOF10: 0

## 2022-07-05 ASSESSMENT — ENCOUNTER SYMPTOMS
FATIGUE: 0
ACTIVITY CHANGE: 0
WEAKNESS: 0
APPETITE CHANGE: 0

## 2022-07-06 ENCOUNTER — NURSING HOME VISIT (OUTPATIENT)
Dept: POST ACUTE CARE | Age: 82
End: 2022-07-06

## 2022-07-06 DIAGNOSIS — I82.403 ACUTE DEEP VEIN THROMBOSIS (DVT) OF BOTH LOWER EXTREMITIES, UNSPECIFIED VEIN (CMD): ICD-10-CM

## 2022-07-06 DIAGNOSIS — I50.32 CHRONIC DIASTOLIC CONGESTIVE HEART FAILURE (CMD): ICD-10-CM

## 2022-07-06 DIAGNOSIS — Z86.79 HISTORY OF ISCHEMIC CARDIOMYOPATHY: ICD-10-CM

## 2022-07-06 DIAGNOSIS — R53.81 DEBILITY: Primary | ICD-10-CM

## 2022-07-06 DIAGNOSIS — N17.9 AKI (ACUTE KIDNEY INJURY) (CMD): ICD-10-CM

## 2022-07-06 DIAGNOSIS — F33.42 MAJOR DEPRESSIVE DISORDER, RECURRENT EPISODE, IN FULL REMISSION (CMD): ICD-10-CM

## 2022-07-06 DIAGNOSIS — Z71.89 ACP (ADVANCE CARE PLANNING): ICD-10-CM

## 2022-07-06 DIAGNOSIS — Z79.01 LONG TERM (CURRENT) USE OF ANTICOAGULANTS: ICD-10-CM

## 2022-07-06 DIAGNOSIS — I48.91 ATRIAL FIBRILLATION, UNSPECIFIED TYPE (CMD): ICD-10-CM

## 2022-07-06 DIAGNOSIS — Z95.810 HISTORY OF AUTOMATIC INTERNAL CARDIAC DEFIBRILLATOR (AICD): ICD-10-CM

## 2022-07-06 DIAGNOSIS — E11.29 TYPE 2 DIABETES MELLITUS WITH OTHER DIABETIC KIDNEY COMPLICATION, WITHOUT LONG-TERM CURRENT USE OF INSULIN (CMD): ICD-10-CM

## 2022-07-06 DIAGNOSIS — E44.0 PROTEIN-CALORIE MALNUTRITION, MODERATE (CMD): ICD-10-CM

## 2022-07-06 DIAGNOSIS — Z86.79 HISTORY OF VENTRICULAR TACHYCARDIA: ICD-10-CM

## 2022-07-06 PROCEDURE — 1160F RVW MEDS BY RX/DR IN RCRD: CPT | Performed by: NURSE PRACTITIONER

## 2022-07-06 PROCEDURE — 99497 ADVNCD CARE PLAN 30 MIN: CPT | Performed by: NURSE PRACTITIONER

## 2022-07-06 PROCEDURE — 1170F FXNL STATUS ASSESSED: CPT | Performed by: NURSE PRACTITIONER

## 2022-07-06 PROCEDURE — 1125F AMNT PAIN NOTED PAIN PRSNT: CPT | Performed by: NURSE PRACTITIONER

## 2022-07-06 PROCEDURE — 99310 SBSQ NF CARE HIGH MDM 45: CPT | Performed by: NURSE PRACTITIONER

## 2022-07-06 PROCEDURE — 1157F ADVNC CARE PLAN IN RCRD: CPT | Performed by: NURSE PRACTITIONER

## 2022-07-06 PROCEDURE — 1158F ADVNC CARE PLAN TLK DOCD: CPT | Performed by: NURSE PRACTITIONER

## 2022-07-06 ASSESSMENT — PAIN SCALES - GENERAL
PAINLEVEL: 6
PAINLEVEL: 6
PAINLEVEL: 7

## 2022-07-08 ENCOUNTER — NURSING HOME VISIT (OUTPATIENT)
Dept: POST ACUTE CARE | Age: 82
End: 2022-07-08

## 2022-07-08 VITALS
DIASTOLIC BLOOD PRESSURE: 66 MMHG | TEMPERATURE: 96.2 F | SYSTOLIC BLOOD PRESSURE: 125 MMHG | BODY MASS INDEX: 25.71 KG/M2 | WEIGHT: 160 LBS | OXYGEN SATURATION: 99 % | HEIGHT: 66 IN | HEART RATE: 68 BPM | RESPIRATION RATE: 18 BRPM

## 2022-07-08 VITALS
HEART RATE: 97 BPM | HEIGHT: 66 IN | BODY MASS INDEX: 25.71 KG/M2 | DIASTOLIC BLOOD PRESSURE: 66 MMHG | RESPIRATION RATE: 18 BRPM | SYSTOLIC BLOOD PRESSURE: 119 MMHG | OXYGEN SATURATION: 96 % | TEMPERATURE: 97.9 F | WEIGHT: 160 LBS

## 2022-07-08 VITALS
HEART RATE: 87 BPM | TEMPERATURE: 97.9 F | SYSTOLIC BLOOD PRESSURE: 119 MMHG | RESPIRATION RATE: 18 BRPM | DIASTOLIC BLOOD PRESSURE: 66 MMHG | OXYGEN SATURATION: 96 %

## 2022-07-08 DIAGNOSIS — Z86.79 HISTORY OF VENTRICULAR TACHYCARDIA: ICD-10-CM

## 2022-07-08 DIAGNOSIS — N17.9 AKI (ACUTE KIDNEY INJURY) (CMD): ICD-10-CM

## 2022-07-08 DIAGNOSIS — G89.29 CHRONIC NONINTRACTABLE HEADACHE, UNSPECIFIED HEADACHE TYPE: ICD-10-CM

## 2022-07-08 DIAGNOSIS — R53.81 DEBILITY: ICD-10-CM

## 2022-07-08 DIAGNOSIS — I48.20 CHRONIC ATRIAL FIBRILLATION (CMD): Primary | ICD-10-CM

## 2022-07-08 DIAGNOSIS — I50.32 CHRONIC DIASTOLIC CONGESTIVE HEART FAILURE (CMD): ICD-10-CM

## 2022-07-08 DIAGNOSIS — E44.0 PROTEIN-CALORIE MALNUTRITION, MODERATE (CMD): ICD-10-CM

## 2022-07-08 DIAGNOSIS — I82.403 DEEP VEIN THROMBOSIS (DVT) OF BOTH LOWER EXTREMITIES, UNSPECIFIED CHRONICITY, UNSPECIFIED VEIN (CMD): ICD-10-CM

## 2022-07-08 DIAGNOSIS — Z79.01 LONG TERM (CURRENT) USE OF ANTICOAGULANTS: ICD-10-CM

## 2022-07-08 DIAGNOSIS — E11.29 TYPE 2 DIABETES MELLITUS WITH OTHER DIABETIC KIDNEY COMPLICATION, WITHOUT LONG-TERM CURRENT USE OF INSULIN (CMD): ICD-10-CM

## 2022-07-08 DIAGNOSIS — Z95.810 HISTORY OF AUTOMATIC INTERNAL CARDIAC DEFIBRILLATOR (AICD): ICD-10-CM

## 2022-07-08 DIAGNOSIS — Z71.89 ACP (ADVANCE CARE PLANNING): ICD-10-CM

## 2022-07-08 DIAGNOSIS — I82.403 ACUTE DEEP VEIN THROMBOSIS (DVT) OF BOTH LOWER EXTREMITIES, UNSPECIFIED VEIN (CMD): ICD-10-CM

## 2022-07-08 DIAGNOSIS — M79.604 PAIN IN BOTH LOWER EXTREMITIES: ICD-10-CM

## 2022-07-08 DIAGNOSIS — Z86.79 HISTORY OF ISCHEMIC CARDIOMYOPATHY: ICD-10-CM

## 2022-07-08 DIAGNOSIS — L22 DIAPER DERMATITIS: ICD-10-CM

## 2022-07-08 DIAGNOSIS — F33.42 MAJOR DEPRESSIVE DISORDER, RECURRENT EPISODE, IN FULL REMISSION (CMD): ICD-10-CM

## 2022-07-08 DIAGNOSIS — R51.9 CHRONIC NONINTRACTABLE HEADACHE, UNSPECIFIED HEADACHE TYPE: ICD-10-CM

## 2022-07-08 DIAGNOSIS — M79.605 PAIN IN BOTH LOWER EXTREMITIES: ICD-10-CM

## 2022-07-08 RX ORDER — ACETAMINOPHEN 500 MG
1000 TABLET ORAL EVERY 8 HOURS
Status: SHIPPED | COMMUNITY
Start: 2022-07-08 | End: 2022-07-08 | Stop reason: CLARIF

## 2022-07-08 RX ORDER — HYDROCODONE BITARTRATE AND ACETAMINOPHEN 5; 325 MG/1; MG/1
1 TABLET ORAL EVERY 6 HOURS
Qty: 20 TABLET | Refills: 0 | Status: SHIPPED | OUTPATIENT
Start: 2022-07-08 | End: 2022-07-13 | Stop reason: SDUPTHER

## 2022-07-08 ASSESSMENT — FRAILTY ASSESSMENTS
DO YOU HAVE DIFFICULTY CLIMBING A FLIGHT OF STAIRS: YES
DO YOU HAVE DIFFICULTY WALKING ONE BLOCK: YES
FRAILTY SCALE TOTAL SCORE: 5
HAVE YOU FELT FATIGUED? MOST OR ALL OF THE TIME OVER THE PAST MONTH: YES
DO YOU HAVE DIFFICULTY WALKING ONE BLOCK: YES
DO YOU HAVE DIFFICULTY CLIMBING A FLIGHT OF STAIRS: YES
FRAILTY SCALE TOTAL SCORE: 4
HAVE YOU LOST MORE THAN 5 PERCENT OF YOUR WEIGHT IN THE PAST YEAR: NO
HAVE YOU FELT FATIGUED? MOST OR ALL OF THE TIME OVER THE PAST MONTH: YES
DO YOU HAVE ANY OF THESE ILLNESSES: HYPERTENSION, DIABETES, CANCER (OTHER THAN A MINOR SKIN CANCER), CHRONIC LUNG DISEASE, HEART ATTACK, CONGESTIVE HEART FAILURE, ANGINA, ASTHMA, ARTHRITIS, STROKE, AND KIDNEY DISEASE: YES
HAVE YOU LOST MORE THAN 5 PERCENT OF YOUR WEIGHT IN THE PAST YEAR: YES
DO YOU HAVE ANY OF THESE ILLNESSES: HYPERTENSION, DIABETES, CANCER (OTHER THAN A MINOR SKIN CANCER), CHRONIC LUNG DISEASE, HEART ATTACK, CONGESTIVE HEART FAILURE, ANGINA, ASTHMA, ARTHRITIS, STROKE, AND KIDNEY DISEASE: YES

## 2022-07-08 ASSESSMENT — ENCOUNTER SYMPTOMS
ACTIVITY CHANGE: 1
UNEXPECTED WEIGHT CHANGE: 1
UNEXPECTED WEIGHT CHANGE: 1
APPETITE CHANGE: 1
ACTIVITY CHANGE: 1
APPETITE CHANGE: 1
ACTIVITY CHANGE: 1
UNEXPECTED WEIGHT CHANGE: 1
APPETITE CHANGE: 1
HEADACHES: 1

## 2022-07-11 ENCOUNTER — NURSING HOME VISIT (OUTPATIENT)
Dept: POST ACUTE CARE | Age: 82
End: 2022-07-11

## 2022-07-11 VITALS
RESPIRATION RATE: 18 BRPM | OXYGEN SATURATION: 97 % | SYSTOLIC BLOOD PRESSURE: 133 MMHG | DIASTOLIC BLOOD PRESSURE: 71 MMHG | HEART RATE: 71 BPM | TEMPERATURE: 96.1 F

## 2022-07-11 DIAGNOSIS — M79.605 PAIN IN BOTH LOWER EXTREMITIES: ICD-10-CM

## 2022-07-11 DIAGNOSIS — E11.29 TYPE 2 DIABETES MELLITUS WITH OTHER DIABETIC KIDNEY COMPLICATION, WITHOUT LONG-TERM CURRENT USE OF INSULIN (CMD): ICD-10-CM

## 2022-07-11 DIAGNOSIS — I50.32 CHRONIC DIASTOLIC CONGESTIVE HEART FAILURE (CMD): ICD-10-CM

## 2022-07-11 DIAGNOSIS — E44.0 PROTEIN-CALORIE MALNUTRITION, MODERATE (CMD): ICD-10-CM

## 2022-07-11 DIAGNOSIS — I48.91 ATRIAL FIBRILLATION, UNSPECIFIED TYPE (CMD): ICD-10-CM

## 2022-07-11 DIAGNOSIS — Z79.01 LONG TERM (CURRENT) USE OF ANTICOAGULANTS: ICD-10-CM

## 2022-07-11 DIAGNOSIS — G89.29 CHRONIC NONINTRACTABLE HEADACHE, UNSPECIFIED HEADACHE TYPE: ICD-10-CM

## 2022-07-11 DIAGNOSIS — N17.9 AKI (ACUTE KIDNEY INJURY) (CMD): ICD-10-CM

## 2022-07-11 DIAGNOSIS — I82.403 ACUTE DEEP VEIN THROMBOSIS (DVT) OF BOTH LOWER EXTREMITIES, UNSPECIFIED VEIN (CMD): ICD-10-CM

## 2022-07-11 DIAGNOSIS — M79.604 PAIN IN BOTH LOWER EXTREMITIES: ICD-10-CM

## 2022-07-11 DIAGNOSIS — R51.9 CHRONIC NONINTRACTABLE HEADACHE, UNSPECIFIED HEADACHE TYPE: ICD-10-CM

## 2022-07-11 DIAGNOSIS — R53.81 DEBILITY: Primary | ICD-10-CM

## 2022-07-11 PROCEDURE — 1160F RVW MEDS BY RX/DR IN RCRD: CPT | Performed by: NURSE PRACTITIONER

## 2022-07-11 PROCEDURE — 1158F ADVNC CARE PLAN TLK DOCD: CPT | Performed by: NURSE PRACTITIONER

## 2022-07-11 PROCEDURE — 1125F AMNT PAIN NOTED PAIN PRSNT: CPT | Performed by: NURSE PRACTITIONER

## 2022-07-11 PROCEDURE — 1157F ADVNC CARE PLAN IN RCRD: CPT | Performed by: NURSE PRACTITIONER

## 2022-07-11 PROCEDURE — 99310 SBSQ NF CARE HIGH MDM 45: CPT | Performed by: NURSE PRACTITIONER

## 2022-07-11 PROCEDURE — 1170F FXNL STATUS ASSESSED: CPT | Performed by: NURSE PRACTITIONER

## 2022-07-11 RX ORDER — WARFARIN SODIUM 5 MG/1
TABLET ORAL
Qty: 30 TABLET | Refills: 5 | Status: CANCELLED | OUTPATIENT
Start: 2022-07-11

## 2022-07-11 ASSESSMENT — ENCOUNTER SYMPTOMS
ACTIVITY CHANGE: 1
APPETITE CHANGE: 1
HEADACHES: 1

## 2022-07-11 ASSESSMENT — PAIN SCALES - GENERAL: PAINLEVEL: 4

## 2022-07-12 ENCOUNTER — NURSING HOME VISIT (OUTPATIENT)
Dept: POST ACUTE CARE | Age: 82
End: 2022-07-12

## 2022-07-12 DIAGNOSIS — N18.32 STAGE 3B CHRONIC KIDNEY DISEASE (CMD): ICD-10-CM

## 2022-07-12 DIAGNOSIS — E11.29 TYPE 2 DIABETES MELLITUS WITH OTHER DIABETIC KIDNEY COMPLICATION, WITHOUT LONG-TERM CURRENT USE OF INSULIN (CMD): ICD-10-CM

## 2022-07-12 DIAGNOSIS — I82.403 ACUTE DEEP VEIN THROMBOSIS (DVT) OF BOTH LOWER EXTREMITIES, UNSPECIFIED VEIN (CMD): ICD-10-CM

## 2022-07-12 DIAGNOSIS — M79.604 PAIN IN BOTH LOWER EXTREMITIES: ICD-10-CM

## 2022-07-12 DIAGNOSIS — R51.9 CHRONIC NONINTRACTABLE HEADACHE, UNSPECIFIED HEADACHE TYPE: Primary | ICD-10-CM

## 2022-07-12 DIAGNOSIS — N17.9 AKI (ACUTE KIDNEY INJURY) (CMD): ICD-10-CM

## 2022-07-12 DIAGNOSIS — M79.605 PAIN IN BOTH LOWER EXTREMITIES: ICD-10-CM

## 2022-07-12 DIAGNOSIS — Z79.01 LONG TERM (CURRENT) USE OF ANTICOAGULANTS: ICD-10-CM

## 2022-07-12 DIAGNOSIS — G89.29 CHRONIC NONINTRACTABLE HEADACHE, UNSPECIFIED HEADACHE TYPE: Primary | ICD-10-CM

## 2022-07-12 DIAGNOSIS — R53.81 DEBILITY: ICD-10-CM

## 2022-07-12 PROCEDURE — 1160F RVW MEDS BY RX/DR IN RCRD: CPT | Performed by: NURSE PRACTITIONER

## 2022-07-12 PROCEDURE — 99310 SBSQ NF CARE HIGH MDM 45: CPT | Performed by: NURSE PRACTITIONER

## 2022-07-12 PROCEDURE — 1157F ADVNC CARE PLAN IN RCRD: CPT | Performed by: NURSE PRACTITIONER

## 2022-07-12 PROCEDURE — 1158F ADVNC CARE PLAN TLK DOCD: CPT | Performed by: NURSE PRACTITIONER

## 2022-07-12 PROCEDURE — 1170F FXNL STATUS ASSESSED: CPT | Performed by: NURSE PRACTITIONER

## 2022-07-12 PROCEDURE — 1125F AMNT PAIN NOTED PAIN PRSNT: CPT | Performed by: NURSE PRACTITIONER

## 2022-07-12 ASSESSMENT — PAIN SCALES - GENERAL: PAINLEVEL: 3

## 2022-07-13 ENCOUNTER — NURSING HOME VISIT (OUTPATIENT)
Dept: POST ACUTE CARE | Age: 82
End: 2022-07-13

## 2022-07-13 VITALS
SYSTOLIC BLOOD PRESSURE: 103 MMHG | RESPIRATION RATE: 18 BRPM | OXYGEN SATURATION: 95 % | HEART RATE: 64 BPM | TEMPERATURE: 97.8 F | DIASTOLIC BLOOD PRESSURE: 55 MMHG

## 2022-07-13 VITALS
DIASTOLIC BLOOD PRESSURE: 68 MMHG | TEMPERATURE: 97.2 F | OXYGEN SATURATION: 96 % | SYSTOLIC BLOOD PRESSURE: 108 MMHG | RESPIRATION RATE: 19 BRPM | HEART RATE: 68 BPM

## 2022-07-13 DIAGNOSIS — E11.29 TYPE 2 DIABETES MELLITUS WITH OTHER DIABETIC KIDNEY COMPLICATION, WITHOUT LONG-TERM CURRENT USE OF INSULIN (CMD): ICD-10-CM

## 2022-07-13 DIAGNOSIS — R53.81 DEBILITY: ICD-10-CM

## 2022-07-13 DIAGNOSIS — R51.9 CHRONIC NONINTRACTABLE HEADACHE, UNSPECIFIED HEADACHE TYPE: Primary | ICD-10-CM

## 2022-07-13 DIAGNOSIS — I82.403 ACUTE DEEP VEIN THROMBOSIS (DVT) OF BOTH LOWER EXTREMITIES, UNSPECIFIED VEIN (CMD): ICD-10-CM

## 2022-07-13 DIAGNOSIS — G89.29 CHRONIC NONINTRACTABLE HEADACHE, UNSPECIFIED HEADACHE TYPE: Primary | ICD-10-CM

## 2022-07-13 DIAGNOSIS — N18.32 STAGE 3B CHRONIC KIDNEY DISEASE (CMD): ICD-10-CM

## 2022-07-13 DIAGNOSIS — M79.604 PAIN IN BOTH LOWER EXTREMITIES: ICD-10-CM

## 2022-07-13 DIAGNOSIS — R79.1 SUPRATHERAPEUTIC INR: ICD-10-CM

## 2022-07-13 DIAGNOSIS — I50.32 CHRONIC DIASTOLIC CONGESTIVE HEART FAILURE (CMD): ICD-10-CM

## 2022-07-13 DIAGNOSIS — Z71.89 ACP (ADVANCE CARE PLANNING): ICD-10-CM

## 2022-07-13 DIAGNOSIS — Z79.01 LONG TERM (CURRENT) USE OF ANTICOAGULANTS: ICD-10-CM

## 2022-07-13 DIAGNOSIS — M79.605 PAIN IN BOTH LOWER EXTREMITIES: ICD-10-CM

## 2022-07-13 PROCEDURE — 99497 ADVNCD CARE PLAN 30 MIN: CPT | Performed by: NURSE PRACTITIONER

## 2022-07-13 PROCEDURE — 1160F RVW MEDS BY RX/DR IN RCRD: CPT | Performed by: NURSE PRACTITIONER

## 2022-07-13 PROCEDURE — 99310 SBSQ NF CARE HIGH MDM 45: CPT | Performed by: NURSE PRACTITIONER

## 2022-07-13 PROCEDURE — 1158F ADVNC CARE PLAN TLK DOCD: CPT | Performed by: NURSE PRACTITIONER

## 2022-07-13 PROCEDURE — 1170F FXNL STATUS ASSESSED: CPT | Performed by: NURSE PRACTITIONER

## 2022-07-13 PROCEDURE — 1125F AMNT PAIN NOTED PAIN PRSNT: CPT | Performed by: NURSE PRACTITIONER

## 2022-07-13 PROCEDURE — 1157F ADVNC CARE PLAN IN RCRD: CPT | Performed by: NURSE PRACTITIONER

## 2022-07-13 RX ORDER — HYDROCODONE BITARTRATE AND ACETAMINOPHEN 5; 325 MG/1; MG/1
1 TABLET ORAL EVERY 6 HOURS
Qty: 20 TABLET | Refills: 0 | Status: SHIPPED | OUTPATIENT
Start: 2022-07-13 | End: 2022-07-24 | Stop reason: ALTCHOICE

## 2022-07-13 ASSESSMENT — ENCOUNTER SYMPTOMS
ACTIVITY CHANGE: 1
APPETITE CHANGE: 1
APPETITE CHANGE: 1
HEADACHES: 1
HEADACHES: 1
ACTIVITY CHANGE: 1

## 2022-07-13 ASSESSMENT — PAIN SCALES - GENERAL: PAINLEVEL: 4

## 2022-07-14 PROBLEM — R79.1 SUPRATHERAPEUTIC INR: Status: ACTIVE | Noted: 2022-07-14

## 2022-07-15 ENCOUNTER — NURSING HOME VISIT (OUTPATIENT)
Dept: POST ACUTE CARE | Age: 82
End: 2022-07-15

## 2022-07-15 VITALS
RESPIRATION RATE: 19 BRPM | HEART RATE: 77 BPM | SYSTOLIC BLOOD PRESSURE: 126 MMHG | OXYGEN SATURATION: 97 % | DIASTOLIC BLOOD PRESSURE: 66 MMHG | TEMPERATURE: 96.4 F

## 2022-07-15 DIAGNOSIS — M79.605 PAIN IN BOTH LOWER EXTREMITIES: ICD-10-CM

## 2022-07-15 DIAGNOSIS — Z51.81 SUBTHERAPEUTIC ANTICOAGULATION: ICD-10-CM

## 2022-07-15 DIAGNOSIS — G89.29 CHRONIC NONINTRACTABLE HEADACHE, UNSPECIFIED HEADACHE TYPE: Primary | ICD-10-CM

## 2022-07-15 DIAGNOSIS — I82.403 ACUTE DEEP VEIN THROMBOSIS (DVT) OF BOTH LOWER EXTREMITIES, UNSPECIFIED VEIN (CMD): ICD-10-CM

## 2022-07-15 DIAGNOSIS — R51.9 CHRONIC NONINTRACTABLE HEADACHE, UNSPECIFIED HEADACHE TYPE: Primary | ICD-10-CM

## 2022-07-15 DIAGNOSIS — R30.0 DYSURIA: ICD-10-CM

## 2022-07-15 DIAGNOSIS — I50.32 CHRONIC DIASTOLIC CONGESTIVE HEART FAILURE (CMD): ICD-10-CM

## 2022-07-15 DIAGNOSIS — E11.29 TYPE 2 DIABETES MELLITUS WITH OTHER DIABETIC KIDNEY COMPLICATION, WITHOUT LONG-TERM CURRENT USE OF INSULIN (CMD): ICD-10-CM

## 2022-07-15 DIAGNOSIS — Z79.01 SUBTHERAPEUTIC ANTICOAGULATION: ICD-10-CM

## 2022-07-15 DIAGNOSIS — M79.604 PAIN IN BOTH LOWER EXTREMITIES: ICD-10-CM

## 2022-07-15 DIAGNOSIS — N30.00 ACUTE CYSTITIS WITHOUT HEMATURIA: ICD-10-CM

## 2022-07-15 DIAGNOSIS — R53.81 DEBILITY: ICD-10-CM

## 2022-07-15 DIAGNOSIS — Z79.01 LONG TERM (CURRENT) USE OF ANTICOAGULANTS: ICD-10-CM

## 2022-07-15 PROCEDURE — 1160F RVW MEDS BY RX/DR IN RCRD: CPT | Performed by: NURSE PRACTITIONER

## 2022-07-15 PROCEDURE — 1170F FXNL STATUS ASSESSED: CPT | Performed by: NURSE PRACTITIONER

## 2022-07-15 PROCEDURE — 1157F ADVNC CARE PLAN IN RCRD: CPT | Performed by: NURSE PRACTITIONER

## 2022-07-15 PROCEDURE — 1125F AMNT PAIN NOTED PAIN PRSNT: CPT | Performed by: NURSE PRACTITIONER

## 2022-07-15 PROCEDURE — 99309 SBSQ NF CARE MODERATE MDM 30: CPT | Performed by: NURSE PRACTITIONER

## 2022-07-15 PROCEDURE — 1158F ADVNC CARE PLAN TLK DOCD: CPT | Performed by: NURSE PRACTITIONER

## 2022-07-15 ASSESSMENT — PAIN SCALES - GENERAL: PAINLEVEL: 3

## 2022-07-15 ASSESSMENT — ENCOUNTER SYMPTOMS
APPETITE CHANGE: 1
ACTIVITY CHANGE: 1
HEADACHES: 1

## 2022-07-17 PROBLEM — Z79.01 SUBTHERAPEUTIC ANTICOAGULATION: Status: ACTIVE | Noted: 2022-07-17

## 2022-07-17 PROBLEM — R30.0 DYSURIA: Status: ACTIVE | Noted: 2022-07-17

## 2022-07-17 PROBLEM — N30.00 ACUTE CYSTITIS WITHOUT HEMATURIA: Status: ACTIVE | Noted: 2022-07-17

## 2022-07-17 PROBLEM — Z51.81 SUBTHERAPEUTIC ANTICOAGULATION: Status: ACTIVE | Noted: 2022-07-17

## 2022-07-18 ASSESSMENT — ENCOUNTER SYMPTOMS
ACTIVITY CHANGE: 1
HEADACHES: 1
APPETITE CHANGE: 1

## 2022-07-19 ENCOUNTER — NURSING HOME VISIT (OUTPATIENT)
Dept: POST ACUTE CARE | Age: 82
End: 2022-07-19

## 2022-07-19 DIAGNOSIS — M79.604 PAIN IN BOTH LOWER EXTREMITIES: ICD-10-CM

## 2022-07-19 DIAGNOSIS — R51.9 CHRONIC NONINTRACTABLE HEADACHE, UNSPECIFIED HEADACHE TYPE: ICD-10-CM

## 2022-07-19 DIAGNOSIS — Z79.01 LONG TERM (CURRENT) USE OF ANTICOAGULANTS: ICD-10-CM

## 2022-07-19 DIAGNOSIS — M79.605 PAIN IN BOTH LOWER EXTREMITIES: ICD-10-CM

## 2022-07-19 DIAGNOSIS — E11.29 TYPE 2 DIABETES MELLITUS WITH OTHER DIABETIC KIDNEY COMPLICATION, WITHOUT LONG-TERM CURRENT USE OF INSULIN (CMD): ICD-10-CM

## 2022-07-19 DIAGNOSIS — R53.81 DEBILITY: ICD-10-CM

## 2022-07-19 DIAGNOSIS — N18.32 STAGE 3B CHRONIC KIDNEY DISEASE (CMD): ICD-10-CM

## 2022-07-19 DIAGNOSIS — I50.32 CHRONIC DIASTOLIC CONGESTIVE HEART FAILURE (CMD): Primary | ICD-10-CM

## 2022-07-19 DIAGNOSIS — N30.00 ACUTE CYSTITIS WITHOUT HEMATURIA: ICD-10-CM

## 2022-07-19 DIAGNOSIS — G89.29 CHRONIC NONINTRACTABLE HEADACHE, UNSPECIFIED HEADACHE TYPE: ICD-10-CM

## 2022-07-19 DIAGNOSIS — E44.0 PROTEIN-CALORIE MALNUTRITION, MODERATE (CMD): ICD-10-CM

## 2022-07-19 DIAGNOSIS — I82.403 ACUTE DEEP VEIN THROMBOSIS (DVT) OF BOTH LOWER EXTREMITIES, UNSPECIFIED VEIN (CMD): ICD-10-CM

## 2022-07-19 PROCEDURE — 1160F RVW MEDS BY RX/DR IN RCRD: CPT | Performed by: NURSE PRACTITIONER

## 2022-07-19 PROCEDURE — 1170F FXNL STATUS ASSESSED: CPT | Performed by: NURSE PRACTITIONER

## 2022-07-19 PROCEDURE — 99310 SBSQ NF CARE HIGH MDM 45: CPT | Performed by: NURSE PRACTITIONER

## 2022-07-19 PROCEDURE — 99497 ADVNCD CARE PLAN 30 MIN: CPT | Performed by: NURSE PRACTITIONER

## 2022-07-19 PROCEDURE — 1157F ADVNC CARE PLAN IN RCRD: CPT | Performed by: NURSE PRACTITIONER

## 2022-07-19 PROCEDURE — 1158F ADVNC CARE PLAN TLK DOCD: CPT | Performed by: NURSE PRACTITIONER

## 2022-07-19 PROCEDURE — 1125F AMNT PAIN NOTED PAIN PRSNT: CPT | Performed by: NURSE PRACTITIONER

## 2022-07-19 ASSESSMENT — PAIN SCALES - GENERAL: PAINLEVEL: 3

## 2022-07-20 ENCOUNTER — NURSING HOME VISIT (OUTPATIENT)
Dept: POST ACUTE CARE | Age: 82
End: 2022-07-20

## 2022-07-20 DIAGNOSIS — N30.00 ACUTE CYSTITIS WITHOUT HEMATURIA: Primary | ICD-10-CM

## 2022-07-20 DIAGNOSIS — I50.32 CHRONIC DIASTOLIC CONGESTIVE HEART FAILURE (CMD): ICD-10-CM

## 2022-07-20 DIAGNOSIS — M79.604 PAIN IN BOTH LOWER EXTREMITIES: ICD-10-CM

## 2022-07-20 DIAGNOSIS — I82.403 ACUTE DEEP VEIN THROMBOSIS (DVT) OF BOTH LOWER EXTREMITIES, UNSPECIFIED VEIN (CMD): ICD-10-CM

## 2022-07-20 DIAGNOSIS — M79.605 PAIN IN BOTH LOWER EXTREMITIES: ICD-10-CM

## 2022-07-20 DIAGNOSIS — R53.81 DEBILITY: ICD-10-CM

## 2022-07-20 DIAGNOSIS — G89.29 CHRONIC NONINTRACTABLE HEADACHE, UNSPECIFIED HEADACHE TYPE: ICD-10-CM

## 2022-07-20 DIAGNOSIS — R68.84 JAW PAIN: ICD-10-CM

## 2022-07-20 DIAGNOSIS — N18.32 STAGE 3B CHRONIC KIDNEY DISEASE (CMD): ICD-10-CM

## 2022-07-20 DIAGNOSIS — Z79.01 LONG TERM (CURRENT) USE OF ANTICOAGULANTS: ICD-10-CM

## 2022-07-20 DIAGNOSIS — R51.9 CHRONIC NONINTRACTABLE HEADACHE, UNSPECIFIED HEADACHE TYPE: ICD-10-CM

## 2022-07-20 PROCEDURE — 1125F AMNT PAIN NOTED PAIN PRSNT: CPT | Performed by: NURSE PRACTITIONER

## 2022-07-20 PROCEDURE — 99309 SBSQ NF CARE MODERATE MDM 30: CPT | Performed by: NURSE PRACTITIONER

## 2022-07-20 PROCEDURE — 1170F FXNL STATUS ASSESSED: CPT | Performed by: NURSE PRACTITIONER

## 2022-07-20 PROCEDURE — 1160F RVW MEDS BY RX/DR IN RCRD: CPT | Performed by: NURSE PRACTITIONER

## 2022-07-20 PROCEDURE — 1158F ADVNC CARE PLAN TLK DOCD: CPT | Performed by: NURSE PRACTITIONER

## 2022-07-20 PROCEDURE — 1157F ADVNC CARE PLAN IN RCRD: CPT | Performed by: NURSE PRACTITIONER

## 2022-07-20 ASSESSMENT — PAIN SCALES - GENERAL: PAINLEVEL: 2

## 2022-07-22 ENCOUNTER — NURSING HOME VISIT (OUTPATIENT)
Dept: POST ACUTE CARE | Age: 82
End: 2022-07-22

## 2022-07-22 VITALS
OXYGEN SATURATION: 97 % | HEART RATE: 66 BPM | RESPIRATION RATE: 18 BRPM | TEMPERATURE: 98.2 F | DIASTOLIC BLOOD PRESSURE: 69 MMHG | SYSTOLIC BLOOD PRESSURE: 105 MMHG

## 2022-07-22 VITALS
OXYGEN SATURATION: 96 % | TEMPERATURE: 96.9 F | SYSTOLIC BLOOD PRESSURE: 127 MMHG | HEART RATE: 78 BPM | DIASTOLIC BLOOD PRESSURE: 76 MMHG | RESPIRATION RATE: 18 BRPM

## 2022-07-22 DIAGNOSIS — G89.29 CHRONIC NONINTRACTABLE HEADACHE, UNSPECIFIED HEADACHE TYPE: ICD-10-CM

## 2022-07-22 DIAGNOSIS — R68.84 JAW PAIN: ICD-10-CM

## 2022-07-22 DIAGNOSIS — Z79.01 LONG TERM (CURRENT) USE OF ANTICOAGULANTS: ICD-10-CM

## 2022-07-22 DIAGNOSIS — E11.29 TYPE 2 DIABETES MELLITUS WITH OTHER DIABETIC KIDNEY COMPLICATION, WITHOUT LONG-TERM CURRENT USE OF INSULIN (CMD): ICD-10-CM

## 2022-07-22 DIAGNOSIS — R53.81 DEBILITY: ICD-10-CM

## 2022-07-22 DIAGNOSIS — M79.604 PAIN IN BOTH LOWER EXTREMITIES: Primary | ICD-10-CM

## 2022-07-22 DIAGNOSIS — N18.32 STAGE 3B CHRONIC KIDNEY DISEASE (CMD): ICD-10-CM

## 2022-07-22 DIAGNOSIS — R51.9 CHRONIC NONINTRACTABLE HEADACHE, UNSPECIFIED HEADACHE TYPE: ICD-10-CM

## 2022-07-22 DIAGNOSIS — I48.20 CHRONIC ATRIAL FIBRILLATION (CMD): ICD-10-CM

## 2022-07-22 DIAGNOSIS — M79.605 PAIN IN BOTH LOWER EXTREMITIES: Primary | ICD-10-CM

## 2022-07-22 DIAGNOSIS — Z51.81 SUBTHERAPEUTIC ANTICOAGULATION: ICD-10-CM

## 2022-07-22 DIAGNOSIS — I50.32 CHRONIC DIASTOLIC CONGESTIVE HEART FAILURE (CMD): ICD-10-CM

## 2022-07-22 DIAGNOSIS — I82.403 ACUTE DEEP VEIN THROMBOSIS (DVT) OF BOTH LOWER EXTREMITIES, UNSPECIFIED VEIN (CMD): ICD-10-CM

## 2022-07-22 DIAGNOSIS — Z79.01 SUBTHERAPEUTIC ANTICOAGULATION: ICD-10-CM

## 2022-07-22 DIAGNOSIS — N30.00 ACUTE CYSTITIS WITHOUT HEMATURIA: ICD-10-CM

## 2022-07-22 PROCEDURE — 1158F ADVNC CARE PLAN TLK DOCD: CPT | Performed by: NURSE PRACTITIONER

## 2022-07-22 PROCEDURE — 1126F AMNT PAIN NOTED NONE PRSNT: CPT | Performed by: NURSE PRACTITIONER

## 2022-07-22 PROCEDURE — 99316 NF DSCHRG MGMT 30 MIN+: CPT | Performed by: NURSE PRACTITIONER

## 2022-07-22 PROCEDURE — 1170F FXNL STATUS ASSESSED: CPT | Performed by: NURSE PRACTITIONER

## 2022-07-22 PROCEDURE — 1157F ADVNC CARE PLAN IN RCRD: CPT | Performed by: NURSE PRACTITIONER

## 2022-07-22 PROCEDURE — 1160F RVW MEDS BY RX/DR IN RCRD: CPT | Performed by: NURSE PRACTITIONER

## 2022-07-22 ASSESSMENT — PAIN SCALES - GENERAL: PAINLEVEL: 0

## 2022-07-23 VITALS
DIASTOLIC BLOOD PRESSURE: 68 MMHG | HEART RATE: 68 BPM | SYSTOLIC BLOOD PRESSURE: 122 MMHG | RESPIRATION RATE: 18 BRPM | TEMPERATURE: 96.8 F | OXYGEN SATURATION: 93 %

## 2022-07-23 PROBLEM — R68.84 JAW PAIN: Status: ACTIVE | Noted: 2022-07-23

## 2022-07-23 ASSESSMENT — ENCOUNTER SYMPTOMS
HEADACHES: 1
ACTIVITY CHANGE: 1
APPETITE CHANGE: 1

## 2022-07-24 ENCOUNTER — HOSPITAL ENCOUNTER (INPATIENT)
Age: 82
LOS: 7 days | Discharge: NURSING, REHAB OR LONG-TERM CARE | DRG: 602 | End: 2022-08-01
Attending: EMERGENCY MEDICINE | Admitting: INTERNAL MEDICINE

## 2022-07-24 ENCOUNTER — APPOINTMENT (OUTPATIENT)
Dept: GENERAL RADIOLOGY | Age: 82
DRG: 602 | End: 2022-07-24
Attending: EMERGENCY MEDICINE

## 2022-07-24 ENCOUNTER — APPOINTMENT (OUTPATIENT)
Dept: ULTRASOUND IMAGING | Age: 82
DRG: 602 | End: 2022-07-24
Attending: EMERGENCY MEDICINE

## 2022-07-24 ENCOUNTER — APPOINTMENT (OUTPATIENT)
Dept: ULTRASOUND IMAGING | Age: 82
DRG: 602 | End: 2022-07-24
Attending: INTERNAL MEDICINE

## 2022-07-24 DIAGNOSIS — I50.33 ACUTE ON CHRONIC DIASTOLIC (CONGESTIVE) HEART FAILURE (CMD): ICD-10-CM

## 2022-07-24 DIAGNOSIS — L03.116 CELLULITIS OF LEFT LOWER EXTREMITY: Primary | ICD-10-CM

## 2022-07-24 DIAGNOSIS — D64.9 ANEMIA, UNSPECIFIED TYPE: ICD-10-CM

## 2022-07-24 DIAGNOSIS — I82.503 CHRONIC DEEP VEIN THROMBOSIS (DVT) OF BOTH LOWER EXTREMITIES, UNSPECIFIED VEIN (CMD): ICD-10-CM

## 2022-07-24 DIAGNOSIS — L03.119 CELLULITIS OF LOWER EXTREMITY, UNSPECIFIED LATERALITY: ICD-10-CM

## 2022-07-24 DIAGNOSIS — I48.20 CHRONIC ATRIAL FIBRILLATION (CMD): ICD-10-CM

## 2022-07-24 DIAGNOSIS — I50.9 ACUTE ON CHRONIC CONGESTIVE HEART FAILURE, UNSPECIFIED HEART FAILURE TYPE (CMD): ICD-10-CM

## 2022-07-24 DIAGNOSIS — E87.6 HYPOKALEMIA: ICD-10-CM

## 2022-07-24 DIAGNOSIS — I82.403 ACUTE DEEP VEIN THROMBOSIS (DVT) OF BOTH LOWER EXTREMITIES, UNSPECIFIED VEIN (CMD): ICD-10-CM

## 2022-07-24 LAB
ALBUMIN SERPL-MCNC: 2.4 G/DL (ref 3.6–5.1)
ALBUMIN/GLOB SERPL: 0.6 {RATIO} (ref 1–2.4)
ALP SERPL-CCNC: 140 UNITS/L (ref 45–117)
ALT SERPL-CCNC: 33 UNITS/L
ANION GAP SERPL CALC-SCNC: 9 MMOL/L (ref 7–19)
AST SERPL-CCNC: 25 UNITS/L
BASOPHILS # BLD: 0.1 K/MCL (ref 0–0.3)
BASOPHILS NFR BLD: 1 %
BILIRUB SERPL-MCNC: 0.4 MG/DL (ref 0.2–1)
BUN SERPL-MCNC: 19 MG/DL (ref 6–20)
BUN/CREAT SERPL: 16 (ref 7–25)
CALCIUM SERPL-MCNC: 8.3 MG/DL (ref 8.4–10.2)
CHLORIDE SERPL-SCNC: 106 MMOL/L (ref 97–110)
CO2 SERPL-SCNC: 29 MMOL/L (ref 21–32)
CREAT SERPL-MCNC: 1.2 MG/DL (ref 0.51–0.95)
CRP SERPL-MCNC: 15.2 MG/DL
DEPRECATED RDW RBC: 54.2 FL (ref 39–50)
EOSINOPHIL # BLD: 0 K/MCL (ref 0–0.5)
EOSINOPHIL NFR BLD: 0 %
ERYTHROCYTE [DISTWIDTH] IN BLOOD: 13.9 % (ref 11–15)
ERYTHROCYTE [SEDIMENTATION RATE] IN BLOOD BY WESTERGREN METHOD: 49 MM/HR (ref 0–20)
FASTING DURATION TIME PATIENT: ABNORMAL H
FLUAV RNA RESP QL NAA+PROBE: NOT DETECTED
FLUBV RNA RESP QL NAA+PROBE: NOT DETECTED
GFR SERPLBLD BASED ON 1.73 SQ M-ARVRAT: 46 ML/MIN
GLOBULIN SER-MCNC: 3.7 G/DL (ref 2–4)
GLUCOSE BLDC GLUCOMTR-MCNC: 100 MG/DL (ref 70–99)
GLUCOSE BLDC GLUCOMTR-MCNC: 105 MG/DL (ref 70–99)
GLUCOSE BLDC GLUCOMTR-MCNC: 84 MG/DL (ref 70–99)
GLUCOSE BLDC GLUCOMTR-MCNC: 96 MG/DL (ref 70–99)
GLUCOSE SERPL-MCNC: 122 MG/DL (ref 70–99)
HCT VFR BLD CALC: 29.9 % (ref 36–46.5)
HGB BLD-MCNC: 9.3 G/DL (ref 12–15.5)
IMM GRANULOCYTES # BLD AUTO: 0 K/MCL (ref 0–0.2)
IMM GRANULOCYTES # BLD: 0 %
INR PPP: 2
LYMPHOCYTES # BLD: 0.8 K/MCL (ref 1–4)
LYMPHOCYTES NFR BLD: 9 %
MCH RBC QN AUTO: 32.7 PG (ref 26–34)
MCHC RBC AUTO-ENTMCNC: 31.1 G/DL (ref 32–36.5)
MCV RBC AUTO: 105.3 FL (ref 78–100)
MONOCYTES # BLD: 1 K/MCL (ref 0.3–0.9)
MONOCYTES NFR BLD: 10 %
NEUTROPHILS # BLD: 7.6 K/MCL (ref 1.8–7.7)
NEUTROPHILS NFR BLD: 80 %
NRBC BLD MANUAL-RTO: 0 /100 WBC
NT-PROBNP SERPL-MCNC: 2980 PG/ML
PLATELET # BLD AUTO: 253 K/MCL (ref 140–450)
POTASSIUM SERPL-SCNC: 3.2 MMOL/L (ref 3.4–5.1)
PROT SERPL-MCNC: 6.1 G/DL (ref 6.4–8.2)
PROTHROMBIN TIME: 20.9 SEC (ref 9.7–11.8)
RAINBOW EXTRA TUBES HOLD SPECIMEN: NORMAL
RBC # BLD: 2.84 MIL/MCL (ref 4–5.2)
RSV AG NPH QL IA.RAPID: NOT DETECTED
SARS-COV-2 RNA RESP QL NAA+PROBE: NOT DETECTED
SERVICE CMNT-IMP: NORMAL
SERVICE CMNT-IMP: NORMAL
SODIUM SERPL-SCNC: 141 MMOL/L (ref 135–145)
WBC # BLD: 9.5 K/MCL (ref 4.2–11)

## 2022-07-24 PROCEDURE — 99285 EMERGENCY DEPT VISIT HI MDM: CPT

## 2022-07-24 PROCEDURE — 96376 TX/PRO/DX INJ SAME DRUG ADON: CPT

## 2022-07-24 PROCEDURE — 10002803 HB RX 637: Performed by: INTERNAL MEDICINE

## 2022-07-24 PROCEDURE — 10002800 HB RX 250 W HCPCS: Performed by: INTERNAL MEDICINE

## 2022-07-24 PROCEDURE — G0378 HOSPITAL OBSERVATION PER HR: HCPCS

## 2022-07-24 PROCEDURE — 96375 TX/PRO/DX INJ NEW DRUG ADDON: CPT

## 2022-07-24 PROCEDURE — 93970 EXTREMITY STUDY: CPT

## 2022-07-24 PROCEDURE — 10002800 HB RX 250 W HCPCS: Performed by: EMERGENCY MEDICINE

## 2022-07-24 PROCEDURE — 10002801 HB RX 250 W/O HCPCS: Performed by: EMERGENCY MEDICINE

## 2022-07-24 PROCEDURE — 85610 PROTHROMBIN TIME: CPT | Performed by: EMERGENCY MEDICINE

## 2022-07-24 PROCEDURE — 10004651 HB RX, NO CHARGE ITEM: Performed by: INTERNAL MEDICINE

## 2022-07-24 PROCEDURE — 36415 COLL VENOUS BLD VENIPUNCTURE: CPT

## 2022-07-24 PROCEDURE — 82962 GLUCOSE BLOOD TEST: CPT

## 2022-07-24 PROCEDURE — 96365 THER/PROPH/DIAG IV INF INIT: CPT

## 2022-07-24 PROCEDURE — 80053 COMPREHEN METABOLIC PANEL: CPT | Performed by: EMERGENCY MEDICINE

## 2022-07-24 PROCEDURE — 10002803 HB RX 637: Performed by: EMERGENCY MEDICINE

## 2022-07-24 PROCEDURE — 85652 RBC SED RATE AUTOMATED: CPT | Performed by: EMERGENCY MEDICINE

## 2022-07-24 PROCEDURE — 10002807 HB RX 258: Performed by: EMERGENCY MEDICINE

## 2022-07-24 PROCEDURE — 76604 US EXAM CHEST: CPT

## 2022-07-24 PROCEDURE — 99220 INITIAL OBSERVATION CARE,LEVL III: CPT | Performed by: INTERNAL MEDICINE

## 2022-07-24 PROCEDURE — C9803 HOPD COVID-19 SPEC COLLECT: HCPCS

## 2022-07-24 PROCEDURE — 83880 ASSAY OF NATRIURETIC PEPTIDE: CPT | Performed by: EMERGENCY MEDICINE

## 2022-07-24 PROCEDURE — 10002019 HB COUNTER RESP ASSESSMENT

## 2022-07-24 PROCEDURE — 96366 THER/PROPH/DIAG IV INF ADDON: CPT

## 2022-07-24 PROCEDURE — 87040 BLOOD CULTURE FOR BACTERIA: CPT | Performed by: EMERGENCY MEDICINE

## 2022-07-24 PROCEDURE — 71045 X-RAY EXAM CHEST 1 VIEW: CPT

## 2022-07-24 PROCEDURE — 0241U COVID/FLU/RSV PANEL: CPT | Performed by: EMERGENCY MEDICINE

## 2022-07-24 PROCEDURE — 99233 SBSQ HOSP IP/OBS HIGH 50: CPT | Performed by: INTERNAL MEDICINE

## 2022-07-24 PROCEDURE — 86140 C-REACTIVE PROTEIN: CPT | Performed by: EMERGENCY MEDICINE

## 2022-07-24 PROCEDURE — 85025 COMPLETE CBC W/AUTO DIFF WBC: CPT | Performed by: EMERGENCY MEDICINE

## 2022-07-24 RX ORDER — WARFARIN SODIUM 2.5 MG/1
2.5 TABLET ORAL ONCE
Status: COMPLETED | OUTPATIENT
Start: 2022-07-24 | End: 2022-07-24

## 2022-07-24 RX ORDER — ACETAMINOPHEN 325 MG/1
650 TABLET ORAL EVERY 4 HOURS PRN
Status: DISCONTINUED | OUTPATIENT
Start: 2022-07-24 | End: 2022-08-01 | Stop reason: HOSPADM

## 2022-07-24 RX ORDER — NICOTINE POLACRILEX 4 MG
30 LOZENGE BUCCAL PRN
Status: DISCONTINUED | OUTPATIENT
Start: 2022-07-24 | End: 2022-08-01 | Stop reason: HOSPADM

## 2022-07-24 RX ORDER — TORSEMIDE 20 MG/1
20 TABLET ORAL DAILY
Status: DISCONTINUED | OUTPATIENT
Start: 2022-07-24 | End: 2022-07-24

## 2022-07-24 RX ORDER — AMOXICILLIN AND CLAVULANATE POTASSIUM 875; 125 MG/1; MG/1
1 TABLET, FILM COATED ORAL 2 TIMES DAILY
Status: ON HOLD | COMMUNITY
End: 2022-07-24

## 2022-07-24 RX ORDER — TRAMADOL HYDROCHLORIDE 50 MG/1
50 TABLET ORAL EVERY 6 HOURS PRN
Status: DISCONTINUED | OUTPATIENT
Start: 2022-07-24 | End: 2022-07-26

## 2022-07-24 RX ORDER — ONDANSETRON 2 MG/ML
4 INJECTION INTRAMUSCULAR; INTRAVENOUS ONCE
Status: COMPLETED | OUTPATIENT
Start: 2022-07-24 | End: 2022-07-24

## 2022-07-24 RX ORDER — POTASSIUM CHLORIDE 20 MEQ/1
40 TABLET, EXTENDED RELEASE ORAL ONCE
Status: COMPLETED | OUTPATIENT
Start: 2022-07-24 | End: 2022-07-24

## 2022-07-24 RX ORDER — FUROSEMIDE 10 MG/ML
40 INJECTION INTRAMUSCULAR; INTRAVENOUS
Status: DISCONTINUED | OUTPATIENT
Start: 2022-07-24 | End: 2022-07-25

## 2022-07-24 RX ORDER — FUROSEMIDE 10 MG/ML
40 INJECTION INTRAMUSCULAR; INTRAVENOUS ONCE
Status: COMPLETED | OUTPATIENT
Start: 2022-07-24 | End: 2022-07-24

## 2022-07-24 RX ORDER — ALBUTEROL SULFATE 2.5 MG/3ML
2.5 SOLUTION RESPIRATORY (INHALATION)
Status: DISCONTINUED | OUTPATIENT
Start: 2022-07-24 | End: 2022-08-01 | Stop reason: HOSPADM

## 2022-07-24 RX ORDER — HYDROCODONE BITARTRATE AND ACETAMINOPHEN 5; 325 MG/1; MG/1
1 TABLET ORAL EVERY 6 HOURS PRN
COMMUNITY
End: 2022-08-30 | Stop reason: ALTCHOICE

## 2022-07-24 RX ORDER — TORSEMIDE 20 MG/1
20 TABLET ORAL 2 TIMES DAILY
Qty: 30 TABLET | Refills: 0 | Status: SHIPPED | COMMUNITY
Start: 2022-07-24 | End: 2022-01-01 | Stop reason: SDUPTHER

## 2022-07-24 RX ORDER — DEXTROSE MONOHYDRATE 25 G/50ML
12.5 INJECTION, SOLUTION INTRAVENOUS PRN
Status: DISCONTINUED | OUTPATIENT
Start: 2022-07-24 | End: 2022-08-01 | Stop reason: HOSPADM

## 2022-07-24 RX ORDER — DEXTROSE MONOHYDRATE 25 G/50ML
25 INJECTION, SOLUTION INTRAVENOUS PRN
Status: DISCONTINUED | OUTPATIENT
Start: 2022-07-24 | End: 2022-08-01 | Stop reason: HOSPADM

## 2022-07-24 RX ORDER — ALBUTEROL SULFATE 90 UG/1
2 AEROSOL, METERED RESPIRATORY (INHALATION) EVERY 4 HOURS PRN
Status: DISCONTINUED | OUTPATIENT
Start: 2022-07-24 | End: 2022-07-24

## 2022-07-24 RX ORDER — AMIODARONE HYDROCHLORIDE 200 MG/1
100 TABLET ORAL AT BEDTIME
Status: DISCONTINUED | OUTPATIENT
Start: 2022-07-24 | End: 2022-08-01 | Stop reason: HOSPADM

## 2022-07-24 RX ORDER — METOPROLOL TARTRATE 50 MG/1
50 TABLET, FILM COATED ORAL 2 TIMES DAILY
Status: DISCONTINUED | OUTPATIENT
Start: 2022-07-24 | End: 2022-08-01 | Stop reason: HOSPADM

## 2022-07-24 RX ORDER — NICOTINE POLACRILEX 4 MG
15 LOZENGE BUCCAL PRN
Status: DISCONTINUED | OUTPATIENT
Start: 2022-07-24 | End: 2022-08-01 | Stop reason: HOSPADM

## 2022-07-24 RX ORDER — WARFARIN SODIUM 5 MG/1
2.5 TABLET ORAL DAILY
COMMUNITY
End: 2023-01-01 | Stop reason: DRUGHIGH

## 2022-07-24 RX ORDER — DILTIAZEM HYDROCHLORIDE 120 MG/1
120 CAPSULE, EXTENDED RELEASE ORAL DAILY
Status: DISCONTINUED | OUTPATIENT
Start: 2022-07-24 | End: 2022-07-26

## 2022-07-24 RX ORDER — CEFAZOLIN SODIUM/WATER 2 G/20 ML
2000 SYRINGE (ML) INTRAVENOUS DAILY
Status: DISCONTINUED | OUTPATIENT
Start: 2022-07-24 | End: 2022-07-24

## 2022-07-24 RX ADMIN — AMIODARONE HYDROCHLORIDE 100 MG: 200 TABLET ORAL at 20:54

## 2022-07-24 RX ADMIN — METOPROLOL TARTRATE 50 MG: 50 TABLET, FILM COATED ORAL at 20:54

## 2022-07-24 RX ADMIN — TRAMADOL HYDROCHLORIDE 50 MG: 50 TABLET, COATED ORAL at 17:34

## 2022-07-24 RX ADMIN — FUROSEMIDE 40 MG: 10 INJECTION, SOLUTION INTRAMUSCULAR; INTRAVENOUS at 17:34

## 2022-07-24 RX ADMIN — VANCOMYCIN HYDROCHLORIDE 1250 MG: 10 INJECTION, POWDER, LYOPHILIZED, FOR SOLUTION INTRAVENOUS at 04:52

## 2022-07-24 RX ADMIN — ACETAMINOPHEN 650 MG: 325 TABLET ORAL at 21:15

## 2022-07-24 RX ADMIN — MORPHINE SULFATE 4 MG: 4 INJECTION INTRAVENOUS at 02:58

## 2022-07-24 RX ADMIN — METOPROLOL TARTRATE 50 MG: 50 TABLET, FILM COATED ORAL at 12:48

## 2022-07-24 RX ADMIN — ONDANSETRON 4 MG: 2 INJECTION INTRAMUSCULAR; INTRAVENOUS at 02:54

## 2022-07-24 RX ADMIN — WARFARIN SODIUM 2.5 MG: 2.5 TABLET ORAL at 17:34

## 2022-07-24 RX ADMIN — FUROSEMIDE 40 MG: 10 INJECTION, SOLUTION INTRAMUSCULAR; INTRAVENOUS at 12:47

## 2022-07-24 RX ADMIN — SERTRALINE HYDROCHLORIDE 25 MG: 50 TABLET, FILM COATED ORAL at 12:48

## 2022-07-24 RX ADMIN — CEFAZOLIN SODIUM 2000 MG: 300 INJECTION, POWDER, LYOPHILIZED, FOR SOLUTION INTRAVENOUS at 03:00

## 2022-07-24 RX ADMIN — POTASSIUM CHLORIDE 40 MEQ: 20 TABLET, EXTENDED RELEASE ORAL at 09:21

## 2022-07-24 ASSESSMENT — ACTIVITIES OF DAILY LIVING (ADL)
RECENT_DECLINE_ADL: YES, DECLINE IN AMBULATION/TRANSFERRING, COLLABORATE WITH PROVIDER (T)
ADL_SCORE: 7
MOBILITY_ASSIST_DEVICES: STANDARD WALKER
ADL_SHORT_OF_BREATH: NO
TRANSFERRING: NEEDS ASSISTANCE
BATHING: NEEDS ASSISTANCE
CONTINENCE: INDEPENDENT
DRESSING YOURSELF: NEEDS ASSISTANCE
ADL_BEFORE_ADMISSION: NEEDS/REQUIRES ASSISTANCE
CHRONIC_PAIN_PRESENT: NO
TOILETING: NEEDS ASSISTANCE
FEEDING YOURSELF: NEEDS ASSISTANCE

## 2022-07-24 ASSESSMENT — ENCOUNTER SYMPTOMS
CONSTITUTIONAL NEGATIVE: 1
RESPIRATORY NEGATIVE: 1
NEUROLOGICAL NEGATIVE: 1
EYES NEGATIVE: 1
PSYCHIATRIC NEGATIVE: 1
GASTROINTESTINAL NEGATIVE: 1

## 2022-07-24 ASSESSMENT — LIFESTYLE VARIABLES
HOW MANY STANDARD DRINKS CONTAINING ALCOHOL DO YOU HAVE ON A TYPICAL DAY: 0,1 OR 2
HOW OFTEN DO YOU HAVE A DRINK CONTAINING ALCOHOL: NEVER
ALCOHOL_USE_STATUS: NO OR LOW RISK WITH VALIDATED TOOL
HOW OFTEN DO YOU HAVE 6 OR MORE DRINKS ON ONE OCCASION: NEVER
AUDIT-C TOTAL SCORE: 0

## 2022-07-24 ASSESSMENT — PAIN SCALES - GENERAL
PAINLEVEL_OUTOF10: 3
PAINLEVEL_OUTOF10: 10
PAINLEVEL_OUTOF10: 7
PAINLEVEL_OUTOF10: 3
PAINLEVEL_OUTOF10: 10
PAINLEVEL_OUTOF10: 2
PAINLEVEL_OUTOF10: 6

## 2022-07-24 ASSESSMENT — PULMONARY FUNCTION TESTS: FEV1/FVC: UNABLE TO OBTAIN, OR GREATER THAN 70%

## 2022-07-24 ASSESSMENT — COGNITIVE AND FUNCTIONAL STATUS - GENERAL
DO YOU HAVE DIFFICULTY DRESSING OR BATHING: YES
BECAUSE OF A PHYSICAL, MENTAL, OR EMOTIONAL CONDITION, DO YOU HAVE SERIOUS DIFFICULTY CONCENTRATING, REMEMBERING OR MAKING DECISIONS: YES
BECAUSE OF A PHYSICAL, MENTAL, OR EMOTIONAL CONDITION, DO YOU HAVE DIFFICULTY DOING ERRANDS ALONE: YES
DO YOU HAVE SERIOUS DIFFICULTY WALKING OR CLIMBING STAIRS: YES
ARE YOU BLIND OR DO YOU HAVE SERIOUS DIFFICULTY SEEING, EVEN WHEN WEARING GLASSES: NO
ARE YOU DEAF OR DO YOU HAVE SERIOUS DIFFICULTY  HEARING: NO

## 2022-07-25 ENCOUNTER — ANTI-COAG (OUTPATIENT)
Dept: ANTICOAGULATION | Age: 82
End: 2022-07-25

## 2022-07-25 LAB
ANION GAP SERPL CALC-SCNC: 9 MMOL/L (ref 7–19)
BASOPHILS # BLD: 0.1 K/MCL (ref 0–0.3)
BASOPHILS NFR BLD: 1 %
BUN SERPL-MCNC: 18 MG/DL (ref 6–20)
BUN/CREAT SERPL: 14 (ref 7–25)
CALCIUM SERPL-MCNC: 8.4 MG/DL (ref 8.4–10.2)
CHLORIDE SERPL-SCNC: 105 MMOL/L (ref 97–110)
CO2 SERPL-SCNC: 29 MMOL/L (ref 21–32)
CREAT SERPL-MCNC: 1.32 MG/DL (ref 0.51–0.95)
DEPRECATED RDW RBC: 55.4 FL (ref 39–50)
EOSINOPHIL # BLD: 0.1 K/MCL (ref 0–0.5)
EOSINOPHIL NFR BLD: 2 %
ERYTHROCYTE [DISTWIDTH] IN BLOOD: 14.2 % (ref 11–15)
FASTING DURATION TIME PATIENT: ABNORMAL H
GFR SERPLBLD BASED ON 1.73 SQ M-ARVRAT: 41 ML/MIN
GLUCOSE BLDC GLUCOMTR-MCNC: 111 MG/DL (ref 70–99)
GLUCOSE BLDC GLUCOMTR-MCNC: 111 MG/DL (ref 70–99)
GLUCOSE BLDC GLUCOMTR-MCNC: 79 MG/DL (ref 70–99)
GLUCOSE BLDC GLUCOMTR-MCNC: 87 MG/DL (ref 70–99)
GLUCOSE SERPL-MCNC: 117 MG/DL (ref 70–99)
HCT VFR BLD CALC: 30.4 % (ref 36–46.5)
HGB BLD-MCNC: 9.4 G/DL (ref 12–15.5)
IMM GRANULOCYTES # BLD AUTO: 0 K/MCL (ref 0–0.2)
IMM GRANULOCYTES # BLD: 0 %
INR PPP: 2.5
LYMPHOCYTES # BLD: 1 K/MCL (ref 1–4)
LYMPHOCYTES NFR BLD: 15 %
MCH RBC QN AUTO: 32.8 PG (ref 26–34)
MCHC RBC AUTO-ENTMCNC: 30.9 G/DL (ref 32–36.5)
MCV RBC AUTO: 105.9 FL (ref 78–100)
MONOCYTES # BLD: 0.7 K/MCL (ref 0.3–0.9)
MONOCYTES NFR BLD: 10 %
NEUTROPHILS # BLD: 4.9 K/MCL (ref 1.8–7.7)
NEUTROPHILS NFR BLD: 72 %
NRBC BLD MANUAL-RTO: 0 /100 WBC
PLATELET # BLD AUTO: 237 K/MCL (ref 140–450)
POTASSIUM SERPL-SCNC: 3.6 MMOL/L (ref 3.4–5.1)
PROTHROMBIN TIME: 25 SEC (ref 9.7–11.8)
RBC # BLD: 2.87 MIL/MCL (ref 4–5.2)
SODIUM SERPL-SCNC: 139 MMOL/L (ref 135–145)
WBC # BLD: 6.8 K/MCL (ref 4.2–11)

## 2022-07-25 PROCEDURE — 10002800 HB RX 250 W HCPCS: Performed by: INTERNAL MEDICINE

## 2022-07-25 PROCEDURE — 85610 PROTHROMBIN TIME: CPT | Performed by: INTERNAL MEDICINE

## 2022-07-25 PROCEDURE — 10004651 HB RX, NO CHARGE ITEM: Performed by: INTERNAL MEDICINE

## 2022-07-25 PROCEDURE — 96366 THER/PROPH/DIAG IV INF ADDON: CPT

## 2022-07-25 PROCEDURE — 99231 SBSQ HOSP IP/OBS SF/LOW 25: CPT | Performed by: NURSE PRACTITIONER

## 2022-07-25 PROCEDURE — 99233 SBSQ HOSP IP/OBS HIGH 50: CPT | Performed by: INTERNAL MEDICINE

## 2022-07-25 PROCEDURE — 80048 BASIC METABOLIC PNL TOTAL CA: CPT | Performed by: INTERNAL MEDICINE

## 2022-07-25 PROCEDURE — 10002803 HB RX 637: Performed by: INTERNAL MEDICINE

## 2022-07-25 PROCEDURE — G0378 HOSPITAL OBSERVATION PER HR: HCPCS

## 2022-07-25 PROCEDURE — 85025 COMPLETE CBC W/AUTO DIFF WBC: CPT | Performed by: INTERNAL MEDICINE

## 2022-07-25 PROCEDURE — 36415 COLL VENOUS BLD VENIPUNCTURE: CPT | Performed by: INTERNAL MEDICINE

## 2022-07-25 PROCEDURE — 10002807 HB RX 258: Performed by: INTERNAL MEDICINE

## 2022-07-25 PROCEDURE — 82962 GLUCOSE BLOOD TEST: CPT

## 2022-07-25 PROCEDURE — 96376 TX/PRO/DX INJ SAME DRUG ADON: CPT

## 2022-07-25 PROCEDURE — 10006031 HB ROOM CHARGE TELEMETRY

## 2022-07-25 RX ORDER — WARFARIN SODIUM 2.5 MG/1
2.5 TABLET ORAL ONCE
Status: COMPLETED | OUTPATIENT
Start: 2022-07-25 | End: 2022-07-25

## 2022-07-25 RX ORDER — POLYETHYLENE GLYCOL 3350 17 G/17G
17 POWDER, FOR SOLUTION ORAL DAILY
Status: DISCONTINUED | OUTPATIENT
Start: 2022-07-25 | End: 2022-08-01 | Stop reason: HOSPADM

## 2022-07-25 RX ORDER — CEPHALEXIN 250 MG/1
250 CAPSULE ORAL EVERY 8 HOURS SCHEDULED
Status: DISCONTINUED | OUTPATIENT
Start: 2022-07-25 | End: 2022-08-01 | Stop reason: HOSPADM

## 2022-07-25 RX ORDER — FUROSEMIDE 10 MG/ML
40 INJECTION INTRAMUSCULAR; INTRAVENOUS DAILY
Status: DISCONTINUED | OUTPATIENT
Start: 2022-07-26 | End: 2022-07-26

## 2022-07-25 RX ORDER — AMOXICILLIN 250 MG
1 CAPSULE ORAL NIGHTLY
Status: DISCONTINUED | OUTPATIENT
Start: 2022-07-25 | End: 2022-08-01 | Stop reason: HOSPADM

## 2022-07-25 RX ORDER — POTASSIUM CHLORIDE 20 MEQ/1
40 TABLET, EXTENDED RELEASE ORAL ONCE
Status: COMPLETED | OUTPATIENT
Start: 2022-07-25 | End: 2022-07-25

## 2022-07-25 RX ADMIN — CEPHALEXIN 250 MG: 250 CAPSULE ORAL at 21:08

## 2022-07-25 RX ADMIN — DILTIAZEM HYDROCHLORIDE 120 MG: 120 CAPSULE, EXTENDED RELEASE ORAL at 09:05

## 2022-07-25 RX ADMIN — WARFARIN SODIUM 2.5 MG: 2.5 TABLET ORAL at 18:18

## 2022-07-25 RX ADMIN — CEPHALEXIN 250 MG: 250 CAPSULE ORAL at 13:50

## 2022-07-25 RX ADMIN — VANCOMYCIN HYDROCHLORIDE 750 MG: 750 INJECTION, POWDER, LYOPHILIZED, FOR SOLUTION INTRAVENOUS at 09:00

## 2022-07-25 RX ADMIN — TRAMADOL HYDROCHLORIDE 50 MG: 50 TABLET, COATED ORAL at 19:33

## 2022-07-25 RX ADMIN — SENNOSIDES AND DOCUSATE SODIUM 1 TABLET: 50; 8.6 TABLET ORAL at 21:08

## 2022-07-25 RX ADMIN — TRAMADOL HYDROCHLORIDE 50 MG: 50 TABLET, COATED ORAL at 09:05

## 2022-07-25 RX ADMIN — ACETAMINOPHEN 650 MG: 325 TABLET ORAL at 21:36

## 2022-07-25 RX ADMIN — METOPROLOL TARTRATE 50 MG: 50 TABLET, FILM COATED ORAL at 09:05

## 2022-07-25 RX ADMIN — AMIODARONE HYDROCHLORIDE 100 MG: 200 TABLET ORAL at 21:08

## 2022-07-25 RX ADMIN — POTASSIUM CHLORIDE 40 MEQ: 1500 TABLET, EXTENDED RELEASE ORAL at 12:57

## 2022-07-25 RX ADMIN — SERTRALINE HYDROCHLORIDE 25 MG: 50 TABLET, FILM COATED ORAL at 09:05

## 2022-07-25 RX ADMIN — METOPROLOL TARTRATE 50 MG: 50 TABLET, FILM COATED ORAL at 21:08

## 2022-07-25 RX ADMIN — FUROSEMIDE 40 MG: 10 INJECTION, SOLUTION INTRAMUSCULAR; INTRAVENOUS at 09:02

## 2022-07-25 ASSESSMENT — COGNITIVE AND FUNCTIONAL STATUS - GENERAL
BECAUSE OF A PHYSICAL, MENTAL, OR EMOTIONAL CONDITION, DO YOU HAVE SERIOUS DIFFICULTY CONCENTRATING, REMEMBERING OR MAKING DECISIONS: NO
DO YOU HAVE DIFFICULTY DRESSING OR BATHING: YES
DO YOU HAVE SERIOUS DIFFICULTY WALKING OR CLIMBING STAIRS: YES
BECAUSE OF A PHYSICAL, MENTAL, OR EMOTIONAL CONDITION, DO YOU HAVE DIFFICULTY DOING ERRANDS ALONE: YES

## 2022-07-25 ASSESSMENT — PAIN SCALES - GENERAL
PAINLEVEL_OUTOF10: 2
PAINLEVEL_OUTOF10: 10
PAINLEVEL_OUTOF10: 9
PAINLEVEL_OUTOF10: 9
PAINLEVEL_OUTOF10: 10
PAINLEVEL_OUTOF10: 9
PAINLEVEL_OUTOF10: 8

## 2022-07-25 ASSESSMENT — ENCOUNTER SYMPTOMS: RESPIRATORY NEGATIVE: 1

## 2022-07-26 LAB
ANION GAP SERPL CALC-SCNC: 10 MMOL/L (ref 7–19)
BUN SERPL-MCNC: 20 MG/DL (ref 6–20)
BUN/CREAT SERPL: 16 (ref 7–25)
CALCIUM SERPL-MCNC: 8 MG/DL (ref 8.4–10.2)
CHLORIDE SERPL-SCNC: 106 MMOL/L (ref 97–110)
CO2 SERPL-SCNC: 29 MMOL/L (ref 21–32)
CREAT SERPL-MCNC: 1.26 MG/DL (ref 0.51–0.95)
FASTING DURATION TIME PATIENT: ABNORMAL H
GFR SERPLBLD BASED ON 1.73 SQ M-ARVRAT: 43 ML/MIN
GLUCOSE BLDC GLUCOMTR-MCNC: 104 MG/DL (ref 70–99)
GLUCOSE BLDC GLUCOMTR-MCNC: 78 MG/DL (ref 70–99)
GLUCOSE BLDC GLUCOMTR-MCNC: 80 MG/DL (ref 70–99)
GLUCOSE BLDC GLUCOMTR-MCNC: 99 MG/DL (ref 70–99)
GLUCOSE SERPL-MCNC: 79 MG/DL (ref 70–99)
INR PPP: 2.4
POTASSIUM SERPL-SCNC: 4.2 MMOL/L (ref 3.4–5.1)
PROTHROMBIN TIME: 24.5 SEC (ref 9.7–11.8)
QRS-INTERVAL (MSEC): 166
QT-INTERVAL (MSEC): 458
QTC: 487
R AXIS (DEGREES): -30
RAINBOW EXTRA TUBES HOLD SPECIMEN: NORMAL
REPORT TEXT: NORMAL
SODIUM SERPL-SCNC: 141 MMOL/L (ref 135–145)
T AXIS (DEGREES): 101
VENTRICULAR RATE EKG/MIN (BPM): 68

## 2022-07-26 PROCEDURE — 80048 BASIC METABOLIC PNL TOTAL CA: CPT | Performed by: INTERNAL MEDICINE

## 2022-07-26 PROCEDURE — 10002800 HB RX 250 W HCPCS: Performed by: NURSE PRACTITIONER

## 2022-07-26 PROCEDURE — 85610 PROTHROMBIN TIME: CPT | Performed by: INTERNAL MEDICINE

## 2022-07-26 PROCEDURE — 10003585 HB ROOM CHARGE INTERMEDIATE CARE

## 2022-07-26 PROCEDURE — 10004651 HB RX, NO CHARGE ITEM: Performed by: INTERNAL MEDICINE

## 2022-07-26 PROCEDURE — 97162 PT EVAL MOD COMPLEX 30 MIN: CPT

## 2022-07-26 PROCEDURE — 10002803 HB RX 637: Performed by: INTERNAL MEDICINE

## 2022-07-26 PROCEDURE — 97530 THERAPEUTIC ACTIVITIES: CPT

## 2022-07-26 PROCEDURE — 99233 SBSQ HOSP IP/OBS HIGH 50: CPT | Performed by: NURSE PRACTITIONER

## 2022-07-26 PROCEDURE — 99233 SBSQ HOSP IP/OBS HIGH 50: CPT | Performed by: INTERNAL MEDICINE

## 2022-07-26 PROCEDURE — 93005 ELECTROCARDIOGRAM TRACING: CPT | Performed by: INTERNAL MEDICINE

## 2022-07-26 PROCEDURE — 36415 COLL VENOUS BLD VENIPUNCTURE: CPT | Performed by: INTERNAL MEDICINE

## 2022-07-26 RX ORDER — FUROSEMIDE 10 MG/ML
40 INJECTION INTRAMUSCULAR; INTRAVENOUS
Status: DISCONTINUED | OUTPATIENT
Start: 2022-07-26 | End: 2022-07-28

## 2022-07-26 RX ORDER — WARFARIN SODIUM 2.5 MG/1
2.5 TABLET ORAL ONCE
Status: COMPLETED | OUTPATIENT
Start: 2022-07-26 | End: 2022-07-26

## 2022-07-26 RX ADMIN — SENNOSIDES AND DOCUSATE SODIUM 1 TABLET: 50; 8.6 TABLET ORAL at 21:50

## 2022-07-26 RX ADMIN — AMIODARONE HYDROCHLORIDE 100 MG: 200 TABLET ORAL at 21:50

## 2022-07-26 RX ADMIN — ACETAMINOPHEN 650 MG: 325 TABLET ORAL at 21:58

## 2022-07-26 RX ADMIN — FUROSEMIDE 40 MG: 10 INJECTION, SOLUTION INTRAMUSCULAR; INTRAVENOUS at 17:46

## 2022-07-26 RX ADMIN — SERTRALINE HYDROCHLORIDE 25 MG: 50 TABLET, FILM COATED ORAL at 09:05

## 2022-07-26 RX ADMIN — CEPHALEXIN 250 MG: 250 CAPSULE ORAL at 06:36

## 2022-07-26 RX ADMIN — CEPHALEXIN 250 MG: 250 CAPSULE ORAL at 14:33

## 2022-07-26 RX ADMIN — CEPHALEXIN 250 MG: 250 CAPSULE ORAL at 21:50

## 2022-07-26 RX ADMIN — METOPROLOL TARTRATE 50 MG: 50 TABLET, FILM COATED ORAL at 10:22

## 2022-07-26 RX ADMIN — TRAMADOL HYDROCHLORIDE 50 MG: 50 TABLET, COATED ORAL at 06:40

## 2022-07-26 RX ADMIN — METOPROLOL TARTRATE 50 MG: 50 TABLET, FILM COATED ORAL at 21:54

## 2022-07-26 RX ADMIN — WARFARIN SODIUM 2.5 MG: 2.5 TABLET ORAL at 17:46

## 2022-07-26 RX ADMIN — FUROSEMIDE 40 MG: 10 INJECTION, SOLUTION INTRAMUSCULAR; INTRAVENOUS at 10:23

## 2022-07-26 ASSESSMENT — PAIN SCALES - GENERAL
PAINLEVEL_OUTOF10: 5
PAINLEVEL_OUTOF10: 5
PAINLEVEL_OUTOF10: 2
PAINLEVEL_OUTOF10: 10

## 2022-07-26 ASSESSMENT — COGNITIVE AND FUNCTIONAL STATUS - GENERAL
BASIC_MOBILITY_RAW_SCORE: 17
BASIC_MOBILITY_CONVERTED_SCORE: 39.67

## 2022-07-26 ASSESSMENT — ENCOUNTER SYMPTOMS: RESPIRATORY NEGATIVE: 1

## 2022-07-27 ENCOUNTER — APPOINTMENT (OUTPATIENT)
Dept: CARDIOLOGY | Age: 82
End: 2022-07-27

## 2022-07-27 LAB
ANION GAP SERPL CALC-SCNC: 11 MMOL/L (ref 7–19)
BUN SERPL-MCNC: 25 MG/DL (ref 6–20)
BUN/CREAT SERPL: 20 (ref 7–25)
CALCIUM SERPL-MCNC: 8.4 MG/DL (ref 8.4–10.2)
CHLORIDE SERPL-SCNC: 104 MMOL/L (ref 97–110)
CO2 SERPL-SCNC: 27 MMOL/L (ref 21–32)
CREAT SERPL-MCNC: 1.24 MG/DL (ref 0.51–0.95)
FASTING DURATION TIME PATIENT: ABNORMAL H
GFR SERPLBLD BASED ON 1.73 SQ M-ARVRAT: 44 ML/MIN
GLUCOSE BLDC GLUCOMTR-MCNC: 102 MG/DL (ref 70–99)
GLUCOSE BLDC GLUCOMTR-MCNC: 80 MG/DL (ref 70–99)
GLUCOSE BLDC GLUCOMTR-MCNC: 92 MG/DL (ref 70–99)
GLUCOSE BLDC GLUCOMTR-MCNC: 93 MG/DL (ref 70–99)
GLUCOSE SERPL-MCNC: 79 MG/DL (ref 70–99)
INR PPP: 2.3
POTASSIUM SERPL-SCNC: 4.1 MMOL/L (ref 3.4–5.1)
PROTHROMBIN TIME: 23.7 SEC (ref 9.7–11.8)
RAINBOW EXTRA TUBES HOLD SPECIMEN: NORMAL
SODIUM SERPL-SCNC: 138 MMOL/L (ref 135–145)

## 2022-07-27 PROCEDURE — 10002803 HB RX 637: Performed by: INTERNAL MEDICINE

## 2022-07-27 PROCEDURE — 10004651 HB RX, NO CHARGE ITEM: Performed by: INTERNAL MEDICINE

## 2022-07-27 PROCEDURE — 85610 PROTHROMBIN TIME: CPT | Performed by: INTERNAL MEDICINE

## 2022-07-27 PROCEDURE — 99233 SBSQ HOSP IP/OBS HIGH 50: CPT | Performed by: INTERNAL MEDICINE

## 2022-07-27 PROCEDURE — 80048 BASIC METABOLIC PNL TOTAL CA: CPT | Performed by: INTERNAL MEDICINE

## 2022-07-27 PROCEDURE — 99233 SBSQ HOSP IP/OBS HIGH 50: CPT | Performed by: NURSE PRACTITIONER

## 2022-07-27 PROCEDURE — 10002800 HB RX 250 W HCPCS: Performed by: NURSE PRACTITIONER

## 2022-07-27 PROCEDURE — 36415 COLL VENOUS BLD VENIPUNCTURE: CPT | Performed by: INTERNAL MEDICINE

## 2022-07-27 PROCEDURE — 10003585 HB ROOM CHARGE INTERMEDIATE CARE

## 2022-07-27 RX ORDER — ONDANSETRON 2 MG/ML
4 INJECTION INTRAMUSCULAR; INTRAVENOUS EVERY 12 HOURS PRN
Status: DISCONTINUED | OUTPATIENT
Start: 2022-07-27 | End: 2022-08-01 | Stop reason: HOSPADM

## 2022-07-27 RX ORDER — WARFARIN SODIUM 2.5 MG/1
2.5 TABLET ORAL ONCE
Status: COMPLETED | OUTPATIENT
Start: 2022-07-27 | End: 2022-07-27

## 2022-07-27 RX ORDER — AMOXICILLIN 250 MG
2 CAPSULE ORAL NIGHTLY PRN
Status: DISCONTINUED | OUTPATIENT
Start: 2022-07-27 | End: 2022-08-01 | Stop reason: HOSPADM

## 2022-07-27 RX ORDER — LACTULOSE 10 G/15ML
10 SOLUTION ORAL ONCE
Status: COMPLETED | OUTPATIENT
Start: 2022-07-27 | End: 2022-07-27

## 2022-07-27 RX ADMIN — WARFARIN SODIUM 2.5 MG: 2.5 TABLET ORAL at 16:38

## 2022-07-27 RX ADMIN — ACETAMINOPHEN 650 MG: 325 TABLET ORAL at 08:09

## 2022-07-27 RX ADMIN — AMIODARONE HYDROCHLORIDE 100 MG: 200 TABLET ORAL at 21:34

## 2022-07-27 RX ADMIN — METOPROLOL TARTRATE 50 MG: 50 TABLET, FILM COATED ORAL at 08:09

## 2022-07-27 RX ADMIN — SENNOSIDES AND DOCUSATE SODIUM 1 TABLET: 50; 8.6 TABLET ORAL at 21:34

## 2022-07-27 RX ADMIN — LACTULOSE 10 G: 10 SOLUTION ORAL at 11:58

## 2022-07-27 RX ADMIN — POLYETHYLENE GLYCOL (3350) 17 G: 17 POWDER, FOR SOLUTION ORAL at 08:09

## 2022-07-27 RX ADMIN — CEPHALEXIN 250 MG: 250 CAPSULE ORAL at 21:34

## 2022-07-27 RX ADMIN — ACETAMINOPHEN 650 MG: 325 TABLET ORAL at 21:37

## 2022-07-27 RX ADMIN — METFORMIN HYDROCHLORIDE 500 MG: 500 TABLET ORAL at 08:09

## 2022-07-27 RX ADMIN — FUROSEMIDE 40 MG: 10 INJECTION, SOLUTION INTRAMUSCULAR; INTRAVENOUS at 16:38

## 2022-07-27 RX ADMIN — SERTRALINE HYDROCHLORIDE 25 MG: 50 TABLET, FILM COATED ORAL at 08:09

## 2022-07-27 RX ADMIN — CEPHALEXIN 250 MG: 250 CAPSULE ORAL at 06:00

## 2022-07-27 RX ADMIN — FUROSEMIDE 40 MG: 10 INJECTION, SOLUTION INTRAMUSCULAR; INTRAVENOUS at 08:09

## 2022-07-27 RX ADMIN — METOPROLOL TARTRATE 50 MG: 50 TABLET, FILM COATED ORAL at 21:35

## 2022-07-27 RX ADMIN — CEPHALEXIN 250 MG: 250 CAPSULE ORAL at 15:32

## 2022-07-27 ASSESSMENT — PAIN SCALES - WONG BAKER
WONGBAKER_NUMERICALRESPONSE: 2

## 2022-07-27 ASSESSMENT — PAIN SCALES - GENERAL
PAINLEVEL_OUTOF10: 10
PAINLEVEL_OUTOF10: 6
PAINLEVEL_OUTOF10: 7
PAINLEVEL_OUTOF10: 3

## 2022-07-27 ASSESSMENT — ENCOUNTER SYMPTOMS: RESPIRATORY NEGATIVE: 1

## 2022-07-28 ENCOUNTER — APPOINTMENT (OUTPATIENT)
Dept: CT IMAGING | Age: 82
DRG: 602 | End: 2022-07-28
Attending: INTERNAL MEDICINE

## 2022-07-28 LAB
ANION GAP SERPL CALC-SCNC: 10 MMOL/L (ref 7–19)
BUN SERPL-MCNC: 26 MG/DL (ref 6–20)
BUN/CREAT SERPL: 20 (ref 7–25)
CALCIUM SERPL-MCNC: 8.2 MG/DL (ref 8.4–10.2)
CHLORIDE SERPL-SCNC: 106 MMOL/L (ref 97–110)
CO2 SERPL-SCNC: 29 MMOL/L (ref 21–32)
CREAT SERPL-MCNC: 1.3 MG/DL (ref 0.51–0.95)
FASTING DURATION TIME PATIENT: ABNORMAL H
GFR SERPLBLD BASED ON 1.73 SQ M-ARVRAT: 42 ML/MIN
GLUCOSE BLDC GLUCOMTR-MCNC: 102 MG/DL (ref 70–99)
GLUCOSE BLDC GLUCOMTR-MCNC: 105 MG/DL (ref 70–99)
GLUCOSE BLDC GLUCOMTR-MCNC: 107 MG/DL (ref 70–99)
GLUCOSE BLDC GLUCOMTR-MCNC: 81 MG/DL (ref 70–99)
GLUCOSE BLDC GLUCOMTR-MCNC: 84 MG/DL (ref 70–99)
GLUCOSE SERPL-MCNC: 94 MG/DL (ref 70–99)
INR PPP: 2.4
POTASSIUM SERPL-SCNC: 4 MMOL/L (ref 3.4–5.1)
PROTHROMBIN TIME: 24.6 SEC (ref 9.7–11.8)
RAINBOW EXTRA TUBES HOLD SPECIMEN: NORMAL
SODIUM SERPL-SCNC: 141 MMOL/L (ref 135–145)

## 2022-07-28 PROCEDURE — 10002803 HB RX 637: Performed by: INTERNAL MEDICINE

## 2022-07-28 PROCEDURE — 99233 SBSQ HOSP IP/OBS HIGH 50: CPT | Performed by: INTERNAL MEDICINE

## 2022-07-28 PROCEDURE — 80048 BASIC METABOLIC PNL TOTAL CA: CPT | Performed by: INTERNAL MEDICINE

## 2022-07-28 PROCEDURE — 99232 SBSQ HOSP IP/OBS MODERATE 35: CPT | Performed by: INTERNAL MEDICINE

## 2022-07-28 PROCEDURE — G1004 CDSM NDSC: HCPCS

## 2022-07-28 PROCEDURE — 10004651 HB RX, NO CHARGE ITEM: Performed by: INTERNAL MEDICINE

## 2022-07-28 PROCEDURE — 97530 THERAPEUTIC ACTIVITIES: CPT

## 2022-07-28 PROCEDURE — 97535 SELF CARE MNGMENT TRAINING: CPT

## 2022-07-28 PROCEDURE — 85610 PROTHROMBIN TIME: CPT | Performed by: INTERNAL MEDICINE

## 2022-07-28 PROCEDURE — 10002803 HB RX 637: Performed by: NURSE PRACTITIONER

## 2022-07-28 PROCEDURE — 10002805 HB CONTRAST AGENT: Performed by: INTERNAL MEDICINE

## 2022-07-28 PROCEDURE — 36415 COLL VENOUS BLD VENIPUNCTURE: CPT | Performed by: INTERNAL MEDICINE

## 2022-07-28 PROCEDURE — 10003585 HB ROOM CHARGE INTERMEDIATE CARE

## 2022-07-28 PROCEDURE — 97166 OT EVAL MOD COMPLEX 45 MIN: CPT

## 2022-07-28 PROCEDURE — 10002800 HB RX 250 W HCPCS: Performed by: NURSE PRACTITIONER

## 2022-07-28 PROCEDURE — 70450 CT HEAD/BRAIN W/O DYE: CPT

## 2022-07-28 PROCEDURE — 70496 CT ANGIOGRAPHY HEAD: CPT

## 2022-07-28 RX ORDER — BUTALBITAL, ACETAMINOPHEN AND CAFFEINE 50; 325; 40 MG/1; MG/1; MG/1
1 TABLET ORAL ONCE
Status: COMPLETED | OUTPATIENT
Start: 2022-07-28 | End: 2022-07-28

## 2022-07-28 RX ORDER — FUROSEMIDE 40 MG/1
40 TABLET ORAL DAILY
Status: DISCONTINUED | OUTPATIENT
Start: 2022-07-28 | End: 2022-08-01 | Stop reason: HOSPADM

## 2022-07-28 RX ORDER — WARFARIN SODIUM 2.5 MG/1
2.5 TABLET ORAL ONCE
Status: COMPLETED | OUTPATIENT
Start: 2022-07-28 | End: 2022-07-28

## 2022-07-28 RX ORDER — POTASSIUM CHLORIDE 20 MEQ/1
40 TABLET, EXTENDED RELEASE ORAL ONCE
Status: COMPLETED | OUTPATIENT
Start: 2022-07-28 | End: 2022-07-28

## 2022-07-28 RX ORDER — BISACODYL 10 MG
10 SUPPOSITORY, RECTAL RECTAL DAILY PRN
Status: DISCONTINUED | OUTPATIENT
Start: 2022-07-28 | End: 2022-08-01 | Stop reason: HOSPADM

## 2022-07-28 RX ADMIN — CEPHALEXIN 250 MG: 250 CAPSULE ORAL at 21:01

## 2022-07-28 RX ADMIN — BUTALBITAL, ACETAMINOPHEN AND CAFFEINE 1 TABLET: 50; 325; 40 TABLET ORAL at 10:56

## 2022-07-28 RX ADMIN — SENNOSIDES AND DOCUSATE SODIUM 1 TABLET: 50; 8.6 TABLET ORAL at 20:57

## 2022-07-28 RX ADMIN — CEPHALEXIN 250 MG: 250 CAPSULE ORAL at 05:55

## 2022-07-28 RX ADMIN — POTASSIUM CHLORIDE 40 MEQ: 1500 TABLET, EXTENDED RELEASE ORAL at 08:25

## 2022-07-28 RX ADMIN — WARFARIN SODIUM 2.5 MG: 2.5 TABLET ORAL at 16:13

## 2022-07-28 RX ADMIN — AMIODARONE HYDROCHLORIDE 100 MG: 200 TABLET ORAL at 20:55

## 2022-07-28 RX ADMIN — SERTRALINE HYDROCHLORIDE 25 MG: 50 TABLET, FILM COATED ORAL at 08:25

## 2022-07-28 RX ADMIN — CEPHALEXIN 250 MG: 250 CAPSULE ORAL at 14:34

## 2022-07-28 RX ADMIN — IOHEXOL 75 ML: 350 INJECTION, SOLUTION INTRAVENOUS at 14:08

## 2022-07-28 RX ADMIN — POLYETHYLENE GLYCOL (3350) 17 G: 17 POWDER, FOR SOLUTION ORAL at 08:25

## 2022-07-28 RX ADMIN — METOPROLOL TARTRATE 50 MG: 50 TABLET, FILM COATED ORAL at 20:56

## 2022-07-28 RX ADMIN — BUTALBITAL, ACETAMINOPHEN AND CAFFEINE 1 TABLET: 50; 325; 40 TABLET ORAL at 20:56

## 2022-07-28 RX ADMIN — FUROSEMIDE 40 MG: 40 TABLET ORAL at 10:56

## 2022-07-28 RX ADMIN — ACETAMINOPHEN 650 MG: 325 TABLET ORAL at 18:12

## 2022-07-28 RX ADMIN — BISACODYL 10 MG: 10 SUPPOSITORY RECTAL at 18:08

## 2022-07-28 RX ADMIN — ACETAMINOPHEN 650 MG: 325 TABLET ORAL at 08:25

## 2022-07-28 RX ADMIN — METFORMIN HYDROCHLORIDE 500 MG: 500 TABLET ORAL at 09:34

## 2022-07-28 RX ADMIN — METOPROLOL TARTRATE 50 MG: 50 TABLET, FILM COATED ORAL at 08:25

## 2022-07-28 ASSESSMENT — PAIN SCALES - WONG BAKER
WONGBAKER_NUMERICALRESPONSE: 2

## 2022-07-28 ASSESSMENT — COGNITIVE AND FUNCTIONAL STATUS - GENERAL
HELP NEEDED DRESSING REGULAR LOWER BODY CLOTHING: TOTAL
HELP NEEDED FOR BATHING: A LOT
BASIC_MOBILITY_CONVERTED_SCORE: 36.97
DAILY_ACTIVITY_RAW_SCORE: 15
BASIC_MOBILITY_RAW_SCORE: 15
DAILY_ACTIVITY_CONVERTED_SCORE: 34.69
HELP NEEDED FOR PERSONAL GROOMING: A LITTLE
HELP NEEDED FOR TOILETING: A LOT
HELP NEEDED DRESSING REGULAR UPPER BODY CLOTHING: A LITTLE

## 2022-07-28 ASSESSMENT — PAIN SCALES - GENERAL
PAINLEVEL_OUTOF10: 10
PAINLEVEL_OUTOF10: 9
PAINLEVEL_OUTOF10: 10

## 2022-07-28 ASSESSMENT — ACTIVITIES OF DAILY LIVING (ADL)
PRIOR_ADL_BATHING: SUPERVISION (SUPV)
PRIOR_ADL: MODIFIED INDEPENDENT
HOME_MANAGEMENT_TIME_ENTRY: 10

## 2022-07-28 ASSESSMENT — ENCOUNTER SYMPTOMS: RESPIRATORY NEGATIVE: 1

## 2022-07-29 LAB
ANION GAP SERPL CALC-SCNC: 9 MMOL/L (ref 7–19)
BACTERIA BLD CULT: NORMAL
BACTERIA BLD CULT: NORMAL
BASOPHILS # BLD: 0 K/MCL (ref 0–0.3)
BASOPHILS NFR BLD: 0 %
BUN SERPL-MCNC: 30 MG/DL (ref 6–20)
BUN/CREAT SERPL: 24 (ref 7–25)
CALCIUM SERPL-MCNC: 8.5 MG/DL (ref 8.4–10.2)
CHLORIDE SERPL-SCNC: 106 MMOL/L (ref 97–110)
CO2 SERPL-SCNC: 28 MMOL/L (ref 21–32)
CREAT SERPL-MCNC: 1.23 MG/DL (ref 0.51–0.95)
DEPRECATED RDW RBC: 54.7 FL (ref 39–50)
EOSINOPHIL # BLD: 0.1 K/MCL (ref 0–0.5)
EOSINOPHIL NFR BLD: 2 %
ERYTHROCYTE [DISTWIDTH] IN BLOOD: 14.3 % (ref 11–15)
FASTING DURATION TIME PATIENT: ABNORMAL H
GFR SERPLBLD BASED ON 1.73 SQ M-ARVRAT: 44 ML/MIN
GLUCOSE BLDC GLUCOMTR-MCNC: 80 MG/DL (ref 70–99)
GLUCOSE BLDC GLUCOMTR-MCNC: 88 MG/DL (ref 70–99)
GLUCOSE BLDC GLUCOMTR-MCNC: 93 MG/DL (ref 70–99)
GLUCOSE BLDC GLUCOMTR-MCNC: 98 MG/DL (ref 70–99)
GLUCOSE SERPL-MCNC: 85 MG/DL (ref 70–99)
HCT VFR BLD CALC: 28.9 % (ref 36–46.5)
HGB BLD-MCNC: 9.2 G/DL (ref 12–15.5)
IMM GRANULOCYTES # BLD AUTO: 0 K/MCL (ref 0–0.2)
IMM GRANULOCYTES # BLD: 1 %
INR PPP: 2.2
LYMPHOCYTES # BLD: 1 K/MCL (ref 1–4)
LYMPHOCYTES NFR BLD: 14 %
MCH RBC QN AUTO: 32.7 PG (ref 26–34)
MCHC RBC AUTO-ENTMCNC: 31.8 G/DL (ref 32–36.5)
MCV RBC AUTO: 102.8 FL (ref 78–100)
MONOCYTES # BLD: 0.7 K/MCL (ref 0.3–0.9)
MONOCYTES NFR BLD: 10 %
NEUTROPHILS # BLD: 5.3 K/MCL (ref 1.8–7.7)
NEUTROPHILS NFR BLD: 73 %
NRBC BLD MANUAL-RTO: 0 /100 WBC
PLATELET # BLD AUTO: 284 K/MCL (ref 140–450)
POTASSIUM SERPL-SCNC: 4.4 MMOL/L (ref 3.4–5.1)
POTASSIUM SERPL-SCNC: 4.5 MMOL/L (ref 3.4–5.1)
PROTHROMBIN TIME: 22.4 SEC (ref 9.7–11.8)
RBC # BLD: 2.81 MIL/MCL (ref 4–5.2)
SARS-COV-2 RNA RESP QL NAA+PROBE: NOT DETECTED
SERVICE CMNT-IMP: NORMAL
SERVICE CMNT-IMP: NORMAL
SODIUM SERPL-SCNC: 138 MMOL/L (ref 135–145)
WBC # BLD: 7.2 K/MCL (ref 4.2–11)

## 2022-07-29 PROCEDURE — 85025 COMPLETE CBC W/AUTO DIFF WBC: CPT | Performed by: INTERNAL MEDICINE

## 2022-07-29 PROCEDURE — 10003585 HB ROOM CHARGE INTERMEDIATE CARE

## 2022-07-29 PROCEDURE — 85610 PROTHROMBIN TIME: CPT | Performed by: INTERNAL MEDICINE

## 2022-07-29 PROCEDURE — 99232 SBSQ HOSP IP/OBS MODERATE 35: CPT | Performed by: INTERNAL MEDICINE

## 2022-07-29 PROCEDURE — 87635 SARS-COV-2 COVID-19 AMP PRB: CPT | Performed by: INTERNAL MEDICINE

## 2022-07-29 PROCEDURE — 10002803 HB RX 637: Performed by: NURSE PRACTITIONER

## 2022-07-29 PROCEDURE — 10004651 HB RX, NO CHARGE ITEM: Performed by: INTERNAL MEDICINE

## 2022-07-29 PROCEDURE — 36415 COLL VENOUS BLD VENIPUNCTURE: CPT | Performed by: INTERNAL MEDICINE

## 2022-07-29 PROCEDURE — 80048 BASIC METABOLIC PNL TOTAL CA: CPT | Performed by: NURSE PRACTITIONER

## 2022-07-29 PROCEDURE — 99233 SBSQ HOSP IP/OBS HIGH 50: CPT | Performed by: INTERNAL MEDICINE

## 2022-07-29 PROCEDURE — 84132 ASSAY OF SERUM POTASSIUM: CPT | Performed by: INTERNAL MEDICINE

## 2022-07-29 PROCEDURE — 10002803 HB RX 637: Performed by: INTERNAL MEDICINE

## 2022-07-29 RX ORDER — BUTALBITAL, ACETAMINOPHEN AND CAFFEINE 50; 325; 40 MG/1; MG/1; MG/1
1 TABLET ORAL ONCE
Status: COMPLETED | OUTPATIENT
Start: 2022-07-29 | End: 2022-07-29

## 2022-07-29 RX ORDER — AMOXICILLIN 250 MG
2 CAPSULE ORAL NIGHTLY PRN
Qty: 10 TABLET | Refills: 0 | Status: SHIPPED | COMMUNITY
Start: 2022-07-29 | End: 2022-08-08

## 2022-07-29 RX ORDER — WARFARIN SODIUM 2.5 MG/1
2.5 TABLET ORAL ONCE
Status: COMPLETED | OUTPATIENT
Start: 2022-07-29 | End: 2022-07-29

## 2022-07-29 RX ORDER — MAGNESIUM CITRATE
150 SOLUTION, ORAL ORAL ONCE
Status: COMPLETED | OUTPATIENT
Start: 2022-07-29 | End: 2022-07-29

## 2022-07-29 RX ADMIN — ACETAMINOPHEN 650 MG: 325 TABLET ORAL at 17:43

## 2022-07-29 RX ADMIN — ACETAMINOPHEN 650 MG: 325 TABLET ORAL at 09:55

## 2022-07-29 RX ADMIN — FUROSEMIDE 40 MG: 40 TABLET ORAL at 09:55

## 2022-07-29 RX ADMIN — SERTRALINE HYDROCHLORIDE 25 MG: 50 TABLET, FILM COATED ORAL at 09:55

## 2022-07-29 RX ADMIN — METFORMIN HYDROCHLORIDE 500 MG: 500 TABLET ORAL at 09:56

## 2022-07-29 RX ADMIN — WARFARIN SODIUM 2.5 MG: 2.5 TABLET ORAL at 17:43

## 2022-07-29 RX ADMIN — BUTALBITAL, ACETAMINOPHEN, AND CAFFEINE 1 TABLET: 50; 325; 40 TABLET ORAL at 13:21

## 2022-07-29 RX ADMIN — MAGNESIUM CITRATE 150 ML: 1.75 LIQUID ORAL at 15:56

## 2022-07-29 RX ADMIN — SENNOSIDES AND DOCUSATE SODIUM 1 TABLET: 50; 8.6 TABLET ORAL at 21:57

## 2022-07-29 RX ADMIN — ACETAMINOPHEN 650 MG: 325 TABLET ORAL at 22:03

## 2022-07-29 RX ADMIN — CEPHALEXIN 250 MG: 250 CAPSULE ORAL at 06:02

## 2022-07-29 RX ADMIN — POLYETHYLENE GLYCOL (3350) 17 G: 17 POWDER, FOR SOLUTION ORAL at 09:56

## 2022-07-29 RX ADMIN — SENNOSIDES AND DOCUSATE SODIUM 2 TABLET: 50; 8.6 TABLET ORAL at 09:55

## 2022-07-29 RX ADMIN — AMIODARONE HYDROCHLORIDE 100 MG: 200 TABLET ORAL at 21:56

## 2022-07-29 RX ADMIN — CEPHALEXIN 250 MG: 250 CAPSULE ORAL at 21:57

## 2022-07-29 RX ADMIN — METOPROLOL TARTRATE 50 MG: 50 TABLET, FILM COATED ORAL at 09:55

## 2022-07-29 RX ADMIN — METOPROLOL TARTRATE 50 MG: 50 TABLET, FILM COATED ORAL at 21:57

## 2022-07-29 RX ADMIN — CEPHALEXIN 250 MG: 250 CAPSULE ORAL at 13:21

## 2022-07-29 ASSESSMENT — PAIN SCALES - GENERAL
PAINLEVEL_OUTOF10: 4
PAINLEVEL_OUTOF10: 9
PAINLEVEL_OUTOF10: 6
PAINLEVEL_OUTOF10: 3
PAINLEVEL_OUTOF10: 6

## 2022-07-29 ASSESSMENT — ENCOUNTER SYMPTOMS: RESPIRATORY NEGATIVE: 1

## 2022-07-30 LAB
GLUCOSE BLDC GLUCOMTR-MCNC: 106 MG/DL (ref 70–99)
GLUCOSE BLDC GLUCOMTR-MCNC: 112 MG/DL (ref 70–99)
GLUCOSE BLDC GLUCOMTR-MCNC: 78 MG/DL (ref 70–99)
GLUCOSE BLDC GLUCOMTR-MCNC: 82 MG/DL (ref 70–99)
GLUCOSE BLDC GLUCOMTR-MCNC: 95 MG/DL (ref 70–99)
INR PPP: 1.9
PROTHROMBIN TIME: 19.8 SEC (ref 9.7–11.8)

## 2022-07-30 PROCEDURE — 10002803 HB RX 637: Performed by: INTERNAL MEDICINE

## 2022-07-30 PROCEDURE — 36415 COLL VENOUS BLD VENIPUNCTURE: CPT | Performed by: INTERNAL MEDICINE

## 2022-07-30 PROCEDURE — 10004651 HB RX, NO CHARGE ITEM: Performed by: INTERNAL MEDICINE

## 2022-07-30 PROCEDURE — 99233 SBSQ HOSP IP/OBS HIGH 50: CPT | Performed by: INTERNAL MEDICINE

## 2022-07-30 PROCEDURE — 10002803 HB RX 637: Performed by: NURSE PRACTITIONER

## 2022-07-30 PROCEDURE — 10003585 HB ROOM CHARGE INTERMEDIATE CARE

## 2022-07-30 PROCEDURE — 85610 PROTHROMBIN TIME: CPT | Performed by: INTERNAL MEDICINE

## 2022-07-30 RX ORDER — WARFARIN SODIUM 3 MG/1
3 TABLET ORAL ONCE
Status: COMPLETED | OUTPATIENT
Start: 2022-07-30 | End: 2022-07-30

## 2022-07-30 RX ORDER — CEPHALEXIN 250 MG/1
250 CAPSULE ORAL EVERY 8 HOURS SCHEDULED
Qty: 18 CAPSULE | Refills: 0 | Status: SHIPPED | COMMUNITY
Start: 2022-07-30 | End: 2022-07-31 | Stop reason: SDUPTHER

## 2022-07-30 RX ADMIN — ACETAMINOPHEN 650 MG: 325 TABLET ORAL at 21:43

## 2022-07-30 RX ADMIN — CEPHALEXIN 250 MG: 250 CAPSULE ORAL at 21:40

## 2022-07-30 RX ADMIN — CEPHALEXIN 250 MG: 250 CAPSULE ORAL at 05:59

## 2022-07-30 RX ADMIN — CEPHALEXIN 250 MG: 250 CAPSULE ORAL at 15:25

## 2022-07-30 RX ADMIN — POLYETHYLENE GLYCOL (3350) 17 G: 17 POWDER, FOR SOLUTION ORAL at 08:08

## 2022-07-30 RX ADMIN — AMIODARONE HYDROCHLORIDE 100 MG: 200 TABLET ORAL at 21:40

## 2022-07-30 RX ADMIN — WARFARIN SODIUM 3 MG: 3 TABLET ORAL at 18:23

## 2022-07-30 RX ADMIN — SERTRALINE HYDROCHLORIDE 25 MG: 50 TABLET, FILM COATED ORAL at 08:08

## 2022-07-30 RX ADMIN — FUROSEMIDE 40 MG: 40 TABLET ORAL at 08:08

## 2022-07-30 RX ADMIN — METOPROLOL TARTRATE 50 MG: 50 TABLET, FILM COATED ORAL at 08:08

## 2022-07-30 RX ADMIN — METOPROLOL TARTRATE 50 MG: 50 TABLET, FILM COATED ORAL at 21:40

## 2022-07-30 RX ADMIN — METFORMIN HYDROCHLORIDE 500 MG: 500 TABLET ORAL at 08:08

## 2022-07-30 ASSESSMENT — PAIN SCALES - GENERAL
PAINLEVEL_OUTOF10: 9
PAINLEVEL_OUTOF10: 0
PAINLEVEL_OUTOF10: 0

## 2022-07-31 LAB
GLUCOSE BLDC GLUCOMTR-MCNC: 100 MG/DL (ref 70–99)
GLUCOSE BLDC GLUCOMTR-MCNC: 111 MG/DL (ref 70–99)
GLUCOSE BLDC GLUCOMTR-MCNC: 79 MG/DL (ref 70–99)
INR PPP: 1.8
PROTHROMBIN TIME: 18.3 SEC (ref 9.7–11.8)
RAINBOW EXTRA TUBES HOLD SPECIMEN: NORMAL
RAINBOW EXTRA TUBES HOLD SPECIMEN: NORMAL

## 2022-07-31 PROCEDURE — 99232 SBSQ HOSP IP/OBS MODERATE 35: CPT | Performed by: INTERNAL MEDICINE

## 2022-07-31 PROCEDURE — 85610 PROTHROMBIN TIME: CPT | Performed by: INTERNAL MEDICINE

## 2022-07-31 PROCEDURE — 36415 COLL VENOUS BLD VENIPUNCTURE: CPT | Performed by: INTERNAL MEDICINE

## 2022-07-31 PROCEDURE — 10002803 HB RX 637: Performed by: INTERNAL MEDICINE

## 2022-07-31 PROCEDURE — 10003585 HB ROOM CHARGE INTERMEDIATE CARE

## 2022-07-31 PROCEDURE — 10002803 HB RX 637: Performed by: NURSE PRACTITIONER

## 2022-07-31 PROCEDURE — 10004651 HB RX, NO CHARGE ITEM: Performed by: INTERNAL MEDICINE

## 2022-07-31 RX ORDER — WARFARIN SODIUM 3 MG/1
3 TABLET ORAL ONCE
Status: COMPLETED | OUTPATIENT
Start: 2022-07-31 | End: 2022-07-31

## 2022-07-31 RX ORDER — CEPHALEXIN 250 MG/1
250 CAPSULE ORAL EVERY 8 HOURS SCHEDULED
Qty: 12 CAPSULE | Refills: 0 | Status: SHIPPED | COMMUNITY
Start: 2022-07-31 | End: 2022-08-01 | Stop reason: SDUPTHER

## 2022-07-31 RX ADMIN — ACETAMINOPHEN 650 MG: 325 TABLET ORAL at 22:28

## 2022-07-31 RX ADMIN — CEPHALEXIN 250 MG: 250 CAPSULE ORAL at 14:01

## 2022-07-31 RX ADMIN — SERTRALINE HYDROCHLORIDE 25 MG: 50 TABLET, FILM COATED ORAL at 08:31

## 2022-07-31 RX ADMIN — CEPHALEXIN 250 MG: 250 CAPSULE ORAL at 22:28

## 2022-07-31 RX ADMIN — WARFARIN SODIUM 3 MG: 3 TABLET ORAL at 17:36

## 2022-07-31 RX ADMIN — ACETAMINOPHEN 650 MG: 325 TABLET ORAL at 05:30

## 2022-07-31 RX ADMIN — METOPROLOL TARTRATE 50 MG: 50 TABLET, FILM COATED ORAL at 08:31

## 2022-07-31 RX ADMIN — METFORMIN HYDROCHLORIDE 500 MG: 500 TABLET ORAL at 08:31

## 2022-07-31 RX ADMIN — FUROSEMIDE 40 MG: 40 TABLET ORAL at 08:31

## 2022-07-31 RX ADMIN — METOPROLOL TARTRATE 50 MG: 50 TABLET, FILM COATED ORAL at 22:28

## 2022-07-31 RX ADMIN — POLYETHYLENE GLYCOL (3350) 17 G: 17 POWDER, FOR SOLUTION ORAL at 08:31

## 2022-07-31 RX ADMIN — AMIODARONE HYDROCHLORIDE 100 MG: 200 TABLET ORAL at 22:28

## 2022-07-31 RX ADMIN — CEPHALEXIN 250 MG: 250 CAPSULE ORAL at 05:16

## 2022-07-31 ASSESSMENT — PAIN SCALES - GENERAL
PAINLEVEL_OUTOF10: 9
PAINLEVEL_OUTOF10: 2
PAINLEVEL_OUTOF10: 9

## 2022-08-01 VITALS
WEIGHT: 174.38 LBS | TEMPERATURE: 97 F | DIASTOLIC BLOOD PRESSURE: 74 MMHG | OXYGEN SATURATION: 95 % | HEIGHT: 66 IN | BODY MASS INDEX: 28.03 KG/M2 | RESPIRATION RATE: 19 BRPM | HEART RATE: 83 BPM | SYSTOLIC BLOOD PRESSURE: 137 MMHG

## 2022-08-01 LAB
DEPRECATED RDW RBC: 54.7 FL (ref 39–50)
ERYTHROCYTE [DISTWIDTH] IN BLOOD: 14.4 % (ref 11–15)
GLUCOSE BLDC GLUCOMTR-MCNC: 84 MG/DL (ref 70–99)
GLUCOSE BLDC GLUCOMTR-MCNC: 87 MG/DL (ref 70–99)
HCT VFR BLD CALC: 27.5 % (ref 36–46.5)
HGB BLD-MCNC: 8.5 G/DL (ref 12–15.5)
INR PPP: 1.6
MCH RBC QN AUTO: 32.2 PG (ref 26–34)
MCHC RBC AUTO-ENTMCNC: 30.9 G/DL (ref 32–36.5)
MCV RBC AUTO: 104.2 FL (ref 78–100)
NRBC BLD MANUAL-RTO: 0 /100 WBC
PLATELET # BLD AUTO: 255 K/MCL (ref 140–450)
PROTHROMBIN TIME: 16.4 SEC (ref 9.7–11.8)
RAINBOW EXTRA TUBES HOLD SPECIMEN: NORMAL
RBC # BLD: 2.64 MIL/MCL (ref 4–5.2)
SARS-COV-2 RNA RESP QL NAA+PROBE: NOT DETECTED
SERVICE CMNT-IMP: NORMAL
SERVICE CMNT-IMP: NORMAL
WBC # BLD: 8 K/MCL (ref 4.2–11)

## 2022-08-01 PROCEDURE — 85027 COMPLETE CBC AUTOMATED: CPT | Performed by: INTERNAL MEDICINE

## 2022-08-01 PROCEDURE — 99239 HOSP IP/OBS DSCHRG MGMT >30: CPT | Performed by: INTERNAL MEDICINE

## 2022-08-01 PROCEDURE — 10002803 HB RX 637: Performed by: NURSE PRACTITIONER

## 2022-08-01 PROCEDURE — 85610 PROTHROMBIN TIME: CPT | Performed by: INTERNAL MEDICINE

## 2022-08-01 PROCEDURE — 10002803 HB RX 637: Performed by: INTERNAL MEDICINE

## 2022-08-01 PROCEDURE — 36415 COLL VENOUS BLD VENIPUNCTURE: CPT | Performed by: INTERNAL MEDICINE

## 2022-08-01 PROCEDURE — 87635 SARS-COV-2 COVID-19 AMP PRB: CPT | Performed by: INTERNAL MEDICINE

## 2022-08-01 PROCEDURE — 10004651 HB RX, NO CHARGE ITEM: Performed by: INTERNAL MEDICINE

## 2022-08-01 RX ORDER — WARFARIN SODIUM 2.5 MG/1
5 TABLET ORAL ONCE
Status: COMPLETED | OUTPATIENT
Start: 2022-08-01 | End: 2022-08-01

## 2022-08-01 RX ORDER — CEPHALEXIN 250 MG/1
250 CAPSULE ORAL EVERY 8 HOURS SCHEDULED
Qty: 9 CAPSULE | Refills: 0 | Status: SHIPPED | OUTPATIENT
Start: 2022-08-01 | End: 2022-08-04

## 2022-08-01 RX ADMIN — CEPHALEXIN 250 MG: 250 CAPSULE ORAL at 14:41

## 2022-08-01 RX ADMIN — FUROSEMIDE 40 MG: 40 TABLET ORAL at 09:04

## 2022-08-01 RX ADMIN — SERTRALINE HYDROCHLORIDE 25 MG: 50 TABLET, FILM COATED ORAL at 09:04

## 2022-08-01 RX ADMIN — CEPHALEXIN 250 MG: 250 CAPSULE ORAL at 05:00

## 2022-08-01 RX ADMIN — METFORMIN HYDROCHLORIDE 500 MG: 500 TABLET ORAL at 09:04

## 2022-08-01 RX ADMIN — WARFARIN SODIUM 5 MG: 2.5 TABLET ORAL at 16:10

## 2022-08-01 RX ADMIN — ACETAMINOPHEN 650 MG: 325 TABLET ORAL at 16:10

## 2022-08-01 RX ADMIN — METOPROLOL TARTRATE 50 MG: 50 TABLET, FILM COATED ORAL at 09:04

## 2022-08-01 ASSESSMENT — PAIN SCALES - GENERAL
PAINLEVEL_OUTOF10: 9
PAINLEVEL_OUTOF10: 9

## 2022-08-06 ENCOUNTER — TELEPHONE (OUTPATIENT)
Dept: SCHEDULING | Age: 82
End: 2022-08-06

## 2022-08-09 ENCOUNTER — TELEPHONE (OUTPATIENT)
Dept: CARDIOLOGY | Age: 82
End: 2022-08-09

## 2022-08-16 ENCOUNTER — APPOINTMENT (OUTPATIENT)
Dept: CARDIOLOGY | Age: 82
End: 2022-08-16

## 2022-08-16 ENCOUNTER — TELEPHONE (OUTPATIENT)
Dept: CARDIOLOGY | Age: 82
End: 2022-08-16

## 2022-08-16 LAB
ABSOLUTE IMMATURE GRANULOCYTES (OFFPRE24): 0.02 THO/MM3 (ref 0–0.1)
BASOPHILS # BLD: 0.05 THO/MM3 (ref 0–0.1)
BASOPHILS NFR BLD: 0.8 %
BUN SERPL-MCNC: 24 MG/DL (ref 7–28)
CALCIUM SERPL-MCNC: 8.8 MG/DL (ref 8.7–10.5)
CHLORIDE SERPL-SCNC: 105 MEQ/L (ref 99–110)
CO2 SERPL-SCNC: 30 MM/L (ref 18–30)
CREAT SERPL-MCNC: 1.22 MG/DL (ref 0.44–1.32)
EOSINOPHIL # BLD: 0.15 THO/MM3 (ref 0–0.5)
EOSINOPHIL NFR BLD: 2.4 %
ERYTHROCYTE [DISTWIDTH] IN BLOOD: 14.6 % (ref 11.5–15.2)
GLUCOSE SERPL-MCNC: 72 MG/DL (ref 70–110)
HCT VFR BLD CALC: 29.9 % (ref 37–47)
HGB BLD-MCNC: 9 GM/DL (ref 12–16)
IMMATURE GRANULOCYTES (OFFPRE25): 0.3 %
INR PPP: 1.82 RATIO (ref 2–3)
LENGTH OF FAST TIME PATIENT: ABNORMAL H
LYMPHOCYTES # BLD: 1.14 THO/MM3 (ref 0.8–3)
LYMPHOCYTES NFR BLD: 18.2 %
MCH RBC QN AUTO: 31.7 UUG (ref 27–33)
MCHC RBC AUTO-ENTMCNC: 30.1 GM/DL (ref 32–36)
MCV RBC AUTO: 105.3 UM3 (ref 81–99)
MONOCYTES # BLD: 0.56 THO/MM3 (ref 0.2–1)
MONOCYTES NFR BLD: 8.9 %
MPV (OFFPRE2): 12.6 UM3 (ref 9.5–13.1)
NEUTROPHILS # BLD: 4.34 THO/MM3 (ref 1.4–6.8)
NEUTROPHILS NFR BLD: 69.4 %
PLATELET # BLD: 181 THO/MM3 (ref 150–400)
POTASSIUM SERPL-SCNC: 3.7 MEQ/L (ref 3.6–5)
PROTHROMBIN TIME: 18 SEC (ref 19.7–28.8)
RBC # BLD: 2.84 MIL/MM3 (ref 4.2–5.4)
SODIUM SERPL-SCNC: 144 MEQ/L (ref 138–147)
WBC # BLD: 6.26 THO/MM3 (ref 4.8–10.8)

## 2022-08-16 NOTE — PROGRESS NOTES
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    Resp  Chest vest every 6 hours  Cont Albuterol, Pulmicort and 3%  Will need to arrange home vent to be checked and used on patient. Of note parents only use vent when patient is sick and report it had been checked previously  Continue RA day/TC night (respiratory baseline)    CV  On all home antihypertensives - amlodipine, clonidine (patches change 8/16), labetalol, minoxidil  Home furosemide  Can use Nifedipine/Hydralazine as PRN  Appreciate nephrology input    KARLY:  Trial feeds again at slower rate (306cc per feed at 130cc/hr, total 6 feeds per day)  Cont home dose Vit D, Pepcid, Iron, Prevacid, NaCl supplements  Increase home po Na Bicarbonate to 30meq BID (8/15)  Switch lokelma back to kayexylate per renal    ID  Levofloxacin for E Coli UTI and Serratia tracheitis, total 7 days (finishes 8/17), switch to po today  Needs prophylactic antibiotics after completion of treatment course, will consider Bactrim versus Macrobid depending on creatinine    Heme/Transplant  Lovenox level therapeutic, will repeat with next blood draw as renally cleared  Epogen Mon/Thurs  Tacrolimus level with dose changes or significant changes in her GFR  Prednisone daily    Neuro  Home Brivaracetam, cannabidiol, diazepam, eslicarbazepine and lacosamide.  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    Resp  Continue RA day/TC night (respiratory baseline)  Chest vest every 6 hours  Albuterol, Pulmicort and 3%  Home vent to be checked by [employer], uses when sick    CV  On all home antihypertensives - amlodipine, clonidine (patches change 8/16), labetalol, minoxidil  Home furosemide  Can use Nifedipine/Hydralazine as PRN  Appreciate nephrology input    FEN/GI:  Tolerating feeds at slower rate (306cc per feed at 130cc/hr, total 6 feeds per day)  Cont home dose Vit D, Pepcid, Iron, Prevacid, NaCl supplements  Increased home po Na Bicarbonate to 30meq BID (8/15)  Kayexylate decanting feeds    ID  Levofloxacin po for E Coli UTI and Serratia tracheitis, total 7 days (finishes 8/17)  Amoxicillin prophylaxis 250mg qd for UTI prevention once finished with levofloxacin treatment course    Heme/Transplant  Lovenox level therapeutic   Epogen Mon/Thurs  Tacrolimus level with dose changes or significant changes in her GFR - will have checked outpatient  Prednisone daily    Neuro  Home Brivaracetam, cannabidiol, diazepam, eslicarbazepine and lacosamide.  Following with neurology    Access  PIV x1    Stable for discharge home, mom will titrate back to home feeding regiment under guidance of PMD

## 2022-08-17 ENCOUNTER — ANTI-COAG (OUTPATIENT)
Dept: ANTICOAGULATION | Age: 82
End: 2022-08-17

## 2022-08-19 ENCOUNTER — TELEPHONE (OUTPATIENT)
Dept: CARDIOLOGY | Age: 82
End: 2022-08-19

## 2022-08-19 ENCOUNTER — ANTI-COAG (OUTPATIENT)
Dept: CARDIOLOGY | Age: 82
End: 2022-08-19

## 2022-08-19 DIAGNOSIS — N18.32 STAGE 3B CHRONIC KIDNEY DISEASE (CMD): ICD-10-CM

## 2022-08-19 DIAGNOSIS — I48.20 CHRONIC ATRIAL FIBRILLATION (CMD): Primary | ICD-10-CM

## 2022-08-19 DIAGNOSIS — I50.32 CHRONIC DIASTOLIC CONGESTIVE HEART FAILURE (CMD): ICD-10-CM

## 2022-08-22 ENCOUNTER — TELEPHONE (OUTPATIENT)
Dept: POST ACUTE CARE | Age: 82
End: 2022-08-22

## 2022-08-23 ENCOUNTER — ANTI-COAG (OUTPATIENT)
Dept: CARDIOLOGY | Age: 82
End: 2022-08-23

## 2022-08-23 DIAGNOSIS — I48.20 CHRONIC ATRIAL FIBRILLATION (CMD): Primary | ICD-10-CM

## 2022-08-23 PROBLEM — I50.33 ACUTE ON CHRONIC DIASTOLIC (CONGESTIVE) HEART FAILURE (CMD): Status: RESOLVED | Noted: 2022-07-24 | Resolved: 2022-08-23

## 2022-08-23 PROBLEM — I48.91 ATRIAL FIBRILLATION (CMD): Status: RESOLVED | Noted: 2019-02-20 | Resolved: 2022-08-23

## 2022-08-23 PROBLEM — I83.90 VARICOSE VEINS OF LOWER EXTREMITY: Status: ACTIVE | Noted: 2018-10-02

## 2022-08-23 PROBLEM — I83.90 VARICOSE VEINS OF LOWER EXTREMITY: Status: RESOLVED | Noted: 2018-10-02 | Resolved: 2022-08-23

## 2022-08-23 PROBLEM — N30.00 ACUTE CYSTITIS WITHOUT HEMATURIA: Status: RESOLVED | Noted: 2022-07-17 | Resolved: 2022-08-23

## 2022-08-23 PROBLEM — M89.49 OSTEOARTHROSIS INVOLVING MULTIPLE SITES BUT NOT DESIGNATED AS GENERALIZED: Status: RESOLVED | Noted: 2022-08-23 | Resolved: 2022-08-23

## 2022-08-23 PROBLEM — R68.84 JAW PAIN: Status: RESOLVED | Noted: 2022-07-23 | Resolved: 2022-08-23

## 2022-08-23 PROBLEM — Z95.818 S/P MITRAL VALVE CLIP IMPLANTATION: Status: ACTIVE | Noted: 2019-04-12

## 2022-08-23 PROBLEM — I48.91 HYPERCOAGULABILITY DUE TO ATRIAL FIBRILLATION (CMD): Status: ACTIVE | Noted: 2022-08-23

## 2022-08-23 PROBLEM — R79.1 SUPRATHERAPEUTIC INR: Status: RESOLVED | Noted: 2022-07-14 | Resolved: 2022-08-23

## 2022-08-23 PROBLEM — R30.0 DYSURIA: Status: RESOLVED | Noted: 2022-07-17 | Resolved: 2022-08-23

## 2022-08-23 PROBLEM — Z95.818 S/P MITRAL VALVE CLIP IMPLANTATION: Status: RESOLVED | Noted: 2019-04-12 | Resolved: 2022-08-23

## 2022-08-23 PROBLEM — S12.100A CLOSED ODONTOID FRACTURE (CMD): Status: RESOLVED | Noted: 2020-07-05 | Resolved: 2022-08-23

## 2022-08-23 PROBLEM — Z79.01 LONG TERM (CURRENT) USE OF ANTICOAGULANTS: Status: RESOLVED | Noted: 2021-10-29 | Resolved: 2022-08-23

## 2022-08-23 PROBLEM — Z51.5 PALLIATIVE CARE ENCOUNTER: Status: ACTIVE | Noted: 2022-08-23

## 2022-08-23 PROBLEM — Z86.711 HISTORY OF PULMONARY EMBOLISM: Status: ACTIVE | Noted: 2022-08-23

## 2022-08-23 PROBLEM — Z98.890 S/P MITRAL VALVE CLIP IMPLANTATION: Status: RESOLVED | Noted: 2019-04-12 | Resolved: 2022-08-23

## 2022-08-23 PROBLEM — Z98.890 S/P MITRAL VALVE CLIP IMPLANTATION: Status: ACTIVE | Noted: 2019-04-12

## 2022-08-23 PROBLEM — K81.1 CHRONIC CHOLECYSTITIS: Status: ACTIVE | Noted: 2019-09-03

## 2022-08-23 PROBLEM — M89.49 OSTEOARTHROSIS INVOLVING MULTIPLE SITES BUT NOT DESIGNATED AS GENERALIZED: Status: ACTIVE | Noted: 2022-08-23

## 2022-08-23 PROBLEM — Z79.01 SUBTHERAPEUTIC ANTICOAGULATION: Status: RESOLVED | Noted: 2022-07-17 | Resolved: 2022-08-23

## 2022-08-23 PROBLEM — I70.0 ATHEROSCLEROSIS OF AORTA (CMD): Status: ACTIVE | Noted: 2022-08-23

## 2022-08-23 PROBLEM — Z51.81 SUBTHERAPEUTIC ANTICOAGULATION: Status: RESOLVED | Noted: 2022-07-17 | Resolved: 2022-08-23

## 2022-08-23 PROBLEM — D68.69 HYPERCOAGULABILITY DUE TO ATRIAL FIBRILLATION (CMD): Status: ACTIVE | Noted: 2022-08-23

## 2022-08-23 PROBLEM — K81.1 CHRONIC CHOLECYSTITIS: Status: RESOLVED | Noted: 2019-09-03 | Resolved: 2022-08-23

## 2022-08-23 PROBLEM — S12.100A CLOSED ODONTOID FRACTURE (CMD): Status: ACTIVE | Noted: 2020-07-05

## 2022-08-23 PROBLEM — I48.91 UNSPECIFIED ATRIAL FIBRILLATION (CMD): Status: RESOLVED | Noted: 2019-02-22 | Resolved: 2022-08-23

## 2022-08-24 ENCOUNTER — TELEPHONE (OUTPATIENT)
Dept: POST ACUTE CARE | Age: 82
End: 2022-08-24

## 2022-08-24 ENCOUNTER — ANTI-COAG (OUTPATIENT)
Dept: CARDIOLOGY | Age: 82
End: 2022-08-24

## 2022-08-24 DIAGNOSIS — I48.20 CHRONIC ATRIAL FIBRILLATION (CMD): Primary | ICD-10-CM

## 2022-08-25 ENCOUNTER — APPOINTMENT (OUTPATIENT)
Dept: POST ACUTE CARE | Age: 82
End: 2022-08-25
Attending: INTERNAL MEDICINE

## 2022-08-29 ENCOUNTER — TELEPHONE (OUTPATIENT)
Dept: CARDIOLOGY | Age: 82
End: 2022-08-29

## 2022-08-30 ENCOUNTER — TELEPHONE (OUTPATIENT)
Dept: CARDIOLOGY | Age: 82
End: 2022-08-30

## 2022-08-30 ENCOUNTER — HOME/GROUP HOME VISIT (OUTPATIENT)
Dept: POST ACUTE CARE | Age: 82
End: 2022-08-30
Attending: INTERNAL MEDICINE

## 2022-08-30 ENCOUNTER — TELEPHONIC VISIT (OUTPATIENT)
Dept: POST ACUTE CARE | Age: 82
End: 2022-08-30

## 2022-08-30 ENCOUNTER — ANTI-COAG (OUTPATIENT)
Dept: CARDIOLOGY | Age: 82
End: 2022-08-30

## 2022-08-30 VITALS
HEART RATE: 93 BPM | OXYGEN SATURATION: 99 % | TEMPERATURE: 98.2 F | SYSTOLIC BLOOD PRESSURE: 170 MMHG | DIASTOLIC BLOOD PRESSURE: 98 MMHG

## 2022-08-30 DIAGNOSIS — E11.29 TYPE 2 DIABETES MELLITUS WITH OTHER DIABETIC KIDNEY COMPLICATION, WITHOUT LONG-TERM CURRENT USE OF INSULIN (CMD): ICD-10-CM

## 2022-08-30 DIAGNOSIS — I50.32 CHRONIC DIASTOLIC CONGESTIVE HEART FAILURE (CMD): Primary | ICD-10-CM

## 2022-08-30 DIAGNOSIS — I70.0 ATHEROSCLEROSIS OF AORTA (CMD): ICD-10-CM

## 2022-08-30 DIAGNOSIS — I48.20 CHRONIC ATRIAL FIBRILLATION (CMD): Primary | ICD-10-CM

## 2022-08-30 DIAGNOSIS — Z51.5 PALLIATIVE CARE ENCOUNTER: ICD-10-CM

## 2022-08-30 DIAGNOSIS — E43 UNSPECIFIED SEVERE PROTEIN-CALORIE MALNUTRITION (CMD): ICD-10-CM

## 2022-08-30 DIAGNOSIS — E44.0 PROTEIN-CALORIE MALNUTRITION, MODERATE (CMD): ICD-10-CM

## 2022-08-30 DIAGNOSIS — I82.403 ACUTE DEEP VEIN THROMBOSIS (DVT) OF BOTH LOWER EXTREMITIES, UNSPECIFIED VEIN (CMD): ICD-10-CM

## 2022-08-30 DIAGNOSIS — M79.604 PAIN IN BOTH LOWER EXTREMITIES: ICD-10-CM

## 2022-08-30 DIAGNOSIS — N18.32 STAGE 3B CHRONIC KIDNEY DISEASE (CMD): ICD-10-CM

## 2022-08-30 DIAGNOSIS — F33.42 MAJOR DEPRESSIVE DISORDER, RECURRENT EPISODE, IN FULL REMISSION (CMD): ICD-10-CM

## 2022-08-30 DIAGNOSIS — I48.20 CHRONIC ATRIAL FIBRILLATION (CMD): ICD-10-CM

## 2022-08-30 DIAGNOSIS — M79.605 PAIN IN BOTH LOWER EXTREMITIES: ICD-10-CM

## 2022-08-30 DIAGNOSIS — R19.7 DIARRHEA OF PRESUMED INFECTIOUS ORIGIN: ICD-10-CM

## 2022-08-30 LAB — INR PPP: 7.45

## 2022-08-30 PROCEDURE — 99354 PROLONGED SERV OUTPT REQ DIRECT PT CONTCT BEYND TIME USUAL SERV 1ST HR: CPT | Performed by: NURSE PRACTITIONER

## 2022-08-30 PROCEDURE — 99497 ADVNCD CARE PLAN 30 MIN: CPT | Performed by: NURSE PRACTITIONER

## 2022-08-30 PROCEDURE — 99350 HOME/RES VST EST HIGH MDM 60: CPT | Performed by: NURSE PRACTITIONER

## 2022-08-30 PROCEDURE — 1157F ADVNC CARE PLAN IN RCRD: CPT | Performed by: NURSE PRACTITIONER

## 2022-08-30 PROCEDURE — X1094 NO CHARGE VISIT: HCPCS | Performed by: SPECIALIST

## 2022-08-30 PROCEDURE — 1170F FXNL STATUS ASSESSED: CPT | Performed by: NURSE PRACTITIONER

## 2022-08-30 PROCEDURE — 1125F AMNT PAIN NOTED PAIN PRSNT: CPT | Performed by: NURSE PRACTITIONER

## 2022-08-30 RX ORDER — TRAMADOL HYDROCHLORIDE 50 MG/1
50 TABLET ORAL 2 TIMES DAILY PRN
COMMUNITY
End: 2022-08-30 | Stop reason: SDUPTHER

## 2022-08-30 RX ORDER — TRAMADOL HYDROCHLORIDE 50 MG/1
50 TABLET ORAL 2 TIMES DAILY PRN
Qty: 30 TABLET | Refills: 0 | Status: SHIPPED | OUTPATIENT
Start: 2022-08-30 | End: 2022-01-01 | Stop reason: SDUPTHER

## 2022-08-30 RX ORDER — DILTIAZEM HYDROCHLORIDE 120 MG/1
120 TABLET, FILM COATED ORAL DAILY
COMMUNITY
End: 2022-01-01 | Stop reason: SDUPTHER

## 2022-08-30 ASSESSMENT — ENCOUNTER SYMPTOMS
CONFUSION: 0
ENDOCRINE NEGATIVE: 1
FATIGUE: 1
SHORTNESS OF BREATH: 0
RESPIRATORY NEGATIVE: 1
NEUROLOGICAL NEGATIVE: 1
ALLERGIC/IMMUNOLOGIC NEGATIVE: 1
ACTIVITY CHANGE: 1
ABDOMINAL PAIN: 0
ABDOMINAL DISTENTION: 0
AGITATION: 0
APNEA: 0
WHEEZING: 0
FEVER: 0
HEMATOLOGIC/LYMPHATIC NEGATIVE: 1
COUGH: 0
DIZZINESS: 0
GASTROINTESTINAL NEGATIVE: 1
COLOR CHANGE: 0
EYES NEGATIVE: 1
PSYCHIATRIC NEGATIVE: 1
NAUSEA: 0
VOMITING: 0

## 2022-08-31 ENCOUNTER — ANTI-COAG (OUTPATIENT)
Dept: ANTICOAGULATION | Age: 82
End: 2022-08-31

## 2022-08-31 DIAGNOSIS — Z86.711 HISTORY OF PULMONARY EMBOLISM: ICD-10-CM

## 2022-08-31 DIAGNOSIS — I82.403 ACUTE DEEP VEIN THROMBOSIS (DVT) OF BOTH LOWER EXTREMITIES (CMD): ICD-10-CM

## 2022-08-31 DIAGNOSIS — Z79.01 CURRENT USE OF LONG TERM ANTICOAGULATION: ICD-10-CM

## 2022-08-31 DIAGNOSIS — I48.20 CHRONIC ATRIAL FIBRILLATION (CMD): Primary | ICD-10-CM

## 2022-09-01 ENCOUNTER — TELEPHONE (OUTPATIENT)
Dept: POST ACUTE CARE | Age: 82
End: 2022-09-01

## 2022-09-02 ENCOUNTER — TELEPHONE (OUTPATIENT)
Dept: CHRONIC DISEASE MANAGEMENT | Age: 82
End: 2022-09-02

## 2022-09-02 ENCOUNTER — HOME/GROUP HOME VISIT (OUTPATIENT)
Dept: POST ACUTE CARE | Age: 82
End: 2022-09-02

## 2022-09-02 ENCOUNTER — ANTI-COAG (OUTPATIENT)
Dept: ANTICOAGULATION | Age: 82
End: 2022-09-02

## 2022-09-02 ENCOUNTER — APPOINTMENT (OUTPATIENT)
Dept: ANTICOAGULATION | Age: 82
End: 2022-09-02

## 2022-09-02 DIAGNOSIS — E44.0 PROTEIN-CALORIE MALNUTRITION, MODERATE (CMD): ICD-10-CM

## 2022-09-02 DIAGNOSIS — F33.42 MAJOR DEPRESSIVE DISORDER, RECURRENT EPISODE, IN FULL REMISSION (CMD): ICD-10-CM

## 2022-09-02 DIAGNOSIS — I70.0 ATHEROSCLEROSIS OF AORTA (CMD): ICD-10-CM

## 2022-09-02 DIAGNOSIS — N18.32 STAGE 3B CHRONIC KIDNEY DISEASE (CMD): ICD-10-CM

## 2022-09-02 DIAGNOSIS — Z79.01 CURRENT USE OF LONG TERM ANTICOAGULATION: ICD-10-CM

## 2022-09-02 DIAGNOSIS — I82.403 ACUTE DEEP VEIN THROMBOSIS (DVT) OF BOTH LOWER EXTREMITIES (CMD): ICD-10-CM

## 2022-09-02 DIAGNOSIS — Z71.89 ACP (ADVANCE CARE PLANNING): ICD-10-CM

## 2022-09-02 DIAGNOSIS — E11.29 TYPE 2 DIABETES MELLITUS WITH OTHER DIABETIC KIDNEY COMPLICATION, WITHOUT LONG-TERM CURRENT USE OF INSULIN (CMD): ICD-10-CM

## 2022-09-02 DIAGNOSIS — E43 UNSPECIFIED SEVERE PROTEIN-CALORIE MALNUTRITION (CMD): ICD-10-CM

## 2022-09-02 DIAGNOSIS — Z86.711 HISTORY OF PULMONARY EMBOLISM: ICD-10-CM

## 2022-09-02 DIAGNOSIS — I48.20 CHRONIC ATRIAL FIBRILLATION (CMD): Primary | ICD-10-CM

## 2022-09-02 LAB
ALBUMIN SERPL-MCNC: 3.2 G/DL (ref 3.6–5.1)
ALBUMIN/GLOB SERPL: 1.1 {RATIO} (ref 1–2.4)
ALP SERPL-CCNC: 76 UNITS/L (ref 45–117)
ALT SERPL-CCNC: 12 UNITS/L
ANION GAP SERPL CALC-SCNC: 11 MMOL/L (ref 7–19)
AST SERPL-CCNC: 15 UNITS/L
BASOPHILS # BLD: 0.1 K/MCL (ref 0–0.3)
BASOPHILS NFR BLD: 1 %
BILIRUB SERPL-MCNC: 0.4 MG/DL (ref 0.2–1)
BUN SERPL-MCNC: 10 MG/DL (ref 6–20)
BUN/CREAT SERPL: 8 (ref 7–25)
CALCIUM SERPL-MCNC: 8.4 MG/DL (ref 8.4–10.2)
CHLORIDE SERPL-SCNC: 105 MMOL/L (ref 97–110)
CHOLEST SERPL-MCNC: 194 MG/DL
CHOLEST/HDLC SERPL: 3 {RATIO}
CO2 SERPL-SCNC: 30 MMOL/L (ref 21–32)
CREAT SERPL-MCNC: 1.21 MG/DL (ref 0.51–0.95)
DEPRECATED RDW RBC: 53.5 FL (ref 39–50)
EOSINOPHIL # BLD: 0.1 K/MCL (ref 0–0.5)
EOSINOPHIL NFR BLD: 2 %
ERYTHROCYTE [DISTWIDTH] IN BLOOD: 14.7 % (ref 11–15)
FASTING DURATION TIME PATIENT: ABNORMAL H
FASTING DURATION TIME PATIENT: NORMAL H
GFR SERPLBLD BASED ON 1.73 SQ M-ARVRAT: 45 ML/MIN
GLOBULIN SER-MCNC: 2.8 G/DL (ref 2–4)
GLUCOSE SERPL-MCNC: 59 MG/DL (ref 70–99)
HCT VFR BLD CALC: 32.9 % (ref 36–46.5)
HDLC SERPL-MCNC: 65 MG/DL
HGB BLD-MCNC: 9.9 G/DL (ref 12–15.5)
IMM GRANULOCYTES # BLD AUTO: 0 K/MCL (ref 0–0.2)
IMM GRANULOCYTES # BLD: 0 %
INR PPP: 4.1
INR PPP: 4.8
LDLC SERPL CALC-MCNC: 111 MG/DL
LYMPHOCYTES # BLD: 1 K/MCL (ref 1–4)
LYMPHOCYTES NFR BLD: 16 %
MCH RBC QN AUTO: 29.6 PG (ref 26–34)
MCHC RBC AUTO-ENTMCNC: 30.1 G/DL (ref 32–36.5)
MCV RBC AUTO: 98.2 FL (ref 78–100)
MONOCYTES # BLD: 0.5 K/MCL (ref 0.3–0.9)
MONOCYTES NFR BLD: 9 %
NEUTROPHILS # BLD: 4.3 K/MCL (ref 1.8–7.7)
NEUTROPHILS NFR BLD: 72 %
NONHDLC SERPL-MCNC: 129 MG/DL
NRBC BLD MANUAL-RTO: 0 /100 WBC
PLATELET # BLD AUTO: 216 K/MCL (ref 140–450)
POTASSIUM SERPL-SCNC: 3.1 MMOL/L (ref 3.4–5.1)
PROT SERPL-MCNC: 6 G/DL (ref 6.4–8.2)
PROTHROMBIN TIME: 40.8 SEC (ref 9.7–11.8)
RBC # BLD: 3.35 MIL/MCL (ref 4–5.2)
SODIUM SERPL-SCNC: 143 MMOL/L (ref 135–145)
T4 FREE SERPL-MCNC: 1.8 NG/DL (ref 0.8–1.5)
TRIGL SERPL-MCNC: 88 MG/DL
TSH SERPL-ACNC: 0.32 MCUNITS/ML (ref 0.35–5)
WBC # BLD: 6 K/MCL (ref 4.2–11)

## 2022-09-02 PROCEDURE — 36415 COLL VENOUS BLD VENIPUNCTURE: CPT | Performed by: INTERNAL MEDICINE

## 2022-09-02 PROCEDURE — 84439 ASSAY OF FREE THYROXINE: CPT | Performed by: INTERNAL MEDICINE

## 2022-09-02 PROCEDURE — 85610 PROTHROMBIN TIME: CPT | Performed by: INTERNAL MEDICINE

## 2022-09-02 PROCEDURE — 85025 COMPLETE CBC W/AUTO DIFF WBC: CPT | Performed by: INTERNAL MEDICINE

## 2022-09-02 PROCEDURE — 82306 VITAMIN D 25 HYDROXY: CPT | Performed by: INTERNAL MEDICINE

## 2022-09-02 PROCEDURE — 80061 LIPID PANEL: CPT | Performed by: INTERNAL MEDICINE

## 2022-09-02 PROCEDURE — 83036 HEMOGLOBIN GLYCOSYLATED A1C: CPT | Performed by: INTERNAL MEDICINE

## 2022-09-02 PROCEDURE — 84443 ASSAY THYROID STIM HORMONE: CPT | Performed by: INTERNAL MEDICINE

## 2022-09-02 PROCEDURE — X1094 NO CHARGE VISIT: HCPCS | Performed by: NURSE PRACTITIONER

## 2022-09-02 PROCEDURE — 80053 COMPREHEN METABOLIC PANEL: CPT | Performed by: INTERNAL MEDICINE

## 2022-09-02 PROCEDURE — 99487 CPLX CHRNC CARE 1ST 60 MIN: CPT | Performed by: NURSE PRACTITIONER

## 2022-09-02 ASSESSMENT — FRAILTY ASSESSMENTS
DO YOU HAVE DIFFICULTY WALKING ONE BLOCK: YES
HAVE YOU FELT FATIGUED? MOST OR ALL OF THE TIME OVER THE PAST MONTH: YES
DO YOU HAVE ANY OF THESE ILLNESSES: HYPERTENSION, DIABETES, CANCER (OTHER THAN A MINOR SKIN CANCER), CHRONIC LUNG DISEASE, HEART ATTACK, CONGESTIVE HEART FAILURE, ANGINA, ASTHMA, ARTHRITIS, STROKE, AND KIDNEY DISEASE: YES
HAVE YOU LOST MORE THAN 5 PERCENT OF YOUR WEIGHT IN THE PAST YEAR: YES
FRAILTY SCALE TOTAL SCORE: 5
DO YOU HAVE DIFFICULTY CLIMBING A FLIGHT OF STAIRS: YES

## 2022-09-03 LAB
25(OH)D3+25(OH)D2 SERPL-MCNC: 27.3 NG/ML (ref 30–100)
HBA1C MFR BLD: 5 % (ref 4.5–5.6)

## 2022-09-05 VITALS
DIASTOLIC BLOOD PRESSURE: 72 MMHG | SYSTOLIC BLOOD PRESSURE: 110 MMHG | RESPIRATION RATE: 18 BRPM | TEMPERATURE: 98.3 F | OXYGEN SATURATION: 98 % | HEART RATE: 78 BPM

## 2022-09-06 ENCOUNTER — TELEPHONE (OUTPATIENT)
Dept: CARDIOLOGY | Age: 82
End: 2022-09-06

## 2022-09-06 ENCOUNTER — ANTI-COAG (OUTPATIENT)
Dept: CHRONIC DISEASE MANAGEMENT | Age: 82
End: 2022-09-06

## 2022-09-06 ENCOUNTER — APPOINTMENT (OUTPATIENT)
Dept: POST ACUTE CARE | Age: 82
End: 2022-09-06

## 2022-09-06 DIAGNOSIS — Z86.711 HISTORY OF PULMONARY EMBOLISM: ICD-10-CM

## 2022-09-06 DIAGNOSIS — Z79.01 CURRENT USE OF LONG TERM ANTICOAGULATION: ICD-10-CM

## 2022-09-06 DIAGNOSIS — I82.403 ACUTE DEEP VEIN THROMBOSIS (DVT) OF BOTH LOWER EXTREMITIES (CMD): ICD-10-CM

## 2022-09-06 DIAGNOSIS — I48.20 CHRONIC ATRIAL FIBRILLATION (CMD): Primary | ICD-10-CM

## 2022-09-06 LAB — INR PPP: 4.02

## 2022-09-06 PROCEDURE — 93793 ANTICOAG MGMT PT WARFARIN: CPT | Performed by: NURSE PRACTITIONER

## 2022-09-09 PROBLEM — Z86.711 HISTORY OF PULMONARY EMBOLISM: Status: RESOLVED | Noted: 2022-08-23 | Resolved: 2022-01-01

## 2022-09-09 PROBLEM — I48.20 CHRONIC ATRIAL FIBRILLATION (CMD): Status: RESOLVED | Noted: 2022-08-19 | Resolved: 2022-01-01

## 2022-09-09 PROBLEM — I82.403 ACUTE DEEP VEIN THROMBOSIS (DVT) OF BOTH LOWER EXTREMITIES (CMD): Status: RESOLVED | Noted: 2022-07-08 | Resolved: 2022-01-01

## 2022-09-09 PROBLEM — Z79.01 CURRENT USE OF LONG TERM ANTICOAGULATION: Status: RESOLVED | Noted: 2022-08-31 | Resolved: 2022-01-01

## 2022-09-12 PROBLEM — I48.20 CHRONIC ATRIAL FIBRILLATION (CMD): Status: ACTIVE | Noted: 2022-01-01

## 2022-10-19 PROBLEM — L89.91: Status: ACTIVE | Noted: 2022-01-01

## 2022-12-05 DIAGNOSIS — F41.9 ANXIETY: ICD-10-CM

## 2022-12-05 DIAGNOSIS — F32.5 MAJOR DEPRESSIVE DISORDER WITH SINGLE EPISODE, IN REMISSION (HCC): ICD-10-CM

## 2022-12-06 NOTE — TELEPHONE ENCOUNTER
Sent MC for pt to call to schedule an appt for refill, or to let us know that she has changed PCPs. Medication request should not have been sent to Dr. Dio Jaramillo.

## 2022-12-09 RX ORDER — SERTRALINE HYDROCHLORIDE 25 MG/1
TABLET, FILM COATED ORAL
Qty: 90 TABLET | Refills: 3 | OUTPATIENT
Start: 2022-12-09

## 2022-12-09 NOTE — TELEPHONE ENCOUNTER
Spoke to patient's daughter, Marta Soliz, who states that her pt lives with her son and is on pallative care and house bound. Pt has meds filled by cardiologist and pallative care physician.

## 2022-12-11 ENCOUNTER — APPOINTMENT (OUTPATIENT)
Dept: CT IMAGING | Facility: HOSPITAL | Age: 82
End: 2022-12-11
Attending: EMERGENCY MEDICINE
Payer: MEDICARE

## 2022-12-11 ENCOUNTER — HOSPITAL ENCOUNTER (OUTPATIENT)
Facility: HOSPITAL | Age: 82
Setting detail: OBSERVATION
Discharge: NURSING, REHAB OR LONG-TERM CARE | End: 2022-12-12
Attending: EMERGENCY MEDICINE | Admitting: HOSPITALIST
Payer: MEDICARE

## 2022-12-11 DIAGNOSIS — N39.0 ACUTE UTI: ICD-10-CM

## 2022-12-11 DIAGNOSIS — R26.2 INABILITY TO AMBULATE DUE TO MULTIPLE JOINTS: ICD-10-CM
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
## 2022-12-11 DIAGNOSIS — S09.90XA INJURY OF HEAD, INITIAL ENCOUNTER: ICD-10-CM

## 2022-12-11 DIAGNOSIS — M54.59 INTRACTABLE LOW BACK PAIN: ICD-10-CM

## 2022-12-11 DIAGNOSIS — M25.452 EFFUSION OF LEFT HIP: ICD-10-CM

## 2022-12-11 DIAGNOSIS — N18.9 CHRONIC KIDNEY DISEASE, UNSPECIFIED CKD STAGE: ICD-10-CM

## 2022-12-11 DIAGNOSIS — S32.10XA CLOSED FRACTURE OF SACRUM, UNSPECIFIED PORTION OF SACRUM, INITIAL ENCOUNTER (HCC): ICD-10-CM

## 2022-12-11 PROBLEM — R73.9 HYPERGLYCEMIA: Status: ACTIVE | Noted: 2022-12-11

## 2022-12-11 PROBLEM — D69.6 THROMBOCYTOPENIA (HCC): Status: ACTIVE | Noted: 2022-12-11

## 2022-12-11 LAB
ALBUMIN SERPL-MCNC: 2.7 G/DL (ref 3.4–5)
ALBUMIN/GLOB SERPL: 0.9 {RATIO} (ref 1–2)
ALP LIVER SERPL-CCNC: 73 U/L
ALT SERPL-CCNC: 46 U/L
ANION GAP SERPL CALC-SCNC: 6 MMOL/L (ref 0–18)
AST SERPL-CCNC: 49 U/L (ref 15–37)
BASOPHILS # BLD AUTO: 0.02 X10(3) UL (ref 0–0.2)
BASOPHILS NFR BLD AUTO: 0.2 %
BILIRUB SERPL-MCNC: 0.9 MG/DL (ref 0.1–2)
BILIRUB UR QL STRIP.AUTO: NEGATIVE
BUN BLD-MCNC: 16 MG/DL (ref 7–18)
CALCIUM BLD-MCNC: 8.4 MG/DL (ref 8.5–10.1)
CHLORIDE SERPL-SCNC: 109 MMOL/L (ref 98–112)
CLARITY UR REFRACT.AUTO: CLEAR
CO2 SERPL-SCNC: 26 MMOL/L (ref 21–32)
COLOR UR AUTO: YELLOW
CREAT BLD-MCNC: 1.05 MG/DL
EOSINOPHIL # BLD AUTO: 0.02 X10(3) UL (ref 0–0.7)
EOSINOPHIL NFR BLD AUTO: 0.2 %
ERYTHROCYTE [DISTWIDTH] IN BLOOD BY AUTOMATED COUNT: 18.8 %
EST. AVERAGE GLUCOSE BLD GHB EST-MCNC: 103 MG/DL (ref 68–126)
GFR SERPLBLD BASED ON 1.73 SQ M-ARVRAT: 54 ML/MIN/1.73M2 (ref 60–?)
GLOBULIN PLAS-MCNC: 3 G/DL (ref 2.8–4.4)
GLUCOSE BLD-MCNC: 107 MG/DL (ref 70–99)
GLUCOSE BLD-MCNC: 130 MG/DL (ref 70–99)
GLUCOSE BLD-MCNC: 193 MG/DL (ref 70–99)
GLUCOSE UR STRIP.AUTO-MCNC: NEGATIVE MG/DL
HBA1C MFR BLD: 5.2 % (ref ?–5.7)
HCT VFR BLD AUTO: 35.2 %
HGB BLD-MCNC: 11.2 G/DL
HYALINE CASTS #/AREA URNS AUTO: PRESENT /LPF
IMM GRANULOCYTES # BLD AUTO: 0.07 X10(3) UL (ref 0–1)
IMM GRANULOCYTES NFR BLD: 0.7 %
INR BLD: 2 (ref 0.85–1.16)
KETONES UR STRIP.AUTO-MCNC: NEGATIVE MG/DL
LYMPHOCYTES # BLD AUTO: 0.84 X10(3) UL (ref 1–4)
LYMPHOCYTES NFR BLD AUTO: 9 %
MCH RBC QN AUTO: 32.1 PG (ref 26–34)
MCHC RBC AUTO-ENTMCNC: 31.8 G/DL (ref 31–37)
MCV RBC AUTO: 100.9 FL
MONOCYTES # BLD AUTO: 0.7 X10(3) UL (ref 0.1–1)
MONOCYTES NFR BLD AUTO: 7.5 %
NEUTROPHILS # BLD AUTO: 7.73 X10 (3) UL (ref 1.5–7.7)
NEUTROPHILS # BLD AUTO: 7.73 X10(3) UL (ref 1.5–7.7)
NEUTROPHILS NFR BLD AUTO: 82.4 %
NITRITE UR QL STRIP.AUTO: NEGATIVE
OSMOLALITY SERPL CALC.SUM OF ELEC: 298 MOSM/KG (ref 275–295)
PH UR STRIP.AUTO: 5 [PH] (ref 5–8)
PLATELET # BLD AUTO: 122 10(3)UL (ref 150–450)
POTASSIUM SERPL-SCNC: 3.8 MMOL/L (ref 3.5–5.1)
PROT SERPL-MCNC: 5.7 G/DL (ref 6.4–8.2)
PROT UR STRIP.AUTO-MCNC: NEGATIVE MG/DL
PROTHROMBIN TIME: 22.5 SECONDS (ref 11.6–14.8)
RBC # BLD AUTO: 3.49 X10(6)UL
RBC UR QL AUTO: NEGATIVE
SARS-COV-2 RNA RESP QL NAA+PROBE: NOT DETECTED
SODIUM SERPL-SCNC: 141 MMOL/L (ref 136–145)
SP GR UR STRIP.AUTO: 1.01 (ref 1–1.03)
UROBILINOGEN UR STRIP.AUTO-MCNC: 2 MG/DL
WBC # BLD AUTO: 9.4 X10(3) UL (ref 4–11)
WBC #/AREA URNS AUTO: >50 /HPF

## 2022-12-11 PROCEDURE — 99497 ADVNCD CARE PLAN 30 MIN: CPT | Performed by: HOSPITALIST

## 2022-12-11 PROCEDURE — 72131 CT LUMBAR SPINE W/O DYE: CPT | Performed by: EMERGENCY MEDICINE

## 2022-12-11 PROCEDURE — 70450 CT HEAD/BRAIN W/O DYE: CPT | Performed by: EMERGENCY MEDICINE

## 2022-12-11 PROCEDURE — 99220 INITIAL OBSERVATION CARE,LEVL III: CPT | Performed by: HOSPITALIST

## 2022-12-11 PROCEDURE — 73700 CT LOWER EXTREMITY W/O DYE: CPT | Performed by: EMERGENCY MEDICINE

## 2022-12-11 RX ORDER — AMIODARONE HYDROCHLORIDE 200 MG/1
200 TABLET ORAL DAILY
Status: DISCONTINUED | OUTPATIENT
Start: 2022-12-11 | End: 2022-12-11

## 2022-12-11 RX ORDER — MORPHINE SULFATE 2 MG/ML
2 INJECTION, SOLUTION INTRAMUSCULAR; INTRAVENOUS ONCE
Status: COMPLETED | OUTPATIENT
Start: 2022-12-11 | End: 2022-12-11

## 2022-12-11 RX ORDER — METOPROLOL TARTRATE 50 MG/1
50 TABLET, FILM COATED ORAL ONCE
Status: COMPLETED | OUTPATIENT
Start: 2022-12-11 | End: 2022-12-11

## 2022-12-11 RX ORDER — SODIUM CHLORIDE 9 MG/ML
INJECTION, SOLUTION INTRAVENOUS CONTINUOUS
Status: DISCONTINUED | OUTPATIENT
Start: 2022-12-11 | End: 2022-12-11

## 2022-12-11 RX ORDER — AMIODARONE HYDROCHLORIDE 100 MG/1
100 TABLET ORAL DAILY
Status: DISCONTINUED | OUTPATIENT
Start: 2022-12-12 | End: 2022-12-12

## 2022-12-11 RX ORDER — ONDANSETRON 2 MG/ML
4 INJECTION INTRAMUSCULAR; INTRAVENOUS EVERY 4 HOURS PRN
Status: ACTIVE | OUTPATIENT
Start: 2022-12-11 | End: 2022-12-11

## 2022-12-11 RX ORDER — MORPHINE SULFATE 4 MG/ML
4 INJECTION, SOLUTION INTRAMUSCULAR; INTRAVENOUS EVERY 30 MIN PRN
Status: ACTIVE | OUTPATIENT
Start: 2022-12-11 | End: 2022-12-11

## 2022-12-11 RX ORDER — SERTRALINE HYDROCHLORIDE 25 MG/1
25 TABLET, FILM COATED ORAL DAILY
Status: DISCONTINUED | OUTPATIENT
Start: 2022-12-11 | End: 2022-12-12

## 2022-12-11 RX ORDER — HYDROMORPHONE HYDROCHLORIDE 1 MG/ML
0.8 INJECTION, SOLUTION INTRAMUSCULAR; INTRAVENOUS; SUBCUTANEOUS EVERY 2 HOUR PRN
Status: DISCONTINUED | OUTPATIENT
Start: 2022-12-11 | End: 2022-12-12

## 2022-12-11 RX ORDER — CEPHALEXIN 500 MG/1
500 CAPSULE ORAL ONCE
Status: COMPLETED | OUTPATIENT
Start: 2022-12-11 | End: 2022-12-11

## 2022-12-11 RX ORDER — HYDROMORPHONE HYDROCHLORIDE 1 MG/ML
0.2 INJECTION, SOLUTION INTRAMUSCULAR; INTRAVENOUS; SUBCUTANEOUS EVERY 2 HOUR PRN
Status: DISCONTINUED | OUTPATIENT
Start: 2022-12-11 | End: 2022-12-12

## 2022-12-11 RX ORDER — SERTRALINE HYDROCHLORIDE 25 MG/1
25 TABLET, FILM COATED ORAL DAILY
Status: DISCONTINUED | OUTPATIENT
Start: 2022-12-11 | End: 2022-12-11

## 2022-12-11 RX ORDER — ACETAMINOPHEN 500 MG
500 TABLET ORAL EVERY 4 HOURS PRN
Status: DISCONTINUED | OUTPATIENT
Start: 2022-12-11 | End: 2022-12-12

## 2022-12-11 RX ORDER — URSODIOL 300 MG/1
300 CAPSULE ORAL 2 TIMES DAILY
Status: DISCONTINUED | OUTPATIENT
Start: 2022-12-11 | End: 2022-12-12

## 2022-12-11 RX ORDER — MELATONIN
3 NIGHTLY PRN
Status: DISCONTINUED | OUTPATIENT
Start: 2022-12-11 | End: 2022-12-12

## 2022-12-11 RX ORDER — TORSEMIDE 20 MG/1
20 TABLET ORAL DAILY
Status: DISCONTINUED | OUTPATIENT
Start: 2022-12-11 | End: 2022-12-12

## 2022-12-11 RX ORDER — NICOTINE POLACRILEX 4 MG
30 LOZENGE BUCCAL
Status: DISCONTINUED | OUTPATIENT
Start: 2022-12-11 | End: 2022-12-12

## 2022-12-11 RX ORDER — CEPHALEXIN 500 MG/1
500 CAPSULE ORAL 2 TIMES DAILY
Qty: 20 CAPSULE | Refills: 0 | Status: SHIPPED | OUTPATIENT
Start: 2022-12-11 | End: 2022-12-12

## 2022-12-11 RX ORDER — HYDROMORPHONE HYDROCHLORIDE 1 MG/ML
0.4 INJECTION, SOLUTION INTRAMUSCULAR; INTRAVENOUS; SUBCUTANEOUS EVERY 2 HOUR PRN
Status: DISCONTINUED | OUTPATIENT
Start: 2022-12-11 | End: 2022-12-12

## 2022-12-11 RX ORDER — WARFARIN SODIUM 1 MG/1
2 TABLET ORAL
Status: COMPLETED | OUTPATIENT
Start: 2022-12-11 | End: 2022-12-11

## 2022-12-11 RX ORDER — DILTIAZEM HYDROCHLORIDE 120 MG/1
120 CAPSULE, EXTENDED RELEASE ORAL DAILY
Status: DISCONTINUED | OUTPATIENT
Start: 2022-12-11 | End: 2022-12-12

## 2022-12-11 RX ORDER — NICOTINE POLACRILEX 4 MG
15 LOZENGE BUCCAL
Status: DISCONTINUED | OUTPATIENT
Start: 2022-12-11 | End: 2022-12-12

## 2022-12-11 RX ORDER — METOCLOPRAMIDE HYDROCHLORIDE 5 MG/ML
10 INJECTION INTRAMUSCULAR; INTRAVENOUS EVERY 8 HOURS PRN
Status: DISCONTINUED | OUTPATIENT
Start: 2022-12-11 | End: 2022-12-12

## 2022-12-11 RX ORDER — POTASSIUM CHLORIDE 1.5 G/1.77G
20 POWDER, FOR SOLUTION ORAL ONCE
Status: COMPLETED | OUTPATIENT
Start: 2022-12-11 | End: 2022-12-11

## 2022-12-11 RX ORDER — HEPARIN SODIUM 5000 [USP'U]/ML
5000 INJECTION, SOLUTION INTRAVENOUS; SUBCUTANEOUS EVERY 12 HOURS SCHEDULED
Status: DISCONTINUED | OUTPATIENT
Start: 2022-12-11 | End: 2022-12-11

## 2022-12-11 RX ORDER — ONDANSETRON 2 MG/ML
4 INJECTION INTRAMUSCULAR; INTRAVENOUS EVERY 6 HOURS PRN
Status: DISCONTINUED | OUTPATIENT
Start: 2022-12-11 | End: 2022-12-12

## 2022-12-11 RX ORDER — DEXTROSE MONOHYDRATE 25 G/50ML
50 INJECTION, SOLUTION INTRAVENOUS
Status: DISCONTINUED | OUTPATIENT
Start: 2022-12-11 | End: 2022-12-12

## 2022-12-11 RX ORDER — LOSARTAN POTASSIUM 25 MG/1
25 TABLET ORAL DAILY
Status: DISCONTINUED | OUTPATIENT
Start: 2022-12-11 | End: 2022-12-11

## 2022-12-11 NOTE — ED QUICK NOTES
Orders for admission, patient is aware of plan and ready to go upstairs.  Any questions, please call ED RN Brandon Dobbins at extension [phone]    Patient Covid vaccination status: Unvaccinated     COVID Test Ordered in ED: Rapid SARS-CoV-2 by PCR    COVID Suspicion at Admission: N/A    Running Infusions:  None    Mental Status/LOC at time of transport: a&ox4    Other pertinent information:   CIWA score: N/A   NIH score:  N/A

## 2022-12-11 NOTE — CM/SW NOTE
Received a call from [email or web address]. Patient's insurance will not pay for [facility] AND hospice. Will call daughter.

## 2022-12-11 NOTE — CM/SW NOTE
EPHRAIMM spoke with the son/[employer] and would like pt to be admitted and evaluated by Hospice, since pt condition continue to decline. And if Hospice eval said that pt is not qualified then [employer]/son is requesting to put the referral for SNF/[facility]. Will have  made aware of the pt's son decision.

## 2022-12-11 NOTE — PROGRESS NOTES
PHYSICAL THERAPY EVALUATION - INPATIENT     Room Number: AH1/AH1  Evaluation Date: 12/11/2022  Type of Evaluation: Initial  Physician Order: PT Eval and Treat    Presenting Problem: fall  Co-Morbidities : diverticulitis, COPD, CHF, AF, asthma, gallstones  Reason for Therapy: Mobility Dysfunction and Discharge Planning    History related to current admission: Patient is a [de-identified]year old female admitted on 12/11/2022 from home for fall while transferring off commode. CTH negative, hip with small joint effusion, +sacral fx. No precautions indicated per RN.      ASSESSMENT   In this PT evaluation, the patient presents with the following impairments L hip pain, standing balance deficits, and decreased activity tolerance. These impairments and comorbidities manifest themselves as functional limitations in independent bed mobility, transfers, and gait. Pt able to participate in mulitple transfers with increased indep and standing balance, however unable to progress to distanced gait. The patient is below baseline and would benefit from skilled inpatient PT to address the above deficits to assist patient in returning to prior to level of function. Functional outcome measures completed include AMPA. The AM-PAC '6-Clicks' Inpatient Basic Mobility Short Form was completed and this patient is demonstrating a Approx Degree of Impairment: 61.29%  degree of impairment in mobility. Research supports that patients with this level of impairment may benefit from further PT in ALYSHA to return to baseline of min A/supervision levels. Based on this evaluation, patient's clinical presentation is stable and overall the evaluation complexity is considered low. DISCHARGE RECOMMENDATIONS  PT Discharge Recommendations: Sub-acute rehabilitation    PLAN  PT Treatment Plan: Body mechanics; Bed mobility; Patient education;Gait training;Neuromuscular re-educate;Transfer training;Balance training  Rehab Potential : Good  Frequency (Obs): 3-5x/week  Number of Visits to Meet Established Goals: 5      CURRENT GOALS    Goal #1 Patient is able to demonstrate supine - sit EOB @ level: supervision     Goal #2 Patient is able to demonstrate transfers EOB to/from Chair/Wheelchair at assistance level: minimum assistance     Goal #3 Patient is able to ambulate 50 feet with assist device: walker - rolling at assistance level: minimum assistance     Goal #4    Goal #5    Goal #6    Goal Comments: Goals established on 12/11/2022    HOME SITUATION  Type of Home: [address]: One level                Lives With: Son  Drives: No  Patient Owned Equipment: Rolling walker; Wheelchair       Prior Level of [place]: able to ambulate short distances <household with RW, primary mode for long distances = w/c, son is available 24 hours to assist.    SUBJECTIVE  C/o pain that increases with weightbearing      OBJECTIVE  Precautions: Bed/chair alarm  Fall Risk: High fall risk    WEIGHT BEARING RESTRICTION                   PAIN ASSESSMENT  Rating: 10  Location: back, L hip  Management Techniques: Activity promotion;Repositioning;Relaxation    COGNITION  A&Ox4, follows commands, appropriate insight into deficits and safety awreness    RANGE OF MOTION AND STRENGTH ASSESSMENT  Upper extremity ROM and strength are within functional limits   Lower extremity ROM is within functional limits : L hip guarded, AAROM ~ 100deg flexion    Lower extremity strength is within functional limits : L hip guarded 3/5, knee/ankle 3+/5      BALANCE  Static Sitting: Fair +  Dynamic Sitting: Fair +  Static Standing: Poor  Dynamic Standing: Poor    ADDITIONAL TESTS                                    ACTIVITY TOLERANCE                         O2 WALK       NEUROLOGICAL FINDINGS         STEFANI ECHEVERRIA/MONSERRAT               AM-PAC '6-Clicks' INPATIENT SHORT FORM - BASIC MOBILITY  How much difficulty does the patient currently have. ..   Patient Difficulty: Turning over in bed (including adjusting bedclothes, sheets and blankets)?: A Little   Patient Difficulty: Sitting down on and standing up from a chair with arms (e.g., wheelchair, bedside commode, etc.): A Lot   Patient Difficulty: Moving from lying on back to sitting on the side of the bed?: A Little   How much help from another person does the patient currently need. .. Help from Another: Moving to and from a bed to a chair (including a wheelchair)?: A Lot   Help from Another: Need to walk in hospital room?: A Lot   Help from Another: Climbing 3-5 steps with a railing?: A Lot       AM-PAC Score:  Raw Score: 14   Approx Degree of Impairment: 61.29%   Standardized Score (AM-PAC Scale): 38.1   CMS Modifier (G-Code): CL    FUNCTIONAL ABILITY STATUS  Gait Assessment   Functional Mobility/Gait Assessment  Gait Assistance: Minimum assistance  Distance (ft): 2  Assistive Device: Rolling walker    Skilled Therapy Provided     Bed Mobility:  Supine to sit: min A with LLE management   Sit to supine: mod A due to pain   Sitting balance without UE support, supervision    Transfer Mobility:  Sit to stand: from elevated cart: mod A initially, second attempt min A   Stand to sit: min A  Standing balance: initially with significnat trunk flexion and guarding of L hip, able to increase to upright posture and maintain for 1 min  Gait = 2ft lateral stepping with RW min A, increased time, difficulty with L weightshifting and advancement of RLE, requires tactile cues    Education: role of PT, goals, POC, dc recs, pt in agreement    Patient End of Session: In bed; With [facility] staff;Needs met; All patient questions and concerns addressed

## 2022-12-11 NOTE — CM/SW NOTE
Spoke to daughter Antonio Jeronimo. She would like to proceed with ALYSHA. Patient is already on palliative care. Family will look into hospice once patient is at a facility. Sherin Martinez from [facility] updated. Hospice consult cancelled.   Bubble chat sent to [facility] in PT.

## 2022-12-11 NOTE — CM/SW NOTE
PT will be down to evaluate patient around 1pm.  Aidin referral completed for ALYSHA placement. Insurance authorization is required. If no auth and/or accepting facility by 3pm, will likely need to admit patient. Insurance authorization typically takes at least 24-48 hours. RN and MD updated.

## 2022-12-11 NOTE — CM/SW NOTE
SNF/ALYSHA referral placed. PASRR completed. AIDIN referral done. Covid test still pending. Pt's son/Kodak would like pt to go to a SNF/ALYSHA that have hospice care as well. Pt's son and daughter would like Hospice eval for the pt since they knew pt continue to decline. Will endorse it to incoming [facility] this morning.

## 2022-12-11 NOTE — ED QUICK NOTES
Rounding Completed    Plan of Care reviewed. Waiting for hospice eval/SNIF placement. Bed is locked and in lowest position. Call light within reach.

## 2022-12-11 NOTE — ED INITIAL ASSESSMENT (HPI)
Pt had falled at home two days ago, family aided bacl top bed. Has been c/o of worsening pain since. L side hip pain.  On thinner

## 2022-12-11 NOTE — ED NOTES
Patient's family requesting we check a urine. Patient has been awake and alert and oriented appropriate while here for us but they family notices some subtle changes in her mental status and concern for possibility of UTI. We will gladly check a urine analysis for this patient.

## 2022-12-11 NOTE — ED PROVIDER NOTES
unable to arrange placement today. patient will be admitted for observation.   [facility]

## 2022-12-11 NOTE — CM/SW NOTE
[facility] [employer] authorization.

## 2022-12-11 NOTE — ED QUICK NOTES
Pt awake and alert, skin w/d,resps reg/unlabored. Pt eating hang crackers, has water and coffee at bedside. Pt states she has no pain if she is not ambulating. Pure wick in place.  Pt in position of .comfort on cart

## 2022-12-11 NOTE — CM/SW NOTE
PT recommending ALYSHA. PT eval uploaded in [address]. No accepting facility without the insurance authorization, which normally takes 24-48 hours. LUIZ/FAYE to follow on floor. [employer] auth to be initiated. Son made aware that patient is being admitted. Per son, patient is currently under palliative care with [facility]. They act as patient's PCP. Son lives in [place] with patient. SNF closer to [place] may be preferable for family. Daughter says no to [facility], [facility] and [facility]. They were all removed from the Aidin referral.    MD aware that patient is to be admitted.

## 2022-12-11 NOTE — PLAN OF CARE
Patient is alert and oriented x4. No complaint of numbness or tingling. Pulses bilateral +1. Patient weak to bilateral lower extremities. Some complaints of pain last rated a mild, declined pain meds at this time. IV is infusing. Vital signs in normal range on RA. Voids appropriately, last bowel movement was 12/10. IS and ankle pumps encouraged. Belongings within reach. Encouraged to call for any needs.

## 2022-12-11 NOTE — ED NOTES
Patient's urine found to have a UTI. Patient will be given first dose of antibiotics here and prescription for home.

## 2022-12-11 NOTE — ED QUICK NOTES
Pt appears comfortable on cart, watching TV. Report given to [facility], [address] [facility].

## 2022-12-11 NOTE — CM/SW NOTE
No call back received from [facility]. Called [facility] 24 hour triage line, [phone]. Spoke with Ramen. Per Ramen, she will send a message to have hospice contact [facility] right away.

## 2022-12-11 NOTE — CM/SW NOTE
Pt unable to ambulate even stand for an extended time, very unsteady per [employer] and . DANGELO spoke to the pt and offered if she would like to go to SNF for a ALYSHA, but unable to decide and wants me to call her daughter/Jeri for the decision. Pt lives w/ son/Kodak, but pt insisted to call the daughter/Jeri since she is the POA. EPHRAIMM spoke with Jeri/daughter/POA and she expressed that pt is wheelchair bound at home but still able to pivot/walk short distance to the b.commode. pt being followed by palliative. Per Jeri/daughter, she is aware pt is no longer getting better madelyn w/ other co-morbidity like CHF etc. Jeri/daughter was made aware of the CT BRAIN & CT HIP results and that CT LUMBAR SPINE result is still pending. Jeri/daughter was also made aware that pt unable to stand up on her own or even walk a short distance. Jeri/daughter would like pt be evaluated by HOSPICE. Pt was recently admitted at [facility] 8/2022 and was recommended to do Hospice eval but Jeri/daughter stated would like to try Palliative first and if pt is continue do decline then would consider Hospice.  made aware of pt's daughter/Jeri request to have Hospice eval pt.

## 2022-12-11 NOTE — ED QUICK NOTES
PATIENT MOVED TO HALLWAY WHILE AWAITING NURSING HOME PLACEMENT, FOOD TRAY ORDERED FOR PT, RESTING COMFORTABLY ON CART

## 2022-12-11 NOTE — CM/SW NOTE
Received a call from PT. PT needs to wait until hospice eval is completed and recommendations noted. CM to contact PT once hospice eval is completed.

## 2022-12-11 NOTE — ED QUICK NOTES
[facility] Per [facility], pt is to be admitted to a SN[facility] and awaiting placement.

## 2022-12-11 NOTE — CM/SW NOTE
Called daughter Jaylyn Cline. She would like to proceed with hospice evaluation and SNF placement. Patient lives with her brother, cannot ambulate and brother is unable to care for patient anymore.

## 2022-12-11 NOTE — CM/SW NOTE
Pt w/ a fx on sacral area w/c is inoperable. Pt's son/Kodak agreeable for SNF/ALYSHA placement and if the accepting facility can do a Hospice eval for the pt as well. Aj MATTHEWS made aware about pt' son/Kodak decision and that Aj MATTHEWS will make  aware.

## 2022-12-11 NOTE — CM/SW NOTE
No call back from [facility].  Another call placed. Another message left regarding consult. Await call back.

## 2022-12-11 NOTE — ED QUICK NOTES
Pt attempted ambulation although is unable to stand for any extended prior of time with out pain and unable to walk.  MD made aware

## 2022-12-12 ENCOUNTER — PATIENT OUTREACH (OUTPATIENT)
Dept: CASE MANAGEMENT | Age: 82
End: 2022-12-12

## 2022-12-12 VITALS
RESPIRATION RATE: 17 BRPM | HEIGHT: 66 IN | WEIGHT: 160 LBS | OXYGEN SATURATION: 93 % | BODY MASS INDEX: 25.71 KG/M2 | HEART RATE: 71 BPM | DIASTOLIC BLOOD PRESSURE: 72 MMHG | TEMPERATURE: 98 F | SYSTOLIC BLOOD PRESSURE: 113 MMHG

## 2022-12-12 LAB
ALBUMIN SERPL-MCNC: 2 G/DL (ref 3.4–5)
ALBUMIN/GLOB SERPL: 0.8 {RATIO} (ref 1–2)
ALP LIVER SERPL-CCNC: 90 U/L
ALT SERPL-CCNC: 75 U/L
ANION GAP SERPL CALC-SCNC: 4 MMOL/L (ref 0–18)
AST SERPL-CCNC: 83 U/L (ref 15–37)
BASOPHILS # BLD AUTO: 0.01 X10(3) UL (ref 0–0.2)
BASOPHILS NFR BLD AUTO: 0.2 %
BILIRUB SERPL-MCNC: 0.8 MG/DL (ref 0.1–2)
BUN BLD-MCNC: 16 MG/DL (ref 7–18)
CALCIUM BLD-MCNC: 7.7 MG/DL (ref 8.5–10.1)
CHLORIDE SERPL-SCNC: 106 MMOL/L (ref 98–112)
CO2 SERPL-SCNC: 29 MMOL/L (ref 21–32)
CREAT BLD-MCNC: 1.1 MG/DL
EOSINOPHIL # BLD AUTO: 0.07 X10(3) UL (ref 0–0.7)
EOSINOPHIL NFR BLD AUTO: 1.1 %
ERYTHROCYTE [DISTWIDTH] IN BLOOD BY AUTOMATED COUNT: 18.4 %
GFR SERPLBLD BASED ON 1.73 SQ M-ARVRAT: 51 ML/MIN/1.73M2 (ref 60–?)
GLOBULIN PLAS-MCNC: 2.6 G/DL (ref 2.8–4.4)
GLUCOSE BLD-MCNC: 108 MG/DL (ref 70–99)
GLUCOSE BLD-MCNC: 124 MG/DL (ref 70–99)
GLUCOSE BLD-MCNC: 84 MG/DL (ref 70–99)
GLUCOSE BLD-MCNC: 98 MG/DL (ref 70–99)
HCT VFR BLD AUTO: 29.8 %
HGB BLD-MCNC: 9.7 G/DL
IMM GRANULOCYTES # BLD AUTO: 0.06 X10(3) UL (ref 0–1)
IMM GRANULOCYTES NFR BLD: 0.9 %
INR BLD: 1.81 (ref 0.85–1.16)
LYMPHOCYTES # BLD AUTO: 1.23 X10(3) UL (ref 1–4)
LYMPHOCYTES NFR BLD AUTO: 18.7 %
MAGNESIUM SERPL-MCNC: 2 MG/DL (ref 1.6–2.6)
MCH RBC QN AUTO: 32.7 PG (ref 26–34)
MCHC RBC AUTO-ENTMCNC: 32.6 G/DL (ref 31–37)
MCV RBC AUTO: 100.3 FL
MONOCYTES # BLD AUTO: 0.74 X10(3) UL (ref 0.1–1)
MONOCYTES NFR BLD AUTO: 11.3 %
NEUTROPHILS # BLD AUTO: 4.46 X10 (3) UL (ref 1.5–7.7)
NEUTROPHILS # BLD AUTO: 4.46 X10(3) UL (ref 1.5–7.7)
NEUTROPHILS NFR BLD AUTO: 67.8 %
OSMOLALITY SERPL CALC.SUM OF ELEC: 288 MOSM/KG (ref 275–295)
PLATELET # BLD AUTO: 105 10(3)UL (ref 150–450)
POTASSIUM SERPL-SCNC: 4.5 MMOL/L (ref 3.5–5.1)
PROT SERPL-MCNC: 4.6 G/DL (ref 6.4–8.2)
PROTHROMBIN TIME: 20.8 SECONDS (ref 11.6–14.8)
RBC # BLD AUTO: 2.97 X10(6)UL
SODIUM SERPL-SCNC: 139 MMOL/L (ref 136–145)
WBC # BLD AUTO: 6.6 X10(3) UL (ref 4–11)

## 2022-12-12 PROCEDURE — 99217 OBSERVATION CARE DISCHARGE: CPT | Performed by: HOSPITALIST

## 2022-12-12 RX ORDER — CEPHALEXIN 250 MG/1
250 CAPSULE ORAL 4 TIMES DAILY
Qty: 24 CAPSULE | Refills: 0 | Status: SHIPPED | COMMUNITY
Start: 2022-12-12

## 2022-12-12 RX ORDER — HYDROCODONE BITARTRATE AND ACETAMINOPHEN 5; 325 MG/1; MG/1
1 TABLET ORAL EVERY 6 HOURS PRN
Status: DISCONTINUED | OUTPATIENT
Start: 2022-12-12 | End: 2022-12-12

## 2022-12-12 RX ORDER — HYDROCODONE BITARTRATE AND ACETAMINOPHEN 5; 325 MG/1; MG/1
1 TABLET ORAL EVERY 6 HOURS PRN
Qty: 20 TABLET | Refills: 0 | Status: SHIPPED | OUTPATIENT
Start: 2022-12-12

## 2022-12-12 RX ORDER — WARFARIN SODIUM 1 MG/1
2 TABLET ORAL
Status: DISCONTINUED | OUTPATIENT
Start: 2022-12-12 | End: 2022-12-12

## 2022-12-12 NOTE — CM/SW NOTE
Spoke with pt's son who stated preference for [facility]. Facility reserved in [address]. Message sent to [facility] to inform [employer] of chosen facility and confirm insurance approval.  / to remain available for support and/or discharge planning. The [facility] of [address] [facility] [facility]  Discharge Planner  [phone]    Addendum:  Spoke with Aldne Espinosa from [facility] who confirmed pt can be accepted for admission today. [facility] transport set up for 7:15pm.  PCS form completed and available for RN to print. Updated pt's son, Hesham Hastings who expressed agreement with DC plan. Updated pt's RN. / to remain available for support and/or discharge planning.      The [facility] of [address]    [facility]  [phone]

## 2022-12-12 NOTE — CM/SW NOTE
Kristen called requesting add'l clinical for patient: H&P, Therapy notes, prior level of independence, prior level living situation and skilled medical needs. phone # [phone] ANDRIY#[phone] ref # [id number]    Charly Buckley SW notified.

## 2022-12-12 NOTE — PROGRESS NOTES
[facility] Dosing Service  Warfarin (Coumadin) Initial Dosing    Jewels Posadas is a [de-identified]year old patient for whom pharmacy has been consulted to dose warfarin (COUMADIN) for  afib  by Dr. Ros Knowles. Based on this indication, goal INR is 2-3. Pertinent Patient Medical History: Afib, HTN, DM2, HFpEF, COPD, mild dementia   Potential Drug Interactions:  amiodarone    Latest INR:   Recent Labs     12/11/22  1809   INR 2.00*       Other Anticoagulants:  none  Home regimen (if applicable):  2 mg Sun, Wed, Fri; 1 mg Mon, Tues, Thurs, Sat   Date/Time last dose was given (if applicable):     Based on above -  1. For today, Give warfarin (COUMADIN) 2 mg at 2100 tonight    2. PT/INR ordered daily while on warfarin    3. Pharmacy will continue to follow. We appreciate the opportunity to assist in the care of this patient.     Jeison Moran, PharmD  12/11/2022  7:12 PM

## 2022-12-12 NOTE — PROGRESS NOTES
[facility] Dosing Service  Warfarin (Coumadin) Subsequent Dosing    Julio Cesar Quintero is a [de-identified]year old patient for whom pharmacy is dosing warfarin (Coumadin). Goal INR is 2-3    Recent Labs   Lab 12/11/22  1809 12/12/22  1030   INR 2.00* 1.81*       Consulted by:  Dr Katelyn Bryan  Indication:  afib  Potential Drug Interactions:  amiodarone, ceftriaxone - can increase INR  Other Anticoagulants:  none  Home regimen (if applicable):  Coumadin 2mg on Sun, Wed and Fri and 1mg Mon, Tues, Thurs and Sat    Inpatient Dosing History:    Date 12/11 12/12       INR 2.00 1.81       Coumadin dose 2mg                 Based on above -  1. For today, Give warfarin (COUMADIN) 2 mg at 2100 tonight  2   PT/INR ordered daily while on warfarin  3. Pharmacy will continue to follow. We appreciate the opportunity to assist in the care of this patient.     Washington Yanes PharmD  12/12/2022  2:32 PM

## 2022-12-12 NOTE — CM/SW NOTE
Chart reviewed for discharge planning. Therapy recommending ALYSHA. Referrals pending. Insurance auth pending with [employer]. Informed insurance requesting additional clinical updates. Message sent to [facility] to request that updates be added via portal.  PT/OT both worked with pt this AM and notes recommending ALYSHA. Noted PASRR was completed by ED CM. Spoke with pt's dtr/Jeri MORE who stated pt lives with her brother Rhenda Essex. Pt has been current with [facility] for palliative care. Pt's dtr stated they were intending to have pt evaluated for possible hospice care prior to this recent fall and hospital admission. At this time, goal is to discharge to NH for ALYSHA then transition to home with [facility] hospice after rehab stay. Pt's dtr asked SW to speak with pt's son, Rhenda Essex regarding preferred facility for ALYSHA. Spoke with pt's son, Rhenda Essex and emailed list of accepting ALYSHA facilities: [facility]. He will review to confirm preferred facility. Discussed pending insurance auth and anticipated DC once auth received and accepting/preferred facility is identified.       Faiza Zuniga [facility]  Discharge Planner  [phone]

## 2022-12-12 NOTE — PLAN OF CARE
Patient A&O x4, slightly forgetful at times, re-orientates well. Lungs clear with some trouble with deep breath from pain. Patient c/o pain to lower back / sacral area. BS manageable without need for coverage. Daughter called this evening and medications clarified and orders corrected. 250 ml 0.9 bolus given to patient for low BPs and BPs have improved through the night.

## 2022-12-12 NOTE — PLAN OF CARE
Pt A&Ox4 hx dementia, VSS on RA, , tele; Afib. Pt reporting severe pain to lower back and sacrum; IV and PO pain medications given with adequate relief. Tolerating diet, Voiding via purewick, LBM 12/12. Up with 2 assist and walker. Plan to DC to [facility] when accepted. Plan of care reviewed with patient. Patient demonstrates understanding.

## 2022-12-12 NOTE — PROCEDURES
The office order for PCP request is Approved and completed on December 12, 2022.     Thanks,  [facility] [place] [employer]

## 2022-12-13 ENCOUNTER — EXTERNAL FACILITY (OUTPATIENT)
Dept: FAMILY MEDICINE CLINIC | Facility: CLINIC | Age: 82
End: 2022-12-13

## 2022-12-13 ENCOUNTER — PATIENT OUTREACH (OUTPATIENT)
Dept: CASE MANAGEMENT | Age: 82
End: 2022-12-13

## 2022-12-13 DIAGNOSIS — D69.6 THROMBOCYTOPENIA (HCC): ICD-10-CM

## 2022-12-13 DIAGNOSIS — N39.0 URINARY TRACT INFECTION WITHOUT HEMATURIA, SITE UNSPECIFIED: ICD-10-CM

## 2022-12-13 DIAGNOSIS — I50.33 ACUTE ON CHRONIC HEART FAILURE WITH PRESERVED EJECTION FRACTION (HCC): ICD-10-CM

## 2022-12-13 DIAGNOSIS — N18.9 CHRONIC KIDNEY DISEASE, UNSPECIFIED CKD STAGE: ICD-10-CM

## 2022-12-13 DIAGNOSIS — S32.10XA CLOSED FRACTURE OF SACRUM, UNSPECIFIED PORTION OF SACRUM, INITIAL ENCOUNTER (HCC): ICD-10-CM

## 2022-12-13 DIAGNOSIS — N39.0 ACUTE UTI: ICD-10-CM

## 2022-12-13 DIAGNOSIS — R26.2 INABILITY TO AMBULATE DUE TO MULTIPLE JOINTS: ICD-10-CM

## 2022-12-13 DIAGNOSIS — I48.0 PAF (PAROXYSMAL ATRIAL FIBRILLATION) (HCC): ICD-10-CM

## 2022-12-13 DIAGNOSIS — J43.8 OTHER EMPHYSEMA (HCC): ICD-10-CM

## 2022-12-13 DIAGNOSIS — Z79.01 LONG TERM (CURRENT) USE OF ANTICOAGULANTS: ICD-10-CM

## 2022-12-13 DIAGNOSIS — S32.2XXD CLOSED FRACTURE OF SACRUM AND COCCYX WITH COMPLETE CAUDA EQUINA LESION WITH ROUTINE HEALING, SUBSEQUENT ENCOUNTER: Primary | ICD-10-CM

## 2022-12-13 DIAGNOSIS — M54.59 INTRACTABLE LOW BACK PAIN: ICD-10-CM

## 2022-12-13 DIAGNOSIS — R53.81 PHYSICAL DECONDITIONING: ICD-10-CM

## 2022-12-13 DIAGNOSIS — S32.10XD CLOSED FRACTURE OF SACRUM AND COCCYX WITH COMPLETE CAUDA EQUINA LESION WITH ROUTINE HEALING, SUBSEQUENT ENCOUNTER: Primary | ICD-10-CM

## 2022-12-13 DIAGNOSIS — F32.5 MAJOR DEPRESSIVE DISORDER WITH SINGLE EPISODE, IN REMISSION (HCC): ICD-10-CM

## 2022-12-13 DIAGNOSIS — S34.3XXD CLOSED FRACTURE OF SACRUM AND COCCYX WITH COMPLETE CAUDA EQUINA LESION WITH ROUTINE HEALING, SUBSEQUENT ENCOUNTER: Primary | ICD-10-CM

## 2022-12-13 DIAGNOSIS — Z86.711 HISTORY OF PULMONARY EMBOLUS (PE): ICD-10-CM

## 2022-12-13 PROCEDURE — 1111F DSCHRG MED/CURRENT MED MERGE: CPT | Performed by: FAMILY MEDICINE

## 2022-12-13 PROCEDURE — 99306 1ST NF CARE HIGH MDM 50: CPT | Performed by: FAMILY MEDICINE

## 2022-12-13 NOTE — PROGRESS NOTES
NURSING DISCHARGE NOTE    Discharged Nursing home via Ambulance. Accompanied by Support staff  Belongings Taken by patient/family. Report given to RN at [facility]. PIV removed. DC instructions printed. All questions answered at this time.

## 2022-12-14 PROBLEM — R53.81 PHYSICAL DECONDITIONING: Status: ACTIVE | Noted: 2022-12-14

## 2022-12-14 PROBLEM — S32.2XXA CLOSED FRACTURE OF SACRUM AND COCCYX WITH COMPLETE CAUDA EQUINA LESION (HCC): Status: ACTIVE | Noted: 2022-12-11

## 2022-12-14 PROBLEM — S32.10XA CLOSED FRACTURE OF SACRUM AND COCCYX WITH COMPLETE CAUDA EQUINA LESION (HCC): Status: ACTIVE | Noted: 2022-12-11
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
## 2022-12-14 PROBLEM — S34.3XXA CLOSED FRACTURE OF SACRUM AND COCCYX WITH COMPLETE CAUDA EQUINA LESION (HCC): Status: ACTIVE | Noted: 2022-12-11

## 2022-12-15 PROBLEM — F32.5 MAJOR DEPRESSIVE DISORDER WITH SINGLE EPISODE, IN REMISSION (HCC): Status: RESOLVED | Noted: 2019-10-11 | Resolved: 2022-12-15

## 2022-12-16 ENCOUNTER — SNF ADMIT/H&P (OUTPATIENT)
Dept: INTERNAL MEDICINE CLINIC | Age: 82
End: 2022-12-16

## 2022-12-16 DIAGNOSIS — S32.10XD CLOSED FRACTURE OF SACRUM AND COCCYX WITH COMPLETE CAUDA EQUINA LESION WITH ROUTINE HEALING, SUBSEQUENT ENCOUNTER: ICD-10-CM

## 2022-12-16 DIAGNOSIS — Z79.01 ENCOUNTER FOR MONITORING COUMADIN THERAPY: ICD-10-CM

## 2022-12-16 DIAGNOSIS — I48.0 PAROXYSMAL ATRIAL FIBRILLATION (HCC): ICD-10-CM

## 2022-12-16 DIAGNOSIS — Z71.2 ENCOUNTER TO DISCUSS TEST RESULTS: ICD-10-CM

## 2022-12-16 DIAGNOSIS — Z71.89 ENCOUNTER FOR MEDICATION REVIEW AND COUNSELING: ICD-10-CM

## 2022-12-16 DIAGNOSIS — H91.90 HEARING LOSS, UNSPECIFIED HEARING LOSS TYPE, UNSPECIFIED LATERALITY: ICD-10-CM

## 2022-12-16 DIAGNOSIS — N39.0 ACUTE UTI: ICD-10-CM

## 2022-12-16 DIAGNOSIS — S32.2XXD CLOSED FRACTURE OF SACRUM AND COCCYX WITH COMPLETE CAUDA EQUINA LESION WITH ROUTINE HEALING, SUBSEQUENT ENCOUNTER: ICD-10-CM

## 2022-12-16 DIAGNOSIS — Z51.81 ENCOUNTER FOR MONITORING COUMADIN THERAPY: ICD-10-CM

## 2022-12-16 DIAGNOSIS — S34.3XXD CLOSED FRACTURE OF SACRUM AND COCCYX WITH COMPLETE CAUDA EQUINA LESION WITH ROUTINE HEALING, SUBSEQUENT ENCOUNTER: ICD-10-CM

## 2022-12-20 ENCOUNTER — SNF VISIT (OUTPATIENT)
Dept: INTERNAL MEDICINE CLINIC | Age: 82
End: 2022-12-20

## 2022-12-20 DIAGNOSIS — Z79.899 MEDICATION MANAGEMENT: ICD-10-CM

## 2022-12-20 DIAGNOSIS — S32.10XD CLOSED FRACTURE OF SACRUM WITH ROUTINE HEALING, UNSPECIFIED PORTION OF SACRUM, SUBSEQUENT ENCOUNTER: ICD-10-CM

## 2022-12-20 DIAGNOSIS — Z71.2 ENCOUNTER TO DISCUSS TEST RESULTS: ICD-10-CM

## 2022-12-20 DIAGNOSIS — R79.1 SUBTHERAPEUTIC INTERNATIONAL NORMALIZED RATIO (INR): ICD-10-CM

## 2022-12-20 DIAGNOSIS — Z51.89 ENCOUNTER FOR MEDICATION ADJUSTMENT: ICD-10-CM

## 2022-12-20 DIAGNOSIS — E55.9 VITAMIN D INSUFFICIENCY: ICD-10-CM

## 2022-12-20 DIAGNOSIS — Z01.89 LABORATORY TEST: ICD-10-CM

## 2022-12-30 ENCOUNTER — SNF VISIT (OUTPATIENT)
Dept: INTERNAL MEDICINE CLINIC | Age: 82
End: 2022-12-30

## 2022-12-30 DIAGNOSIS — R60.9 ASYMMETRIC EDEMA OF BOTH LOWER EXTREMITIES: ICD-10-CM

## 2022-12-30 DIAGNOSIS — Z51.89 ENCOUNTER FOR MEDICATION ADJUSTMENT: ICD-10-CM

## 2022-12-30 DIAGNOSIS — R53.81 PHYSICAL DECONDITIONING: ICD-10-CM

## 2022-12-30 DIAGNOSIS — Z79.899 MEDICATION MANAGEMENT: ICD-10-CM

## 2022-12-30 DIAGNOSIS — L03.115 CELLULITIS OF RIGHT LOWER EXTREMITY: ICD-10-CM

## 2022-12-30 PROCEDURE — 1111F DSCHRG MED/CURRENT MED MERGE: CPT | Performed by: NURSE PRACTITIONER

## 2022-12-30 PROCEDURE — 99310 SBSQ NF CARE HIGH MDM 45: CPT | Performed by: NURSE PRACTITIONER

## 2023-01-01 ENCOUNTER — APPOINTMENT (OUTPATIENT)
Dept: GENERAL RADIOLOGY | Age: 83
End: 2023-01-01
Attending: EMERGENCY MEDICINE

## 2023-01-01 ENCOUNTER — HOME/GROUP HOME VISIT (OUTPATIENT)
Dept: POST ACUTE CARE | Age: 83
End: 2023-01-01

## 2023-01-01 ENCOUNTER — APPOINTMENT (OUTPATIENT)
Dept: CARDIOLOGY | Age: 83
End: 2023-01-01
Attending: INTERNAL MEDICINE

## 2023-01-01 ENCOUNTER — APPOINTMENT (OUTPATIENT)
Dept: GENERAL RADIOLOGY | Age: 83
DRG: 471 | End: 2023-01-01
Attending: ORTHOPAEDIC SURGERY

## 2023-01-01 ENCOUNTER — ANTI-COAG (OUTPATIENT)
Dept: CARDIOLOGY | Age: 83
End: 2023-01-01

## 2023-01-01 ENCOUNTER — TELEPHONE (OUTPATIENT)
Dept: CARDIOLOGY | Age: 83
End: 2023-01-01

## 2023-01-01 ENCOUNTER — APPOINTMENT (OUTPATIENT)
Dept: GENERAL RADIOLOGY | Age: 83
DRG: 471 | End: 2023-01-01

## 2023-01-01 ENCOUNTER — TELEPHONE (OUTPATIENT)
Dept: NEUROSURGERY | Age: 83
End: 2023-01-01

## 2023-01-01 ENCOUNTER — APPOINTMENT (OUTPATIENT)
Dept: NEUROSURGERY | Age: 83
End: 2023-01-01

## 2023-01-01 ENCOUNTER — APPOINTMENT (OUTPATIENT)
Dept: CT IMAGING | Age: 83
End: 2023-01-01
Attending: EMERGENCY MEDICINE

## 2023-01-01 ENCOUNTER — APPOINTMENT (OUTPATIENT)
Dept: CT IMAGING | Age: 83
DRG: 471 | End: 2023-01-01
Attending: STUDENT IN AN ORGANIZED HEALTH CARE EDUCATION/TRAINING PROGRAM

## 2023-01-01 ENCOUNTER — HOSPITAL ENCOUNTER (OUTPATIENT)
Age: 83
Setting detail: OBSERVATION
Discharge: HOME | End: 2023-07-04
Attending: EMERGENCY MEDICINE | Admitting: FAMILY MEDICINE

## 2023-01-01 ENCOUNTER — APPOINTMENT (OUTPATIENT)
Dept: CT IMAGING | Age: 83
DRG: 471 | End: 2023-01-01
Attending: PHYSICIAN ASSISTANT

## 2023-01-01 ENCOUNTER — ANESTHESIA (OUTPATIENT)
Dept: SURGERY | Age: 83
DRG: 471 | End: 2023-01-01

## 2023-01-01 ENCOUNTER — APPOINTMENT (OUTPATIENT)
Dept: GENERAL RADIOLOGY | Age: 83
DRG: 471 | End: 2023-01-01
Attending: INTERNAL MEDICINE

## 2023-01-01 ENCOUNTER — TELEPHONE (OUTPATIENT)
Dept: POST ACUTE CARE | Age: 83
End: 2023-01-01

## 2023-01-01 ENCOUNTER — CLINICAL DOCUMENTATION (OUTPATIENT)
Dept: POST ACUTE CARE | Age: 83
End: 2023-01-01

## 2023-01-01 ENCOUNTER — APPOINTMENT (OUTPATIENT)
Dept: ULTRASOUND IMAGING | Age: 83
End: 2023-01-01
Attending: INTERNAL MEDICINE

## 2023-01-01 ENCOUNTER — APPOINTMENT (OUTPATIENT)
Dept: GENERAL RADIOLOGY | Age: 83
DRG: 471 | End: 2023-01-01
Attending: NEUROLOGICAL SURGERY

## 2023-01-01 ENCOUNTER — APPOINTMENT (OUTPATIENT)
Dept: GENERAL RADIOLOGY | Age: 83
DRG: 471 | End: 2023-01-01
Attending: STUDENT IN AN ORGANIZED HEALTH CARE EDUCATION/TRAINING PROGRAM

## 2023-01-01 ENCOUNTER — TELEPHONIC VISIT (OUTPATIENT)
Dept: POST ACUTE CARE | Age: 83
End: 2023-01-01

## 2023-01-01 ENCOUNTER — APPOINTMENT (OUTPATIENT)
Dept: MRI IMAGING | Age: 83
DRG: 471 | End: 2023-01-01
Attending: NURSE PRACTITIONER

## 2023-01-01 ENCOUNTER — APPOINTMENT (OUTPATIENT)
Dept: GENERAL RADIOLOGY | Age: 83
DRG: 471 | End: 2023-01-01
Attending: PAIN MEDICINE

## 2023-01-01 ENCOUNTER — APPOINTMENT (OUTPATIENT)
Dept: POST ACUTE CARE | Age: 83
End: 2023-01-01

## 2023-01-01 ENCOUNTER — HOSPITAL ENCOUNTER (INPATIENT)
Age: 83
LOS: 9 days | Discharge: NURSING, REHAB OR LONG-TERM CARE | DRG: 471 | End: 2023-08-08
Attending: STUDENT IN AN ORGANIZED HEALTH CARE EDUCATION/TRAINING PROGRAM | Admitting: FAMILY MEDICINE

## 2023-01-01 ENCOUNTER — APPOINTMENT (OUTPATIENT)
Dept: MRI IMAGING | Age: 83
DRG: 471 | End: 2023-01-01
Attending: STUDENT IN AN ORGANIZED HEALTH CARE EDUCATION/TRAINING PROGRAM

## 2023-01-01 ENCOUNTER — ANTI-COAG (OUTPATIENT)
Dept: CHRONIC DISEASE MANAGEMENT | Age: 83
End: 2023-01-01

## 2023-01-01 ENCOUNTER — ANESTHESIA EVENT (OUTPATIENT)
Dept: SURGERY | Age: 83
DRG: 471 | End: 2023-01-01

## 2023-01-01 ENCOUNTER — E-ADVICE (OUTPATIENT)
Dept: CARDIOLOGY | Age: 83
End: 2023-01-01

## 2023-01-01 ENCOUNTER — APPOINTMENT (OUTPATIENT)
Dept: GASTROENTEROLOGY | Age: 83
DRG: 471 | End: 2023-01-01
Attending: NURSE PRACTITIONER

## 2023-01-01 VITALS
SYSTOLIC BLOOD PRESSURE: 130 MMHG | DIASTOLIC BLOOD PRESSURE: 68 MMHG | HEART RATE: 55 BPM | TEMPERATURE: 97.3 F | WEIGHT: 160 LBS | OXYGEN SATURATION: 98 % | BODY MASS INDEX: 25.82 KG/M2

## 2023-01-01 VITALS
WEIGHT: 106.48 LBS | HEIGHT: 66 IN | TEMPERATURE: 97.5 F | BODY MASS INDEX: 17.11 KG/M2 | SYSTOLIC BLOOD PRESSURE: 98 MMHG | HEART RATE: 124 BPM | DIASTOLIC BLOOD PRESSURE: 58 MMHG | RESPIRATION RATE: 16 BRPM | OXYGEN SATURATION: 97 %

## 2023-01-01 VITALS
HEART RATE: 104 BPM | SYSTOLIC BLOOD PRESSURE: 121 MMHG | WEIGHT: 130.51 LBS | HEIGHT: 64 IN | RESPIRATION RATE: 18 BRPM | OXYGEN SATURATION: 96 % | TEMPERATURE: 98.4 F | BODY MASS INDEX: 22.28 KG/M2 | DIASTOLIC BLOOD PRESSURE: 68 MMHG

## 2023-01-01 DIAGNOSIS — Z78.9 NEEDS ASSISTANCE WITH COMMUNITY RESOURCES: ICD-10-CM

## 2023-01-01 DIAGNOSIS — R41.0 CONFUSION: ICD-10-CM

## 2023-01-01 DIAGNOSIS — S12.090G: ICD-10-CM

## 2023-01-01 DIAGNOSIS — Z86.79 HISTORY OF VENTRICULAR TACHYCARDIA: ICD-10-CM

## 2023-01-01 DIAGNOSIS — M54.2 NECK PAIN: ICD-10-CM

## 2023-01-01 DIAGNOSIS — E03.9 HYPOTHYROIDISM, UNSPECIFIED TYPE: ICD-10-CM

## 2023-01-01 DIAGNOSIS — S09.90XA CLOSED HEAD INJURY, INITIAL ENCOUNTER: Primary | ICD-10-CM

## 2023-01-01 DIAGNOSIS — S12.112G CLOSED NONDISPLACED ODONTOID FRACTURE WITH TYPE II MORPHOLOGY AND DELAYED HEALING, SUBSEQUENT ENCOUNTER: ICD-10-CM

## 2023-01-01 DIAGNOSIS — R40.4 UNRESPONSIVE EPISODE: ICD-10-CM

## 2023-01-01 DIAGNOSIS — M54.16 LUMBAR RADICULOPATHY: Primary | ICD-10-CM

## 2023-01-01 DIAGNOSIS — Z59.9 INABILITY TO RETURN TO LIVING SITUATION: ICD-10-CM

## 2023-01-01 DIAGNOSIS — G95.20 CERVICAL SPINAL CORD COMPRESSION (CMD): ICD-10-CM

## 2023-01-01 DIAGNOSIS — R26.2 INABILITY TO AMBULATE DUE TO HIP: Primary | ICD-10-CM

## 2023-01-01 DIAGNOSIS — R53.81 DEBILITY: ICD-10-CM

## 2023-01-01 DIAGNOSIS — R13.10 DYSPHAGIA, UNSPECIFIED TYPE: ICD-10-CM

## 2023-01-01 DIAGNOSIS — M79.604 PAIN IN BOTH LOWER EXTREMITIES: ICD-10-CM

## 2023-01-01 DIAGNOSIS — R62.7 FAILURE TO THRIVE IN ADULT: ICD-10-CM

## 2023-01-01 DIAGNOSIS — I48.20 CHRONIC ATRIAL FIBRILLATION (CMD): Primary | ICD-10-CM

## 2023-01-01 DIAGNOSIS — E11.29 TYPE 2 DIABETES MELLITUS WITH OTHER DIABETIC KIDNEY COMPLICATION, WITHOUT LONG-TERM CURRENT USE OF INSULIN (CMD): ICD-10-CM

## 2023-01-01 DIAGNOSIS — I70.0 ATHEROSCLEROSIS OF AORTA (CMD): ICD-10-CM

## 2023-01-01 DIAGNOSIS — F33.42 MAJOR DEPRESSIVE DISORDER, RECURRENT EPISODE, IN FULL REMISSION (CMD): ICD-10-CM

## 2023-01-01 DIAGNOSIS — M79.605 PAIN IN BOTH LOWER EXTREMITIES: ICD-10-CM

## 2023-01-01 DIAGNOSIS — N17.9 AKI (ACUTE KIDNEY INJURY) (CMD): ICD-10-CM

## 2023-01-01 DIAGNOSIS — Z86.79 HISTORY OF ISCHEMIC CARDIOMYOPATHY: ICD-10-CM

## 2023-01-01 DIAGNOSIS — E44.0 PROTEIN-CALORIE MALNUTRITION, MODERATE (CMD): ICD-10-CM

## 2023-01-01 DIAGNOSIS — I50.32 CHRONIC DIASTOLIC CONGESTIVE HEART FAILURE (CMD): ICD-10-CM

## 2023-01-01 DIAGNOSIS — S12.110A CLOSED ODONTOID FRACTURE WITH TYPE II MORPHOLOGY AND ANTERIOR DISPLACEMENT, INITIAL ENCOUNTER (CMD): ICD-10-CM

## 2023-01-01 DIAGNOSIS — Z98.890 S/P SPINAL SURGERY: Primary | ICD-10-CM

## 2023-01-01 DIAGNOSIS — I50.32 CHRONIC DIASTOLIC CONGESTIVE HEART FAILURE (CMD): Primary | ICD-10-CM

## 2023-01-01 DIAGNOSIS — S06.0X0A CONCUSSION WITHOUT LOSS OF CONSCIOUSNESS, INITIAL ENCOUNTER: ICD-10-CM

## 2023-01-01 DIAGNOSIS — I48.20 CHRONIC ATRIAL FIBRILLATION (CMD): ICD-10-CM

## 2023-01-01 DIAGNOSIS — M54.16 LUMBAR RADICULOPATHY: ICD-10-CM

## 2023-01-01 DIAGNOSIS — E11.29 TYPE 2 DIABETES MELLITUS WITH OTHER DIABETIC KIDNEY COMPLICATION, WITHOUT LONG-TERM CURRENT USE OF INSULIN (CMD): Primary | ICD-10-CM

## 2023-01-01 DIAGNOSIS — Z95.810 HISTORY OF AUTOMATIC INTERNAL CARDIAC DEFIBRILLATOR (AICD): ICD-10-CM

## 2023-01-01 LAB
ABO + RH BLD: NORMAL
ALBUMIN SERPL-MCNC: 2.2 G/DL (ref 3.6–5.1)
ALBUMIN SERPL-MCNC: 2.4 G/DL (ref 3.6–5.1)
ALBUMIN SERPL-MCNC: 2.7 G/DL (ref 3.6–5.1)
ALBUMIN SERPL-MCNC: 3 G/DL (ref 3.6–5.1)
ALBUMIN/GLOB SERPL: 0.8 {RATIO} (ref 1–2.4)
ALBUMIN/GLOB SERPL: 0.8 {RATIO} (ref 1–2.4)
ALBUMIN/GLOB SERPL: 0.9 {RATIO} (ref 1–2.4)
ALP SERPL-CCNC: 59 UNITS/L (ref 45–117)
ALP SERPL-CCNC: 61 UNITS/L (ref 45–117)
ALP SERPL-CCNC: 65 UNITS/L (ref 45–117)
ALP SERPL-CCNC: 69 UNITS/L (ref 45–117)
ALT SERPL-CCNC: 11 UNITS/L
ALT SERPL-CCNC: 13 UNITS/L
ALT SERPL-CCNC: 15 UNITS/L
ALT SERPL-CCNC: 17 UNITS/L
ANION GAP SERPL CALC-SCNC: 6 MMOL/L (ref 7–19)
ANION GAP SERPL CALC-SCNC: 7 MMOL/L (ref 7–19)
ANION GAP SERPL CALC-SCNC: 7 MMOL/L (ref 7–19)
ANION GAP SERPL CALC-SCNC: 8 MMOL/L (ref 7–19)
ANION GAP SERPL CALC-SCNC: 8 MMOL/L (ref 7–19)
ANION GAP SERPL CALC-SCNC: 9 MMOL/L (ref 7–19)
AORTIC VALVE AREA (AVA): 1.3
APPEARANCE UR: CLEAR
APPEARANCE UR: CLEAR
APTT PPP: 38 SEC (ref 22–30)
AST SERPL-CCNC: 14 UNITS/L
AST SERPL-CCNC: 15 UNITS/L
AST SERPL-CCNC: 18 UNITS/L
AST SERPL-CCNC: 7 UNITS/L
ATRIAL RATE (BPM): 127
ATRIAL RATE (BPM): 68
ATRIAL RATE (BPM): 84
AV PEAK GRADIENT (AVPG): 6
AV PEAK VELOCITY (AVPV): 1.27
AVI LVOT PEAK GRADIENT (LVOTMG): 1.3
BASOPHILS # BLD: 0 K/MCL (ref 0–0.3)
BASOPHILS NFR BLD: 0 %
BILIRUB CONJ SERPL-MCNC: 0.1 MG/DL (ref 0–0.2)
BILIRUB SERPL-MCNC: 0.3 MG/DL (ref 0.2–1)
BILIRUB SERPL-MCNC: 0.3 MG/DL (ref 0.2–1)
BILIRUB SERPL-MCNC: 0.4 MG/DL (ref 0.2–1)
BILIRUB SERPL-MCNC: 0.6 MG/DL (ref 0.2–1)
BILIRUB UR QL STRIP: NEGATIVE
BILIRUB UR QL STRIP: NEGATIVE
BLD GP AB SCN SERPL QL GEL: NEGATIVE
BLOOD EXPIRATION DATE: NORMAL
BUN SERPL-MCNC: 18 MG/DL (ref 6–20)
BUN SERPL-MCNC: 20 MG/DL (ref 6–20)
BUN SERPL-MCNC: 24 MG/DL (ref 6–20)
BUN SERPL-MCNC: 28 MG/DL (ref 6–20)
BUN SERPL-MCNC: 33 MG/DL (ref 6–20)
BUN SERPL-MCNC: 40 MG/DL (ref 6–20)
BUN/CREAT SERPL: 18 (ref 7–25)
BUN/CREAT SERPL: 18 (ref 7–25)
BUN/CREAT SERPL: 19 (ref 7–25)
BUN/CREAT SERPL: 21 (ref 7–25)
BUN/CREAT SERPL: 21 (ref 7–25)
BUN/CREAT SERPL: 25 (ref 7–25)
BUN/CREAT SERPL: 26 (ref 7–25)
BUN/CREAT SERPL: 29 (ref 7–25)
BUN/CREAT SERPL: 30 (ref 7–25)
BUN/CREAT SERPL: 34 (ref 7–25)
CALCIUM SERPL-MCNC: 7.3 MG/DL (ref 8.4–10.2)
CALCIUM SERPL-MCNC: 7.8 MG/DL (ref 8.4–10.2)
CALCIUM SERPL-MCNC: 7.9 MG/DL (ref 8.4–10.2)
CALCIUM SERPL-MCNC: 8 MG/DL (ref 8.4–10.2)
CALCIUM SERPL-MCNC: 8.2 MG/DL (ref 8.4–10.2)
CALCIUM SERPL-MCNC: 8.3 MG/DL (ref 8.4–10.2)
CALCIUM SERPL-MCNC: 8.4 MG/DL (ref 8.4–10.2)
CALCIUM SERPL-MCNC: 8.6 MG/DL (ref 8.4–10.2)
CHLORIDE SERPL-SCNC: 109 MMOL/L (ref 97–110)
CHLORIDE SERPL-SCNC: 110 MMOL/L (ref 97–110)
CHLORIDE SERPL-SCNC: 112 MMOL/L (ref 97–110)
CHLORIDE SERPL-SCNC: 113 MMOL/L (ref 97–110)
CHLORIDE SERPL-SCNC: 114 MMOL/L (ref 97–110)
CHLORIDE SERPL-SCNC: 115 MMOL/L (ref 97–110)
CLOSURE TME BLD-IMP: ABNORMAL
CLOSURE TME COLL+ADP BLD: 150 SEC (ref 50–114)
CLOSURE TME COLL+EPINEP BLD: 173 SEC (ref 70–160)
CO2 SERPL-SCNC: 22 MMOL/L (ref 21–32)
CO2 SERPL-SCNC: 23 MMOL/L (ref 21–32)
CO2 SERPL-SCNC: 25 MMOL/L (ref 21–32)
CO2 SERPL-SCNC: 26 MMOL/L (ref 21–32)
CO2 SERPL-SCNC: 28 MMOL/L (ref 21–32)
CO2 SERPL-SCNC: 29 MMOL/L (ref 21–32)
COLOR UR: NORMAL
COLOR UR: YELLOW
CREAT SERPL-MCNC: 0.72 MG/DL (ref 0.51–0.95)
CREAT SERPL-MCNC: 0.8 MG/DL (ref 0.51–0.95)
CREAT SERPL-MCNC: 0.83 MG/DL (ref 0.51–0.95)
CREAT SERPL-MCNC: 0.84 MG/DL (ref 0.51–0.95)
CREAT SERPL-MCNC: 0.86 MG/DL (ref 0.51–0.95)
CREAT SERPL-MCNC: 0.97 MG/DL (ref 0.51–0.95)
CREAT SERPL-MCNC: 0.99 MG/DL (ref 0.51–0.95)
CREAT SERPL-MCNC: 1.09 MG/DL (ref 0.51–0.95)
CREAT SERPL-MCNC: 1.25 MG/DL (ref 0.51–0.95)
CREAT SERPL-MCNC: 1.39 MG/DL (ref 0.51–0.95)
DEPRECATED RDW RBC: 60 FL (ref 39–50)
DEPRECATED RDW RBC: 63 FL (ref 39–50)
DEPRECATED RDW RBC: 64.9 FL (ref 39–50)
DEPRECATED RDW RBC: 66.1 FL (ref 39–50)
DEPRECATED RDW RBC: 66.5 FL (ref 39–50)
DEPRECATED RDW RBC: 66.7 FL (ref 39–50)
DEPRECATED RDW RBC: 67 FL (ref 39–50)
DEPRECATED RDW RBC: 67.3 FL (ref 39–50)
DEPRECATED RDW RBC: 67.5 FL (ref 39–50)
DEPRECATED RDW RBC: 67.6 FL (ref 39–50)
DEPRECATED RDW RBC: 67.9 FL (ref 39–50)
DEPRECATED RDW RBC: 68.1 FL (ref 39–50)
DEPRECATED RDW RBC: 69.2 FL (ref 39–50)
DEPRECATED RDW RBC: 69.2 FL (ref 39–50)
DISPENSE STATUS: NORMAL
E WAVE DECELARATION TIME (MDT): 18.39
EOSINOPHIL # BLD: 0 K/MCL (ref 0–0.5)
EOSINOPHIL NFR BLD: 0 %
EOSINOPHIL NFR BLD: 1 %
ERYTHROCYTE [DISTWIDTH] IN BLOOD: 15.7 % (ref 11–15)
ERYTHROCYTE [DISTWIDTH] IN BLOOD: 15.9 % (ref 11–15)
ERYTHROCYTE [DISTWIDTH] IN BLOOD: 16 % (ref 11–15)
ERYTHROCYTE [DISTWIDTH] IN BLOOD: 16.1 % (ref 11–15)
ERYTHROCYTE [DISTWIDTH] IN BLOOD: 16.2 % (ref 11–15)
ERYTHROCYTE [DISTWIDTH] IN BLOOD: 16.3 % (ref 11–15)
ERYTHROCYTE [DISTWIDTH] IN BLOOD: 16.4 % (ref 11–15)
ERYTHROCYTE [DISTWIDTH] IN BLOOD: 16.5 % (ref 11–15)
ERYTHROCYTE [DISTWIDTH] IN BLOOD: 16.5 % (ref 11–15)
ERYTHROCYTE [DISTWIDTH] IN BLOOD: 16.7 % (ref 11–15)
FASTING DURATION TIME PATIENT: ABNORMAL H
GFR SERPLBLD BASED ON 1.73 SQ M-ARVRAT: 38 ML/MIN
GFR SERPLBLD BASED ON 1.73 SQ M-ARVRAT: 43 ML/MIN
GFR SERPLBLD BASED ON 1.73 SQ M-ARVRAT: 51 ML/MIN
GFR SERPLBLD BASED ON 1.73 SQ M-ARVRAT: 57 ML/MIN
GFR SERPLBLD BASED ON 1.73 SQ M-ARVRAT: 59 ML/MIN
GFR SERPLBLD BASED ON 1.73 SQ M-ARVRAT: 68 ML/MIN
GFR SERPLBLD BASED ON 1.73 SQ M-ARVRAT: 70 ML/MIN
GFR SERPLBLD BASED ON 1.73 SQ M-ARVRAT: 71 ML/MIN
GFR SERPLBLD BASED ON 1.73 SQ M-ARVRAT: 74 ML/MIN
GFR SERPLBLD BASED ON 1.73 SQ M-ARVRAT: 84 ML/MIN
GLOBULIN SER-MCNC: 2.7 G/DL (ref 2–4)
GLOBULIN SER-MCNC: 2.7 G/DL (ref 2–4)
GLOBULIN SER-MCNC: 3.2 G/DL (ref 2–4)
GLUCOSE BLDC GLUCOMTR-MCNC: 143 MG/DL (ref 70–99)
GLUCOSE SERPL-MCNC: 102 MG/DL (ref 70–99)
GLUCOSE SERPL-MCNC: 104 MG/DL (ref 70–99)
GLUCOSE SERPL-MCNC: 106 MG/DL (ref 70–99)
GLUCOSE SERPL-MCNC: 124 MG/DL (ref 70–99)
GLUCOSE SERPL-MCNC: 131 MG/DL (ref 70–99)
GLUCOSE SERPL-MCNC: 134 MG/DL (ref 70–99)
GLUCOSE SERPL-MCNC: 91 MG/DL (ref 70–99)
GLUCOSE SERPL-MCNC: 92 MG/DL (ref 70–99)
GLUCOSE SERPL-MCNC: 95 MG/DL (ref 70–99)
GLUCOSE SERPL-MCNC: 99 MG/DL (ref 70–99)
GLUCOSE UR STRIP-MCNC: NEGATIVE MG/DL
GLUCOSE UR STRIP-MCNC: NEGATIVE MG/DL
HCT VFR BLD CALC: 25.2 % (ref 36–46.5)
HCT VFR BLD CALC: 25.4 % (ref 36–46.5)
HCT VFR BLD CALC: 25.8 % (ref 36–46.5)
HCT VFR BLD CALC: 25.8 % (ref 36–46.5)
HCT VFR BLD CALC: 26.6 % (ref 36–46.5)
HCT VFR BLD CALC: 26.8 % (ref 36–46.5)
HCT VFR BLD CALC: 28.4 % (ref 36–46.5)
HCT VFR BLD CALC: 30.4 % (ref 36–46.5)
HCT VFR BLD CALC: 30.6 % (ref 36–46.5)
HCT VFR BLD CALC: 31.2 % (ref 36–46.5)
HCT VFR BLD CALC: 31.7 % (ref 36–46.5)
HCT VFR BLD CALC: 33.4 % (ref 36–46.5)
HCT VFR BLD CALC: 36.9 % (ref 36–46.5)
HCT VFR BLD CALC: 37.6 % (ref 36–46.5)
HGB BLD-MCNC: 10.1 G/DL (ref 12–15.5)
HGB BLD-MCNC: 10.4 G/DL (ref 12–15.5)
HGB BLD-MCNC: 10.5 G/DL (ref 12–15.5)
HGB BLD-MCNC: 11.3 G/DL (ref 12–15.5)
HGB BLD-MCNC: 12.4 G/DL (ref 12–15.5)
HGB BLD-MCNC: 12.5 G/DL (ref 12–15.5)
HGB BLD-MCNC: 8.1 G/DL (ref 12–15.5)
HGB BLD-MCNC: 8.2 G/DL (ref 12–15.5)
HGB BLD-MCNC: 8.3 G/DL (ref 12–15.5)
HGB BLD-MCNC: 8.3 G/DL (ref 12–15.5)
HGB BLD-MCNC: 8.7 G/DL (ref 12–15.5)
HGB BLD-MCNC: 8.7 G/DL (ref 12–15.5)
HGB BLD-MCNC: 9.2 G/DL (ref 12–15.5)
HGB BLD-MCNC: 9.9 G/DL (ref 12–15.5)
HGB UR QL STRIP: NEGATIVE
HGB UR QL STRIP: NEGATIVE
IMM GRANULOCYTES # BLD AUTO: 0 K/MCL (ref 0–0.2)
IMM GRANULOCYTES # BLD AUTO: 0.1 K/MCL (ref 0–0.2)
IMM GRANULOCYTES # BLD: 0 %
IMM GRANULOCYTES # BLD: 0 %
IMM GRANULOCYTES # BLD: 1 %
IMM GRANULOCYTES # BLD: 2 %
INR PPP: 0
INR PPP: 1
INR PPP: 1.4
INR PPP: 1.6
INR PPP: 1.64
INR PPP: 1.7
INR PPP: 2.07
INR PPP: 2.5
INR PPP: 2.6
INR PPP: 3.1
INTERVENTRICULAR SEPTUM IN END DIASTOLE (IVSD): 2.65
ISBT BLOOD TYPE: 6200
ISSUE DATE/TIME: NORMAL
KETONES UR STRIP-MCNC: NEGATIVE MG/DL
KETONES UR STRIP-MCNC: NEGATIVE MG/DL
LEFT INTERNAL DIMENSION IN SYSTOLE (LVSD): 1.5
LEFT VENTRICULAR INTERNAL DIMENSION IN DIASTOLE (LVDD): 3.4
LEFT VENTRICULAR POSTERIOR WALL IN END DIASTOLE (LVPW): 4.9
LEUKOCYTE ESTERASE UR QL STRIP: NEGATIVE
LEUKOCYTE ESTERASE UR QL STRIP: NEGATIVE
LIPASE SERPL-CCNC: 282 UNITS/L (ref 73–393)
LV EF: NORMAL %
LVOT 2D (LVOTD): 10.4
LVOT VTI (LVOTVTI): 0.59
LYMPHOCYTES # BLD: 0.4 K/MCL (ref 1–4)
LYMPHOCYTES # BLD: 0.5 K/MCL (ref 1–4)
LYMPHOCYTES # BLD: 0.8 K/MCL (ref 1–4)
LYMPHOCYTES # BLD: 0.9 K/MCL (ref 1–4)
LYMPHOCYTES # BLD: 0.9 K/MCL (ref 1–4)
LYMPHOCYTES # BLD: 1 K/MCL (ref 1–4)
LYMPHOCYTES # BLD: 1.1 K/MCL (ref 1–4)
LYMPHOCYTES # BLD: 1.2 K/MCL (ref 1–4)
LYMPHOCYTES # BLD: 1.2 K/MCL (ref 1–4)
LYMPHOCYTES # BLD: 1.3 K/MCL (ref 1–4)
LYMPHOCYTES # BLD: 1.3 K/MCL (ref 1–4)
LYMPHOCYTES # BLD: 1.4 K/MCL (ref 1–4)
LYMPHOCYTES NFR BLD: 10 %
LYMPHOCYTES NFR BLD: 10 %
LYMPHOCYTES NFR BLD: 11 %
LYMPHOCYTES NFR BLD: 11 %
LYMPHOCYTES NFR BLD: 12 %
LYMPHOCYTES NFR BLD: 13 %
LYMPHOCYTES NFR BLD: 19 %
LYMPHOCYTES NFR BLD: 20 %
LYMPHOCYTES NFR BLD: 25 %
LYMPHOCYTES NFR BLD: 4 %
LYMPHOCYTES NFR BLD: 7 %
LYMPHOCYTES NFR BLD: 9 %
MAGNESIUM SERPL-MCNC: 1.8 MG/DL (ref 1.7–2.4)
MAGNESIUM SERPL-MCNC: 1.9 MG/DL (ref 1.7–2.4)
MAGNESIUM SERPL-MCNC: 1.9 MG/DL (ref 1.7–2.4)
MAGNESIUM SERPL-MCNC: 2 MG/DL (ref 1.7–2.4)
MAGNESIUM SERPL-MCNC: 2 MG/DL (ref 1.7–2.4)
MAGNESIUM SERPL-MCNC: 2.1 MG/DL (ref 1.7–2.4)
MAGNESIUM SERPL-MCNC: 2.2 MG/DL (ref 1.7–2.4)
MAGNESIUM SERPL-MCNC: 2.2 MG/DL (ref 1.7–2.4)
MAGNESIUM SERPL-MCNC: 2.4 MG/DL (ref 1.7–2.4)
MCH RBC QN AUTO: 35 PG (ref 26–34)
MCH RBC QN AUTO: 35.2 PG (ref 26–34)
MCH RBC QN AUTO: 36.1 PG (ref 26–34)
MCH RBC QN AUTO: 36.3 PG (ref 26–34)
MCH RBC QN AUTO: 36.3 PG (ref 26–34)
MCH RBC QN AUTO: 36.5 PG (ref 26–34)
MCH RBC QN AUTO: 36.5 PG (ref 26–34)
MCH RBC QN AUTO: 36.6 PG (ref 26–34)
MCH RBC QN AUTO: 36.6 PG (ref 26–34)
MCH RBC QN AUTO: 36.7 PG (ref 26–34)
MCH RBC QN AUTO: 36.8 PG (ref 26–34)
MCH RBC QN AUTO: 36.9 PG (ref 26–34)
MCH RBC QN AUTO: 37 PG (ref 26–34)
MCH RBC QN AUTO: 37.3 PG (ref 26–34)
MCHC RBC AUTO-ENTMCNC: 32.1 G/DL (ref 32–36.5)
MCHC RBC AUTO-ENTMCNC: 32.2 G/DL (ref 32–36.5)
MCHC RBC AUTO-ENTMCNC: 32.2 G/DL (ref 32–36.5)
MCHC RBC AUTO-ENTMCNC: 32.3 G/DL (ref 32–36.5)
MCHC RBC AUTO-ENTMCNC: 32.4 G/DL (ref 32–36.5)
MCHC RBC AUTO-ENTMCNC: 32.5 G/DL (ref 32–36.5)
MCHC RBC AUTO-ENTMCNC: 32.6 G/DL (ref 32–36.5)
MCHC RBC AUTO-ENTMCNC: 32.7 G/DL (ref 32–36.5)
MCHC RBC AUTO-ENTMCNC: 32.8 G/DL (ref 32–36.5)
MCHC RBC AUTO-ENTMCNC: 33 G/DL (ref 32–36.5)
MCHC RBC AUTO-ENTMCNC: 33 G/DL (ref 32–36.5)
MCHC RBC AUTO-ENTMCNC: 33.7 G/DL (ref 32–36.5)
MCHC RBC AUTO-ENTMCNC: 33.8 G/DL (ref 32–36.5)
MCHC RBC AUTO-ENTMCNC: 33.9 G/DL (ref 32–36.5)
MCV RBC AUTO: 103.9 FL (ref 78–100)
MCV RBC AUTO: 106.7 FL (ref 78–100)
MCV RBC AUTO: 108.7 FL (ref 78–100)
MCV RBC AUTO: 110.2 FL (ref 78–100)
MCV RBC AUTO: 110.5 FL (ref 78–100)
MCV RBC AUTO: 110.6 FL (ref 78–100)
MCV RBC AUTO: 111.4 FL (ref 78–100)
MCV RBC AUTO: 112.2 FL (ref 78–100)
MCV RBC AUTO: 112.4 FL (ref 78–100)
MCV RBC AUTO: 113.2 FL (ref 78–100)
MCV RBC AUTO: 113.6 FL (ref 78–100)
MCV RBC AUTO: 113.6 FL (ref 78–100)
MCV RBC AUTO: 113.7 FL (ref 78–100)
MCV RBC AUTO: 114 FL (ref 78–100)
MONOCYTES # BLD: 0.4 K/MCL (ref 0.3–0.9)
MONOCYTES # BLD: 0.4 K/MCL (ref 0.3–0.9)
MONOCYTES # BLD: 0.5 K/MCL (ref 0.3–0.9)
MONOCYTES # BLD: 0.6 K/MCL (ref 0.3–0.9)
MONOCYTES # BLD: 0.7 K/MCL (ref 0.3–0.9)
MONOCYTES # BLD: 0.8 K/MCL (ref 0.3–0.9)
MONOCYTES NFR BLD: 10 %
MONOCYTES NFR BLD: 3 %
MONOCYTES NFR BLD: 5 %
MONOCYTES NFR BLD: 7 %
MONOCYTES NFR BLD: 8 %
MONOCYTES NFR BLD: 9 %
MV E TISSUE VEL MED (MESV): 10
MV E WAVE VEL/E TISSUE VEL MED(MSR): 7.07
MV PEAK A VELOCITY (MVPAV): 351
NEUTROPHILS # BLD: 10.8 K/MCL (ref 1.8–7.7)
NEUTROPHILS # BLD: 3.5 K/MCL (ref 1.8–7.7)
NEUTROPHILS # BLD: 4.9 K/MCL (ref 1.8–7.7)
NEUTROPHILS # BLD: 5 K/MCL (ref 1.8–7.7)
NEUTROPHILS # BLD: 5.8 K/MCL (ref 1.8–7.7)
NEUTROPHILS # BLD: 5.9 K/MCL (ref 1.8–7.7)
NEUTROPHILS # BLD: 5.9 K/MCL (ref 1.8–7.7)
NEUTROPHILS # BLD: 6.5 K/MCL (ref 1.8–7.7)
NEUTROPHILS # BLD: 6.6 K/MCL (ref 1.8–7.7)
NEUTROPHILS # BLD: 7.2 K/MCL (ref 1.8–7.7)
NEUTROPHILS # BLD: 7.5 K/MCL (ref 1.8–7.7)
NEUTROPHILS # BLD: 7.6 K/MCL (ref 1.8–7.7)
NEUTROPHILS # BLD: 7.8 K/MCL (ref 1.8–7.7)
NEUTROPHILS # BLD: 8.3 K/MCL (ref 1.8–7.7)
NEUTROPHILS NFR BLD: 64 %
NEUTROPHILS NFR BLD: 72 %
NEUTROPHILS NFR BLD: 75 %
NEUTROPHILS NFR BLD: 76 %
NEUTROPHILS NFR BLD: 79 %
NEUTROPHILS NFR BLD: 79 %
NEUTROPHILS NFR BLD: 80 %
NEUTROPHILS NFR BLD: 80 %
NEUTROPHILS NFR BLD: 81 %
NEUTROPHILS NFR BLD: 83 %
NEUTROPHILS NFR BLD: 85 %
NEUTROPHILS NFR BLD: 92 %
NITRITE UR QL STRIP: NEGATIVE
NITRITE UR QL STRIP: NEGATIVE
NRBC BLD MANUAL-RTO: 0 /100 WBC
NT-PROBNP SERPL-MCNC: 5423 PG/ML
PH UR STRIP: 5 [PH] (ref 5–7)
PH UR STRIP: 5.5 [PH] (ref 5–7)
PHOSPHATE SERPL-MCNC: 1.1 MG/DL (ref 2.4–4.7)
PHOSPHATE SERPL-MCNC: 1.5 MG/DL (ref 2.4–4.7)
PHOSPHATE SERPL-MCNC: 1.5 MG/DL (ref 2.4–4.7)
PHOSPHATE SERPL-MCNC: 1.7 MG/DL (ref 2.4–4.7)
PHOSPHATE SERPL-MCNC: 2 MG/DL (ref 2.4–4.7)
PHOSPHATE SERPL-MCNC: 2.5 MG/DL (ref 2.4–4.7)
PHOSPHATE SERPL-MCNC: 3.3 MG/DL (ref 2.4–4.7)
PLATELET # BLD AUTO: 128 K/MCL (ref 140–450)
PLATELET # BLD AUTO: 132 K/MCL (ref 140–450)
PLATELET # BLD AUTO: 135 K/MCL (ref 140–450)
PLATELET # BLD AUTO: 139 K/MCL (ref 140–450)
PLATELET # BLD AUTO: 141 K/MCL (ref 140–450)
PLATELET # BLD AUTO: 142 K/MCL (ref 140–450)
PLATELET # BLD AUTO: 143 K/MCL (ref 140–450)
PLATELET # BLD AUTO: 147 K/MCL (ref 140–450)
PLATELET # BLD AUTO: 156 K/MCL (ref 140–450)
PLATELET # BLD AUTO: 164 K/MCL (ref 140–450)
PLATELET # BLD AUTO: 172 K/MCL (ref 140–450)
PLATELET # BLD AUTO: 176 K/MCL (ref 140–450)
PLATELET # BLD AUTO: 188 K/MCL (ref 140–450)
PLATELET # BLD AUTO: 210 K/MCL (ref 140–450)
POTASSIUM SERPL-SCNC: 3.5 MMOL/L (ref 3.4–5.1)
POTASSIUM SERPL-SCNC: 3.6 MMOL/L (ref 3.4–5.1)
POTASSIUM SERPL-SCNC: 3.6 MMOL/L (ref 3.4–5.1)
POTASSIUM SERPL-SCNC: 3.9 MMOL/L (ref 3.4–5.1)
POTASSIUM SERPL-SCNC: 4.1 MMOL/L (ref 3.4–5.1)
POTASSIUM SERPL-SCNC: 4.3 MMOL/L (ref 3.4–5.1)
POTASSIUM SERPL-SCNC: 4.3 MMOL/L (ref 3.4–5.1)
POTASSIUM SERPL-SCNC: 4.4 MMOL/L (ref 3.4–5.1)
POTASSIUM SERPL-SCNC: 4.5 MMOL/L (ref 3.4–5.1)
POTASSIUM SERPL-SCNC: 4.8 MMOL/L (ref 3.4–5.1)
POTASSIUM SERPL-SCNC: 4.8 MMOL/L (ref 3.4–5.1)
POTASSIUM SERPL-SCNC: 5 MMOL/L (ref 3.4–5.1)
PROCALCITONIN SERPL IA-MCNC: <0.05 NG/ML
PRODUCT CODE: NORMAL
PRODUCT DESCRIPTION: NORMAL
PRODUCT ID: NORMAL
PROT SERPL-MCNC: 4.9 G/DL (ref 6.4–8.2)
PROT SERPL-MCNC: 5.1 G/DL (ref 6.4–8.2)
PROT SERPL-MCNC: 5.9 G/DL (ref 6.4–8.2)
PROT SERPL-MCNC: 6.1 G/DL (ref 6.4–8.2)
PROT UR STRIP-MCNC: ABNORMAL MG/DL
PROT UR STRIP-MCNC: NEGATIVE MG/DL
PROTHROMBIN TIME: 10 SEC (ref 9.7–11.8)
PROTHROMBIN TIME: 13.8 SEC (ref 9.7–11.8)
PROTHROMBIN TIME: 24.3 SEC (ref 9.7–11.8)
PROTHROMBIN TIME: 25.1 SEC (ref 9.7–11.8)
PROTHROMBIN TIME: 29.4 SEC (ref 9.7–11.8)
QRS-INTERVAL (MSEC): 164
QRS-INTERVAL (MSEC): 164
QRS-INTERVAL (MSEC): 166
QRS-INTERVAL (MSEC): 168
QT-INTERVAL (MSEC): 404
QT-INTERVAL (MSEC): 420
QT-INTERVAL (MSEC): 422
QT-INTERVAL (MSEC): 476
QTC: 479
QTC: 484
QTC: 508
QTC: 511
R AXIS (DEGREES): -36
R AXIS (DEGREES): -43
R AXIS (DEGREES): -47
R AXIS (DEGREES): 55
RAINBOW EXTRA TUBES HOLD SPECIMEN: NORMAL
RBC # BLD: 2.21 MIL/MCL (ref 4–5.2)
RBC # BLD: 2.26 MIL/MCL (ref 4–5.2)
RBC # BLD: 2.27 MIL/MCL (ref 4–5.2)
RBC # BLD: 2.3 MIL/MCL (ref 4–5.2)
RBC # BLD: 2.35 MIL/MCL (ref 4–5.2)
RBC # BLD: 2.36 MIL/MCL (ref 4–5.2)
RBC # BLD: 2.5 MIL/MCL (ref 4–5.2)
RBC # BLD: 2.73 MIL/MCL (ref 4–5.2)
RBC # BLD: 2.77 MIL/MCL (ref 4–5.2)
RBC # BLD: 2.87 MIL/MCL (ref 4–5.2)
RBC # BLD: 2.97 MIL/MCL (ref 4–5.2)
RBC # BLD: 3.03 MIL/MCL (ref 4–5.2)
RBC # BLD: 3.4 MIL/MCL (ref 4–5.2)
RBC # BLD: 3.55 MIL/MCL (ref 4–5.2)
REPORT TEXT: NORMAL
RV END SYSTOLIC LONGITUDINAL STRAIN FREE WALL (RVGS): 2.1
SODIUM SERPL-SCNC: 137 MMOL/L (ref 135–145)
SODIUM SERPL-SCNC: 138 MMOL/L (ref 135–145)
SODIUM SERPL-SCNC: 139 MMOL/L (ref 135–145)
SODIUM SERPL-SCNC: 141 MMOL/L (ref 135–145)
SODIUM SERPL-SCNC: 142 MMOL/L (ref 135–145)
SODIUM SERPL-SCNC: 142 MMOL/L (ref 135–145)
SODIUM SERPL-SCNC: 144 MMOL/L (ref 135–145)
SP GR UR STRIP: 1.02 (ref 1–1.03)
SP GR UR STRIP: 1.02 (ref 1–1.03)
T AXIS (DEGREES): -140
T AXIS (DEGREES): 132
T AXIS (DEGREES): 147
T AXIS (DEGREES): 170
TRICUSPID VALVE PEAK REGURGITATION VELOCITY (TRPV): 3.3
TRIGL SERPL-MCNC: 95 MG/DL
TROPONIN I SERPL DL<=0.01 NG/ML-MCNC: 11 NG/L
TROPONIN I SERPL DL<=0.01 NG/ML-MCNC: 14 NG/L
TROPONIN I SERPL DL<=0.01 NG/ML-MCNC: 8 NG/L
TROPONIN I SERPL DL<=0.01 NG/ML-MCNC: 9 NG/L
TV ESTIMATED RIGHT ARTERIAL PRESSURE (RAP): 12
TYPE AND SCREEN EXPIRATION DATE: NORMAL
UNIT BLOOD TYPE: NORMAL
UNIT NUMBER: NORMAL
UROBILINOGEN UR STRIP-MCNC: 0.2 MG/DL
UROBILINOGEN UR STRIP-MCNC: 0.2 MG/DL
VENTRICULAR RATE EKG/MIN (BPM): 61
VENTRICULAR RATE EKG/MIN (BPM): 80
VENTRICULAR RATE EKG/MIN (BPM): 88
VENTRICULAR RATE EKG/MIN (BPM): 95
WBC # BLD: 10.2 K/MCL (ref 4.2–11)
WBC # BLD: 11.7 K/MCL (ref 4.2–11)
WBC # BLD: 5.5 K/MCL (ref 4.2–11)
WBC # BLD: 6.7 K/MCL (ref 4.2–11)
WBC # BLD: 6.7 K/MCL (ref 4.2–11)
WBC # BLD: 7.2 K/MCL (ref 4.2–11)
WBC # BLD: 7.5 K/MCL (ref 4.2–11)
WBC # BLD: 7.6 K/MCL (ref 4.2–11)
WBC # BLD: 7.8 K/MCL (ref 4.2–11)
WBC # BLD: 8 K/MCL (ref 4.2–11)
WBC # BLD: 8.8 K/MCL (ref 4.2–11)
WBC # BLD: 9.2 K/MCL (ref 4.2–11)
WBC # BLD: 9.3 K/MCL (ref 4.2–11)
WBC # BLD: 9.5 K/MCL (ref 4.2–11)

## 2023-01-01 PROCEDURE — 22590 ARTHRD PST TQ CRANIOCERVICAL: CPT | Performed by: NEUROLOGICAL SURGERY

## 2023-01-01 PROCEDURE — G0378 HOSPITAL OBSERVATION PER HR: HCPCS

## 2023-01-01 PROCEDURE — 72141 MRI NECK SPINE W/O DYE: CPT

## 2023-01-01 PROCEDURE — 10002807 HB RX 258: Performed by: EMERGENCY MEDICINE

## 2023-01-01 PROCEDURE — 10002803 HB RX 637: Performed by: INTERNAL MEDICINE

## 2023-01-01 PROCEDURE — 84100 ASSAY OF PHOSPHORUS: CPT

## 2023-01-01 PROCEDURE — 97530 THERAPEUTIC ACTIVITIES: CPT

## 2023-01-01 PROCEDURE — 10002807 HB RX 258

## 2023-01-01 PROCEDURE — G1004 CDSM NDSC: HCPCS

## 2023-01-01 PROCEDURE — 84100 ASSAY OF PHOSPHORUS: CPT | Performed by: INTERNAL MEDICINE

## 2023-01-01 PROCEDURE — 80048 BASIC METABOLIC PNL TOTAL CA: CPT | Performed by: PHYSICIAN ASSISTANT

## 2023-01-01 PROCEDURE — 83735 ASSAY OF MAGNESIUM: CPT | Performed by: FAMILY MEDICINE

## 2023-01-01 PROCEDURE — 84295 ASSAY OF SERUM SODIUM: CPT | Performed by: FAMILY MEDICINE

## 2023-01-01 PROCEDURE — 36415 COLL VENOUS BLD VENIPUNCTURE: CPT | Performed by: NURSE PRACTITIONER

## 2023-01-01 PROCEDURE — 10004651 HB RX, NO CHARGE ITEM: Performed by: FAMILY MEDICINE

## 2023-01-01 PROCEDURE — 85025 COMPLETE CBC W/AUTO DIFF WBC: CPT | Performed by: PHYSICIAN ASSISTANT

## 2023-01-01 PROCEDURE — 10002807 HB RX 258: Performed by: ANESTHESIOLOGY

## 2023-01-01 PROCEDURE — 73552 X-RAY EXAM OF FEMUR 2/>: CPT

## 2023-01-01 PROCEDURE — 10002803 HB RX 637: Performed by: NURSE PRACTITIONER

## 2023-01-01 PROCEDURE — 72131 CT LUMBAR SPINE W/O DYE: CPT

## 2023-01-01 PROCEDURE — 10002801 HB RX 250 W/O HCPCS: Performed by: FAMILY MEDICINE

## 2023-01-01 PROCEDURE — 93005 ELECTROCARDIOGRAM TRACING: CPT | Performed by: EMERGENCY MEDICINE

## 2023-01-01 PROCEDURE — 97110 THERAPEUTIC EXERCISES: CPT

## 2023-01-01 PROCEDURE — P9073 PLATELETS PHERESIS PATH REDU: HCPCS

## 2023-01-01 PROCEDURE — 10002800 HB RX 250 W HCPCS: Performed by: NURSE PRACTITIONER

## 2023-01-01 PROCEDURE — 10002800 HB RX 250 W HCPCS: Performed by: PHYSICIAN ASSISTANT

## 2023-01-01 PROCEDURE — 71045 X-RAY EXAM CHEST 1 VIEW: CPT

## 2023-01-01 PROCEDURE — 96372 THER/PROPH/DIAG INJ SC/IM: CPT | Performed by: NURSE PRACTITIONER

## 2023-01-01 PROCEDURE — 22842 INSERT SPINE FIXATION DEVICE: CPT | Performed by: NEUROLOGICAL SURGERY

## 2023-01-01 PROCEDURE — 10004651 HB RX, NO CHARGE ITEM: Performed by: NURSE PRACTITIONER

## 2023-01-01 PROCEDURE — 36415 COLL VENOUS BLD VENIPUNCTURE: CPT

## 2023-01-01 PROCEDURE — 83880 ASSAY OF NATRIURETIC PEPTIDE: CPT | Performed by: EMERGENCY MEDICINE

## 2023-01-01 PROCEDURE — 97161 PT EVAL LOW COMPLEX 20 MIN: CPT

## 2023-01-01 PROCEDURE — 99291 CRITICAL CARE FIRST HOUR: CPT

## 2023-01-01 PROCEDURE — 10002800 HB RX 250 W HCPCS: Performed by: FAMILY MEDICINE

## 2023-01-01 PROCEDURE — 86901 BLOOD TYPING SEROLOGIC RH(D): CPT | Performed by: STUDENT IN AN ORGANIZED HEALTH CARE EDUCATION/TRAINING PROGRAM

## 2023-01-01 PROCEDURE — 10002803 HB RX 637: Performed by: FAMILY MEDICINE

## 2023-01-01 PROCEDURE — 97535 SELF CARE MNGMENT TRAINING: CPT

## 2023-01-01 PROCEDURE — 99024 POSTOP FOLLOW-UP VISIT: CPT | Performed by: NURSE PRACTITIONER

## 2023-01-01 PROCEDURE — 99233 SBSQ HOSP IP/OBS HIGH 50: CPT | Performed by: PHYSICIAN ASSISTANT

## 2023-01-01 PROCEDURE — 85025 COMPLETE CBC W/AUTO DIFF WBC: CPT | Performed by: NURSE PRACTITIONER

## 2023-01-01 PROCEDURE — 80048 BASIC METABOLIC PNL TOTAL CA: CPT

## 2023-01-01 PROCEDURE — 83735 ASSAY OF MAGNESIUM: CPT

## 2023-01-01 PROCEDURE — 85730 THROMBOPLASTIN TIME PARTIAL: CPT | Performed by: STUDENT IN AN ORGANIZED HEALTH CARE EDUCATION/TRAINING PROGRAM

## 2023-01-01 PROCEDURE — 73502 X-RAY EXAM HIP UNI 2-3 VIEWS: CPT

## 2023-01-01 PROCEDURE — 99231 SBSQ HOSP IP/OBS SF/LOW 25: CPT | Performed by: PHYSICIAN ASSISTANT

## 2023-01-01 PROCEDURE — P9612 CATHETERIZE FOR URINE SPEC: HCPCS

## 2023-01-01 PROCEDURE — 13000001 HB PHASE II RECOVERY EA 30 MINUTES

## 2023-01-01 PROCEDURE — 85610 PROTHROMBIN TIME: CPT | Performed by: STUDENT IN AN ORGANIZED HEALTH CARE EDUCATION/TRAINING PROGRAM

## 2023-01-01 PROCEDURE — 10002807 HB RX 258: Performed by: NURSE PRACTITIONER

## 2023-01-01 PROCEDURE — 10002800 HB RX 250 W HCPCS: Performed by: EMERGENCY MEDICINE

## 2023-01-01 PROCEDURE — 10004452 HB PACU ADDL 30 MINUTES: Performed by: NEUROLOGICAL SURGERY

## 2023-01-01 PROCEDURE — 72148 MRI LUMBAR SPINE W/O DYE: CPT

## 2023-01-01 PROCEDURE — 72125 CT NECK SPINE W/O DYE: CPT

## 2023-01-01 PROCEDURE — 92611 MOTION FLUOROSCOPY/SWALLOW: CPT

## 2023-01-01 PROCEDURE — 36415 COLL VENOUS BLD VENIPUNCTURE: CPT | Performed by: FAMILY MEDICINE

## 2023-01-01 PROCEDURE — 84100 ASSAY OF PHOSPHORUS: CPT | Performed by: FAMILY MEDICINE

## 2023-01-01 PROCEDURE — 73030 X-RAY EXAM OF SHOULDER: CPT

## 2023-01-01 PROCEDURE — 93306 TTE W/DOPPLER COMPLETE: CPT

## 2023-01-01 PROCEDURE — 85610 PROTHROMBIN TIME: CPT | Performed by: EMERGENCY MEDICINE

## 2023-01-01 PROCEDURE — 96365 THER/PROPH/DIAG IV INF INIT: CPT

## 2023-01-01 PROCEDURE — 10002803 HB RX 637: Performed by: ANESTHESIOLOGY

## 2023-01-01 PROCEDURE — 10006031 HB ROOM CHARGE TELEMETRY

## 2023-01-01 PROCEDURE — 80053 COMPREHEN METABOLIC PANEL: CPT | Performed by: STUDENT IN AN ORGANIZED HEALTH CARE EDUCATION/TRAINING PROGRAM

## 2023-01-01 PROCEDURE — 99233 SBSQ HOSP IP/OBS HIGH 50: CPT | Performed by: NURSE PRACTITIONER

## 2023-01-01 PROCEDURE — 22600 ARTHRD PST TQ 1NTRSPC CRV: CPT | Performed by: NEUROLOGICAL SURGERY

## 2023-01-01 PROCEDURE — 10004180 HB COUNTER-TRANSPORT

## 2023-01-01 PROCEDURE — 93306 TTE W/DOPPLER COMPLETE: CPT | Performed by: INTERNAL MEDICINE

## 2023-01-01 PROCEDURE — 70450 CT HEAD/BRAIN W/O DYE: CPT

## 2023-01-01 PROCEDURE — 1157F ADVNC CARE PLAN IN RCRD: CPT | Performed by: NURSE PRACTITIONER

## 2023-01-01 PROCEDURE — 3E0R3BZ INTRODUCTION OF ANESTHETIC AGENT INTO SPINAL CANAL, PERCUTANEOUS APPROACH: ICD-10-PCS | Performed by: PAIN MEDICINE

## 2023-01-01 PROCEDURE — 10002803 HB RX 637

## 2023-01-01 PROCEDURE — 85025 COMPLETE CBC W/AUTO DIFF WBC: CPT | Performed by: INTERNAL MEDICINE

## 2023-01-01 PROCEDURE — 80053 COMPREHEN METABOLIC PANEL: CPT | Performed by: FAMILY MEDICINE

## 2023-01-01 PROCEDURE — 22614 ARTHRD PST TQ 1NTRSPC EA ADD: CPT | Performed by: NEUROLOGICAL SURGERY

## 2023-01-01 PROCEDURE — 93005 ELECTROCARDIOGRAM TRACING: CPT | Performed by: FAMILY MEDICINE

## 2023-01-01 PROCEDURE — 10006023 HB SUPPLY 272: Performed by: NEUROLOGICAL SURGERY

## 2023-01-01 PROCEDURE — 13003289 HB OXYGEN THERAPY DAILY

## 2023-01-01 PROCEDURE — 10002800 HB RX 250 W HCPCS: Performed by: PAIN MEDICINE

## 2023-01-01 PROCEDURE — 84484 ASSAY OF TROPONIN QUANT: CPT | Performed by: STUDENT IN AN ORGANIZED HEALTH CARE EDUCATION/TRAINING PROGRAM

## 2023-01-01 PROCEDURE — 99497 ADVNCD CARE PLAN 30 MIN: CPT | Performed by: NURSE PRACTITIONER

## 2023-01-01 PROCEDURE — 99285 EMERGENCY DEPT VISIT HI MDM: CPT

## 2023-01-01 PROCEDURE — 83735 ASSAY OF MAGNESIUM: CPT | Performed by: INTERNAL MEDICINE

## 2023-01-01 PROCEDURE — 99350 HOME/RES VST EST HIGH MDM 60: CPT | Performed by: NURSE PRACTITIONER

## 2023-01-01 PROCEDURE — 84484 ASSAY OF TROPONIN QUANT: CPT | Performed by: EMERGENCY MEDICINE

## 2023-01-01 PROCEDURE — 83735 ASSAY OF MAGNESIUM: CPT | Performed by: EMERGENCY MEDICINE

## 2023-01-01 PROCEDURE — 13000113 HB NEURO MAJOR COMPLEX CASE EA ADD MINUTE: Performed by: NEUROLOGICAL SURGERY

## 2023-01-01 PROCEDURE — 92610 EVALUATE SWALLOWING FUNCTION: CPT

## 2023-01-01 PROCEDURE — 61343 CRNEC SOPL CRV LAM DCMPRN: CPT | Performed by: NEUROLOGICAL SURGERY

## 2023-01-01 PROCEDURE — C1713 ANCHOR/SCREW BN/BN,TIS/BN: HCPCS | Performed by: NEUROLOGICAL SURGERY

## 2023-01-01 PROCEDURE — 85025 COMPLETE CBC W/AUTO DIFF WBC: CPT | Performed by: STUDENT IN AN ORGANIZED HEALTH CARE EDUCATION/TRAINING PROGRAM

## 2023-01-01 PROCEDURE — 10006027 HB SUPPLY 278: Performed by: NEUROLOGICAL SURGERY

## 2023-01-01 PROCEDURE — 10002800 HB RX 250 W HCPCS: Performed by: NEUROLOGICAL SURGERY

## 2023-01-01 PROCEDURE — 84132 ASSAY OF SERUM POTASSIUM: CPT | Performed by: FAMILY MEDICINE

## 2023-01-01 PROCEDURE — 93880 EXTRACRANIAL BILAT STUDY: CPT

## 2023-01-01 PROCEDURE — 10002019 HB COUNTER RESP ASSESSMENT

## 2023-01-01 PROCEDURE — 10002016 HB COUNTER INCENTIVE SPIROMETRY

## 2023-01-01 PROCEDURE — 13000003 HB ANESTHESIA  GENERAL EA ADD MINUTE: Performed by: NEUROLOGICAL SURGERY

## 2023-01-01 PROCEDURE — 10002805 HB CONTRAST AGENT: Performed by: STUDENT IN AN ORGANIZED HEALTH CARE EDUCATION/TRAINING PROGRAM

## 2023-01-01 PROCEDURE — 80048 BASIC METABOLIC PNL TOTAL CA: CPT | Performed by: EMERGENCY MEDICINE

## 2023-01-01 PROCEDURE — 99233 SBSQ HOSP IP/OBS HIGH 50: CPT | Performed by: INTERNAL MEDICINE

## 2023-01-01 PROCEDURE — 10002801 HB RX 250 W/O HCPCS: Performed by: NEUROLOGICAL SURGERY

## 2023-01-01 PROCEDURE — 1125F AMNT PAIN NOTED PAIN PRSNT: CPT | Performed by: NURSE PRACTITIONER

## 2023-01-01 PROCEDURE — 10005281 FL INTRAOPERATIVE C ARM WITH REPORT

## 2023-01-01 PROCEDURE — 85610 PROTHROMBIN TIME: CPT | Performed by: NURSE PRACTITIONER

## 2023-01-01 PROCEDURE — 0RG2071 FUSION OF 2 OR MORE CERVICAL VERTEBRAL JOINTS WITH AUTOLOGOUS TISSUE SUBSTITUTE, POSTERIOR APPROACH, POSTERIOR COLUMN, OPEN APPROACH: ICD-10-PCS | Performed by: NEUROLOGICAL SURGERY

## 2023-01-01 PROCEDURE — 85025 COMPLETE CBC W/AUTO DIFF WBC: CPT | Performed by: FAMILY MEDICINE

## 2023-01-01 PROCEDURE — 10002801 HB RX 250 W/O HCPCS: Performed by: PAIN MEDICINE

## 2023-01-01 PROCEDURE — 13000067 HB PAIN MANAGEMENT GROUP 2

## 2023-01-01 PROCEDURE — 10000008 HB ROOM CHARGE ICU OR CCU

## 2023-01-01 PROCEDURE — 3E0G76Z INTRODUCTION OF NUTRITIONAL SUBSTANCE INTO UPPER GI, VIA NATURAL OR ARTIFICIAL OPENING: ICD-10-PCS | Performed by: INTERNAL MEDICINE

## 2023-01-01 PROCEDURE — 10002801 HB RX 250 W/O HCPCS

## 2023-01-01 PROCEDURE — 70498 CT ANGIOGRAPHY NECK: CPT

## 2023-01-01 PROCEDURE — 96372 THER/PROPH/DIAG INJ SC/IM: CPT | Performed by: FAMILY MEDICINE

## 2023-01-01 PROCEDURE — 99221 1ST HOSP IP/OBS SF/LOW 40: CPT | Performed by: ORTHOPAEDIC SURGERY

## 2023-01-01 PROCEDURE — 3E0R33Z INTRODUCTION OF ANTI-INFLAMMATORY INTO SPINAL CANAL, PERCUTANEOUS APPROACH: ICD-10-PCS | Performed by: PAIN MEDICINE

## 2023-01-01 PROCEDURE — 10002801 HB RX 250 W/O HCPCS: Performed by: EMERGENCY MEDICINE

## 2023-01-01 PROCEDURE — 00NW0ZZ RELEASE CERVICAL SPINAL CORD, OPEN APPROACH: ICD-10-PCS | Performed by: NEUROLOGICAL SURGERY

## 2023-01-01 PROCEDURE — X1094 NO CHARGE VISIT: HCPCS | Performed by: SPECIALIST

## 2023-01-01 PROCEDURE — 10002807 HB RX 258: Performed by: NEUROLOGICAL SURGERY

## 2023-01-01 PROCEDURE — 10002807 HB RX 258: Performed by: PHYSICIAN ASSISTANT

## 2023-01-01 PROCEDURE — 13001086 HB INCENTIVE SPIROMETER W INSTRUCT

## 2023-01-01 PROCEDURE — 85610 PROTHROMBIN TIME: CPT | Performed by: PHYSICIAN ASSISTANT

## 2023-01-01 PROCEDURE — 1170F FXNL STATUS ASSESSED: CPT | Performed by: NURSE PRACTITIONER

## 2023-01-01 PROCEDURE — 13000002 HB ANESTHESIA  GENERAL  S/U + 1ST 15 MIN: Performed by: NEUROLOGICAL SURGERY

## 2023-01-01 PROCEDURE — 10002805 HB CONTRAST AGENT: Performed by: PAIN MEDICINE

## 2023-01-01 PROCEDURE — 10002807 HB RX 258: Performed by: FAMILY MEDICINE

## 2023-01-01 PROCEDURE — 99222 1ST HOSP IP/OBS MODERATE 55: CPT | Performed by: INTERNAL MEDICINE

## 2023-01-01 PROCEDURE — 10005281 FL INTRAOPERATIVE O ARM WITH REPORT

## 2023-01-01 PROCEDURE — 10000002 HB ROOM CHARGE MED SURG

## 2023-01-01 PROCEDURE — 74230 X-RAY XM SWLNG FUNCJ C+: CPT

## 2023-01-01 PROCEDURE — 1160F RVW MEDS BY RX/DR IN RCRD: CPT | Performed by: NURSE PRACTITIONER

## 2023-01-01 PROCEDURE — 73562 X-RAY EXAM OF KNEE 3: CPT

## 2023-01-01 PROCEDURE — 83690 ASSAY OF LIPASE: CPT | Performed by: EMERGENCY MEDICINE

## 2023-01-01 PROCEDURE — 84478 ASSAY OF TRIGLYCERIDES: CPT

## 2023-01-01 PROCEDURE — 81003 URINALYSIS AUTO W/O SCOPE: CPT | Performed by: EMERGENCY MEDICINE

## 2023-01-01 PROCEDURE — 84145 PROCALCITONIN (PCT): CPT | Performed by: EMERGENCY MEDICINE

## 2023-01-01 PROCEDURE — X1094 NO CHARGE VISIT: HCPCS | Performed by: NURSE PRACTITIONER

## 2023-01-01 PROCEDURE — 85576 BLOOD PLATELET AGGREGATION: CPT | Performed by: PHYSICIAN ASSISTANT

## 2023-01-01 PROCEDURE — 36415 COLL VENOUS BLD VENIPUNCTURE: CPT | Performed by: INTERNAL MEDICINE

## 2023-01-01 PROCEDURE — 92526 ORAL FUNCTION THERAPY: CPT

## 2023-01-01 PROCEDURE — 96372 THER/PROPH/DIAG INJ SC/IM: CPT | Performed by: EMERGENCY MEDICINE

## 2023-01-01 PROCEDURE — 83735 ASSAY OF MAGNESIUM: CPT | Performed by: STUDENT IN AN ORGANIZED HEALTH CARE EDUCATION/TRAINING PROGRAM

## 2023-01-01 PROCEDURE — 63045 LAM FACETEC & FORAMOT CRV: CPT | Performed by: NEUROLOGICAL SURGERY

## 2023-01-01 PROCEDURE — 99223 1ST HOSP IP/OBS HIGH 75: CPT | Performed by: PHYSICIAN ASSISTANT

## 2023-01-01 PROCEDURE — 13000112 HB NEURO MAJOR COMPLEX CASE S/U + 1ST 15 MIN: Performed by: NEUROLOGICAL SURGERY

## 2023-01-01 PROCEDURE — 99223 1ST HOSP IP/OBS HIGH 75: CPT | Performed by: INTERNAL MEDICINE

## 2023-01-01 PROCEDURE — 10003579 HB TRAUMA W/O CRITICAL CARE

## 2023-01-01 PROCEDURE — 80048 BASIC METABOLIC PNL TOTAL CA: CPT | Performed by: INTERNAL MEDICINE

## 2023-01-01 PROCEDURE — 0RG0071 FUSION OF OCCIPITAL-CERVICAL JOINT WITH AUTOLOGOUS TISSUE SUBSTITUTE, POSTERIOR APPROACH, POSTERIOR COLUMN, OPEN APPROACH: ICD-10-PCS | Performed by: NEUROLOGICAL SURGERY

## 2023-01-01 PROCEDURE — 93010 ELECTROCARDIOGRAM REPORT: CPT | Performed by: SPECIALIST

## 2023-01-01 PROCEDURE — 97162 PT EVAL MOD COMPLEX 30 MIN: CPT

## 2023-01-01 PROCEDURE — 0DH67UZ INSERTION OF FEEDING DEVICE INTO STOMACH, VIA NATURAL OR ARTIFICIAL OPENING: ICD-10-PCS | Performed by: INTERNAL MEDICINE

## 2023-01-01 PROCEDURE — 97166 OT EVAL MOD COMPLEX 45 MIN: CPT

## 2023-01-01 PROCEDURE — 85025 COMPLETE CBC W/AUTO DIFF WBC: CPT | Performed by: EMERGENCY MEDICINE

## 2023-01-01 PROCEDURE — 81003 URINALYSIS AUTO W/O SCOPE: CPT | Performed by: STUDENT IN AN ORGANIZED HEALTH CARE EDUCATION/TRAINING PROGRAM

## 2023-01-01 PROCEDURE — 84484 ASSAY OF TROPONIN QUANT: CPT | Performed by: INTERNAL MEDICINE

## 2023-01-01 PROCEDURE — 10004451 HB PACU RECOVERY 1ST 30 MINUTES: Performed by: NEUROLOGICAL SURGERY

## 2023-01-01 PROCEDURE — 99232 SBSQ HOSP IP/OBS MODERATE 35: CPT | Performed by: INTERNAL MEDICINE

## 2023-01-01 PROCEDURE — 96375 TX/PRO/DX INJ NEW DRUG ADDON: CPT

## 2023-01-01 PROCEDURE — 80076 HEPATIC FUNCTION PANEL: CPT | Performed by: EMERGENCY MEDICINE

## 2023-01-01 PROCEDURE — 10002800 HB RX 250 W HCPCS

## 2023-01-01 PROCEDURE — 72128 CT CHEST SPINE W/O DYE: CPT

## 2023-01-01 DEVICE — IMPLANTABLE DEVICE: Type: IMPLANTABLE DEVICE | Site: SPINE CERVICAL | Status: FUNCTIONAL

## 2023-01-01 DEVICE — GRAFT INFS 2.8ML 23MM SM 14MM RHBMP-2 BOVINE CLGN ABS SPNG: Type: IMPLANTABLE DEVICE | Site: SPINE CERVICAL | Status: FUNCTIONAL

## 2023-01-01 DEVICE — BIO DBM PLUS PUTTY (WITH CANCELLOUS)
Type: IMPLANTABLE DEVICE | Site: SPINE CERVICAL | Status: FUNCTIONAL
Brand: BIO DBM

## 2023-01-01 DEVICE — BLOCKER SPNL OASYS OCT: Type: IMPLANTABLE DEVICE | Site: SPINE CERVICAL | Status: FUNCTIONAL

## 2023-01-01 DEVICE — IMPLANTABLE DEVICE
Type: IMPLANTABLE DEVICE | Site: SPINE CERVICAL | Status: FUNCTIONAL
Brand: SURGIFOAM® ABSORBABLE GELATIN SPONGE, U.S.P.

## 2023-01-01 DEVICE — FLOSEAL HEMOSTATIC MATRIX, 10ML
Type: IMPLANTABLE DEVICE | Site: SPINE CERVICAL | Status: FUNCTIONAL
Brand: FLOSEAL HEMOSTATIC MATRIX

## 2023-01-01 RX ORDER — WARFARIN SODIUM 1 MG/1
1 TABLET ORAL DAILY
COMMUNITY
End: 2023-01-01 | Stop reason: SDUPTHER

## 2023-01-01 RX ORDER — DEXTROSE AND SODIUM CHLORIDE 5; .9 G/100ML; G/100ML
INJECTION, SOLUTION INTRAVENOUS
Status: COMPLETED | OUTPATIENT
Start: 2023-01-01 | End: 2023-01-01

## 2023-01-01 RX ORDER — HYDRALAZINE HYDROCHLORIDE 20 MG/ML
5 INJECTION INTRAMUSCULAR; INTRAVENOUS EVERY 10 MIN PRN
Status: DISCONTINUED | OUTPATIENT
Start: 2023-01-01 | End: 2023-01-01 | Stop reason: HOSPADM

## 2023-01-01 RX ORDER — MAGNESIUM HYDROXIDE/ALUMINUM HYDROXICE/SIMETHICONE 120; 1200; 1200 MG/30ML; MG/30ML; MG/30ML
30 SUSPENSION ORAL EVERY 4 HOURS PRN
Status: DISCONTINUED | OUTPATIENT
Start: 2023-01-01 | End: 2023-01-01 | Stop reason: HOSPADM

## 2023-01-01 RX ORDER — POLYETHYLENE GLYCOL 3350 17 G/17G
17 POWDER, FOR SOLUTION ORAL DAILY PRN
Status: DISCONTINUED | OUTPATIENT
Start: 2023-01-01 | End: 2023-01-01 | Stop reason: HOSPADM

## 2023-01-01 RX ORDER — ASCORBIC ACID 500 MG
1000 TABLET ORAL DAILY
Status: DISCONTINUED | OUTPATIENT
Start: 2023-01-01 | End: 2023-01-01 | Stop reason: HOSPADM

## 2023-01-01 RX ORDER — FAMOTIDINE 20 MG/1
20 TABLET, FILM COATED ORAL
Status: COMPLETED | OUTPATIENT
Start: 2023-01-01 | End: 2023-01-01

## 2023-01-01 RX ORDER — ALBUTEROL SULFATE 2.5 MG/3ML
2.5 SOLUTION RESPIRATORY (INHALATION)
Status: DISCONTINUED | OUTPATIENT
Start: 2023-01-01 | End: 2023-01-01 | Stop reason: HOSPADM

## 2023-01-01 RX ORDER — AMOXICILLIN 250 MG
1 CAPSULE ORAL 2 TIMES DAILY
Status: DISCONTINUED | OUTPATIENT
Start: 2023-01-01 | End: 2023-01-01 | Stop reason: CLARIF

## 2023-01-01 RX ORDER — WARFARIN SODIUM 1 MG/1
2.5 TABLET ORAL EVERY OTHER DAY
Status: ON HOLD | COMMUNITY
End: 2023-01-01 | Stop reason: HOSPADM

## 2023-01-01 RX ORDER — 0.9 % SODIUM CHLORIDE 0.9 %
2 VIAL (ML) INJECTION EVERY 12 HOURS SCHEDULED
Status: DISCONTINUED | OUTPATIENT
Start: 2023-01-01 | End: 2023-01-01 | Stop reason: HOSPADM

## 2023-01-01 RX ORDER — HEPARIN SODIUM 5000 [USP'U]/ML
5000 INJECTION, SOLUTION INTRAVENOUS; SUBCUTANEOUS EVERY 8 HOURS SCHEDULED
Status: DISCONTINUED | OUTPATIENT
Start: 2023-01-01 | End: 2023-01-01

## 2023-01-01 RX ORDER — WARFARIN SODIUM 1 MG/1
1 TABLET ORAL DAILY
Qty: 90 TABLET | Refills: 3 | Status: SHIPPED | OUTPATIENT
Start: 2023-01-01 | End: 2023-01-01 | Stop reason: HOSPADM

## 2023-01-01 RX ORDER — DILTIAZEM HYDROCHLORIDE 120 MG/1
120 CAPSULE, EXTENDED RELEASE ORAL DAILY
Status: DISCONTINUED | OUTPATIENT
Start: 2023-01-01 | End: 2023-01-01 | Stop reason: HOSPADM

## 2023-01-01 RX ORDER — HYDROCODONE BITARTRATE AND ACETAMINOPHEN 5; 325 MG/1; MG/1
1 TABLET ORAL EVERY 4 HOURS PRN
Status: DISCONTINUED | OUTPATIENT
Start: 2023-01-01 | End: 2023-01-01 | Stop reason: HOSPADM

## 2023-01-01 RX ORDER — DIAZEPAM 2 MG/1
2 TABLET ORAL EVERY 8 HOURS PRN
Status: DISCONTINUED | OUTPATIENT
Start: 2023-01-01 | End: 2023-01-01 | Stop reason: HOSPADM

## 2023-01-01 RX ORDER — LIDOCAINE HYDROCHLORIDE 20 MG/ML
INJECTION, SOLUTION INFILTRATION; PERINEURAL PRN
Status: DISCONTINUED | OUTPATIENT
Start: 2023-01-01 | End: 2023-01-01

## 2023-01-01 RX ORDER — ROCURONIUM BROMIDE 10 MG/ML
INJECTION, SOLUTION INTRAVENOUS PRN
Status: DISCONTINUED | OUTPATIENT
Start: 2023-01-01 | End: 2023-01-01

## 2023-01-01 RX ORDER — OXYCODONE HYDROCHLORIDE 5 MG/1
10 TABLET ORAL EVERY 4 HOURS PRN
Status: DISCONTINUED | OUTPATIENT
Start: 2023-01-01 | End: 2023-01-01 | Stop reason: HOSPADM

## 2023-01-01 RX ORDER — OMEPRAZOLE 40 MG/1
40 CAPSULE, DELAYED RELEASE ORAL DAILY PRN
Status: SHIPPED | COMMUNITY
Start: 2023-01-01

## 2023-01-01 RX ORDER — URSODIOL 300 MG/1
300 CAPSULE ORAL EVERY EVENING
COMMUNITY

## 2023-01-01 RX ORDER — CYANOCOBALAMIN 1000 UG/ML
1000 INJECTION, SOLUTION INTRAMUSCULAR; SUBCUTANEOUS ONCE
Status: COMPLETED | OUTPATIENT
Start: 2023-01-01 | End: 2023-01-01

## 2023-01-01 RX ORDER — METOPROLOL TARTRATE 50 MG/1
50 TABLET, FILM COATED ORAL 2 TIMES DAILY
Status: DISCONTINUED | OUTPATIENT
Start: 2023-01-01 | End: 2023-01-01

## 2023-01-01 RX ORDER — TRAMADOL HYDROCHLORIDE 50 MG/1
50 TABLET ORAL 2 TIMES DAILY PRN
Qty: 30 TABLET | Refills: 0 | Status: SHIPPED | OUTPATIENT
Start: 2023-01-01 | End: 2023-09-09

## 2023-01-01 RX ORDER — AMIODARONE HYDROCHLORIDE 100 MG/1
100 TABLET ORAL DAILY
Status: DISCONTINUED | OUTPATIENT
Start: 2023-01-01 | End: 2023-01-01

## 2023-01-01 RX ORDER — ONDANSETRON 2 MG/ML
4 INJECTION INTRAMUSCULAR; INTRAVENOUS 4 TIMES DAILY PRN
Status: DISCONTINUED | OUTPATIENT
Start: 2023-01-01 | End: 2023-01-01 | Stop reason: HOSPADM

## 2023-01-01 RX ORDER — LIDOCAINE HYDROCHLORIDE 10 MG/ML
INJECTION, SOLUTION INFILTRATION; PERINEURAL PRN
Status: COMPLETED | OUTPATIENT
Start: 2023-01-01 | End: 2023-01-01

## 2023-01-01 RX ORDER — DEXAMETHASONE SODIUM PHOSPHATE 4 MG/ML
INJECTION, SOLUTION INTRA-ARTICULAR; INTRALESIONAL; INTRAMUSCULAR; INTRAVENOUS; SOFT TISSUE PRN
Status: DISCONTINUED | OUTPATIENT
Start: 2023-01-01 | End: 2023-01-01

## 2023-01-01 RX ORDER — ACETAMINOPHEN 500 MG
1000 TABLET ORAL EVERY 8 HOURS PRN
Status: DISCONTINUED | OUTPATIENT
Start: 2023-01-01 | End: 2023-01-01 | Stop reason: HOSPADM

## 2023-01-01 RX ORDER — ONDANSETRON 2 MG/ML
INJECTION INTRAMUSCULAR; INTRAVENOUS PRN
Status: DISCONTINUED | OUTPATIENT
Start: 2023-01-01 | End: 2023-01-01

## 2023-01-01 RX ORDER — ACETAMINOPHEN 325 MG/1
650 TABLET ORAL EVERY 4 HOURS PRN
Status: DISCONTINUED | OUTPATIENT
Start: 2023-01-01 | End: 2023-01-01 | Stop reason: HOSPADM

## 2023-01-01 RX ORDER — SODIUM CHLORIDE 9 MG/ML
INJECTION, SOLUTION INTRAVENOUS CONTINUOUS PRN
Status: DISCONTINUED | OUTPATIENT
Start: 2023-01-01 | End: 2023-01-01

## 2023-01-01 RX ORDER — SODIUM CHLORIDE, SODIUM LACTATE, POTASSIUM CHLORIDE, CALCIUM CHLORIDE 600; 310; 30; 20 MG/100ML; MG/100ML; MG/100ML; MG/100ML
INJECTION, SOLUTION INTRAVENOUS CONTINUOUS
Status: DISCONTINUED | OUTPATIENT
Start: 2023-01-01 | End: 2023-01-01

## 2023-01-01 RX ORDER — SODIUM BICARBONATE 650 MG/1
650 TABLET ORAL PRN
Status: DISCONTINUED | OUTPATIENT
Start: 2023-01-01 | End: 2023-01-01 | Stop reason: HOSPADM

## 2023-01-01 RX ORDER — ACETAMINOPHEN 500 MG
1000 TABLET ORAL 2 TIMES DAILY
COMMUNITY

## 2023-01-01 RX ORDER — ONDANSETRON 2 MG/ML
4 INJECTION INTRAMUSCULAR; INTRAVENOUS 2 TIMES DAILY PRN
Status: DISCONTINUED | OUTPATIENT
Start: 2023-01-01 | End: 2023-01-01 | Stop reason: HOSPADM

## 2023-01-01 RX ORDER — TRIAMCINOLONE ACETONIDE 40 MG/ML
INJECTION, SUSPENSION INTRA-ARTICULAR; INTRAMUSCULAR PRN
Status: COMPLETED | OUTPATIENT
Start: 2023-01-01 | End: 2023-01-01

## 2023-01-01 RX ORDER — SODIUM CHLORIDE, SODIUM LACTATE, POTASSIUM CHLORIDE, CALCIUM CHLORIDE 600; 310; 30; 20 MG/100ML; MG/100ML; MG/100ML; MG/100ML
INJECTION, SOLUTION INTRAVENOUS CONTINUOUS
Status: DISCONTINUED | OUTPATIENT
Start: 2023-01-01 | End: 2023-01-01 | Stop reason: HOSPADM

## 2023-01-01 RX ORDER — ACETAMINOPHEN 500 MG
1000 TABLET ORAL
Status: DISCONTINUED | OUTPATIENT
Start: 2023-01-01 | End: 2023-01-01 | Stop reason: HOSPADM

## 2023-01-01 RX ORDER — DILTIAZEM HYDROCHLORIDE EXTENDED-RELEASE TABLETS 120 MG/1
120 TABLET, EXTENDED RELEASE ORAL DAILY
COMMUNITY

## 2023-01-01 RX ORDER — LIDOCAINE HYDROCHLORIDE 10 MG/ML
5-10 INJECTION, SOLUTION EPIDURAL; INFILTRATION; INTRACAUDAL; PERINEURAL PRN
Status: DISCONTINUED | OUTPATIENT
Start: 2023-01-01 | End: 2023-01-01 | Stop reason: HOSPADM

## 2023-01-01 RX ORDER — TAMSULOSIN HYDROCHLORIDE 0.4 MG/1
0.4 CAPSULE ORAL
Qty: 30 CAPSULE | Refills: 0 | Status: SHIPPED | OUTPATIENT
Start: 2023-01-01 | End: 2023-09-09

## 2023-01-01 RX ORDER — POLYETHYLENE GLYCOL 3350 17 G/17G
17 POWDER, FOR SOLUTION ORAL DAILY PRN
Qty: 30 EACH | Refills: 0 | Status: SHIPPED | OUTPATIENT
Start: 2023-01-01 | End: 2023-09-09

## 2023-01-01 RX ORDER — POLYETHYLENE GLYCOL 3350 17 G/17G
17 POWDER, FOR SOLUTION ORAL DAILY
Status: DISCONTINUED | OUTPATIENT
Start: 2023-01-01 | End: 2023-01-01 | Stop reason: CLARIF

## 2023-01-01 RX ORDER — HEPARIN SODIUM 5000 [USP'U]/ML
5000 INJECTION, SOLUTION INTRAVENOUS; SUBCUTANEOUS EVERY 8 HOURS SCHEDULED
Status: DISCONTINUED | OUTPATIENT
Start: 2023-01-01 | End: 2023-01-01 | Stop reason: HOSPADM

## 2023-01-01 RX ORDER — TAMSULOSIN HYDROCHLORIDE 0.4 MG/1
0.4 CAPSULE ORAL
Status: DISCONTINUED | OUTPATIENT
Start: 2023-01-01 | End: 2023-01-01 | Stop reason: HOSPADM

## 2023-01-01 RX ORDER — POTASSIUM CHLORIDE 20 MEQ/1
40 TABLET, EXTENDED RELEASE ORAL ONCE
Status: DISCONTINUED | OUTPATIENT
Start: 2023-01-01 | End: 2023-01-01

## 2023-01-01 RX ORDER — NALOXONE HCL 0.4 MG/ML
0.2 VIAL (ML) INJECTION EVERY 5 MIN PRN
Status: DISCONTINUED | OUTPATIENT
Start: 2023-01-01 | End: 2023-01-01 | Stop reason: HOSPADM

## 2023-01-01 RX ORDER — BUPIVACAINE HYDROCHLORIDE AND EPINEPHRINE 2.5; 5 MG/ML; UG/ML
INJECTION, SOLUTION EPIDURAL; INFILTRATION; INTRACAUDAL; PERINEURAL PRN
Status: DISCONTINUED | OUTPATIENT
Start: 2023-01-01 | End: 2023-01-01 | Stop reason: HOSPADM

## 2023-01-01 RX ORDER — AMIODARONE HYDROCHLORIDE 200 MG/1
100 TABLET ORAL DAILY
Status: DISCONTINUED | OUTPATIENT
Start: 2023-01-01 | End: 2023-01-01 | Stop reason: HOSPADM

## 2023-01-01 RX ORDER — OMEPRAZOLE 40 MG/1
40 CAPSULE, DELAYED RELEASE ORAL DAILY
Qty: 90 CAPSULE | Refills: 3 | Status: ON HOLD | OUTPATIENT
Start: 2023-01-01 | End: 2023-01-01 | Stop reason: SDUPTHER

## 2023-01-01 RX ORDER — PROPOFOL 10 MG/ML
INJECTION, EMULSION INTRAVENOUS PRN
Status: DISCONTINUED | OUTPATIENT
Start: 2023-01-01 | End: 2023-01-01

## 2023-01-01 RX ORDER — BUPIVACAINE HYDROCHLORIDE 2.5 MG/ML
INJECTION, SOLUTION EPIDURAL; INFILTRATION; INTRACAUDAL PRN
Status: COMPLETED | OUTPATIENT
Start: 2023-01-01 | End: 2023-01-01

## 2023-01-01 RX ORDER — FOLIC ACID 5 MG/ML
1 INJECTION, SOLUTION INTRAMUSCULAR; INTRAVENOUS; SUBCUTANEOUS ONCE
Status: COMPLETED | OUTPATIENT
Start: 2023-01-01 | End: 2023-01-01

## 2023-01-01 RX ORDER — METOCLOPRAMIDE HYDROCHLORIDE 5 MG/ML
5 INJECTION INTRAMUSCULAR; INTRAVENOUS EVERY 6 HOURS PRN
Status: DISCONTINUED | OUTPATIENT
Start: 2023-01-01 | End: 2023-01-01 | Stop reason: HOSPADM

## 2023-01-01 RX ORDER — AMOXICILLIN 250 MG
2 CAPSULE ORAL DAILY PRN
Status: DISCONTINUED | OUTPATIENT
Start: 2023-01-01 | End: 2023-01-01 | Stop reason: HOSPADM

## 2023-01-01 RX ORDER — TRAMADOL HYDROCHLORIDE 50 MG/1
50 TABLET ORAL EVERY 6 HOURS PRN
Status: DISCONTINUED | OUTPATIENT
Start: 2023-01-01 | End: 2023-01-01 | Stop reason: HOSPADM

## 2023-01-01 RX ORDER — OXYCODONE HYDROCHLORIDE 5 MG/1
5 TABLET ORAL EVERY 4 HOURS PRN
Status: DISCONTINUED | OUTPATIENT
Start: 2023-01-01 | End: 2023-01-01 | Stop reason: HOSPADM

## 2023-01-01 RX ORDER — SCOLOPAMINE TRANSDERMAL SYSTEM 1 MG/1
1 PATCH, EXTENDED RELEASE TRANSDERMAL PRN
Status: DISCONTINUED | OUTPATIENT
Start: 2023-01-01 | End: 2023-01-01 | Stop reason: HOSPADM

## 2023-01-01 RX ORDER — SODIUM CHLORIDE AND POTASSIUM CHLORIDE 150; 900 MG/100ML; MG/100ML
INJECTION, SOLUTION INTRAVENOUS CONTINUOUS
Status: DISCONTINUED | OUTPATIENT
Start: 2023-01-01 | End: 2023-01-01 | Stop reason: HOSPADM

## 2023-01-01 RX ADMIN — ROCURONIUM BROMIDE 30 MG: 10 INJECTION, SOLUTION INTRAVENOUS at 10:15

## 2023-01-01 RX ADMIN — METOPROLOL TARTRATE 50 MG: 50 TABLET, FILM COATED ORAL at 20:02

## 2023-01-01 RX ADMIN — OXYCODONE HYDROCHLORIDE 5 MG: 5 TABLET ORAL at 18:05

## 2023-01-01 RX ADMIN — OXYCODONE HYDROCHLORIDE AND ACETAMINOPHEN 1000 MG: 500 TABLET ORAL at 09:48

## 2023-01-01 RX ADMIN — AMIODARONE HYDROCHLORIDE 100 MG: 200 TABLET ORAL at 10:40

## 2023-01-01 RX ADMIN — HYDROCODONE BITARTRATE AND ACETAMINOPHEN 1 TABLET: 5; 325 TABLET ORAL at 16:00

## 2023-01-01 RX ADMIN — HEPARIN SODIUM 5000 UNITS: 5000 INJECTION INTRAVENOUS; SUBCUTANEOUS at 21:47

## 2023-01-01 RX ADMIN — SODIUM CHLORIDE, PRESERVATIVE FREE 2 ML: 5 INJECTION INTRAVENOUS at 21:42

## 2023-01-01 RX ADMIN — LIDOCAINE HYDROCHLORIDE 3 ML: 20 INJECTION, SOLUTION INFILTRATION; PERINEURAL at 08:55

## 2023-01-01 RX ADMIN — PHYTONADIONE 5 MG: 10 INJECTION, EMULSION INTRAMUSCULAR; INTRAVENOUS; SUBCUTANEOUS at 22:08

## 2023-01-01 RX ADMIN — METOPROLOL TARTRATE 50 MG: 50 TABLET, FILM COATED ORAL at 20:23

## 2023-01-01 RX ADMIN — REMIFENTANIL HYDROCHLORIDE 0.05 MCG/KG/MIN: 1 INJECTION, POWDER, LYOPHILIZED, FOR SOLUTION INTRAVENOUS at 09:20

## 2023-01-01 RX ADMIN — DEXTROSE AND SODIUM CHLORIDE: 5; 900 INJECTION, SOLUTION INTRAVENOUS at 03:59

## 2023-01-01 RX ADMIN — SODIUM CHLORIDE, PRESERVATIVE FREE 2 ML: 5 INJECTION INTRAVENOUS at 19:58

## 2023-01-01 RX ADMIN — POTASSIUM CHLORIDE AND SODIUM CHLORIDE: 900; 150 INJECTION, SOLUTION INTRAVENOUS at 23:59

## 2023-01-01 RX ADMIN — HEPARIN SODIUM 5000 UNITS: 5000 INJECTION INTRAVENOUS; SUBCUTANEOUS at 05:38

## 2023-01-01 RX ADMIN — OXYCODONE HYDROCHLORIDE 5 MG: 5 TABLET ORAL at 09:32

## 2023-01-01 RX ADMIN — OXYCODONE HYDROCHLORIDE AND ACETAMINOPHEN 1000 MG: 500 TABLET ORAL at 09:14

## 2023-01-01 RX ADMIN — SODIUM CHLORIDE, PRESERVATIVE FREE 2 ML: 5 INJECTION INTRAVENOUS at 20:12

## 2023-01-01 RX ADMIN — SODIUM PHOSPHATE, MONOBASIC, MONOHYDRATE AND SODIUM PHOSPHATE, DIBASIC, ANHYDROUS 45 MMOL: 142; 276 INJECTION, SOLUTION INTRAVENOUS at 09:13

## 2023-01-01 RX ADMIN — METOPROLOL TARTRATE 50 MG: 50 TABLET, FILM COATED ORAL at 21:51

## 2023-01-01 RX ADMIN — HEPARIN SODIUM 5000 UNITS: 5000 INJECTION INTRAVENOUS; SUBCUTANEOUS at 16:09

## 2023-01-01 RX ADMIN — CEFAZOLIN SODIUM 2000 MG: 300 INJECTION, POWDER, LYOPHILIZED, FOR SOLUTION INTRAVENOUS at 18:38

## 2023-01-01 RX ADMIN — HYDROCODONE BITARTRATE AND ACETAMINOPHEN 1 TABLET: 5; 325 TABLET ORAL at 21:17

## 2023-01-01 RX ADMIN — HEPARIN SODIUM 5000 UNITS: 5000 INJECTION INTRAVENOUS; SUBCUTANEOUS at 05:16

## 2023-01-01 RX ADMIN — ONDANSETRON 4 MG: 2 INJECTION INTRAMUSCULAR; INTRAVENOUS at 18:07

## 2023-01-01 RX ADMIN — SENNOSIDES AND DOCUSATE SODIUM 1 TABLET: 50; 8.6 TABLET ORAL at 08:17

## 2023-01-01 RX ADMIN — SODIUM CHLORIDE: 9 INJECTION, SOLUTION INTRAVENOUS at 08:44

## 2023-01-01 RX ADMIN — HYDROCODONE BITARTRATE AND ACETAMINOPHEN 1 TABLET: 5; 325 TABLET ORAL at 01:01

## 2023-01-01 RX ADMIN — HEPARIN SODIUM 5000 UNITS: 5000 INJECTION INTRAVENOUS; SUBCUTANEOUS at 21:55

## 2023-01-01 RX ADMIN — AMIODARONE HYDROCHLORIDE 100 MG: 200 TABLET ORAL at 09:32

## 2023-01-01 RX ADMIN — SENNOSIDES AND DOCUSATE SODIUM 1 TABLET: 50; 8.6 TABLET ORAL at 10:14

## 2023-01-01 RX ADMIN — HYDROCODONE BITARTRATE AND ACETAMINOPHEN 1 TABLET: 5; 325 TABLET ORAL at 02:15

## 2023-01-01 RX ADMIN — METOPROLOL TARTRATE 50 MG: 50 TABLET, FILM COATED ORAL at 08:36

## 2023-01-01 RX ADMIN — HEPARIN SODIUM 5000 UNITS: 5000 INJECTION INTRAVENOUS; SUBCUTANEOUS at 05:06

## 2023-01-01 RX ADMIN — OXYCODONE HYDROCHLORIDE AND ACETAMINOPHEN 1000 MG: 500 TABLET ORAL at 08:19

## 2023-01-01 RX ADMIN — DEXAMETHASONE SODIUM PHOSPHATE 8 MG: 4 INJECTION, SOLUTION INTRAMUSCULAR; INTRAVENOUS at 13:13

## 2023-01-01 RX ADMIN — SODIUM CHLORIDE, PRESERVATIVE FREE 2 ML: 5 INJECTION INTRAVENOUS at 10:40

## 2023-01-01 RX ADMIN — PHENYLEPHRINE HYDROCHLORIDE 25 MCG/MIN: 10 INJECTION, SOLUTION INTRAVENOUS at 09:20

## 2023-01-01 RX ADMIN — CYANOCOBALAMIN 1000 MCG: 1000 INJECTION, SOLUTION INTRAMUSCULAR at 04:08

## 2023-01-01 RX ADMIN — ROCURONIUM BROMIDE 20 MG: 10 INJECTION, SOLUTION INTRAVENOUS at 10:21

## 2023-01-01 RX ADMIN — SENNOSIDES AND DOCUSATE SODIUM 1 TABLET: 50; 8.6 TABLET ORAL at 20:24

## 2023-01-01 RX ADMIN — DILTIAZEM HYDROCHLORIDE 120 MG: 120 CAPSULE, EXTENDED RELEASE ORAL at 11:13

## 2023-01-01 RX ADMIN — SODIUM CHLORIDE, POTASSIUM CHLORIDE, SODIUM LACTATE AND CALCIUM CHLORIDE 500 ML: 600; 310; 30; 20 INJECTION, SOLUTION INTRAVENOUS at 23:12

## 2023-01-01 RX ADMIN — SENNOSIDES AND DOCUSATE SODIUM 1 TABLET: 50; 8.6 TABLET ORAL at 20:57

## 2023-01-01 RX ADMIN — HEPARIN SODIUM 5000 UNITS: 5000 INJECTION INTRAVENOUS; SUBCUTANEOUS at 08:40

## 2023-01-01 RX ADMIN — SODIUM PHOSPHATE, MONOBASIC, MONOHYDRATE AND SODIUM PHOSPHATE, DIBASIC, ANHYDROUS 30 MMOL: 142; 276 INJECTION, SOLUTION INTRAVENOUS at 12:12

## 2023-01-01 RX ADMIN — DIBASIC SODIUM PHOSPHATE, MONOBASIC POTASSIUM PHOSPHATE AND MONOBASIC SODIUM PHOSPHATE 250 MG: 852; 155; 130 TABLET ORAL at 11:04

## 2023-01-01 RX ADMIN — IOHEXOL 75 ML: 350 INJECTION, SOLUTION INTRAVENOUS at 18:05

## 2023-01-01 RX ADMIN — SODIUM CHLORIDE, PRESERVATIVE FREE 2 ML: 5 INJECTION INTRAVENOUS at 20:15

## 2023-01-01 RX ADMIN — TRAMADOL HYDROCHLORIDE 50 MG: 50 TABLET, COATED ORAL at 08:39

## 2023-01-01 RX ADMIN — TAMSULOSIN HYDROCHLORIDE 0.4 MG: 0.4 CAPSULE ORAL at 08:42

## 2023-01-01 RX ADMIN — OXYCODONE HYDROCHLORIDE 5 MG: 5 TABLET ORAL at 05:16

## 2023-01-01 RX ADMIN — AMIODARONE HYDROCHLORIDE 100 MG: 200 TABLET ORAL at 08:27

## 2023-01-01 RX ADMIN — BUPIVACAINE HYDROCHLORIDE 5 ML: 2.5 INJECTION, SOLUTION EPIDURAL; INFILTRATION; INTRACAUDAL at 07:53

## 2023-01-01 RX ADMIN — METOPROLOL TARTRATE 50 MG: 50 TABLET, FILM COATED ORAL at 08:41

## 2023-01-01 RX ADMIN — METOPROLOL TARTRATE 50 MG: 50 TABLET, FILM COATED ORAL at 08:42

## 2023-01-01 RX ADMIN — SUGAMMADEX 150 MG: 100 INJECTION, SOLUTION INTRAVENOUS at 14:20

## 2023-01-01 RX ADMIN — SODIUM CHLORIDE, PRESERVATIVE FREE 2 ML: 5 INJECTION INTRAVENOUS at 10:14

## 2023-01-01 RX ADMIN — CEFAZOLIN SODIUM 2000 MG: 300 INJECTION, POWDER, LYOPHILIZED, FOR SOLUTION INTRAVENOUS at 09:02

## 2023-01-01 RX ADMIN — SODIUM CHLORIDE, PRESERVATIVE FREE 2 ML: 5 INJECTION INTRAVENOUS at 21:24

## 2023-01-01 RX ADMIN — SODIUM CHLORIDE, PRESERVATIVE FREE 2 ML: 5 INJECTION INTRAVENOUS at 08:16

## 2023-01-01 RX ADMIN — PROPOFOL 50 MCG/KG/MIN: 10 INJECTION, EMULSION INTRAVENOUS at 09:20

## 2023-01-01 RX ADMIN — SODIUM CHLORIDE, PRESERVATIVE FREE 2 ML: 5 INJECTION INTRAVENOUS at 09:56

## 2023-01-01 RX ADMIN — ACETAMINOPHEN 1000 MG: 500 TABLET ORAL at 16:54

## 2023-01-01 RX ADMIN — HEPARIN SODIUM 5000 UNITS: 5000 INJECTION INTRAVENOUS; SUBCUTANEOUS at 06:38

## 2023-01-01 RX ADMIN — SODIUM CHLORIDE, PRESERVATIVE FREE 2 ML: 5 INJECTION INTRAVENOUS at 20:59

## 2023-01-01 RX ADMIN — SODIUM CHLORIDE, PRESERVATIVE FREE 2 ML: 5 INJECTION INTRAVENOUS at 08:20

## 2023-01-01 RX ADMIN — DILTIAZEM HYDROCHLORIDE 120 MG: 120 CAPSULE, EXTENDED RELEASE ORAL at 08:37

## 2023-01-01 RX ADMIN — SODIUM CHLORIDE, PRESERVATIVE FREE 2 ML: 5 INJECTION INTRAVENOUS at 08:47

## 2023-01-01 RX ADMIN — TAMSULOSIN HYDROCHLORIDE 0.4 MG: 0.4 CAPSULE ORAL at 20:15

## 2023-01-01 RX ADMIN — OXYCODONE HYDROCHLORIDE 10 MG: 5 TABLET ORAL at 13:39

## 2023-01-01 RX ADMIN — HEPARIN SODIUM 5000 UNITS: 5000 INJECTION INTRAVENOUS; SUBCUTANEOUS at 05:17

## 2023-01-01 RX ADMIN — AMIODARONE HYDROCHLORIDE 100 MG: 200 TABLET ORAL at 08:40

## 2023-01-01 RX ADMIN — LIDOCAINE HYDROCHLORIDE 5 ML: 10 INJECTION, SOLUTION INFILTRATION; PERINEURAL at 07:50

## 2023-01-01 RX ADMIN — OXYCODONE HYDROCHLORIDE 5 MG: 5 TABLET ORAL at 00:54

## 2023-01-01 RX ADMIN — HEPARIN SODIUM 5000 UNITS: 5000 INJECTION INTRAVENOUS; SUBCUTANEOUS at 05:54

## 2023-01-01 RX ADMIN — OXYCODONE HYDROCHLORIDE 5 MG: 5 TABLET ORAL at 14:09

## 2023-01-01 RX ADMIN — OXYCODONE HYDROCHLORIDE AND ACETAMINOPHEN 1000 MG: 500 TABLET ORAL at 08:42

## 2023-01-01 RX ADMIN — SODIUM CHLORIDE, PRESERVATIVE FREE 2 ML: 5 INJECTION INTRAVENOUS at 20:11

## 2023-01-01 RX ADMIN — SODIUM CHLORIDE, PRESERVATIVE FREE 2 ML: 5 INJECTION INTRAVENOUS at 20:17

## 2023-01-01 RX ADMIN — ONDANSETRON 4 MG: 2 INJECTION INTRAMUSCULAR; INTRAVENOUS at 19:42

## 2023-01-01 RX ADMIN — HEPARIN SODIUM 5000 UNITS: 5000 INJECTION INTRAVENOUS; SUBCUTANEOUS at 13:15

## 2023-01-01 RX ADMIN — AMIODARONE HYDROCHLORIDE 100 MG: 200 TABLET ORAL at 11:13

## 2023-01-01 RX ADMIN — ROCURONIUM BROMIDE 30 MG: 10 INJECTION, SOLUTION INTRAVENOUS at 08:55

## 2023-01-01 RX ADMIN — SODIUM CHLORIDE, PRESERVATIVE FREE 2 ML: 5 INJECTION INTRAVENOUS at 09:26

## 2023-01-01 RX ADMIN — SENNOSIDES AND DOCUSATE SODIUM 1 TABLET: 50; 8.6 TABLET ORAL at 21:16

## 2023-01-01 RX ADMIN — HEPARIN SODIUM 5000 UNITS: 5000 INJECTION INTRAVENOUS; SUBCUTANEOUS at 13:39

## 2023-01-01 RX ADMIN — DILTIAZEM HYDROCHLORIDE 120 MG: 120 CAPSULE, EXTENDED RELEASE ORAL at 08:40

## 2023-01-01 RX ADMIN — METOPROLOL TARTRATE 50 MG: 50 TABLET, FILM COATED ORAL at 20:17

## 2023-01-01 RX ADMIN — HEPARIN SODIUM 5000 UNITS: 5000 INJECTION INTRAVENOUS; SUBCUTANEOUS at 20:15

## 2023-01-01 RX ADMIN — SODIUM CHLORIDE, PRESERVATIVE FREE 2 ML: 5 INJECTION INTRAVENOUS at 01:08

## 2023-01-01 RX ADMIN — ROCURONIUM BROMIDE 20 MG: 10 INJECTION, SOLUTION INTRAVENOUS at 10:26

## 2023-01-01 RX ADMIN — OXYCODONE HYDROCHLORIDE AND ACETAMINOPHEN 1000 MG: 500 TABLET ORAL at 10:40

## 2023-01-01 RX ADMIN — HYDROCODONE BITARTRATE AND ACETAMINOPHEN 1 TABLET: 5; 325 TABLET ORAL at 19:45

## 2023-01-01 RX ADMIN — OXYCODONE HYDROCHLORIDE 5 MG: 5 TABLET ORAL at 16:58

## 2023-01-01 RX ADMIN — FAMOTIDINE 20 MG: 20 TABLET, FILM COATED ORAL at 07:06

## 2023-01-01 RX ADMIN — PROPOFOL 100 MG: 10 INJECTION, EMULSION INTRAVENOUS at 08:55

## 2023-01-01 RX ADMIN — SODIUM CHLORIDE, PRESERVATIVE FREE 2 ML: 5 INJECTION INTRAVENOUS at 21:29

## 2023-01-01 RX ADMIN — OXYCODONE HYDROCHLORIDE 10 MG: 5 TABLET ORAL at 20:13

## 2023-01-01 RX ADMIN — HYDROCODONE BITARTRATE AND ACETAMINOPHEN 1 TABLET: 5; 325 TABLET ORAL at 09:53

## 2023-01-01 RX ADMIN — SENNOSIDES AND DOCUSATE SODIUM 1 TABLET: 50; 8.6 TABLET ORAL at 08:36

## 2023-01-01 RX ADMIN — HYDROCODONE BITARTRATE AND ACETAMINOPHEN 1 TABLET: 5; 325 TABLET ORAL at 18:00

## 2023-01-01 RX ADMIN — OXYCODONE HYDROCHLORIDE 10 MG: 5 TABLET ORAL at 09:24

## 2023-01-01 RX ADMIN — OXYCODONE HYDROCHLORIDE 5 MG: 5 TABLET ORAL at 17:28

## 2023-01-01 RX ADMIN — METOPROLOL TARTRATE 50 MG: 50 TABLET, FILM COATED ORAL at 20:57

## 2023-01-01 RX ADMIN — OXYCODONE HYDROCHLORIDE 10 MG: 5 TABLET ORAL at 02:37

## 2023-01-01 RX ADMIN — OXYCODONE HYDROCHLORIDE 5 MG: 5 TABLET ORAL at 15:19

## 2023-01-01 RX ADMIN — AMIODARONE HYDROCHLORIDE 100 MG: 200 TABLET ORAL at 08:41

## 2023-01-01 RX ADMIN — DILTIAZEM HYDROCHLORIDE 120 MG: 120 CAPSULE, EXTENDED RELEASE ORAL at 09:25

## 2023-01-01 RX ADMIN — SENNOSIDES AND DOCUSATE SODIUM 1 TABLET: 50; 8.6 TABLET ORAL at 20:09

## 2023-01-01 RX ADMIN — IOHEXOL 1 ML: 300 INJECTION, SOLUTION INTRAVENOUS at 07:51

## 2023-01-01 RX ADMIN — HEPARIN SODIUM 5000 UNITS: 5000 INJECTION INTRAVENOUS; SUBCUTANEOUS at 14:36

## 2023-01-01 RX ADMIN — SODIUM CHLORIDE, PRESERVATIVE FREE 2 ML: 5 INJECTION INTRAVENOUS at 09:35

## 2023-01-01 RX ADMIN — SODIUM CHLORIDE, PRESERVATIVE FREE 2 ML: 5 INJECTION INTRAVENOUS at 08:37

## 2023-01-01 RX ADMIN — METOPROLOL TARTRATE 50 MG: 50 TABLET, FILM COATED ORAL at 09:46

## 2023-01-01 RX ADMIN — CEFAZOLIN SODIUM 2000 MG: 300 INJECTION, POWDER, LYOPHILIZED, FOR SOLUTION INTRAVENOUS at 12:02

## 2023-01-01 RX ADMIN — OXYCODONE HYDROCHLORIDE AND ACETAMINOPHEN 1000 MG: 500 TABLET ORAL at 08:41

## 2023-01-01 RX ADMIN — DILTIAZEM HYDROCHLORIDE 120 MG: 120 CAPSULE, EXTENDED RELEASE ORAL at 08:42

## 2023-01-01 RX ADMIN — SODIUM CHLORIDE, PRESERVATIVE FREE 2 ML: 5 INJECTION INTRAVENOUS at 09:16

## 2023-01-01 RX ADMIN — SODIUM CHLORIDE, PRESERVATIVE FREE 2 ML: 5 INJECTION INTRAVENOUS at 08:40

## 2023-01-01 RX ADMIN — AMIODARONE HYDROCHLORIDE 100 MG: 200 TABLET ORAL at 09:25

## 2023-01-01 RX ADMIN — OXYCODONE HYDROCHLORIDE 10 MG: 5 TABLET ORAL at 16:09

## 2023-01-01 RX ADMIN — THIAMINE HYDROCHLORIDE 400 MG: 100 INJECTION, SOLUTION INTRAMUSCULAR; INTRAVENOUS at 04:13

## 2023-01-01 RX ADMIN — IOHEXOL 3 ML: 300 INJECTION, SOLUTION INTRAVENOUS at 08:38

## 2023-01-01 RX ADMIN — ONDANSETRON 4 MG: 2 INJECTION INTRAMUSCULAR; INTRAVENOUS at 14:01

## 2023-01-01 RX ADMIN — OXYCODONE HYDROCHLORIDE 5 MG: 5 TABLET ORAL at 12:45

## 2023-01-01 RX ADMIN — SODIUM CHLORIDE, POTASSIUM CHLORIDE, SODIUM LACTATE AND CALCIUM CHLORIDE: 600; 310; 30; 20 INJECTION, SOLUTION INTRAVENOUS at 15:36

## 2023-01-01 RX ADMIN — OXYCODONE HYDROCHLORIDE AND ACETAMINOPHEN 1000 MG: 500 TABLET ORAL at 09:32

## 2023-01-01 RX ADMIN — METOPROLOL TARTRATE 50 MG: 50 TABLET, FILM COATED ORAL at 11:13

## 2023-01-01 RX ADMIN — SODIUM CHLORIDE, POTASSIUM CHLORIDE, SODIUM LACTATE AND CALCIUM CHLORIDE: 600; 310; 30; 20 INJECTION, SOLUTION INTRAVENOUS at 00:00

## 2023-01-01 RX ADMIN — DILTIAZEM HYDROCHLORIDE 120 MG: 120 CAPSULE, EXTENDED RELEASE ORAL at 09:47

## 2023-01-01 RX ADMIN — ACETAMINOPHEN 1000 MG: 500 TABLET ORAL at 08:42

## 2023-01-01 RX ADMIN — METOPROLOL TARTRATE 50 MG: 50 TABLET, FILM COATED ORAL at 06:11

## 2023-01-01 RX ADMIN — FOLIC ACID 1 MG: 5 INJECTION, SOLUTION INTRAMUSCULAR; INTRAVENOUS; SUBCUTANEOUS at 04:02

## 2023-01-01 RX ADMIN — METOPROLOL TARTRATE 50 MG: 50 TABLET, FILM COATED ORAL at 21:16

## 2023-01-01 RX ADMIN — SODIUM CHLORIDE, PRESERVATIVE FREE 2 ML: 5 INJECTION INTRAVENOUS at 08:42

## 2023-01-01 RX ADMIN — SODIUM CHLORIDE, PRESERVATIVE FREE 2 ML: 5 INJECTION INTRAVENOUS at 21:55

## 2023-01-01 RX ADMIN — TRIAMCINOLONE ACETONIDE 40 MG: 40 INJECTION, SUSPENSION INTRA-ARTICULAR; INTRAMUSCULAR at 07:52

## 2023-01-01 RX ADMIN — HYDROCODONE BITARTRATE AND ACETAMINOPHEN 1 TABLET: 5; 325 TABLET ORAL at 09:14

## 2023-01-01 RX ADMIN — DIBASIC SODIUM PHOSPHATE, MONOBASIC POTASSIUM PHOSPHATE AND MONOBASIC SODIUM PHOSPHATE 250 MG: 852; 155; 130 TABLET ORAL at 16:54

## 2023-01-01 RX ADMIN — HEPARIN SODIUM 5000 UNITS: 5000 INJECTION INTRAVENOUS; SUBCUTANEOUS at 17:00

## 2023-01-01 RX ADMIN — METOPROLOL TARTRATE 50 MG: 50 TABLET, FILM COATED ORAL at 09:25

## 2023-01-01 RX ADMIN — AMIODARONE HYDROCHLORIDE 100 MG: 200 TABLET ORAL at 09:14

## 2023-01-01 RX ADMIN — HEPARIN SODIUM 5000 UNITS: 5000 INJECTION INTRAVENOUS; SUBCUTANEOUS at 21:51

## 2023-01-01 RX ADMIN — SODIUM CHLORIDE, PRESERVATIVE FREE 2 ML: 5 INJECTION INTRAVENOUS at 20:23

## 2023-01-01 RX ADMIN — TAMSULOSIN HYDROCHLORIDE 0.4 MG: 0.4 CAPSULE ORAL at 09:32

## 2023-01-01 RX ADMIN — ROCURONIUM BROMIDE 20 MG: 10 INJECTION, SOLUTION INTRAVENOUS at 09:19

## 2023-01-01 RX ADMIN — ACETAMINOPHEN 1000 MG: 500 TABLET ORAL at 16:58

## 2023-01-01 RX ADMIN — AMIODARONE HYDROCHLORIDE 100 MG: 200 TABLET ORAL at 08:37

## 2023-01-01 RX ADMIN — HEPARIN SODIUM 5000 UNITS: 5000 INJECTION INTRAVENOUS; SUBCUTANEOUS at 21:28

## 2023-01-01 RX ADMIN — AMIODARONE HYDROCHLORIDE 100 MG: 200 TABLET ORAL at 09:47

## 2023-01-01 RX ADMIN — ONDANSETRON 4 MG: 2 INJECTION INTRAMUSCULAR; INTRAVENOUS at 13:13

## 2023-01-01 RX ADMIN — OXYCODONE HYDROCHLORIDE AND ACETAMINOPHEN 1000 MG: 500 TABLET ORAL at 09:24

## 2023-01-01 RX ADMIN — METOPROLOL TARTRATE 50 MG: 50 TABLET, FILM COATED ORAL at 20:09

## 2023-01-01 RX ADMIN — CEFAZOLIN SODIUM 2000 MG: 300 INJECTION, POWDER, LYOPHILIZED, FOR SOLUTION INTRAVENOUS at 23:58

## 2023-01-01 RX ADMIN — METOPROLOL TARTRATE 50 MG: 50 TABLET, FILM COATED ORAL at 09:14

## 2023-01-01 RX ADMIN — OXYCODONE HYDROCHLORIDE 10 MG: 5 TABLET ORAL at 01:28

## 2023-01-01 RX ADMIN — OXYCODONE HYDROCHLORIDE 10 MG: 5 TABLET ORAL at 05:35

## 2023-01-01 RX ADMIN — DILTIAZEM HYDROCHLORIDE 120 MG: 120 CAPSULE, EXTENDED RELEASE ORAL at 10:40

## 2023-01-01 RX ADMIN — ACETAMINOPHEN 650 MG: 325 TABLET ORAL at 18:39

## 2023-01-01 RX ADMIN — METOPROLOL TARTRATE 50 MG: 50 TABLET, FILM COATED ORAL at 10:40

## 2023-01-01 RX ADMIN — OXYCODONE HYDROCHLORIDE 5 MG: 5 TABLET ORAL at 00:38

## 2023-01-01 RX ADMIN — DILTIAZEM HYDROCHLORIDE 120 MG: 120 CAPSULE, EXTENDED RELEASE ORAL at 09:32

## 2023-01-01 RX ADMIN — OXYCODONE HYDROCHLORIDE AND ACETAMINOPHEN 1000 MG: 500 TABLET ORAL at 18:39

## 2023-01-01 RX ADMIN — METOPROLOL TARTRATE 50 MG: 50 TABLET, FILM COATED ORAL at 09:32

## 2023-01-01 SDOH — HEALTH STABILITY: PHYSICAL HEALTH: DO YOU HAVE DIFFICULTY DRESSING OR BATHING?: YES

## 2023-01-01 SDOH — ECONOMIC STABILITY: FOOD INSECURITY: HOW OFTEN IN THE PAST 12 MONTHS WERE YOU WORRIED OR STRESSED ABOUT HAVING ENOUGH MONEY TO BUY NUTRITIOUS MEALS?: NEVER

## 2023-01-01 SDOH — SOCIAL STABILITY: SOCIAL NETWORK
HOW OFTEN DO YOU SEE OR TALK TO PEOPLE THAT YOU CARE ABOUT AND FEEL CLOSE TO? (FOR EXAMPLE: TALKING TO FRIENDS ON THE PHONE, VISITING FRIENDS OR FAMILY, GOING TO CHURCH OR CLUB MEETINGS): 5 OR MORE TIMES A WEEK

## 2023-01-01 SDOH — ECONOMIC STABILITY: GENERAL

## 2023-01-01 SDOH — SOCIAL STABILITY: SOCIAL NETWORK: SUPPORT SYSTEMS: FAMILY MEMBERS

## 2023-01-01 SDOH — HEALTH STABILITY: GENERAL: BECAUSE OF A PHYSICAL, MENTAL, OR EMOTIONAL CONDITION, DO YOU HAVE DIFFICULTY DOING ERRANDS ALONE?: YES

## 2023-01-01 SDOH — ECONOMIC STABILITY: HOUSING INSECURITY: WHAT IS YOUR LIVING SITUATION TODAY?: HOUSE

## 2023-01-01 SDOH — HEALTH STABILITY: PHYSICAL HEALTH: DO YOU HAVE SERIOUS DIFFICULTY WALKING OR CLIMBING STAIRS?: YES

## 2023-01-01 SDOH — ECONOMIC STABILITY: HOUSING INSECURITY: WHAT IS YOUR LIVING SITUATION TODAY?: ADULT CHILDREN;FAMILY MEMBERS

## 2023-01-01 SDOH — ECONOMIC STABILITY: TRANSPORTATION INSECURITY
IN THE PAST 12 MONTHS, HAS THE LACK OF TRANSPORTATION KEPT YOU FROM MEDICAL APPOINTMENTS OR FROM GETTING MEDICATIONS?: NO

## 2023-01-01 SDOH — ECONOMIC STABILITY - INCOME SECURITY: PROBLEM RELATED TO HOUSING AND ECONOMIC CIRCUMSTANCES, UNSPECIFIED: Z59.9

## 2023-01-01 SDOH — HEALTH STABILITY: GENERAL
BECAUSE OF A PHYSICAL, MENTAL, OR EMOTIONAL CONDITION, DO YOU HAVE SERIOUS DIFFICULTY CONCENTRATING, REMEMBERING OR MAKING DECISIONS?: YES

## 2023-01-01 SDOH — ECONOMIC STABILITY: HOUSING INSECURITY: ARE YOU WORRIED ABOUT LOSING YOUR HOUSING?: NO

## 2023-01-01 SDOH — ECONOMIC STABILITY: TRANSPORTATION INSECURITY
IN THE PAST 12 MONTHS, HAS LACK OF TRANSPORTATION KEPT YOU FROM MEETINGS, WORK, OR FROM GETTING THINGS NEEDED FOR DAILY LIVING?: NO

## 2023-01-01 SDOH — ECONOMIC STABILITY: HOUSING INSECURITY: WHAT IS YOUR LIVING SITUATION TODAY?: CHILDREN

## 2023-01-01 ASSESSMENT — PAIN SCALES - PAIN ASSESSMENT IN ADVANCED DEMENTIA (PAINAD)
NEGVOCALIZATION: OCCASIONAL MOAN OR GROAN, LOW LEVELS OF SPEECH WITH A NEGATIVE OR DISAPPROVING QUALITY
BREATHING: OCCASIONAL LABORED BREATHING, SHORT PERIOD OF HYPERVENTILATION
BODYLANGUAGE: TENSE, DISTRESSED, FIDGETING
BREATHING: OCCASIONAL LABORED BREATHING, SHORT PERIOD OF HYPERVENTILATION
TOTALSCORE: 2
FACIALEXPRESSION: SMILING OR INEXPRESSIVE
BREATHING: OCCASIONAL LABORED BREATHING, SHORT PERIOD OF HYPERVENTILATION
CONSOLABILITY: NO NEED TO CONSOLE
NEGVOCALIZATION: OCCASIONAL MOAN OR GROAN, LOW LEVELS OF SPEECH WITH A NEGATIVE OR DISAPPROVING QUALITY
BODYLANGUAGE: RELAXED
CONSOLABILITY: DISTRACTED OR REASSURED BY VOICE OR TOUCH
BREATHING: NORMAL
TOTALSCORE: 2
BREATHING: OCCASIONAL LABORED BREATHING, SHORT PERIOD OF HYPERVENTILATION
FACIALEXPRESSION: SMILING OR INEXPRESSIVE
FACIALEXPRESSION: SAD. FRIGHTENED. FROWNING
CONSOLABILITY: DISTRACTED OR REASSURED BY VOICE OR TOUCH
BODYLANGUAGE: TENSE, DISTRESSED, FIDGETING
FACIALEXPRESSION: SMILING OR INEXPRESSIVE
BREATHING: OCCASIONAL LABORED BREATHING, SHORT PERIOD OF HYPERVENTILATION
NEGVOCALIZATION: OCCASIONAL MOAN OR GROAN, LOW LEVELS OF SPEECH WITH A NEGATIVE OR DISAPPROVING QUALITY
BREATHING: NORMAL
BODYLANGUAGE: RELAXED
CONSOLABILITY: NO NEED TO CONSOLE
NEGVOCALIZATION: OCCASIONAL MOAN OR GROAN, LOW LEVELS OF SPEECH WITH A NEGATIVE OR DISAPPROVING QUALITY
NEGVOCALIZATION: REPEATED TROUBLED CALLING OUT. LOUD MOANING OR GROANING. CRYING
BREATHING: OCCASIONAL LABORED BREATHING, SHORT PERIOD OF HYPERVENTILATION
BREATHING: NORMAL
FACIALEXPRESSION: SMILING OR INEXPRESSIVE
TOTALSCORE: 0
CONSOLABILITY: NO NEED TO CONSOLE
CONSOLABILITY: UNABLE TO CONSOLE, DISTRACT OR REASSURE
CONSOLABILITY: NO NEED TO CONSOLE
NEGVOCALIZATION: OCCASIONAL MOAN OR GROAN, LOW LEVELS OF SPEECH WITH A NEGATIVE OR DISAPPROVING QUALITY
FACIALEXPRESSION: SMILING OR INEXPRESSIVE
CONSOLABILITY: DISTRACTED OR REASSURED BY VOICE OR TOUCH
CONSOLABILITY: DISTRACTED OR REASSURED BY VOICE OR TOUCH
CONSOLABILITY: NO NEED TO CONSOLE
FACIALEXPRESSION: SMILING OR INEXPRESSIVE
TOTALSCORE: 4
BODYLANGUAGE: TENSE, DISTRESSED, FIDGETING
FACIALEXPRESSION: SMILING OR INEXPRESSIVE
BODYLANGUAGE: RELAXED
BREATHING: NORMAL
NEGVOCALIZATION: OCCASIONAL MOAN OR GROAN, LOW LEVELS OF SPEECH WITH A NEGATIVE OR DISAPPROVING QUALITY
CONSOLABILITY: NO NEED TO CONSOLE
FACIALEXPRESSION: SMILING OR INEXPRESSIVE
BODYLANGUAGE: TENSE, DISTRESSED, FIDGETING
TOTALSCORE: 0
CONSOLABILITY: DISTRACTED OR REASSURED BY VOICE OR TOUCH
BREATHING: NORMAL
TOTALSCORE: 5
CONSOLABILITY: NO NEED TO CONSOLE
FACIALEXPRESSION: SMILING OR INEXPRESSIVE
TOTALSCORE: 4
TOTALSCORE: 3
TOTALSCORE: 4
BODYLANGUAGE: RELAXED
TOTALSCORE: 2
CONSOLABILITY: NO NEED TO CONSOLE
NEGVOCALIZATION: REPEATED TROUBLED CALLING OUT. LOUD MOANING OR GROANING. CRYING
NEGVOCALIZATION: OCCASIONAL MOAN OR GROAN, LOW LEVELS OF SPEECH WITH A NEGATIVE OR DISAPPROVING QUALITY
FACIALEXPRESSION: SAD. FRIGHTENED. FROWNING
FACIALEXPRESSION: SAD. FRIGHTENED. FROWNING
BREATHING: OCCASIONAL LABORED BREATHING, SHORT PERIOD OF HYPERVENTILATION
TOTALSCORE: 3
FACIALEXPRESSION: SMILING OR INEXPRESSIVE
BODYLANGUAGE: TENSE, DISTRESSED, FIDGETING
BODYLANGUAGE: RELAXED
BODYLANGUAGE: TENSE, DISTRESSED, FIDGETING
TOTALSCORE: 1
FACIALEXPRESSION: SMILING OR INEXPRESSIVE
BODYLANGUAGE: TENSE, DISTRESSED, FIDGETING
TOTALSCORE: 1
BODYLANGUAGE: RELAXED
BREATHING: NORMAL
TOTALSCORE: 7
FACIALEXPRESSION: SMILING OR INEXPRESSIVE
CONSOLABILITY: DISTRACTED OR REASSURED BY VOICE OR TOUCH
BODYLANGUAGE: TENSE, DISTRESSED, FIDGETING
BODYLANGUAGE: TENSE, DISTRESSED, FIDGETING
BREATHING: OCCASIONAL LABORED BREATHING, SHORT PERIOD OF HYPERVENTILATION
NEGVOCALIZATION: OCCASIONAL MOAN OR GROAN, LOW LEVELS OF SPEECH WITH A NEGATIVE OR DISAPPROVING QUALITY
BREATHING: NORMAL
TOTALSCORE: 2

## 2023-01-01 ASSESSMENT — COGNITIVE AND FUNCTIONAL STATUS - GENERAL
DAILY_ACTIVITY_RAW_SCORE: 11
HELP NEEDED DRESSING REGULAR UPPER BODY CLOTHING: A LOT
BASIC_MOBILITY_CONVERTED_SCORE: 19.39
REMEMBERING 5 ERRANDS WITH NO LIST: UNABLE
DO YOU HAVE SERIOUS DIFFICULTY WALKING OR CLIMBING STAIRS: YES
HELP NEEDED FOR TOILETING: TOTAL
BASIC_MOBILITY_RAW_SCORE: 7
BASIC_MOBILITY_CONVERTED_SCORE: 22.61
DAILY_ACTIVITY_RAW_SCORE: 8
BECAUSE OF A PHYSICAL, MENTAL, OR EMOTIONAL CONDITION, DO YOU HAVE DIFFICULTY DOING ERRANDS ALONE: YES
BASIC_MOBILITY_RAW_SCORE: 7
BASIC_MOBILITY_RAW_SCORE: 8
HELP NEEDED DRESSING REGULAR LOWER BODY CLOTHING: TOTAL
BECAUSE OF A PHYSICAL, MENTAL, OR EMOTIONAL CONDITION, DO YOU HAVE SERIOUS DIFFICULTY CONCENTRATING, REMEMBERING OR MAKING DECISIONS: YES
BASIC_MOBILITY_RAW_SCORE: 13
TAKING CARE OF COMPLICATED TASKS: UNABLE
HELP NEEDED FOR PERSONAL GROOMING: A LOT
BASIC_MOBILITY_CONVERTED_SCORE: 19.39
BASIC_MOBILITY_CONVERTED_SCORE: 22.61
BASIC_MOBILITY_RAW_SCORE: 7
APPLIED_COGNITIVE_RAW_SCORE: 7
REMEMBERING WHERE THINGS ARE: UNABLE
REMEMBERING TO TAKE MEDICATION: UNABLE
DAILY_ACTIVITY_CONVERTED_SCORE: 29.04
BASIC_MOBILITY_CONVERTED_SCORE: 33.99
UNDERSTANDING 10 TO 15 MIN SPEECH: UNABLE
FOLLOWS FAMILIAR CONVERSATION: A LOT
DAILY_ACTIVITY_CONVERTED_SCORE: 22.86
BASIC_MOBILITY_RAW_SCORE: 8
HELP NEEDED FOR BATHING: TOTAL
BASIC_MOBILITY_CONVERTED_SCORE: 19.39
HELP NEEDED FOR TOILETING: TOTAL
HELP NEEDED FOR BATHING: A LOT
HELP NEEDED DRESSING REGULAR UPPER BODY CLOTHING: TOTAL
APPLIED_COGNITIVE_CONVERTED_SCORE: 15.17
HELP NEEDED DRESSING REGULAR LOWER BODY CLOTHING: TOTAL
HELP NEEDED FOR PERSONAL GROOMING: A LOT

## 2023-01-01 ASSESSMENT — ENCOUNTER SYMPTOMS
HEADACHES: 1
ABDOMINAL PAIN: 0
ACTIVITY CHANGE: 1
BLURRED VISION: 0
BRUISES/BLEEDS EASILY: 0
WEIGHT LOSS: 0
PSYCHIATRIC NEGATIVE: 1
ANOREXIA: 0
SHORTNESS OF BREATH: 0
WHEEZING: 0
RESPIRATORY NEGATIVE: 1
WEAKNESS: 1
BLOOD IN STOOL: 0
DIARRHEA: 0
COUGH: 0
VOMITING: 0
DIZZINESS: 0
HEMATOLOGIC/LYMPHATIC NEGATIVE: 1
EYES NEGATIVE: 1
ENDOCRINE NEGATIVE: 1
DOUBLE VISION: 0
NAUSEA: 0
COUGH: 0
SUSPICIOUS LESIONS: 0
CONSTIPATION: 0
AGITATION: 0
FATIGUE: 1
SHORTNESS OF BREATH: 0
SENSORY CHANGE: 0
COLOR CHANGE: 0
CONFUSION: 0
SUBJECTIVE PATIENT PAIN CONTROL: WELL CONTROLLED
TINGLING: 0
TREMORS: 0
ACTIVITY IMPAIRMENT: IMPAIRED DUE TO PAIN
WEAKNESS: 0
ROS SKIN COMMENTS: C COLLAR IN PLACE
HEMATOCHEZIA: 0
PSYCHIATRIC NEGATIVE: 1
ROS GI COMMENTS: NG TUBE
CONSTITUTIONAL NEGATIVE: 1
HEMOPTYSIS: 0
APNEA: 0
NEUROLOGICAL NEGATIVE: 1
ABDOMINAL DISTENTION: 0
PAIN SEVERITY NOW: MILD
FOCAL WEAKNESS: 1
FEVER: 0
EYES NEGATIVE: 1
SUBJECTIVE PATIENT PAIN CONTROL: WELL CONTROLLED
ALLERGIC/IMMUNOLOGIC NEGATIVE: 1
HEADACHES: 0
ENDOCRINE NEGATIVE: 1
LIGHT-HEADEDNESS: 0
DEPRESSION: 0
WEAKNESS: 0
DIZZINESS: 0
CHILLS: 0
ABDOMINAL PAIN: 0
NAUSEA: 0
ABDOMINAL PAIN: 0
WEIGHT GAIN: 0
SUBJECTIVE PAIN PROGRESSION: IMPROVING
SHORTNESS OF BREATH: 0
VOMITING: 0
NAUSEA: 0
VOMITING: 0
SYNCOPE: 0
HEARTBURN: 0
PAIN SEVERITY NOW: MILD
PAIN SEVERITY NOW: 8
FEVER: 0
HEMOPTYSIS: 0
FOCAL WEAKNESS: 0
ORTHOPNEA: 0
RESPIRATORY NEGATIVE: 1
SHORTNESS OF BREATH: 0
GASTROINTESTINAL NEGATIVE: 1
BACK PAIN: 1
COUGH: 0

## 2023-01-01 ASSESSMENT — PATIENT HEALTH QUESTIONNAIRE - PHQ9
CLINICAL INTERPRETATION OF PHQ2 SCORE: NO FURTHER SCREENING NEEDED
CLINICAL INTERPRETATION OF PHQ2 SCORE: NO FURTHER SCREENING NEEDED
SUM OF ALL RESPONSES TO PHQ9 QUESTIONS 1 AND 2: 0
SUM OF ALL RESPONSES TO PHQ9 QUESTIONS 1 AND 2: 0
1. LITTLE INTEREST OR PLEASURE IN DOING THINGS: NOT AT ALL
IS PATIENT ABLE TO COMPLETE PHQ2 OR PHQ9: YES
IS PATIENT ABLE TO COMPLETE PHQ2 OR PHQ9: NO, DEFER TO LATER TIME
IS PATIENT ABLE TO COMPLETE PHQ2 OR PHQ9: YES
2. FEELING DOWN, DEPRESSED OR HOPELESS: NOT AT ALL
SUM OF ALL RESPONSES TO PHQ9 QUESTIONS 1 AND 2: 0
SUM OF ALL RESPONSES TO PHQ9 QUESTIONS 1 AND 2: 0
1. LITTLE INTEREST OR PLEASURE IN DOING THINGS: NOT AT ALL
2. FEELING DOWN, DEPRESSED OR HOPELESS: NOT AT ALL
IS PATIENT ABLE TO COMPLETE PHQ2 OR PHQ9: NO, DEFER TO LATER TIME

## 2023-01-01 ASSESSMENT — LIFESTYLE VARIABLES
HOW OFTEN DO YOU HAVE A DRINK CONTAINING ALCOHOL: NEVER
ALCOHOL_USE_STATUS: NO OR LOW RISK WITH VALIDATED TOOL
HOW MANY STANDARD DRINKS CONTAINING ALCOHOL DO YOU HAVE ON A TYPICAL DAY: 0,1 OR 2
AUDIT-C TOTAL SCORE: 0
HOW MANY STANDARD DRINKS CONTAINING ALCOHOL DO YOU HAVE ON A TYPICAL DAY: 0,1 OR 2
HOW OFTEN DO YOU HAVE 6 OR MORE DRINKS ON ONE OCCASION: NEVER
AUDIT-C TOTAL SCORE: 0
HOW OFTEN DO YOU HAVE 6 OR MORE DRINKS ON ONE OCCASION: NEVER
ALCOHOL_USE_STATUS: NO OR LOW RISK WITH VALIDATED TOOL
HOW OFTEN DO YOU HAVE A DRINK CONTAINING ALCOHOL: NEVER

## 2023-01-01 ASSESSMENT — PAIN SCALES - GENERAL
PAINLEVEL_OUTOF10: 5
PAINLEVEL_OUTOF10: 9
PAINLEVEL_OUTOF10: 2
PAINLEVEL_OUTOF10: 2
PAINLEVEL_OUTOF10: 6
PAINLEVEL_OUTOF10: 2
PAINLEVEL_OUTOF10: 2
PAINLEVEL_OUTOF10: 9
PAINLEVEL_OUTOF10: 0
PAINLEVEL_OUTOF10: 3
PAINLEVEL_OUTOF10: 9
PAINLEVEL_OUTOF10: 8
PAINLEVEL_OUTOF10: 0
PAINLEVEL_OUTOF10: 10
PAINLEVEL_OUTOF10: 5
PAINLEVEL_OUTOF10: 1
PAINLEVEL_OUTOF10: 2
PAINLEVEL_OUTOF10: 7
PAINLEVEL_OUTOF10: 2
PAINLEVEL_OUTOF10: 9
PAINLEVEL_OUTOF10: 4
PAINLEVEL_OUTOF10: 6
PAINLEVEL_OUTOF10: 1
PAINLEVEL_OUTOF10: 0
PAINLEVEL_OUTOF10: 0
PAINLEVEL_OUTOF10: 10
PAINLEVEL_OUTOF10: 5
PAINLEVEL_OUTOF10: 8
PAINLEVEL_OUTOF10: 2
PAINLEVEL_OUTOF10: 0
PAINLEVEL_OUTOF10: 0
PAINLEVEL_OUTOF10: 1
PAINLEVEL_OUTOF10: 8
PAINLEVEL_OUTOF10: 0
PAINLEVEL_OUTOF10: 8
PAINLEVEL_OUTOF10: 9
PAINLEVEL_OUTOF10: 2
PAINLEVEL_OUTOF10: 4
PAINLEVEL_OUTOF10: 3
PAINLEVEL_OUTOF10: 7
PAINLEVEL_OUTOF10: 3
PAINLEVEL_OUTOF10: 0
PAINLEVEL_OUTOF10: 3
PAINLEVEL_OUTOF10: 0
PAINLEVEL_OUTOF10: 6

## 2023-01-01 ASSESSMENT — COLUMBIA-SUICIDE SEVERITY RATING SCALE - C-SSRS
1. WITHIN THE PAST MONTH, HAVE YOU WISHED YOU WERE DEAD OR WISHED YOU COULD GO TO SLEEP AND NOT WAKE UP?: NO
6. HAVE YOU EVER DONE ANYTHING, STARTED TO DO ANYTHING, OR PREPARED TO DO ANYTHING TO END YOUR LIFE?: NO
IS THE PATIENT ABLE TO COMPLETE C-SSRS: YES
2. HAVE YOU ACTUALLY HAD ANY THOUGHTS OF KILLING YOURSELF?: NO

## 2023-01-01 ASSESSMENT — ACTIVITIES OF DAILY LIVING (ADL)
FEEDING: NEEDS ASSISTANCE
ADL_SCORE: 6
BATHING: NEEDS ASSISTANCE
HOME_MANAGEMENT_TIME_ENTRY: 15
HOME_MANAGEMENT_TIME_ENTRY: 26
TOILETING: NEEDS ASSISTANCE
RECENT_DECLINE_ADL: NO
ADL_BEFORE_ADMISSION: NEEDS/REQUIRES ASSISTANCE
EATING: INDEPENDENT
DRESSING: NEEDS ASSISTANCE
GROOMING: MINIMAL ASSIST (MIN)
PRIOR_ADL_BATHING: MAXIMAL ASSIST (MAX)
ADL_BEFORE_ADMISSION: NEEDS/REQUIRES ASSISTANCE
PRIOR_ADL: MINIMAL ASSIST (MIN)
ADL_SHORT_OF_BREATH: NO
RECENT_DECLINE_ADL: YES, DECLINE IN BATHING/DRESSING/FEEDING, COLLABORATE WITH PROVIDER (T);YES, DECLINE IN AMBULATION/TRANSFERRING, COLLABORATE WITH PROVIDER (T)
PRIOR_ADL_TOILETING: MAXIMAL ASSIST (MAX)
ADL_SCORE: 6
DRESSING: NEEDS ASSISTANCE
TOILETING: NEEDS ASSISTANCE
BATHING: NEEDS ASSISTANCE
ADL_SHORT_OF_BREATH: YES
FEEDING: NEEDS ASSISTANCE

## 2023-01-01 ASSESSMENT — ORIENTATION MEMORY CONCENTRATION TEST (OMCT)
OMCT SCORE: 8
OMCT INTERPRETATION: 7-10: MILD COGNITIVE IMPAIRMENT
SAY THE MONTHS IN REVERSE ORDER STARTING WITH LAST MONTH: 2 OR MORE ERRORS
COUNT BACKWARDS FROM 20 TO 1: 2 OR MORE ERRORS
WHAT MONTH IS IT NOW: CORRECT
WHAT TIME IS IT (NO WATCH OR CLOCK): CORRECT
WHAT YEAR IS IT NOW (MUST BE EXACT): CORRECT
REPEAT THE NAME AND ADDRESS I ASKED YOU TO REMEMBER: CORRECT

## 2023-01-01 ASSESSMENT — PAIN SCALES - WONG BAKER
WONGBAKER_NUMERICALRESPONSE: 4
WONGBAKER_NUMERICALRESPONSE: 3
WONGBAKER_NUMERICALRESPONSE: 0
WONGBAKER_NUMERICALRESPONSE: 0
WONGBAKER_NUMERICALRESPONSE: 9

## 2023-01-01 ASSESSMENT — PULMONARY FUNCTION TESTS
FEV1/FVC: UNABLE TO OBTAIN, OR GREATER THAN 70%
FEV1/FVC: UNABLE TO OBTAIN, OR GREATER THAN 70%

## 2023-01-01 ASSESSMENT — PAIN DESCRIPTION - PAIN TYPE: TYPE: ACUTE PAIN

## 2023-01-01 ASSESSMENT — MOVEMENT AND STRENGTH ASSESSMENTS
FACE_JAW: FACE SYMMETRICAL
HEAD_NECK: FULL RANGE OF MOTION
ALL_EXTREMITIES: EQUAL STRENGTH/TONE/MOVEMENT

## 2023-01-01 NOTE — PLAN OF CARE
Assumed care of pt 1900. Aox4. Sitting up in chair. Neuro checks q4hrs. Neuro exam intact. Seizure precautions maintained. bilat le edema noted. Elevated on pillows. Pt denies N/T. Prn norco/morphine given for bilat le pain with relief.  Heparin infusing 14 without S/S of infection Progressing    • Oral mucous membranes remain intact Progressing [id number]

## 2023-01-07 ENCOUNTER — HOSPITAL ENCOUNTER (EMERGENCY)
Facility: HOSPITAL | Age: 83
Discharge: HOME | End: 2023-01-08
Attending: EMERGENCY MEDICINE
Payer: MEDICARE

## 2023-01-07 ENCOUNTER — APPOINTMENT (OUTPATIENT)
Dept: GENERAL RADIOLOGY | Facility: HOSPITAL | Age: 83
End: 2023-01-07
Attending: EMERGENCY MEDICINE
Payer: MEDICARE

## 2023-01-07 DIAGNOSIS — R07.9 ACUTE CHEST PAIN: Primary | ICD-10-CM

## 2023-01-07 DIAGNOSIS — D64.9 CHRONIC ANEMIA: ICD-10-CM

## 2023-01-07 DIAGNOSIS — E87.6 HYPOKALEMIA: ICD-10-CM

## 2023-01-07 LAB
ALBUMIN SERPL-MCNC: 2.3 G/DL (ref 3.4–5)
ALBUMIN/GLOB SERPL: 0.7 {RATIO} (ref 1–2)
ALP LIVER SERPL-CCNC: 188 U/L
ALT SERPL-CCNC: 50 U/L
ANION GAP SERPL CALC-SCNC: 4 MMOL/L (ref 0–18)
AST SERPL-CCNC: 76 U/L (ref 15–37)
BASOPHILS # BLD AUTO: 0.03 X10(3) UL (ref 0–0.2)
BASOPHILS NFR BLD AUTO: 0.4 %
BILIRUB SERPL-MCNC: 0.5 MG/DL (ref 0.1–2)
BUN BLD-MCNC: 24 MG/DL (ref 7–18)
CALCIUM BLD-MCNC: 8.1 MG/DL (ref 8.5–10.1)
CHLORIDE SERPL-SCNC: 105 MMOL/L (ref 98–112)
CO2 SERPL-SCNC: 30 MMOL/L (ref 21–32)
CREAT BLD-MCNC: 1.01 MG/DL
EOSINOPHIL # BLD AUTO: 0.08 X10(3) UL (ref 0–0.7)
EOSINOPHIL NFR BLD AUTO: 1.1 %
ERYTHROCYTE [DISTWIDTH] IN BLOOD BY AUTOMATED COUNT: 14 %
GFR SERPLBLD BASED ON 1.73 SQ M-ARVRAT: 56 ML/MIN/1.73M2 (ref 60–?)
GLOBULIN PLAS-MCNC: 3.5 G/DL (ref 2.8–4.4)
GLUCOSE BLD-MCNC: 134 MG/DL (ref 70–99)
HCT VFR BLD AUTO: 28.8 %
HGB BLD-MCNC: 9.2 G/DL
IMM GRANULOCYTES # BLD AUTO: 0.03 X10(3) UL (ref 0–1)
IMM GRANULOCYTES NFR BLD: 0.4 %
LYMPHOCYTES # BLD AUTO: 0.92 X10(3) UL (ref 1–4)
LYMPHOCYTES NFR BLD AUTO: 12.3 %
MCH RBC QN AUTO: 33.2 PG (ref 26–34)
MCHC RBC AUTO-ENTMCNC: 31.9 G/DL (ref 31–37)
MCV RBC AUTO: 104 FL
MONOCYTES # BLD AUTO: 0.69 X10(3) UL (ref 0.1–1)
MONOCYTES NFR BLD AUTO: 9.2 %
NEUTROPHILS # BLD AUTO: 5.74 X10 (3) UL (ref 1.5–7.7)
NEUTROPHILS # BLD AUTO: 5.74 X10(3) UL (ref 1.5–7.7)
NEUTROPHILS NFR BLD AUTO: 76.6 %
OSMOLALITY SERPL CALC.SUM OF ELEC: 294 MOSM/KG (ref 275–295)
PLATELET # BLD AUTO: 193 10(3)UL (ref 150–450)
POTASSIUM SERPL-SCNC: 3.4 MMOL/L (ref 3.5–5.1)
PROT SERPL-MCNC: 5.8 G/DL (ref 6.4–8.2)
RBC # BLD AUTO: 2.77 X10(6)UL
SODIUM SERPL-SCNC: 139 MMOL/L (ref 136–145)
TROPONIN I HIGH SENSITIVITY: 11 NG/L
WBC # BLD AUTO: 7.5 X10(3) UL (ref 4–11)

## 2023-01-07 PROCEDURE — 84484 ASSAY OF TROPONIN QUANT: CPT | Performed by: EMERGENCY MEDICINE

## 2023-01-07 PROCEDURE — 99285 EMERGENCY DEPT VISIT HI MDM: CPT

## 2023-01-07 PROCEDURE — 93010 ELECTROCARDIOGRAM REPORT: CPT

## 2023-01-07 PROCEDURE — 85025 COMPLETE CBC W/AUTO DIFF WBC: CPT | Performed by: EMERGENCY MEDICINE

## 2023-01-07 PROCEDURE — 36415 COLL VENOUS BLD VENIPUNCTURE: CPT

## 2023-01-07 PROCEDURE — 71045 X-RAY EXAM CHEST 1 VIEW: CPT | Performed by: EMERGENCY MEDICINE

## 2023-01-07 PROCEDURE — 93005 ELECTROCARDIOGRAM TRACING: CPT

## 2023-01-07 PROCEDURE — 80053 COMPREHEN METABOLIC PANEL: CPT | Performed by: EMERGENCY MEDICINE

## 2023-01-08 VITALS
SYSTOLIC BLOOD PRESSURE: 131 MMHG | WEIGHT: 160.94 LBS | BODY MASS INDEX: 28.52 KG/M2 | OXYGEN SATURATION: 97 % | DIASTOLIC BLOOD PRESSURE: 75 MMHG | HEART RATE: 68 BPM | RESPIRATION RATE: 16 BRPM | HEIGHT: 63 IN | TEMPERATURE: 98 F

## 2023-01-08 LAB
ATRIAL RATE: 326 BPM
Q-T INTERVAL: 436 MS
QRS DURATION: 156 MS
QTC CALCULATION (BEZET): 490 MS
R AXIS: -33 DEGREES
T AXIS: 84 DEGREES
TROPONIN I HIGH SENSITIVITY: 10 NG/L
VENTRICULAR RATE: 76 BPM

## 2023-01-08 RX ORDER — POTASSIUM CHLORIDE 1.5 G/1.77G
20 POWDER, FOR SOLUTION ORAL ONCE
Status: COMPLETED | OUTPATIENT
Start: 2023-01-08 | End: 2023-01-08

## 2023-01-08 NOTE — ED QUICK NOTES
Rounding Completed    Plan of Care reviewed. Elimination needs assessed. Pt resting comfortably on cot. Bed is locked and in lowest position. Call light within reach.

## 2023-01-10 ENCOUNTER — SNF VISIT (OUTPATIENT)
Dept: INTERNAL MEDICINE CLINIC | Age: 83
End: 2023-01-10

## 2023-01-10 DIAGNOSIS — R60.0 EDEMA OF BOTH LOWER EXTREMITIES: ICD-10-CM

## 2023-01-10 DIAGNOSIS — R79.1 SUPRATHERAPEUTIC INR: ICD-10-CM

## 2023-01-10 DIAGNOSIS — Z79.899 ENCOUNTER FOR MEDICATION REVIEW: ICD-10-CM

## 2023-01-10 DIAGNOSIS — K64.9 HEMORRHOIDS, UNSPECIFIED HEMORRHOID TYPE: ICD-10-CM

## 2023-01-10 DIAGNOSIS — R23.8 REDNESS AND SWELLING OF LOWER LEG: ICD-10-CM

## 2023-01-10 DIAGNOSIS — M79.89 REDNESS AND SWELLING OF LOWER LEG: ICD-10-CM

## 2023-01-10 PROCEDURE — 99310 SBSQ NF CARE HIGH MDM 45: CPT | Performed by: NURSE PRACTITIONER

## 2023-02-11 PROBLEM — E11.29 TYPE 2 DIABETES MELLITUS WITH OTHER DIABETIC KIDNEY COMPLICATION, WITHOUT LONG-TERM CURRENT USE OF INSULIN (CMD): Status: ACTIVE | Noted: 2023-01-01

## 2023-02-20 PROBLEM — E11.29 TYPE 2 DIABETES MELLITUS WITH OTHER DIABETIC KIDNEY COMPLICATION, WITHOUT LONG-TERM CURRENT USE OF INSULIN (CMD): Status: RESOLVED | Noted: 2023-01-01 | Resolved: 2023-01-01

## 2023-02-20 PROBLEM — I48.20 CHRONIC ATRIAL FIBRILLATION (CMD): Status: RESOLVED | Noted: 2022-01-01 | Resolved: 2023-01-01

## 2023-07-03 PROBLEM — S09.90XA CLOSED HEAD INJURY, INITIAL ENCOUNTER: Status: ACTIVE | Noted: 2023-01-01

## 2023-07-28 PROBLEM — R26.2 INABILITY TO AMBULATE DUE TO HIP: Status: ACTIVE | Noted: 2023-01-01

## 2023-08-15 NOTE — DIETARY NOTE
[facility]     Admitting diagnosis:  Acute pulmonary edema ([facility] [address] [J81.0]    Ht: 170.2 cm (5' 7\")  Wt: 89.5 kg (197 lb 5 oz). This is 146 % of IBW  BMI: Body mass index is 30.9 kg/m². IBW: Ideal body weight: 61. pt ANDREW from home, states he has his morrison changed once a month, was changed today but has had no urine output since.

## 2023-09-08 ENCOUNTER — TELEPHONE (OUTPATIENT)
Dept: NEUROSURGERY | Age: 83
End: 2023-09-08

## 2023-10-13 ENCOUNTER — APPOINTMENT (OUTPATIENT)
Dept: NEUROSURGERY | Age: 83
End: 2023-10-13

## 2023-10-23 ENCOUNTER — ANTI-COAG (OUTPATIENT)
Dept: CARDIOLOGY | Age: 83
End: 2023-10-23

## 2024-03-04 NOTE — PROGRESS NOTES
Post-hospital MD f/u for STEPHANIE; admitted on 3/27 for CHF and discharged 4/17. Pt had colonoscopy and EGD done IP and received 3 doses of venofer 200 mg. Pt is scheduled for repeat labs and 1 additional dose of iron today.      Education Record    Learner:  Pa MAR

## 2024-04-30 NOTE — TELEPHONE ENCOUNTER
Rcvd lab results from [facility] dated 2/17/17. BMP. Placed in MD bin for review and/or signature.
(1) 13 years and above

## 2024-10-02 NOTE — PLAN OF CARE
Problem: Impaired Activities of Daily Living  Goal: Achieve highest/safest level of independence in self care  Description  Interventions:  - Assess ability and encourage patient to participate in ADLs to maximize function  - Promote sitting position i Stable.

## (undated) DEVICE — DRESSING WND TELFA 5X4IN ABS NADH FILM ISLAND NONWOVEN POLY

## (undated) DEVICE — SYRINGE 1ML GRAD STRL MED LF DISP LL

## (undated) DEVICE — ENDOSCOPY PACK UPPER: Brand: MEDLINE INDUSTRIES, INC.

## (undated) DEVICE — Device: Brand: DEFENDO AIR/WATER/SUCTION AND BIOPSY VALVE

## (undated) DEVICE — BLADE SURG 11 STRL SS

## (undated) DEVICE — DRAPE .75 SHT FNFLD 76X52IN SURG CNVRT STRL LF DISP TIBURON

## (undated) DEVICE — SUTURE VICRYL 0 CT1 18IN CNTRL RELS BRAID 8 STRN COAT ABS J740D

## (undated) DEVICE — SUTURE ETHILON MTPS 3-0 PS2 18IN MONO NABSB BLK 1669H

## (undated) DEVICE — SUTURE VICRYL 3-0 X-1 18IN CNTRL RELS BRAID 8 STRN COAT ABS J790D

## (undated) DEVICE — BLADE BN MILL FINE 3.2MM STRL LF DISP

## (undated) DEVICE — OINTMENT POLYSPORIN .5 OZ

## (undated) DEVICE — GLOVE SURG 6.5 PROTEXIS LF CRM PF BEAD CUFF STRL PLISPRN

## (undated) DEVICE — PIN ADULT MAYFIELD WING GRV PLASTIC CRANIUM SKULL DISP STRL

## (undated) DEVICE — SUTURE VICRYL 0 UR-6 27IN BRAID COAT ABS VIOL J603H

## (undated) DEVICE — ELECTRODE PT RTN C30- LB 15FT CORD ADH STRIP VALLEYLAB REM

## (undated) DEVICE — REM POLYHESIVE ADULT PATIENT RETURN ELECTRODE: Brand: VALLEYLAB

## (undated) DEVICE — SNARE CAPTIFLEX STD OVAL OLY

## (undated) DEVICE — Device

## (undated) DEVICE — GOWN SURG XL L3 NONREINFORCE SET IN SLV STRL LF DISP BLUE

## (undated) DEVICE — ENDOSCOPY PACK - LOWER: Brand: MEDLINE INDUSTRIES, INC.

## (undated) DEVICE — 1200CC GUARDIAN II: Brand: GUARDIAN

## (undated) DEVICE — SPONGE GAUZE 6.75X6IN CTN ABS STRL LF BLK2

## (undated) DEVICE — GLOVE SURG 7.5 PROTEXIS LTX YLW PF SMTH BEAD CUFF STRL

## (undated) DEVICE — NEEDLE BLUNT 18GA 1.5IN REG WALL FILL LL HUB DEHP-FR STRL LF

## (undated) DEVICE — TOOL 14CM MIDAS REX LGND 3MM MATCH HEAD FLUTE DSCT STRL LF

## (undated) DEVICE — COVER PROBE FLX-FEEL 58X6IN OR 4 FLAG 2 SHT 24 PRINT STRL LF

## (undated) DEVICE — SPHERE STEALTH STATION 4-PACK

## (undated) DEVICE — PROBE DPLR 275MM OPTM VSB INTOP LOWPRFL 20MHZ BYNT STRL NS

## (undated) DEVICE — DRESSING ADH 8X4IN ABS NADH FILM ISLAND NONWOVEN KENDALL

## (undated) DEVICE — TOWEL OR BLU 16X26IN 4PK

## (undated) DEVICE — 3M™ RED DOT™ MONITORING ELECTRODE WITH FOAM TAPE AND STICKY GEL, 50/BAG, 20/CASE, 72/PLT 2570: Brand: RED DOT™

## (undated) DEVICE — COLLAR CRV MED LONG 23INX3.5IN NECK FOAM 16-21IN

## (undated) DEVICE — SPONGE SURG 3X.5IN CTTND ABS PRCS CUT RADOPQ PATTIE STRL LF

## (undated) DEVICE — GLOVE SURG 6.5 PROTEXIS LF BLUE PF SMTH BEAD CUFF INTLK STRL

## (undated) DEVICE — ELECTRODE ESURG BLADE 4IN .2IN 332IN INSULATE STD SHAFT XTD

## (undated) DEVICE — CATHETER IV 16GA 1.88IN RADOPQ DEHP-FR FEP BD ANGIOCATH

## (undated) DEVICE — DRESSING PETRO 8X1IN NADH OCL BCTRST LOWPRFL XEROFORM 3% BI

## (undated) DEVICE — TRAY STRL SPHERES

## (undated) DEVICE — TRAP 4 CPTR CHMBR N EZ INLN

## (undated) DEVICE — GLOVE SURG 6 PROTEXIS LF CRM PF BEAD CUFF STRL PLISPRN 11.5

## (undated) DEVICE — SPONGE SURG .5X.5IN CTTND ABS PRCS CUT RADOPQ PATTIE STRL LF

## (undated) DEVICE — FILTERLINE NASAL ADULT O2/CO2

## (undated) DEVICE — MEDI-VAC NON-CONDUCTIVE SUCTION TUBING: Brand: CARDINAL HEALTH

## (undated) DEVICE — COLLAR MIAMI J SELECT SET UNIV

## (undated) DEVICE — STEERABLE GUIDE CATHETER: Brand: STEERABLE GUIDE CATHETER

## (undated) DEVICE — GLOVE SURG 8 PROTEXIS LF BLUE PF SMTH BEAD CUFF INTLK STRL

## (undated) DEVICE — SPONGE GAUZE 2X2IN CTN 8 PLY WOVEN FOLD EDGE XRAY DTCT STRL

## (undated) DEVICE — IMPLANTABLE DEVICE
Type: IMPLANTABLE DEVICE | Site: SPINE CERVICAL | Status: NON-FUNCTIONAL
Removed: 2023-07-31

## (undated) DEVICE — MEDI-VAC SUCTION HANDLE REGULAR CAPACITY: Brand: CARDINAL HEALTH

## (undated) DEVICE — NEEDLE HPO 16GA 1.5IN REG WALL REG BVL LL HUB DEHP-FR STRL

## (undated) DEVICE — GLOVE SURG 7 PROTEXIS LF BLUE PF SMTH BEAD CUFF INTLK STRL

## (undated) DEVICE — FORCEP BIOPSY RJ4 LG CAP W/ND

## (undated) DEVICE — GLOVE SURG 7 PROTEXIS LF CRM PF BEAD CUFF STRL PLISPRN 12IN

## (undated) DEVICE — GOWN SURG LG L3 REINFORCE SET IN SLV STRL LF DISP BLUE

## (undated) DEVICE — TRAY CATH CMP CR LUBRI-SIL IC STLK SURESTEP 16FR FOLEY URMTR

## (undated) DEVICE — SOLUTION IRR 1000ML 0.9% NACL PLASTIC POUR BTL ISTNC N-PYRG

## (undated) DEVICE — SOLUTION PREP 70% ISO ALC 4OZ LF

## (undated) DEVICE — TUBING SCT 2FT 5MM FRZR MALE CNCT CLAMP STRL DISP

## (undated) DEVICE — SOLUTION SURG PRP 26ML 74% ISO ALC 0.7% IOD POVACRYLEX SELF

## (undated) DEVICE — PEN SURGMRK DEVON SKIN DISP REG TIP RULER CAP GENTIAN VIOL

## (undated) NOTE — IP AVS SNAPSHOT
[facility] [address] [place] [address], [address] ~ [phone]                Discharge Summary   4/22/2017    Olesya Balloon           Admission Information        Provider Department    4/22/2017 Mann Olivarez MD  3ne-A         T What changed:    - medication strength  - additional instructions        Take 1 tablet (5 mg total) by mouth nightly. Patient would like to resume 5mg M/Tue/thurs/fri/sat and 2.5mg Sun/Wed. HOLD today, INR tomorrow 4/25/17, then as as directed.     Urban Keita, Commonly known as:  COLACE        Take 100 mg by mouth 2 (two) times daily.                                Glucose Blood Strp   Commonly known as:  LUIS ALFREDO CONTOUR NEXT TEST        To test daily  Diagnosis E11.9    Saul Woods Last time this was given:  20 mEq on 4/24/2017  8:24 AM   Commonly known as:  K-DUR M20        Take 20 mEq by mouth daily.                             TAB-A-MAE MAXIMUM Tabs   Last time this was given:  1 tablet on 4/24/2017  8:23 AM        Take 1 tablet b [address], [address] [address] [facility] [address] [place] [address])    [address] [place] [address]   [phone]              Immunization History as of 4/24/2017  Reviewed on 4/22/2017    INFLUENZA 11/1/2016, Medications Sent Home None to return    Medications Returned:           Additional Information       We are concerned for your overall well being:    - If you are a smoker or have smoked in the last 12 months, we encourage you to explore options for quitti Use: Treat infections or suspected infection   Most common side effects:  Allergic reactions, rash, nausea, diarrhea   What to report to your healthcare team: Tolerance of medications, temperature, rash, itching, shortness of breath, chills, nausea, and emory What to report to your healthcare team:  Low blood sugar (less than 70) twice a week or high blood sugar (greater than 200) for more than 2-3 days           Narcotic Medications     HYDROcodone-acetaminophen 5-325 MG Oral Tab       Use:  Treat pain   Most Most common side effects: Impotence, decreased libido, low blood pressure, gynecomastia   What to report to your healthcare team: Dizziness, breast tissue enlargement, abnormal ejaculation             General Nerve Function Medications     Lacosamide (VIMP

## (undated) NOTE — LETTER
[facility] [address], [address]  Consent for Procedure/Sedation    Date: 08/06/2018    Time: 1406      1. I authorize the performance upon Radha Naylor the following: cholecystogram and possible change.   2. I authorize Dr. Governor Munroe to consent for patient: _________________________ patient: ___________________    Witness: _______________________________ Date: _____________________    Printed: 2018   2:06 PM  Patient Name: Sheri Apgar        : 1940       Medical Record #

## (undated) NOTE — LETTER
[facility] [address], [address]  Consent for Procedure/Sedation    Date: ___    Time: ___      1. I authorize the performance upon Krista Vazquez the following:  Mitral Clip     2.  I authorize Dr. Liz Irizarry ________________________________    ___________________    Witness: _________________________      Date: ___________________    Printed: 2019   7:41 AM  Patient Name: Karyle Cheng        : 1940       Medical Record #: [id number]

## (undated) NOTE — IP AVS SNAPSHOT
[facility] [place]  [address] [place], [address] ~ [phone]                Discharge Summary   1/5/2017    Mark Friday           Admission Information        Provider Department    1/5/2017 Dewayne Rosenbaum MD  7ne-A         Than Take 1 tablet (10 mg total) by mouth 3 (three) times daily.     Ivone Payton                                 DiltiaZEM HCl ER Coated Beads 240 MG Cp24   Last time this was given:  240 mg on 1/31/2017  8:22 AM   Commonly known as:  CARDIZEM CD   What ch Lacosamide 200 MG Tabs   Commonly known as:  VIMPAT   Next dose due:  1/14/17 PM   Notes to Patient:  Patient is refusing this medication. Take 1 tablet (200 mg total) by mouth 2 (two) times daily.     Joseline Rivera Commonly known as:  COZAAR                Where to Get Your Medications      Please  your prescriptions at the location directed by your doctor or nurse     Bring a paper prescription for each of these medications    - Bethanechol Chloride 10 MG Tab 3. Limit your fluid intake to no more than 2 liters or 64 ounces per day    4. Some exercise and activity is important to help keep your heart functioning and strong.  Unless instructed not to exercise, you may walk at a slow to moderate pace for 10-15 ramiro Follow up with Kenney Chaudhari MD. Schedule an appointment as soon as possible for a visit in 1 week.     Specialties:  Internal Medicine, IP Consult to Primary Care    Contact information:    [phone] [facility],[address] [place] [address] [phone] Is Patient allergic to iodinated contrast or has patient had a SEVERE reaction to anything?:      Does patient have poor kidney function or elevated Creatinine/BUN levels?:      BASIC METABOLIC PANEL (8)  0/75/8887 (Approximate) 1/16/2018    Questions: HgbA1C Glucose BUN Creatinine Calcium Alkaline Phosph AST    (12/16/16)  6.0 (H) (01/31/17)  92 (01/31/17)  22 (H) (01/31/17)  1.17 (H) (01/31/17)  8.9 (01/14/17)  195 (H) (01/14/17)  46 (H)      Metabolic Lab Results  (Last result in the past 90 days) _____________________________________________________________________________    Medication Side Effects - Medications to be taken at home  As your caregivers, we want you to be aware of the medications you are prescribed to take and their potential SIDE E What to report to your healthcare team: Bruising, blood in urine or stool, or nosebleeds             Blood Sugar Medications     METFORMIN  MG Oral Tab         Use:  Treat high blood sugar   Most common side effects: Low blood sugar:nausea, jitters, Use:  Treat conditions such as seizures, headaches, depression, anxiety and other neurologic conditions   Most common side effects:  Dizziness, drowsiness, headache, nausea/vomiting, somnolence   What to report to your healthcare team: Dizziness, Somno

## (undated) NOTE — ED AVS SNAPSHOT
[facility] Emergency Department    [place]  [address] [address]    Phone:  [phone]    Fax:  [phone] Sameer Campuzano   MRN: [id number]    Department:  [facility] Emergency Department   Date of Visit:  1/3/2 You have just completed an imaging study requiring an injection of iodinated contrast agent.   You have been identified as taking a medication containing metformin or similar medication that may interact with the contrast.    You should not take this drug to a primary care or a specialist physician for a follow-up visit, please tell this physician (or your personal doctor if your instructions are to return to your personal doctor) about any new or lasting problems.  The primary care or specialist physician w We are concerned for your overall well being:    - If you are a smoker or have smoked in the last 12 months, we encourage you to explore options for quitting.     - If you have concerns related to behavioral health issues or thoughts of harming yourself, THROMBI:  Partial thrombus in the mid and distal right superficial femoral vein and proximal and distal right popliteal vein. Complete thrombus in the proximal mid and distal right paired posterior tibial veins.              MyChart

## (undated) NOTE — IP AVS SNAPSHOT
Patient Demographics     Address  [address] [facility]  [place] [address] Phone  [phone] [facility]) E-mail Address  [email or web address]      Emergency Contact(s)     Name Relation Home Work [address] Son [phone] Oo Take 100 mg by mouth 2 (two) times daily. Sahara Vyas MD         atorvastatin 10 MG Tabs  Commonly known as:  LIPITOR  Next dose due:  Next dose due Saturday 9/9 evening    Then daily at bedtime as directed      Take 10 mg by mouth nightly. Next dose due:  Next dose due Saturday 9/9 evening  Then daily twice a day as directed      42 mcg by Nasal route 2 (two) times daily.           Lacosamide 200 MG Tabs  Commonly known as:  VIMPAT  Next dose due:  Next dose due Saturday 9/9 evening  Then david Next dose due:  Next dose due Lorenzo morning 9/10 then daily as directed      Take 20 mEq by mouth daily. TAB-A-MAE MAXIMUM Tabs  Next dose due:  Next dose due Lorenzo morning 9/10 then daily as directed      Take 1 tablet by mouth daily. [id number] furosemide (LASIX) tab 40 mg 09/08/17 1612 Given      [id number] furosemide (LASIX) tab 40 mg 09/09/17 0955 Given      [id number] heparin (PORCINE) drip 62658kzwhb/250mL infusion CONTINUOUS 09/09/17 0327 New Bag      [id number] levofloxacin (Ankit Serina The aPTT Heparin Therapeutic Range is approximately 65- 104 seconds. The therapeutic range has been validated against 0.3-0.7 heparin anti-Xa units/mL.       Specimen Information    Type Source Collected On   Blood — 09/08/17 2019          Components    Com  1940 MRN [id number]   [facility] 2NE-A Attending Dimas Montgomery [address] Day # 0 PCP John Deras MD     Chief Complaint:[RZ.1] Abdominal pain[RZ. 2]    History of Present Illness: Brendan Mcfarlane is a 78year old female with[R • KIDNEY STONE    • LAD (lymphadenopathy), cervical 1/18/2014    bx 2014- benign   • Lymph node enlargement 1/14/2014    Neck 2014   • Migraines    • Muscle weakness    • Near syncope 3/18/2015   • Neuropathy 9/16/2016    Severe both legs   • NSVT (nonsust • Diabetes Mother    • Diabetes Sister    • Heart Disease Sister    • Diabetes Brother    • Heart Disease Brother    • Cancer Brother        Allergies:   Lisinopril              Coughing  Mexitil [Mexiletine]    Rash    Medications:    No current facility- Multiple Vitamins-Minerals (TAB-A-MAE MAXIMUM) Oral Tab Take 1 tablet by mouth daily. Disp:  Rfl:    Calcium Carbonate-Vitamin D 600-400 MG-UNIT Oral Tab Take 1 tablet by mouth daily.  Disp:  Rfl:    acetaminophen (TYLENOL EXTRA STRENGTH) 500 MG Oral Tab T /89 (BP Location: Right arm)   Pulse 90   Temp 98.2 °F (36.8 °C) (Oral)   Resp 18   Ht 5' 6\" (1.676 m)   Wt 221 lb 5.5 oz (100.4 kg)   SpO2 96%   BMI 35.73 kg/m²   General: No acute distress. Alert and oriented x 3. HEENT: Normocephalic atraumatic. Will continue on[RZ. 1] IV[RZ.2] beta-blocker. 3. Atrial fibrillation-rate controlled[RZ.1]. NPO for now. [RZ.2]   Reversing Coumadin. 4. Type 2 diabetes-we will place on hyperglycemia protocol with erection factor insulin.   Patient currently n.p.o.  5. Hi Location [facility] [place] [address] Attending Aj Sheffield MD   Hosp Day #[VM.1] 0[VM. 2] PCP[VM.1] Kate Montelongo MD[VM. 2]     Reason for Consultation:  Oral anticoagulation in patients with known chronic A. fib and recent PE/DVT    History of Present Illness:[VM • High blood pressure    • HIGH CHOLESTEROL    • HYPERTENSION    • Insomnia 11/25/2013   • KIDNEY STONE    • LAD (lymphadenopathy), cervical 1/18/2014    bx 2014- benign   • Lymph node enlargement 1/14/2014    Neck 2014   • Migraines    • Muscle weakness • Cancer Brother       reports that she has never smoked. She has never used smokeless tobacco. She reports that she does not drink alcohol or use drugs. [VM.2]    Allergies:[VM.1]    Lisinopril              Coughing  Mexitil [Mexiletine]    Rash[VM. 2]    M (Oral)   Resp 18   Ht 5' 6\" (1.676 m)   Wt 221 lb 5.5 oz (100.4 kg)   SpO2 94%   BMI 35.73 kg/m²[VM. 2]   Temp (24hrs), Av.5 °F (36.9 °C), Min:98.2 °F (36.8 °C), Max:98.7 °F (37.1 °C)[VM.1]       Intake/Output Summary (Last 24 hours) at 17 0920 ICD (implantable cardioverter-defibrillator) in place    Chronic systolic congestive heart failure (HCC)    Partial symptomatic epilepsy with complex partial seizures, not intractable, without status epilepticus ([facility]    Acute cholecystitis    Biliary co Author:  Zachary Bernard PTA Service:  Rehab Author Type:  Physical Therapy Assistant    Filed:  9/7/2017 10:42 AM Date of Service:  9/7/2017 10:39 AM Status:  Signed    :  Zachary Bernard PTA (Physical Therapy Assistant)        PHYSICAL THERAPY • Frozen shoulder syndrome 6/18/2015    bilat severe   • Gallstones    • High blood pressure    • HIGH CHOLESTEROL    • HYPERTENSION    • Insomnia 11/25/2013   • KIDNEY STONE    • LAD (lymphadenopathy), cervical 1/18/2014    bx 2014- benign   • Lymph node OBJECTIVE[AR.1]  Precautions: Drain(s); Seizure (right )[AR.2]    WEIGHT BEARING RESTRICTION[AR.1]  Weight Bearing Restriction: None[AR.2]                PAIN ASSESSMENT[AR.1]   Ratin  Location: denies  Management Techniques: Repositioning; Activity pr ambulating in hallway increased distance with use of RW - c/o heaviness in BLE's. Pt performed all functional mobility in safe manner and denied pain.      Transfer to Mobility Team    THERAPEUTIC EXERCISES  Lower Extremity Alternating marching  Ankle pump AR.3 Myah Carballo, CRISTA on 9/7/2017 10:42 AM                     Occupational Therapy Notes (last 72 hours) (Notes from 9/6/2017  3:30 PM through 9/9/2017  3:30 PM)     No notes of this type exist for this encounter.       Video Swallow Study Notes

## (undated) NOTE — IP AVS SNAPSHOT
Patient Demographics     Address  [address] [place] [address]  [place] [address] Phone  [phone] [facility])  [phone] (Mobile) *Preferred* E-mail Address  [email or web address]      Emergency Contact(s)     Name Relation Home Work Mobile    Kodak Pacheco Son turn your head suddenly or extremely to look from side to side. Sitting  ? Sit with your knees at about the same level as your hips; use a footstool if needed. ? Firm chairs with straight backs give better support’ recliners are OK to use. ?  Change po ? Smoking will inhibit the spine from healing . ? Even one cigarette a day will cause problems. ? Chewing tobacco, nicotine gum or patches will also inhibit bone healing. Pain Management    Relaxation techniques  ?  A way to focus your attention ot [address] [facility]  [phone]             Schedule an appointment as soon as possible for a visit with Seamus Vann MD.    Specialty:  Family Practice  Contact information:  [address] Take 1 tablet (50 mg total) by mouth 2 (two) times daily. Ernie Barrett MD         PEG 3350 17 g Pack  Commonly known as:  MIRALAX      Take 17 g by mouth daily as needed.    Lori Anton MD         Potassium Chloride ER 20 MEQ Tbcr  Commonly kno [id number] CefTRIAXone Sodium (ROCEPHIN) 1 g in sodium chloride 0.9% 100 mL MBP/ADD-vantage 07/08/20 2146 New Bag      [id number] HYDROcodone-acetaminophen (NORCO) 5-325 MG per tab 2 tablet (Or Linked Group #2) 07/08/20 1938 Given      [id number] HYDROcodone Hold Gold Auto Resulted — — [facility])            PROTHROMBIN TIME (PT) [[id number]] (Abnormal)  Resulted: 07/09/20 0515, Result status: Final result   Ordering provider:  Merlinda Mathieu, APRN  07/08/20 2300 [facility]ing lab[facility]  [facility]ED[facility] Squamous Epi.  Cells None Seen Small /LPF — [facility])   Renal Tubular Epithelial Cells None Seen Small /LPF — [facility])   Transitional Cells None Seen Small /LPF — [facility])   Hyaline Casts Present None Seen /LPF A [facility])   [facility] Hosp Day #[RZ.1] 0[RZ. 2] PCP[RZ. Elsie Taveras MD[RZ.2]     Chief Complaint: Fall    History of Present Illness:[RZ.1] Nuvia Pacheco[RZ. 2] is a[RZ.3 68year old[RZ.2] female with hx of subdural hematoma, atrial fibrillation, COPD,HTN,diabetes, de trivial   • Pulmonary HTN ([address] 10/10/2014   • Rheumatoid arthritis ([address]    • S/P ICD (internal cardiac defibrillator) procedure 7/7/2016    medtronic    • S/P total knee replacement 6/18/2015    bilat 1990s, both severe OA   • Subdural hematoma ([address] 12 used smokeless tobacco. She reports that she does not drink alcohol or use drugs. [RZ.2]    Family History:[RZ.1]   Family History   Problem Relation Age of Onset   • Diabetes Father    • Heart Disease Father    • Diabetes Mother    • Diabetes Sister    • H amiodarone HCl 200 MG Oral Tab, Take 1 tablet (200 mg total) by mouth daily. , Disp: , Rfl: 0  Warfarin Sodium 5 MG Oral Tab, Take 5 mg by mouth See Admin Instructions.  Take 5 mg on Monday, Tuesday, Thursday, Friday, Saturday, Disp: , Rfl:   Warfarin Sodium Neurologic: No focal neurological deficits. CNII-XII grossly intact. Musculoskeletal: Moves all extremities. Extremities: No edema or cyanosis. Integument: No rashes or lesions. Psychiatric: Appropriate mood and affect.       Diagnostic Data:      Labs Joseph Jimenez DO  7/5/2020[RZ. 1]                      Electronically signed by Masha Olson DO on 7/6/2020  2:01 AM   Attribution Key    RZ. 1 - Masha Olosn DO on 7/5/2020  7:36 PM  RZ. 2 - Masha Olson • Congestive heart disease (HCC)    • COPD (chronic obstructive pulmonary disease) (HCC)    • Deep vein thrombosis (HCC)     R lower leg- 12/14/16, h/o IVC filter   • Dementia ([facility]    • Diabetes mellitus type 2, noninsulin dependent ([facility] [address]     Type !!   • reactive gastropathy, Dr. Jason Blanc GI   • ESOPHAGOGASTRODUODENOSCOPY (EGD) N/A 4/5/2019    Performed by Jose Miller DO at [facility] ENDOSCOPY   • HERNIA SURGERY  3/24/2011 [facility]    Repair of Incarcerated Complex Ventral Hernia w/mesh, bilater total) into the skin every 12 (twelve) hours. Discontinue when INR is 2.0 or greater. , Disp: 10 Syringe, Rfl: 2  traMADol HCl 50 MG Oral Tab, Take 1 tablet (50 mg total) by mouth every 8 (eight) hours as needed for Pain., Disp: 20 tablet, Rfl: 0  torsemide mouth daily. , Disp: , Rfl:   Calcium Carbonate-Vitamin D 600-400 MG-UNIT Oral Tab, Take 1 tablet by mouth daily. , Disp: , Rfl:[RZ.2]         Review of Systems:   A comprehensive 14 point review of systems was completed.     Pertinent positives and negatives Will admit to NICU and will place on ICH protocol. Neurosurgery and neuro CC on consult. Will repeat CT cervical spine in the am. Anticoagulation being reversed with FFp and KCentra. 2. C2 fracture- Pt is aspen collar. Neurosurgery on consult.   3. Atrial  1940 MRN [id number]   [facility] 6NE-A Attending Tahir Rodriguez MD   [facility] Day # 1 PCP Jun Guadalupe MD     REASON FOR CONSULTATION:  Fall    HISTORY OF PRESENT ILLNESS:  Selina Turner is a(n) 68year old female with PMHx signi cardiogenic   • Insomnia 11/25/2013   • Kidney stone    • Migraines    • Neuropathy     bilateral feet   • NSVT (nonsustained ventricular tachycardia) ([facility] [address] 7/11/2014    Recurrence 7.2016 Hx VT- s/p ICD implant on 7/7/16.      • Obesity, morbid, BMI 40.0-4 2/2 prolapse, no cancer   • INJECT EPIDURAL ANEST,LUMB,CONTINOUS  2012   • MITRACLIP N/A 4/11/2019    Performed by Muriel Rueda MD at [facility] [facility]  2016    surgery to relieve pressure on brain due to fall   • PACEMAKER/DEFIBRILLATOR      Medtronic Losartan Potassium 25 MG Oral Tab, Take 1 tablet (25 mg total) by mouth daily. , Disp: 90 tablet, Rfl: 3, 7/5/2020 at 1600  ursodiol 300 MG Oral Cap, Take 1 capsule (300 mg total) by mouth 2 (two) times daily. , Disp: 180 capsule, Rfl: 3, 7/5/2020 at 1600  a HYDROcodone-acetaminophen (NORCO) 5-325 MG per tab 2 tablet, 2 tablet, Oral, Q4H PRN  tiZANidine HCl (ZANAFLEX) tab 2 mg, 2 mg, Oral, Q6H PRN  diltiazem (CARDIZEM CD) 24 hr cap 120 mg, 120 mg, Oral, Daily  amiodarone HCl (PACERONE) tab 200 mg, 200 mg, Oral Resp 13   Ht 66\"   Wt 198 lb 13.7 oz (90.2 kg)   SpO2 94%   BMI 32.10 kg/m²   GENERAL:  Patient is a 68year old female in no acute distress. HEENT:  Normocephalic, atraumatic. NEUROLOGICAL:  This patient is alert and orientated x 3. Speech fluent.  Com BONES:  There is an acute minimally distracted (2-3 mm) fracture involving the midportion of the dense with minimal posterior angulation of the superior fracture fragment which is a stable finding.      CERVICAL DISC LEVELS:  C2-C3:  Mild degenerative disc atherosclerosis. Degenerative changes of the C1-C2 articulation. CONCLUSION:  Mildly displaced acute transverse fracture through the mid/superior aspect of the dens with mild posterior angulation of the superior fracture fragment.   The fracture plane 7/6/2020, 8:57 AM[MR.1]     Pt seen and examined with pa  Case discussed  69 yo female s/p fall with c2 fracture  gcs 15  Feels lousy  Ct reviewed  rec [place] j collar  X rays when able   following[WS.1]        Electronically signed by Yuly Low, of the superior fracture fragment.  The fracture plane measures 2.5 mm in thickness.  There is mild to moderate amount of epidural hematoma within the ventral aspect of the spinal canal at the C1-C2 level which results in mild spinal canal narrowing.      • Fracture of C1 vertebra, [facility] 07/2016   • Gallstones    • Hearing impairment     does not wear a hearing aid   • High blood pressure    • High cholesterol    • History of syncope     cardiogenic   • Insomnia 11/25/2013   • Kidney stone    • Migra Repair of Incarcerated Complex Ventral Hernia w/mesh, bilateral component separation, removal previous mesh, partial omentectomy, lysis of adhesions on 3/24/2011:  PCP:  Dr. Haylee Barnes   • [address]     • HYSTERECTOMY      2/2 prolapse, no cancer How much help from another person does the patient currently need. ..[CY.1]   -   Moving to and from a bed to a chair (including a wheelchair)?: A Little   -   Need to walk in hospital room?: A Little   -   Climbing 3-5 steps with a railing?: Total[CY. 2] Pt making slow progress toward functional goals and is limited by pain . The AM-PAC '6-Clicks' Inpatient Basic Mobility Short Form was completed and this patient is demonstrating a 61% degree of impairment in mobility.  Research supports that patients wi 7/5/2020 from home following a fall.   The pt bent forward to  her water bottle cap, lost her balance and hit her head on the TV  Pt diagnosed with epidural hematoma.       Therapy significant imaging (date, test, result):  7/5/20, CT Cervical Spine: • Deep vein thrombosis (HCC)     R lower leg- 12/14/16, h/o IVC filter   • Dementia (HCC)    • Depression    • Diabetes ([facility]    • Diabetes mellitus type 2, noninsulin dependent ([facility]     Type !!   • Diabetic peripheral neuropathy associated with type 2 di Performed by Lorena Guzmán DO at [facility] MAIN OR   • CT DRAIN CHOLECYSTOSTOMY (YTB=[id number])     • EGD  04/05/2019    reactive gastropathy, Dr. Negin Rodriguez, [facility] GI   • ESOPHAGOGASTRODUODENOSCOPY (EGD) N/A 4/5/2019    Performed by Varinder Alba DO at [facility] -   Sitting down on and standing up from a chair with arms (e.g., wheelchair, bedside commode, etc.): A Little   -   Moving from lying on back to sitting on the side of the bed?: A Lot   How much help from another person does the patient currently need. .. completed and this patient is demonstrating a 5[CY.1]7[CY.2]% degree of impairment in mobility. Research supports that patients with this level of impairment may benefit from ALYSHA.       DISCHARGE RECOMMENDATIONS  PT Discharge Recommendations: Sub-acute reha Therapy significant imaging (date, test, result):  7/5/20, CT Cervical Spine: Mildly displaced acute transverse fracture through the mid/superior aspect of the dens with mild posterior angulation of the superior fracture fragment.  The fracture plane measu Type !!   • Diabetic peripheral neuropathy associated with type 2 diabetes mellitus ([facility] 09/16/2016    Severe both legs   • Diverticulitis    • Essential hypertension    • Fracture of C1 vertebra, closed ([facility] 07/2016   • Gallstones    • Hearing impairme • ESOPHAGOGASTRODUODENOSCOPY (EGD) N/A 4/5/2019    Performed by Dalila Swanson DO at [facility] ENDOSCOPY   • HERNIA SURGERY  3/24/2011 [facility]    Repair of Incarcerated Complex Ventral Hernia w/mesh, bilateral component separation, removal previous mesh, par -   Moving from lying on back to sitting on the side of the bed?: A Lot[CY. 2]   How much help from another person does the patient currently need. ..[CY.1]   -   Moving to and from a bed to a chair (including a wheelchair)?: A Little   -   Need to walk in h fracture  . Pt will continue to benefit from ongoing IP PT to maximize functional independence. The AM-PAC '6-Clicks' Inpatient Basic Mobility Short Form was completed and this patient is demonstrating a 54.16% degree of impairment in mobility.  Research Presenting Problem: fall, odontoid fracture[KK. 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 Giovanna Landau, John MOREIRA (during inpatient stay)   • COLONOSCOPY N/A 4/5/2019    Performed by Shanta Sullivan DO at [facility] Left 12/4/2 Patient self-stated goal is to have better pain control. OBJECTIVE[KK.1]  Precautions: Seizure;Cervical brace(-150)  Fall Risk: High fall [address]. 2]    WEIGHT BEARING RESTRICTION[KK. 1]  Weight Bearing Restriction: None[KK. 2]                PAIN Supine to Sit : Minimum assistance  Sit to Stand: Minimum assistance[KK. 2]    Skilled Therapy Provided: PPE worn: surgical mask, gloves.   Pt was received supine in bed for session, pt educated on role of OT and spine precautions, pt edcuated on log roll te Client Assessment/Performance Deficits MODERATE - Comorbidities and min to mod modifications of tasks    Clinical Decision Making MODERATE - Analysis of occupational profile, detailed assessments, several treatment options    Overall Complexity MODERATE[KK :  Leana Horne (Speech and Language Pathologist)       SPEECH DAILY NOTE - INPATIENT    ASSESSMENT & PLAN   ASSESSMENT  Pt seen for dysphagia tx to assess tolerance with recommended diet, ensure proper utilization of aspiration precautions and [facility] MS CCC-SLP  Pager [phone][JL.1]      Electronically signed by Lianna Velaqsuez on 7/7/2020 11:34 AM   Attribution Key    JL. 1 - Lianna Velasquez on 7/7/2020 11:28 AM                     Immunizations     Name Date      INFLUENZA 10/01/19

## (undated) NOTE — ED AVS SNAPSHOT
Sheela Velasquez   MRN: [id number]    Department:  [facility] Emergency Department   Date of Visit:  8/31/2018           Disclosure     Insurance plans vary and the physician(s) referred by the ER may not be covered by your plan.  Please contact your tell this physician (or your personal doctor if your instructions are to return to your personal doctor) about any new or lasting problems. The primary care or specialist physician will see patients referred from the [facility] Emergency Department.  Rajesh Parada

## (undated) NOTE — Clinical Note
I had the pleasure of seeing Ms. Silvestre Calvo in my office today. Please see my attached note.       Idania Henderson

## (undated) NOTE — ED AVS SNAPSHOT
Courtney Harrison   MRN: [id number]    Department:  [facility] Emergency Department   Date of Visit:  9/29/2018           Disclosure     Insurance plans vary and the physician(s) referred by the ER may not be covered by your plan.  Please contact your tell this physician (or your personal doctor if your instructions are to return to your personal doctor) about any new or lasting problems. The primary care or specialist physician will see patients referred from the [facility] Emergency Department.  Michelle Grijalva

## (undated) NOTE — LETTER
Anne-Marie Mason [address]  Zak, [address] [place] [address]    Consent for Operation  Date: _____April 54,2019_____________                                Time: ___1400____________    1.  I authorize the performance upon Sandra Munroe the following opera procedure has been videotaped, the surgeon will obtain the original videotape. The hospital will not be responsible for storage or maintenance of this tape.   8. For the purpose of advancing medical education, I consent to the admittance of observers to the STATEMENTS REQUIRING INSERTION OR COMPLETION WERE FILLED IN.     Signature of Patient:   ___________________________    When the patient is a minor or mentally incompetent to give consent:  Signature of person authorized to consent for patient: ____________ drugs/illegal medications). Failure to inform my anesthesiologist about these medicines may increase my risk of anesthetic complications. iv. If I am allergic to anything or have had a reaction to anesthesia before.   3. I understand how the anesthesia med I have discussed the procedure and information above with the patient (or patient’s representative) and answered their questions. The patient or their representative has agreed to have anesthesia services.     _______________________________________________

## (undated) NOTE — IP AVS SNAPSHOT
[address]            (For Outpatient Use Only) Initial Admit Date: 7/5/2020   Inpt/Obs Admit Date: Inpt: 7/5/20 / Obs: N/A   Discharge Date:    Mackenzie Copier:  [de-identified]   MRN: [de-identified]   CSN: [id number]   CEID: [id number] Group Number:  Insurance Type:    Subscriber Name:  Subscriber :    Subscriber ID:  Pt Rel to Subscriber:    Hospital Account Financial Class: Medicare Advantage    2020

## (undated) NOTE — ED AVS SNAPSHOT
Ramandeep Anne   MRN: [id number]    Department:  [facility] Emergency Department   Date of Visit:  12/30/2019           Disclosure     Insurance plans vary and the physician(s) referred by the ER may not be covered by your plan.  Please contact you tell this physician (or your personal doctor if your instructions are to return to your personal doctor) about any new or lasting problems. The primary care or specialist physician will see patients referred from the [facility] Emergency Department.  Augusto Dupree

## (undated) NOTE — MR AVS SNAPSHOT
After Visit Summary   6/9/2017    Dylan Palacios    MRN: [id number]           Diagnoses this Visit     DVT of axillary vein, chronic right ([address]    -  Primary     Chronic deep vein thrombosis (DVT) of proximal vein of lower extremity, unspecified la mouth nightly. Bethanechol Chloride 10 MG Oral Tab Take 1 tablet (10 mg total) by mouth 3 (three) times daily. ondansetron 4 MG Oral Tablet Dispersible Take 4 mg by mouth every 8 (eight) hours as needed for Nausea.     HYDROcodone-acetaminophen 5-325 Friday June 16, 2017 7:30 AM     Appointment with [facility] at [address] [place] [address] [phone] [facility] [address]            Juan     [facility] with your [facility]

## (undated) NOTE — LETTER
[facility] [address], [address]  Consent for Procedure/Sedation    Date: 04/01/2019    Time: 14:30      1. I authorize the performance upon Annamary Mal the following:  Transesophageal Echocardiogram     2.  I authorize Dr. [facility] ________________________________    ___________________    Witness: _________________________      Date: ___________________    Printed: 2019   2:32 PM  Patient Name: Betty Dennison        : 1940       Medical Record #: [id number]

## (undated) NOTE — LETTER
[facility] [address], [address] [place] [address]    Consent for Anesthesia   1.    Agnieszka Ash agree to be cared for by an anesthesiologist, who is specially trained to monitor me and give me medicine to put me to sleep or keep me comfortab vision, nerves, or muscles and in extremely rare instances death. 5. My doctor has explained to me other choices available to me for my care (alternatives).   6. Pregnant Patients (“epidural”):  I understand that the risks of having an epidural (medicine g

## (undated) NOTE — LETTER
[facility] [address], [address]  Consent for Procedure/Sedation    Date: 08/06/2018    Time: 1408      1. I authorize the performance upon Jaziel Rose the following: CHOLECYSTOGRAM AND POSSIBLE CHANGE.   2. I authorize Dr. Erin Wiggins to consent for patient: _________________________ patient: ___________________    Witness: _______________________________ Date: _____________________    Printed: 2018   2:08 PM  Patient Name: Crystal Mercado        : 1940       Medical Record #

## (undated) NOTE — Clinical Note
FYI, TCM call made, see notes. NCM confirmed appointment on 8/17/18, NCM changed to TCM HFU visit type.

## (undated) NOTE — Clinical Note
Pt does not have HFU appt scheduled at this time. Dtr refusing to schedule. TE sent to triage to f/u. Thank you!

## (undated) NOTE — Clinical Note
I had the pleasure of seeing MsJose Buzz Camacho in my office today. Please see my attached note.       Madelyn Harris

## (undated) NOTE — Clinical Note
BATOOL: Spoke with patient's daughter for TCM call. She declined to schedule HFU in TCC or with PCP. Pt does not have swelling or s/s CHF exacerbation at time of call.

## (undated) NOTE — Clinical Note
FYI- dtr refusing TCM, but is high risk. Would really benefit from TCM. TE sent to triage to f/u. Thank you!

## (undated) NOTE — Clinical Note
I had the pleasure of seeing Ms. Mahin Morrison in my office today. Please see my attached note.       Kamala Hall

## (undated) NOTE — IP AVS SNAPSHOT
[address]            (For Outpatient Use Only) Initial Admit Date: 2022   Inpt/Obs Admit Date: Inpt: N/A / Obs: 22   Discharge Date:    Hospital Acct:  [de-identified]   MRN: [de-identified]   CSN: [id number]   CEID: [id number]        ENCOUNTER  Patient Class: Observation Admitting Provider: Evita Sofia MD Unit: [facility] 3SW-A   Hospital Service: Medical Attending Provider: Celine Maguire DO   Bed: 380-A   Visit Type:   Referring Physician: No ref. provider found Billing Flag:    Admit Diagnosis: Acute UTI [N39.0]      PATIENT  Legal Name:   Katelin Cao   Legal Sex: Female  Gender ID:              [address] [facility] Name:    PCP:   Home: [phone]   Address:  [address] [place] [address] : 1940 (82 yrs) Mobile: [phone]         City/[facility]/Zip: [facility] [address] Marital:  Language: Melissa Stevenson: Alan SSN4: [id number] Nondenominational: [address] Not David Foil*     Race: White Ethnicity: Non  Or [facility]   Name Relationship Legal Guardian? Home Phone Work Phone Mobile Phone   1. Andrew Manifold  2. Kodak Pacheco of [place]  Son    [phone]     GUARANTOR  Guarantor:  CUONG PACHECO : 1940 Home Phone: [phone]   Address: [address] [place] [address]  Sex: Female Work Phone:    City/State/Zip: [place] [address]   Rel. to Patient: Self Guarantor ID: [id number]   [address]   Employer:  Status: RETIRED     COVERAGE  PRIMARY INSURANCE   Payor: [employer] CR[employer] [facility] Plan: BCBS MEDICARE ADV PPO   Group Number: [id number] Insurance Type: [address] Name: Eleazar Mac : 1940   Subscriber ID: [id number] Pt Rel to Subscriber: Self   SECONDARY INSURANCE   Payor:  Plan:    Group Number:  Insurance Type:    Subscriber Name:  Subscriber :    Subscriber ID:  Pt Rel to Subscriber:    TERTIARY INSURANCE   Payor:  Plan:    Group Number:  Insurance Type:    Subscriber Name:  Subscriber : Subscriber ID:  Pt Rel to Subscriber:    Hospital Account Financial Class: Medicare Advantage    December 12, 2022

## (undated) NOTE — LETTER
Consent to Procedure/Sedation    Date: 11-9-2017    Time: 1100    1. I authorize the performance upon Alphonso Alleman the following:    Cholangiogram possible tube removal possible exchange    2.  I authorize Dr. Royal Toney (and whomever is designated as the doctor Witness: ____________________     Date: ______________    Printed: 2017   11:03 AM    Patient Name: Clara Coleman        : 1940       Medical Record #: [id number]

## (undated) NOTE — IP AVS SNAPSHOT
[address]            (For Outpatient Use Only) Initial Admit Date: 5/9/2020   Inpt/Obs Admit Date: Inpt: 5/9/20 / Obs: N/A   Discharge Date:    Krista Lord:  [de-identified]   MRN: [de-identified]   CSN: [id number]   CEID: [id number] Subscriber ID:  Pt Rel to Subscriber:    Hospital Account Financial Class: Medicare Advantage    May 15, 2020

## (undated) NOTE — LETTER
[address] [facility] FOR BLOOD OR BLOOD COMPONENTS      In the course of your treatment, it may become necessary to administer a transfusion of blood or blood components.  This form provides basic information concerning this proc explain the alternatives to you if it has not already been done. I,Shivani Pacheco, have read/had read to me the above. I understand the matters bearing on the decision whether or not to authorize a transfusion of blood or blood components.  I have no quest

## (undated) NOTE — LETTER
[facility] [address], [address]  Consent for Procedure/Sedation    Date:     Time:       1. I authorize the performance upon Siddharth Bustamante the following:  CHOLANGIOGRAM, POSSIBLE REMOVAL OF DRAINAGE TUBE     2.  I authorize  ________________________________    ___________________    Witness: _________________________      Date: ___________________    Printed: 10/18/2017   10:52 AM  Patient Name: Gwenlyn Siemens        : 1940       Medical Record #: [id number]

## (undated) NOTE — LETTER
Consent to Procedure/Sedation    Date: 8/31/2017    Time: _______________    1. I authorize the performance upon Courtney Harrison the following:    Cholecystostomy Tube Placement    2.  I authorize Dr. Fernando Bradford (and whomever is designated as the doctor’s assistant) ___________________________    ___________________    Witness: ____________________     Date: ______________    Printed: 2017   2:49 PM    Patient Name: Courtney Harrison        : 1940       Medical Record #: [id number]

## (undated) NOTE — LETTER
September 13, 2018    [address] [place] [address] [address] [facility], [address] [facility]    Dear Gabriel Velazquez: It was a pleasure speaking with you over the phone recently regarding your mother Anahy Vyas.  To follow up, I wanted to send you some contact information to util

## (undated) NOTE — IP AVS SNAPSHOT
Patient Demographics     Address Phone E-mail Address    [address] [facility] [address]  [address] [facility] [phone] (Home) *Preferred*  [phone] [facility]) [email or web address]      Emergency Contact(s)     Name Relation Home Work [address], 1. An appointment has been made for you to see your doctor or healthcare provider within 7 days of hospital discharge. It is important that you attend this appointment to make sure your symptoms are under control.      2. Your recommended sodium intake is 1 Contact information:    [address], [address]  [phone]          Follow up with Olga Vargas MD. Schedule an appointment as soon as possible for a visit in 1 week.     Specialties:  Internal Medicine, IP Consult to Primary Care Commonly known as:  VALISONE        Apply 1 Application topically daily.  To legs    Harry Middleton                           Bethanechol Chloride 10 MG Tabs   Last time this was given:  20 mg on 1/31/2017  8:22 AM   Commonly known as:  URECHOLINE   Next dose Take 1 tablet (50 mg total) by mouth 2x Daily(Beta Blocker). Harry Middleton                           ondansetron 4 MG Tbdp   Commonly known as:  ZOFRAN-ODT        Take 4 mg by mouth every 8 (eight) hours as needed for Nausea. [id number] Atorvastatin Calcium (LIPITOR) tab 15 mg 01/30/17 2101 Given      [id number] Bethanechol Chloride (URECHOLINE) tab 20 mg 01/30/17 1718 Given      [id number] Bethanechol Chloride (URECHOLINE) tab 20 mg 01/30/17 2101 Given      [id number] Bethanechol Components       Value Reference Range Flag Lab   Magnesium 2.3 1.7-3.0 mg/dL  [employer]            BASIC METABOLIC PANEL (8) [[id number]] (Abnormal)  Resulted: 01/31/17 4945, Result status: Final result    Ordering provider: CRAIG Romo  01/30/17 Collected:  01/07/17 3926    Order Status:  Completed Lab Status:  Final result Updated:  01/09/17 6314    Specimen Information:  Urine from Urine, clean catch      Urine Culture >100,000 CFU/ML Escherichia coli (A)     Susceptibility      Escherichia coli hematology and risk of anticoagulation outweighed benefits. Patient was subsequently discharged to rehab. Patient returned to ED yesterday due to increased RLE swelling. Venous doppler ultrasound revealed RLE DVT.  Patient was discharged back to rehab and w • S/P ICD (internal cardiac defibrillator) procedure 7/7/2016     medtronic    • Insomnia 11/25/2013   • Obstructive sleep apnea (adult) (pediatric) 6/29/2012     Cannot tolerate CPAP machine- uses oxygen periodically    • Asthma    • High blood pressure daily. Disp: 90 tablet Rfl: 3   Lacosamide (VIMPAT) 200 MG Oral Tab Take 1 tablet (200 mg total) by mouth 2 (two) times daily.  Disp: 60 tablet Rfl: 1   METFORMIN  MG Oral Tab TAKE 1 TABLET BY MOUTH TWO TIMES A DAY WITH MEALS Disp: 180 tablet Rfl: 1 Respiratory: Clear to auscultation bilaterally. No wheezes. No rhonchi. Cardiovascular: Irregularly irregular   Chest and Back: No tenderness or deformity. Abdomen: Soft, nontender, nondistended. Positive bowel sounds.  No rebound, guarding or organomega 2. No AC due to recent SDH  3. Cardio consulted  4. Rates controlled  11. Chronic systolic heart failure  1. Continue cardiac meds  12. Physical deconditioning  1. PT  2. Likely back to acute rehab upon d/c  3. SW consult  13. Obesity  1.  BMI 37      Quali neurosurgery and repeat evacuation was not performed. Patient had IVC filter placed by IR on 12/16/16 due to DVT. On 12/20, patient developed increasing SOB and CTA chest revealed right-sided PE.  Patient was seen by hematology and risk of anticoagulation o • Abnormal gait 11/25/2013   • Pulmonary HTN ([facility] 10/10/2014   • NSVT (nonsustained ventricular tachycardia) ([facility] 7/11/2014     Recurrence 7.2016 Hx VT- s/p ICD implant on 7/7/16.      • Bilateral edema of lower extremity 11/18/2015   • S/P ICD (internal Daily(Beta Blocker). Disp: 180 tablet Rfl: 1   torsemide (DEMADEX) 20 MG Oral Tab Take 1 tablet (20 mg total) by mouth daily. New dose. Disp: 180 tablet Rfl: 3   Amiodarone HCl 100 MG Oral Tab Take 3 tablets (300 mg total) by mouth daily.  Disp: 90 tablet R Physical Exam:    There were no vitals taken for this visit. General: No acute distress. Alert and oriented x 3. HEENT: Normocephalic atraumatic. Moist mucous membranes. EOM-I. Neck: No JVD. No carotid bruits.   Respiratory: Clear to auscultation bilate 7. Recent UTI  1. Complete course of bactrim  8. Essential HTN  1. Controlled  9. DMII  1. SSI  10. Chronic afib  1. Continue amio/dig  2. No AC due to recent SDH  3. Cardio consulted  4. Rates controlled  11. Chronic systolic heart failure  1.  Continue ca [facility] admission in the last 6 weeks. On December 1 she was admitted to the hospital following a fall in her driveway in which she sustained head injury. She presented with headache, nausea, blurred vision as well as some a aphasia.   She was see therapy including infusion of TPA and heparin. Her second examination in the Cath Lab was yesterday but due to anxiety she need to be intubated. We are asked to assist with ventilator management.   At present the patient appears comfortable and is on corbin • Diabetes Brother    • Heart Disease Brother    • Cancer Brother       reports that she has never smoked. She has never used smokeless tobacco. She reports that she does not drink alcohol or use illicit drugs.     Medications:    Prescriptions prior to adm Multiple Vitamins-Minerals (TAB-A-MAE MAXIMUM) Oral Tab Take 1 tablet by mouth daily. Disp:  Rfl:  1/5/2017 at 0800   Calcium Carbonate-Vitamin D 600-400 MG-UNIT Oral Tab Take 1 tablet by mouth daily.  Disp:  Rfl:  1/5/2017 at 0800   acetaminophen (TYLENOL 0401   RBC  2.72*  2.80*  2.86*   HGB  9.1*  9.4*  9.4*   HCT  27.1*  27.8*  28.2*   MCV  99.6  99.3  98.6   MCH  33.5*  33.6*  32.9   MCHC  33.6  33.8  33.3   RDW  14.2  14.0  14.1   WBC  10.2  9.1  8.2   PLT  131.0*  116.0*  111.0*     Recent Labs   Lab will await instructions from the cardiology team regarding weaning the patient. In the meantime she appears to be stable with acceptable vital signs on her current ventilator settings.   That being said her constellation of problems with certainly classify 07/05/2016. This was a routine echocardiographic study. Transthoracic echocardiography for ventricular function evaluation, assessment of valvular function, and evaluation of pulmonary pressures. Image quality was adequate.  ECG rhythm:   Paced rhythm -- There was no evidence for stenosis. There was mild regurgitation. Aortic valve:   Structurally normal valve. Trileaflet. Cusp separation was normal.  Doppler:  Transvalvular velocity was within the normal range. There was no stenosis. No regurgitation.  Johnnie Enriquez volume/bsa, ES, 1-p A2C                     89    ml/m^2 ---------  LA ID, A-P, ES, MM                      (H)    5.5   cm     2.7 - 3.8  LA ID/bsa, A-P, ES, MM                  (H)    2.6   cm/m^2 1. 5 - 2.3  LA/aortic root ratio, MM PHYSICAL THERAPY TREATMENT NOTE - INPATIENT    Room Number: 7377/0793-V     Session: 6   Number of Visits to Meet Established Goals: 10 (5 sessions added 1/31/17)    Presenting Problem: right ileofemoral femoral thrombosis, lytic therapy 1/11/17 • Insomnia 11/25/2013   • Obstructive sleep apnea (adult) (pediatric) 6/29/2012     Cannot tolerate CPAP machine- uses oxygen periodically    • Asthma    • High blood pressure    • Diabetes (HCC)    • Stroke [facility])    • Migraines    • Muscle weakness    • R How much help from another person does the patient currently need. ..   -   Moving to and from a bed to a chair (including a wheelchair)?: A Little   -   Need to walk in hospital room?: A Little   -   Climbing 3-5 steps with a railing?: A Lot    AM-PAC Scor PT Treatment Plan: Bed mobility; Body mechanics; Endurance; Energy conservation;Patient education; Family education;Gait training;Range of motion;Strengthening;Transfer training;Balance training    CURRENT GOALS   Goal #1  Patient is able to demonstrate supine • Visual impairment    • Cataract      RIGHT   • Lymph node enlargement 1/14/2014     Neck 2014   • LAD (lymphadenopathy), cervical 1/18/2014     bx 2014- benign   • 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      OBJECTIVE  Precautions: Limb alert - left    WEIGHT BEARING RESTRICTION  Weight in the B LE's. Pt with supervision for sit<>stand. Pt ambulates to bathroom without AD and supervision level. Pt uses RW for longer distances and required VC's for upright posture. Pt able to ascend/descend 1 step with CGA.      THERAPEUTIC EXERCISES  Lower Occupational Therapy Notes (last 72 hours) (Notes from 1/28/2017  2:05 PM through 1/31/2017  2:05 PM)      Occupational Therapy Note by Og Mehta OT at 1/31/2017 11:36 AM  Version 1 of 1    Author:  Og Mehta OT Service:  (none) Author Type:  Occ • UTI (lower urinary tract infection) 3/18/2015   • Arthritis of shoulder region, left, degenerative 3/18/2015     mod   • Obesity, morbid, [facility] 40.0[facility]49.9 [facility] 6/18/2015   • S/P total knee replacement 6/18/2015     bilat 1990s, both severe OA   • Bilateral Rating: Unable to rate  Location: RLE  Management Techniques: Breathing techniques;Relaxation;Repositioning     ACTIVITY TOLERANCE  O2 wfl    ACTIVITIES OF DAILY LIVING ASSESSMENT  AM-PAC ‘6-Clicks’ Inpatient Daily Activity Short Form  How much help from a improvement in toileting and standing tolerance. Pt currently requires supervision for functional transfers, mod (I) for UB ADL, and Min (A) for LB ADL.  Recommend skilled OT in the [facility] hospital setting to increase independence with ADL/functional transfe Presenting Problem: Right Leg DVT    History related to current admission: 78 y.o female admitted 1/5/17 from [facility] rehab due to acute R LE pain. Imaging + for acute R LE DVT.    Pt diagnosed with right iliofem thrombosis, lytic therapy performed 1/11/17.   [facility] • NSVT (nonsustained ventricular tachycardia) ([facility] 7/11/2014     Recurrence 7.2016 Hx VT- s/p ICD implant on 7/7/16.      • Bilateral edema of lower extremity 11/18/2015   • S/P ICD (internal cardiac defibrillator) procedure 7/7/2016     medtronic    • Ins -   Putting on and taking off regular upper body clothing?: None  -   Taking care of personal grooming such as brushing teeth?: None  -   Eating meals?: None    AM-PAC Score:  Score: 22  Approx Degree of Impairment: 25.8%  Standardized Score (AM-PAC Scale) Additional Information:  Spoke with  about potential change in discharge plan.          OT Discharge Recommendations: Home with home health PT/OT (pending continued progress)  OT Device Recommendations: None    PLAN  OT Treatment Plan: Balance chronic SDH with craniotomy performed 12/4/16(fall), recent PE 12/20/16, recent UTI, DM II, afib. See below for more detailed hx.       Problem List  Principal Problem:    Right leg DVT (HCC)  Active Problems:    Chronic a-fib (HCC)    Atypical chest pain Cannot tolerate CPAP machine- uses oxygen periodically    • Asthma    • High blood pressure    • Diabetes ([facility])    • Stroke [facility])    • Migraines    • Muscle weakness    • Rheumatoid arthritis ([facility])    • Osteoarthritis        Past Surgical History      Pa Supine to Sit : Not tested  Sit to Stand: Supervision    Skilled Therapy Provided: Pt received up in the chair for session. Collaborated with pt on methods to complete LE dressing. Pt able to simulate techniques for donning pants.   Spoke with pt about en training;UE strengthening/ROM; Endurance training;Patient/Family education;Patient/Family training;UE splinting;Fine motor coordination activities      ADL Goals  Patient will perform all ADLs: with supervision- ongoing    Functional Transfer Goals  Patient

## (undated) NOTE — MR AVS SNAPSHOT
[facility] [address]  [facility]  [phone]               Thank you for choosing us for your health care visit with CRAIG Heaton.   We are glad to serve you and happy to provide you 7am-3pm plus most Saturday 830am-12p. call [phone] to schedule  •To schedule Imaging or tests at [facility] Scheduling [phone], Go to [facility] A ER Building (For example: CT scans, X rays, Ultrasound, MRI)  •Cardiac Testing in ER building Bu and must be picked up from the office in person. Narcotic medications can only be filled in 30 day increments and must be refilled at an office visit only. If your prescription is due for a refill, you may be due for a follow-up appointment.   We cannot r Take 100 mg by mouth 2 (two) times daily.    Commonly known as:  COLACE           Glucose Blood Strp   To test daily  Diagnosis E11.9   Commonly known as:  LUIS ALFREDO CONTOUR NEXT TEST           HYDROcodone-acetaminophen 5-325 MG Tabs   Take 1 tablet by mouth 2 If you have questions, you can call [phone] to talk to our [facility] Staff. Remember, [employer] is NOT to be used for urgent needs. For medical emergencies, dial 911. Visit [email or web address] to learn more.         Educational Infor

## (undated) NOTE — LETTER
Consent to Procedure/Sedation    Date: 8/1/2018    Time: _______________    1. I authorize the performance upon Petros Rocha the following:    Cholecystostomy Tube Insertion    2.  I authorize Dr. Martir Jaimes whomever is designated as the [employer] Witness: ____________________     Date: ______________    Printed: 2018   11:42 AM    Patient Name: Karyle Cheng        : 1940       Medical Record #: [id number]

## (undated) NOTE — LETTER
[facility]  [address] [facility], [address] [place] [address]    Consent for Operation    Date: __________________    Time: _______________    1. I authorize the performance upon Annamary Mal the following operation:    Venous thrombectomy/lysis    2.  I au videotape. The [facility] will not be responsible for storage or maintenance of this tape. 6. For the purpose of advancing medical education, I consent to the admittance of observers to the Operating Room.     7. I authorize the use of any specimen, organs Signature of Patient:   ___________________________    When the patient is a minor or mentally incompetent to give consent:  Signature of person authorized to consent for patient: ___________________________   Relationship to patient: _____________________ drugs/illegal medications). Failure to inform my anesthesiologist about these medicines may increase my risk of anesthetic complications. · If I am allergic to anything or have had a reaction to anesthesia before.     3. I understand how the anesthesia med I have discussed the procedure and information above with the patient (or patient’s representative) and answered their questions. The patient or their representative has agreed to have anesthesia services.     _______________________________________________

## (undated) NOTE — LETTER
[facility] [address], [address] [place] [address]    Consent for Anesthesia   1.    Jeremi Guzman agree to be cared for by an anesthesiologist, who is specially trained to monitor me and give me medicine to put me to sleep or keep me comfortab · Rare risks include: remembering what happened during my procedure, allergic reactions to medications, injury to my airway, heart, lungs, vision, nerves, or muscles and in extremely rare instances death.   5. My doctor has explained to me other choices eileen Printed: 4/10/2019 at 10:35 AM     Medical Record #: [id number]                                            Page 1 of 1

## (undated) NOTE — LETTER
[facility]  [address] [facility], [address] [place] [address]    Consent for Operation    Date: __________________    Time: _______________    1. I authorize the performance upon Gemma Rosa the following operation:    * No surgery found *    2.  I Daniela Maravilla videotape. The [facility] will not be responsible for storage or maintenance of this tape. 6. For the purpose of advancing medical education, I consent to the admittance of observers to the Operating Room.     7. I authorize the use of any specimen, organs Signature of Patient:   ___________________________    When the patient is a minor or mentally incompetent to give consent:  Signature of person authorized to consent for patient: ___________________________   Relationship to patient: _____________________ drugs/illegal medications). Failure to inform my anesthesiologist about these medicines may increase my risk of anesthetic complications. · If I am allergic to anything or have had a reaction to anesthesia before.     3. I understand how the anesthesia med I have discussed the procedure and information above with the patient (or patient’s representative) and answered their questions. The patient or their representative has agreed to have anesthesia services.     _______________________________________________

## (undated) NOTE — LETTER
04/18/19  [phone]    Dear Carmine Mcgovern: It was a pleasure speaking with you over the phone recently.  To follow up, I wanted to send you some contact information to utilize when you have a question and or nee

## (undated) NOTE — ED AVS SNAPSHOT
Rodríguezrani Huizarlam   MRN: [id number]    Department:  [facility] Emergency Department   Date of Visit:  7/24/2018           Disclosure     Insurance plans vary and the physician(s) referred by the ER may not be covered by your plan.  Please contact your tell this physician (or your personal doctor if your instructions are to return to your personal doctor) about any new or lasting problems. The primary care or specialist physician will see patients referred from the [facility] Emergency Department.  Caridad Hurtado

## (undated) NOTE — ED AVS SNAPSHOT
[facility] Emergency Department    [place]  [address] [address]    Phone:  [phone]    Fax:  [phone]   MRN: [id number]    Department:  [facility] Emergency Department   Date of Visit:  1/3/2 IF THERE IS ANY CHANGE OR WORSENING OF YOUR CONDITION, CALL YOUR PRIMARY CARE PHYSICIAN AT ONCE OR RETURN IMMEDIATELY TO THE EMERGENCY DEPARTMENT.     If you have been prescribed any medication(s), please fill your prescription right away and begin taking t

## (undated) NOTE — IP AVS SNAPSHOT
Patient Demographics     Address  [address]  [place] [address] Phone  [phone] [facility])  [phone] (Mobile) *Preferred* E-mail Address  [email or web address]      Emergency Contact(s)     Name Relation Home Work Mobile    Kodak Pacheco Inject 0.84 mL (84 mg total) into the skin every 12 (twelve) hours. Discontinue when INR is 2.0 or greater.    Audelia Grijalva MD         guaiFENesin  MG Tb12  Commonly known as:  MUCINEX      Take 1 tablet (600 mg total) by mouth 2 (two) times da Commonly known as:  COUMADIN      Take 2.5 mg by mouth See Admin Instructions.  Take 2.5 mg on Wednesday, Sunday                Where to Get Your Medications      These medications were sent to [facility], [address] [place] [address] Order ID Medication Name Action Time Action Reason Comments    [id number] Enoxaparin Sodium (LOVENOX) 80 MG/0.8ML injection 80 mg 05/14/20 2044 Given              Recent Vital Signs       Most Recent Value   Vitals  123/60 Filed at 05/15/2020 1152   Pulse PT 17.4 12.4 - 14.6 seconds [facility])   Comment:         New lot of PT reagent started on March 23,2020. The new PT reference range is 12.4-14.6 seconds.        INR 1.38 0.89 - 1.11 H [facility])   Comment:         New lot of PT reagent starte 1042    Order Status:  Completed Lab Status:  Final result Updated:  05/09/20 1056    Specimen:  Other from [facility]      Rapid SARS-CoV-2 by PCR Detected         H&P - H&P Note      H&P signed by Lorri Narvaez MD at 5/9/2020  5:16 PM  Version 4 of 4    Auth nausea vomiting. Denies any focal weakness or numbness. Complains of mild headache. 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 rate 18, height 5' 6\" (1.676 m), weight 189 lb (85.7 kg), SpO2 97 %. General: No acute distress. HEENT: Moist mucous membranes. EOM-I.  P Interstitial abnormalities the lungs are noted. No pneumothorax. CONCLUSION:  No significant changes since prior exam.           Dictated by: Juanito Moore MD on 5/09/2020 at 11:18 AM[MJ.2]         ASSESSMENT / PLAN:[MJ.1]     1.  Fever, cough, Plan of care discussed with[MJ.1] patient and RN at bedside[MJ. 3]      Discussed with ER Physician. [MJ.1]  D/w daughter  Haveconrad Worrell of [place] [phone]   May need ALYSHA per daughter, daughter states pt was in [facility] before in [address] [place] [address] self-monitoring at home and did not have any exposure to COVID-19 that she knows of. Complains of diarrhea 1 episode today at home. Complains of mild diffuse abdominal discomfort pain. Complains of mild pleuritic chest pain and diffuse body pain.   Laquita Severe, frozen shoulder syndrome   • Pericardial effusion 6/5/2014    trivial   • Pulmonary HTN ([facility] 10/10/2014   • Rheumatoid arthritis ([facility]    • S/P ICD (internal cardiac defibrillator) procedure 7/7/2016    medtronic    • S/P total knee replacement 6 • Diabetes Father    • Heart Disease Father    • Diabetes Mother    • Diabetes Sister    • Heart Disease Sister    • Diabetes Brother    • Heart Disease Brother    • Cancer Brother       Reviewed    Social history:   reports that she has never smoked.  She PTP 50.0 05/09/2020    CRP 2.15 05/09/2020    MG 2.0 05/09/2020    TROP <0.045 05/09/2020    CK 36 05/09/2020       Recent Labs   Lab 05/09/20  1042   PTP 50.0*   INR 5.34*       Recent Labs   Lab 05/09/20  1042   TROP <0.045   CK 36     Imaging:[MJ.1]  X 1. RADHA protocol  10. 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 Rheumatoid arthritis  12. History of subdural hematoma with previous craniotomy  13.  Pulmonary XQGCDEHOCAVE[LC.2],[UL.8] Chronic systolic heart failure, history of VT, history of mitral clip, history of AICD[MJ  1940 MRN [id number]   Location [address] [facility] Attending Francesca Gonzales MD   Hosp Day # 0 PCP Kevon Penaloza MD     Chief Complaint:[MJ.1] Fever, cough, headache, dizziness, syncopal episode at home[MJ.2]    History o • Fracture of C1 vertebra, closed ([facility] 07/2016   • Gallstones    • High cholesterol    • History of syncope     cardiogenic   • Insomnia 11/25/2013   • Kidney stone    • Migraines    • NSVT (nonsustained ventricular tachycardia) ([facility] 7/11/2014    Recurre • INJECT EPIDURAL ADRIAST,LUMB,CONTINOUS  2012   • MITRACLIP N/A 4/11/2019    Performed by Berto Briggs MD at [facility] CVOR   • OTHER  2016    surgery to relieve pressure on brain due to fall   • PACEMAKER/DEFIBRILLATOR      Medtronic single chamber ICD   • TOTA noted.  Integument: No lesions. No erythema. Psychiatric: Appropriate mood and affect.       Diagnostic Data:      Laboratory Data:   Lab Results   Component Value Date    CREATSERUM 0.98 05/09/2020    BUN 12 05/09/2020     05/09/2020    K 2.9 05/09/ 6. Asthma, COPD[MJ.2]  1. Stable at this time  2. Patient on albuterol inhaler as needed at home[MJ.4]  7. 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 Hyperglycemia protocol  2. Last hemoglobin A1c in epic on November 2019 was 5.2  3.  Hold home Related Notes:  Addendum by Aurelia Graham MD (Physician) filed at 2020  5:05 PM       [facility]                                                               History & Physical         Solange Ill Patient Status:  Emergency     • Diabetes mellitus type 2, noninsulin dependent ([facility]     Type !!   • Diabetic peripheral neuropathy associated with type 2 diabetes mellitus ([facility] 09/16/2016    Severe both legs   • Diverticulitis    • Essential hypertension    • Fracture of C1 vertebra, Repair of Incarcerated Complex Ventral Hernia w/mesh, bilateral component separation, removal previous mesh, partial omentectomy, lysis of adhesions on 3/24/2011:  PCP:  Dr. Martinez Powell   • [address] [facility]     • HYSTERECTOMY      2/2 prolapse, no cancer Cardiovascular: S1, S2.  Regular rate and rhythm. No murmurs. Equal pulses   Abdomen: Soft, nontender, nondistended. Positive bowel sounds. No rebound tenderness  Neurologic:[MJ.1] Awake alert oriented x3, n[MJ.3]o focal neurological deficits.   [address] 3. Symptomatic management for now[MJ.3]  2. Hypokalemia  1. Replace  3. Coagulopathy due to Coumadin  1. Hold Coumadin  2. Follow INR  4. Chronic atrial fibrillation  1. Continue home Cardizem and amiodarone and metoprolol  2.  Hold Coumadin due to coagulop 1. Consult to Cardiology [[id number]] ordered by Kitty Rondon MD at [address]  Cardiology Consultation[VM. 1]    Erika Pacheco[VM. 2] Patient Status:  Inpatient    1940 MRN [id number]   [facility]- Recurrence 7.2016 Hx VT- s/p ICD implant on 7/7/16.      • Obesity, morbid, BMI 40.0-49.9 ([facility] 6/18/2015   • RADHA (obstructive sleep apnea) 06/29/2012    Cannot tolerate CPAP machine- uses oxygen periodically    • Osteoarthritis of shoulders, bilateral 06/ • TOTAL KNEE REPLACEMENT     • US FNA LYMPH NODE, GUIDE INCLD,  LEFT (CPT=10005)  1/14/14   • VALVE REPAIR  05/03/2019    mitral valve clip placed 2/2 severe mitral regurgitation     Family History   Problem Relation Age of Onset   • Diabetes Father    • H •  hydrALAzine HCl (APRESOLINE) injection 10 mg, 10 mg, Intravenous, Q6H PRN[VM. 2]    Review of Systems:  A comprehensive review of systems was negative if not otherwise mention in above HPI.[VM.1]    /89 (BP Location: Left arm)   Pulse 80   Temp 99. stenosis. There was moderate regurgitation directed eccentrically. The     mitral regurgitation jet appears lateral to the MitraClips.  Mean gradient     (D): 3mm Hg at an effective heart rate of 59-62 bpm. Valve area by     continuity equation (using LV VM.3 - Rom Soto MD on 5/10/2020  3:05 PM                     D/C Summary    No notes of this type exist for this encounter. Imaging Results (HF patients)    Chest X-Ray Results (HF patients only)    No exam resulted this encounter.       2D Ec - See additional Care Plan goals for specific interventions

## (undated) NOTE — LETTER
Anne-Marie Mason [address]  [place], [address] [facility]    Consent for Operation  Date: __________________                                Time: _______________    1.  I authorize the performance upon Ernesto Friday the following operation:  Procedure( 5. I consent to the photographing or videotaping of the operations or procedures to be performed, including appropriate portions of my body for medical, scientific, or educational purposes, provided my identity is not revealed by the pictures or by descrip 9. If I have a Do Not Resuscitate order in place, the surgeon and I (or the individual authorized to consent on my behalf) will discuss and agree as to whether the Do Not Resuscitate order will remain in effect or will be discontinued during the performanc a. Allow the anesthesiologist (anesthesia doctor) to give me medicine and do additional procedures as necessary.  Some examples are: Starting or using an “IV” to give me medicine, fluids or blood during my procedure, and having a breathing tube placed to he 7. Regional Anesthesia (“spinal”, “epidural”, & “nerve blocks”): I understand that rare but potential complications include headache, bleeding, infection, seizure, irregular heart rhythms, and nerve injury.     I can change my mind about having anesthesia

## (undated) NOTE — LETTER
[facility] [address], [address]  Consent for Procedure/Sedation    Date: [id number]    Time: 1410      1.  I authorize the performance upon Pawan Pruitt the following: CHOLECYSTOGRAM AND POSSIBLE CHANGE  2. I authorize Dr. Aditya Tranc to consent for patient: _________________________ patient: ___________________    Witness: _______________________________ Date: _____________________    Printed: 2018   2:09 PM  Patient Name: Ksenia Matt        : 1940       Medical Record #